# Patient Record
Sex: MALE | Race: WHITE | NOT HISPANIC OR LATINO | Employment: OTHER | URBAN - NONMETROPOLITAN AREA
[De-identification: names, ages, dates, MRNs, and addresses within clinical notes are randomized per-mention and may not be internally consistent; named-entity substitution may affect disease eponyms.]

---

## 2017-07-11 ENCOUNTER — OFFICE VISIT (OUTPATIENT)
Dept: GASTROENTEROLOGY | Facility: CLINIC | Age: 77
End: 2017-07-11

## 2017-07-11 VITALS
HEART RATE: 76 BPM | DIASTOLIC BLOOD PRESSURE: 82 MMHG | SYSTOLIC BLOOD PRESSURE: 168 MMHG | OXYGEN SATURATION: 100 % | BODY MASS INDEX: 22.76 KG/M2 | HEIGHT: 70 IN | WEIGHT: 159 LBS | TEMPERATURE: 97.8 F

## 2017-07-11 DIAGNOSIS — Z80.0 FAMILY HX OF COLON CANCER: ICD-10-CM

## 2017-07-11 DIAGNOSIS — I10 ESSENTIAL HYPERTENSION: ICD-10-CM

## 2017-07-11 DIAGNOSIS — Z86.010 HX OF COLONIC POLYP: Primary | ICD-10-CM

## 2017-07-11 PROBLEM — Z86.0100 HX OF COLONIC POLYP: Status: ACTIVE | Noted: 2017-07-11

## 2017-07-11 PROCEDURE — S0285 CNSLT BEFORE SCREEN COLONOSC: HCPCS | Performed by: NURSE PRACTITIONER

## 2017-07-11 RX ORDER — LATANOPROST 50 UG/ML
1 SOLUTION/ DROPS OPHTHALMIC DAILY
COMMUNITY
Start: 2017-06-27

## 2017-07-11 RX ORDER — ASPIRIN 81 MG/1
81 TABLET ORAL DAILY
COMMUNITY
End: 2022-09-13

## 2017-07-11 NOTE — PROGRESS NOTES
[facility]    Primary Physician Jorge Shepherd MD    7/11/2017    Oral Dey   1940      Chief Complaint   Patient presents with   • Colonoscopy       Subjective     HPI    Oral Dey is a 76 y.o. male who presents as a referral for preventative maintenance. He has no complaints of nausea or vomiting. No change in bowels. No wt loss. No BRBPR. No melena. No abdominal pain. There is family h/o colon cancer.  He has h/o colon polyps.  Last cscope was 6/2012 , recall recommended 5 yr.       Past Medical History:   Diagnosis Date   • CLL (chronic lymphocytic leukemia)    • Colon polyp    • GERD (gastroesophageal reflux disease)    • Hiatal hernia    • Hypertension        Past Surgical History:   Procedure Laterality Date   • COLONOSCOPY  06/13/2012   • HERNIA REPAIR         Outpatient Prescriptions Marked as Taking for the 7/11/17 encounter (Office Visit) with RENUKA Gonzales   Medication Sig Dispense Refill   • aspirin 81 MG EC tablet Take 81 mg by mouth Daily.     • latanoprost (XALATAN) 0.005 % ophthalmic solution Daily.     • metoprolol tartrate (LOPRESSOR) 25 MG tablet Daily.         Allergies   Allergen Reactions   • Augmentin [Amoxicillin-Pot Clavulanate]    • Latex      And band aids.        Social History     Social History   • Marital status:      Spouse name: N/A   • Number of children: N/A   • Years of education: N/A     Occupational History   • Not on file.     Social History Main Topics   • Smoking status: Former Smoker   • Smokeless tobacco: Never Used   • Alcohol use No   • Drug use: No   • Sexual activity: Not on file     Other Topics Concern   • Not on file     Social History Narrative       Family History   Problem Relation Age of Onset   • Colon cancer Maternal Grandfather      in his 60's.        Review of Systems   Constitutional: Negative for appetite change, chills, fatigue, fever and unexpected weight change.   HENT: Positive for hearing loss  (wears hearing aid). Negative for sore throat and trouble swallowing.    Respiratory: Negative for cough, chest tightness, shortness of breath and wheezing.    Cardiovascular: Negative for chest pain and palpitations.   Gastrointestinal: Negative for abdominal distention, abdominal pain, anal bleeding, blood in stool, constipation, diarrhea, nausea, rectal pain and vomiting.        As mentioned in hpi   Genitourinary: Negative for difficulty urinating, dysuria and hematuria.   Musculoskeletal: Negative for arthralgias and back pain.   Skin: Negative for color change and rash.   Neurological: Negative for dizziness, tremors, seizures, syncope, light-headedness and headaches.   Hematological: Negative for adenopathy. Does not bruise/bleed easily.   Psychiatric/Behavioral: Negative for confusion. The patient is not nervous/anxious.        Objective     Vitals:    07/11/17 1040   BP: 168/82   Pulse: 76   Temp: 97.8 °F (36.6 °C)   SpO2: 100%     Last 2 weights    07/11/17  1040   Weight: 159 lb (72.1 kg)     Body mass index is 22.81 kg/(m^2).    Physical Exam   Constitutional: He is oriented to person, place, and time. He appears well-developed and well-nourished. No distress.   HENT:   Head: Normocephalic and atraumatic.   Mouth/Throat: Oropharynx is clear and moist.   Eyes: Pupils are equal, round, and reactive to light. No scleral icterus.   Neck: Normal range of motion. Neck supple. No JVD present. No tracheal deviation present.   Cardiovascular: Normal rate, regular rhythm, normal heart sounds and intact distal pulses.  Exam reveals no gallop and no friction rub.    No murmur heard.  Pulmonary/Chest: Effort normal and breath sounds normal. No stridor. No respiratory distress. He has no wheezes. He has no rales.   Abdominal: Soft. Bowel sounds are normal. He exhibits no distension and no mass. There is no tenderness. There is no rebound and no guarding.   Musculoskeletal: Normal range of motion. He exhibits no edema  or deformity.   Neurological: He is alert and oriented to person, place, and time.   Skin: Skin is warm and dry. No rash noted.   Psychiatric: He has a normal mood and affect. His behavior is normal.   Vitals reviewed.      Imaging Results (most recent)     None          Assessment/Plan     Oral was seen today for colonoscopy.    Diagnoses and all orders for this visit:    Hx of colonic polyp  -     External Facility Surgical/Procedural Request    Family hx of colon cancer  -     External Facility Surgical/Procedural Request    Essential hypertension    Other orders  -     polyethylene glycol (GOLYTELY) 236 G solution; Take 4,000 mL by mouth 1 (One) Time for 1 dose. Take as directed per instruction sheet.      Plan for colonoscopy.Instructed patient to take heart or blood pressure medication a.m. of procedure if that is when normally taken.    COLONOSCOPY   All risks, benefits, alternatives, and indications of colonoscopy procedure have been discussed with the patient. Risks to include perforation of the colon requiring possible surgery or colostomy, risk of bleeding from biopsies or removal of colon tissue, possibility of missing a colon polyp or cancer, or adverse drug reaction.  Benefits to include the diagnosis and management of disease of the colon and rectum. Alternatives to include barium enema, radiographic evaluation, lab testing or no intervention. Pt verbalizes understanding and agrees.         RENUKA Peacock      EMR Dragon/transcription disclaimer:  Much of this encounter note is electronic transcription/translation of spoken language to printed text.  The electronic translation of spoken language may be erroneous, or at times, nonsensical words or phrases may be inadvertently transcribed.  Although I have reviewed the note for such errors, some may still exist.

## 2017-08-01 ENCOUNTER — HOSPITAL ENCOUNTER (OUTPATIENT)
Dept: HOSPITAL 58 - SURG | Age: 77
Discharge: HOME | End: 2017-08-01
Attending: INTERNAL MEDICINE

## 2017-08-01 ENCOUNTER — OUTSIDE FACILITY SERVICE (OUTPATIENT)
Dept: GASTROENTEROLOGY | Facility: CLINIC | Age: 77
End: 2017-08-01

## 2017-08-01 VITALS — BODY MASS INDEX: 23.9 KG/M2

## 2017-08-01 VITALS — SYSTOLIC BLOOD PRESSURE: 132 MMHG | DIASTOLIC BLOOD PRESSURE: 67 MMHG | TEMPERATURE: 97.2 F

## 2017-08-01 DIAGNOSIS — Z80.0: ICD-10-CM

## 2017-08-01 DIAGNOSIS — Z09: Primary | ICD-10-CM

## 2017-08-01 DIAGNOSIS — Z86.010: ICD-10-CM

## 2017-08-01 DIAGNOSIS — K64.8: ICD-10-CM

## 2017-08-01 PROCEDURE — G0105 COLORECTAL SCRN; HI RISK IND: HCPCS | Performed by: INTERNAL MEDICINE

## 2017-08-02 NOTE — OP
INDICATIONS FOR PROCEDURE: 76-year-old gentleman presents for colonoscopy exam.
  He has a history of adenomatous polyps with the last colonoscopy five years 
ago.  A grandparent had colon cancer at an elderly age.  He presents now for 
colonoscopy.



MEDICATIONS:  SEE ANESTHESIA NOTES.



PROCEDURE:  COLONOSCOPY.



REPORT:  The risks, benefits, alternatives and limitations were discussed in 
detail with the patient.  Informed consent was obtained.



After adequate sedation was achieved, digital rectal exam revealed good tone, 
no masses.  The colonoscope was introduced into the rectum and advanced under 
direct visual guidance to the cecum.  The cecum was identified by the 
appendiceal orifice and IC valve. I then slowly withdrew the scope in a 
circumferential manner examining the mucosa quite carefully.  I looked on the 
proximal and distal side of folds and flexures as best as possible. I was able 
to retroflex the scope in the right colon as well as left colon. I noted no 
abnormalities throughout the entire length of the colon except for internal 
hemorrhoids on retroflex view of the anal canal. The prep was good. The 
withdrawal time was 7 minutes and 17 seconds.  The patient tolerated the 
procedure well with stable vital signs and pulse oximetry throughout.



IMPRESSION:

1.   SMALL INTERNAL HEMORRHOIDS. 



RECOMMENDATIONS: 

1.   High fiber diet.

2.   Office visit as needed. 

3.   Reviewing his health and his advanced age, I would suggest a colonoscopy 
examination for surveillance again in 5 years, only if he is clinically well. 
We will not put him on our recall but please refer him for colonoscopy if he is 
a reasonable candidate at that time or sooner if needed.



CC:  DR. DAVE SARMIENTO

## 2017-09-01 ENCOUNTER — HOSPITAL ENCOUNTER (OUTPATIENT)
Dept: HOSPITAL 58 - RAD | Age: 77
Discharge: HOME | End: 2017-09-01
Attending: INTERNAL MEDICINE

## 2017-09-01 VITALS — BODY MASS INDEX: 23.9 KG/M2

## 2017-09-01 DIAGNOSIS — M25.432: ICD-10-CM

## 2017-09-01 DIAGNOSIS — M25.532: Primary | ICD-10-CM

## 2017-09-01 NOTE — DI
EXAM:  Two views of the left wrist. 

  

History:  Left wrist pain and swelling. 

  

Findings: There is a subtle lucency of the distal left radius.  No dislocation.  No abnormal calcifi
cations or radiopaque foreign bodies.  Mild to moderate narrowing of the first carpal metacarpal micahel
nt and mild to moderate narrowing of the scaphoid trapezium articulation. 

  

Impression: A subtle linear lucency within the distal left radius near the articular surface probabl
y represents a nutrient foramen.  Nondisplaced fracture is not excluded.

## 2017-09-01 NOTE — DI
EXAM:  Radiographs, left hand 

  

HISTORY:  Left hand pain and swelling. 

  

COMPARISON:  None available. 

  

TECHNIQUE:  Three views. 

  

  

FINDINGS:  Bone mineralization is normal.  There is no fracture or dislocation.  Mild joint space na
rrowing marginal osteophyte formation noted throughout the hand and wrist.  No erosive changes are s
een.  No focal soft tissue abnormality is seen. 

  

---------------------------- 

IMPRESSION: 

Mild osteoarthritis.

## 2017-12-21 ENCOUNTER — APPOINTMENT (OUTPATIENT)
Dept: PREADMISSION TESTING | Facility: HOSPITAL | Age: 77
End: 2017-12-21

## 2017-12-21 VITALS
HEIGHT: 70 IN | RESPIRATION RATE: 18 BRPM | HEART RATE: 68 BPM | DIASTOLIC BLOOD PRESSURE: 71 MMHG | BODY MASS INDEX: 23.01 KG/M2 | OXYGEN SATURATION: 95 % | SYSTOLIC BLOOD PRESSURE: 148 MMHG | WEIGHT: 160.72 LBS

## 2017-12-21 LAB
ALBUMIN SERPL-MCNC: 4.1 G/DL (ref 3.5–5)
ALBUMIN/GLOB SERPL: 1.8 G/DL (ref 1.1–2.5)
ALP SERPL-CCNC: 72 U/L (ref 24–120)
ALT SERPL W P-5'-P-CCNC: 27 U/L (ref 0–54)
ANION GAP SERPL CALCULATED.3IONS-SCNC: 12 MMOL/L (ref 4–13)
AST SERPL-CCNC: 15 U/L (ref 7–45)
BASOPHILS # BLD MANUAL: 0.24 10*3/MM3 (ref 0–0.2)
BASOPHILS NFR BLD AUTO: 3 % (ref 0–2)
BILIRUB SERPL-MCNC: 1.3 MG/DL (ref 0.1–1)
BUN BLD-MCNC: 12 MG/DL (ref 5–21)
BUN/CREAT SERPL: 11.7 (ref 7–25)
CALCIUM SPEC-SCNC: 9.8 MG/DL (ref 8.4–10.4)
CHLORIDE SERPL-SCNC: 103 MMOL/L (ref 98–110)
CO2 SERPL-SCNC: 29 MMOL/L (ref 24–31)
CREAT BLD-MCNC: 1.03 MG/DL (ref 0.5–1.4)
DEPRECATED RDW RBC AUTO: 39.8 FL (ref 40–54)
ERYTHROCYTE [DISTWIDTH] IN BLOOD BY AUTOMATED COUNT: 12.3 % (ref 12–15)
GFR SERPL CREATININE-BSD FRML MDRD: 70 ML/MIN/1.73
GLOBULIN UR ELPH-MCNC: 2.3 GM/DL
GLUCOSE BLD-MCNC: 130 MG/DL (ref 70–100)
HCT VFR BLD AUTO: 38.6 % (ref 40–52)
HGB BLD-MCNC: 13 G/DL (ref 14–18)
LYMPHOCYTES # BLD MANUAL: 2.22 10*3/MM3 (ref 0.72–4.86)
LYMPHOCYTES NFR BLD MANUAL: 28 % (ref 15–45)
LYMPHOCYTES NFR BLD MANUAL: 4 % (ref 4–12)
MCH RBC QN AUTO: 29.7 PG (ref 28–32)
MCHC RBC AUTO-ENTMCNC: 33.7 G/DL (ref 33–36)
MCV RBC AUTO: 88.3 FL (ref 82–95)
MONOCYTES # BLD AUTO: 0.32 10*3/MM3 (ref 0.19–1.3)
NEUTROPHILS # BLD AUTO: 3.65 10*3/MM3 (ref 1.87–8.4)
NEUTROPHILS NFR BLD MANUAL: 45 % (ref 39–78)
NEUTS BAND NFR BLD MANUAL: 1 % (ref 0–10)
PLATELET # BLD AUTO: 119 10*3/MM3 (ref 130–400)
PMV BLD AUTO: 9.5 FL (ref 6–12)
POTASSIUM BLD-SCNC: 3.8 MMOL/L (ref 3.5–5.3)
PROT SERPL-MCNC: 6.4 G/DL (ref 6.3–8.7)
RBC # BLD AUTO: 4.37 10*6/MM3 (ref 4.8–5.9)
RBC MORPH BLD: NORMAL
SCAN SLIDE: NORMAL
SMALL PLATELETS BLD QL SMEAR: ABNORMAL
SODIUM BLD-SCNC: 144 MMOL/L (ref 135–145)
VARIANT LYMPHS NFR BLD MANUAL: 19 % (ref 0–5)
WBC MORPH BLD: NORMAL
WBC NRBC COR # BLD: 7.94 10*3/MM3 (ref 4.8–10.8)

## 2017-12-21 PROCEDURE — 85007 BL SMEAR W/DIFF WBC COUNT: CPT | Performed by: SPECIALIST

## 2017-12-21 PROCEDURE — 93010 ELECTROCARDIOGRAM REPORT: CPT | Performed by: INTERNAL MEDICINE

## 2017-12-21 PROCEDURE — 85025 COMPLETE CBC W/AUTO DIFF WBC: CPT | Performed by: SPECIALIST

## 2017-12-21 PROCEDURE — 93005 ELECTROCARDIOGRAM TRACING: CPT

## 2017-12-21 PROCEDURE — 80053 COMPREHEN METABOLIC PANEL: CPT | Performed by: SPECIALIST

## 2017-12-21 PROCEDURE — 36415 COLL VENOUS BLD VENIPUNCTURE: CPT

## 2017-12-21 RX ORDER — ANTIOX #8/OM3/DHA/EPA/LUT/ZEAX 250-2.5 MG
1 CAPSULE ORAL 2 TIMES DAILY
COMMUNITY

## 2017-12-21 NOTE — DISCHARGE INSTRUCTIONS
DAY OF SURGERY INSTRUCTIONS        YOUR SURGEON: April MARITA    PROCEDURE: RIGHT INGUINAL HERNIA REPAIR WITH MESH    DATE OF SURGERY: December 28, 2017    ARRIVAL TIME: AS DIRECTED BY OFFICE    DAY OF SURGERY TAKE ONLY THESE MEDICATIONS: METOPROLOL        BEFORE YOU COME TO THE HOSPITAL  (Pre-op instructions)  • Do not eat, drink, smoke or chew gum after midnight the night before surgery.  This also includes no mints.  • Morning of surgery take only the medicines you have been instructed with a sip of water unless otherwise instructed  by your physician.  • Do not shave, wear makeup or dark nail polish.  • Remove all jewelry including rings.  • Leave anything you consider valuable at home.  • Leave your suitcase in the car until after your surgery.  • Bring the following with you if applicable:  o Picture ID and insurance, Medicare or Medicaid cards  o Co-pay/deductible required by insurance (cash, check, credit card)  o Copy of advance directive, living will or power-of- documents if not brought to PAT  o CPAP or BIPAP mask and tubing  o Relaxation aids (MP3 player, book, magazine)  • On the day of surgery check in at registration located at the main entrance of the hospital.       Outpatient Surgery Guidelines, Adult  Outpatient procedures are those for which the person having the procedure is allowed to go home the same day as the procedure. Various procedures are done on an outpatient basis. You should follow some general guidelines if you will be having an outpatient procedure.  LET YOUR HEALTH CARE PROVIDER KNOW ABOUT:  · Any allergies you have.  · All medicines you are taking, including vitamins, herbs, eye drops, creams, and over-the-counter medicines.  · Previous problems you or members of your family have had with the use of anesthetics.  · Any blood disorders you have.  · Previous surgeries you have had.  · Medical conditions you have.  RISKS AND COMPLICATIONS  Your health care provider will  discuss possible risks and complications with you before surgery. Common risks and complications include:    · Problems due to the use of anesthetics.  · Blood loss and replacement (does not apply to minor surgical procedures).  · Temporary increase in pain due to surgery.  · Uncorrected pain or problems that the surgery was meant to correct.  · Infection.  · New damage.  BEFORE THE PROCEDURE  · Ask your health care provider about changing or stopping your regular medicines. You may need to stop taking certain medicines in the days or weeks before the procedure.  · Stop smoking at least 2 weeks before surgery. This lowers your risk for complications during and after surgery. Ask your health care provider for help with this if needed.  · Eat your usual meals and a light supper the day before surgery. Continue fluid intake. Do not drink alcohol.  · Do not eat or drink after midnight the night before your surgery.   · Arrange for someone to take you home and to stay with you for 24 hours after the procedure. Medicine given for your procedure may affect your ability to drive or to care for yourself.  · Call your health care provider's office if you develop an illness or problem that may prevent you from safely having your procedure.  AFTER THE PROCEDURE  After surgery, you will be taken to a recovery area, where your progress will be monitored. If there are no complications, you will be allowed to go home when you are awake, stable, and taking fluids well. You may have numbness around the surgical site. Healing will take some time. You will have tenderness at the surgical site and may have some swelling and bruising. You may also have some nausea.  HOME CARE INSTRUCTIONS  · Do not drive for 24 hours, or as directed by your health care provider. Do not drive while taking prescription pain medicines.  · Do not drink alcohol for 24 hours.  · Do not make important decisions or sign legal documents for 24 hours.  · You may  resume a normal diet and activities as directed.  · Do not lift anything heavier than 10 pounds (4.5 kg) or play contact sports until your health care provider says it is okay.  · Change your bandages (dressings) as directed.  · Only take over-the-counter or prescription medicines as directed by your health care provider.  · Follow up with your health care provider as directed.  SEEK MEDICAL CARE IF:  · You have increased bleeding (more than a small spot) from the surgical site.  · You have redness, swelling, or increasing pain in the wound.  · You see pus coming from the wound.  · You have a fever.  · You notice a bad smell coming from the wound or dressing.  · You feel lightheaded or faint.  · You develop a rash.  · You have trouble breathing.  · You develop allergies.  MAKE SURE YOU:  · Understand these instructions.  · Will watch your condition.  · Will get help right away if you are not doing well or get worse.     This information is not intended to replace advice given to you by your health care provider. Make sure you discuss any questions you have with your health care provider.     Document Released: 09/12/2002 Document Revised: 05/03/2016 Document Reviewed: 05/22/2014  [employer] Interactive Patient Education ©2016 [employer]       Fall Prevention in Hospitals, Adult  As a hospital patient, your condition and the treatments you receive can increase your risk for falls. Some additional risk factors for falls in a hospital include:  · Being in an unfamiliar environment.  · Being on bed rest.  · Your surgery.  · Taking certain medicines.  · Your tubing requirements, such as intravenous (IV) therapy or catheters.  It is important that you learn how to decrease fall risks while at the hospital. Below are important tips that can help prevent falls.  SAFETY TIPS FOR PREVENTING FALLS  Talk about your risk of falling.  · Ask your health care provider why you are at risk for falling. Is it your medicine, illness,  tubing placement, or something else?  · Make a plan with your health care provider to keep you safe from falls.  · Ask your health care provider or pharmacist about side effects of your medicines. Some medicines can make you dizzy or affect your coordination.  Ask for help.  · Ask for help before getting out of bed. You may need to press your call button.  · Ask for assistance in getting safely to the toilet.  · Ask for a walker or cane to be put at your bedside. Ask that most of the side rails on your bed be placed up before your health care provider leaves the room.  · Ask family or friends to sit with you.  · Ask for things that are out of your reach, such as your glasses, hearing aids, telephone, bedside table, or call button.  Follow these tips to avoid falling:  · Stay lying or seated, rather than standing, while waiting for help.  · Wear rubber-soled slippers or shoes whenever you walk in the hospital.  · Avoid quick, sudden movements.  ¨ Change positions slowly.  ¨ Sit on the side of your bed before standing.  ¨ Stand up slowly and wait before you start to walk.  · Let your health care provider know if there is a spill on the floor.  · Pay careful attention to the medical equipment, electrical cords, and tubes around you.  · When you need help, use your call button by your bed or in the bathroom. Wait for one of your health care providers to help you.  · If you feel dizzy or unsure of your footing, return to bed and wait for assistance.  · Avoid being distracted by the TV, telephone, or another person in your room.  · Do not lean or support yourself on rolling objects, such as IV poles or bedside tables.     This information is not intended to replace advice given to you by your health care provider. Make sure you discuss any questions you have with your health care provider.     Document Released: 12/15/2001 Document Revised: 01/08/2016 Document Reviewed: 08/25/2013  [employer] Interactive Patient Education  ©2016 [employer]       Surgical Site Infections FAQs  What is a Surgical Site Infection (SSI)?  A surgical site infection is an infection that occurs after surgery in the part of the body where the surgery took place. Most patients who have surgery do not develop an infection. However, infections develop in about 1 to 3 out of every 100 patients who have surgery.  Some of the common symptoms of a surgical site infection are:  · Redness and pain around the area where you had surgery  · Drainage of cloudy fluid from your surgical wound  · Fever  Can SSIs be treated?  Yes. Most surgical site infections can be treated with antibiotics. The antibiotic given to you depends on the bacteria (germs) causing the infection. Sometimes patients with SSIs also need another surgery to treat the infection.  What are some of the things that hospitals are doing to prevent SSIs?  To prevent SSIs, doctors, nurses, and other healthcare providers:  · Clean their hands and arms up to their elbows with an antiseptic agent just before the surgery.  · Clean their hands with soap and water or an alcohol-based hand rub before and after caring for each patient.  · May remove some of your hair immediately before your surgery using electric clippers if the hair is in the same area where the procedure will occur. They should not shave you with a razor.  · Wear special hair covers, masks, gowns, and gloves during surgery to keep the surgery area clean.  · Give you antibiotics before your surgery starts. In most cases, you should get antibiotics within 60 minutes before the surgery starts and the antibiotics should be stopped within 24 hours after surgery.  · Clean the skin at the site of your surgery with a special soap that kills germs.  What can I do to help prevent SSIs?  Before your surgery:  · Tell your doctor about other medical problems you may have. Health problems such as allergies, diabetes, and obesity could affect your surgery and  your treatment.  · Quit smoking. Patients who smoke get more infections. Talk to your doctor about how you can quit before your surgery.  · Do not shave near where you will have surgery. Shaving with a razor can irritate your skin and make it easier to develop an infection.  At the time of your surgery:  · Speak up if someone tries to shave you with a razor before surgery. Ask why you need to be shaved and talk with your surgeon if you have any concerns.  · Ask if you will get antibiotics before surgery.  After your surgery:  · Make sure that your healthcare providers clean their hands before examining you, either with soap and water or an alcohol-based hand rub.  · If you do not see your providers clean their hands, please ask them to do so.  · Family and friends who visit you should not touch the surgical wound or dressings.  · Family and friends should clean their hands with soap and water or an alcohol-based hand rub before and after visiting you. If you do not see them clean their hands, ask them to clean their hands.  What do I need to do when I go home from the hospital?  · Before you go home, your doctor or nurse should explain everything you need to know about taking care of your wound. Make sure you understand how to care for your wound before you leave the hospital.  · Always clean your hands before and after caring for your wound.  · Before you go home, make sure you know who to contact if you have questions or problems after you get home.  · If you have any symptoms of an infection, such as redness and pain at the surgery site, drainage, or fever, call your doctor immediately.  If you have additional questions, please ask your doctor or nurse.  Developed and co-sponsored by The Society for Healthcare Epidemiology of [place] (SHEA); Infectious Diseases Society of [place] (IDSA); American Hospital Association; Association for Professionals in Infection Control and Epidemiology (APIC); Centers for Disease  Control and Prevention (CDC); and The Joint Commission.     This information is not intended to replace advice given to you by your health care provider. Make sure you discuss any questions you have with your health care provider.     Document Released: 12/23/2014 Document Revised: 01/08/2016 Document Reviewed: 03/02/2016  [employer] Interactive Patient Education ©2016 [employer]       [facility]  CHG 4% Patient Instruction Sheet    Preparing the Skin Before Surgery  Preparing or “prepping” skin before surgery can reduce the risk of infection at the surgical site. To make the process easier,[facility] has chosen 4% Chlorhexidine Gluconate (CHG) antiseptic solution.   The steps below outline the prepping process and should be carefully followed.                                                                                                                                                      Prep the skin at the following time(s):                                                      We recommend you shower the night before surgery, and again the morning of surgery with the 4% CHG antiseptic solution using half of the bottle and a cloth each time.  Dress in clean clothes/sleepwear after showering.  See instructions below for application.          Do not apply any lotions or moisturizers.       Do not shave the area to be prepped for at least 2 days prior to surgery.    Clipping the hair may be done immediately prior to your surgery at the hospital    if needed.    Directions:  Thoroughly rinse your body with water.  Apply 4% CHG to a cloth and wash skin gently, paying special attention to the operative site.  Rinse again thoroughly.  Once you have begun using this product do not apply anything else to your skin. If itching or redness persists, rinse affected areas and discontinue use.    When using this product:  • Keep out of eyes, ears, and mouth.  • If solution should contact these areas, rinse out promptly  and thoroughly with water.  • For external use only.  • Do not use in genital area, on your face or head.      PATIENT/FAMILY/RESPONSIBLE PARTY VERBALIZES UNDERSTANDING OF ABOVE EDUCATION.  COPY OF PAIN SCALE GIVEN AND REVIEWED WITH VERBALIZED UNDERSTANDING.

## 2017-12-28 ENCOUNTER — ANESTHESIA EVENT (OUTPATIENT)
Dept: PERIOP | Facility: HOSPITAL | Age: 77
End: 2017-12-28

## 2017-12-28 ENCOUNTER — ANESTHESIA (OUTPATIENT)
Dept: PERIOP | Facility: HOSPITAL | Age: 77
End: 2017-12-28

## 2017-12-28 ENCOUNTER — HOSPITAL ENCOUNTER (OUTPATIENT)
Facility: HOSPITAL | Age: 77
Setting detail: HOSPITAL OUTPATIENT SURGERY
Discharge: HOME | End: 2017-12-28
Attending: SPECIALIST | Admitting: SPECIALIST

## 2017-12-28 VITALS
RESPIRATION RATE: 16 BRPM | SYSTOLIC BLOOD PRESSURE: 142 MMHG | OXYGEN SATURATION: 96 % | TEMPERATURE: 97.6 F | DIASTOLIC BLOOD PRESSURE: 62 MMHG | HEART RATE: 69 BPM

## 2017-12-28 PROCEDURE — 25010000002 VANCOMYCIN PER 500 MG: Performed by: SPECIALIST

## 2017-12-28 PROCEDURE — 25010000002 MIDAZOLAM PER 1 MG: Performed by: ANESTHESIOLOGY

## 2017-12-28 PROCEDURE — 25010000002 FENTANYL CITRATE (PF) 250 MCG/5ML SOLUTION: Performed by: NURSE ANESTHETIST, CERTIFIED REGISTERED

## 2017-12-28 PROCEDURE — C1781 MESH (IMPLANTABLE): HCPCS | Performed by: SPECIALIST

## 2017-12-28 PROCEDURE — 25010000002 ONDANSETRON PER 1 MG: Performed by: NURSE ANESTHETIST, CERTIFIED REGISTERED

## 2017-12-28 PROCEDURE — 25010000002 DEXAMETHASONE PER 1 MG: Performed by: NURSE ANESTHETIST, CERTIFIED REGISTERED

## 2017-12-28 PROCEDURE — 25010000002 PROPOFOL 10 MG/ML EMULSION: Performed by: NURSE ANESTHETIST, CERTIFIED REGISTERED

## 2017-12-28 PROCEDURE — 25010000002 SUCCINYLCHOLINE PER 20 MG: Performed by: NURSE ANESTHETIST, CERTIFIED REGISTERED

## 2017-12-28 DEVICE — BARD MESH PERFIX PLUG, EXTRA LARGE
Type: IMPLANTABLE DEVICE | Site: INGUINAL | Status: FUNCTIONAL
Brand: BARD MESH PERFIX PLUG

## 2017-12-28 RX ORDER — SUCCINYLCHOLINE CHLORIDE 20 MG/ML
INJECTION INTRAMUSCULAR; INTRAVENOUS AS NEEDED
Status: DISCONTINUED | OUTPATIENT
Start: 2017-12-28 | End: 2017-12-28 | Stop reason: SURG

## 2017-12-28 RX ORDER — MIDAZOLAM HYDROCHLORIDE 1 MG/ML
2 INJECTION INTRAMUSCULAR; INTRAVENOUS
Status: DISCONTINUED | OUTPATIENT
Start: 2017-12-28 | End: 2017-12-28 | Stop reason: HOSPADM

## 2017-12-28 RX ORDER — PROPOFOL 10 MG/ML
VIAL (ML) INTRAVENOUS AS NEEDED
Status: DISCONTINUED | OUTPATIENT
Start: 2017-12-28 | End: 2017-12-28 | Stop reason: SURG

## 2017-12-28 RX ORDER — LIDOCAINE HYDROCHLORIDE 40 MG/ML
SOLUTION TOPICAL AS NEEDED
Status: DISCONTINUED | OUTPATIENT
Start: 2017-12-28 | End: 2017-12-28 | Stop reason: SURG

## 2017-12-28 RX ORDER — ROCURONIUM BROMIDE 10 MG/ML
INJECTION, SOLUTION INTRAVENOUS AS NEEDED
Status: DISCONTINUED | OUTPATIENT
Start: 2017-12-28 | End: 2017-12-28 | Stop reason: SURG

## 2017-12-28 RX ORDER — SODIUM CHLORIDE 0.9 % (FLUSH) 0.9 %
3 SYRINGE (ML) INJECTION AS NEEDED
Status: DISCONTINUED | OUTPATIENT
Start: 2017-12-28 | End: 2017-12-28 | Stop reason: HOSPADM

## 2017-12-28 RX ORDER — CLINDAMYCIN PHOSPHATE 600 MG/50ML
600 INJECTION INTRAVENOUS
Status: COMPLETED | OUTPATIENT
Start: 2017-12-28 | End: 2017-12-28

## 2017-12-28 RX ORDER — NALOXONE HCL 0.4 MG/ML
0.04 VIAL (ML) INJECTION AS NEEDED
Status: DISCONTINUED | OUTPATIENT
Start: 2017-12-28 | End: 2017-12-28 | Stop reason: HOSPADM

## 2017-12-28 RX ORDER — FENTANYL CITRATE 50 UG/ML
INJECTION, SOLUTION INTRAMUSCULAR; INTRAVENOUS AS NEEDED
Status: DISCONTINUED | OUTPATIENT
Start: 2017-12-28 | End: 2017-12-28

## 2017-12-28 RX ORDER — METOCLOPRAMIDE HYDROCHLORIDE 5 MG/ML
5 INJECTION INTRAMUSCULAR; INTRAVENOUS
Status: DISCONTINUED | OUTPATIENT
Start: 2017-12-28 | End: 2017-12-28 | Stop reason: HOSPADM

## 2017-12-28 RX ORDER — DEXAMETHASONE SODIUM PHOSPHATE 4 MG/ML
INJECTION, SOLUTION INTRA-ARTICULAR; INTRALESIONAL; INTRAMUSCULAR; INTRAVENOUS; SOFT TISSUE AS NEEDED
Status: DISCONTINUED | OUTPATIENT
Start: 2017-12-28 | End: 2017-12-28 | Stop reason: SURG

## 2017-12-28 RX ORDER — MORPHINE SULFATE 2 MG/ML
2 INJECTION, SOLUTION INTRAMUSCULAR; INTRAVENOUS AS NEEDED
Status: DISCONTINUED | OUTPATIENT
Start: 2017-12-28 | End: 2017-12-28 | Stop reason: HOSPADM

## 2017-12-28 RX ORDER — HYDROCODONE BITARTRATE AND ACETAMINOPHEN 5; 325 MG/1; MG/1
1-2 TABLET ORAL EVERY 4 HOURS PRN
Qty: 30 TABLET | Refills: 0 | Status: SHIPPED | OUTPATIENT
Start: 2017-12-28 | End: 2022-03-01

## 2017-12-28 RX ORDER — LABETALOL HYDROCHLORIDE 5 MG/ML
5 INJECTION, SOLUTION INTRAVENOUS
Status: DISCONTINUED | OUTPATIENT
Start: 2017-12-28 | End: 2017-12-28 | Stop reason: HOSPADM

## 2017-12-28 RX ORDER — SODIUM CHLORIDE, SODIUM LACTATE, POTASSIUM CHLORIDE, CALCIUM CHLORIDE 600; 310; 30; 20 MG/100ML; MG/100ML; MG/100ML; MG/100ML
100 INJECTION, SOLUTION INTRAVENOUS CONTINUOUS
Status: DISCONTINUED | OUTPATIENT
Start: 2017-12-28 | End: 2017-12-28 | Stop reason: HOSPADM

## 2017-12-28 RX ORDER — LIDOCAINE HYDROCHLORIDE 20 MG/ML
INJECTION, SOLUTION INFILTRATION; PERINEURAL AS NEEDED
Status: DISCONTINUED | OUTPATIENT
Start: 2017-12-28 | End: 2017-12-28 | Stop reason: SURG

## 2017-12-28 RX ORDER — IPRATROPIUM BROMIDE AND ALBUTEROL SULFATE 2.5; .5 MG/3ML; MG/3ML
3 SOLUTION RESPIRATORY (INHALATION) ONCE AS NEEDED
Status: DISCONTINUED | OUTPATIENT
Start: 2017-12-28 | End: 2017-12-28 | Stop reason: HOSPADM

## 2017-12-28 RX ORDER — FLUMAZENIL 0.1 MG/ML
0.2 INJECTION INTRAVENOUS AS NEEDED
Status: DISCONTINUED | OUTPATIENT
Start: 2017-12-28 | End: 2017-12-28 | Stop reason: HOSPADM

## 2017-12-28 RX ORDER — SODIUM CHLORIDE 0.9 % (FLUSH) 0.9 %
1-10 SYRINGE (ML) INJECTION AS NEEDED
Status: DISCONTINUED | OUTPATIENT
Start: 2017-12-28 | End: 2017-12-28 | Stop reason: HOSPADM

## 2017-12-28 RX ORDER — HYDROCODONE BITARTRATE AND ACETAMINOPHEN 5; 325 MG/1; MG/1
1 TABLET ORAL ONCE AS NEEDED
Status: DISCONTINUED | OUTPATIENT
Start: 2017-12-28 | End: 2017-12-28 | Stop reason: HOSPADM

## 2017-12-28 RX ORDER — MEPERIDINE HYDROCHLORIDE 25 MG/ML
12.5 INJECTION INTRAMUSCULAR; INTRAVENOUS; SUBCUTANEOUS
Status: DISCONTINUED | OUTPATIENT
Start: 2017-12-28 | End: 2017-12-28 | Stop reason: HOSPADM

## 2017-12-28 RX ORDER — FENTANYL CITRATE 50 UG/ML
INJECTION, SOLUTION INTRAMUSCULAR; INTRAVENOUS AS NEEDED
Status: DISCONTINUED | OUTPATIENT
Start: 2017-12-28 | End: 2017-12-28 | Stop reason: SURG

## 2017-12-28 RX ORDER — SODIUM CHLORIDE, SODIUM LACTATE, POTASSIUM CHLORIDE, CALCIUM CHLORIDE 600; 310; 30; 20 MG/100ML; MG/100ML; MG/100ML; MG/100ML
1000 INJECTION, SOLUTION INTRAVENOUS CONTINUOUS
Status: DISCONTINUED | OUTPATIENT
Start: 2017-12-28 | End: 2017-12-28 | Stop reason: HOSPADM

## 2017-12-28 RX ORDER — ONDANSETRON 2 MG/ML
INJECTION INTRAMUSCULAR; INTRAVENOUS AS NEEDED
Status: DISCONTINUED | OUTPATIENT
Start: 2017-12-28 | End: 2017-12-28 | Stop reason: SURG

## 2017-12-28 RX ORDER — HYDRALAZINE HYDROCHLORIDE 20 MG/ML
5 INJECTION INTRAMUSCULAR; INTRAVENOUS
Status: DISCONTINUED | OUTPATIENT
Start: 2017-12-28 | End: 2017-12-28 | Stop reason: HOSPADM

## 2017-12-28 RX ORDER — ONDANSETRON 2 MG/ML
4 INJECTION INTRAMUSCULAR; INTRAVENOUS AS NEEDED
Status: DISCONTINUED | OUTPATIENT
Start: 2017-12-28 | End: 2017-12-28 | Stop reason: HOSPADM

## 2017-12-28 RX ORDER — MIDAZOLAM HYDROCHLORIDE 1 MG/ML
1 INJECTION INTRAMUSCULAR; INTRAVENOUS
Status: DISCONTINUED | OUTPATIENT
Start: 2017-12-28 | End: 2017-12-28 | Stop reason: HOSPADM

## 2017-12-28 RX ADMIN — ONDANSETRON HYDROCHLORIDE 4 MG: 2 SOLUTION INTRAMUSCULAR; INTRAVENOUS at 11:10

## 2017-12-28 RX ADMIN — FENTANYL CITRATE 125 MCG: 50 INJECTION INTRAMUSCULAR; INTRAVENOUS at 10:38

## 2017-12-28 RX ADMIN — LIDOCAINE HYDROCHLORIDE 0.5 ML: 10 INJECTION, SOLUTION EPIDURAL; INFILTRATION; INTRACAUDAL; PERINEURAL at 08:58

## 2017-12-28 RX ADMIN — SUCCINYLCHOLINE CHLORIDE 100 MG: 20 INJECTION, SOLUTION INTRAMUSCULAR; INTRAVENOUS at 10:38

## 2017-12-28 RX ADMIN — PROPOFOL 100 MG: 10 INJECTION, EMULSION INTRAVENOUS at 10:38

## 2017-12-28 RX ADMIN — MIDAZOLAM HYDROCHLORIDE 2 MG: 1 INJECTION, SOLUTION INTRAMUSCULAR; INTRAVENOUS at 09:54

## 2017-12-28 RX ADMIN — LIDOCAINE HYDROCHLORIDE 1 EACH: 40 SOLUTION TOPICAL at 10:38

## 2017-12-28 RX ADMIN — SODIUM CHLORIDE, POTASSIUM CHLORIDE, SODIUM LACTATE AND CALCIUM CHLORIDE 1000 ML: 600; 310; 30; 20 INJECTION, SOLUTION INTRAVENOUS at 08:59

## 2017-12-28 RX ADMIN — CLINDAMYCIN PHOSPHATE 600 MG: 12 INJECTION, SOLUTION INTRAVENOUS at 10:38

## 2017-12-28 RX ADMIN — DEXAMETHASONE SODIUM PHOSPHATE 4 MG: 4 INJECTION, SOLUTION INTRAMUSCULAR; INTRAVENOUS at 11:10

## 2017-12-28 RX ADMIN — ROCURONIUM BROMIDE 10 MG: 10 INJECTION INTRAVENOUS at 10:38

## 2017-12-28 RX ADMIN — LIDOCAINE HYDROCHLORIDE 100 MG: 20 INJECTION, SOLUTION INFILTRATION; PERINEURAL at 10:38

## 2017-12-28 RX ADMIN — HYDROCODONE BITARTRATE AND ACETAMINOPHEN 1 TABLET: 5; 325 TABLET ORAL at 13:15

## 2017-12-28 NOTE — ANESTHESIA PREPROCEDURE EVALUATION
Anesthesia Evaluation     Patient summary reviewed   no history of anesthetic complications:  NPO Solid Status: > 8 hours  NPO Liquid Status: > 8 hours     Airway   Mallampati: I  TM distance: >3 FB  Neck ROM: full  no difficulty expected  Dental - normal exam     Pulmonary - negative pulmonary ROS and normal exam   (-) COPD, asthma, sleep apnea, not a smoker  Cardiovascular - normal exam  Exercise tolerance: good (4-7 METS)    ECG reviewed  Patient on routine beta blocker and Beta blocker given within 24 hours of surgery    (+) hypertension,     ROS comment: Patient reports took B Blocker last pm at 1730    Neuro/Psych- negative ROS  GI/Hepatic/Renal/Endo    (+)  GERD,   (-) liver disease, no renal disease, diabetes    Musculoskeletal     Abdominal    Substance History      OB/GYN          Other      history of cancer (CLL)                                          Anesthesia Plan    ASA 2     general     intravenous induction   Anesthetic plan and risks discussed with patient.

## 2017-12-28 NOTE — ANESTHESIA POSTPROCEDURE EVALUATION
Patient: Oral Dey    Procedure Summary     Date Anesthesia Start Anesthesia Stop Room / Location    12/28/17 1036 1141 [facility] OR 06 / [facility] OR       Procedure Diagnosis Surgeon Provider    RIGHT INGUINAL HERNIA REPAIR WITH MESH  (Right Abdomen) (RIGHT INGUINAL HERNIA ) MD Nigel Howard CRNA          Anesthesia Type: general  Last vitals  BP   143/66 (12/28/17 1245)   Temp   97.6 °F (36.4 °C) (12/28/17 1205)   Pulse   68 (12/28/17 1245)   Resp   16 (12/28/17 1245)     SpO2   98 % (12/28/17 1245)     Post Anesthesia Care and Evaluation    Patient location during evaluation: PACU  Patient participation: complete - patient participated  Level of consciousness: awake and alert  Pain management: adequate  Airway patency: patent  Anesthetic complications: No anesthetic complications  PONV Status: none  Cardiovascular status: acceptable and hemodynamically stable  Respiratory status: acceptable  Hydration status: acceptable    Comments: Blood pressure 143/66, pulse 68, temperature 97.6 °F (36.4 °C), temperature source Temporal Artery , resp. rate 16, SpO2 98 %.    Patient discharged from PACU based upon Abimael score. Please see RN notes for further details

## 2017-12-28 NOTE — ANESTHESIA PROCEDURE NOTES
Airway  Urgency: elective    Date/Time: 12/28/2017 10:38 AM  Airway not difficult    General Information and Staff    Patient location during procedure: OR  CRNA: UNA DAVILA    Indications and Patient Condition  Indications for airway management: airway protection    Preoxygenated: yes  MILS maintained throughout  Mask difficulty assessment: 1 - vent by mask    Final Airway Details  Final airway type: endotracheal airway      Successful airway: ETT  Cuffed: yes   Successful intubation technique: direct laryngoscopy  Facilitating devices/methods: intubating stylet  Endotracheal tube insertion site: oral  Blade: Marx  Blade size: #2  ETT size: 8.0 mm  Cormack-Lehane Classification: grade IIa - partial view of glottis  Placement verified by: chest auscultation   Cuff volume (mL): 18  Measured from: lips  ETT to lips (cm): 22  Number of attempts at approach: 1    Additional Comments  Limited view ,disposable blade shape in the way. To keep from doing 2nd laryngoscopy intubating stylet was used.

## 2018-09-11 ENCOUNTER — HOSPITAL ENCOUNTER (OUTPATIENT)
Dept: HOSPITAL 58 - OUTPT | Age: 78
End: 2018-09-11
Attending: OTOLARYNGOLOGY

## 2018-09-11 VITALS — BODY MASS INDEX: 23.9 KG/M2

## 2018-09-11 DIAGNOSIS — H69.80: Primary | ICD-10-CM

## 2019-01-02 ENCOUNTER — LAB REQUISITION (OUTPATIENT)
Dept: LAB | Facility: HOSPITAL | Age: 79
End: 2019-01-02

## 2019-01-02 DIAGNOSIS — Z00.00 ENCOUNTER FOR GENERAL ADULT MEDICAL EXAMINATION WITHOUT ABNORMAL FINDINGS: ICD-10-CM

## 2019-01-02 LAB
FERRITIN SERPL-MCNC: 82.4 NG/ML (ref 17.9–464)
IRON 24H UR-MRATE: 134 MCG/DL (ref 42–180)
IRON SATN MFR SERPL: 40 % (ref 20–45)
TIBC SERPL-MCNC: 336 MCG/DL (ref 225–420)

## 2019-01-02 PROCEDURE — 83550 IRON BINDING TEST: CPT | Performed by: INTERNAL MEDICINE

## 2019-01-02 PROCEDURE — 82728 ASSAY OF FERRITIN: CPT | Performed by: INTERNAL MEDICINE

## 2019-01-02 PROCEDURE — 83540 ASSAY OF IRON: CPT | Performed by: INTERNAL MEDICINE

## 2019-01-29 ENCOUNTER — LAB REQUISITION (OUTPATIENT)
Dept: LAB | Facility: HOSPITAL | Age: 79
End: 2019-01-29

## 2019-01-29 DIAGNOSIS — Z00.00 ENCOUNTER FOR GENERAL ADULT MEDICAL EXAMINATION WITHOUT ABNORMAL FINDINGS: ICD-10-CM

## 2019-01-29 LAB
FERRITIN SERPL-MCNC: 77.9 NG/ML (ref 17.9–464)
IRON 24H UR-MRATE: 111 MCG/DL (ref 42–180)
IRON SATN MFR SERPL: 32 % (ref 20–45)
TIBC SERPL-MCNC: 342 MCG/DL (ref 225–420)

## 2019-01-29 PROCEDURE — 82728 ASSAY OF FERRITIN: CPT | Performed by: INTERNAL MEDICINE

## 2019-01-29 PROCEDURE — 83540 ASSAY OF IRON: CPT | Performed by: INTERNAL MEDICINE

## 2019-01-29 PROCEDURE — 83550 IRON BINDING TEST: CPT | Performed by: INTERNAL MEDICINE

## 2019-02-06 ENCOUNTER — TRANSCRIBE ORDERS (OUTPATIENT)
Dept: ONCOLOGY | Facility: CLINIC | Age: 79
End: 2019-02-06

## 2019-02-06 DIAGNOSIS — C91.10 CLL (CHRONIC LYMPHOCYTIC LEUKEMIA) (HCC): Primary | ICD-10-CM

## 2019-02-06 DIAGNOSIS — D47.2 MONOCLONAL GAMMOPATHY: ICD-10-CM

## 2019-02-26 ENCOUNTER — APPOINTMENT (OUTPATIENT)
Dept: LAB | Facility: HOSPITAL | Age: 79
End: 2019-02-26

## 2019-02-26 LAB
ALBUMIN SERPL-MCNC: 4.3 G/DL (ref 3.5–5)
ALBUMIN/GLOB SERPL: 2.3 G/DL (ref 1.1–2.5)
ALP SERPL-CCNC: 66 U/L (ref 24–120)
ALT SERPL W P-5'-P-CCNC: 19 U/L (ref 0–54)
ANION GAP SERPL CALCULATED.3IONS-SCNC: 9 MMOL/L (ref 4–13)
AST SERPL-CCNC: 21 U/L (ref 7–45)
BILIRUB SERPL-MCNC: 1.1 MG/DL (ref 0.1–1)
BUN BLD-MCNC: 13 MG/DL (ref 5–21)
BUN/CREAT SERPL: 11.8 (ref 7–25)
CALCIUM SPEC-SCNC: 9.1 MG/DL (ref 8.4–10.4)
CHLORIDE SERPL-SCNC: 103 MMOL/L (ref 98–110)
CO2 SERPL-SCNC: 27 MMOL/L (ref 24–31)
CREAT BLD-MCNC: 1.1 MG/DL (ref 0.5–1.4)
DEPRECATED RDW RBC AUTO: 40.5 FL (ref 40–54)
EOSINOPHIL # BLD MANUAL: 0.19 10*3/MM3 (ref 0–0.7)
EOSINOPHIL NFR BLD MANUAL: 2.2 % (ref 0–4)
ERYTHROCYTE [DISTWIDTH] IN BLOOD BY AUTOMATED COUNT: 12.5 % (ref 12–15)
FERRITIN SERPL-MCNC: 83.4 NG/ML (ref 17.9–464)
GFR SERPL CREATININE-BSD FRML MDRD: 65 ML/MIN/1.73
GLOBULIN UR ELPH-MCNC: 1.9 GM/DL
GLUCOSE BLD-MCNC: 99 MG/DL (ref 70–100)
HCT VFR BLD AUTO: 39 % (ref 40–52)
HGB BLD-MCNC: 13.6 G/DL (ref 14–18)
IRON 24H UR-MRATE: 146 MCG/DL (ref 42–180)
IRON SATN MFR SERPL: 44 % (ref 20–45)
LYMPHOCYTES # BLD MANUAL: 2.66 10*3/MM3 (ref 0.72–4.86)
LYMPHOCYTES NFR BLD MANUAL: 31.2 % (ref 15–45)
LYMPHOCYTES NFR BLD MANUAL: 7.5 % (ref 4–12)
MCH RBC QN AUTO: 30.8 PG (ref 28–32)
MCHC RBC AUTO-ENTMCNC: 34.9 G/DL (ref 33–36)
MCV RBC AUTO: 88.4 FL (ref 82–95)
MONOCYTES # BLD AUTO: 0.64 10*3/MM3 (ref 0.19–1.3)
NEUTROPHILS # BLD AUTO: 3.66 10*3/MM3 (ref 1.87–8.4)
NEUTROPHILS NFR BLD MANUAL: 43 % (ref 39–78)
PLAT MORPH BLD: NORMAL
PLATELET # BLD AUTO: 127 10*3/MM3 (ref 130–400)
PMV BLD AUTO: 9.2 FL (ref 6–12)
POIKILOCYTOSIS BLD QL SMEAR: ABNORMAL
POTASSIUM BLD-SCNC: 4.3 MMOL/L (ref 3.5–5.3)
PROT SERPL-MCNC: 6.2 G/DL (ref 6.3–8.7)
RBC # BLD AUTO: 4.41 10*6/MM3 (ref 4.8–5.9)
SMUDGE CELLS BLD QL SMEAR: ABNORMAL
SODIUM BLD-SCNC: 139 MMOL/L (ref 135–145)
TIBC SERPL-MCNC: 332 MCG/DL (ref 225–420)
VARIANT LYMPHS NFR BLD MANUAL: 16.1 % (ref 0–5)
WBC NRBC COR # BLD: 8.52 10*3/MM3 (ref 4.8–10.8)

## 2019-02-26 PROCEDURE — 84165 PROTEIN E-PHORESIS SERUM: CPT | Performed by: INTERNAL MEDICINE

## 2019-02-26 PROCEDURE — 83540 ASSAY OF IRON: CPT | Performed by: INTERNAL MEDICINE

## 2019-02-26 PROCEDURE — 36415 COLL VENOUS BLD VENIPUNCTURE: CPT | Performed by: INTERNAL MEDICINE

## 2019-02-26 PROCEDURE — 85007 BL SMEAR W/DIFF WBC COUNT: CPT | Performed by: INTERNAL MEDICINE

## 2019-02-26 PROCEDURE — 82728 ASSAY OF FERRITIN: CPT | Performed by: INTERNAL MEDICINE

## 2019-02-26 PROCEDURE — 83550 IRON BINDING TEST: CPT | Performed by: INTERNAL MEDICINE

## 2019-02-26 PROCEDURE — 85025 COMPLETE CBC W/AUTO DIFF WBC: CPT | Performed by: INTERNAL MEDICINE

## 2019-02-26 PROCEDURE — 80053 COMPREHEN METABOLIC PANEL: CPT | Performed by: INTERNAL MEDICINE

## 2019-02-27 LAB
ALBUMIN SERPL-MCNC: 3.9 G/DL (ref 2.9–4.4)
ALBUMIN/GLOB SERPL: 1.9 {RATIO} (ref 0.7–1.7)
ALPHA1 GLOB FLD ELPH-MCNC: 0.2 G/DL (ref 0–0.4)
ALPHA2 GLOB SERPL ELPH-MCNC: 0.7 G/DL (ref 0.4–1)
B-GLOBULIN SERPL ELPH-MCNC: 0.8 G/DL (ref 0.7–1.3)
GAMMA GLOB SERPL ELPH-MCNC: 0.4 G/DL (ref 0.4–1.8)
GLOBULIN SER CALC-MCNC: 2.1 G/DL (ref 2.2–3.9)
Lab: ABNORMAL
M-SPIKE: 0.2 G/DL
PROT PATTERN SERPL ELPH-IMP: ABNORMAL
PROT SERPL-MCNC: 6 G/DL (ref 6–8.5)

## 2019-03-11 ENCOUNTER — TRANSCRIBE ORDERS (OUTPATIENT)
Dept: ONCOLOGY | Facility: CLINIC | Age: 79
End: 2019-03-11

## 2019-03-11 DIAGNOSIS — D64.9 NORMOCYTIC ANEMIA: ICD-10-CM

## 2019-03-11 DIAGNOSIS — D47.2 GAMMOPATHY, MONOCLONAL: ICD-10-CM

## 2019-03-11 DIAGNOSIS — C91.10 CHRONIC LYMPHOCYTIC LEUKEMIA B-CELL TYPE NOT HAVING ACHIEVED REMISSION (HCC): Primary | ICD-10-CM

## 2019-04-30 ENCOUNTER — LAB (OUTPATIENT)
Dept: LAB | Facility: HOSPITAL | Age: 79
End: 2019-04-30

## 2019-04-30 DIAGNOSIS — D47.2 GAMMOPATHY, MONOCLONAL: ICD-10-CM

## 2019-04-30 DIAGNOSIS — C91.10 CHRONIC LYMPHOCYTIC LEUKEMIA B-CELL TYPE NOT HAVING ACHIEVED REMISSION (HCC): ICD-10-CM

## 2019-04-30 DIAGNOSIS — D64.9 NORMOCYTIC ANEMIA: ICD-10-CM

## 2019-04-30 LAB
BASOPHILS # BLD MANUAL: 0.08 10*3/MM3 (ref 0–0.2)
BASOPHILS NFR BLD AUTO: 1 % (ref 0–2)
DEPRECATED RDW RBC AUTO: 39.9 FL (ref 40–54)
ERYTHROCYTE [DISTWIDTH] IN BLOOD BY AUTOMATED COUNT: 12.2 % (ref 12–15)
FERRITIN SERPL-MCNC: 92.3 NG/ML (ref 17.9–464)
HCT VFR BLD AUTO: 39.4 % (ref 40–52)
HGB BLD-MCNC: 13.6 G/DL (ref 14–18)
IRON 24H UR-MRATE: 80 MCG/DL (ref 42–180)
IRON SATN MFR SERPL: 23 % (ref 20–45)
LYMPHOCYTES # BLD MANUAL: 5.31 10*3/MM3 (ref 0.72–4.86)
LYMPHOCYTES NFR BLD MANUAL: 64 % (ref 15–45)
LYMPHOCYTES NFR BLD MANUAL: 7 % (ref 4–12)
MCH RBC QN AUTO: 30.8 PG (ref 28–32)
MCHC RBC AUTO-ENTMCNC: 34.5 G/DL (ref 33–36)
MCV RBC AUTO: 89.1 FL (ref 82–95)
MONOCYTES # BLD AUTO: 0.58 10*3/MM3 (ref 0.19–1.3)
NEUTROPHILS # BLD AUTO: 1.91 10*3/MM3 (ref 1.87–8.4)
NEUTROPHILS NFR BLD MANUAL: 23 % (ref 39–78)
PLAT MORPH BLD: NORMAL
PLATELET # BLD AUTO: 144 10*3/MM3 (ref 130–400)
PMV BLD AUTO: 9.2 FL (ref 6–12)
RBC # BLD AUTO: 4.42 10*6/MM3 (ref 4.8–5.9)
RBC MORPH BLD: NORMAL
TIBC SERPL-MCNC: 343 MCG/DL (ref 225–420)
VARIANT LYMPHS NFR BLD MANUAL: 5 % (ref 0–5)
WBC MORPH BLD: NORMAL
WBC NRBC COR # BLD: 8.29 10*3/MM3 (ref 4.8–10.8)

## 2019-04-30 PROCEDURE — 85007 BL SMEAR W/DIFF WBC COUNT: CPT

## 2019-04-30 PROCEDURE — 36415 COLL VENOUS BLD VENIPUNCTURE: CPT

## 2019-04-30 PROCEDURE — 85025 COMPLETE CBC W/AUTO DIFF WBC: CPT

## 2019-04-30 PROCEDURE — 83540 ASSAY OF IRON: CPT

## 2019-04-30 PROCEDURE — 82728 ASSAY OF FERRITIN: CPT

## 2019-04-30 PROCEDURE — 83550 IRON BINDING TEST: CPT

## 2019-06-25 ENCOUNTER — LAB (OUTPATIENT)
Dept: LAB | Facility: HOSPITAL | Age: 79
End: 2019-06-25

## 2019-06-25 DIAGNOSIS — D47.2 GAMMOPATHY, MONOCLONAL: ICD-10-CM

## 2019-06-25 DIAGNOSIS — D64.9 NORMOCYTIC ANEMIA: ICD-10-CM

## 2019-06-25 DIAGNOSIS — C91.10 CHRONIC LYMPHOCYTIC LEUKEMIA B-CELL TYPE NOT HAVING ACHIEVED REMISSION (HCC): ICD-10-CM

## 2019-06-25 LAB
DEPRECATED RDW RBC AUTO: 40.4 FL (ref 40–54)
EOSINOPHIL # BLD MANUAL: 0.2 10*3/MM3 (ref 0–0.7)
EOSINOPHIL NFR BLD MANUAL: 3.1 % (ref 0–4)
ERYTHROCYTE [DISTWIDTH] IN BLOOD BY AUTOMATED COUNT: 12.3 % (ref 12–15)
FERRITIN SERPL-MCNC: 65.1 NG/ML (ref 17.9–464)
HCT VFR BLD AUTO: 37.1 % (ref 40–52)
HGB BLD-MCNC: 12.7 G/DL (ref 14–18)
IRON 24H UR-MRATE: 92 MCG/DL (ref 42–180)
IRON SATN MFR SERPL: 27 % (ref 20–45)
LYMPHOCYTES # BLD MANUAL: 2.84 10*3/MM3 (ref 0.72–4.86)
LYMPHOCYTES NFR BLD MANUAL: 43.3 % (ref 15–45)
LYMPHOCYTES NFR BLD MANUAL: 8.2 % (ref 4–12)
MCH RBC QN AUTO: 30.8 PG (ref 28–32)
MCHC RBC AUTO-ENTMCNC: 34.2 G/DL (ref 33–36)
MCV RBC AUTO: 90 FL (ref 82–95)
MONOCYTES # BLD AUTO: 0.54 10*3/MM3 (ref 0.19–1.3)
NEUTROPHILS # BLD AUTO: 2.17 10*3/MM3 (ref 1.87–8.4)
NEUTROPHILS NFR BLD MANUAL: 33 % (ref 39–78)
PLAT MORPH BLD: NORMAL
PLATELET # BLD AUTO: 130 10*3/MM3 (ref 130–400)
PMV BLD AUTO: 9.1 FL (ref 6–12)
RBC # BLD AUTO: 4.12 10*6/MM3 (ref 4.8–5.9)
RBC MORPH BLD: NORMAL
TIBC SERPL-MCNC: 340 MCG/DL (ref 225–420)
VARIANT LYMPHS NFR BLD MANUAL: 12.4 % (ref 0–5)
WBC MORPH BLD: NORMAL
WBC NRBC COR # BLD: 6.57 10*3/MM3 (ref 4.8–10.8)

## 2019-06-25 PROCEDURE — 83540 ASSAY OF IRON: CPT

## 2019-06-25 PROCEDURE — 82728 ASSAY OF FERRITIN: CPT

## 2019-06-25 PROCEDURE — 83550 IRON BINDING TEST: CPT

## 2019-06-25 PROCEDURE — 85025 COMPLETE CBC W/AUTO DIFF WBC: CPT

## 2019-06-25 PROCEDURE — 36415 COLL VENOUS BLD VENIPUNCTURE: CPT

## 2019-06-25 PROCEDURE — 85007 BL SMEAR W/DIFF WBC COUNT: CPT

## 2019-08-20 ENCOUNTER — APPOINTMENT (OUTPATIENT)
Dept: LAB | Facility: HOSPITAL | Age: 79
End: 2019-08-20

## 2019-08-20 LAB
ALBUMIN SERPL-MCNC: 4.3 G/DL (ref 3.5–5)
ALBUMIN/GLOB SERPL: 2 G/DL (ref 1.1–2.5)
ALP SERPL-CCNC: 69 U/L (ref 24–120)
ALT SERPL W P-5'-P-CCNC: 16 U/L (ref 0–54)
ANION GAP SERPL CALCULATED.3IONS-SCNC: 7 MMOL/L (ref 4–13)
ANISOCYTOSIS BLD QL: ABNORMAL
AST SERPL-CCNC: 20 U/L (ref 7–45)
BASOPHILS # BLD MANUAL: 0.22 10*3/MM3 (ref 0–0.2)
BASOPHILS NFR BLD AUTO: 3 % (ref 0–1.5)
BILIRUB SERPL-MCNC: 1.1 MG/DL (ref 0.1–1)
BUN BLD-MCNC: 15 MG/DL (ref 5–21)
BUN/CREAT SERPL: 12 (ref 7–25)
CALCIUM SPEC-SCNC: 9.5 MG/DL (ref 8.4–10.4)
CHLORIDE SERPL-SCNC: 103 MMOL/L (ref 98–110)
CO2 SERPL-SCNC: 31 MMOL/L (ref 24–31)
CREAT BLD-MCNC: 1.25 MG/DL (ref 0.5–1.4)
DEPRECATED RDW RBC AUTO: 41.8 FL (ref 37–54)
EOSINOPHIL # BLD MANUAL: 0.15 10*3/MM3 (ref 0–0.4)
EOSINOPHIL NFR BLD MANUAL: 2 % (ref 0.3–6.2)
ERYTHROCYTE [DISTWIDTH] IN BLOOD BY AUTOMATED COUNT: 12.4 % (ref 12.3–15.4)
FERRITIN SERPL-MCNC: 71.5 NG/ML (ref 17.9–464)
GFR SERPL CREATININE-BSD FRML MDRD: 56 ML/MIN/1.73
GLOBULIN UR ELPH-MCNC: 2.2 GM/DL
GLUCOSE BLD-MCNC: 129 MG/DL (ref 70–100)
HCT VFR BLD AUTO: 40 % (ref 37.5–51)
HGB BLD-MCNC: 13.5 G/DL (ref 13–17.7)
IRON 24H UR-MRATE: 100 MCG/DL (ref 42–180)
IRON SATN MFR SERPL: 28 % (ref 20–45)
LYMPHOCYTES # BLD MANUAL: 3.16 10*3/MM3 (ref 0.7–3.1)
LYMPHOCYTES NFR BLD MANUAL: 43 % (ref 19.6–45.3)
LYMPHOCYTES NFR BLD MANUAL: 8 % (ref 5–12)
MCH RBC QN AUTO: 31 PG (ref 26.6–33)
MCHC RBC AUTO-ENTMCNC: 33.8 G/DL (ref 31.5–35.7)
MCV RBC AUTO: 91.7 FL (ref 79–97)
MONOCYTES # BLD AUTO: 0.59 10*3/MM3 (ref 0.1–0.9)
NEUTROPHILS # BLD AUTO: 2.87 10*3/MM3 (ref 1.7–7)
NEUTROPHILS NFR BLD MANUAL: 39 % (ref 42.7–76)
PLAT MORPH BLD: NORMAL
PLATELET # BLD AUTO: 144 10*3/MM3 (ref 140–450)
PMV BLD AUTO: 9 FL (ref 6–12)
POTASSIUM BLD-SCNC: 4.3 MMOL/L (ref 3.5–5.3)
PROT SERPL-MCNC: 6.5 G/DL (ref 6.3–8.7)
RBC # BLD AUTO: 4.36 10*6/MM3 (ref 4.14–5.8)
SODIUM BLD-SCNC: 141 MMOL/L (ref 135–145)
TIBC SERPL-MCNC: 358 MCG/DL (ref 225–420)
VARIANT LYMPHS NFR BLD MANUAL: 5 % (ref 0–5)
WBC MORPH BLD: NORMAL
WBC NRBC COR # BLD: 7.36 10*3/MM3 (ref 3.4–10.8)

## 2019-08-20 PROCEDURE — 83540 ASSAY OF IRON: CPT

## 2019-08-20 PROCEDURE — 84165 PROTEIN E-PHORESIS SERUM: CPT | Performed by: INTERNAL MEDICINE

## 2019-08-20 PROCEDURE — 36415 COLL VENOUS BLD VENIPUNCTURE: CPT | Performed by: INTERNAL MEDICINE

## 2019-08-20 PROCEDURE — 82728 ASSAY OF FERRITIN: CPT

## 2019-08-20 PROCEDURE — 80053 COMPREHEN METABOLIC PANEL: CPT | Performed by: INTERNAL MEDICINE

## 2019-08-20 PROCEDURE — 83550 IRON BINDING TEST: CPT

## 2019-08-20 PROCEDURE — 85007 BL SMEAR W/DIFF WBC COUNT: CPT

## 2019-08-20 PROCEDURE — 85025 COMPLETE CBC W/AUTO DIFF WBC: CPT

## 2019-08-21 LAB
ALBUMIN SERPL-MCNC: 4 G/DL (ref 2.9–4.4)
ALBUMIN/GLOB SERPL: 1.8 {RATIO} (ref 0.7–1.7)
ALPHA1 GLOB FLD ELPH-MCNC: 0.2 G/DL (ref 0–0.4)
ALPHA2 GLOB SERPL ELPH-MCNC: 0.7 G/DL (ref 0.4–1)
B-GLOBULIN SERPL ELPH-MCNC: 0.9 G/DL (ref 0.7–1.3)
GAMMA GLOB SERPL ELPH-MCNC: 0.4 G/DL (ref 0.4–1.8)
GLOBULIN SER CALC-MCNC: 2.2 G/DL (ref 2.2–3.9)
Lab: ABNORMAL
M-SPIKE: 0.2 G/DL
PROT PATTERN SERPL ELPH-IMP: ABNORMAL
PROT SERPL-MCNC: 6.2 G/DL (ref 6–8.5)

## 2019-08-27 ENCOUNTER — OFFICE VISIT (OUTPATIENT)
Dept: ONCOLOGY | Facility: CLINIC | Age: 79
End: 2019-08-27

## 2019-08-27 VITALS
HEIGHT: 60 IN | OXYGEN SATURATION: 97 % | SYSTOLIC BLOOD PRESSURE: 126 MMHG | DIASTOLIC BLOOD PRESSURE: 72 MMHG | TEMPERATURE: 97.6 F | RESPIRATION RATE: 16 BRPM | BODY MASS INDEX: 30.53 KG/M2 | HEART RATE: 68 BPM | WEIGHT: 155.5 LBS

## 2019-08-27 DIAGNOSIS — D47.2 IGG LAMBDA MONOCLONAL GAMMOPATHY: ICD-10-CM

## 2019-08-27 DIAGNOSIS — E61.1 IRON DEFICIENCY: ICD-10-CM

## 2019-08-27 DIAGNOSIS — I10 ESSENTIAL HYPERTENSION: ICD-10-CM

## 2019-08-27 DIAGNOSIS — C91.10 CLL (CHRONIC LYMPHOCYTIC LEUKEMIA) (HCC): Primary | ICD-10-CM

## 2019-08-27 PROCEDURE — 99213 OFFICE O/P EST LOW 20 MIN: CPT | Performed by: NURSE PRACTITIONER

## 2019-08-27 NOTE — PROGRESS NOTES
[facility]  HEMATOLOGY & ONCOLOGY    [facility] [place]ATOLOGY AND ONCOLOGY  [address]  [place] [address] [phone]    Patient Name: Oral Dey  Encounter Date: 08/27/2019  YOB: 1940  Patient Number: [id number]    Subjective     REASON FOR VISIT:     Chief Complaint   Patient presents with   • CLL     Here for followup. Not having any new problems.   • Depression Screening     Minimal Depression        HEMATOLOGY / ONCOLOGY HISTORY:      CLL (chronic lymphocytic leukemia) (CMS/HCC)    8/27/2019 Initial Diagnosis     CLL (chronic lymphocytic leukemia) (CMS/HCC)            Problem List Items Addressed This Visit        Cardiovascular and Mediastinum    Hypertension       Digestive    Iron deficiency    Relevant Orders    CBC & Differential    Comprehensive Metabolic Panel    Iron Profile    Ferritin       Immune and Lymphatic    IgG lambda monoclonal gammopathy       Hematopoietic and Hemostatic    CLL (chronic lymphocytic leukemia) (CMS/HCC) - Primary    Overview     DIAGNOSTIC ABNORMALITIES:   1. Labs, 05/16/2012, [facility]: WBC 10.9, hemoglobin 14.3, hematocrit 42.2, MCV of 94.2, platelets 133,000, ALC elevated at 4796, ANC normal at 4.8, Absolute monocytosis at 970.  2. Labs, 11/14/2012, [facility]: WBC 10.2, hemoglobin 15.4, hematocrit 45.4, MCV 94.5, platelets 154,000, ALC elevated at 4,865. Globulin 1.9.  3. Labs, 11/15/2012, [facility]: IgG 495, IgA 34, IgM 28 (all below normal limits).  4. Blood film review, 11/16/2012, [facility]: Mild absolute lymphocytosis and monocytosis. Reactive and atypical lymphocytes present. Flow cytometry recommended.  5. PATHOLOGY: Cytogenetics, 12/12/2012, [facility]: CLL and CCND1-IGH@FISH: Abnormal results with +12 and 13q-.   6. Flow cytometry peripheral blood, 12/12/2012, [facility]: Peripheral blood with a CD5+ B cell population showing lambda  restriction, 30% cellularity.   7. Labs, 03/27/2013: GFR 78. glucose 108. IgG 455, IgM 16, IgA 35, M-spike 0.1, [facility]: IgG monoclonal protein with lambda light chain specificity.   8. 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 Negative for CCND1/IGH gene rearrangement.   9. Hematopathology report peripheral blood, 03/27/2013 [facility]: Normal white blood cell count with no evidence of lymphocytosis but 23% abnormal intermediate lambda light chain-restricted B cell population identified by flow cytometry. Mild thrombocytopenia with normal hemoglobin/hematocrit. See comment.   10. Flow peripheral blood, 03/27/2013, [facility]: Abnormal CD5-positive identified, monoclonal lambda. Consistent with chronic lymphocytic leukemia (CLL)/small lymphocytic lymphoma (SLL).   11. Skeletal survey, 01/11/2013, [facility]: No discrete lytic lesions identified.   12. Ultrasound abdomen, 01/11/2013, [facility]: No focal pathology.     PREVIOUS INTERVENTION:   1. Observation.           Relevant Orders    CBC & Differential    Comprehensive Metabolic Panel    Iron Profile    Ferritin          Cancer Staging Information:  Cancer Staging  CLL (chronic lymphocytic leukemia) (CMS/HCC)  Staging form: Chronic Lymphocytic Leukemia / Small Lymphocytic Lymphoma, AJCC 8th Edition  - Clinical: Modified Rizvi Stage 0 (Modified Rizvi risk: Low, Lymphocytosis: Present, Adenopathy: Absent, Organomegaly: Absent, Anemia: Absent, Thrombocytopenia: Absent) - Unsigned        INTERVAL HISTORY  Patient ID: Oral Dey is a 78 y.o. year old male  who is seen on followup for chronic lymphocytic leukemia (CLL)/small lymphocytic lymphoma (SLL), Stage 0. He is on observation and has been since diagnosis in December 2012.  Fortunately, he has not required treatment for his CLL this far. He is also seen for anemia from iron deficiency. He has been off oral iron for  "\"quite some time.\" The patient is here alone. History is obtained from the patient who is considered to be a marginal historian.    He presents today stating that he has been feeling very well.  He has been tired at times but will be for just a couple of days and then he feels better.  He does do a lot of yard work and is very active.  His weights been remaining overall stable.  He has had no night sweats.  He has a good appetite.  He denies any fever or chills.  He has had no abdominal pain nausea vomiting and no bowel or bladder habit changes.  He denies any significant shortness of breath or chest pain.    Lab work as of 8/20/2019 showed his white count to be stable at 7.36 hemoglobin 13.5 and hematocrit 40.0.  His platelet count was stable at 144,000.  His absolute lymphocyte count was 3160.  Iron saturation 28% and ferritin of 71.5.  He does have an element of an IgG monoclonal gammopathy that is been stable from the CLL.  His M spike has been staying around 0.2.      Past Medical History:   Past Medical History:   Diagnosis Date   • CLL (chronic lymphocytic leukemia) (CMS/HCC)    • Colon polyp    • GERD (gastroesophageal reflux disease)    • Glaucoma    • Hypertension    • Inguinal hernia     right groin   • Macular degeneration, right eye      Past Surgical History:   Past Surgical History:   Procedure Laterality Date   • COLONOSCOPY  06/13/2012   • INGUINAL HERNIA REPAIR Left 2007   • INGUINAL HERNIA REPAIR Right 12/28/2017    Procedure: RIGHT INGUINAL HERNIA REPAIR WITH MESH ;  Surgeon: Rossana Mahajan MD;  Location: [facility];  Service:      Social History:   Social History     Socioeconomic History   • Marital status:      Spouse name: Not on file   • Number of children: Not on file   • Years of education: Not on file   • Highest education level: Not on file   Tobacco Use   • Smoking status: Former Smoker     Years: 15.00     Types: Cigarettes     Last attempt to quit: 1972     Years since " quittin.6   • Smokeless tobacco: Never Used   Substance and Sexual Activity   • Alcohol use: No   • Drug use: No   • Sexual activity: Defer     Family History:   Family History   Problem Relation Age of Onset   • Colon cancer Maternal Grandfather         in his 60's.    • Alzheimer's disease Mother    • Hypertension Father    • Other Father         stent   • COPD Sister    • Heart attack Brother    • No Known Problems Maternal Grandmother    • No Known Problems Paternal Grandmother    • No Known Problems Paternal Grandfather        Review of Systems   Constitutional: Positive for fatigue. Negative for activity change, appetite change, chills, diaphoresis, fever and unexpected weight change.   HENT: Negative for congestion, facial swelling, mouth sores, nosebleeds, sore throat, trouble swallowing and voice change.    Eyes: Negative for discharge and redness.   Respiratory: Negative for cough, chest tightness, shortness of breath and wheezing.    Cardiovascular: Negative for chest pain, palpitations and leg swelling.   Gastrointestinal: Negative for abdominal distention, abdominal pain, blood in stool, constipation, diarrhea, nausea and vomiting.   Endocrine: Negative.    Genitourinary: Negative for difficulty urinating, dysuria, frequency, hematuria and urgency.   Musculoskeletal: Negative for arthralgias, gait problem and myalgias.   Skin: Negative for color change and rash.   Neurological: Negative for dizziness, weakness, light-headedness, numbness and headaches.   Hematological: Negative for adenopathy. Does not bruise/bleed easily.   Psychiatric/Behavioral: Negative for confusion and sleep disturbance. The patient is not nervous/anxious.         Medications:    Current Outpatient Medications   Medication Sig Dispense Refill   • aspirin 81 MG EC tablet Take 81 mg by mouth Daily.     • latanoprost (XALATAN) 0.005 % ophthalmic solution Administer 1 drop to the right eye Daily.     • metoprolol tartrate  "(LOPRESSOR) 25 MG tablet Take 25 mg by mouth Daily.     • multivitamins-minerals (PRESERVISION AREDS 2) capsule capsule Take 1 capsule by mouth 2 (Two) Times a Day.     • HYDROcodone-acetaminophen (NORCO) 5-325 MG per tablet Take 1-2 tablets by mouth Every 4 (Four) Hours As Needed (Pain). 30 tablet 0     No current facility-administered medications for this visit.        ALLERGIES:    Allergies   Allergen Reactions   • Augmentin [Amoxicillin-Pot Clavulanate] Hives   • Latex Itching and Other (See Comments)     And band aids. Causes redness and itching.       Objective      Vitals:    08/27/19 1327   BP: 126/72   Pulse: 68   Resp: 16   Temp: 97.6 °F (36.4 °C)   TempSrc: Oral   SpO2: 97%   Weight: 70.5 kg (155 lb 8 oz)   Height: 70 cm (27.56\")   PainSc: 0-No pain     /72   Pulse 68   Temp 97.6 °F (36.4 °C) (Oral)   Resp 16   Ht 70 cm (27.56\")   Wt 70.5 kg (155 lb 8 oz)   SpO2 97%   .95 kg/m²      Current Status 8/27/2019   ECOG score 0         General Appearance:    Alert, cooperative, no distress, appears stated age   Head:    Normocephalic, without obvious abnormality, atraumatic   Eyes:    PERRL, conjunctiva pink, sclera clear, EOM's intact   Ears:    Not examined   Nose:   Nares normal, septum midline, mucosa normal, no drainage     or sinus tenderness   Throat:   Lips, mucosa, and tongue normal; teeth and gums normal,     mucous membranes moist   Neck:   Supple, Trachea midline   Back:     Symmetric, no curvature, ROM normal, no CVA tenderness   Lungs:     Clear to auscultation bilaterally, respirations unlabored   Chest Wall:    No tenderness or deformity    Heart:    Regular rate and rhythm, S1 and S2 normal, no murmur, rub    or gallop   Abdomen:     Soft, non-tender, bowel sounds active all four quadrants,     no masses, no organomegaly   Extremities:   Extremities normal, atraumatic, no cyanosis or edema       Skin:   Skin color, texture, turgor normal, no rashes or lesions   Lymph nodes: "   Cervical, supraclavicular, and axillary nodes normal   Neurologic:   Grossly nonfocal, gait coordinated and smooth, cognition is   preserved.       LABS    Lab Results - Last 18 Months   Lab Units 08/20/19  1331 06/25/19  1329 04/30/19  1326 02/26/19  1331   HEMOGLOBIN g/dL 13.5 12.7* 13.6* 13.6*   HEMATOCRIT % 40.0 37.1* 39.4* 39.0*   MCV fL 91.7 90.0 89.1 88.4   WBC 10*3/mm3 7.36 6.57 8.29 8.52   RDW % 12.4 12.3 12.2 12.5   MPV fL 9.0 9.1 9.2 9.2   PLATELETS 10*3/mm3 144 130 144 127*   NEUTROS ABS 10*3/mm3 2.87 2.17 1.91 3.66   EOS ABS 10*3/mm3 0.15 0.20  --  0.19   BASOS ABS 10*3/mm3 0.22*  --  0.08  --    NEUTROPHIL % % 39.0* 33.0* 23.0* 43.0   MONOCYTES % % 8.0 8.2 7.0 7.5   BASOPHIL % % 3.0*  --  1.0  --    ATYP LYMPH % % 5.0 12.4* 5.0 16.1*   ANISOCYTOSIS  Slight/1+  --   --   --        Lab Results - Last 18 Months   Lab Units 08/20/19  1331 02/26/19  1331   GLUCOSE mg/dL 129* 99   SODIUM mmol/L 141 139   POTASSIUM mmol/L 4.3 4.3   CO2 mmol/L 31.0 27.0   CHLORIDE mmol/L 103 103   ANION GAP mmol/L 7.0 9.0   CREATININE mg/dL 1.25 1.10   BUN mg/dL 15 13   BUN / CREAT RATIO  12.0 11.8   CALCIUM mg/dL 9.5 9.1   EGFR IF NONAFRICN AM mL/min/1.73 56* 65   ALK PHOS U/L 69 66   TOTAL PROTEIN g/dL 6.5 6.2*   ALT (SGPT) U/L 16 19   AST (SGOT) U/L 20 21   BILIRUBIN mg/dL 1.1* 1.1*   ALBUMIN g/dL 4.30  4.0 4.30  3.9   GLOBULIN gm/dL 2.2 1.9       Lab Results - Last 18 Months   Lab Units 08/20/19  1331 02/26/19  1331   M-SPIKE g/dL 0.2* 0.2*       Lab Results - Last 18 Months   Lab Units 08/20/19  1331 06/25/19  1329 04/30/19  1326 02/26/19  1331 01/29/19  1600 01/02/19  1300   IRON mcg/dL 100 92 80 146 111 134   TIBC mcg/dL 358 340 343 332 342 336   IRON SATURATION % 28 27 23 44 32 40   FERRITIN ng/mL 71.50 65.10 92.30 83.40 77.90 82.40           Assessment/Plan     Patient Active Problem List   Diagnosis   • Hx of colonic polyp   • Family hx of colon cancer   • CLL (chronic lymphocytic leukemia) (CMS/HCC)   •  Hypertension   • IgG lambda monoclonal gammopathy   • Iron deficiency        1. CLL (chronic lymphocytic leukemia) (CMS/[facility])  Stage 0 chronic lymphocytic leukemia/small lymphocytic lymphoma remaining overall stable on observation since diagnosis in 2012.  His total white cell count is remaining within normal range.  His lymphocyte count is also remaining stable.  His white count range over the last 9 months has been from 7.3-12 with an absolute lymphocyte count range 2.6-5.4.  He has had no other cytopenias.  His hemoglobin is remaining stable as is his platelet count.  He is remaining asymptomatic.    Continue to follow his labs every 2 months and see him at a 6-month interval.    2. Iron deficiency  Iron deficiency for which he has been off and on iron therapy orally.  He takes ferrous sulfate 325 daily when needed.  He has been off the oral iron therapy for some time.  His ferritin is down to 71 and since it is less than 100 I will asked him to restart his iron supplement 1 a day.  Hemoglobin again is stable.    3. IgG lambda monoclonal gammopathy  IgG lambda monoclonal gammopathy from CLL overall stable.  M spike has been staying at 0.2 since February.  Immunoglobulins are stable as well. Continue to monitor.     4. Essential hypertension  Blood pressure overall stable today at 126/72.  I encouraged him to continue following with his medical physician for management of this.  He does take metoprolol tartrate daily.       PLAN  1.  Will continue to follow labs every 2 months with CBC CMP and iron studies.  We will also check a serum protein electrophoresis at that time.  2.  Return in 6 months for routine follow-up office visit with the same pre-office labs.  3.  Continue to follow with primary care physician another specialist for other comorbidities.  4.  Patient will continue on observation only as he does not require treatment at this time.  5.  Advised the patient to call with any problems between now and  next appointment if needed.    Orders Placed This Encounter   Procedures   • Comprehensive Metabolic Panel     Standing Status:   Standing     Number of Occurrences:   12     Standing Expiration Date:   8/27/2020   • Iron Profile     Standing Status:   Standing     Number of Occurrences:   12     Standing Expiration Date:   8/27/2020   • Ferritin     Standing Status:   Standing     Number of Occurrences:   12     Standing Expiration Date:   8/27/2020   • CBC & Differential     Standing Status:   Standing     Number of Occurrences:   12     Standing Expiration Date:   8/27/2020     Order Specific Question:   Manual Differential     Answer:   No         I have spent a total of  __25__  minutes in face-to-face encounter with the patient, out of which more than 50% was counseling the patient and family regarding coordination of care.  Counseling included but not limited to time spent reviewing labs, treatment plan as well as answering questions.         All activities occurring within this visit are in accordance with the plan of care as set forth by Dr. Marco Boogie.    Taylor Perez, APRN    8/27/2019    2:22 PM

## 2019-10-22 ENCOUNTER — LAB (OUTPATIENT)
Dept: LAB | Facility: HOSPITAL | Age: 79
End: 2019-10-22

## 2019-10-22 DIAGNOSIS — E61.1 IRON DEFICIENCY: ICD-10-CM

## 2019-10-22 DIAGNOSIS — C91.10 CLL (CHRONIC LYMPHOCYTIC LEUKEMIA) (HCC): ICD-10-CM

## 2019-10-22 LAB
ALBUMIN SERPL-MCNC: 4.2 G/DL (ref 3.5–5.2)
ALBUMIN/GLOB SERPL: 2.5 G/DL
ALP SERPL-CCNC: 77 U/L (ref 39–117)
ALT SERPL W P-5'-P-CCNC: 7 U/L (ref 1–41)
ANION GAP SERPL CALCULATED.3IONS-SCNC: 9 MMOL/L (ref 5–15)
AST SERPL-CCNC: 10 U/L (ref 1–40)
BASOPHILS # BLD MANUAL: 0.28 10*3/MM3 (ref 0–0.2)
BASOPHILS NFR BLD AUTO: 4.3 % (ref 0–1.5)
BILIRUB SERPL-MCNC: 0.7 MG/DL (ref 0.2–1.2)
BUN BLD-MCNC: 13 MG/DL (ref 8–23)
BUN/CREAT SERPL: 12.6 (ref 7–25)
CALCIUM SPEC-SCNC: 8.8 MG/DL (ref 8.6–10.5)
CHLORIDE SERPL-SCNC: 105 MMOL/L (ref 98–107)
CO2 SERPL-SCNC: 30 MMOL/L (ref 22–29)
CREAT BLD-MCNC: 1.03 MG/DL (ref 0.76–1.27)
DEPRECATED RDW RBC AUTO: 40.5 FL (ref 37–54)
EOSINOPHIL # BLD MANUAL: 0.35 10*3/MM3 (ref 0–0.4)
EOSINOPHIL NFR BLD MANUAL: 5.4 % (ref 0.3–6.2)
ERYTHROCYTE [DISTWIDTH] IN BLOOD BY AUTOMATED COUNT: 12.1 % (ref 12.3–15.4)
FERRITIN SERPL-MCNC: 100.2 NG/ML (ref 30–400)
GFR SERPL CREATININE-BSD FRML MDRD: 70 ML/MIN/1.73
GLOBULIN UR ELPH-MCNC: 1.7 GM/DL
GLUCOSE BLD-MCNC: 105 MG/DL (ref 65–99)
HCT VFR BLD AUTO: 38.9 % (ref 37.5–51)
HGB BLD-MCNC: 13 G/DL (ref 13–17.7)
IRON 24H UR-MRATE: 86 MCG/DL (ref 59–158)
IRON SATN MFR SERPL: 24 % (ref 20–50)
LYMPHOCYTES # BLD MANUAL: 2.93 10*3/MM3 (ref 0.7–3.1)
LYMPHOCYTES NFR BLD MANUAL: 4.3 % (ref 5–12)
LYMPHOCYTES NFR BLD MANUAL: 45.7 % (ref 19.6–45.3)
MCH RBC QN AUTO: 30.6 PG (ref 26.6–33)
MCHC RBC AUTO-ENTMCNC: 33.4 G/DL (ref 31.5–35.7)
MCV RBC AUTO: 91.5 FL (ref 79–97)
MONOCYTES # BLD AUTO: 0.28 10*3/MM3 (ref 0.1–0.9)
MYELOCYTES NFR BLD MANUAL: 1.1 % (ref 0–0)
NEUTROPHILS # BLD AUTO: 1.33 10*3/MM3 (ref 1.7–7)
NEUTROPHILS NFR BLD MANUAL: 20.7 % (ref 42.7–76)
PLAT MORPH BLD: NORMAL
PLATELET # BLD AUTO: 129 10*3/MM3 (ref 140–450)
PMV BLD AUTO: 9.6 FL (ref 6–12)
POTASSIUM BLD-SCNC: 4.2 MMOL/L (ref 3.5–5.2)
PROT SERPL-MCNC: 5.9 G/DL (ref 6–8.5)
RBC # BLD AUTO: 4.25 10*6/MM3 (ref 4.14–5.8)
RBC MORPH BLD: NORMAL
SODIUM BLD-SCNC: 144 MMOL/L (ref 136–145)
TIBC SERPL-MCNC: 356 MCG/DL (ref 298–536)
TRANSFERRIN SERPL-MCNC: 239 MG/DL (ref 200–360)
VARIANT LYMPHS NFR BLD MANUAL: 18.5 % (ref 0–5)
WBC MORPH BLD: NORMAL
WBC NRBC COR # BLD: 6.42 10*3/MM3 (ref 3.4–10.8)

## 2019-10-22 PROCEDURE — 85025 COMPLETE CBC W/AUTO DIFF WBC: CPT

## 2019-10-22 PROCEDURE — 80053 COMPREHEN METABOLIC PANEL: CPT

## 2019-10-22 PROCEDURE — 83540 ASSAY OF IRON: CPT

## 2019-10-22 PROCEDURE — 84466 ASSAY OF TRANSFERRIN: CPT

## 2019-10-22 PROCEDURE — 36415 COLL VENOUS BLD VENIPUNCTURE: CPT

## 2019-10-22 PROCEDURE — 85007 BL SMEAR W/DIFF WBC COUNT: CPT

## 2019-10-22 PROCEDURE — 82728 ASSAY OF FERRITIN: CPT

## 2019-10-24 ENCOUNTER — TELEPHONE (OUTPATIENT)
Dept: ONCOLOGY | Facility: CLINIC | Age: 79
End: 2019-10-24

## 2019-10-24 NOTE — TELEPHONE ENCOUNTER
Left voice message    ----- Message from RENUKA Anderson sent at 10/24/2019  2:22 PM CDT -----  Please let Mr Dey know that his ferritin is now 100 so he can stop the iron for now and we will continue to follow

## 2019-12-17 ENCOUNTER — LAB (OUTPATIENT)
Dept: LAB | Facility: HOSPITAL | Age: 79
End: 2019-12-17

## 2019-12-17 DIAGNOSIS — C91.10 CLL (CHRONIC LYMPHOCYTIC LEUKEMIA) (HCC): ICD-10-CM

## 2019-12-17 DIAGNOSIS — E61.1 IRON DEFICIENCY: ICD-10-CM

## 2019-12-17 LAB
ALBUMIN SERPL-MCNC: 4.2 G/DL (ref 3.5–5.2)
ALBUMIN/GLOB SERPL: 2.2 G/DL
ALP SERPL-CCNC: 77 U/L (ref 39–117)
ALT SERPL W P-5'-P-CCNC: 10 U/L (ref 1–41)
ANION GAP SERPL CALCULATED.3IONS-SCNC: 7 MMOL/L (ref 5–15)
AST SERPL-CCNC: 11 U/L (ref 1–40)
BILIRUB SERPL-MCNC: 0.7 MG/DL (ref 0.2–1.2)
BUN BLD-MCNC: 14 MG/DL (ref 8–23)
BUN/CREAT SERPL: 14.6 (ref 7–25)
CALCIUM SPEC-SCNC: 9 MG/DL (ref 8.6–10.5)
CHLORIDE SERPL-SCNC: 104 MMOL/L (ref 98–107)
CO2 SERPL-SCNC: 31 MMOL/L (ref 22–29)
CREAT BLD-MCNC: 0.96 MG/DL (ref 0.76–1.27)
DEPRECATED RDW RBC AUTO: 40.9 FL (ref 37–54)
EOSINOPHIL # BLD MANUAL: 0.3 10*3/MM3 (ref 0–0.4)
EOSINOPHIL NFR BLD MANUAL: 3 % (ref 0.3–6.2)
ERYTHROCYTE [DISTWIDTH] IN BLOOD BY AUTOMATED COUNT: 12.5 % (ref 12.3–15.4)
FERRITIN SERPL-MCNC: 145.7 NG/ML (ref 30–400)
GFR SERPL CREATININE-BSD FRML MDRD: 76 ML/MIN/1.73
GLOBULIN UR ELPH-MCNC: 1.9 GM/DL
GLUCOSE BLD-MCNC: 125 MG/DL (ref 65–99)
HCT VFR BLD AUTO: 40.7 % (ref 37.5–51)
HGB BLD-MCNC: 13.8 G/DL (ref 13–17.7)
IRON 24H UR-MRATE: 104 MCG/DL (ref 59–158)
IRON SATN MFR SERPL: 30 % (ref 20–50)
LYMPHOCYTES # BLD MANUAL: 2.31 10*3/MM3 (ref 0.7–3.1)
LYMPHOCYTES NFR BLD MANUAL: 23 % (ref 19.6–45.3)
LYMPHOCYTES NFR BLD MANUAL: 5 % (ref 5–12)
MCH RBC QN AUTO: 30.7 PG (ref 26.6–33)
MCHC RBC AUTO-ENTMCNC: 33.9 G/DL (ref 31.5–35.7)
MCV RBC AUTO: 90.4 FL (ref 79–97)
MONOCYTES # BLD AUTO: 0.5 10*3/MM3 (ref 0.1–0.9)
NEUTROPHILS # BLD AUTO: 4.32 10*3/MM3 (ref 1.7–7)
NEUTROPHILS NFR BLD MANUAL: 43 % (ref 42.7–76)
PLAT MORPH BLD: NORMAL
PLATELET # BLD AUTO: 166 10*3/MM3 (ref 140–450)
PMV BLD AUTO: 9.1 FL (ref 6–12)
POTASSIUM BLD-SCNC: 3.9 MMOL/L (ref 3.5–5.2)
PROT SERPL-MCNC: 6.1 G/DL (ref 6–8.5)
RBC # BLD AUTO: 4.5 10*6/MM3 (ref 4.14–5.8)
RBC MORPH BLD: NORMAL
SODIUM BLD-SCNC: 142 MMOL/L (ref 136–145)
TIBC SERPL-MCNC: 349 MCG/DL (ref 298–536)
TRANSFERRIN SERPL-MCNC: 234 MG/DL (ref 200–360)
VARIANT LYMPHS NFR BLD MANUAL: 26 % (ref 0–5)
WBC MORPH BLD: NORMAL
WBC NRBC COR # BLD: 10.05 10*3/MM3 (ref 3.4–10.8)

## 2019-12-17 PROCEDURE — 36415 COLL VENOUS BLD VENIPUNCTURE: CPT

## 2019-12-17 PROCEDURE — 83540 ASSAY OF IRON: CPT

## 2019-12-17 PROCEDURE — 80053 COMPREHEN METABOLIC PANEL: CPT

## 2019-12-17 PROCEDURE — 85025 COMPLETE CBC W/AUTO DIFF WBC: CPT

## 2019-12-17 PROCEDURE — 82728 ASSAY OF FERRITIN: CPT

## 2019-12-17 PROCEDURE — 84466 ASSAY OF TRANSFERRIN: CPT

## 2019-12-17 PROCEDURE — 85007 BL SMEAR W/DIFF WBC COUNT: CPT

## 2020-02-20 ENCOUNTER — LAB (OUTPATIENT)
Dept: LAB | Facility: HOSPITAL | Age: 80
End: 2020-02-20

## 2020-02-20 DIAGNOSIS — E61.1 IRON DEFICIENCY: ICD-10-CM

## 2020-02-20 DIAGNOSIS — D47.2 IGG LAMBDA MONOCLONAL GAMMOPATHY: ICD-10-CM

## 2020-02-20 DIAGNOSIS — C91.10 CLL (CHRONIC LYMPHOCYTIC LEUKEMIA) (HCC): ICD-10-CM

## 2020-02-20 LAB
ALBUMIN SERPL-MCNC: 4.4 G/DL (ref 3.5–5.2)
ALBUMIN/GLOB SERPL: 2.1 G/DL
ALP SERPL-CCNC: 95 U/L (ref 39–117)
ALT SERPL W P-5'-P-CCNC: 16 U/L (ref 1–41)
ANION GAP SERPL CALCULATED.3IONS-SCNC: 11 MMOL/L (ref 5–15)
AST SERPL-CCNC: 13 U/L (ref 1–40)
BASOPHILS # BLD AUTO: 0.05 10*3/MM3 (ref 0–0.2)
BASOPHILS NFR BLD AUTO: 0.5 % (ref 0–1.5)
BILIRUB SERPL-MCNC: 0.5 MG/DL (ref 0.2–1.2)
BUN BLD-MCNC: 20 MG/DL (ref 8–23)
BUN/CREAT SERPL: 18.7 (ref 7–25)
CALCIUM SPEC-SCNC: 9.2 MG/DL (ref 8.6–10.5)
CHLORIDE SERPL-SCNC: 101 MMOL/L (ref 98–107)
CO2 SERPL-SCNC: 28 MMOL/L (ref 22–29)
CREAT BLD-MCNC: 1.07 MG/DL (ref 0.76–1.27)
DEPRECATED RDW RBC AUTO: 40.6 FL (ref 37–54)
EOSINOPHIL # BLD AUTO: 0.07 10*3/MM3 (ref 0–0.4)
EOSINOPHIL NFR BLD AUTO: 0.8 % (ref 0.3–6.2)
ERYTHROCYTE [DISTWIDTH] IN BLOOD BY AUTOMATED COUNT: 12.4 % (ref 12.3–15.4)
FERRITIN SERPL-MCNC: 195 NG/ML (ref 30–400)
GFR SERPL CREATININE-BSD FRML MDRD: 67 ML/MIN/1.73
GLOBULIN UR ELPH-MCNC: 2.1 GM/DL
GLUCOSE BLD-MCNC: 118 MG/DL (ref 65–99)
HCT VFR BLD AUTO: 40.6 % (ref 37.5–51)
HGB BLD-MCNC: 13.8 G/DL (ref 13–17.7)
IMM GRANULOCYTES # BLD AUTO: 0.1 10*3/MM3 (ref 0–0.05)
IMM GRANULOCYTES NFR BLD AUTO: 1.1 % (ref 0–0.5)
IRON 24H UR-MRATE: 71 MCG/DL (ref 59–158)
IRON SATN MFR SERPL: 20 % (ref 20–50)
LYMPHOCYTES # BLD AUTO: 3.97 10*3/MM3 (ref 0.7–3.1)
LYMPHOCYTES NFR BLD AUTO: 43.5 % (ref 19.6–45.3)
MCH RBC QN AUTO: 30.7 PG (ref 26.6–33)
MCHC RBC AUTO-ENTMCNC: 34 G/DL (ref 31.5–35.7)
MCV RBC AUTO: 90.2 FL (ref 79–97)
MONOCYTES # BLD AUTO: 0.93 10*3/MM3 (ref 0.1–0.9)
MONOCYTES NFR BLD AUTO: 10.2 % (ref 5–12)
NEUTROPHILS # BLD AUTO: 4 10*3/MM3 (ref 1.7–7)
NEUTROPHILS NFR BLD AUTO: 43.9 % (ref 42.7–76)
NRBC BLD AUTO-RTO: 0 /100 WBC (ref 0–0.2)
PLATELET # BLD AUTO: 225 10*3/MM3 (ref 140–450)
PMV BLD AUTO: 8.9 FL (ref 6–12)
POTASSIUM BLD-SCNC: 4 MMOL/L (ref 3.5–5.2)
PROT SERPL-MCNC: 6.5 G/DL (ref 6–8.5)
RBC # BLD AUTO: 4.5 10*6/MM3 (ref 4.14–5.8)
SODIUM BLD-SCNC: 140 MMOL/L (ref 136–145)
TIBC SERPL-MCNC: 361 MCG/DL (ref 298–536)
TRANSFERRIN SERPL-MCNC: 242 MG/DL (ref 200–360)
WBC NRBC COR # BLD: 9.12 10*3/MM3 (ref 3.4–10.8)

## 2020-02-20 PROCEDURE — 83540 ASSAY OF IRON: CPT

## 2020-02-20 PROCEDURE — 82728 ASSAY OF FERRITIN: CPT

## 2020-02-20 PROCEDURE — 84165 PROTEIN E-PHORESIS SERUM: CPT

## 2020-02-20 PROCEDURE — 36415 COLL VENOUS BLD VENIPUNCTURE: CPT

## 2020-02-20 PROCEDURE — 83883 ASSAY NEPHELOMETRY NOT SPEC: CPT

## 2020-02-20 PROCEDURE — 84466 ASSAY OF TRANSFERRIN: CPT

## 2020-02-20 PROCEDURE — 85025 COMPLETE CBC W/AUTO DIFF WBC: CPT

## 2020-02-20 PROCEDURE — 82784 ASSAY IGA/IGD/IGG/IGM EACH: CPT

## 2020-02-20 PROCEDURE — 86334 IMMUNOFIX E-PHORESIS SERUM: CPT

## 2020-02-20 PROCEDURE — 80053 COMPREHEN METABOLIC PANEL: CPT

## 2020-02-20 NOTE — PROGRESS NOTES
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  He is off therapy.  He is also seen for anemia from iron deficiency.  He is off oral iron for the past 4 months.  The patient is here alone.  History is obtained from the patient who is considered to be a marginal historian.      Problem List Items Addressed This Visit        Hematopoietic and Hemostatic    CLL (chronic lymphocytic leukemia) (CMS/HCC) - Primary    Overview     DIAGNOSTIC ABNORMALITIES:   1. Labs, 05/16/2012, [facility]: WBC 10.9, hemoglobin 14.3, hematocrit 42.2, MCV of 94.2, platelets 133,000, ALC elevated at 4796, ANC normal at 4.8, Absolute monocytosis at 970.  2. Labs, 11/14/2012, [facility]: WBC 10.2, hemoglobin 15.4, hematocrit 45.4, MCV 94.5, platelets 154,000, ALC elevated at 4,865. Globulin 1.9.  3. Labs, 11/15/2012, [facility]: IgG 495, IgA 34, IgM 28 (all below normal limits).  4. Blood film review, 11/16/2012, [facility]: Mild absolute lymphocytosis and monocytosis. Reactive and atypical lymphocytes present. Flow cytometry recommended.  5. PATHOLOGY: Cytogenetics, 12/12/2012, [facility]: CLL and CCND1-IGH@FISH: Abnormal results with +12 and 13q-.   6. Flow cytometry peripheral blood, 12/12/2012, [facility]: Peripheral blood with a CD5+ B cell population showing lambda restriction, 30% cellularity.   7. Labs, 03/27/2013: GFR 78. glucose 108. IgG 455, IgM 16, IgA 35, M-spike 0.1, [facility]: IgG monoclonal protein with lambda light chain specificity.   8. 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 Negative for CCND1/IGH gene rearrangement.   9. Hematopathology report peripheral blood, 03/27/2013 [facility]: Normal white blood cell count with no evidence of lymphocytosis but 23% abnormal intermediate lambda light chain-restricted B cell population identified by flow cytometry. Mild thrombocytopenia with normal hemoglobin/hematocrit. See comment.   10. Flow peripheral blood, 03/27/2013, [facility]: Abnormal CD5-positive identified, monoclonal lambda. Consistent with chronic lymphocytic leukemia (CLL)/small lymphocytic lymphoma (SLL).   11. Skeletal survey, 01/11/2013, [facility]: No discrete lytic lesions identified.   12. Ultrasound abdomen, 01/11/2013, [facility]: No focal pathology.     PREVIOUS INTERVENTION:   1. Observation.           Relevant Orders    CBC & Differential    Ferritin    Iron Profile    Comprehensive Metabolic Panel    Protein Elec + Interp, Serum        Oncology/Hematology History    DIAGNOSTIC ABNORMALITIES:  Labs, 05/16/2012, [facility]: WBC 10.9, hemoglobin 14.3, hematocrit 42.2, MCV of 94.2, platelets 133,000, ALC elevated at 4796, ANC normal at 4.8, Absolute monocytosis at 970.  Labs, 11/14/2012, [facility]: WBC 10.2, hemoglobin 15.4, hematocrit 45.4, MCV 94.5, platelets 154,000, ALC elevated at 4,865. Globulin 1.9.  Labs, 11/15/2012, [facility]: IgG 495, IgA 34, IgM 28 (all below normal limits).  Blood film review, 11/16/2012, [facility]: Mild absolute lymphocytosis and monocytosis. Reactive and atypical lymphocytes present. Flow cytometry recommended.  PATHOLOGY: Cytogenetics, 12/12/2012, [facility]:  CLL and CCND1-IGH@FISH: Abnormal results with +12 and 13q-.   Flow cytometry peripheral blood, 12/12/2012, [facility]: Peripheral blood with a CD5+ B cell population showing lambda restriction, 30% cellularity.   Labs, 03/27/2013: GFR 78. glucose 108. IgG 455, IgM 16, IgA 35, M-spike 0.1, [facility]: IgG  monoclonal protein with lambda light chain specificity.   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 Negative for CCND1/IGH gene rearrangement.    Hematopathology report peripheral blood, 03/27/2013 [facility]: Normal white blood cell count with no evidence of lymphocytosis but 23% abnormal intermediate lambda light chain-restricted B cell population identified by flow cytometry.  Mild thrombocytopenia with normal hemoglobin/hematocrit.  See comment.   Flow peripheral blood, 03/27/2013, [facility]:  Abnormal CD5-positive identified, monoclonal lambda. Consistent with chronic lymphocytic leukemia (CLL)/small lymphocytic lymphoma (SLL).     Skeletal survey, 01/11/2013, [facility]: No discrete lytic lesions identified.   Ultrasound abdomen, 01/11/2013, [facility]: No focal pathology.        PREVIOUS INTERVENTION:   Observation.      PREVIOUS INTERVENTIONS: Anemia from iron deficiency.  Ferrous sulfate 325 mg p.o. daily 01/03/2018 through 03/06/2018.  Resume 09/04/2018 through 10/10/2018.  Resumed 08/27/2019 through 10/24/2019.         CLL (chronic lymphocytic leukemia) (CMS/[facility])    8/27/2019 Initial Diagnosis     CLL (chronic lymphocytic leukemia) (CMS/[facility])         PAST MEDICAL HISTORY:  ALLERGIES:  Allergies   Allergen Reactions   • Augmentin [Amoxicillin-Pot Clavulanate] Hives   • Latex Itching and Other (See Comments)     And band aids. Causes redness and itching.     CURRENT MEDICATIONS:  Outpatient Encounter Medications as of 2/27/2020   Medication Sig Dispense Refill   • aspirin 81 MG EC tablet Take 81 mg by mouth Daily.     • folic acid-vit B6-vit B12 (FOLTABS) 0.8-10-0.115 MG tablet tablet Take  by mouth Daily.     • latanoprost (XALATAN) 0.005 % ophthalmic solution Administer 1 drop to the right eye Daily.     • metoprolol tartrate (LOPRESSOR) 25 MG tablet Take 25 mg by mouth 2 (Two) Times a Day.     •  multivitamins-minerals (PRESERVISION AREDS 2) capsule capsule Take 1 capsule by mouth 2 (Two) Times a Day.     • HYDROcodone-acetaminophen (NORCO) 5-325 MG per tablet Take 1-2 tablets by mouth Every 4 (Four) Hours As Needed (Pain). 30 tablet 0     No facility-administered encounter medications on file as of 2/27/2020.      ADULT ILLNESSES:  Patient Active Problem List   Diagnosis Code   • Hx of colonic polyp Z86.010   • Family hx of colon cancer Z80.0   • CLL (chronic lymphocytic leukemia) (CMS/HCC) C91.10   • Hypertension I10   • IgG lambda monoclonal gammopathy D47.2   • Iron deficiency E61.1     SURGERIES:  Past Surgical History:   Procedure Laterality Date   • COLONOSCOPY  06/13/2012   • INGUINAL HERNIA REPAIR Left 2007   • INGUINAL HERNIA REPAIR Right 12/28/2017    Procedure: RIGHT INGUINAL HERNIA REPAIR WITH MESH ;  Surgeon: Rossana Mahajan MD;  Location: [facility] OR;  Service:      HEALTH MAINTENANCE ITEMS:  Health Maintenance Due   Topic Date Due   • ANNUAL PHYSICAL  12/19/1945   • TDAP/TD VACCINES (1 - Tdap) 12/19/1953   • ZOSTER VACCINE (1 of 2) 12/19/1992       <no information>  Last Completed Colonoscopy       Status Date      COLONOSCOPY Done 8/1/2017 SCANNED - COLONOSCOPY     Patient has more history with this topic...          There is no immunization history on file for this patient.  Last Completed Mammogram     Patient has no health maintenance due at this time            FAMILY HISTORY:  Family History   Problem Relation Age of Onset   • Colon cancer Maternal Grandfather         in his 60's.    • Alzheimer's disease Mother    • Hypertension Father    • Other Father         stent   • COPD Sister    • Heart attack Brother    • No Known Problems Maternal Grandmother    • No Known Problems Paternal Grandmother    • No Known Problems Paternal Grandfather      SOCIAL HISTORY:  Social History     Socioeconomic History   • Marital status:      Spouse name: Not on file   • Number of children: Not on  "file   • Years of education: Not on file   • Highest education level: Not on file   Tobacco Use   • Smoking status: Former Smoker     Years: 15.00     Types: Cigarettes     Last attempt to quit: 1972     Years since quittin.1   • Smokeless tobacco: Never Used   Substance and Sexual Activity   • Alcohol use: No   • Drug use: No   • Sexual activity: Defer       REVIEW OF SYSTEMS:    Review of Systems   Constitutional: Negative for activity change, appetite change, chills, diaphoresis, fatigue, fever and unexpected weight loss.        \"I am okay.\"   Respiratory: Negative for cough, shortness of breath and wheezing.    Cardiovascular: Negative for chest pain and leg swelling.   Gastrointestinal: Negative for abdominal pain, blood in stool, constipation, diarrhea, nausea and vomiting.   Endocrine: Negative for heat intolerance.   Genitourinary: Negative for difficulty urinating, dysuria, frequency, hematuria and urgency.   Musculoskeletal: Negative for arthralgias, joint swelling and neck stiffness.   Skin: Negative for pallor.   Allergic/Immunologic: Negative for food allergies.   Neurological: Negative for dizziness, tremors, seizures, syncope, speech difficulty, weakness, light-headedness, headache and confusion.   Hematological: Negative for adenopathy. Does not bruise/bleed easily.   Psychiatric/Behavioral: Negative for agitation, behavioral problems, dysphoric mood, sleep disturbance, suicidal ideas and depressed mood.       VITAL SIGNS: /66   Pulse 70   Temp 98.2 °F (36.8 °C)   Resp 18   Ht 177.8 cm (70\")   Wt 71.3 kg (157 lb 3.2 oz)   SpO2 97%   BMI 22.56 kg/m²  Body surface area is 1.88 meters squared.   Pain Score    20 1408   PainSc: 0-No pain       PHYSICAL EXAMINATION:     Physical Exam   Constitutional: He is oriented to person, place, and time. He appears well-developed and well-nourished. No distress.   HENT:   Head: Normocephalic and atraumatic.   Neck: Trachea normal. "   Cardiovascular: Normal rate, regular rhythm and normal pulses. Exam reveals no friction rub.   No murmur heard.  Pulmonary/Chest: Effort normal and breath sounds normal. He has no wheezes. He has no rhonchi. He has no rales. Chest wall is not dull to percussion.   Abdominal: Soft. Normal appearance and bowel sounds are normal. There is no tenderness. There is no rebound and no guarding.   Musculoskeletal: He exhibits no edema.   Lymphadenopathy:     He has no axillary adenopathy.        Right: No inguinal and no supraclavicular adenopathy present.        Left: No inguinal and no supraclavicular adenopathy present.   Neurological: He is alert and oriented to person, place, and time. He has normal strength. No sensory deficit.   Skin: Skin is warm and dry. He is not diaphoretic. No pallor.   Psychiatric: He has a normal mood and affect. His behavior is normal. Judgment and thought content normal.       LABS    Lab Results - Last 18 Months   Lab Units 02/20/20  1340 12/17/19  1339 10/22/19  1346 08/20/19  1331 06/25/19  1329 04/30/19  1326 02/26/19  1331   HEMOGLOBIN g/dL 13.8 13.8 13.0 13.5 12.7* 13.6* 13.6*   HEMATOCRIT % 40.6 40.7 38.9 40.0 37.1* 39.4* 39.0*   MCV fL 90.2 90.4 91.5 91.7 90.0 89.1 88.4   WBC 10*3/mm3 9.12 10.05 6.42 7.36 6.57 8.29 8.52   RDW % 12.4 12.5 12.1* 12.4 12.3 12.2 12.5   MPV fL 8.9 9.1 9.6 9.0 9.1 9.2 9.2   PLATELETS 10*3/mm3 225 166 129* 144 130 144 127*   IMM GRAN % % 1.1*  --   --   --   --   --   --    NEUTROS ABS 10*3/mm3 4.00 4.32 1.33* 2.87 2.17 1.91 3.66   LYMPHS ABS 10*3/mm3 3.97*  --   --   --   --   --   --    MONOS ABS 10*3/mm3 0.93*  --   --   --   --   --   --    EOS ABS 10*3/mm3 0.07 0.30 0.35 0.15 0.20  --  0.19   BASOS ABS 10*3/mm3 0.05  --  0.28* 0.22*  --  0.08  --    IMMATURE GRANS (ABS) 10*3/mm3 0.10*  --   --   --   --   --   --    NRBC /100 WBC 0.0  --   --   --   --   --   --    NEUTROPHIL % %  --  43.0 20.7* 39.0* 33.0* 23.0* 43.0   MONOCYTES % %  --  5.0 4.3* 8.0  8.2 7.0 7.5   BASOPHIL % %  --   --  4.3* 3.0*  --  1.0  --    ATYP LYMPH % %  --  26.0* 18.5* 5.0 12.4* 5.0 16.1*   ANISOCYTOSIS   --   --   --  Slight/1+  --   --   --        Lab Results - Last 18 Months   Lab Units 02/20/20  1340 12/17/19  1339 10/22/19  1346 08/20/19  1331 02/26/19  1331   GLUCOSE mg/dL 118* 125* 105* 129* 99   SODIUM mmol/L 140 142 144 141 139   POTASSIUM mmol/L 4.0 3.9 4.2 4.3 4.3   CO2 mmol/L 28.0 31.0* 30.0* 31.0 27.0   CHLORIDE mmol/L 101 104 105 103 103   ANION GAP mmol/L 11.0 7.0 9.0 7.0 9.0   CREATININE mg/dL 1.07 0.96 1.03 1.25 1.10   BUN mg/dL 20 14 13 15 13   BUN / CREAT RATIO  18.7 14.6 12.6 12.0 11.8   CALCIUM mg/dL 9.2 9.0 8.8 9.5 9.1   EGFR IF NONAFRICN AM mL/min/1.73 67 76 70 56* 65   ALK PHOS U/L 95 77 77 69 66   TOTAL PROTEIN g/dL 6.5 6.1 5.9* 6.5 6.2*   ALT (SGPT) U/L 16 10 7 16 19   AST (SGOT) U/L 13 11 10 20 21   BILIRUBIN mg/dL 0.5 0.7 0.7 1.1* 1.1*   ALBUMIN g/dL 4.40  3.7 4.20 4.20 4.30  4.0 4.30  3.9   GLOBULIN gm/dL 2.1 1.9 1.7 2.2 1.9       Lab Results - Last 18 Months   Lab Units 02/20/20  1340 08/20/19  1331 02/26/19  1331   M-SPIKE g/dL 0.1* 0.2* 0.2*   KAPPA/LAMBDA RATIO, S  0.73  --   --    FREE LAMBDA LIGHT CHAINS mg/L 8.9  --   --    IG KAPPA FREE LIGHT CHAIN mg/L 6.5  --   --        Lab Results - Last 18 Months   Lab Units 02/20/20  1340 12/17/19  1339 10/22/19  1346 08/20/19  1331 06/25/19  1329 04/30/19  1326   IRON mcg/dL 71 104 86 100 92 80   TIBC mcg/dL 361 349 356 358 340 343   IRON SATURATION % 20 30 24 28 27 23   FERRITIN ng/mL 195.00 145.70 100.20 71.50 65.10 92.30         Oral Dey reports a pain score of 0.        Patient's Body mass index is 22.56 kg/m². BMI is within normal parameters. No follow-up required..      ASSESSMENT:  1.    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  Treatment status: Observation.  Complications: None.  Prognosis: Good.  2.    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  3.    Normocytic anemia, from chronic disease and iron deficiency.   4.    Glaucoma, right eye.  5.    Mild thrombocytopenia, stable for observation.   6.    History of fever, resolved, ? viral syndrome.  Not compatible with progressing chronic lymphocytic leukemia (CLL). Lymphocyte count is stable, no palpable adenopathies, and fever had resolved.   7.    Elevated bilirubin, negative sonogram of the abdomen, 01/2013.  8.    Cholelithiasis.      PLAN:  1.     again Re: Chronic lymphocytic leukemia (CLL) is an incurable disease. Treatment is generally reserved until the patient has active disease defined as the presence of disease-related symptoms, such as weight loss greater than 10%, extreme fatigue, fever greater than 100.5 for 2 weeks with night sweats without evidence of infection (\"B\" symptoms), worsening cytopenias, unexplained recurrent infections, worsening adenopathy, or splenomegaly.  3.     Re:  Heme status.  WBC 9.1, lymphocyte 3.97, ANC 4, hemoglobin 13.8 gm, normal, and platelet 225.   4.     Re:  Pre office SPEP/SIEP. M spike 0.1, stable.  5.     Re:  Pre-office CMP.   GFR 67 ml/minute.  6.     Re:  Pre-office ferritin, TIBC, % saturation, and iron. 20% saturation and ferritin 195 ng/ml.  7.     Re:  Stable for observation (CLL).  Patient did not meet criteria for therapy.  8.     again Re:  A bone marrow aspiration and biopsy is indicated in all patients with an M protein 1.5 g/dL, patients with a non-IgG MGUS, patients with an abnormal serum free light chain ratio (i.e., ratio of kappa to lambda free light chains less than 0.26 or greater than 1.65), and in all patients who have any abnormalities of the CBC, serum creatinine, serum calcium, or radiographic bone survey.  9.    Continue currently identified medications.  10.  Plan of care discussed with " patient.  Understanding expressed.  Patient agreeable to proceed.  11.  Continue ongoing management per primary care physician and other specialists.  12.  Advance Care Planning   ACP discussion was declined by the patient. Patient does not have an advance directive, information provided.  13.  CBC with differential, ferritin, TIBC, % saturation, and iron every 8 weeks.  14.  Return to the [place] office with pre-office CMP and SPEP/SIEP in 6 months.      TIME SPENT:  Face-to-face time on this encounter, as defined by the [employer] Medical Association in the 2020 Current Procedural Terminology codebook; assessment, record review, lab review, planning and education is 17 minutes.          cc:  Jorge Shepherd MD         (Chalo Zaman MD)

## 2020-02-21 LAB
ALBUMIN SERPL-MCNC: 3.7 G/DL (ref 2.9–4.4)
ALBUMIN/GLOB SERPL: 1.6 {RATIO} (ref 0.7–1.7)
ALPHA1 GLOB FLD ELPH-MCNC: 0.2 G/DL (ref 0–0.4)
ALPHA2 GLOB SERPL ELPH-MCNC: 0.9 G/DL (ref 0.4–1)
B-GLOBULIN SERPL ELPH-MCNC: 0.9 G/DL (ref 0.7–1.3)
GAMMA GLOB SERPL ELPH-MCNC: 0.4 G/DL (ref 0.4–1.8)
GLOBULIN SER CALC-MCNC: 2.4 G/DL (ref 2.2–3.9)
IGA SERPL-MCNC: 27 MG/DL (ref 61–437)
IGG SERPL-MCNC: 415 MG/DL (ref 700–1600)
IGM SERPL-MCNC: 15 MG/DL (ref 15–143)
INTERPRETATION SERPL IEP-IMP: ABNORMAL
KAPPA LC SERPL-MCNC: 6.5 MG/L (ref 3.3–19.4)
KAPPA LC/LAMBDA SER: 0.73 {RATIO} (ref 0.26–1.65)
LAMBDA LC FREE SERPL-MCNC: 8.9 MG/L (ref 5.7–26.3)
Lab: ABNORMAL
M-SPIKE: 0.1 G/DL
PROT SERPL-MCNC: 6.1 G/DL (ref 6–8.5)

## 2020-02-27 ENCOUNTER — OFFICE VISIT (OUTPATIENT)
Dept: ONCOLOGY | Facility: CLINIC | Age: 80
End: 2020-02-27

## 2020-02-27 VITALS
HEART RATE: 70 BPM | WEIGHT: 157.2 LBS | RESPIRATION RATE: 18 BRPM | DIASTOLIC BLOOD PRESSURE: 66 MMHG | SYSTOLIC BLOOD PRESSURE: 152 MMHG | TEMPERATURE: 98.2 F | OXYGEN SATURATION: 97 % | BODY MASS INDEX: 22.5 KG/M2 | HEIGHT: 70 IN

## 2020-02-27 DIAGNOSIS — C91.10 CLL (CHRONIC LYMPHOCYTIC LEUKEMIA) (HCC): Primary | ICD-10-CM

## 2020-02-27 PROCEDURE — 99213 OFFICE O/P EST LOW 20 MIN: CPT | Performed by: INTERNAL MEDICINE

## 2020-04-23 ENCOUNTER — LAB (OUTPATIENT)
Dept: LAB | Facility: HOSPITAL | Age: 80
End: 2020-04-23

## 2020-04-23 DIAGNOSIS — C91.10 CLL (CHRONIC LYMPHOCYTIC LEUKEMIA) (HCC): ICD-10-CM

## 2020-04-23 DIAGNOSIS — E61.1 IRON DEFICIENCY: ICD-10-CM

## 2020-04-23 LAB
ALBUMIN SERPL-MCNC: 4.3 G/DL (ref 3.5–5.2)
ALBUMIN/GLOB SERPL: 2.4 G/DL
ALP SERPL-CCNC: 88 U/L (ref 39–117)
ALT SERPL W P-5'-P-CCNC: 8 U/L (ref 1–41)
ANION GAP SERPL CALCULATED.3IONS-SCNC: 10 MMOL/L (ref 5–15)
AST SERPL-CCNC: 11 U/L (ref 1–40)
BASOPHILS # BLD MANUAL: 0.09 10*3/MM3 (ref 0–0.2)
BASOPHILS NFR BLD AUTO: 1.1 % (ref 0–1.5)
BILIRUB SERPL-MCNC: 1 MG/DL (ref 0.2–1.2)
BUN BLD-MCNC: 13 MG/DL (ref 8–23)
BUN/CREAT SERPL: 12.3 (ref 7–25)
CALCIUM SPEC-SCNC: 9.3 MG/DL (ref 8.6–10.5)
CHLORIDE SERPL-SCNC: 105 MMOL/L (ref 98–107)
CO2 SERPL-SCNC: 28 MMOL/L (ref 22–29)
CREAT BLD-MCNC: 1.06 MG/DL (ref 0.76–1.27)
DEPRECATED RDW RBC AUTO: 40.9 FL (ref 37–54)
EOSINOPHIL # BLD MANUAL: 0.26 10*3/MM3 (ref 0–0.4)
EOSINOPHIL NFR BLD MANUAL: 3.3 % (ref 0.3–6.2)
ERYTHROCYTE [DISTWIDTH] IN BLOOD BY AUTOMATED COUNT: 12.3 % (ref 12.3–15.4)
FERRITIN SERPL-MCNC: 139.1 NG/ML (ref 30–400)
GFR SERPL CREATININE-BSD FRML MDRD: 68 ML/MIN/1.73
GLOBULIN UR ELPH-MCNC: 1.8 GM/DL
GLUCOSE BLD-MCNC: 100 MG/DL (ref 65–99)
HCT VFR BLD AUTO: 39.2 % (ref 37.5–51)
HGB BLD-MCNC: 13.1 G/DL (ref 13–17.7)
IRON 24H UR-MRATE: 133 MCG/DL (ref 59–158)
IRON SATN MFR SERPL: 36 % (ref 20–50)
LYMPHOCYTES # BLD MANUAL: 5 10*3/MM3 (ref 0.7–3.1)
LYMPHOCYTES NFR BLD MANUAL: 13 % (ref 5–12)
LYMPHOCYTES NFR BLD MANUAL: 63 % (ref 19.6–45.3)
MCH RBC QN AUTO: 30.3 PG (ref 26.6–33)
MCHC RBC AUTO-ENTMCNC: 33.4 G/DL (ref 31.5–35.7)
MCV RBC AUTO: 90.5 FL (ref 79–97)
MONOCYTES # BLD AUTO: 1.03 10*3/MM3 (ref 0.1–0.9)
NEUTROPHILS # BLD AUTO: 1.38 10*3/MM3 (ref 1.7–7)
NEUTROPHILS NFR BLD MANUAL: 17.4 % (ref 42.7–76)
PLAT MORPH BLD: NORMAL
PLATELET # BLD AUTO: 120 10*3/MM3 (ref 140–450)
PMV BLD AUTO: 9.1 FL (ref 6–12)
POTASSIUM BLD-SCNC: 4.5 MMOL/L (ref 3.5–5.2)
PROT SERPL-MCNC: 6.1 G/DL (ref 6–8.5)
RBC # BLD AUTO: 4.33 10*6/MM3 (ref 4.14–5.8)
RBC MORPH BLD: NORMAL
SODIUM BLD-SCNC: 143 MMOL/L (ref 136–145)
TIBC SERPL-MCNC: 367 MCG/DL (ref 298–536)
TRANSFERRIN SERPL-MCNC: 246 MG/DL (ref 200–360)
VARIANT LYMPHS NFR BLD MANUAL: 2.2 % (ref 0–5)
WBC MORPH BLD: NORMAL
WBC NRBC COR # BLD: 7.93 10*3/MM3 (ref 3.4–10.8)

## 2020-04-23 PROCEDURE — 83540 ASSAY OF IRON: CPT

## 2020-04-23 PROCEDURE — 80053 COMPREHEN METABOLIC PANEL: CPT

## 2020-04-23 PROCEDURE — 85007 BL SMEAR W/DIFF WBC COUNT: CPT

## 2020-04-23 PROCEDURE — 85025 COMPLETE CBC W/AUTO DIFF WBC: CPT

## 2020-04-23 PROCEDURE — 84466 ASSAY OF TRANSFERRIN: CPT

## 2020-04-23 PROCEDURE — 82728 ASSAY OF FERRITIN: CPT

## 2020-04-23 PROCEDURE — 36415 COLL VENOUS BLD VENIPUNCTURE: CPT

## 2020-06-18 ENCOUNTER — LAB (OUTPATIENT)
Dept: LAB | Facility: HOSPITAL | Age: 80
End: 2020-06-18

## 2020-06-18 DIAGNOSIS — E61.1 IRON DEFICIENCY: ICD-10-CM

## 2020-06-18 DIAGNOSIS — C91.10 CLL (CHRONIC LYMPHOCYTIC LEUKEMIA) (HCC): ICD-10-CM

## 2020-06-18 LAB
ALBUMIN SERPL-MCNC: 4.3 G/DL (ref 3.5–5.2)
ALBUMIN/GLOB SERPL: 2.2 G/DL
ALP SERPL-CCNC: 84 U/L (ref 39–117)
ALT SERPL W P-5'-P-CCNC: 7 U/L (ref 1–41)
ANION GAP SERPL CALCULATED.3IONS-SCNC: 12 MMOL/L (ref 5–15)
AST SERPL-CCNC: 12 U/L (ref 1–40)
BASOPHILS # BLD MANUAL: 0.07 10*3/MM3 (ref 0–0.2)
BASOPHILS NFR BLD AUTO: 1 % (ref 0–1.5)
BILIRUB SERPL-MCNC: 0.8 MG/DL (ref 0.2–1.2)
BUN BLD-MCNC: 18 MG/DL (ref 8–23)
BUN/CREAT SERPL: 16.8 (ref 7–25)
CALCIUM SPEC-SCNC: 9.6 MG/DL (ref 8.6–10.5)
CHLORIDE SERPL-SCNC: 106 MMOL/L (ref 98–107)
CO2 SERPL-SCNC: 26 MMOL/L (ref 22–29)
CREAT BLD-MCNC: 1.07 MG/DL (ref 0.76–1.27)
DEPRECATED RDW RBC AUTO: 40.4 FL (ref 37–54)
EOSINOPHIL # BLD MANUAL: 0.14 10*3/MM3 (ref 0–0.4)
EOSINOPHIL NFR BLD MANUAL: 2 % (ref 0.3–6.2)
ERYTHROCYTE [DISTWIDTH] IN BLOOD BY AUTOMATED COUNT: 12.3 % (ref 12.3–15.4)
FERRITIN SERPL-MCNC: 108.2 NG/ML (ref 30–400)
GFR SERPL CREATININE-BSD FRML MDRD: 67 ML/MIN/1.73
GLOBULIN UR ELPH-MCNC: 2 GM/DL
GLUCOSE BLD-MCNC: 116 MG/DL (ref 65–99)
HCT VFR BLD AUTO: 38.9 % (ref 37.5–51)
HGB BLD-MCNC: 13 G/DL (ref 13–17.7)
IRON 24H UR-MRATE: 101 MCG/DL (ref 59–158)
IRON SATN MFR SERPL: 27 % (ref 20–50)
LYMPHOCYTES # BLD MANUAL: 4.45 10*3/MM3 (ref 0.7–3.1)
LYMPHOCYTES NFR BLD MANUAL: 13 % (ref 5–12)
LYMPHOCYTES NFR BLD MANUAL: 65 % (ref 19.6–45.3)
MCH RBC QN AUTO: 29.7 PG (ref 26.6–33)
MCHC RBC AUTO-ENTMCNC: 33.4 G/DL (ref 31.5–35.7)
MCV RBC AUTO: 89 FL (ref 79–97)
MONOCYTES # BLD AUTO: 0.89 10*3/MM3 (ref 0.1–0.9)
NEUTROPHILS # BLD AUTO: 1.3 10*3/MM3 (ref 1.7–7)
NEUTROPHILS NFR BLD MANUAL: 19 % (ref 42.7–76)
PLATELET # BLD AUTO: 131 10*3/MM3 (ref 140–450)
PMV BLD AUTO: 9.4 FL (ref 6–12)
POTASSIUM BLD-SCNC: 4.3 MMOL/L (ref 3.5–5.2)
PROT SERPL-MCNC: 6.3 G/DL (ref 6–8.5)
RBC # BLD AUTO: 4.37 10*6/MM3 (ref 4.14–5.8)
RBC MORPH BLD: NORMAL
SMALL PLATELETS BLD QL SMEAR: ADEQUATE
SODIUM BLD-SCNC: 144 MMOL/L (ref 136–145)
TIBC SERPL-MCNC: 380 MCG/DL (ref 298–536)
TRANSFERRIN SERPL-MCNC: 255 MG/DL (ref 200–360)
WBC MORPH BLD: NORMAL
WBC NRBC COR # BLD: 6.85 10*3/MM3 (ref 3.4–10.8)

## 2020-06-18 PROCEDURE — 36415 COLL VENOUS BLD VENIPUNCTURE: CPT

## 2020-06-18 PROCEDURE — 80053 COMPREHEN METABOLIC PANEL: CPT

## 2020-06-18 PROCEDURE — 85025 COMPLETE CBC W/AUTO DIFF WBC: CPT

## 2020-06-18 PROCEDURE — 82728 ASSAY OF FERRITIN: CPT

## 2020-06-18 PROCEDURE — 83540 ASSAY OF IRON: CPT

## 2020-06-18 PROCEDURE — 85007 BL SMEAR W/DIFF WBC COUNT: CPT

## 2020-06-18 PROCEDURE — 84466 ASSAY OF TRANSFERRIN: CPT

## 2020-08-19 NOTE — PROGRESS NOTES
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  He is off therapy.  He is also seen for anemia from iron deficiency.  He is off oral iron for the past 10 months.  The patient is here alone.  History is obtained from the patient who is considered to be a marginal historian.      Problem List Items Addressed This Visit     None        Oncology/Hematology History    DIAGNOSTIC ABNORMALITIES:  Labs, 05/16/2012, [facility]: WBC 10.9, hemoglobin 14.3, hematocrit 42.2, MCV of 94.2, platelets 133,000, ALC elevated at 4796, ANC normal at 4.8, Absolute monocytosis at 970.  Labs, 11/14/2012, [facility]: WBC 10.2, hemoglobin 15.4, hematocrit 45.4, MCV 94.5, platelets 154,000, ALC elevated at 4,865. Globulin 1.9.  Labs, 11/15/2012, [facility]: IgG 495, IgA 34, IgM 28 (all below normal limits).  Blood film review, 11/16/2012, [facility]: Mild absolute lymphocytosis and monocytosis. Reactive and atypical lymphocytes present. Flow cytometry recommended.  PATHOLOGY: Cytogenetics, 12/12/2012, [facility]:  CLL and CCND1-IGH@FISH: Abnormal results with +12 and 13q-.   Flow cytometry peripheral blood, 12/12/2012, [facility]: Peripheral blood with a CD5+ B cell population showing lambda restriction, 30% cellularity.   Labs, 03/27/2013: GFR 78. glucose 108. IgG 455, IgM 16, IgA 35, M-spike 0.1, [facility]: IgG monoclonal protein with lambda light chain specificity.   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 Negative for CCND1/IGH gene  rearrangement.    Hematopathology report peripheral blood, 03/27/2013 [facility]: Normal white blood cell count with no evidence of lymphocytosis but 23% abnormal intermediate lambda light chain-restricted B cell population identified by flow cytometry.  Mild thrombocytopenia with normal hemoglobin/hematocrit.  See comment.   Flow peripheral blood, 03/27/2013, [facility]:  Abnormal CD5-positive identified, monoclonal lambda. Consistent with chronic lymphocytic leukemia (CLL)/small lymphocytic lymphoma (SLL).     Skeletal survey, 01/11/2013, [facility]: No discrete lytic lesions identified.   Ultrasound abdomen, 01/11/2013, [facility]: No focal pathology.        PREVIOUS INTERVENTION:   Observation.      PREVIOUS INTERVENTIONS: Anemia from iron deficiency.  Ferrous sulfate 325 mg p.o. daily 01/03/2018 through 03/06/2018.  Resume 09/04/2018 through 10/10/2018.  Resumed 08/27/2019 through 10/24/2019.         CLL (chronic lymphocytic leukemia) (CMS/[facility])    8/27/2019 Initial Diagnosis     CLL (chronic lymphocytic leukemia) (CMS/[facility])         PAST MEDICAL HISTORY:  ALLERGIES:  Allergies   Allergen Reactions   • Augmentin [Amoxicillin-Pot Clavulanate] Hives   • Latex Itching and Other (See Comments)     And band aids. Causes redness and itching.     CURRENT MEDICATIONS:  Outpatient Encounter Medications as of 8/26/2020   Medication Sig Dispense Refill   • aspirin 81 MG EC tablet Take 81 mg by mouth Daily.     • folic acid-vit B6-vit B12 (FOLTABS) 0.8-10-0.115 MG tablet tablet Take  by mouth Daily.     • HYDROcodone-acetaminophen (NORCO) 5-325 MG per tablet Take 1-2 tablets by mouth Every 4 (Four) Hours As Needed (Pain). 30 tablet 0   • latanoprost (XALATAN) 0.005 % ophthalmic solution Administer 1 drop to the right eye Daily.     • metoprolol tartrate (LOPRESSOR) 25 MG tablet Take 25 mg by mouth 2 (Two) Times a Day.     • multivitamins-minerals (PRESERVISION AREDS 2) capsule capsule Take 1 capsule by mouth  2 (Two) Times a Day.       No facility-administered encounter medications on file as of 8/26/2020.      ADULT ILLNESSES:  Patient Active Problem List   Diagnosis Code   • Hx of colonic polyp Z86.010   • Family hx of colon cancer Z80.0   • CLL (chronic lymphocytic leukemia) (CMS/HCC) C91.10   • Hypertension I10   • IgG lambda monoclonal gammopathy D47.2   • Iron deficiency E61.1     SURGERIES:  Past Surgical History:   Procedure Laterality Date   • COLONOSCOPY  06/13/2012   • INGUINAL HERNIA REPAIR Left 2007   • INGUINAL HERNIA REPAIR Right 12/28/2017    Procedure: RIGHT INGUINAL HERNIA REPAIR WITH MESH ;  Surgeon: Rossana Mahajan MD;  Location: [facility] OR;  Service:      HEALTH MAINTENANCE ITEMS:  Health Maintenance Due   Topic Date Due   • ANNUAL PHYSICAL  12/19/1943   • TDAP/TD VACCINES (1 - Tdap) 12/19/1953   • ZOSTER VACCINE (1 of 2) 12/19/1992   • HEPATITIS C SCREENING  06/23/2017   • INFLUENZA VACCINE  08/01/2020       <no information>  Last Completed Colonoscopy       Status Date      COLONOSCOPY Done 8/1/2017 SCANNED - COLONOSCOPY     Patient has more history with this topic...          There is no immunization history on file for this patient.  Last Completed Mammogram     Patient has no health maintenance due at this time            FAMILY HISTORY:  Family History   Problem Relation Age of Onset   • Colon cancer Maternal Grandfather         in his 60's.    • Alzheimer's disease Mother    • Hypertension Father    • Other Father         stent   • COPD Sister    • Heart attack Brother    • No Known Problems Maternal Grandmother    • No Known Problems Paternal Grandmother    • No Known Problems Paternal Grandfather      SOCIAL HISTORY:  Social History     Socioeconomic History   • Marital status:      Spouse name: Not on file   • Number of children: Not on file   • Years of education: Not on file   • Highest education level: Not on file   Tobacco Use   • Smoking status: Former Smoker     Years: 15.00     " Types: Cigarettes     Last attempt to quit: 1972     Years since quittin.6   • Smokeless tobacco: Never Used   Substance and Sexual Activity   • Alcohol use: No   • Drug use: No   • Sexual activity: Defer       REVIEW OF SYSTEMS:    Review of Systems   Constitutional: Negative for chills, diaphoresis, fatigue and fever.        \"I feel normal.\"   Respiratory: Negative for cough, shortness of breath and wheezing.    Cardiovascular: Negative for chest pain and leg swelling.   Gastrointestinal: Negative for abdominal pain, constipation, diarrhea, nausea and vomiting.   Endocrine: Negative for cold intolerance and heat intolerance.   Genitourinary: Negative for difficulty urinating, flank pain and hematuria.   Musculoskeletal: Negative for gait problem and joint swelling.   Skin: Negative for pallor.   Allergic/Immunologic: Negative for food allergies.   Neurological: Negative for speech difficulty, weakness and confusion.   Hematological: Negative for adenopathy. Does not bruise/bleed easily.   Psychiatric/Behavioral: Negative for agitation and hallucinations. The patient is not nervous/anxious.        VITAL SIGNS: /58   Pulse 60   Temp 98.2 °F (36.8 °C)   Resp 18   Ht 177.8 cm (70\")   Wt 70.6 kg (155 lb 9.6 oz)   SpO2 98%   BMI 22.33 kg/m²  Body surface area is 1.88 meters squared.   Pain Score    20 1337   PainSc: 0-No pain       PHYSICAL EXAMINATION:     Physical Exam   Constitutional: He is oriented to person, place, and time. He appears well-developed and well-nourished. No distress.   HENT:   Head: Normocephalic and atraumatic.   Cardiovascular: Normal rate and regular rhythm.   Pulmonary/Chest: Effort normal and breath sounds normal. He has no wheezes. He has no rales.   Abdominal: Soft. Bowel sounds are normal. There is no tenderness.   Musculoskeletal: He exhibits no edema.   Neurological: He is alert and oriented to person, place, and time.   Skin: Skin is warm and dry. He is not " diaphoretic. No pallor.   Psychiatric: He has a normal mood and affect. His behavior is normal. Judgment and thought content normal.   Vitals reviewed.      LABS    Lab Results - Last 18 Months   Lab Units 08/20/20  1414 06/18/20  1408 04/23/20  1401 02/20/20  1340 12/17/19  1339 10/22/19  1346 08/20/19  1331 06/25/19  1329 04/30/19  1326   HEMOGLOBIN g/dL 13.4 13.0 13.1 13.8 13.8 13.0 13.5 12.7* 13.6*   HEMATOCRIT % 40.1 38.9 39.2 40.6 40.7 38.9 40.0 37.1* 39.4*   MCV fL 89.5 89.0 90.5 90.2 90.4 91.5 91.7 90.0 89.1   WBC 10*3/mm3 11.69* 6.85 7.93 9.12 10.05 6.42 7.36 6.57 8.29   RDW % 12.4 12.3 12.3 12.4 12.5 12.1* 12.4 12.3 12.2   MPV fL 9.4 9.4 9.1 8.9 9.1 9.6 9.0 9.1 9.2   PLATELETS 10*3/mm3 131* 131* 120* 225 166 129* 144 130 144   IMM GRAN % %  --   --   --  1.1*  --   --   --   --   --    NEUTROS ABS 10*3/mm3 5.85 1.30* 1.38* 4.00 4.32 1.33* 2.87 2.17 1.91   LYMPHS ABS 10*3/mm3  --   --   --  3.97*  --   --   --   --   --    MONOS ABS 10*3/mm3  --   --   --  0.93*  --   --   --   --   --    EOS ABS 10*3/mm3 0.36 0.14 0.26 0.07 0.30 0.35 0.15 0.20  --    BASOS ABS 10*3/mm3 0.12 0.07 0.09 0.05  --  0.28* 0.22*  --  0.08   IMMATURE GRANS (ABS) 10*3/mm3  --   --   --  0.10*  --   --   --   --   --    NRBC /100 WBC  --   --   --  0.0  --   --   --   --   --    NEUTROPHIL % % 50.0 19.0* 17.4*  --  43.0 20.7* 39.0* 33.0* 23.0*   MONOCYTES % % 3.1* 13.0* 13.0*  --  5.0 4.3* 8.0 8.2 7.0   BASOPHIL % % 1.0 1.0 1.1  --   --  4.3* 3.0*  --  1.0   ATYP LYMPH % % 5.2*  --  2.2  --  26.0* 18.5* 5.0 12.4* 5.0   ANISOCYTOSIS   --   --   --   --   --   --  Slight/1+  --   --        Lab Results - Last 18 Months   Lab Units 08/20/20  1414 06/18/20  1408 04/23/20  1401 02/20/20  1340 12/17/19  1339 10/22/19  1346   GLUCOSE mg/dL 123* 116* 100* 118* 125* 105*   SODIUM mmol/L 141 144 143 140 142 144   POTASSIUM mmol/L 4.4 4.3 4.5 4.0 3.9 4.2   CO2 mmol/L 26.0 26.0 28.0 28.0 31.0* 30.0*   CHLORIDE mmol/L 102 106 105 101 104 105    ANION GAP mmol/L 13.0 12.0 10.0 11.0 7.0 9.0   CREATININE mg/dL 1.22 1.07 1.06 1.07 0.96 1.03   BUN mg/dL 16 18 13 20 14 13   BUN / CREAT RATIO  13.1 16.8 12.3 18.7 14.6 12.6   CALCIUM mg/dL 9.4 9.6 9.3 9.2 9.0 8.8   EGFR IF NONAFRICN AM mL/min/1.73 58* 67 68 67 76 70   ALK PHOS U/L 87 84 88 95 77 77   TOTAL PROTEIN g/dL 6.4 6.3 6.1 6.5 6.1 5.9*   ALT (SGPT) U/L 9 7 8 16 10 7   AST (SGOT) U/L 11 12 11 13 11 10   BILIRUBIN mg/dL 0.7 0.8 1.0 0.5 0.7 0.7   ALBUMIN g/dL 4.60  3.9 4.30 4.30 4.40  3.7 4.20 4.20   GLOBULIN gm/dL 1.8 2.0 1.8 2.1 1.9 1.7       Lab Results - Last 18 Months   Lab Units 08/20/20  1414 02/20/20  1340 08/20/19  1331 02/26/19  1331   M-SPIKE g/dL 0.2* 0.1* 0.2* 0.2*   KAPPA/LAMBDA RATIO, S   --  0.73  --   --    FREE LAMBDA LIGHT CHAINS mg/L  --  8.9  --   --    IG KAPPA FREE LIGHT CHAIN mg/L  --  6.5  --   --        Lab Results - Last 18 Months   Lab Units 08/20/20  1414 06/18/20  1408 04/23/20  1401 02/20/20  1340 12/17/19  1339 10/22/19  1346   IRON mcg/dL 68 101 133 71 104 86   TIBC mcg/dL 390 380 367 361 349 356   IRON SATURATION % 17* 27 36 20 30 24   FERRITIN ng/mL 125.40 108.20 139.10 195.00 145.70 100.20         Oral Dey reports a pain score of 0.      Patient's Body mass index is 22.33 kg/m². BMI is within normal parameters. No follow-up required..    ASSESSMENT:  1.    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  Treatment status: Observation.  Complications: None.  Prognosis: Good.  2.    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  3.    Normocytic anemia, from chronic disease and iron deficiency.   4.    Glaucoma, right eye.  5.    Mild thrombocytopenia, stable for observation.   6.    History of fever, resolved, ? viral syndrome.  Not compatible with progressing chronic lymphocytic leukemia (CLL). Lymphocyte count is stable, no palpable  "adenopathies, and fever had resolved.   7.    Elevated bilirubin, negative sonogram of the abdomen, 01/2013.  8.    Cholelithiasis.  Asymptomatic.         PLAN:  1.     Re:  Treatment is generally reserved until the patient has active disease defined as the presence of disease-related symptoms, such as weight loss greater than 10%, extreme fatigue, fever greater than 100.5 for 2 weeks with night sweats without evidence of infection (\"B\" symptoms), worsening cytopenias, unexplained recurrent infections, worsening adenopathy, or splenomegaly.  3.     Re:  Heme status.  WBC 11.6, hemoglobin 13.4, normal, platelet 131, lymphocyte 4.3, no rapid doubling.  4.     Re:  Pre office SPEP/SIEP. M spike 0.2 from 0.1, stable.  5.     Re:  Pre-office CMP.  GFR 58 ml/minute.  6.     Re:  Pre-office ferritin, TIBC, % saturation, and iron. 17% saturation and ferritin 125.4 ng/ml.  Hold iron replacement, no anemia.  7.     Re:  Stable for observation (CLL).  Patient did not meet criteria for therapy.  8.     again Re:  A bone marrow aspiration and biopsy is indicated in all patients with an M protein 1.5 g/dL, patients with a non-IgG MGUS, patients with an abnormal serum free light chain ratio (i.e., ratio of kappa to lambda free light chains less than 0.26 or greater than 1.65), and in all patients who have any abnormalities of the CBC, serum creatinine, serum calcium, or radiographic bone survey.  9.    Continue currently identified medications.  10.  Plan of care discussed with patient.  Understanding expressed.  Patient agreeable to proceed.  11.  Continue ongoing management per primary care physician and other specialists.  12.  Advance Care Planning   ACP discussion was declined by the patient. Patient does not have an advance directive, information provided.  13.  CBC with differential, ferritin, TIBC, % saturation, and iron every 12 weeks.  14.  Return to the office with " pre-office CMP and SPEP/SIEP in 6 months.        I spent 16 total minutes, face-to-face, caring for Oral today.  Greater than 50% of this time involved counseling and/or coordination of care as documented within this note regarding the patient's illness(es), pros and cons of various treatment options, instructions and/or risk reduction.         cc:  Jorge Shepherd MD         (Chalo Zaman MD)

## 2020-08-20 ENCOUNTER — LAB (OUTPATIENT)
Dept: LAB | Facility: HOSPITAL | Age: 80
End: 2020-08-20

## 2020-08-20 DIAGNOSIS — C91.10 CLL (CHRONIC LYMPHOCYTIC LEUKEMIA) (HCC): ICD-10-CM

## 2020-08-20 LAB
ALBUMIN SERPL-MCNC: 4.6 G/DL (ref 3.5–5.2)
ALBUMIN/GLOB SERPL: 2.6 G/DL
ALP SERPL-CCNC: 87 U/L (ref 39–117)
ALT SERPL W P-5'-P-CCNC: 9 U/L (ref 1–41)
ANION GAP SERPL CALCULATED.3IONS-SCNC: 13 MMOL/L (ref 5–15)
AST SERPL-CCNC: 11 U/L (ref 1–40)
BASOPHILS # BLD MANUAL: 0.12 10*3/MM3 (ref 0–0.2)
BASOPHILS NFR BLD AUTO: 1 % (ref 0–1.5)
BILIRUB SERPL-MCNC: 0.7 MG/DL (ref 0–1.2)
BUN SERPL-MCNC: 16 MG/DL (ref 8–23)
BUN/CREAT SERPL: 13.1 (ref 7–25)
CALCIUM SPEC-SCNC: 9.4 MG/DL (ref 8.6–10.5)
CHLORIDE SERPL-SCNC: 102 MMOL/L (ref 98–107)
CO2 SERPL-SCNC: 26 MMOL/L (ref 22–29)
CREAT SERPL-MCNC: 1.22 MG/DL (ref 0.76–1.27)
DEPRECATED RDW RBC AUTO: 40.6 FL (ref 37–54)
EOSINOPHIL # BLD MANUAL: 0.36 10*3/MM3 (ref 0–0.4)
EOSINOPHIL NFR BLD MANUAL: 3.1 % (ref 0.3–6.2)
ERYTHROCYTE [DISTWIDTH] IN BLOOD BY AUTOMATED COUNT: 12.4 % (ref 12.3–15.4)
FERRITIN SERPL-MCNC: 125.4 NG/ML (ref 30–400)
GFR SERPL CREATININE-BSD FRML MDRD: 58 ML/MIN/1.73
GLOBULIN UR ELPH-MCNC: 1.8 GM/DL
GLUCOSE SERPL-MCNC: 123 MG/DL (ref 65–99)
HCT VFR BLD AUTO: 40.1 % (ref 37.5–51)
HGB BLD-MCNC: 13.4 G/DL (ref 13–17.7)
IRON 24H UR-MRATE: 68 MCG/DL (ref 59–158)
IRON SATN MFR SERPL: 17 % (ref 20–50)
LYMPHOCYTES # BLD MANUAL: 4.38 10*3/MM3 (ref 0.7–3.1)
LYMPHOCYTES NFR BLD MANUAL: 3.1 % (ref 5–12)
LYMPHOCYTES NFR BLD MANUAL: 37.5 % (ref 19.6–45.3)
MCH RBC QN AUTO: 29.9 PG (ref 26.6–33)
MCHC RBC AUTO-ENTMCNC: 33.4 G/DL (ref 31.5–35.7)
MCV RBC AUTO: 89.5 FL (ref 79–97)
MONOCYTES # BLD AUTO: 0.36 10*3/MM3 (ref 0.1–0.9)
NEUTROPHILS # BLD AUTO: 5.85 10*3/MM3 (ref 1.7–7)
NEUTROPHILS NFR BLD MANUAL: 50 % (ref 42.7–76)
PLATELET # BLD AUTO: 131 10*3/MM3 (ref 140–450)
PMV BLD AUTO: 9.4 FL (ref 6–12)
POTASSIUM SERPL-SCNC: 4.4 MMOL/L (ref 3.5–5.2)
PROT SERPL-MCNC: 6.4 G/DL (ref 6–8.5)
RBC # BLD AUTO: 4.48 10*6/MM3 (ref 4.14–5.8)
RBC MORPH BLD: NORMAL
SMALL PLATELETS BLD QL SMEAR: ABNORMAL
SMUDGE CELLS BLD QL SMEAR: ABNORMAL
SODIUM SERPL-SCNC: 141 MMOL/L (ref 136–145)
TIBC SERPL-MCNC: 390 MCG/DL (ref 298–536)
TRANSFERRIN SERPL-MCNC: 262 MG/DL (ref 200–360)
VARIANT LYMPHS NFR BLD MANUAL: 5.2 % (ref 0–5)
WBC # BLD AUTO: 11.69 10*3/MM3 (ref 3.4–10.8)

## 2020-08-20 PROCEDURE — 82728 ASSAY OF FERRITIN: CPT

## 2020-08-20 PROCEDURE — 85025 COMPLETE CBC W/AUTO DIFF WBC: CPT

## 2020-08-20 PROCEDURE — 36415 COLL VENOUS BLD VENIPUNCTURE: CPT

## 2020-08-20 PROCEDURE — 84466 ASSAY OF TRANSFERRIN: CPT

## 2020-08-20 PROCEDURE — 80053 COMPREHEN METABOLIC PANEL: CPT

## 2020-08-20 PROCEDURE — 85007 BL SMEAR W/DIFF WBC COUNT: CPT

## 2020-08-20 PROCEDURE — 84165 PROTEIN E-PHORESIS SERUM: CPT

## 2020-08-20 PROCEDURE — 83540 ASSAY OF IRON: CPT

## 2020-08-21 LAB
ALBUMIN SERPL-MCNC: 3.9 G/DL (ref 2.9–4.4)
ALBUMIN/GLOB SERPL: 1.7 {RATIO} (ref 0.7–1.7)
ALPHA1 GLOB FLD ELPH-MCNC: 0.3 G/DL (ref 0–0.4)
ALPHA2 GLOB SERPL ELPH-MCNC: 0.8 G/DL (ref 0.4–1)
B-GLOBULIN SERPL ELPH-MCNC: 0.9 G/DL (ref 0.7–1.3)
GAMMA GLOB SERPL ELPH-MCNC: 0.4 G/DL (ref 0.4–1.8)
GLOBULIN SER CALC-MCNC: 2.3 G/DL (ref 2.2–3.9)
Lab: ABNORMAL
M-SPIKE: 0.2 G/DL
PROT PATTERN SERPL ELPH-IMP: ABNORMAL
PROT SERPL-MCNC: 6.2 G/DL (ref 6–8.5)

## 2020-08-26 ENCOUNTER — OFFICE VISIT (OUTPATIENT)
Dept: ONCOLOGY | Facility: CLINIC | Age: 80
End: 2020-08-26

## 2020-08-26 VITALS
HEART RATE: 60 BPM | TEMPERATURE: 98.2 F | SYSTOLIC BLOOD PRESSURE: 126 MMHG | OXYGEN SATURATION: 98 % | WEIGHT: 155.6 LBS | RESPIRATION RATE: 18 BRPM | HEIGHT: 70 IN | DIASTOLIC BLOOD PRESSURE: 58 MMHG | BODY MASS INDEX: 22.28 KG/M2

## 2020-08-26 DIAGNOSIS — C91.10 CLL (CHRONIC LYMPHOCYTIC LEUKEMIA) (HCC): Primary | ICD-10-CM

## 2020-08-26 PROCEDURE — 99213 OFFICE O/P EST LOW 20 MIN: CPT | Performed by: INTERNAL MEDICINE

## 2020-11-18 ENCOUNTER — LAB (OUTPATIENT)
Dept: LAB | Facility: HOSPITAL | Age: 80
End: 2020-11-18

## 2020-11-18 DIAGNOSIS — C91.10 CLL (CHRONIC LYMPHOCYTIC LEUKEMIA) (HCC): ICD-10-CM

## 2020-11-18 LAB
ANISOCYTOSIS BLD QL: ABNORMAL
BASOPHILS # BLD MANUAL: 0.1 10*3/MM3 (ref 0–0.2)
BASOPHILS NFR BLD AUTO: 1 % (ref 0–1.5)
DEPRECATED RDW RBC AUTO: 41.1 FL (ref 37–54)
EOSINOPHIL # BLD MANUAL: 0.31 10*3/MM3 (ref 0–0.4)
EOSINOPHIL NFR BLD MANUAL: 3 % (ref 0.3–6.2)
ERYTHROCYTE [DISTWIDTH] IN BLOOD BY AUTOMATED COUNT: 12.4 % (ref 12.3–15.4)
FERRITIN SERPL-MCNC: 113.5 NG/ML (ref 30–400)
HCT VFR BLD AUTO: 41.6 % (ref 37.5–51)
HGB BLD-MCNC: 13.8 G/DL (ref 13–17.7)
IRON 24H UR-MRATE: 121 MCG/DL (ref 59–158)
IRON SATN MFR SERPL: 30 % (ref 20–50)
LYMPHOCYTES # BLD MANUAL: 3.91 10*3/MM3 (ref 0.7–3.1)
LYMPHOCYTES NFR BLD MANUAL: 38.4 % (ref 19.6–45.3)
LYMPHOCYTES NFR BLD MANUAL: 4 % (ref 5–12)
MCH RBC QN AUTO: 29.8 PG (ref 26.6–33)
MCHC RBC AUTO-ENTMCNC: 33.2 G/DL (ref 31.5–35.7)
MCV RBC AUTO: 89.8 FL (ref 79–97)
MONOCYTES # BLD AUTO: 0.41 10*3/MM3 (ref 0.1–0.9)
NEUTROPHILS # BLD AUTO: 3.81 10*3/MM3 (ref 1.7–7)
NEUTROPHILS NFR BLD MANUAL: 37.4 % (ref 42.7–76)
PLAT MORPH BLD: NORMAL
PLATELET # BLD AUTO: 132 10*3/MM3 (ref 140–450)
PMV BLD AUTO: 9.3 FL (ref 6–12)
RBC # BLD AUTO: 4.63 10*6/MM3 (ref 4.14–5.8)
TIBC SERPL-MCNC: 399 MCG/DL (ref 298–536)
TRANSFERRIN SERPL-MCNC: 268 MG/DL (ref 200–360)
VARIANT LYMPHS NFR BLD MANUAL: 16.2 % (ref 0–5)
WBC # BLD AUTO: 10.18 10*3/MM3 (ref 3.4–10.8)
WBC MORPH BLD: NORMAL

## 2020-11-18 PROCEDURE — 85007 BL SMEAR W/DIFF WBC COUNT: CPT

## 2020-11-18 PROCEDURE — 36415 COLL VENOUS BLD VENIPUNCTURE: CPT

## 2020-11-18 PROCEDURE — 82728 ASSAY OF FERRITIN: CPT

## 2020-11-18 PROCEDURE — 83540 ASSAY OF IRON: CPT

## 2020-11-18 PROCEDURE — 84466 ASSAY OF TRANSFERRIN: CPT

## 2020-11-18 PROCEDURE — 85025 COMPLETE CBC W/AUTO DIFF WBC: CPT

## 2021-02-15 ENCOUNTER — LAB (OUTPATIENT)
Dept: LAB | Facility: HOSPITAL | Age: 81
End: 2021-02-15

## 2021-02-15 DIAGNOSIS — C91.10 CLL (CHRONIC LYMPHOCYTIC LEUKEMIA) (HCC): ICD-10-CM

## 2021-02-15 LAB
ALBUMIN SERPL-MCNC: 4.4 G/DL (ref 3.5–5.2)
ALBUMIN/GLOB SERPL: 2 G/DL
ALP SERPL-CCNC: 100 U/L (ref 39–117)
ALT SERPL W P-5'-P-CCNC: 8 U/L (ref 1–41)
ANION GAP SERPL CALCULATED.3IONS-SCNC: 7 MMOL/L (ref 5–15)
AST SERPL-CCNC: 13 U/L (ref 1–40)
BILIRUB SERPL-MCNC: 0.7 MG/DL (ref 0–1.2)
BUN SERPL-MCNC: 11 MG/DL (ref 8–23)
BUN/CREAT SERPL: 10.8 (ref 7–25)
CALCIUM SPEC-SCNC: 9.8 MG/DL (ref 8.6–10.5)
CHLORIDE SERPL-SCNC: 106 MMOL/L (ref 98–107)
CO2 SERPL-SCNC: 30 MMOL/L (ref 22–29)
CREAT SERPL-MCNC: 1.02 MG/DL (ref 0.76–1.27)
DEPRECATED RDW RBC AUTO: 42.4 FL (ref 37–54)
EOSINOPHIL # BLD MANUAL: 0.11 10*3/MM3 (ref 0–0.4)
EOSINOPHIL NFR BLD MANUAL: 1 % (ref 0.3–6.2)
ERYTHROCYTE [DISTWIDTH] IN BLOOD BY AUTOMATED COUNT: 12.8 % (ref 12.3–15.4)
FERRITIN SERPL-MCNC: 124.3 NG/ML (ref 30–400)
GFR SERPL CREATININE-BSD FRML MDRD: 71 ML/MIN/1.73
GLOBULIN UR ELPH-MCNC: 2.2 GM/DL
GLUCOSE SERPL-MCNC: 99 MG/DL (ref 65–99)
HCT VFR BLD AUTO: 39.6 % (ref 37.5–51)
HGB BLD-MCNC: 13.7 G/DL (ref 13–17.7)
IRON 24H UR-MRATE: 114 MCG/DL (ref 59–158)
IRON SATN MFR SERPL: 27 % (ref 20–50)
LYMPHOCYTES # BLD MANUAL: 5.85 10*3/MM3 (ref 0.7–3.1)
LYMPHOCYTES NFR BLD MANUAL: 4.1 % (ref 5–12)
LYMPHOCYTES NFR BLD MANUAL: 55.1 % (ref 19.6–45.3)
MCH RBC QN AUTO: 31.1 PG (ref 26.6–33)
MCHC RBC AUTO-ENTMCNC: 34.6 G/DL (ref 31.5–35.7)
MCV RBC AUTO: 90 FL (ref 79–97)
MONOCYTES # BLD AUTO: 0.44 10*3/MM3 (ref 0.1–0.9)
NEUTROPHILS # BLD AUTO: 3.35 10*3/MM3 (ref 1.7–7)
NEUTROPHILS NFR BLD MANUAL: 31.6 % (ref 42.7–76)
PLAT MORPH BLD: NORMAL
PLATELET # BLD AUTO: 116 10*3/MM3 (ref 140–450)
PMV BLD AUTO: 9.3 FL (ref 6–12)
POIKILOCYTOSIS BLD QL SMEAR: ABNORMAL
POTASSIUM SERPL-SCNC: 4.4 MMOL/L (ref 3.5–5.2)
PROT SERPL-MCNC: 6.6 G/DL (ref 6–8.5)
RBC # BLD AUTO: 4.4 10*6/MM3 (ref 4.14–5.8)
SODIUM SERPL-SCNC: 143 MMOL/L (ref 136–145)
TIBC SERPL-MCNC: 420 MCG/DL (ref 298–536)
TRANSFERRIN SERPL-MCNC: 282 MG/DL (ref 200–360)
VARIANT LYMPHS NFR BLD MANUAL: 8.2 % (ref 0–5)
WBC # BLD AUTO: 10.61 10*3/MM3 (ref 3.4–10.8)
WBC MORPH BLD: NORMAL

## 2021-02-15 PROCEDURE — 80053 COMPREHEN METABOLIC PANEL: CPT

## 2021-02-15 PROCEDURE — 85007 BL SMEAR W/DIFF WBC COUNT: CPT

## 2021-02-15 PROCEDURE — 82728 ASSAY OF FERRITIN: CPT

## 2021-02-15 PROCEDURE — 83540 ASSAY OF IRON: CPT

## 2021-02-15 PROCEDURE — 84165 PROTEIN E-PHORESIS SERUM: CPT

## 2021-02-15 PROCEDURE — 85025 COMPLETE CBC W/AUTO DIFF WBC: CPT

## 2021-02-15 PROCEDURE — 84466 ASSAY OF TRANSFERRIN: CPT

## 2021-02-15 PROCEDURE — 36415 COLL VENOUS BLD VENIPUNCTURE: CPT

## 2021-02-15 NOTE — PROGRESS NOTES
[facility] GROUP HEMATOLOGY AND ONCOLOGY  [address]  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  He is off therapy.  He is also seen for anemia from iron deficiency.  He is off oral iron for the past 15.5 months.  The patient is here alone.  History is obtained from the patient who is considered to be a marginal historian.      I have reviewed the HPI and verified with the patient the accuracy of it. No changes to interval history since the information was documented. Marco Boogie MD 02/22/21       Problem List Items Addressed This Visit     None        Oncology/Hematology History Overview Note   DIAGNOSTIC ABNORMALITIES:  Labs, 05/16/2012, [facility]: WBC 10.9, hemoglobin 14.3, hematocrit 42.2, MCV of 94.2, platelets 133,000, ALC elevated at 4796, ANC normal at 4.8, Absolute monocytosis at 970.  Labs, 11/14/2012, [facility]: WBC 10.2, hemoglobin 15.4, hematocrit 45.4, MCV 94.5, platelets 154,000, ALC elevated at 4,865. Globulin 1.9.  Labs, 11/15/2012, [facility]: IgG 495, IgA 34, IgM 28 (all below normal limits).  Blood film review, 11/16/2012, [facility]: Mild absolute lymphocytosis and monocytosis. Reactive and atypical lymphocytes present. Flow cytometry recommended.  PATHOLOGY: Cytogenetics, 12/12/2012, [facility]:  CLL and CCND1-IGH@FISH: Abnormal results with +12 and 13q-.   Flow cytometry peripheral blood, 12/12/2012, [facility]: Peripheral blood with a CD5+ B cell population showing lambda restriction, 30% cellularity.   Labs, 03/27/2013: GFR 78. glucose 108. IgG 455, IgM 16, IgA 35, M-spike 0.1, [facility]: IgG monoclonal protein with  lambda light chain specificity.   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 Negative for CCND1/IGH gene rearrangement.    Hematopathology report peripheral blood, 03/27/2013 [facility]: Normal white blood cell count with no evidence of lymphocytosis but 23% abnormal intermediate lambda light chain-restricted B cell population identified by flow cytometry.  Mild thrombocytopenia with normal hemoglobin/hematocrit.  See comment.   Flow peripheral blood, 03/27/2013, [facility]:  Abnormal CD5-positive identified, monoclonal lambda. Consistent with chronic lymphocytic leukemia (CLL)/small lymphocytic lymphoma (SLL).     Skeletal survey, 01/11/2013, [facility]: No discrete lytic lesions identified.   Ultrasound abdomen, 01/11/2013, [facility]: No focal pathology.        PREVIOUS INTERVENTION:   Observation.      PREVIOUS INTERVENTIONS: Anemia from iron deficiency.  Ferrous sulfate 325 mg p.o. daily 01/03/2018 through 03/06/2018.  Resume 09/04/2018 through 10/10/2018.  Resumed 08/27/2019 through 10/24/2019.      CLL (chronic lymphocytic leukemia) (CMS/[facility])   8/27/2019 Initial Diagnosis    CLL (chronic lymphocytic leukemia) (CMS/[facility])         PAST MEDICAL HISTORY:  ALLERGIES:  Allergies   Allergen Reactions   • Augmentin [Amoxicillin-Pot Clavulanate] Hives   • Latex Itching and Other (See Comments)     And band aids. Causes redness and itching.     CURRENT MEDICATIONS:  Outpatient Encounter Medications as of 2/22/2021   Medication Sig Dispense Refill   • aspirin 81 MG EC tablet Take 81 mg by mouth Daily.     • folic acid-vit B6-vit B12 (FOLTABS) 0.8-10-0.115 MG tablet tablet Take  by mouth Daily.     • HYDROcodone-acetaminophen (NORCO) 5-325 MG per tablet Take 1-2 tablets by mouth Every 4 (Four) Hours As Needed (Pain). 30 tablet 0   • latanoprost (XALATAN) 0.005 % ophthalmic solution Administer 1 drop to the right eye Daily.     •  metoprolol tartrate (LOPRESSOR) 25 MG tablet Take 25 mg by mouth 2 (Two) Times a Day.     • multivitamins-minerals (PRESERVISION AREDS 2) capsule capsule Take 1 capsule by mouth 2 (Two) Times a Day.       No facility-administered encounter medications on file as of 2/22/2021.      ADULT ILLNESSES:  Patient Active Problem List   Diagnosis Code   • Hx of colonic polyp Z86.010   • Family hx of colon cancer Z80.0   • CLL (chronic lymphocytic leukemia) (CMS/HCC) C91.10   • Hypertension I10   • IgG lambda monoclonal gammopathy D47.2   • Iron deficiency E61.1     SURGERIES:  Past Surgical History:   Procedure Laterality Date   • COLONOSCOPY  06/13/2012   • INGUINAL HERNIA REPAIR Left 2007   • INGUINAL HERNIA REPAIR Right 12/28/2017    Procedure: RIGHT INGUINAL HERNIA REPAIR WITH MESH ;  Surgeon: Rossana Mahajan MD;  Location: [facility] OR;  Service:      HEALTH MAINTENANCE ITEMS:  Health Maintenance Due   Topic Date Due   • ANNUAL PHYSICAL  [birth year]19/1943   • TDAP/TD VACCINES (1 - Tdap) 12/19/1961   • ZOSTER VACCINE (1 of 2) 12/19/1992   • Pneumococcal Vaccine 65+ (1 of 1 - PPSV23) 12/19/2007   • HEPATITIS C SCREENING  06/23/2017       <no information>  Last Completed Colonoscopy       Status Date      COLONOSCOPY Done 8/1/2017 SCANNED - COLONOSCOPY     Patient has more history with this topic...          There is no immunization history on file for this patient.  Last Completed Mammogram     Patient has no health maintenance due at this time            FAMILY HISTORY:  Family History   Problem Relation Age of Onset   • Colon cancer Maternal Grandfather         in his 60's.    • Alzheimer's disease Mother    • Hypertension Father    • Other Father         stent   • COPD Sister    • Heart attack Brother    • No Known Problems Maternal Grandmother    • No Known Problems Paternal Grandmother    • No Known Problems Paternal Grandfather      SOCIAL HISTORY:  Social History     Socioeconomic History   • Marital status:       "Spouse name: Not on file   • Number of children: Not on file   • Years of education: Not on file   • Highest education level: Not on file   Tobacco Use   • Smoking status: Former Smoker     Years: 15.00     Types: Cigarettes     Quit date:      Years since quittin.1   • Smokeless tobacco: Never Used   Substance and Sexual Activity   • Alcohol use: No   • Drug use: No   • Sexual activity: Defer       REVIEW OF SYSTEMS:    Review of Systems   Constitutional: Negative for chills, fatigue and fever.        \"I feel pretty good.\"   Respiratory: Negative for cough, shortness of breath and wheezing.    Cardiovascular: Negative for chest pain and palpitations.   Gastrointestinal: Negative for abdominal pain, constipation, diarrhea, nausea and vomiting.   Genitourinary: Negative for flank pain and hematuria.   Musculoskeletal: Negative for gait problem and joint swelling.   Skin: Negative for pallor.   Neurological: Negative for facial asymmetry, weakness and confusion.   Psychiatric/Behavioral: Negative for agitation and hallucinations. The patient is not nervous/anxious.          VITAL SIGNS: /66   Pulse 68   Temp 98 °F (36.7 °C)   Resp 18   Ht 177.8 cm (70\")   Wt 73 kg (160 lb 14.4 oz)   SpO2 98%   BMI 23.09 kg/m²  Body surface area is 1.9 meters squared.   Pain Score    21 1412   PainSc: 0-No pain           PHYSICAL EXAMINATION:     Physical Exam  Vitals signs reviewed.   Constitutional:       General: He is not in acute distress.  Cardiovascular:      Rate and Rhythm: Normal rate and regular rhythm.   Pulmonary:      Effort: No respiratory distress.      Breath sounds: No wheezing or rales.   Abdominal:      General: Abdomen is flat. Bowel sounds are normal.      Palpations: Abdomen is soft.      Tenderness: There is no abdominal tenderness.   Musculoskeletal:         General: No swelling.   Skin:     General: Skin is warm and dry.      Coloration: Skin is not pale.   Neurological:      Mental " Status: He is alert and oriented to person, place, and time.   Psychiatric:         Mood and Affect: Mood normal.         Behavior: Behavior normal.         Thought Content: Thought content normal.         Judgment: Judgment normal.         LABS    Lab Results - Last 18 Months   Lab Units 02/15/21  1131 11/18/20  1106 08/20/20  1414 06/18/20  1408 04/23/20  1401 02/20/20  1340 12/17/19  1339 10/22/19  1346   HEMOGLOBIN g/dL 13.7 13.8 13.4 13.0 13.1 13.8 13.8 13.0   HEMATOCRIT % 39.6 41.6 40.1 38.9 39.2 40.6 40.7 38.9   MCV fL 90.0 89.8 89.5 89.0 90.5 90.2 90.4 91.5   WBC 10*3/mm3 10.61 10.18 11.69* 6.85 7.93 9.12 10.05 6.42   RDW % 12.8 12.4 12.4 12.3 12.3 12.4 12.5 12.1*   MPV fL 9.3 9.3 9.4 9.4 9.1 8.9 9.1 9.6   PLATELETS 10*3/mm3 116* 132* 131* 131* 120* 225 166 129*   IMM GRAN % %  --   --   --   --   --  1.1*  --   --    NEUTROS ABS 10*3/mm3 3.35 3.81 5.85 1.30* 1.38* 4.00 4.32 1.33*   LYMPHS ABS 10*3/mm3  --   --   --   --   --  3.97*  --   --    MONOS ABS 10*3/mm3  --   --   --   --   --  0.93*  --   --    EOS ABS 10*3/mm3 0.11 0.31 0.36 0.14 0.26 0.07 0.30 0.35   BASOS ABS 10*3/mm3  --  0.10 0.12 0.07 0.09 0.05  --  0.28*   IMMATURE GRANS (ABS) 10*3/mm3  --   --   --   --   --  0.10*  --   --    NRBC /100 WBC  --   --   --   --   --  0.0  --   --    NEUTROPHIL % % 31.6* 37.4* 50.0 19.0* 17.4*  --  43.0 20.7*   MONOCYTES % % 4.1* 4.0* 3.1* 13.0* 13.0*  --  5.0 4.3*   BASOPHIL % %  --  1.0 1.0 1.0 1.1  --   --  4.3*   ATYP LYMPH % % 8.2* 16.2* 5.2*  --  2.2  --  26.0* 18.5*   ANISOCYTOSIS   --  Slight/1+  --   --   --   --   --   --        Lab Results - Last 18 Months   Lab Units 02/15/21  1131 08/20/20  1414 06/18/20  1408 04/23/20  1401 02/20/20  1340 12/17/19  1339   GLUCOSE mg/dL 99 123* 116* 100* 118* 125*   SODIUM mmol/L 143 141 144 143 140 142   POTASSIUM mmol/L 4.4 4.4 4.3 4.5 4.0 3.9   CO2 mmol/L 30.0* 26.0 26.0 28.0 28.0 31.0*   CHLORIDE mmol/L 106 102 106 105 101 104   ANION GAP mmol/L 7.0 13.0 12.0  10.0 11.0 7.0   CREATININE mg/dL 1.02 1.22 1.07 1.06 1.07 0.96   BUN mg/dL 11 16 18 13 20 14   BUN / CREAT RATIO  10.8 13.1 16.8 12.3 18.7 14.6   CALCIUM mg/dL 9.8 9.4 9.6 9.3 9.2 9.0   EGFR IF NONAFRICN AM mL/min/1.73 71 58* 67 68 67 76   ALK PHOS U/L 100 87 84 88 95 77   TOTAL PROTEIN g/dL 6.6 6.4 6.3 6.1 6.5 6.1   ALT (SGPT) U/L 8 9 7 8 16 10   AST (SGOT) U/L 13 11 12 11 13 11   BILIRUBIN mg/dL 0.7 0.7 0.8 1.0 0.5 0.7   ALBUMIN g/dL 4.40  4.1 4.60  3.9 4.30 4.30 4.40  3.7 4.20   GLOBULIN gm/dL 2.2 1.8 2.0 1.8 2.1 1.9       Lab Results - Last 18 Months   Lab Units 02/15/21  1131 08/20/20  1414 02/20/20  1340   M-SPIKE g/dL 0.1* 0.2* 0.1*   KAPPA/LAMBDA RATIO, S   --   --  0.73   FREE LAMBDA LIGHT CHAINS mg/L  --   --  8.9   IG KAPPA FREE LIGHT CHAIN mg/L  --   --  6.5       Lab Results - Last 18 Months   Lab Units 02/15/21  1131 11/18/20  1106 08/20/20  1414 06/18/20  1408 04/23/20  1401 02/20/20  1340   IRON mcg/dL 114 121 68 101 133 71   TIBC mcg/dL 420 399 390 380 367 361   IRON SATURATION % 27 30 17* 27 36 20   FERRITIN ng/mL 124.30 113.50 125.40 108.20 139.10 195.00         Oral Dey reports a pain score of 0.        Patient's Body mass index is 23.09 kg/m². BMI is within normal parameters. No follow-up required..      ASSESSMENT:  1.    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  Treatment status: Observation.  Complications: None.  Prognosis: Good.  2.    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  3.    Normocytic anemia, from chronic disease and iron deficiency.   4.    Glaucoma, right eye.  5.    Mild thrombocytopenia likely from idiopathic thrombocytopenic purpura, stable for observation.   6.    History of fever, resolved, ? viral syndrome.  Not compatible with progressing chronic lymphocytic leukemia (CLL). Lymphocyte count is stable, no palpable  "adenopathies, and fever had resolved.   7.    Elevated bilirubin, negative sonogram of the abdomen, 01/2013.  8.    Cholelithiasis.  Asymptomatic.         PLAN:  1.     Re:  Treatment is generally reserved until the patient has active disease defined as the presence of disease-related symptoms, such as weight loss greater than 10%, extreme fatigue, fever greater than 100.5 for 2 weeks with night sweats without evidence of infection (\"B\" symptoms), worsening cytopenias, unexplained recurrent infections, worsening adenopathy, or splenomegaly.  3.     Re:  Heme status.  WBC 10.6, hemoglobin 13.7, normal, platelet 116, stable, lymphocyte 5.8  from 3.9 fom 4.3, no rapid doubling.  4.     Re:  Pre office SPEP/SIEP. M spike 0.1 from 0.2 from 0.1, stable.  5.     Re:  Pre-office CMP.  GFR 71 ml/minute.  6.     Re:  Pre-office ferritin, TIBC, % saturation, and iron. 27% saturation and ferritin 124.3 ng/ml.    7.     Re:  Stable for observation (CLL).  Patient did not meet criteria for therapy.  8.     again Re:  A bone marrow aspiration and biopsy is indicated in all patients with an M protein 1.5 g/dL, patients with a non-IgG MGUS, patients with an abnormal serum free light chain ratio (i.e., ratio of kappa to lambda free light chains less than 0.26 or greater than 1.65), and in all patients who have any abnormalities of the CBC, serum creatinine, serum calcium, or radiographic bone survey.  9.    Plan of care discussed with patient.  Understanding expressed.  Patient agreeable to proceed.  10.  Continue ongoing management per primary care physician and other specialists.  11.  Advance Care Planning   ACP discussion was declined by the patient. Patient does not have an advance directive, information provided.  12.  CBC with differential, ferritin, TIBC, % saturation, and iron every 12 weeks.  13.  Return to the office with pre-office CMP and SPEP/SIEP in 6 months.         I " spent 21 total minutes, face-to-face, caring for Oral today.  Greater than 50% of this time involved counseling and/or coordination of care as documented within this note regarding the patient's illness(es), pros and cons of various treatment options, instructions and/or risk reduction.           cc:  Jorge Shepherd MD         (Chalo Zaman MD)

## 2021-02-17 LAB
ALBUMIN SERPL ELPH-MCNC: 4.1 G/DL (ref 2.9–4.4)
ALBUMIN/GLOB SERPL: 2 {RATIO} (ref 0.7–1.7)
ALPHA1 GLOB SERPL ELPH-MCNC: 0.2 G/DL (ref 0–0.4)
ALPHA2 GLOB SERPL ELPH-MCNC: 0.8 G/DL (ref 0.4–1)
B-GLOBULIN SERPL ELPH-MCNC: 0.9 G/DL (ref 0.7–1.3)
GAMMA GLOB SERPL ELPH-MCNC: 0.3 G/DL (ref 0.4–1.8)
GLOBULIN SER CALC-MCNC: 2.1 G/DL (ref 2.2–3.9)
LABORATORY COMMENT REPORT: ABNORMAL
M PROTEIN SERPL ELPH-MCNC: 0.1 G/DL
PROT PATTERN SERPL ELPH-IMP: ABNORMAL
PROT SERPL-MCNC: 6.2 G/DL (ref 6–8.5)

## 2021-02-22 ENCOUNTER — OFFICE VISIT (OUTPATIENT)
Dept: ONCOLOGY | Facility: CLINIC | Age: 81
End: 2021-02-22

## 2021-02-22 VITALS
RESPIRATION RATE: 18 BRPM | TEMPERATURE: 98 F | HEART RATE: 68 BPM | SYSTOLIC BLOOD PRESSURE: 120 MMHG | OXYGEN SATURATION: 98 % | DIASTOLIC BLOOD PRESSURE: 66 MMHG | WEIGHT: 160.9 LBS | BODY MASS INDEX: 23.03 KG/M2 | HEIGHT: 70 IN

## 2021-02-22 DIAGNOSIS — D50.8 IRON DEFICIENCY ANEMIA SECONDARY TO INADEQUATE DIETARY IRON INTAKE: ICD-10-CM

## 2021-02-22 DIAGNOSIS — C91.10 CLL (CHRONIC LYMPHOCYTIC LEUKEMIA) (HCC): Primary | ICD-10-CM

## 2021-02-22 PROCEDURE — 99213 OFFICE O/P EST LOW 20 MIN: CPT | Performed by: INTERNAL MEDICINE

## 2021-05-17 ENCOUNTER — LAB (OUTPATIENT)
Dept: LAB | Facility: HOSPITAL | Age: 81
End: 2021-05-17

## 2021-05-17 DIAGNOSIS — C91.10 CLL (CHRONIC LYMPHOCYTIC LEUKEMIA) (HCC): ICD-10-CM

## 2021-05-17 DIAGNOSIS — D50.8 IRON DEFICIENCY ANEMIA SECONDARY TO INADEQUATE DIETARY IRON INTAKE: ICD-10-CM

## 2021-05-17 LAB
BASOPHILS # BLD AUTO: 0.09 10*3/MM3 (ref 0–0.2)
BASOPHILS NFR BLD AUTO: 0.7 % (ref 0–1.5)
DEPRECATED RDW RBC AUTO: 41.7 FL (ref 37–54)
EOSINOPHIL # BLD AUTO: 0.38 10*3/MM3 (ref 0–0.4)
EOSINOPHIL NFR BLD AUTO: 2.8 % (ref 0.3–6.2)
ERYTHROCYTE [DISTWIDTH] IN BLOOD BY AUTOMATED COUNT: 12.7 % (ref 12.3–15.4)
FERRITIN SERPL-MCNC: 116.7 NG/ML (ref 30–400)
HCT VFR BLD AUTO: 39.8 % (ref 37.5–51)
HGB BLD-MCNC: 13.3 G/DL (ref 13–17.7)
IRON 24H UR-MRATE: 75 MCG/DL (ref 59–158)
IRON SATN MFR SERPL: 22 % (ref 20–50)
LYMPHOCYTES # BLD AUTO: 6.95 10*3/MM3 (ref 0.7–3.1)
LYMPHOCYTES NFR BLD AUTO: 51.1 % (ref 19.6–45.3)
MCH RBC QN AUTO: 30.3 PG (ref 26.6–33)
MCHC RBC AUTO-ENTMCNC: 33.4 G/DL (ref 31.5–35.7)
MCV RBC AUTO: 90.7 FL (ref 79–97)
MONOCYTES # BLD AUTO: 1.02 10*3/MM3 (ref 0.1–0.9)
MONOCYTES NFR BLD AUTO: 7.5 % (ref 5–12)
NEUTROPHILS NFR BLD AUTO: 37.4 % (ref 42.7–76)
NEUTROPHILS NFR BLD AUTO: 5.1 10*3/MM3 (ref 1.7–7)
PLATELET # BLD AUTO: 161 10*3/MM3 (ref 140–450)
PMV BLD AUTO: 8.9 FL (ref 6–12)
RBC # BLD AUTO: 4.39 10*6/MM3 (ref 4.14–5.8)
SMUDGE CELLS BLD QL SMEAR: NORMAL
TIBC SERPL-MCNC: 349 MCG/DL (ref 298–536)
TRANSFERRIN SERPL-MCNC: 234 MG/DL (ref 200–360)
WBC # BLD AUTO: 13.61 10*3/MM3 (ref 3.4–10.8)

## 2021-05-17 PROCEDURE — 85007 BL SMEAR W/DIFF WBC COUNT: CPT

## 2021-05-17 PROCEDURE — 83540 ASSAY OF IRON: CPT

## 2021-05-17 PROCEDURE — 82728 ASSAY OF FERRITIN: CPT

## 2021-05-17 PROCEDURE — 85025 COMPLETE CBC W/AUTO DIFF WBC: CPT

## 2021-05-17 PROCEDURE — 36415 COLL VENOUS BLD VENIPUNCTURE: CPT

## 2021-05-17 PROCEDURE — 84466 ASSAY OF TRANSFERRIN: CPT

## 2021-08-13 NOTE — PROGRESS NOTES
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  He is off therapy.  He is also seen for anemia from iron deficiency.  He is off oral iron for the past 22 months.  The patient is here alone.  History is obtained from the patient who is considered to be a marginal historian.         Problem List Items Addressed This Visit     None        Oncology/Hematology History Overview Note   DIAGNOSTIC ABNORMALITIES:  Labs, 05/16/2012, [facility]: WBC 10.9, hemoglobin 14.3, hematocrit 42.2, MCV of 94.2, platelets 133,000, ALC elevated at 4796, ANC normal at 4.8, Absolute monocytosis at 970.  Labs, 11/14/2012, [facility]: WBC 10.2, hemoglobin 15.4, hematocrit 45.4, MCV 94.5, platelets 154,000, ALC elevated at 4,865. Globulin 1.9.  Labs, 11/15/2012, [facility]: IgG 495, IgA 34, IgM 28 (all below normal limits).  Blood film review, 11/16/2012, [facility]: Mild absolute lymphocytosis and monocytosis. Reactive and atypical lymphocytes present. Flow cytometry recommended.  PATHOLOGY: Cytogenetics, 12/12/2012, [facility]:  CLL and CCND1-IGH@FISH: Abnormal results with +12 and 13q-.   Flow cytometry peripheral blood, 12/12/2012, [facility]: Peripheral blood with a CD5+ B cell population showing lambda restriction, 30% cellularity.   Labs, 03/27/2013: GFR 78. glucose 108. IgG 455, IgM 16, IgA 35, M-spike 0.1, [facility]: IgG monoclonal protein with lambda light chain specificity.   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 Negative for CCND1/IGH  gene rearrangement.    Hematopathology report peripheral blood, 03/27/2013 [facility]: Normal white blood cell count with no evidence of lymphocytosis but 23% abnormal intermediate lambda light chain-restricted B cell population identified by flow cytometry.  Mild thrombocytopenia with normal hemoglobin/hematocrit.  See comment.   Flow peripheral blood, 03/27/2013, [facility]:  Abnormal CD5-positive identified, monoclonal lambda. Consistent with chronic lymphocytic leukemia (CLL)/small lymphocytic lymphoma (SLL).     Skeletal survey, 01/11/2013, [facility]: No discrete lytic lesions identified.   Ultrasound abdomen, 01/11/2013, [facility]: No focal pathology.        PREVIOUS INTERVENTION:   Observation.      PREVIOUS INTERVENTIONS: Anemia from iron deficiency.  Ferrous sulfate 325 mg p.o. daily 01/03/2018 through 03/06/2018.  Resume 09/04/2018 through 10/10/2018.  Resumed 08/27/2019 through 10/24/2019.      CLL (chronic lymphocytic leukemia) (CMS/[facility])   8/27/2019 Initial Diagnosis    CLL (chronic lymphocytic leukemia) (CMS/[facility])         PAST MEDICAL HISTORY:  ALLERGIES:  Allergies   Allergen Reactions   • Augmentin [Amoxicillin-Pot Clavulanate] Hives   • Latex Itching and Other (See Comments)     And band aids. Causes redness and itching.     CURRENT MEDICATIONS:  Outpatient Encounter Medications as of 8/23/2021   Medication Sig Dispense Refill   • aspirin 81 MG EC tablet Take 81 mg by mouth Daily.     • folic acid-vit B6-vit B12 (FOLTABS) 0.8-10-0.115 MG tablet tablet Take  by mouth Daily.     • HYDROcodone-acetaminophen (NORCO) 5-325 MG per tablet Take 1-2 tablets by mouth Every 4 (Four) Hours As Needed (Pain). 30 tablet 0   • latanoprost (XALATAN) 0.005 % ophthalmic solution Administer 1 drop to the right eye Daily.     • metoprolol tartrate (LOPRESSOR) 25 MG tablet Take 25 mg by mouth 2 (Two) Times a Day.     • multivitamins-minerals (PRESERVISION AREDS 2) capsule capsule Take 1 capsule by mouth  2 (Two) Times a Day.       No facility-administered encounter medications on file as of 8/23/2021.     ADULT ILLNESSES:  Patient Active Problem List   Diagnosis Code   • Hx of colonic polyp Z86.010   • Family hx of colon cancer Z80.0   • CLL (chronic lymphocytic leukemia) (CMS/HCC) C91.10   • Hypertension I10   • IgG lambda monoclonal gammopathy D47.2   • Iron deficiency E61.1     SURGERIES:  Past Surgical History:   Procedure Laterality Date   • COLONOSCOPY  06/13/2012   • INGUINAL HERNIA REPAIR Left 2007   • INGUINAL HERNIA REPAIR Right 12/28/2017    Procedure: RIGHT INGUINAL HERNIA REPAIR WITH MESH ;  Surgeon: Rossana Mahajan MD;  Location: [facility];  Service:      HEALTH MAINTENANCE ITEMS:  Health Maintenance Due   Topic Date Due   • ANNUAL PHYSICAL  Never done   • COVID-19 Vaccine (1) Never done   • TDAP/TD VACCINES (1 - Tdap) Never done   • ZOSTER VACCINE (1 of 2) Never done   • Pneumococcal Vaccine 65+ (1 of 1 - PPSV23) Never done   • HEPATITIS C SCREENING  Never done       <no information>  Last Completed Colonoscopy     This patient has no relevant Health Maintenance data.          There is no immunization history on file for this patient.  Last Completed Mammogram     This patient has no relevant Health Maintenance data.            FAMILY HISTORY:  Family History   Problem Relation Age of Onset   • Colon cancer Maternal Grandfather         in his 60's.    • Alzheimer's disease Mother    • Hypertension Father    • Other Father         stent   • COPD Sister    • Heart attack Brother    • No Known Problems Maternal Grandmother    • No Known Problems Paternal Grandmother    • No Known Problems Paternal Grandfather      SOCIAL HISTORY:  Social History     Socioeconomic History   • Marital status:      Spouse name: Not on file   • Number of children: Not on file   • Years of education: Not on file   • Highest education level: Not on file   Tobacco Use   • Smoking status: Former Smoker     Years: 15.00  "    Types: Cigarettes     Quit date: 1972     Years since quittin.6   • Smokeless tobacco: Never Used   Substance and Sexual Activity   • Alcohol use: No   • Drug use: No   • Sexual activity: Defer       REVIEW OF SYSTEMS:    Review of Systems   Constitutional: Positive for fatigue. Negative for chills and fever.        \"I feel tired. Dr. Shepherd told me I might have sleep apnea.\"   HENT: Negative for congestion and trouble swallowing.    Respiratory: Negative for cough, shortness of breath and wheezing.    Cardiovascular: Negative for chest pain and leg swelling.   Gastrointestinal: Negative for abdominal pain, nausea and vomiting.   Genitourinary: Negative for dysuria and flank pain.   Skin: Negative for pallor.   Neurological: Negative for speech difficulty, weakness and confusion.   Hematological: Negative for adenopathy.   Psychiatric/Behavioral: Negative for agitation and hallucinations. The patient is not nervous/anxious.          VITAL SIGNS: /62   Pulse 66   Temp 98.1 °F (36.7 °C)   Resp 18   Ht 177.8 cm (70\")   Wt 67.9 kg (149 lb 12.8 oz)   SpO2 98%   BMI 21.49 kg/m²  Body surface area is 1.85 meters squared.   Pain Score    21 1430   PainSc: 0-No pain           PHYSICAL EXAMINATION:     Physical Exam  Vitals reviewed.   Constitutional:       General: He is not in acute distress.  Cardiovascular:      Rate and Rhythm: Normal rate and regular rhythm.   Pulmonary:      Effort: No respiratory distress.      Breath sounds: No wheezing or rales.   Abdominal:      General: Bowel sounds are normal. There is no distension.      Palpations: Abdomen is soft.   Musculoskeletal:         General: No swelling.   Skin:     General: Skin is warm and dry.      Coloration: Skin is not pale.   Neurological:      Mental Status: He is alert and oriented to person, place, and time.   Psychiatric:         Mood and Affect: Mood normal.         Behavior: Behavior normal.         Thought Content: Thought " content normal.         Judgment: Judgment normal.         LABS    Lab Results - Last 18 Months   Lab Units 08/16/21  1353 05/17/21  1358 02/15/21  1131 11/18/20  1106 08/20/20  1414 06/18/20  1408 04/23/20  1401 04/23/20  1401   HEMOGLOBIN g/dL 13.4 13.3 13.7 13.8 13.4 13.0   < > 13.1   HEMATOCRIT % 41.8 39.8 39.6 41.6 40.1 38.9   < > 39.2   MCV fL 93.1 90.7 90.0 89.8 89.5 89.0   < > 90.5   WBC 10*3/mm3 13.95* 13.61* 10.61 10.18 11.69* 6.85   < > 7.93   RDW % 12.0* 12.7 12.8 12.4 12.4 12.3   < > 12.3   MPV fL 9.2 8.9 9.3 9.3 9.4 9.4   < > 9.1   PLATELETS 10*3/mm3 230 161 116* 132* 131* 131*   < > 120*   NEUTROS ABS 10*3/mm3 9.35* 5.10 3.35 3.81 5.85 1.30*   < > 1.38*   LYMPHS ABS 10*3/mm3  --  6.95*  --   --   --   --   --   --    MONOS ABS 10*3/mm3  --  1.02*  --   --   --   --   --   --    EOS ABS 10*3/mm3  --  0.38 0.11 0.31 0.36 0.14  --  0.26   BASOS ABS 10*3/mm3 0.14 0.09  --  0.10 0.12 0.07  --  0.09   NEUTROPHIL % % 67.0  --  31.6* 37.4* 50.0 19.0*  --  17.4*   MONOCYTES % % 4.0*  --  4.1* 4.0* 3.1* 13.0*  --  13.0*   BASOPHIL % % 1.0  --   --  1.0 1.0 1.0  --  1.1   ATYP LYMPH % %  --   --  8.2* 16.2* 5.2*  --   --  2.2   ANISOCYTOSIS   --   --   --  Slight/1+  --   --   --   --     < > = values in this interval not displayed.       Lab Results - Last 18 Months   Lab Units 08/16/21  1353 02/15/21  1131 08/20/20  1414 06/18/20  1408 04/23/20  1401 04/23/20  1401   GLUCOSE mg/dL 107* 99 123* 116*  --  100*   SODIUM mmol/L 139 143 141 144  --  143   POTASSIUM mmol/L 4.8 4.4 4.4 4.3  --  4.5   CO2 mmol/L 27.0 30.0* 26.0 26.0  --  28.0   CHLORIDE mmol/L 103 106 102 106  --  105   ANION GAP mmol/L 9.0 7.0 13.0 12.0  --  10.0   CREATININE mg/dL 1.05 1.02 1.22 1.07  --  1.06   BUN mg/dL 21 11 16 18  --  13   BUN / CREAT RATIO  20.0 10.8 13.1 16.8  --  12.3   CALCIUM mg/dL 10.1 9.8 9.4 9.6  --  9.3   EGFR IF NONAFRICN AM mL/min/1.73 68 71 58* 67  --  68   ALK PHOS U/L 108 100 87 84  --  88   TOTAL PROTEIN g/dL 6.7  "6.6 6.4 6.3  --  6.1   ALT (SGPT) U/L 7 8 9 7  --  8   AST (SGOT) U/L 10 13 11 12  --  11   BILIRUBIN mg/dL 0.5 0.7 0.7 0.8  --  1.0   ALBUMIN g/dL 4.80  3.9 4.40  4.1 4.60  3.9 4.30   < > 4.30   GLOBULIN gm/dL 1.9 2.2 1.8 2.0  --  1.8    < > = values in this interval not displayed.       Lab Results - Last 18 Months   Lab Units 08/16/21  1353 02/15/21  1131 08/20/20  1414   M-SPIKE g/dL 0.1* 0.1* 0.2*       Lab Results - Last 18 Months   Lab Units 08/16/21  1353 05/17/21  1358 02/15/21  1131 11/18/20  1106 08/20/20  1414 06/18/20  1408   IRON mcg/dL 101 75 114 121 68 101   TIBC mcg/dL 386 349 420 399 390 380   IRON SATURATION % 26 22 27 30 17* 27   FERRITIN ng/mL 134.70 116.70 124.30 113.50 125.40 108.20         Oral Dey reports a pain score of 0.          ASSESSMENT:  1.    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  Treatment status: Observation.  Complications: None.  Prognosis: Good.  2.    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  3.    Normocytic anemia, from chronic disease and iron deficiency.   4.    Glaucoma, right eye.  5.    Mild thrombocytopenia likely from idiopathic thrombocytopenic purpura, stable for observation.   6.    History of fever, resolved, ? viral syndrome.  Not compatible with progressing chronic lymphocytic leukemia (CLL). Lymphocyte count is stable, no palpable adenopathies, and fever had resolved.   7.    Elevated bilirubin, negative sonogram of the abdomen, 01/2013.  8.    Cholelithiasis.  Asymptomatic.         PLAN:  1.     Re:  Treatment is generally reserved until the patient has active disease defined as the presence of disease-related symptoms, such as weight loss greater than 10%, extreme fatigue, fever greater than 100.5 for 2 weeks with night sweats without evidence of infection (\"B\" symptoms), worsening cytopenias, " unexplained recurrent infections, worsening adenopathy, or splenomegaly.  2.     Re:  Heme status.  WBC 13.9 from 13.6, hemoglobin 13.4 from 13.3, normal, platelet 230 from 161, lymphocyte 3.91  from 6.95 from 5.8  from 3.9 fom 4.3, no rapid doubling.  3.     Re:  Pre office SPEP/SIEP. M spike 0.1 from 0.1 from 0.2 from 0.1, stable.  4.     Re:  Pre-office CMP.  GFR 68 ml/minute.  5.     Re:  Pre-office ferritin, TIBC, % saturation, and iron. 26% saturation and ferritin 134.7 ng/ml.    6.     Re:  Stable for observation (CLL).  Patient did not meet criteria for therapy.  7.     again Re:  A bone marrow aspiration and biopsy is indicated in all patients with an M protein 1.5 g/dL, patients with a non-IgG MGUS, patients with an abnormal serum free light chain ratio (i.e., ratio of kappa to lambda free light chains less than 0.26 or greater than 1.65), and in all patients who have any abnormalities of the CBC, serum creatinine, serum calcium, or radiographic bone survey.  8.    Plan of care discussed with patient.  Understanding expressed.  Patient agreeable to proceed.  9.  Continue ongoing management per primary care physician and other specialists.  10.  Advance Care Planning   ACP discussion was declined by the patient. Patient does not have an advance directive, information provided.  11.  CBC with differential, ferritin, TIBC, % saturation, and iron every 12 weeks.  12.  Return to the office with pre-office CMP and SPEP/SIEP in 6 months.        I have reviewed the assessment and plan and verified the accuracy of it. No changes to assessment and plan since the information was documented. Marco Boogie MD 08/23/21        I spent 22 total minutes, face-to-face, caring for Oral today.  Greater than 50% of this time involved counseling and/or coordination of care as documented within this note regarding the patient's illness(es), pros and cons of various treatment options,  instructions and/or risk reduction.         cc:  Jorge Shepherd MD         (Chalo Zaman MD)

## 2021-08-16 ENCOUNTER — LAB (OUTPATIENT)
Dept: LAB | Facility: HOSPITAL | Age: 81
End: 2021-08-16

## 2021-08-16 DIAGNOSIS — C91.10 CLL (CHRONIC LYMPHOCYTIC LEUKEMIA) (HCC): ICD-10-CM

## 2021-08-16 DIAGNOSIS — D50.8 IRON DEFICIENCY ANEMIA SECONDARY TO INADEQUATE DIETARY IRON INTAKE: ICD-10-CM

## 2021-08-16 LAB
ALBUMIN SERPL-MCNC: 4.8 G/DL (ref 3.5–5.2)
ALBUMIN/GLOB SERPL: 2.5 G/DL
ALP SERPL-CCNC: 108 U/L (ref 39–117)
ALT SERPL W P-5'-P-CCNC: 7 U/L (ref 1–41)
ANION GAP SERPL CALCULATED.3IONS-SCNC: 9 MMOL/L (ref 5–15)
AST SERPL-CCNC: 10 U/L (ref 1–40)
BASOPHILS # BLD MANUAL: 0.14 10*3/MM3 (ref 0–0.2)
BASOPHILS NFR BLD AUTO: 1 % (ref 0–1.5)
BILIRUB SERPL-MCNC: 0.5 MG/DL (ref 0–1.2)
BUN SERPL-MCNC: 21 MG/DL (ref 8–23)
BUN/CREAT SERPL: 20 (ref 7–25)
CALCIUM SPEC-SCNC: 10.1 MG/DL (ref 8.6–10.5)
CHLORIDE SERPL-SCNC: 103 MMOL/L (ref 98–107)
CO2 SERPL-SCNC: 27 MMOL/L (ref 22–29)
CREAT SERPL-MCNC: 1.05 MG/DL (ref 0.76–1.27)
DEPRECATED RDW RBC AUTO: 41.5 FL (ref 37–54)
ERYTHROCYTE [DISTWIDTH] IN BLOOD BY AUTOMATED COUNT: 12 % (ref 12.3–15.4)
FERRITIN SERPL-MCNC: 134.7 NG/ML (ref 30–400)
GFR SERPL CREATININE-BSD FRML MDRD: 68 ML/MIN/1.73
GLOBULIN UR ELPH-MCNC: 1.9 GM/DL
GLUCOSE SERPL-MCNC: 107 MG/DL (ref 65–99)
HCT VFR BLD AUTO: 41.8 % (ref 37.5–51)
HGB BLD-MCNC: 13.4 G/DL (ref 13–17.7)
IRON 24H UR-MRATE: 101 MCG/DL (ref 59–158)
IRON SATN MFR SERPL: 26 % (ref 20–50)
LYMPHOCYTES # BLD MANUAL: 3.91 10*3/MM3 (ref 0.7–3.1)
LYMPHOCYTES NFR BLD MANUAL: 28 % (ref 19.6–45.3)
LYMPHOCYTES NFR BLD MANUAL: 4 % (ref 5–12)
MCH RBC QN AUTO: 29.8 PG (ref 26.6–33)
MCHC RBC AUTO-ENTMCNC: 32.1 G/DL (ref 31.5–35.7)
MCV RBC AUTO: 93.1 FL (ref 79–97)
MONOCYTES # BLD AUTO: 0.56 10*3/MM3 (ref 0.1–0.9)
NEUTROPHILS # BLD AUTO: 9.35 10*3/MM3 (ref 1.7–7)
NEUTROPHILS NFR BLD MANUAL: 67 % (ref 42.7–76)
PLAT MORPH BLD: NORMAL
PLATELET # BLD AUTO: 230 10*3/MM3 (ref 140–450)
PMV BLD AUTO: 9.2 FL (ref 6–12)
POTASSIUM SERPL-SCNC: 4.8 MMOL/L (ref 3.5–5.2)
PROT SERPL-MCNC: 6.7 G/DL (ref 6–8.5)
RBC # BLD AUTO: 4.49 10*6/MM3 (ref 4.14–5.8)
RBC MORPH BLD: NORMAL
SMUDGE CELLS BLD QL SMEAR: ABNORMAL
SODIUM SERPL-SCNC: 139 MMOL/L (ref 136–145)
TIBC SERPL-MCNC: 386 MCG/DL (ref 298–536)
TRANSFERRIN SERPL-MCNC: 259 MG/DL (ref 200–360)
WBC # BLD AUTO: 13.95 10*3/MM3 (ref 3.4–10.8)

## 2021-08-16 PROCEDURE — 82728 ASSAY OF FERRITIN: CPT

## 2021-08-16 PROCEDURE — 83540 ASSAY OF IRON: CPT

## 2021-08-16 PROCEDURE — 84165 PROTEIN E-PHORESIS SERUM: CPT

## 2021-08-16 PROCEDURE — 85025 COMPLETE CBC W/AUTO DIFF WBC: CPT

## 2021-08-16 PROCEDURE — 84466 ASSAY OF TRANSFERRIN: CPT

## 2021-08-16 PROCEDURE — 85007 BL SMEAR W/DIFF WBC COUNT: CPT

## 2021-08-16 PROCEDURE — 36415 COLL VENOUS BLD VENIPUNCTURE: CPT

## 2021-08-16 PROCEDURE — 80053 COMPREHEN METABOLIC PANEL: CPT

## 2021-08-17 LAB
ALBUMIN SERPL ELPH-MCNC: 3.9 G/DL (ref 2.9–4.4)
ALBUMIN/GLOB SERPL: 1.6 {RATIO} (ref 0.7–1.7)
ALPHA1 GLOB SERPL ELPH-MCNC: 0.2 G/DL (ref 0–0.4)
ALPHA2 GLOB SERPL ELPH-MCNC: 0.9 G/DL (ref 0.4–1)
B-GLOBULIN SERPL ELPH-MCNC: 0.9 G/DL (ref 0.7–1.3)
GAMMA GLOB SERPL ELPH-MCNC: 0.4 G/DL (ref 0.4–1.8)
GLOBULIN SER CALC-MCNC: 2.4 G/DL (ref 2.2–3.9)
LABORATORY COMMENT REPORT: ABNORMAL
M PROTEIN SERPL ELPH-MCNC: 0.1 G/DL
PROT PATTERN SERPL ELPH-IMP: ABNORMAL
PROT SERPL-MCNC: 6.3 G/DL (ref 6–8.5)

## 2021-08-23 ENCOUNTER — OFFICE VISIT (OUTPATIENT)
Dept: ONCOLOGY | Facility: CLINIC | Age: 81
End: 2021-08-23

## 2021-08-23 VITALS
WEIGHT: 149.8 LBS | RESPIRATION RATE: 18 BRPM | BODY MASS INDEX: 21.45 KG/M2 | HEART RATE: 66 BPM | HEIGHT: 70 IN | TEMPERATURE: 98.1 F | SYSTOLIC BLOOD PRESSURE: 124 MMHG | OXYGEN SATURATION: 98 % | DIASTOLIC BLOOD PRESSURE: 62 MMHG

## 2021-08-23 DIAGNOSIS — C91.10 CLL (CHRONIC LYMPHOCYTIC LEUKEMIA) (HCC): Primary | ICD-10-CM

## 2021-08-23 PROCEDURE — 99213 OFFICE O/P EST LOW 20 MIN: CPT | Performed by: INTERNAL MEDICINE

## 2021-11-15 ENCOUNTER — APPOINTMENT (OUTPATIENT)
Dept: LAB | Facility: HOSPITAL | Age: 81
End: 2021-11-15

## 2021-11-16 ENCOUNTER — LAB (OUTPATIENT)
Dept: LAB | Facility: HOSPITAL | Age: 81
End: 2021-11-16

## 2021-11-16 DIAGNOSIS — C91.10 CLL (CHRONIC LYMPHOCYTIC LEUKEMIA) (HCC): ICD-10-CM

## 2021-11-16 LAB
ANISOCYTOSIS BLD QL: ABNORMAL
DEPRECATED RDW RBC AUTO: 41.1 FL (ref 37–54)
EOSINOPHIL # BLD MANUAL: 0.39 10*3/MM3 (ref 0–0.4)
EOSINOPHIL NFR BLD MANUAL: 4.1 % (ref 0.3–6.2)
ERYTHROCYTE [DISTWIDTH] IN BLOOD BY AUTOMATED COUNT: 12.4 % (ref 12.3–15.4)
FERRITIN SERPL-MCNC: 87.83 NG/ML (ref 30–400)
HCT VFR BLD AUTO: 40.9 % (ref 37.5–51)
HGB BLD-MCNC: 13.7 G/DL (ref 13–17.7)
IRON 24H UR-MRATE: 60 MCG/DL (ref 59–158)
IRON SATN MFR SERPL: 15 % (ref 20–50)
LYMPHOCYTES # BLD MANUAL: 5.74 10*3/MM3 (ref 0.7–3.1)
LYMPHOCYTES NFR BLD MANUAL: 5.1 % (ref 5–12)
LYMPHOCYTES NFR BLD MANUAL: 55.1 % (ref 19.6–45.3)
MCH RBC QN AUTO: 30.2 PG (ref 26.6–33)
MCHC RBC AUTO-ENTMCNC: 33.5 G/DL (ref 31.5–35.7)
MCV RBC AUTO: 90.3 FL (ref 79–97)
MONOCYTES # BLD AUTO: 0.49 10*3/MM3 (ref 0.1–0.9)
NEUTROPHILS # BLD AUTO: 2.92 10*3/MM3 (ref 1.7–7)
NEUTROPHILS NFR BLD MANUAL: 30.6 % (ref 42.7–76)
PLAT MORPH BLD: NORMAL
PLATELET # BLD AUTO: 143 10*3/MM3 (ref 140–450)
PMV BLD AUTO: 9.1 FL (ref 6–12)
RBC # BLD AUTO: 4.53 10*6/MM3 (ref 4.14–5.8)
SMUDGE CELLS BLD QL SMEAR: ABNORMAL
TIBC SERPL-MCNC: 401 MCG/DL (ref 298–536)
TRANSFERRIN SERPL-MCNC: 269 MG/DL (ref 200–360)
VARIANT LYMPHS NFR BLD MANUAL: 5.1 % (ref 0–5)
WBC # BLD AUTO: 9.54 10*3/MM3 (ref 3.4–10.8)

## 2021-11-16 PROCEDURE — 84466 ASSAY OF TRANSFERRIN: CPT

## 2021-11-16 PROCEDURE — 85007 BL SMEAR W/DIFF WBC COUNT: CPT

## 2021-11-16 PROCEDURE — 85025 COMPLETE CBC W/AUTO DIFF WBC: CPT

## 2021-11-16 PROCEDURE — 82728 ASSAY OF FERRITIN: CPT

## 2021-11-16 PROCEDURE — 83540 ASSAY OF IRON: CPT

## 2021-11-16 PROCEDURE — 36415 COLL VENOUS BLD VENIPUNCTURE: CPT

## 2021-12-22 ENCOUNTER — OFFICE VISIT (OUTPATIENT)
Dept: UROLOGY | Age: 81
End: 2021-12-22
Payer: COMMERCIAL

## 2021-12-22 VITALS — WEIGHT: 150 LBS | HEIGHT: 70 IN | BODY MASS INDEX: 21.47 KG/M2

## 2021-12-22 DIAGNOSIS — R31.0 GROSS HEMATURIA: ICD-10-CM

## 2021-12-22 DIAGNOSIS — N32.89 BLADDER MASS: Primary | ICD-10-CM

## 2021-12-22 LAB
APPEARANCE FLUID: CLEAR
BILIRUBIN, POC: NORMAL
BLOOD URINE, POC: NORMAL
CLARITY, POC: CLEAR
COLOR, POC: YELLOW
GLUCOSE URINE, POC: NORMAL
KETONES, POC: NORMAL
LEUKOCYTE EST, POC: NORMAL
NITRITE, POC: NORMAL
PH, POC: 5.5
PROTEIN, POC: 30
SPECIFIC GRAVITY, POC: 1.02
UROBILINOGEN, POC: 0.2

## 2021-12-22 PROCEDURE — 99204 OFFICE O/P NEW MOD 45 MIN: CPT | Performed by: UROLOGY

## 2021-12-22 PROCEDURE — 81002 URINALYSIS NONAUTO W/O SCOPE: CPT | Performed by: UROLOGY

## 2021-12-22 RX ORDER — MULTIVIT-MIN/IRON/FOLIC ACID/K 18-600-40
CAPSULE ORAL
COMMUNITY

## 2021-12-22 RX ORDER — UBIDECARENONE 75 MG
1000 CAPSULE ORAL DAILY
COMMUNITY

## 2021-12-22 RX ORDER — METOPROLOL SUCCINATE 25 MG/1
25 TABLET, EXTENDED RELEASE ORAL 2 TIMES DAILY
COMMUNITY

## 2021-12-22 RX ORDER — ASPIRIN 81 MG/1
81 TABLET ORAL DAILY
Status: ON HOLD | COMMUNITY
End: 2022-01-11 | Stop reason: SDUPTHER

## 2021-12-22 ASSESSMENT — ENCOUNTER SYMPTOMS
WHEEZING: 0
FACIAL SWELLING: 0
SORE THROAT: 0
VOMITING: 0
CHEST TIGHTNESS: 0
EYE DISCHARGE: 0
BACK PAIN: 0
EYE REDNESS: 0
NAUSEA: 0

## 2021-12-22 NOTE — PROGRESS NOTES
Calos Orozco is a 80 y.o. male who presents today   Chief Complaint   Patient presents with    Follow-up     I am here today as a new pt being seen for mass of urinary bladder. Hematuria  Patient complains of gross hematuria. Onset of hematuria was 1 month ago and was sudden in onset. There is not a history of nephrolithiasis. There is not a history of urologic trauma. Other urologic symptoms include slow stream sometimes. Patient admits to history of tobacco use and Former smoker no occupational exposure. Patient denies history of occupational exposure. Prior workup has been UA'S, CT CT scan done 12-21 shows a bladder mass he is referred now for evaluation. Past Medical History:   Diagnosis Date    Hypertension        Past Surgical History:   Procedure Laterality Date    HERNIA REPAIR Bilateral     in groin area        Current Outpatient Medications   Medication Sig Dispense Refill    aspirin 81 MG EC tablet Take 81 mg by mouth daily      metoprolol succinate (TOPROL XL) 25 MG extended release tablet Take 25 mg by mouth 2 times daily      Loratadine (CLARITIN PO) Take by mouth      vitamin B-12 (CYANOCOBALAMIN) 100 MCG tablet Take 1,000 mcg by mouth daily      Cholecalciferol (VITAMIN D) 50 MCG (2000 UT) CAPS capsule Take by mouth       No current facility-administered medications for this visit.        Allergies   Allergen Reactions    Amoxicillin-Pot Clavulanate Hives       Social History     Socioeconomic History    Marital status: Unknown     Spouse name: None    Number of children: None    Years of education: None    Highest education level: None   Occupational History    None   Tobacco Use    Smoking status: Former Smoker    Smokeless tobacco: Never Used    Tobacco comment: about 40 years ago   Substance and Sexual Activity    Alcohol use: None    Drug use: None    Sexual activity: None   Other Topics Concern    None   Social History Narrative    None     Social Determinants of Health     Financial Resource Strain:     Difficulty of Paying Living Expenses: Not on file   Food Insecurity:     Worried About Running Out of Food in the Last Year: Not on file    Esther of Food in the Last Year: Not on file   Transportation Needs:     Lack of Transportation (Medical): Not on file    Lack of Transportation (Non-Medical): Not on file   Physical Activity:     Days of Exercise per Week: Not on file    Minutes of Exercise per Session: Not on file   Stress:     Feeling of Stress : Not on file   Social Connections:     Frequency of Communication with Friends and Family: Not on file    Frequency of Social Gatherings with Friends and Family: Not on file    Attends [employer] Services: Not on file    Active Member of [address] [place] [address] [employer] or Organizations: Not on file    Attends Club or Organization Meetings: Not on file    Marital Status: Not on file   Intimate Partner Violence:     Fear of Current or Ex-Partner: Not on file    Emotionally Abused: Not on file    Physically Abused: Not on file    Sexually Abused: Not on file   Housing Stability:     Unable to Pay for Housing in the Last Year: Not on file    Number of [place] in the Last Year: Not on file    Unstable Housing in the Last Year: Not on file       History reviewed. No pertinent family history. REVIEW OF SYSTEMS:  Review of Systems   Constitutional: Negative for chills and fever. HENT: Negative for facial swelling and sore throat. Eyes: Negative for discharge and redness. Respiratory: Negative for chest tightness and wheezing. Cardiovascular: Negative for chest pain and palpitations. Gastrointestinal: Negative for nausea and vomiting. Endocrine: Negative for polyphagia and polyuria. Genitourinary: Positive for hematuria.  Negative for decreased urine volume, difficulty urinating, dysuria, enuresis, flank pain, frequency, genital sores, penile discharge, penile pain, penile swelling (had red tinged urine in first of December that is what made him go to  ), scrotal swelling, testicular pain and urgency. Musculoskeletal: Negative for back pain and neck stiffness. Skin: Negative for rash and wound. Neurological: Negative for dizziness and headaches. Hematological: Negative for adenopathy. Does not bruise/bleed easily. Psychiatric/Behavioral: Negative for confusion and hallucinations. PHYSICAL EXAM:  Ht 5' 10\" (1.778 m)   Wt 150 lb (68 kg)   BMI 21.52 kg/m²   Physical Exam  Constitutional:       General: He is not in acute distress. Appearance: Normal appearance. He is well-developed. HENT:      Head: Normocephalic and atraumatic. Nose: Nose normal.   Eyes:      General: No scleral icterus. Conjunctiva/sclera: Conjunctivae normal.      Pupils: Pupils are equal, round, and reactive to light. Neck:      Trachea: No tracheal deviation. Cardiovascular:      Rate and Rhythm: Normal rate and regular rhythm. Pulmonary:      Effort: Pulmonary effort is normal. No respiratory distress. Breath sounds: No stridor. Abdominal:      General: There is no distension. Palpations: Abdomen is soft. There is no mass. Tenderness: There is no abdominal tenderness. Musculoskeletal:         General: No tenderness. Normal range of motion. Cervical back: Normal range of motion and neck supple. Lymphadenopathy:      Cervical: No cervical adenopathy. Skin:     General: Skin is warm and dry. Findings: No erythema. Neurological:      Mental Status: He is alert and oriented to person, place, and time.    Psychiatric:         Behavior: Behavior normal.         Judgment: Judgment normal.             DATA:    Results for orders placed or performed in visit on 12/22/21   POCT Urinalysis no Micro   Result Value Ref Range    Color, UA yellow     Clarity, UA clear     Glucose, UA POC neg     Bilirubin, UA neg     Ketones, UA neg     Spec Grav, UA 1.020     Blood, UA POC neg     pH, UA 5.5 Protein, UA POC 30     Urobilinogen, UA 0.2     Leukocytes, UA neg     Nitrite, UA neg     Appearance, Fluid Clear Clear, Slightly Cloudy       IMAGING:  Patient had a CT scan done with and without contrast on 12/2/2021 I do not have the films but the report reveals a 1.9 x 1.8 x 1.9 cm urinary bladder mass in the left posterior lateral bladder. 1. Bladder mass  We will plan for cystoscopy to further evaluate this confirms a bladder tumor he will need surgical resection  - POCT Urinalysis no Micro  - Cystoscopy; Future    2. Gross hematuria  He will bring the films of the CT with him on disc. Return for cystoscopy as above. He is not had any other episodes      Orders Placed This Encounter   Procedures    POCT Urinalysis no Micro    Cystoscopy     Next available     Standing Status:   Future     Standing Expiration Date:   12/22/2022     Scheduling Instructions:      Next available        Return in about 2 weeks (around 1/5/2022) for Cystoscopy on next visit. All information inputted into the note by the MA to include chief complaint, past medical history, past surgical history, medications, allergies, social and family history and review of systems has been reviewed and updated as needed by me. EMR Dragon/transcription disclaimer: Much of this documentt is electronic  transcription/translation of spoken language to printed text. The  electronic translation of spoken language may be erroneous, or at times,  nonsensical words or phrases may be inadvertently transcribed.  Although I  have reviewed the document for such errors, some may still exist.

## 2022-01-05 ENCOUNTER — PROCEDURE VISIT (OUTPATIENT)
Dept: UROLOGY | Age: 82
End: 2022-01-05
Payer: COMMERCIAL

## 2022-01-05 VITALS — TEMPERATURE: 98.3 F | WEIGHT: 153 LBS | HEIGHT: 70 IN | BODY MASS INDEX: 21.9 KG/M2

## 2022-01-05 DIAGNOSIS — C67.2 MALIGNANT NEOPLASM OF LATERAL WALL OF URINARY BLADDER (HCC): Primary | ICD-10-CM

## 2022-01-05 DIAGNOSIS — R31.0 GROSS HEMATURIA: ICD-10-CM

## 2022-01-05 DIAGNOSIS — N32.89 BLADDER MASS: ICD-10-CM

## 2022-01-05 LAB
BILIRUBIN, POC: NORMAL
BLOOD URINE, POC: NORMAL
CLARITY, POC: CLEAR
COLOR, POC: YELLOW
GLUCOSE URINE, POC: NORMAL
KETONES, POC: NORMAL
LEUKOCYTE EST, POC: NORMAL
NITRITE, POC: NORMAL
PH, POC: 7
PROTEIN, POC: NORMAL
SPECIFIC GRAVITY, POC: 1.01
UROBILINOGEN, POC: 0.2

## 2022-01-05 PROCEDURE — 99214 OFFICE O/P EST MOD 30 MIN: CPT | Performed by: UROLOGY

## 2022-01-05 PROCEDURE — 52000 CYSTOURETHROSCOPY: CPT | Performed by: UROLOGY

## 2022-01-05 PROCEDURE — 81003 URINALYSIS AUTO W/O SCOPE: CPT | Performed by: UROLOGY

## 2022-01-05 ASSESSMENT — ENCOUNTER SYMPTOMS
VOMITING: 0
BACK PAIN: 0
EYE REDNESS: 0
CHEST TIGHTNESS: 0
FACIAL SWELLING: 0
WHEEZING: 0
EYE DISCHARGE: 0
NAUSEA: 0
SORE THROAT: 0

## 2022-01-05 NOTE — PROGRESS NOTES
Ivon Stahl is a 80 y.o. male who presents today   Chief Complaint   Patient presents with    Cystoscopy     I am here for cysto to evaluate bladder mass.        Hematuria  Patient complains of gross hematuria. Onset of hematuria was 2 month ago and was sudden in onset. There is not a history of nephrolithiasis. There is not a history of urologic trauma. Other urologic symptoms include slow stream sometimes. Patient admits to history of tobacco use and Former smoker no occupational exposure. Patient denies history of occupational exposure. Prior workup has been UA'S, CT CT scan done 12-21 shows a bladder mass he is referred now for evaluation. He is here today for cystoscopy. He did bring his films          Past Medical History:   Diagnosis Date    Hypertension        Past Surgical History:   Procedure Laterality Date    HERNIA REPAIR Bilateral     in groin area        Current Outpatient Medications   Medication Sig Dispense Refill    aspirin 81 MG EC tablet Take 81 mg by mouth daily      metoprolol succinate (TOPROL XL) 25 MG extended release tablet Take 25 mg by mouth 2 times daily      Loratadine (CLARITIN PO) Take by mouth      vitamin B-12 (CYANOCOBALAMIN) 100 MCG tablet Take 1,000 mcg by mouth daily      Cholecalciferol (VITAMIN D) 50 MCG (2000 UT) CAPS capsule Take by mouth       No current facility-administered medications for this visit. Allergies   Allergen Reactions    Latex Itching and Other (See Comments)     And band aids. Causes redness and itching.     Amoxicillin-Pot Clavulanate Hives       Social History     Socioeconomic History    Marital status: Unknown     Spouse name: None    Number of children: None    Years of education: None    Highest education level: None   Occupational History    None   Tobacco Use    Smoking status: Former Smoker    Smokeless tobacco: Never Used    Tobacco comment: about 40 years ago   Substance and Sexual Activity    Alcohol use: None    Drug use: None    Sexual activity: None   Other Topics Concern    None   Social History Narrative    None     Social Determinants of Health     Financial Resource Strain:     Difficulty of Paying Living Expenses: Not on file   Food Insecurity:     Worried About Running Out of Food in the Last Year: Not on file    Esther of Food in the Last Year: Not on file   Transportation Needs:     Lack of Transportation (Medical): Not on file    Lack of Transportation (Non-Medical): Not on file   Physical Activity:     Days of Exercise per Week: Not on file    Minutes of Exercise per Session: Not on file   Stress:     Feeling of Stress : Not on file   Social Connections:     Frequency of Communication with Friends and Family: Not on file    Frequency of Social Gatherings with Friends and Family: Not on file    Attends [employer] Services: Not on file    Active Member of [address] [place] [address] [employer] or Organizations: Not on file    Attends Club or Organization Meetings: Not on file    Marital Status: Not on file   Intimate Partner Violence:     Fear of Current or Ex-Partner: Not on file    Emotionally Abused: Not on file    Physically Abused: Not on file    Sexually Abused: Not on file   Housing Stability:     Unable to Pay for Housing in the Last Year: Not on file    Number of [place] in the Last Year: Not on file    Unstable Housing in the Last Year: Not on file       No family history on file. REVIEW OF SYSTEMS:  Review of Systems   Constitutional: Negative for chills and fever. HENT: Negative for facial swelling and sore throat. Eyes: Negative for discharge and redness. Respiratory: Negative for chest tightness and wheezing. Cardiovascular: Negative for chest pain and palpitations. Gastrointestinal: Negative for nausea and vomiting. Endocrine: Negative for polyphagia and polyuria. Genitourinary: Positive for hematuria.  Negative for decreased urine volume, difficulty urinating, dysuria, enuresis, flank pain, frequency, genital sores, penile discharge, penile pain, penile swelling (had red tinged urine in first of December that is what made him go to [facility] ), scrotal swelling, testicular pain and urgency. Musculoskeletal: Negative for back pain and neck stiffness. Skin: Negative for rash and wound. Neurological: Negative for dizziness and headaches. Hematological: Negative for adenopathy. Does not bruise/bleed easily. Psychiatric/Behavioral: Negative for confusion and hallucinations. PHYSICAL EXAM:  Temp 98.3 °F (36.8 °C) (Temporal)   Ht 5' 10\" (1.778 m)   Wt 153 lb (69.4 kg)   BMI 21.95 kg/m²   Physical Exam  Constitutional:       General: He is not in acute distress. Appearance: Normal appearance. He is well-developed. HENT:      Head: Normocephalic and atraumatic. Nose: Nose normal.   Eyes:      General: No scleral icterus. Conjunctiva/sclera: Conjunctivae normal.      Pupils: Pupils are equal, round, and reactive to light. Neck:      Trachea: No tracheal deviation. Cardiovascular:      Rate and Rhythm: Normal rate and regular rhythm. Pulmonary:      Effort: Pulmonary effort is normal. No respiratory distress. Breath sounds: No stridor. Abdominal:      General: There is no distension. Palpations: Abdomen is soft. There is no mass. Tenderness: There is no abdominal tenderness. Genitourinary:     Penis: Normal.       Testes: Normal.   Musculoskeletal:         General: No tenderness. Normal range of motion. Cervical back: Normal range of motion and neck supple. Lymphadenopathy:      Cervical: No cervical adenopathy. Skin:     General: Skin is warm and dry. Findings: No erythema. Neurological:      Mental Status: He is alert and oriented to person, place, and time.    Psychiatric:         Behavior: Behavior normal.         Judgment: Judgment normal.       Cystoscopy Procedure Note    Indications: Diagnosis    Pre-operative Diagnosis: Hematuria and bladder mass    Post-operative Diagnosis: Same    Surgeon: Elena Claros MD     Assistants: staff    Anesthesia: Local anesthesia topical 2% lidocaine gel    Procedure Details   The risks, benefits, complications, treatment options, and expected outcomes were discussed with the patient. The patient concurred with the proposed plan, giving informed consent. Cystoscopy was performed today under local anesthesia, using sterile technique. The patient was placed in the supine position, prepped with Hibiclens, and draped in the usual sterile fashion. A 17 Norwegian sheath flexible cystoscope was used to inspect both the urethra and bladder using the flexible scope. Findings:  Anterior urethra: normal without strictures and without scarring. Prostate:  Prostatic urethra: 2+ moderate prostatic hyperplasia. median lobe present? no.   Bladder: 1+ trabeculation, with lesions papillary tumor about 3 to 4 cm in size just lateral to the left ureteral orifice involving the left lateral wall wall more posterior. This does not involve the ureteral orifice however it is on the intramural tunnel of the ureter on the left side no other papillary tumors are seen. Ureteral orifice(s) was/were seen both. Ureteral orifice(s) both were in the normal location and both ureteral orifices were effluxing clear urine. Papillary tumor as described above seen cystoscopically today corresponds to the soft tissue mass seen on the CT. This appears to be consistent with his bladder cancer                            Complications:  None; patient tolerated the procedure well. Disposition: To home after observation.            Condition: stable    DATA:  CBC: No results found for: WBC, RBC, HGB, HCT, MCV, MCH, MCHC, RDW, PLT, MPV  CMP:  No results found for: NA, K, CL, CO2, BUN, CREATININE, GFRAA, AGRATIO, LABGLOM, GLUCOSE, GLU, PROT, LABALBU, CALCIUM, BILITOT, ALKPHOS, AST, ALT  Results for orders placed or performed in visit on 01/05/22   POCT Urinalysis No Micro (Auto)   Result Value Ref Range    Color, UA yellow     Clarity, UA clear     Glucose, UA POC 100mg/dl     Bilirubin, UA -     Ketones, UA -     Spec Grav, UA 1.010     Blood, UA POC -     pH, UA 7.0     Protein, UA POC -     Urobilinogen, UA 0.2     Leukocytes, UA -     Nitrite, UA -         IMAGING:  I reviewed the CT scan done at outside hospital on 12/2/2021. This shows about a 2 cm soft tissue mass in the posterior lateral left bladder. 1. Bladder mass  Cystoscopy today confirms bladder tumor described above. - Cystoscopy  - POCT Urinalysis No Micro (Auto)    2. Malignant neoplasm of lateral wall of urinary bladder (HCC)  We will plan to go the operating room for cystoscopy TURBT, bilateral ureteral catheterization and bilateral retrograde pyelograms. Risk and complication procedure been discussed with the patient including the risk of bladder perforation. He does understand since this is close to the left ureter if this requires resection of the intramural ureter he may require a stent placed. We also discussed the risk of needing go home with [place] catheter bleeding infection recurrence and need for surveillance. Patient agrees to proceed. - [place] CYSTOURETHROSCOPY,FULGUR 2-5CM LESN; Future    3. Gross hematuria  Secondary to bladder mass presumed bladder cancer as described above      Orders Placed This Encounter   Procedures    POCT Urinalysis No Micro (Auto)    [place] CYSTOURETHROSCOPY,FULGUR 2-5CM LESN     Cystoscopy TURBT and bilateral retrograde pyelograms, possible left stent placed     Standing Status:   Future     Standing Expiration Date:   3/5/2022        Return for PT to be scheduled for Surgery. All information inputted into the note by the MA to include chief complaint, past medical history, past surgical history, medications, allergies, social and family history and review of systems has been reviewed and updated as needed by me.     EMR Dragon/transcription disclaimer: Much of this documentt is electronic  transcription/translation of spoken language to printed text. The  electronic translation of spoken language may be erroneous, or at times,  nonsensical words or phrases may be inadvertently transcribed.  Although I  have reviewed the document for such errors, some may still exist.

## 2022-01-10 ENCOUNTER — HOSPITAL ENCOUNTER (OUTPATIENT)
Dept: PREADMISSION TESTING | Age: 82
Discharge: HOME | End: 2022-01-14
Payer: COMMERCIAL

## 2022-01-10 LAB
ANION GAP SERPL CALCULATED.3IONS-SCNC: 9 MMOL/L (ref 7–19)
APTT: 26.7 SEC (ref 26–36.2)
BASOPHILS ABSOLUTE: 0.1 K/UL (ref 0–0.2)
BASOPHILS RELATIVE PERCENT: 1 % (ref 0–1)
BUN BLDV-MCNC: 13 MG/DL (ref 8–23)
CALCIUM SERPL-MCNC: 9.6 MG/DL (ref 8.8–10.2)
CHLORIDE BLD-SCNC: 106 MMOL/L (ref 98–111)
CO2: 29 MMOL/L (ref 22–29)
CREAT SERPL-MCNC: 1 MG/DL (ref 0.5–1.2)
EKG P AXIS: 81 DEGREES
EKG P-R INTERVAL: 220 MS
EKG Q-T INTERVAL: 428 MS
EKG QRS DURATION: 86 MS
EKG QTC CALCULATION (BAZETT): 423 MS
EKG T AXIS: 65 DEGREES
EOSINOPHILS ABSOLUTE: 0.2 K/UL (ref 0–0.6)
EOSINOPHILS RELATIVE PERCENT: 1.7 % (ref 0–5)
GFR AFRICAN AMERICAN: >59
GFR NON-AFRICAN AMERICAN: >60
GLUCOSE BLD-MCNC: 101 MG/DL (ref 74–109)
HCT VFR BLD CALC: 42.2 % (ref 42–52)
HEMOGLOBIN: 13.4 G/DL (ref 14–18)
IMMATURE GRANULOCYTES #: 0 K/UL
INR BLD: 1.03 (ref 0.88–1.18)
LYMPHOCYTES ABSOLUTE: 4.5 K/UL (ref 1.1–4.5)
LYMPHOCYTES RELATIVE PERCENT: 46.7 % (ref 20–40)
MCH RBC QN AUTO: 30.3 PG (ref 27–31)
MCHC RBC AUTO-ENTMCNC: 31.8 G/DL (ref 33–37)
MCV RBC AUTO: 95.5 FL (ref 80–94)
MONOCYTES ABSOLUTE: 1.1 K/UL (ref 0–0.9)
MONOCYTES RELATIVE PERCENT: 11.5 % (ref 0–10)
NEUTROPHILS ABSOLUTE: 3.7 K/UL (ref 1.5–7.5)
NEUTROPHILS RELATIVE PERCENT: 38.8 % (ref 50–65)
PDW BLD-RTO: 12.3 % (ref 11.5–14.5)
PLATELET # BLD: 150 K/UL (ref 130–400)
PMV BLD AUTO: 9.1 FL (ref 9.4–12.4)
POTASSIUM SERPL-SCNC: 4.6 MMOL/L (ref 3.5–5)
PROTHROMBIN TIME: 13.7 SEC (ref 12–14.6)
RBC # BLD: 4.42 M/UL (ref 4.7–6.1)
SODIUM BLD-SCNC: 144 MMOL/L (ref 136–145)
WBC # BLD: 9.6 K/UL (ref 4.8–10.8)

## 2022-01-10 PROCEDURE — 85025 COMPLETE CBC W/AUTO DIFF WBC: CPT

## 2022-01-10 PROCEDURE — 85730 THROMBOPLASTIN TIME PARTIAL: CPT

## 2022-01-10 PROCEDURE — 85610 PROTHROMBIN TIME: CPT

## 2022-01-10 PROCEDURE — 80048 BASIC METABOLIC PNL TOTAL CA: CPT

## 2022-01-10 RX ORDER — CIPROFLOXACIN 2 MG/ML
400 INJECTION, SOLUTION INTRAVENOUS ONCE
Status: CANCELLED | OUTPATIENT
Start: 2022-01-11

## 2022-01-11 ENCOUNTER — ANESTHESIA (OUTPATIENT)
Dept: OPERATING ROOM | Age: 82
End: 2022-01-11
Payer: COMMERCIAL

## 2022-01-11 ENCOUNTER — APPOINTMENT (OUTPATIENT)
Dept: GENERAL RADIOLOGY | Age: 82
End: 2022-01-11
Attending: UROLOGY
Payer: COMMERCIAL

## 2022-01-11 ENCOUNTER — HOSPITAL ENCOUNTER (OUTPATIENT)
Age: 82
Setting detail: OUTPATIENT SURGERY
Discharge: HOME | End: 2022-01-11
Attending: UROLOGY | Admitting: UROLOGY
Payer: COMMERCIAL

## 2022-01-11 ENCOUNTER — ANESTHESIA EVENT (OUTPATIENT)
Dept: OPERATING ROOM | Age: 82
End: 2022-01-11
Payer: COMMERCIAL

## 2022-01-11 VITALS — OXYGEN SATURATION: 100 % | DIASTOLIC BLOOD PRESSURE: 79 MMHG | SYSTOLIC BLOOD PRESSURE: 173 MMHG | TEMPERATURE: 96.6 F

## 2022-01-11 VITALS
BODY MASS INDEX: 21.9 KG/M2 | SYSTOLIC BLOOD PRESSURE: 142 MMHG | HEIGHT: 70 IN | TEMPERATURE: 98 F | RESPIRATION RATE: 18 BRPM | DIASTOLIC BLOOD PRESSURE: 61 MMHG | WEIGHT: 153 LBS | OXYGEN SATURATION: 95 % | HEART RATE: 58 BPM

## 2022-01-11 DIAGNOSIS — C67.2 MALIGNANT NEOPLASM OF LATERAL WALL OF URINARY BLADDER (HCC): Primary | ICD-10-CM

## 2022-01-11 PROCEDURE — C1758 CATHETER, URETERAL: HCPCS | Performed by: UROLOGY

## 2022-01-11 PROCEDURE — 7100000010 HC PHASE II RECOVERY - FIRST 15 MIN: Performed by: UROLOGY

## 2022-01-11 PROCEDURE — 3700000000 HC ANESTHESIA ATTENDED CARE: Performed by: UROLOGY

## 2022-01-11 PROCEDURE — C1769 GUIDE WIRE: HCPCS | Performed by: UROLOGY

## 2022-01-11 PROCEDURE — 6360000002 HC RX W HCPCS

## 2022-01-11 PROCEDURE — 6370000000 HC RX 637 (ALT 250 FOR IP): Performed by: UROLOGY

## 2022-01-11 PROCEDURE — 2580000003 HC RX 258

## 2022-01-11 PROCEDURE — 7100000001 HC PACU RECOVERY - ADDTL 15 MIN: Performed by: UROLOGY

## 2022-01-11 PROCEDURE — 52240 CYSTOSCOPY AND TREATMENT: CPT | Performed by: UROLOGY

## 2022-01-11 PROCEDURE — 74420 UROGRAPHY RTRGR +-KUB: CPT | Performed by: UROLOGY

## 2022-01-11 PROCEDURE — 2720000010 HC SURG SUPPLY STERILE: Performed by: UROLOGY

## 2022-01-11 PROCEDURE — 3600000004 HC SURGERY LEVEL 4 BASE: Performed by: UROLOGY

## 2022-01-11 PROCEDURE — 3600000014 HC SURGERY LEVEL 4 ADDTL 15MIN: Performed by: UROLOGY

## 2022-01-11 PROCEDURE — 7100000000 HC PACU RECOVERY - FIRST 15 MIN: Performed by: UROLOGY

## 2022-01-11 PROCEDURE — 74420 UROGRAPHY RTRGR +-KUB: CPT

## 2022-01-11 PROCEDURE — 6360000002 HC RX W HCPCS: Performed by: UROLOGY

## 2022-01-11 PROCEDURE — C2617 STENT, NON-COR, TEM W/O DEL: HCPCS | Performed by: UROLOGY

## 2022-01-11 PROCEDURE — 52332 CYSTOSCOPY AND TREATMENT: CPT | Performed by: UROLOGY

## 2022-01-11 PROCEDURE — 2709999900 HC NON-CHARGEABLE SUPPLY: Performed by: UROLOGY

## 2022-01-11 PROCEDURE — 3700000001 HC ADD 15 MINUTES (ANESTHESIA): Performed by: UROLOGY

## 2022-01-11 PROCEDURE — 7100000011 HC PHASE II RECOVERY - ADDTL 15 MIN: Performed by: UROLOGY

## 2022-01-11 PROCEDURE — 2500000003 HC RX 250 WO HCPCS

## 2022-01-11 PROCEDURE — 88307 TISSUE EXAM BY PATHOLOGIST: CPT

## 2022-01-11 DEVICE — URETERAL STENT
Type: IMPLANTABLE DEVICE | Status: FUNCTIONAL
Brand: POLARIS™ ULTRA

## 2022-01-11 RX ORDER — ASPIRIN 81 MG/1
81 TABLET ORAL DAILY
Qty: 30 TABLET | Refills: 0 | Status: ON HOLD
Start: 2022-01-16 | End: 2022-10-27 | Stop reason: HOSPADM

## 2022-01-11 RX ORDER — DIPHENHYDRAMINE HYDROCHLORIDE 50 MG/ML
12.5 INJECTION INTRAMUSCULAR; INTRAVENOUS
Status: DISCONTINUED | OUTPATIENT
Start: 2022-01-11 | End: 2022-01-11 | Stop reason: HOSPADM

## 2022-01-11 RX ORDER — SODIUM CHLORIDE, SODIUM LACTATE, POTASSIUM CHLORIDE, CALCIUM CHLORIDE 600; 310; 30; 20 MG/100ML; MG/100ML; MG/100ML; MG/100ML
INJECTION, SOLUTION INTRAVENOUS CONTINUOUS
Status: DISCONTINUED | OUTPATIENT
Start: 2022-01-11 | End: 2022-01-11 | Stop reason: HOSPADM

## 2022-01-11 RX ORDER — HYDRALAZINE HYDROCHLORIDE 20 MG/ML
5 INJECTION INTRAMUSCULAR; INTRAVENOUS EVERY 10 MIN PRN
Status: DISCONTINUED | OUTPATIENT
Start: 2022-01-11 | End: 2022-01-11 | Stop reason: HOSPADM

## 2022-01-11 RX ORDER — HYDROCODONE BITARTRATE AND ACETAMINOPHEN 5; 325 MG/1; MG/1
1 TABLET ORAL EVERY 6 HOURS PRN
Qty: 12 TABLET | Refills: 0 | Status: SHIPPED | OUTPATIENT
Start: 2022-01-11 | End: 2022-01-14

## 2022-01-11 RX ORDER — FENTANYL CITRATE 50 UG/ML
50 INJECTION, SOLUTION INTRAMUSCULAR; INTRAVENOUS
Status: DISCONTINUED | OUTPATIENT
Start: 2022-01-11 | End: 2022-01-11 | Stop reason: HOSPADM

## 2022-01-11 RX ORDER — HYDROMORPHONE HYDROCHLORIDE 1 MG/ML
0.5 INJECTION, SOLUTION INTRAMUSCULAR; INTRAVENOUS; SUBCUTANEOUS EVERY 5 MIN PRN
Status: DISCONTINUED | OUTPATIENT
Start: 2022-01-11 | End: 2022-01-11 | Stop reason: HOSPADM

## 2022-01-11 RX ORDER — SODIUM CHLORIDE 0.9 % (FLUSH) 0.9 %
5-40 SYRINGE (ML) INJECTION EVERY 12 HOURS SCHEDULED
Status: DISCONTINUED | OUTPATIENT
Start: 2022-01-11 | End: 2022-01-11 | Stop reason: HOSPADM

## 2022-01-11 RX ORDER — ENALAPRILAT 2.5 MG/2ML
1.25 INJECTION INTRAVENOUS
Status: DISCONTINUED | OUTPATIENT
Start: 2022-01-11 | End: 2022-01-11 | Stop reason: HOSPADM

## 2022-01-11 RX ORDER — ATROPA BELLADONNA AND OPIUM 16.2; 6 MG/1; MG/1
SUPPOSITORY RECTAL PRN
Status: DISCONTINUED | OUTPATIENT
Start: 2022-01-11 | End: 2022-01-11 | Stop reason: ALTCHOICE

## 2022-01-11 RX ORDER — FENTANYL CITRATE 50 UG/ML
50 INJECTION, SOLUTION INTRAMUSCULAR; INTRAVENOUS EVERY 5 MIN PRN
Status: DISCONTINUED | OUTPATIENT
Start: 2022-01-11 | End: 2022-01-11 | Stop reason: HOSPADM

## 2022-01-11 RX ORDER — MIDAZOLAM HYDROCHLORIDE 1 MG/ML
2 INJECTION INTRAMUSCULAR; INTRAVENOUS
Status: DISCONTINUED | OUTPATIENT
Start: 2022-01-11 | End: 2022-01-11 | Stop reason: HOSPADM

## 2022-01-11 RX ORDER — LIDOCAINE HYDROCHLORIDE 10 MG/ML
1 INJECTION, SOLUTION EPIDURAL; INFILTRATION; INTRACAUDAL; PERINEURAL
Status: DISCONTINUED | OUTPATIENT
Start: 2022-01-11 | End: 2022-01-11 | Stop reason: HOSPADM

## 2022-01-11 RX ORDER — SODIUM CHLORIDE 9 MG/ML
INJECTION, SOLUTION INTRAVENOUS CONTINUOUS
Status: DISCONTINUED | OUTPATIENT
Start: 2022-01-11 | End: 2022-01-11 | Stop reason: HOSPADM

## 2022-01-11 RX ORDER — LIDOCAINE HYDROCHLORIDE 10 MG/ML
INJECTION, SOLUTION EPIDURAL; INFILTRATION; INTRACAUDAL; PERINEURAL PRN
Status: DISCONTINUED | OUTPATIENT
Start: 2022-01-11 | End: 2022-01-11 | Stop reason: SDUPTHER

## 2022-01-11 RX ORDER — CIPROFLOXACIN 500 MG/1
500 TABLET, FILM COATED ORAL DAILY
Qty: 10 TABLET | Refills: 0 | Status: SHIPPED | OUTPATIENT
Start: 2022-01-11 | End: 2022-01-21

## 2022-01-11 RX ORDER — HYDROMORPHONE HYDROCHLORIDE 1 MG/ML
0.5 INJECTION, SOLUTION INTRAMUSCULAR; INTRAVENOUS; SUBCUTANEOUS
Status: DISCONTINUED | OUTPATIENT
Start: 2022-01-11 | End: 2022-01-11 | Stop reason: HOSPADM

## 2022-01-11 RX ORDER — LABETALOL HYDROCHLORIDE 5 MG/ML
5 INJECTION, SOLUTION INTRAVENOUS EVERY 10 MIN PRN
Status: DISCONTINUED | OUTPATIENT
Start: 2022-01-11 | End: 2022-01-11 | Stop reason: HOSPADM

## 2022-01-11 RX ORDER — PROCHLORPERAZINE EDISYLATE 5 MG/ML
5 INJECTION INTRAMUSCULAR; INTRAVENOUS
Status: DISCONTINUED | OUTPATIENT
Start: 2022-01-11 | End: 2022-01-11 | Stop reason: HOSPADM

## 2022-01-11 RX ORDER — MEPERIDINE HYDROCHLORIDE 25 MG/ML
12.5 INJECTION INTRAMUSCULAR; INTRAVENOUS; SUBCUTANEOUS EVERY 5 MIN PRN
Status: DISCONTINUED | OUTPATIENT
Start: 2022-01-11 | End: 2022-01-11 | Stop reason: HOSPADM

## 2022-01-11 RX ORDER — HYDROMORPHONE HYDROCHLORIDE 1 MG/ML
0.25 INJECTION, SOLUTION INTRAMUSCULAR; INTRAVENOUS; SUBCUTANEOUS EVERY 5 MIN PRN
Status: DISCONTINUED | OUTPATIENT
Start: 2022-01-11 | End: 2022-01-11 | Stop reason: HOSPADM

## 2022-01-11 RX ORDER — METOCLOPRAMIDE HYDROCHLORIDE 5 MG/ML
10 INJECTION INTRAMUSCULAR; INTRAVENOUS
Status: DISCONTINUED | OUTPATIENT
Start: 2022-01-11 | End: 2022-01-11 | Stop reason: HOSPADM

## 2022-01-11 RX ORDER — SODIUM CHLORIDE 0.9 % (FLUSH) 0.9 %
5-40 SYRINGE (ML) INJECTION PRN
Status: DISCONTINUED | OUTPATIENT
Start: 2022-01-11 | End: 2022-01-11 | Stop reason: HOSPADM

## 2022-01-11 RX ORDER — OXYCODONE HYDROCHLORIDE AND ACETAMINOPHEN 5; 325 MG/1; MG/1
2 TABLET ORAL EVERY 4 HOURS PRN
Status: DISCONTINUED | OUTPATIENT
Start: 2022-01-11 | End: 2022-01-11 | Stop reason: HOSPADM

## 2022-01-11 RX ORDER — SODIUM CHLORIDE, SODIUM LACTATE, POTASSIUM CHLORIDE, CALCIUM CHLORIDE 600; 310; 30; 20 MG/100ML; MG/100ML; MG/100ML; MG/100ML
INJECTION, SOLUTION INTRAVENOUS CONTINUOUS PRN
Status: DISCONTINUED | OUTPATIENT
Start: 2022-01-11 | End: 2022-01-11 | Stop reason: SDUPTHER

## 2022-01-11 RX ORDER — CIPROFLOXACIN 2 MG/ML
400 INJECTION, SOLUTION INTRAVENOUS ONCE
Status: COMPLETED | OUTPATIENT
Start: 2022-01-11 | End: 2022-01-11

## 2022-01-11 RX ORDER — PROPOFOL 10 MG/ML
INJECTION, EMULSION INTRAVENOUS PRN
Status: DISCONTINUED | OUTPATIENT
Start: 2022-01-11 | End: 2022-01-11 | Stop reason: SDUPTHER

## 2022-01-11 RX ORDER — ROCURONIUM BROMIDE 10 MG/ML
INJECTION, SOLUTION INTRAVENOUS PRN
Status: DISCONTINUED | OUTPATIENT
Start: 2022-01-11 | End: 2022-01-11 | Stop reason: SDUPTHER

## 2022-01-11 RX ORDER — ONDANSETRON 2 MG/ML
4 INJECTION INTRAMUSCULAR; INTRAVENOUS
Status: DISCONTINUED | OUTPATIENT
Start: 2022-01-11 | End: 2022-01-11 | Stop reason: HOSPADM

## 2022-01-11 RX ORDER — FENTANYL CITRATE 50 UG/ML
25 INJECTION, SOLUTION INTRAMUSCULAR; INTRAVENOUS
Status: DISCONTINUED | OUTPATIENT
Start: 2022-01-11 | End: 2022-01-11 | Stop reason: HOSPADM

## 2022-01-11 RX ORDER — ONDANSETRON 2 MG/ML
4 INJECTION INTRAMUSCULAR; INTRAVENOUS EVERY 4 HOURS PRN
Status: DISCONTINUED | OUTPATIENT
Start: 2022-01-11 | End: 2022-01-11 | Stop reason: HOSPADM

## 2022-01-11 RX ORDER — FENTANYL CITRATE 50 UG/ML
25 INJECTION, SOLUTION INTRAMUSCULAR; INTRAVENOUS EVERY 5 MIN PRN
Status: DISCONTINUED | OUTPATIENT
Start: 2022-01-11 | End: 2022-01-11 | Stop reason: HOSPADM

## 2022-01-11 RX ORDER — PROMETHAZINE HYDROCHLORIDE 25 MG/ML
6.25 INJECTION, SOLUTION INTRAMUSCULAR; INTRAVENOUS
Status: DISCONTINUED | OUTPATIENT
Start: 2022-01-11 | End: 2022-01-11 | Stop reason: HOSPADM

## 2022-01-11 RX ORDER — ONDANSETRON 2 MG/ML
INJECTION INTRAMUSCULAR; INTRAVENOUS PRN
Status: DISCONTINUED | OUTPATIENT
Start: 2022-01-11 | End: 2022-01-11 | Stop reason: SDUPTHER

## 2022-01-11 RX ORDER — SODIUM CHLORIDE 9 MG/ML
25 INJECTION, SOLUTION INTRAVENOUS PRN
Status: DISCONTINUED | OUTPATIENT
Start: 2022-01-11 | End: 2022-01-11 | Stop reason: HOSPADM

## 2022-01-11 RX ORDER — FENTANYL CITRATE 50 UG/ML
INJECTION, SOLUTION INTRAMUSCULAR; INTRAVENOUS PRN
Status: DISCONTINUED | OUTPATIENT
Start: 2022-01-11 | End: 2022-01-11 | Stop reason: SDUPTHER

## 2022-01-11 RX ADMIN — CIPROFLOXACIN 400 MG: 2 INJECTION, SOLUTION INTRAVENOUS at 08:16

## 2022-01-11 RX ADMIN — FENTANYL CITRATE 50 MCG: 50 INJECTION, SOLUTION INTRAMUSCULAR; INTRAVENOUS at 08:02

## 2022-01-11 RX ADMIN — ROCURONIUM BROMIDE 10 MG: 10 INJECTION, SOLUTION INTRAVENOUS at 08:56

## 2022-01-11 RX ADMIN — FENTANYL CITRATE 50 MCG: 50 INJECTION, SOLUTION INTRAMUSCULAR; INTRAVENOUS at 08:21

## 2022-01-11 RX ADMIN — FENTANYL CITRATE 50 MCG: 50 INJECTION, SOLUTION INTRAMUSCULAR; INTRAVENOUS at 08:45

## 2022-01-11 RX ADMIN — PROPOFOL 120 MG: 10 INJECTION, EMULSION INTRAVENOUS at 08:06

## 2022-01-11 RX ADMIN — ROCURONIUM BROMIDE 40 MG: 10 INJECTION, SOLUTION INTRAVENOUS at 08:06

## 2022-01-11 RX ADMIN — SODIUM CHLORIDE, SODIUM LACTATE, POTASSIUM CHLORIDE, AND CALCIUM CHLORIDE: 600; 310; 30; 20 INJECTION, SOLUTION INTRAVENOUS at 09:06

## 2022-01-11 RX ADMIN — SODIUM CHLORIDE, SODIUM LACTATE, POTASSIUM CHLORIDE, AND CALCIUM CHLORIDE: 600; 310; 30; 20 INJECTION, SOLUTION INTRAVENOUS at 07:58

## 2022-01-11 RX ADMIN — LIDOCAINE HYDROCHLORIDE 50 MG: 10 INJECTION, SOLUTION EPIDURAL; INFILTRATION; INTRACAUDAL; PERINEURAL at 08:06

## 2022-01-11 RX ADMIN — ROCURONIUM BROMIDE 10 MG: 10 INJECTION, SOLUTION INTRAVENOUS at 08:19

## 2022-01-11 RX ADMIN — SUGAMMADEX 300 MG: 100 INJECTION, SOLUTION INTRAVENOUS at 09:28

## 2022-01-11 RX ADMIN — ONDANSETRON 4 MG: 2 INJECTION INTRAMUSCULAR; INTRAVENOUS at 09:28

## 2022-01-11 ASSESSMENT — PAIN DESCRIPTION - PAIN TYPE
TYPE: SURGICAL PAIN
TYPE: SURGICAL PAIN

## 2022-01-11 ASSESSMENT — LIFESTYLE VARIABLES: SMOKING_STATUS: 0

## 2022-01-11 ASSESSMENT — PAIN SCALES - GENERAL
PAINLEVEL_OUTOF10: 4
PAINLEVEL_OUTOF10: 0
PAINLEVEL_OUTOF10: 5

## 2022-01-11 ASSESSMENT — PAIN DESCRIPTION - DESCRIPTORS
DESCRIPTORS: PRESSURE
DESCRIPTORS: PRESSURE;OTHER (COMMENT)

## 2022-01-11 ASSESSMENT — PAIN DESCRIPTION - LOCATION
LOCATION: PENIS
LOCATION: PENIS

## 2022-01-11 ASSESSMENT — PAIN - FUNCTIONAL ASSESSMENT: PAIN_FUNCTIONAL_ASSESSMENT: 0-10

## 2022-01-11 ASSESSMENT — PAIN DESCRIPTION - FREQUENCY
FREQUENCY: CONTINUOUS
FREQUENCY: CONTINUOUS

## 2022-01-11 ASSESSMENT — ENCOUNTER SYMPTOMS: SHORTNESS OF BREATH: 0

## 2022-01-11 NOTE — OP NOTE
[facility] [address] [facility]                                OPERATIVE REPORT    PATIENT NAME: Tita Preciado                    :        1940  MED REC NO:   [id number]                              ROOM:  ACCOUNT NO:   [de-identified]                           ADMIT DATE: 2022  PROVIDER:     Raffaele Baez MD    DATE OF PROCEDURE:  2022    RADIOGRAPHIC INTERPRETATION OF BILATERAL RETROGRADE PYELOGRAMS    1. Right retrograde pyelogram.    INTERPRETATION:  The right ureter is intubated with ureteral catheter  cystoscopically, contrast injected. This shows a normal-appearing right  ureter with no filling defects. There is no obvious obstruction or  hydronephrosis and normal-appearing upper collecting system on the  right. CONCLUSION:  Normal-appearing right retrograde pyelogram.    2.  Left retrograde pyelogram.    INTERPRETATION:  The left ureter is cystoscopically intubated with  ureteral catheter, contrast injected retrograde. This shows a  normal-appearing left ureter. No filling defects or obstruction. No  hydronephrosis.   Upper collecting system also appears normal.    CONCLUSION:  Normal-appearing left retrograde pyelogram.        Gregory Clifford MD    D: 2022 10:43:30      T: 2022 12:28:59     [email or web address]  Job#: [id number]     Doc#: [id number]    CC:

## 2022-01-11 NOTE — ANESTHESIA PRE PROCEDURE
Department of Anesthesiology  Preprocedure Note       Name:  Santosh Bennett   Age:  80 y.o.  :  1940                                          MRN:  [id number]         Date:  2022      Surgeon: Erin Nugent):  Whitley Aleman MD    Procedure: Procedure(s):  CYSTOSCOPY TRANSURETHRAL RESECTION BLADDER TUMOR  BILATERAL URETERAL CATHETERIZATION, BILATERAL RETROGRADE PYELOGRAMS  POSSIBLE LEFT URETERAL STENT INSERTION    Medications prior to admission:   Prior to Admission medications    Medication Sig Start Date End Date Taking? Authorizing Provider   aspirin 81 MG EC tablet Take 81 mg by mouth daily    Historical Provider, MD   metoprolol succinate (TOPROL XL) 25 MG extended release tablet Take 25 mg by mouth 2 times daily    Historical Provider, MD   Loratadine (CLARITIN PO) Take 1 tablet by mouth daily     Historical Provider, MD   vitamin B-12 (CYANOCOBALAMIN) 100 MCG tablet Take 1,000 mcg by mouth daily    Historical Provider, MD   Cholecalciferol (VITAMIN D) 50 MCG ( UT) CAPS capsule Take by mouth    Historical Provider, MD       Current medications:    Current Facility-Administered Medications   Medication Dose Route Frequency Provider Last Rate Last Admin    ciprofloxacin (CIPRO) IVPB 400 mg  400 mg IntraVENous Once Whitley Aleman MD           Allergies: Allergies   Allergen Reactions    Latex Itching and Other (See Comments)     And band aids. Causes redness and itching.     Amoxicillin-Pot Clavulanate Hives       Problem List:    Patient Active Problem List   Diagnosis Code    Malignant neoplasm of lateral wall of urinary bladder (HCC) C67.2       Past Medical History:        Diagnosis Date    Hypertension        Past Surgical History:        Procedure Laterality Date    HERNIA REPAIR Bilateral     in groin area        Social History:    Social History     Tobacco Use    Smoking status: Former Smoker    Smokeless tobacco: Never Used    Tobacco comment: about 40 years ago   Substance Use Topics    Alcohol use: Not on file                                Counseling given: Not Answered  Comment: about 40 years ago      Vital Signs (Current): There were no vitals filed for this visit. BP Readings from Last 3 Encounters:   No data found for BP       NPO Status:                                                                                 BMI:   Wt Readings from Last 3 Encounters:   01/05/22 153 lb (69.4 kg)   12/22/21 150 lb (68 kg)     There is no height or weight on file to calculate BMI.    CBC:   Lab Results   Component Value Date    WBC 9.6 01/10/2022    RBC 4.42 01/10/2022    HGB 13.4 01/10/2022    HCT 42.2 01/10/2022    MCV 95.5 01/10/2022    RDW 12.3 01/10/2022     01/10/2022       CMP:   Lab Results   Component Value Date     01/10/2022    K 4.6 01/10/2022     01/10/2022    CO2 29 01/10/2022    BUN 13 01/10/2022    CREATININE 1.0 01/10/2022    GFRAA >59 01/10/2022    LABGLOM >60 01/10/2022    GLUCOSE 101 01/10/2022    CALCIUM 9.6 01/10/2022       POC Tests: No results for input(s): POCGLU, POCNA, POCK, POCCL, POCBUN, POCHEMO, POCHCT in the last 72 hours.     Coags:   Lab Results   Component Value Date    PROTIME 13.7 01/10/2022    INR 1.03 01/10/2022    APTT 26.7 01/10/2022       HCG (If Applicable): No results found for: PREGTESTUR, PREGSERUM, HCG, HCGQUANT     ABGs: No results found for: PHART, PO2ART, ZZF1PNP, VJN9GNW, BEART, I3HAUAWQ     Type & Screen (If Applicable):  No results found for: LABABO, LABRH    Drug/Infectious Status (If Applicable):  No results found for: HIV, HEPCAB    COVID-19 Screening (If Applicable): No results found for: COVID19        Anesthesia Evaluation  Patient summary reviewed and Nursing notes reviewed no history of anesthetic complications:   Airway: Mallampati: II  TM distance: <3 FB   Neck ROM: full  Mouth opening: > = 3 FB Dental: normal exam         Pulmonary:       (-) shortness of breath,

## 2022-01-11 NOTE — PROGRESS NOTES
CLINICAL PHARMACY NOTE: MEDS TO BEDS    Total # of Prescriptions Filled: 2   The following medications were delivered to the patient:  · cipro 500mg  · Hydrocodone-acetaminophen 5-325mg    Additional Documentation:  The patients wife paid with cash in the patients room in [facility]. The wife was handed the medications.

## 2022-01-11 NOTE — PROGRESS NOTES
Urine draining to BSD remains clear, pink tinged. Patient will be discharged home with f/c. Teaching done with wife regarding f/c care, leg bag and bedside drainage bag.   Electronically signed by Juaquin Muniz RN on 1/11/2022 at 1:16 PM

## 2022-01-11 NOTE — H&P
Geneva Man is a 80 y.o. male who presents today        Chief Complaint   Patient presents with    Cystoscopy       I am here for cysto to evaluate bladder mass.         Hematuria  Patient complains of gross hematuria. Onset of hematuria was 2 month ago and was sudden in onset. There is not a history of nephrolithiasis. There is not a history of urologic trauma. Other urologic symptoms include slow stream sometimes. Patient admits to history of tobacco use and Former smoker no occupational exposure. Patient denies history of occupational exposure. Prior workup has been UA'S, CT CT scan done 12-21 shows a bladder mass he is referred now for evaluation. He is here today for cystoscopy. He did bring his films            Past Medical History        Past Medical History:   Diagnosis Date    Hypertension              Past Surgical History         Past Surgical History:   Procedure Laterality Date    HERNIA REPAIR Bilateral       in groin area             Current Facility-Administered Medications          Current Outpatient Medications   Medication Sig Dispense Refill    aspirin 81 MG EC tablet Take 81 mg by mouth daily        metoprolol succinate (TOPROL XL) 25 MG extended release tablet Take 25 mg by mouth 2 times daily        Loratadine (CLARITIN PO) Take by mouth        vitamin B-12 (CYANOCOBALAMIN) 100 MCG tablet Take 1,000 mcg by mouth daily        Cholecalciferol (VITAMIN D) 50 MCG (2000 UT) CAPS capsule Take by mouth          No current facility-administered medications for this visit.                  Allergies   Allergen Reactions    Latex Itching and Other (See Comments)       And band aids. Causes redness and itching.     Amoxicillin-Pot Clavulanate Hives         Social History   Social History            Socioeconomic History    Marital status: Unknown       Spouse name: None    Number of children: None    Years of education: None    Highest education level: None   Occupational History    None   Tobacco Use    Smoking status: Former Smoker    Smokeless tobacco: Never Used    Tobacco comment: about 40 years ago   Substance and Sexual Activity    Alcohol use: None    Drug use: None    Sexual activity: None   Other Topics Concern    None   Social History Narrative    None      Social Determinants of Health          Financial Resource Strain:     Difficulty of Paying Living Expenses: Not on file   Food Insecurity:     Worried About Running Out of Food in the Last Year: Not on file    Esther of Food in the Last Year: Not on file   Transportation Needs:     Lack of Transportation (Medical): Not on file    Lack of Transportation (Non-Medical): Not on file   Physical Activity:     Days of Exercise per Week: Not on file    Minutes of Exercise per Session: Not on file   Stress:     Feeling of Stress : Not on file   Social Connections:     Frequency of Communication with Friends and Family: Not on file    Frequency of Social Gatherings with Friends and Family: Not on file    Attends [employer] Services: Not on file    Active Member of [address] [place] [address] or Organizations: Not on file    Attends Club or Organization Meetings: Not on file    Marital Status: Not on file   Intimate Partner Violence:     Fear of Current or Ex-Partner: Not on file    Emotionally Abused: Not on file    Physically Abused: Not on file    Sexually Abused: Not on file   Housing Stability:     Unable to Pay for Housing in the Last Year: Not on file    Number of [place] in the Last Year: Not on file    Unstable Housing in the Last Year: Not on file            Family History   No family history on file.        REVIEW OF SYSTEMS:  Review of Systems   Constitutional: Negative for chills and fever. HENT: Negative for facial swelling and sore throat. Eyes: Negative for discharge and redness. Respiratory: Negative for chest tightness and wheezing. Cardiovascular: Negative for chest pain and palpitations. Gastrointestinal: Negative for nausea and vomiting. Endocrine: Negative for polyphagia and polyuria. Genitourinary: Positive for hematuria. Negative for decreased urine volume, difficulty urinating, dysuria, enuresis, flank pain, frequency, genital sores, penile discharge, penile pain, penile swelling (had red tinged urine in first of December that is what made him go to [facility] ), scrotal swelling, testicular pain and urgency. Musculoskeletal: Negative for back pain and neck stiffness. Skin: Negative for rash and wound. Neurological: Negative for dizziness and headaches. Hematological: Negative for adenopathy. Does not bruise/bleed easily. Psychiatric/Behavioral: Negative for confusion and hallucinations.         PHYSICAL EXAM:  Temp 98.3 °F (36.8 °C) (Temporal)   Ht 5' 10\" (1.778 m)   Wt 153 lb (69.4 kg)   BMI 21.95 kg/m²   Physical Exam  Constitutional:       General: He is not in acute distress. Appearance: Normal appearance. He is well-developed. HENT:      Head: Normocephalic and atraumatic. Nose: Nose normal.   Eyes:      General: No scleral icterus. Conjunctiva/sclera: Conjunctivae normal.      Pupils: Pupils are equal, round, and reactive to light. Neck:      Trachea: No tracheal deviation. Cardiovascular:      Rate and Rhythm: Normal rate and regular rhythm. Pulmonary:      Effort: Pulmonary effort is normal. No respiratory distress. Breath sounds: No stridor. Abdominal:      General: There is no distension. Palpations: Abdomen is soft. There is no mass. Tenderness: There is no abdominal tenderness. Genitourinary:     Penis: Normal.       Testes: Normal.   Musculoskeletal:         General: No tenderness. Normal range of motion. Cervical back: Normal range of motion and neck supple. Lymphadenopathy:      Cervical: No cervical adenopathy. Skin:     General: Skin is warm and dry. Findings: No erythema.    Neurological:      Mental Status: He is alert and oriented to person, place, and time. Psychiatric:         Behavior: Behavior normal.         Judgment: Judgment normal.         Cystoscopy Procedure Note     Indications: Diagnosis    Pre-operative Diagnosis: Hematuria and bladder mass     Post-operative Diagnosis: Same     Surgeon: Gary Mariano MD      Assistants: staff     Anesthesia: Local anesthesia topical 2% lidocaine gel     Procedure Details   The risks, benefits, complications, treatment options, and expected outcomes were discussed with the patient. The patient concurred with the proposed plan, giving informed consent.     Cystoscopy was performed today under local anesthesia, using sterile technique. The patient was placed in the supine position, prepped with Hibiclens, and draped in the usual sterile fashion. A 17 Paraguayan sheath flexible cystoscope was used to inspect both the urethra and bladder using the flexible scope.     Findings:  Anterior urethra: normal without strictures and without scarring. Prostate:  Prostatic urethra: 2+ moderate prostatic hyperplasia. median lobe present? no.   Bladder: 1+ trabeculation, with lesions papillary tumor about 3 to 4 cm in size just lateral to the left ureteral orifice involving the left lateral wall wall more posterior. This does not involve the ureteral orifice however it is on the intramural tunnel of the ureter on the left side no other papillary tumors are seen. Ureteral orifice(s) was/were seen both. Ureteral orifice(s) both were in the normal location and both ureteral orifices were effluxing clear urine. Papillary tumor as described above seen cystoscopically today corresponds to the soft tissue mass seen on the CT. This appears to be consistent with his bladder cancer                             Complications:  None; patient tolerated the procedure well. Disposition: To home after observation.            Condition: stable     DATA:  CBC: No results found for: WBC, RBC, HGB, HCT, MCV, MCH, MCHC, RDW, PLT, MPV  CMP:  No results found for: NA, K, CL, CO2, BUN, CREATININE, GFRAA, AGRATIO, LABGLOM, GLUCOSE, GLU, PROT, LABALBU, CALCIUM, BILITOT, ALKPHOS, AST, ALT        Results for orders placed or performed in visit on 01/05/22   POCT Urinalysis No Micro (Auto)   Result Value Ref Range     Color, UA yellow       Clarity, UA clear       Glucose, UA POC 100mg/dl       Bilirubin, UA -       Ketones, UA -       Spec Grav, UA 1.010       Blood, UA POC -       pH, UA 7.0       Protein, UA POC -       Urobilinogen, UA 0.2       Leukocytes, UA -       Nitrite, UA -           IMAGING:  I reviewed the CT scan done at outside hospital on 12/2/2021. This shows about a 2 cm soft tissue mass in the posterior lateral left bladder.     1. Bladder mass  Cystoscopy today confirms bladder tumor described above. - Cystoscopy  - POCT Urinalysis No Micro (Auto)     2. Malignant neoplasm of lateral wall of urinary bladder (HCC)  We will plan to go the operating room for cystoscopy TURBT, bilateral ureteral catheterization and bilateral retrograde pyelograms. Risk and complication procedure been discussed with the patient including the risk of bladder perforation. He does understand since this is close to the left ureter if this requires resection of the intramural ureter he may require a stent placed. We also discussed the risk of needing go home with Porras catheter bleeding infection recurrence and need for surveillance. Patient agrees to proceed. - CA CYSTOURETHROSCOPY,FULGUR 2-5CM LESN; Future     3.  Gross hematuria  Secondary to bladder mass presumed bladder cancer as described above              Orders Placed This Encounter   Procedures    POCT Urinalysis No Micro (Auto)    CA CYSTOURETHROSCOPY,FULGUR 2-5CM LESN       Cystoscopy TURBT and bilateral retrograde pyelograms, possible left stent placed       Standing Status:   Future       Standing Expiration Date:   3/5/2022         Return for PT to be scheduled for Surgery.     All information inputted into the note by the MA to include chief complaint, past medical history, past surgical history, medications, allergies, social and family history and review of systems has been reviewed and updated as needed by me.     EMR Dragon/transcription disclaimer: Much of this documentt is electronic  transcription/translation of spoken language to printed text. The  electronic translation of spoken language may be erroneous, or at times,  nonsensical words or phrases may be inadvertently transcribed.  Although I  have reviewed the document for such errors, some may still exist.

## 2022-01-11 NOTE — OP NOTE
Brief operative Note      Patient: Feng Chinchilla  YOB: 1940  MRN: [id number]    Date of Procedure: 1/11/2022    Pre-Op Diagnosis: BLADDER CANCER LEFT LATERAL WALL    Post-Op Diagnosis: Same       Procedure(s):  CYSTOSCOPY TRANSURETHRAL RESECTION BLADDER TUMOR  BILATERAL URETERAL CATHETERIZATION, BILATERAL RETROGRADE PYELOGRAMS  LEFT URETERAL STENT INSERTION    Surgeon(s):  Barbara Corley MD    Assistant:   * No surgical staff found *    Anesthesia: General    Estimated Blood Loss (mL): 0    Complications: None    Specimens:   ID Type Source Tests Collected by Time Destination   A : bladder tumor  Tissue Bladder SURGICAL PATHOLOGY Barbara Corley MD 1/11/2022 7736        Implants:  Implant Name Type Inv. Item Serial No.  Lot No. LRB No. Used Action   STENT URET 6FR L22CM PERCFLX HYDR+ DBL PGTL THRD 2  STENT URET 6FR L22CM PERCFLX HYDR+ DBL PGTL THRD 2  [employer]- [id number] Left 1 Implanted         Drains: * No LDAs found *    Findings: Papillary tumor involving the left posterior lateral wall was completely resected this was including the intramural portion of the left ureter which was also resected. Resected down to visible muscle. 6 [place] by 22 cm left stent placed. Normal bilateral retrograde pyelograms without filling defects or hydronephrosis. 20 Solomon Islander three-way catheter with 25 cc balloon. Will go home with Porras catheter    Detailed Description of Procedure:   See dictated report:  [id number]    Disposition to PACU in stable condition. On CBI. Wean CBI over 60 to 90 minutes and monitor off CBI 90 minutes if okay may dismiss with Porras catheter today.   Follow-up in 1 week for catheter removal.  Follow-up for postop visit to see me in 2 weeks     Electronically signed by Vargas France MD on 1/11/2022 at 9:31 AM

## 2022-01-11 NOTE — PROGRESS NOTES
Dr. Keith Michaels at bedside. Decreased CBI to slow rate. Porras cath draining light pink urine.   Electronically signed by Hamlet Rodriguez RN on 1/11/2022 at 12:22 PM

## 2022-01-11 NOTE — OP NOTE
[facility] [employer] [facility] [address] [facility]                                OPERATIVE REPORT    PATIENT NAME: Pascual Dewitt                    :        1940  MED REC NO:   [id number]                              ROOM:  ACCOUNT NO:   [de-identified]                           ADMIT DATE: 2022  PROVIDER:     Jonathan Man MD    DATE OF PROCEDURE:  2022    TITLE OF OPERATION:  1. Cystoscopy, bilateral ureteral catheterization; bilateral retrograde  pyelograms. 2.  Transurethral resection of bladder tumor greater than 5 cm. 3.  Insertion of left ureteral stent, 6-Uzbek x 22 cm. PREOPERATIVE DIAGNOSIS:  Bladder cancer, left lateral wall. POSTOPERATIVE DIAGNOSIS:  Bladder cancer, left lateral wall. ANESTHETIC:  General anesthetic. ATTENDING SURGEON:  Jonathan Man MD    ESTIMATED BLOOD LOSS:  0 mL. HISTORY:  The patient is a 62-year-old gentleman who presented with  gross hematuria. A CT scan showed soft tissue mass of the posterior  left bladder. Otherwise, he underwent cystoscopy which confirmed a  papillary tumor greater than 5 cm in size involving the left lateral  posterior wall of the bladder. It was just lateral to the ureteral  orifice involving the intramural portion of the left ureter, but the  orifice itself was visible. I did tell him that this would have to be  resected and probably resecting a portion of the intramural ureter which  would require a left ureteral stent to be placed. We also discussed the  risk of bladder perforation, need for surveillance, need to go home with  a Porras catheter, infection, etc.  They understood and agreed to  proceed. DESCRIPTION OF PROCEDURE:  The patient was brought to the operating  room, underwent general anesthetic. He was placed in the lithotomy  position. His genitalia was prepped and draped in routine sterile  fashion.   Time-out was performed and he received preoperative  antibiotic. A 22-Cambodian cystoscope was inserted into the meatus. This was advanced  under direct vision. Penile and bulbar urethra appeared to be normal.   Prostatic urethra, he had some mild lateral lobe enlargement, but  nonobstructing, a somewhat of a high bladder neck, but no median lobe,  really a normal prostate for his age without significant obstruction. Entering the patient's bladder, however, he did have 1 to 2+  trabeculation. There was a papillary tumor greater than 5 cm in size  involving the left posterior wall just lateral to the left ureteral  orifice, but was over the intramural portion of the left ureter. Even  though the orifice was visible, this did basically involve the ureter. The right ureteral orifice was normal in normal position. There was no  other papillary tumor seen. I then inspected the bladder with a  70-degree lens and I saw no additional new findings, except for as  described above. With 30-degree lens, I performed bilateral retrograde pyelograms. Under  direct cystoscopic visualization, the right ureter was intubated with a  ureteral catheter, contrast injected retrograde and this showed a  normal-appearing right retrograde pyelogram.  The left orifice was  visible. This was cannulated and intubated with a 5-Cambodian ureteral  catheter, contrast injected retrograde on the left and this showed a  normal-appearing left retrograde pyelogram.    I then withdrew the cystoscope. I used [employer] sounds to dilate and  calibrate the urethral meatus up to 30-Cambodian. The 26-Cambodian continuous  flow resectoscope sheath was then inserted and this was advanced under  direct vision into the patient's bladder using the visual obturator. Then, I exchanged this for the resectoscope working element with yellow  loop. The bladder tumor was then resected down to visible muscle and it  was completely resected.   This included resecting the distal portion of  the ureteral orifice and the intramural ureter. Ellik evacuator was  used to remove the specimen which was collected and sent for pathologic  diagnosis. I then fulgurated the resection site as well as the edges of  resection site to make sure there was good hemostasis and there was no  significant bleeding. There was a little bit of bleeding from the  posterior bladder neck from the scope and this was also fulgurated. After _____ resected down to visible muscle, there was a couple spots  where the bladder was pretty thin and you could see some fat intertwined  between the muscle fibers, but there was no perforations of the bladder. I confirmed all the specimens had been removed and there was good  hemostasis and as mentioned, the resection was greater than 5 cm. I  then withdrew the resectoscope. I looked back in with regular  cystoscope and 30-degree lens. I then intubated the stump of the left  ureteral orifice where I had resected the ureter with a 0.035 sensor tip  guidewire with the aid of a 5-Mexican catheter. Guidewire was then  advanced under fluoroscopic guidance up into the left kidney. I then  advanced the catheter up. I injected contrast to opacify the renal  pelvis and measure for the stent. The wire was replaced. The catheter  was removed. Then over this guidewire, then a 6-Mexican x 22-cm left  double-J ureteral stent was advanced using cystoscopic and fluoroscopic  guidance. This was coiled in the renal pelvis and coiled in the bladder  in good position. There was no string left. He will need to keep the  stent in for approximately 4 weeks. At this point, the procedure was  concluded. A 20-Mexican non-latex three-way Porras catheter was inserted,  25 mL was placed in the balloon. I confirmed the balloon was within the  lumen of the bladder by injecting some contrast through the catheter and  it was in good position. I then hand-irrigated with Deric syringe and  irrigated well.   This was placed to continuous irrigation with normal  saline and was running essentially clear to pink tinge. At this point,  the procedure was terminated. The estimated blood loss was 0 mL. The  specimen was collected and sent for pathologic examination, and he was  awakened from his anesthetic and taken to the recovery room in stable  condition. We will monitor him on CBI over the next 60 to 90 minutes  and then wean this to off. If he remains clear to just pink tinge off  CBI for 90 minutes, we will dismiss him to home later today with Porras  catheter. The plan is to leave the catheter in for 1 week and he can  come in and see the nurse for catheter removal.  He will follow up to  see me in 2 weeks to go over his pathology report. He will keep the  stent in place for a total of 4 weeks.         Hayes Starr MD    D: 01/11/2022 10:43:30      T: 01/11/2022 12:26:55     [id number]  Job#: [id number]     Doc#: [id number]    CC:

## 2022-01-11 NOTE — ANESTHESIA POSTPROCEDURE EVALUATION
Department of Anesthesiology  Postprocedure Note    Patient: Elizabeth Warren  MRN: [id number]  YOB: 1940  Date of evaluation: 1/11/2022  Time:  9:49 AM     Procedure Summary     Date: 01/11/22 Room / Location: [facility]    Anesthesia Start: 1512 Anesthesia Stop: 8632    Procedures:       CYSTOSCOPY TRANSURETHRAL RESECTION BLADDER TUMOR (N/A )      BILATERAL URETERAL CATHETERIZATION, BILATERAL RETROGRADE PYELOGRAMS (Bilateral )      LEFT URETERAL STENT INSERTION (Left ) Diagnosis: (BLADDER CANCER LEFT LATERAL WALL)    Surgeons: Perdo Patton MD Responsible Provider: MAYELA Hdz CRNA    Anesthesia Type: general ASA Status: 2          Anesthesia Type: general    Keily Phase I: Keily Score: 10    Keily Phase II:      Last vitals: Reviewed and per EMR flowsheets.        Anesthesia Post Evaluation    Patient location during evaluation: PACU  Patient participation: complete - patient participated  Level of consciousness: sleepy but conscious  Pain score: 0  Nausea & Vomiting: no nausea  Complications: no  Cardiovascular status: blood pressure returned to baseline  Respiratory status: acceptable and room air  Hydration status: stable

## 2022-01-18 ENCOUNTER — NURSE ONLY (OUTPATIENT)
Dept: UROLOGY | Age: 82
End: 2022-01-18
Payer: COMMERCIAL

## 2022-01-18 DIAGNOSIS — C67.2 MALIGNANT NEOPLASM OF LATERAL WALL OF URINARY BLADDER (HCC): ICD-10-CM

## 2022-01-18 PROCEDURE — 51700 IRRIGATION OF BLADDER: CPT | Performed by: NURSE PRACTITIONER

## 2022-01-18 NOTE — PROGRESS NOTES
Patient of Dr Gilbert Davis presents today for voiding trial post surgery. The patient denies any fever, chills or  N&V. After patient had given consent, using the catheter in place, 200cc of sterile water was installed into the bladder with no complications. Patient was able to void 250cc. Giovanni PEDRO was in office at time of procedure. Patient was advised to drink clear fluids for the next couple hours and urinate. Patient was advised they may experience some blood in the urine and burning with urination for the next couple days. If the patient is unable to urinate or develops fever, chills, N&V or suprapubic pain, they will call office for an appt at clinic or seek medical treatment at nearest ER, PCP or Urgent Care if after hours. Patient verbalized understanding and all questions were answered. Patient advised to call the office with any questions or concerns. Patient is to follow up as scheduled.     1/31/2022  9:30 AM FOLLOW UP [address] [facility] Urology Radha Dukes MD   Appointment Notes:    2 WK FU

## 2022-01-31 ENCOUNTER — OFFICE VISIT (OUTPATIENT)
Dept: UROLOGY | Age: 82
End: 2022-01-31
Payer: COMMERCIAL

## 2022-01-31 VITALS — TEMPERATURE: 97.3 F | HEIGHT: 70 IN | WEIGHT: 153.2 LBS | BODY MASS INDEX: 21.93 KG/M2

## 2022-01-31 DIAGNOSIS — C67.8 MALIGNANT NEOPLASM OF OVERLAPPING SITES OF BLADDER (HCC): Primary | ICD-10-CM

## 2022-01-31 LAB
BACTERIA URINE, POC: 0
BILIRUBIN URINE: 0 MG/DL
BLOOD, URINE: POSITIVE
CASTS URINE, POC: 0
CLARITY: CLEAR
COLOR: YELLOW
CRYSTALS URINE, POC: 0
EPI CELLS URINE, POC: 0
GLUCOSE URINE: NORMAL
KETONES, URINE: NEGATIVE
LEUKOCYTE EST, POC: NORMAL
NITRITE, URINE: NEGATIVE
PH UA: 7 (ref 4.5–8)
PROTEIN UA: NEGATIVE
RBC URINE, POC: 12
SPECIFIC GRAVITY UA: 1.01 (ref 1–1.03)
UROBILINOGEN, URINE: NORMAL
WBC URINE, POC: 0
YEAST URINE, POC: 0

## 2022-01-31 PROCEDURE — 81001 URINALYSIS AUTO W/SCOPE: CPT | Performed by: UROLOGY

## 2022-01-31 PROCEDURE — 99214 OFFICE O/P EST MOD 30 MIN: CPT | Performed by: UROLOGY

## 2022-01-31 ASSESSMENT — ENCOUNTER SYMPTOMS
CHEST TIGHTNESS: 0
FACIAL SWELLING: 0
VOMITING: 0
SORE THROAT: 0
WHEEZING: 0
BACK PAIN: 0
EYE DISCHARGE: 0
EYE REDNESS: 0
NAUSEA: 0

## 2022-01-31 NOTE — PROGRESS NOTES
tablet by mouth daily       vitamin B-12 (CYANOCOBALAMIN) 100 MCG tablet Take 1,000 mcg by mouth daily      Cholecalciferol (VITAMIN D) 50 MCG (2000 UT) CAPS capsule Take by mouth       No current facility-administered medications for this visit. Allergies   Allergen Reactions    Latex Itching and Other (See Comments)     And band aids. Causes redness and itching.  Amoxicillin-Pot Clavulanate Hives       Social History     Socioeconomic History    Marital status:      Spouse name: None    Number of children: None    Years of education: None    Highest education level: None   Occupational History    None   Tobacco Use    Smoking status: Former Smoker    Smokeless tobacco: Never Used    Tobacco comment: about 40 years ago   Vaping Use    Vaping Use: Never used   Substance and Sexual Activity    Alcohol use: None    Drug use: None    Sexual activity: None   Other Topics Concern    None   Social History Narrative    None     Social Determinants of Health     Financial Resource Strain:     Difficulty of Paying Living Expenses: Not on file   Food Insecurity:     Worried About Running Out of Food in the Last Year: Not on file    Esther of Food in the Last Year: Not on file   Transportation Needs:     Lack of Transportation (Medical): Not on file    Lack of Transportation (Non-Medical):  Not on file   Physical Activity:     Days of Exercise per Week: Not on file    Minutes of Exercise per Session: Not on file   Stress:     Feeling of Stress : Not on file   Social Connections:     Frequency of Communication with Friends and Family: Not on file    Frequency of Social Gatherings with Friends and Family: Not on file    Attends Rastafari Services: Not on file    Active Member of Clubs or Organizations: Not on file    Attends Club or Organization Meetings: Not on file    Marital Status: Not on file   Intimate Partner Violence:     Fear of Current or Ex-Partner: Not on file   [facility] Emotionally Abused: Not on file    Physically Abused: Not on file    Sexually Abused: Not on file   Housing Stability:     Unable to Pay for Housing in the Last Year: Not on file    Number of Places Lived in the Last Year: Not on file    Unstable Housing in the Last Year: Not on file       History reviewed. No pertinent family history. REVIEW OF SYSTEMS:  Review of Systems   Constitutional: Negative for chills and fever. HENT: Negative for facial swelling and sore throat. Eyes: Negative for discharge and redness. Respiratory: Negative for chest tightness and wheezing. Cardiovascular: Negative for chest pain and palpitations. Gastrointestinal: Negative for nausea and vomiting. Endocrine: Negative for polyphagia and polyuria. Genitourinary: Negative for decreased urine volume, difficulty urinating, dysuria, enuresis, flank pain, frequency, genital sores, hematuria, penile discharge, penile pain, penile swelling, scrotal swelling, testicular pain and urgency. Musculoskeletal: Negative for back pain and neck stiffness. Skin: Negative for rash and wound. Neurological: Negative for dizziness and headaches. Hematological: Negative for adenopathy. Does not bruise/bleed easily. Psychiatric/Behavioral: Negative for confusion and hallucinations. PHYSICAL EXAM:  Temp 97.3 °F (36.3 °C)   Ht 5' 10\" (1.778 m)   Wt 153 lb 3.2 oz (69.5 kg)   BMI 21.98 kg/m²   Physical Exam  Constitutional:       General: He is not in acute distress. Appearance: Normal appearance. He is well-developed. HENT:      Head: Normocephalic and atraumatic. Nose: Nose normal.   Eyes:      General: No scleral icterus. Conjunctiva/sclera: Conjunctivae normal.      Pupils: Pupils are equal, round, and reactive to light. Neck:      Trachea: No tracheal deviation. Cardiovascular:      Rate and Rhythm: Normal rate and regular rhythm.    Pulmonary:      Effort: Pulmonary effort is normal. No respiratory distress. Breath sounds: No stridor. Abdominal:      General: There is no distension. Palpations: Abdomen is soft. There is no mass. Tenderness: There is no abdominal tenderness. Musculoskeletal:         General: No tenderness. Normal range of motion. Cervical back: Normal range of motion and neck supple. Lymphadenopathy:      Cervical: No cervical adenopathy. Skin:     General: Skin is warm and dry. Findings: No erythema. Neurological:      Mental Status: He is alert and oriented to person, place, and time. Psychiatric:         Behavior: Behavior normal.         Judgment: Judgment normal.             DATA:  CMP:    Lab Results   Component Value Date     01/10/2022    K 4.6 01/10/2022     01/10/2022    CO2 29 01/10/2022    BUN 13 01/10/2022    CREATININE 1.0 01/10/2022    GFRAA >59 01/10/2022    LABGLOM >60 01/10/2022    GLUCOSE 101 01/10/2022    CALCIUM 9.6 01/10/2022     Results for orders placed or performed in visit on 22   POCT Urinalysis Dipstick w/ Micro (Auto)   Result Value Ref Range    Color, UA Yellow     Clarity, UA Clear Clear    Glucose, Ur neg     Bilirubin Urine 0 mg/dL    Ketones, Urine Negative     Specific Gravity, UA 1.010 1.005 - 1.030    Blood, Urine Positive     pH, UA 7.0 4.5 - 8.0    Protein, UA Negative Negative    Nitrite, Urine Negative     Leukocytes, UA small     Urobilinogen, Urine Normal     rbc urine, poc 12     wbc urine, poc 0     bacteria urine, poc 0     yeast urine, poc 0     casts urine, poc 0     Epi Cells Urine, POC 0     crystals urine, poc 0      [address], [place] [address]   Department of Pathology   FINAL SURGICAL PATHOLOGY REPORT   Patient Name: [facility]        Accession No:  [id number]    Age Sex:   1940    81 Y M        Pt Type:  I     DIS                                              Location:   Account #     [de-identified]                 Collected:     01/11/2022   Med Rec #      [id number]                    Received:      01/11/2022   Attend Phys: Arnaldo Dupree             Completed:     01/12/2022   Perform Phys: Pedro Acosta     FINAL DIAGNOSIS:     Urinary bladder, transurethral resection of left lateral wall bladder   tumor:   1.  Noninvasive high-grade papillary urothelial carcinoma. 2.  Detrusor muscle is present, negative for evidence of invasion. AJCC STAGE: pTa, pNx       URINARY BLADDER:  Biopsy and Transurethral Resection of Bladder Tumor   (TURBT)     SPECIMEN     Procedure:  TURBT   TUMOR     Tumor Type     Non-invasive urothelial carcinoma:     Non-invasive Histologic Type:  Non-invasive urothelial (transitional   cell) carcinoma   Histologic Grade:  Urothelial carcinoma:   High-grade   EXTENT     Microscopic Tumor Extension:     Noninvasive papillary carcinoma   ACCESSORY FINDINGS     Tumor Configuration:   Papillary   Adequacy of Material for Determining Muscularis Propria Invasion:   Muscularis propria (detrusor muscle) present   Lymph-Vascular Invasion:  Not identified   ADDITIONAL NON-TUMOR FINDINGS            CBG/CBG       IMAGING:  He had normal upper tracts by bilateral retrograde pyelogram done on 1/11/2020    1. Malignant neoplasm of overlapping sites of bladder [facility])  We discussed pathology report. This showed high-grade nonmuscle invasive bladder cancer. We will plan for surveillance.   He will need to return to see me in 1 month for cystoscopy stent removal he still has a stent in place  - POCT Urinalysis Dipstick w/ Micro (Auto)      Orders Placed This Encounter   Procedures    POCT Urinalysis Dipstick w/ Micro (Auto)        Return in about 1 month (around 2/28/2022) for Cystoscopy stent removal.    All information inputted into the note by the MA to include chief complaint, past medical history, past surgical history, medications, allergies, social and family history and review of systems has been reviewed and updated as needed by me. EMR Dragon/transcription disclaimer: Much of this documentt is electronic  transcription/translation of spoken language to printed text. The  electronic translation of spoken language may be erroneous, or at times,  nonsensical words or phrases may be inadvertently transcribed.  Although I  have reviewed the document for such errors, some may still exist.

## 2022-02-14 ENCOUNTER — TELEPHONE (OUTPATIENT)
Dept: ONCOLOGY | Facility: CLINIC | Age: 82
End: 2022-02-14

## 2022-02-14 NOTE — TELEPHONE ENCOUNTER
DR COLEMAN PT: LEFT VM ON SPOUSE TEL/PT DID NOT HAVE VM ON HIS TEL. NEED TO MOVE APPT OUT PER DR COLEMAN.

## 2022-02-15 ENCOUNTER — APPOINTMENT (OUTPATIENT)
Dept: LAB | Facility: HOSPITAL | Age: 82
End: 2022-02-15

## 2022-02-28 ENCOUNTER — TRANSCRIBE ORDERS (OUTPATIENT)
Dept: LAB | Facility: HOSPITAL | Age: 82
End: 2022-02-28

## 2022-02-28 ENCOUNTER — PROCEDURE VISIT (OUTPATIENT)
Dept: UROLOGY | Age: 82
End: 2022-02-28
Payer: COMMERCIAL

## 2022-02-28 VITALS — TEMPERATURE: 97.5 F | WEIGHT: 154 LBS | OXYGEN SATURATION: 98 % | HEIGHT: 70 IN | BODY MASS INDEX: 22.05 KG/M2

## 2022-02-28 DIAGNOSIS — C67.8 MALIGNANT NEOPLASM OF OVERLAPPING SITES OF BLADDER (HCC): Primary | ICD-10-CM

## 2022-02-28 DIAGNOSIS — Z11.59 SCREENING FOR VIRAL DISEASE: Primary | ICD-10-CM

## 2022-02-28 PROCEDURE — 52310 CYSTOSCOPY AND TREATMENT: CPT | Performed by: UROLOGY

## 2022-02-28 NOTE — PROGRESS NOTES
Laith Nicolas is a 80 y.o. male who presents today   Chief Complaint   Patient presents with    Cystoscopy     I am here today for my stent removal.       HPI:      BLADDER CANCER  Patient was first diagnosed with bladder cancer on 1/11/2022. Patient presented with gross hematuria. CT scan showed a soft tissue mass in the left bladder. Subsequent cystoscopy confirmed a 5 cm tumor involving the left lateral posterior bladder as well as the left trigone and left ureteral orifice. He was taken the operating room on the above date 1/11/2022 underwent TURBT. He had a complete resection. He also had the left ureteral orifice resected and had a stent placed. He was sent home with [place] catheter which was removed in the office on 1/18/2022. Since that time he is emptying his bladder well he has some frequency but no hematuria.   As was his initial diagnosis  Stage of bladder cancer at last recurrence: 8130/2 - Papillary transitional cell carcinoma, non-invasive, stage TA, grade: high grade  Hematuria? microscopic  Is here today for cystoscopy stent removal.    Past Medical History:   Diagnosis Date    Hypertension        Past Surgical History:   Procedure Laterality Date    CYSTOSCOPY N/A 1/11/2022    CYSTOSCOPY TRANSURETHRAL RESECTION BLADDER TUMOR performed by Ruthie Mei MD at [facility] Bilateral 1/11/2022    BILATERAL URETERAL CATHETERIZATION, BILATERAL RETROGRADE PYELOGRAMS performed by Ruthie Mei MD at [facility] Left 1/11/2022    LEFT URETERAL STENT INSERTION performed by Ruthie Mei MD at [address] [place] [address] Bilateral     in groin area        Current Outpatient Medications   Medication Sig Dispense Refill    aspirin 81 MG EC tablet Take 1 tablet by mouth daily 30 tablet 0    metoprolol succinate (TOPROL XL) 25 MG extended release tablet Take 25 mg by mouth 2 times daily      Loratadine (CLARITIN PO) Take 1 tablet by mouth daily       vitamin B-12 (CYANOCOBALAMIN) 100 MCG tablet Take 1,000 mcg by mouth daily      Cholecalciferol (VITAMIN D) 50 MCG (2000 UT) CAPS capsule Take by mouth       No current facility-administered medications for this visit. Allergies   Allergen Reactions    Latex Itching and Other (See Comments)     And band aids. Causes redness and itching.  Amoxicillin-Pot Clavulanate Hives         REVIEW OF SYSTEMS:  Review of Systems    PHYSICAL EXAM:  Physical Exam      Cystoscopy Procedure Note    Indications: Diagnosis,  Indwelling Left Ureteral stent     Pre-operative Diagnosis: Bladder cancer, retained stent    Post-operative Diagnosis: Same    Surgeon: Eddie Paniagua MD     Assistants: staff    Anesthesia: Local anesthesia topical 2% lidocaine gel    Procedure Details   The risks, benefits, complications, treatment options, and expected outcomes were discussedwith the patient. The patient concurred with the proposed plan, giving informed consent. Cystoscopy was performed today under local anesthesia, using sterile technique. The patient was placed in the supine position, prepped with Hibiclens, and draped in the usual sterile fashion. A 17 Frisian sheath flexible cystoscope was used to inspect both the urethra and bladder. A stent was seenprotruding from the Left ureteral orifice. It was grasped with a grasper and the stent was moved removed in its entirety, without difficulty. Findings:  Anterior urethra: normal without strictures and without scarring. Prostate: 2+  Bladder: Mild trabeculation, without lesions prior resection site was healed. There was no mucosa edema, irritation from the stent   Ureteral orifice(s) was/were seen bilateral.Ureteral orifice(s) both were in the normal location and both ureteral orifices were effluxing clear urine. Stent was grasped and removed without difficulty                            Complications:  None;patient tolerated the procedure well.            Disposition: To home after observation. Condition: stable          DATA:  CMP:    Lab Results   Component Value Date     01/10/2022    K 4.6 01/10/2022     01/10/2022    CO2 29 01/10/2022    BUN 13 01/10/2022    CREATININE 1.0 01/10/2022    GFRAA >59 01/10/2022    LABGLOM >60 01/10/2022    GLUCOSE 101 01/10/2022    CALCIUM 9.6 01/10/2022     Upper tract imaging with bilateral retrograde pyelogram showed normal collecting system bilateral without hydronephrosis. 1. Malignant neoplasm of overlapping sites of bladder ([address]  Left UO was resected at the time of TURBT he had a retained stent which was removed today cystoscopically in the office. He will follow-up for surveillance cystoscopy in 6 weeks with cytology prior. We will also check a renal ultrasound prior to the follow-up  - TX CYSTOURETHROGRAPHY REMV CALCULUS, STENT, FOREIGN BODY, SIMPLE  - Cystoscopy;  Future  - Cytology, Non-Gyn; Future  - US RENAL COMPLETE; Future      Orders Placed This Encounter   Procedures    US RENAL COMPLETE     Standing Status:   Future     Standing Expiration Date:   2/28/2023     Order Specific Question:   Reason for exam:     Answer:   Status post resection left-sided bladder tumor requiring left stent placement stent now removed assess for hydronephrosis    Cytology, Non-Gyn     Prior to next visit in 6 weeks     Standing Status:   Future     Standing Expiration Date:   2/28/2023     Order Specific Question:   PREVIOUS BIOPSY     Answer:   Yes     Order Specific Question:   PREOP DIAGNOSIS     Answer:   History of bladder cancer     Order Specific Question:   FROZEN SECTION - NO OR YES/SPECIMEN     Answer:   No    TX CYSTOURETHROGRAPHY REMV CALCULUS, STENT, FOREIGN BODY, SIMPLE    Cystoscopy     Next available     Standing Status:   Future     Standing Expiration Date:   2/28/2023     Scheduling Instructions:      Next available in 6 weeks        Return in about 6 weeks (around 4/11/2022) for Cystoscopy on next visit, Urine Cytology prior to next visit.

## 2022-03-01 ENCOUNTER — LAB (OUTPATIENT)
Dept: LAB | Facility: HOSPITAL | Age: 82
End: 2022-03-01

## 2022-03-01 ENCOUNTER — PRE-ADMISSION TESTING (OUTPATIENT)
Dept: PREADMISSION TESTING | Facility: HOSPITAL | Age: 82
End: 2022-03-01

## 2022-03-01 VITALS
DIASTOLIC BLOOD PRESSURE: 62 MMHG | RESPIRATION RATE: 18 BRPM | SYSTOLIC BLOOD PRESSURE: 155 MMHG | BODY MASS INDEX: 23.05 KG/M2 | OXYGEN SATURATION: 98 % | HEART RATE: 72 BPM | HEIGHT: 69 IN | WEIGHT: 155.65 LBS

## 2022-03-01 LAB
ALBUMIN SERPL-MCNC: 4.1 G/DL (ref 3.5–5.2)
ALBUMIN/GLOB SERPL: 2.2 G/DL
ALP SERPL-CCNC: 105 U/L (ref 39–117)
ALT SERPL W P-5'-P-CCNC: 8 U/L (ref 1–41)
ANION GAP SERPL CALCULATED.3IONS-SCNC: 9 MMOL/L (ref 5–15)
AST SERPL-CCNC: 13 U/L (ref 1–40)
BASOPHILS # BLD MANUAL: 0.09 10*3/MM3 (ref 0–0.2)
BASOPHILS NFR BLD MANUAL: 1 % (ref 0–1.5)
BILIRUB SERPL-MCNC: 0.6 MG/DL (ref 0–1.2)
BUN SERPL-MCNC: 16 MG/DL (ref 8–23)
BUN/CREAT SERPL: 12.7 (ref 7–25)
CALCIUM SPEC-SCNC: 9.4 MG/DL (ref 8.6–10.5)
CHLORIDE SERPL-SCNC: 107 MMOL/L (ref 98–107)
CO2 SERPL-SCNC: 27 MMOL/L (ref 22–29)
CREAT SERPL-MCNC: 1.26 MG/DL (ref 0.76–1.27)
DEPRECATED RDW RBC AUTO: 41.4 FL (ref 37–54)
EGFRCR SERPLBLD CKD-EPI 2021: 58 ML/MIN/1.73
EOSINOPHIL # BLD MANUAL: 0.26 10*3/MM3 (ref 0–0.4)
EOSINOPHIL NFR BLD MANUAL: 3 % (ref 0.3–6.2)
ERYTHROCYTE [DISTWIDTH] IN BLOOD BY AUTOMATED COUNT: 12.5 % (ref 12.3–15.4)
GLOBULIN UR ELPH-MCNC: 1.9 GM/DL
GLUCOSE SERPL-MCNC: 106 MG/DL (ref 65–99)
HCT VFR BLD AUTO: 37.7 % (ref 37.5–51)
HGB BLD-MCNC: 12.5 G/DL (ref 13–17.7)
LYMPHOCYTES # BLD MANUAL: 3.28 10*3/MM3 (ref 0.7–3.1)
LYMPHOCYTES NFR BLD MANUAL: 9.1 % (ref 5–12)
MCH RBC QN AUTO: 30.2 PG (ref 26.6–33)
MCHC RBC AUTO-ENTMCNC: 33.2 G/DL (ref 31.5–35.7)
MCV RBC AUTO: 91.1 FL (ref 79–97)
MONOCYTES # BLD: 0.8 10*3/MM3 (ref 0.1–0.9)
NEUTROPHILS # BLD AUTO: 4.35 10*3/MM3 (ref 1.7–7)
NEUTROPHILS NFR BLD MANUAL: 49.5 % (ref 42.7–76)
PLATELET # BLD AUTO: 136 10*3/MM3 (ref 140–450)
PMV BLD AUTO: 9.6 FL (ref 6–12)
POTASSIUM SERPL-SCNC: 3.9 MMOL/L (ref 3.5–5.2)
PROT SERPL-MCNC: 6 G/DL (ref 6–8.5)
RBC # BLD AUTO: 4.14 10*6/MM3 (ref 4.14–5.8)
RBC MORPH BLD: NORMAL
SARS-COV-2 ORF1AB RESP QL NAA+PROBE: NOT DETECTED
SMALL PLATELETS BLD QL SMEAR: ABNORMAL
SODIUM SERPL-SCNC: 143 MMOL/L (ref 136–145)
VARIANT LYMPHS NFR BLD MANUAL: 29.3 % (ref 19.6–45.3)
VARIANT LYMPHS NFR BLD MANUAL: 8.1 % (ref 0–5)
WBC MORPH BLD: NORMAL
WBC NRBC COR # BLD: 8.78 10*3/MM3 (ref 3.4–10.8)

## 2022-03-01 PROCEDURE — C9803 HOPD COVID-19 SPEC COLLECT: HCPCS | Performed by: SPECIALIST

## 2022-03-01 PROCEDURE — 93010 ELECTROCARDIOGRAM REPORT: CPT | Performed by: INTERNAL MEDICINE

## 2022-03-01 PROCEDURE — 80053 COMPREHEN METABOLIC PANEL: CPT

## 2022-03-01 PROCEDURE — 85007 BL SMEAR W/DIFF WBC COUNT: CPT

## 2022-03-01 PROCEDURE — 85025 COMPLETE CBC W/AUTO DIFF WBC: CPT

## 2022-03-01 PROCEDURE — 36415 COLL VENOUS BLD VENIPUNCTURE: CPT

## 2022-03-01 PROCEDURE — U0004 COV-19 TEST NON-CDC HGH THRU: HCPCS | Performed by: SPECIALIST

## 2022-03-01 PROCEDURE — 93005 ELECTROCARDIOGRAM TRACING: CPT

## 2022-03-01 NOTE — DISCHARGE INSTRUCTIONS
DAY OF SURGERY INSTRUCTIONS          ARRIVAL TIME: AS DIRECTED BY OFFICE    YOU MAY TAKE THE FOLLOWING MEDICATION(S) THE MORNING OF SURGERY WITH A SIP OF WATER: Metoprolol      ALL OTHER HOME MEDICATION CHECK WITH YOUR PHYSICIAN (ask your health care provider about changing or stopping your regular medicines, especially if you are taking diabetes medicines or blood thinners)      DO NOT TAKE ANY ERECTILE DYSFUNCTION MEDICATIONS (EX: CIALIS, VIAGRA) 24 HOURS PRIOR TO SURGERY                      MANAGING PAIN AFTER SURGERY    We know you are probably wondering what your pain will be like after surgery.  Following surgery it is unrealistic to expect you will not have pain.   Pain is how our bodies let us know that something is wrong or cautions us to be careful.  That said, our goal is to make your pain tolerable.    Methods we may use to treat your pain include (oral or IV medications, PCAs, epidurals, nerve blocks, etc.)   While some procedures require IV pain medications for a short time after surgery, transitioning to pain medications by mouth allows for better management of pain.   Your nurse will encourage you to take oral pain medications whenever possible.  IV medications work almost immediately, but only last a short while.  Taking medications by mouth allows for a more constant level of medication in your blood stream for a longer period of time.      Once your pain is out of control it is harder to get back under control.  It is important you are aware when your next dose of pain medication is due.  If you are admitted, your nurse may write the time of your next dose on the white board in your room to help you remember.      We are interested in your pain and encourage you to inform us about aggravating factors during your visit.   Many times a simple repositioning every few hours can make a big difference.    If your physician says it is okay, do not let your pain prevent you from getting out of bed. Be  sure to call your nurse for assistance prior to getting up so you do not fall.      Before surgery, please decide your tolerable pain goal.  These faces help describe the pain ratings we use on a 0-10 scale.   Be prepared to tell us your goal and whether or not you take pain or anxiety medications at home.          BEFORE YOU COME TO THE HOSPITAL  (Pre-op instructions)  • Do not eat, drink, smoke or chew gum after midnight the night before surgery.  This also includes no mints.  • Morning of surgery take only the medicines you have been instructed with a sip of water unless otherwise instructed  by your physician.  • Do not shave, wear makeup or dark nail polish.  • Remove all jewelry including rings.  • Leave anything you consider valuable at home.  • Leave your suitcase in the car until after your surgery.  • Bring the following with you if applicable:  o Picture ID and insurance, Medicare or Medicaid cards  o Co-pay/deductible required by insurance (cash, check, credit card)  o Copy of advance directive, living will or power-of- documents if not brought to pre-work  o CPAP or BIPAP mask and tubing  o Relaxation aids ( book, magazine), etc.  o Hearing aids                        ON THE DAY OF SURGERY  · On the day of surgery check in at registration located at the main entrance of the hospital. Only one family member or friend are allowed per patient.  ? You will be registered and given a beeper with instructions where to wait in the main lobby.  ? When your beeper lights up and vibrates a member of the Outpatient Surgery staff will meet you at the double doors under the stair steps and escort you to your preoperative room.   · You may have cloth compression devices placed on your legs. These help to prevent blood clots and reduce swelling in your legs.  · An IV may be inserted into one of your veins.  · In the operating room, you may be given one or more of the following:  ? A medicine to help you relax  "(sedative).  ? A medicine to numb the area (local anesthetic).  ? A medicine to make you fall asleep (general anesthetic).  ? A medicine that is injected into an area of your body to numb everything below the injection site (regional anesthetic).  · Your surgical site will be marked or identified.  · You may be given an antibiotic through your IV to help prevent infection.  Contact a health care provider if you:  · Develop a fever of more than 100.4°F (38°C) or other feelings of illness during the 48 hours before your surgery.  · Have symptoms that get worse.  Have questions or concerns about your surgery    General Anesthesia/Surgery, Adult  General anesthesia is the use of medicines to make a person \"go to sleep\" (unconscious) for a medical procedure. General anesthesia must be used for certain procedures, and is often recommended for procedures that:  · Last a long time.  · Require you to be still or in an unusual position.  · Are major and can cause blood loss.  The medicines used for general anesthesia are called general anesthetics. As well as making you unconscious for a certain amount of time, these medicines:  · Prevent pain.  · Control your blood pressure.  · Relax your muscles.  Tell a health care provider about:  · Any allergies you have.  · All medicines you are taking, including vitamins, herbs, eye drops, creams, and over-the-counter medicines.  · Any problems you or family members have had with anesthetic medicines.  · Types of anesthetics you have had in the past.  · Any blood disorders you have.  · Any surgeries you have had.  · Any medical conditions you have.  · Any recent upper respiratory, chest, or ear infections.  · Any history of:  ? Heart or lung conditions, such as heart failure, sleep apnea, asthma, or chronic obstructive pulmonary disease (COPD).  ?  service.  ? Depression or anxiety.  · Any tobacco or drug use, including marijuana or alcohol use.  · Whether you are pregnant or " may be pregnant.  What are the risks?  Generally, this is a safe procedure. However, problems may occur, including:  · Allergic reaction.  · Lung and heart problems.  · Inhaling food or liquid from the stomach into the lungs (aspiration).  · Nerve injury.  · Air in the bloodstream, which can lead to stroke.  · Extreme agitation or confusion (delirium) when you wake up from the anesthetic.  · Waking up during your procedure and being unable to move. This is rare.  These problems are more likely to develop if you are having a major surgery or if you have an advanced or serious medical condition. You can prevent some of these complications by answering all of your health care provider's questions thoroughly and by following all instructions before your procedure.  General anesthesia can cause side effects, including:  · Nausea or vomiting.  · A sore throat from the breathing tube.  · Hoarseness.  · Wheezing or coughing.  · Shaking chills.  · Tiredness.  · Body aches.  · Anxiety.  · Sleepiness or drowsiness.  · Confusion or agitation.  RISKS AND COMPLICATIONS OF SURGERY  Your health care provider will discuss possible risks and complications with you before surgery. Common risks and complications include:    · Problems due to the use of anesthetics.  · Blood loss and replacement (does not apply to minor surgical procedures).  · Temporary increase in pain due to surgery.  · Uncorrected pain or problems that the surgery was meant to correct.  · Infection.  · New damage.    What happens before the procedure?    Medicines  Ask your health care provider about:  · Changing or stopping your regular medicines. This is especially important if you are taking diabetes medicines or blood thinners.  · Taking medicines such as aspirin and ibuprofen. These medicines can thin your blood. Do not take these medicines unless your health care provider tells you to take them.  · Taking over-the-counter medicines, vitamins, herbs, and  supplements. Do not take these during the week before your procedure unless your health care provider approves them.  General instructions  · Starting 3-6 weeks before the procedure, do not use any products that contain nicotine or tobacco, such as cigarettes and e-cigarettes. If you need help quitting, ask your health care provider.  · If you brush your teeth on the morning of the procedure, make sure to spit out all of the toothpaste.  · Tell your health care provider if you become ill or develop a cold, cough, or fever.  · If instructed by your health care provider, bring your sleep apnea device with you on the day of your surgery (if applicable).  · Ask your health care provider if you will be going home the same day, the following day, or after a longer hospital stay.  ? Plan to have someone take you home from the hospital or clinic.  ? Plan to have a responsible adult care for you for at least 24 hours after you leave the hospital or clinic. This is important.  What happens during the procedure?  · You will be given anesthetics through both of the following:  ? A mask placed over your nose and mouth.  ? An IV in one of your veins.  · You may receive a medicine to help you relax (sedative).  · After you are unconscious, a breathing tube may be inserted down your throat to help you breathe. This will be removed before you wake up.  · An anesthesia specialist will stay with you throughout your procedure. He or she will:  ? Keep you comfortable and safe by continuing to give you medicines and adjusting the amount of medicine that you get.  ? Monitor your blood pressure, pulse, and oxygen levels to make sure that the anesthetics do not cause any problems.  The procedure may vary among health care providers and hospitals.  What happens after the procedure?  · Your blood pressure, temperature, heart rate, breathing rate, and blood oxygen level will be monitored until the medicines you were given have worn off.  · You  will wake up in a recovery area. You may wake up slowly.  · If you feel anxious or agitated, you may be given medicine to help you calm down.  · If you will be going home the same day, your health care provider may check to make sure you can walk, drink, and urinate.  · Your health care provider will treat any pain or side effects you have before you go home.  · Do not drive for 24 hours if you were given a sedative.  Summary  · General anesthesia is used to keep you still and prevent pain during a procedure.  · It is important to tell your healthcare provider about your medical history and any surgeries you have had, and previous experience with anesthesia.  · Follow your healthcare provider’s instructions about when to stop eating, drinking, or taking certain medicines before your procedure.  · Plan to have someone take you home from the hospital or clinic.  This information is not intended to replace advice given to you by your health care provider. Make sure you discuss any questions you have with your health care provider.  Document Released: 03/26/2009 Document Revised: 08/03/2018 Document Reviewed: 08/03/2018  [employer] Interactive Patient Education © 2019 [employer]       Fall Prevention in Hospitals, Adult  As a hospital patient, your condition and the treatments you receive can increase your risk for falls. Some additional risk factors for falls in a hospital include:  · Being in an unfamiliar environment.  · Being on bed rest.  · Your surgery.  · Taking certain medicines.  · Your tubing requirements, such as intravenous (IV) therapy or catheters.  It is important that you learn how to decrease fall risks while at the hospital. Below are important tips that can help prevent falls.  SAFETY TIPS FOR PREVENTING FALLS  Talk about your risk of falling.  · Ask your health care provider why you are at risk for falling. Is it your medicine, illness, tubing placement, or something else?  · Make a plan with your  health care provider to keep you safe from falls.  · Ask your health care provider or pharmacist about side effects of your medicines. Some medicines can make you dizzy or affect your coordination.  Ask for help.  · Ask for help before getting out of bed. You may need to press your call button.  · Ask for assistance in getting safely to the toilet.  · Ask for a walker or cane to be put at your bedside. Ask that most of the side rails on your bed be placed up before your health care provider leaves the room.  · Ask family or friends to sit with you.  · Ask for things that are out of your reach, such as your glasses, hearing aids, telephone, bedside table, or call button.  Follow these tips to avoid falling:  · Stay lying or seated, rather than standing, while waiting for help.  · Wear rubber-soled slippers or shoes whenever you walk in the hospital.  · Avoid quick, sudden movements.  ¨ Change positions slowly.  ¨ Sit on the side of your bed before standing.  ¨ Stand up slowly and wait before you start to walk.  · Let your health care provider know if there is a spill on the floor.  · Pay careful attention to the medical equipment, electrical cords, and tubes around you.  · When you need help, use your call button by your bed or in the bathroom. Wait for one of your health care providers to help you.  · If you feel dizzy or unsure of your footing, return to bed and wait for assistance.  · Avoid being distracted by the TV, telephone, or another person in your room.  · Do not lean or support yourself on rolling objects, such as IV poles or bedside tables.     This information is not intended to replace advice given to you by your health care provider. Make sure you discuss any questions you have with your health care provider.     Document Released: 12/15/2001 Document Revised: 01/08/2016 Document Reviewed: 08/25/2013  [employer] Interactive Patient Education ©2016 [employer]       [facility] 4%  Patient Instruction Sheet    Chlorhexidine Before Surgery  Chlorhexidine gluconate (CHG) is a germ-killing (antiseptic) solution that is used to clean the skin. It gets rid of the bacteria that normally live on the skin. Cleaning your skin with CHG before surgery helps lower the risk for infection after surgery.    How to use CHG solution  · You will take 2 showers, one shower the night before surgery, the second shower the morning of surgery before coming to the hospital.  · Use CHG only as told by your health care provider, and follow the instructions on the label.  · Use CHG solution while taking a shower. Follow these steps when using CHG solution (unless your health care provider gives you different instructions):  1. Start the shower.  2. Use your normal soap and shampoo to wash your face and hair.  3. Turn off the shower or move out of the shower stream.  4. Pour the CHG onto a clean washcloth. Do not use any type of brush or rough-edged sponge.  5. Starting at your neck, lather your body down to your toes. Make sure you:  6. Pay special attention to the part of your body where you will be having surgery. Scrub this area for at least 1 minute.  7. Use the full amount of CHG as directed. Usually, this is one half bottle for each shower.  8. Do not use CHG on your head or face. If the solution gets into your ears or eyes, rinse them well with water.  9. Avoid your genital area.  10. Avoid any areas of skin that have broken skin, cuts, or scrapes.  11. Scrub your back and under your arms. Make sure to wash skin folds.  12. Let the lather sit on your skin for 1-2 minutes or as long as told by your health care  provider.  13. Thoroughly rinse your entire body in the shower. Make sure that all body creases and crevices are rinsed well.  14. Dry off with a clean towel. Do not put any substances on your body afterward, such as powder, lotion, or perfume.  15. Put on clean clothes or pajamas.  16. If it is the night  before your surgery, sleep in clean sheets.    What are the risks?  Risks of using CHG include:  · A skin reaction.  · Hearing loss, if CHG gets in your ears.  · Eye injury, if CHG gets in your eyes and is not rinsed out.  · The CHG product catching fire.  Make sure that you avoid smoking and flames after applying CHG to your skin.  Do not use CHG:  · If you have a chlorhexidine allergy or have previously reacted to chlorhexidine.  · On babies younger than 2 months of age.      On the day of surgery, when you are taken to your room in Outpatient Surgery you will be given a CHG prepackaged cloth to wipe the site for your surgery.  How to use CHG prepackaged cloths  · Follow the instructions on the label.  · Use the CHG cloth on clean, dry skin. Follow these steps when using a CHG cloth (unless your health care provider gives you different instructions):  1. Using the CHG cloth, vigorously scrub the part of your body where you will be having surgery. Scrub using a back-and-forth motion for 3 minutes. The area on your body should be completely wet with CHG when you are finished scrubbing.  2. Do not rinse. Discard the cloth and let the area air-dry for 1 minute. Do not put any substances on your body afterward, such as powder, lotion, or perfume.  Contact a health care provider if:  · Your skin gets irritated after scrubbing.  · You have questions about using your solution or cloth.  Get help right away if:  · Your eyes become very red or swollen.  · Your eyes itch badly.  · Your skin itches badly and is red or swollen.  · Your hearing changes.  · You have trouble seeing.  · You have swelling or tingling in your mouth or throat.  · You have trouble breathing.  · You swallow any chlorhexidine.  Summary  · Chlorhexidine gluconate (CHG) is a germ-killing (antiseptic) solution that is used to clean the skin. Cleaning your skin with CHG before surgery helps lower the risk for infection after surgery.  · You may be given CHG  to use at home. It may be in a bottle or in a prepackaged cloth to use on your skin. Carefully follow your health care provider's instructions and the instructions on the product label.  · Do not use CHG if you have a chlorhexidine allergy.  · Contact your health care provider if your skin gets irritated after scrubbing.  This information is not intended to replace advice given to you by your health care provider. Make sure you discuss any questions you have with your health care provider.  Document Released: 09/11/2013 Document Revised: 11/15/2018 Document Reviewed: 11/15/2018  [employer] Interactive Patient Education © 2019 [employer]          PATIENT/FAMILY/RESPONSIBLE PARTY VERBALIZES UNDERSTANDING OF ABOVE EDUCATION.  COPY OF PAIN SCALE GIVEN AND REVIEWED WITH VERBALIZED UNDERSTANDING.

## 2022-03-02 LAB
QT INTERVAL: 396 MS
QTC INTERVAL: 430 MS

## 2022-03-03 ENCOUNTER — LAB (OUTPATIENT)
Dept: LAB | Facility: HOSPITAL | Age: 82
End: 2022-03-03

## 2022-03-03 DIAGNOSIS — C91.10 CLL (CHRONIC LYMPHOCYTIC LEUKEMIA): ICD-10-CM

## 2022-03-03 LAB
ALBUMIN SERPL-MCNC: 4.3 G/DL (ref 3.5–5.2)
ALBUMIN/GLOB SERPL: 2.4 G/DL
ALP SERPL-CCNC: 106 U/L (ref 39–117)
ALT SERPL W P-5'-P-CCNC: 5 U/L (ref 1–41)
ANION GAP SERPL CALCULATED.3IONS-SCNC: 7 MMOL/L (ref 5–15)
AST SERPL-CCNC: 11 U/L (ref 1–40)
BASOPHILS # BLD MANUAL: 0.1 10*3/MM3 (ref 0–0.2)
BASOPHILS NFR BLD MANUAL: 1 % (ref 0–1.5)
BILIRUB SERPL-MCNC: 1 MG/DL (ref 0–1.2)
BUN SERPL-MCNC: 13 MG/DL (ref 8–23)
BUN/CREAT SERPL: 10.4 (ref 7–25)
CALCIUM SPEC-SCNC: 9.3 MG/DL (ref 8.6–10.5)
CHLORIDE SERPL-SCNC: 106 MMOL/L (ref 98–107)
CO2 SERPL-SCNC: 30 MMOL/L (ref 22–29)
CREAT SERPL-MCNC: 1.25 MG/DL (ref 0.76–1.27)
DEPRECATED RDW RBC AUTO: 43.2 FL (ref 37–54)
EGFRCR SERPLBLD CKD-EPI 2021: 58.6 ML/MIN/1.73
EOSINOPHIL # BLD MANUAL: 0.2 10*3/MM3 (ref 0–0.4)
EOSINOPHIL NFR BLD MANUAL: 2 % (ref 0.3–6.2)
ERYTHROCYTE [DISTWIDTH] IN BLOOD BY AUTOMATED COUNT: 12.6 % (ref 12.3–15.4)
FERRITIN SERPL-MCNC: 93.39 NG/ML (ref 30–400)
GLOBULIN UR ELPH-MCNC: 1.8 GM/DL
GLUCOSE SERPL-MCNC: 98 MG/DL (ref 65–99)
HCT VFR BLD AUTO: 39.2 % (ref 37.5–51)
HGB BLD-MCNC: 12.7 G/DL (ref 13–17.7)
IRON 24H UR-MRATE: 51 MCG/DL (ref 59–158)
IRON SATN MFR SERPL: 14 % (ref 20–50)
LYMPHOCYTES # BLD MANUAL: 3.96 10*3/MM3 (ref 0.7–3.1)
LYMPHOCYTES NFR BLD MANUAL: 10.1 % (ref 5–12)
MCH RBC QN AUTO: 30.2 PG (ref 26.6–33)
MCHC RBC AUTO-ENTMCNC: 32.4 G/DL (ref 31.5–35.7)
MCV RBC AUTO: 93.1 FL (ref 79–97)
MONOCYTES # BLD: 1.01 10*3/MM3 (ref 0.1–0.9)
NEUTROPHILS # BLD AUTO: 4.77 10*3/MM3 (ref 1.7–7)
NEUTROPHILS NFR BLD MANUAL: 45.5 % (ref 42.7–76)
NEUTS BAND NFR BLD MANUAL: 2 % (ref 0–5)
PLATELET # BLD AUTO: 127 10*3/MM3 (ref 140–450)
PMV BLD AUTO: 9.2 FL (ref 6–12)
POTASSIUM SERPL-SCNC: 4.2 MMOL/L (ref 3.5–5.2)
PROT SERPL-MCNC: 6.1 G/DL (ref 6–8.5)
RBC # BLD AUTO: 4.21 10*6/MM3 (ref 4.14–5.8)
RBC MORPH BLD: NORMAL
SMALL PLATELETS BLD QL SMEAR: ABNORMAL
SMUDGE CELLS BLD QL SMEAR: ABNORMAL
SODIUM SERPL-SCNC: 143 MMOL/L (ref 136–145)
TIBC SERPL-MCNC: 368 MCG/DL (ref 298–536)
TRANSFERRIN SERPL-MCNC: 247 MG/DL (ref 200–360)
VARIANT LYMPHS NFR BLD MANUAL: 15.2 % (ref 0–5)
VARIANT LYMPHS NFR BLD MANUAL: 24.2 % (ref 19.6–45.3)
WBC NRBC COR # BLD: 10.04 10*3/MM3 (ref 3.4–10.8)

## 2022-03-03 PROCEDURE — 82728 ASSAY OF FERRITIN: CPT

## 2022-03-03 PROCEDURE — 85025 COMPLETE CBC W/AUTO DIFF WBC: CPT

## 2022-03-03 PROCEDURE — 85007 BL SMEAR W/DIFF WBC COUNT: CPT

## 2022-03-03 PROCEDURE — 84165 PROTEIN E-PHORESIS SERUM: CPT

## 2022-03-03 PROCEDURE — 84466 ASSAY OF TRANSFERRIN: CPT

## 2022-03-03 PROCEDURE — 80053 COMPREHEN METABOLIC PANEL: CPT

## 2022-03-03 PROCEDURE — 36415 COLL VENOUS BLD VENIPUNCTURE: CPT

## 2022-03-03 PROCEDURE — 83540 ASSAY OF IRON: CPT

## 2022-03-04 ENCOUNTER — HOSPITAL ENCOUNTER (OUTPATIENT)
Facility: HOSPITAL | Age: 82
Setting detail: HOSPITAL OUTPATIENT SURGERY
Discharge: HOME | End: 2022-03-04
Attending: SPECIALIST | Admitting: SPECIALIST

## 2022-03-04 ENCOUNTER — ANESTHESIA EVENT (OUTPATIENT)
Dept: PERIOP | Facility: HOSPITAL | Age: 82
End: 2022-03-04

## 2022-03-04 ENCOUNTER — ANESTHESIA (OUTPATIENT)
Dept: PERIOP | Facility: HOSPITAL | Age: 82
End: 2022-03-04

## 2022-03-04 VITALS
DIASTOLIC BLOOD PRESSURE: 61 MMHG | OXYGEN SATURATION: 94 % | TEMPERATURE: 97.8 F | HEART RATE: 67 BPM | RESPIRATION RATE: 18 BRPM | SYSTOLIC BLOOD PRESSURE: 132 MMHG

## 2022-03-04 DIAGNOSIS — L72.3 SEBACEOUS CYST: Primary | ICD-10-CM

## 2022-03-04 LAB
ALBUMIN SERPL ELPH-MCNC: 3.6 G/DL (ref 2.9–4.4)
ALBUMIN/GLOB SERPL: 1.8 {RATIO} (ref 0.7–1.7)
ALPHA1 GLOB SERPL ELPH-MCNC: 0.2 G/DL (ref 0–0.4)
ALPHA2 GLOB SERPL ELPH-MCNC: 0.7 G/DL (ref 0.4–1)
B-GLOBULIN SERPL ELPH-MCNC: 0.8 G/DL (ref 0.7–1.3)
GAMMA GLOB SERPL ELPH-MCNC: 0.3 G/DL (ref 0.4–1.8)
GLOBULIN SER CALC-MCNC: 2 G/DL (ref 2.2–3.9)
LABORATORY COMMENT REPORT: ABNORMAL
M PROTEIN SERPL ELPH-MCNC: 0.1 G/DL
PROT PATTERN SERPL ELPH-IMP: ABNORMAL
PROT SERPL-MCNC: 5.6 G/DL (ref 6–8.5)

## 2022-03-04 PROCEDURE — 25010000002 PROPOFOL 10 MG/ML EMULSION: Performed by: NURSE ANESTHETIST, CERTIFIED REGISTERED

## 2022-03-04 RX ORDER — SODIUM CHLORIDE 0.9 % (FLUSH) 0.9 %
10 SYRINGE (ML) INJECTION AS NEEDED
Status: DISCONTINUED | OUTPATIENT
Start: 2022-03-04 | End: 2022-03-04 | Stop reason: HOSPADM

## 2022-03-04 RX ORDER — DEXMEDETOMIDINE HYDROCHLORIDE 100 UG/ML
INJECTION, SOLUTION INTRAVENOUS AS NEEDED
Status: DISCONTINUED | OUTPATIENT
Start: 2022-03-04 | End: 2022-03-04 | Stop reason: SURG

## 2022-03-04 RX ORDER — CLINDAMYCIN PHOSPHATE 600 MG/50ML
600 INJECTION INTRAVENOUS ONCE
Status: COMPLETED | OUTPATIENT
Start: 2022-03-04 | End: 2022-03-04

## 2022-03-04 RX ORDER — SODIUM CHLORIDE, SODIUM LACTATE, POTASSIUM CHLORIDE, CALCIUM CHLORIDE 600; 310; 30; 20 MG/100ML; MG/100ML; MG/100ML; MG/100ML
9 INJECTION, SOLUTION INTRAVENOUS CONTINUOUS
Status: DISCONTINUED | OUTPATIENT
Start: 2022-03-04 | End: 2022-03-04 | Stop reason: HOSPADM

## 2022-03-04 RX ORDER — PROPOFOL 10 MG/ML
VIAL (ML) INTRAVENOUS AS NEEDED
Status: DISCONTINUED | OUTPATIENT
Start: 2022-03-04 | End: 2022-03-04 | Stop reason: SURG

## 2022-03-04 RX ORDER — MAGNESIUM HYDROXIDE 1200 MG/15ML
LIQUID ORAL AS NEEDED
Status: DISCONTINUED | OUTPATIENT
Start: 2022-03-04 | End: 2022-03-04 | Stop reason: HOSPADM

## 2022-03-04 RX ORDER — OXYCODONE AND ACETAMINOPHEN 10; 325 MG/1; MG/1
1 TABLET ORAL ONCE AS NEEDED
Status: DISCONTINUED | OUTPATIENT
Start: 2022-03-04 | End: 2022-03-04 | Stop reason: HOSPADM

## 2022-03-04 RX ORDER — OXYCODONE HYDROCHLORIDE AND ACETAMINOPHEN 5; 325 MG/1; MG/1
1 TABLET ORAL ONCE AS NEEDED
Status: DISCONTINUED | OUTPATIENT
Start: 2022-03-04 | End: 2022-03-04 | Stop reason: HOSPADM

## 2022-03-04 RX ORDER — BUPIVACAINE HYDROCHLORIDE AND EPINEPHRINE 2.5; 5 MG/ML; UG/ML
INJECTION, SOLUTION INFILTRATION; PERINEURAL AS NEEDED
Status: DISCONTINUED | OUTPATIENT
Start: 2022-03-04 | End: 2022-03-04 | Stop reason: HOSPADM

## 2022-03-04 RX ORDER — LIDOCAINE HYDROCHLORIDE 20 MG/ML
INJECTION, SOLUTION EPIDURAL; INFILTRATION; INTRACAUDAL; PERINEURAL AS NEEDED
Status: DISCONTINUED | OUTPATIENT
Start: 2022-03-04 | End: 2022-03-04 | Stop reason: SURG

## 2022-03-04 RX ORDER — DEXTROSE MONOHYDRATE 25 G/50ML
12.5 INJECTION, SOLUTION INTRAVENOUS AS NEEDED
Status: DISCONTINUED | OUTPATIENT
Start: 2022-03-04 | End: 2022-03-04 | Stop reason: HOSPADM

## 2022-03-04 RX ORDER — OXYCODONE AND ACETAMINOPHEN 7.5; 325 MG/1; MG/1
2 TABLET ORAL EVERY 4 HOURS PRN
Status: DISCONTINUED | OUTPATIENT
Start: 2022-03-04 | End: 2022-03-04 | Stop reason: HOSPADM

## 2022-03-04 RX ORDER — LIDOCAINE HYDROCHLORIDE 10 MG/ML
0.5 INJECTION, SOLUTION EPIDURAL; INFILTRATION; INTRACAUDAL; PERINEURAL ONCE AS NEEDED
Status: DISCONTINUED | OUTPATIENT
Start: 2022-03-04 | End: 2022-03-04 | Stop reason: HOSPADM

## 2022-03-04 RX ORDER — ONDANSETRON 2 MG/ML
4 INJECTION INTRAMUSCULAR; INTRAVENOUS ONCE AS NEEDED
Status: DISCONTINUED | OUTPATIENT
Start: 2022-03-04 | End: 2022-03-04 | Stop reason: HOSPADM

## 2022-03-04 RX ORDER — LABETALOL HYDROCHLORIDE 5 MG/ML
5 INJECTION, SOLUTION INTRAVENOUS
Status: DISCONTINUED | OUTPATIENT
Start: 2022-03-04 | End: 2022-03-04 | Stop reason: HOSPADM

## 2022-03-04 RX ORDER — NALOXONE HCL 0.4 MG/ML
0.4 VIAL (ML) INJECTION AS NEEDED
Status: DISCONTINUED | OUTPATIENT
Start: 2022-03-04 | End: 2022-03-04 | Stop reason: HOSPADM

## 2022-03-04 RX ORDER — FENTANYL CITRATE 50 UG/ML
25 INJECTION, SOLUTION INTRAMUSCULAR; INTRAVENOUS
Status: DISCONTINUED | OUTPATIENT
Start: 2022-03-04 | End: 2022-03-04 | Stop reason: HOSPADM

## 2022-03-04 RX ORDER — SODIUM CHLORIDE, SODIUM LACTATE, POTASSIUM CHLORIDE, CALCIUM CHLORIDE 600; 310; 30; 20 MG/100ML; MG/100ML; MG/100ML; MG/100ML
1000 INJECTION, SOLUTION INTRAVENOUS CONTINUOUS
Status: DISCONTINUED | OUTPATIENT
Start: 2022-03-04 | End: 2022-03-04 | Stop reason: HOSPADM

## 2022-03-04 RX ORDER — SODIUM CHLORIDE 0.9 % (FLUSH) 0.9 %
3 SYRINGE (ML) INJECTION AS NEEDED
Status: DISCONTINUED | OUTPATIENT
Start: 2022-03-04 | End: 2022-03-04 | Stop reason: HOSPADM

## 2022-03-04 RX ORDER — IBUPROFEN 600 MG/1
600 TABLET ORAL ONCE AS NEEDED
Status: DISCONTINUED | OUTPATIENT
Start: 2022-03-04 | End: 2022-03-04 | Stop reason: HOSPADM

## 2022-03-04 RX ORDER — DROPERIDOL 2.5 MG/ML
0.62 INJECTION, SOLUTION INTRAMUSCULAR; INTRAVENOUS ONCE AS NEEDED
Status: DISCONTINUED | OUTPATIENT
Start: 2022-03-04 | End: 2022-03-04 | Stop reason: HOSPADM

## 2022-03-04 RX ORDER — SUFENTANIL CITRATE 50 UG/ML
INJECTION EPIDURAL; INTRAVENOUS AS NEEDED
Status: DISCONTINUED | OUTPATIENT
Start: 2022-03-04 | End: 2022-03-04 | Stop reason: SURG

## 2022-03-04 RX ORDER — FLUMAZENIL 0.1 MG/ML
0.2 INJECTION INTRAVENOUS AS NEEDED
Status: DISCONTINUED | OUTPATIENT
Start: 2022-03-04 | End: 2022-03-04 | Stop reason: HOSPADM

## 2022-03-04 RX ORDER — OXYCODONE HYDROCHLORIDE AND ACETAMINOPHEN 5; 325 MG/1; MG/1
1 TABLET ORAL EVERY 4 HOURS PRN
Qty: 10 TABLET | Refills: 0 | Status: SHIPPED | OUTPATIENT
Start: 2022-03-04 | End: 2022-09-13

## 2022-03-04 RX ORDER — SODIUM CHLORIDE 0.9 % (FLUSH) 0.9 %
10 SYRINGE (ML) INJECTION EVERY 12 HOURS SCHEDULED
Status: DISCONTINUED | OUTPATIENT
Start: 2022-03-04 | End: 2022-03-04 | Stop reason: HOSPADM

## 2022-03-04 RX ADMIN — SUFENTANIL CITRATE 5 MCG: 50 INJECTION EPIDURAL; INTRAVENOUS at 09:47

## 2022-03-04 RX ADMIN — PROPOFOL 50 MG: 10 INJECTION, EMULSION INTRAVENOUS at 09:45

## 2022-03-04 RX ADMIN — SODIUM CHLORIDE, POTASSIUM CHLORIDE, SODIUM LACTATE AND CALCIUM CHLORIDE 1000 ML: 600; 310; 30; 20 INJECTION, SOLUTION INTRAVENOUS at 08:22

## 2022-03-04 RX ADMIN — DEXMEDETOMIDINE 25 MCG: 100 INJECTION, SOLUTION, CONCENTRATE INTRAVENOUS at 09:51

## 2022-03-04 RX ADMIN — LIDOCAINE HYDROCHLORIDE 100 MG: 20 INJECTION, SOLUTION EPIDURAL; INFILTRATION; INTRACAUDAL; PERINEURAL at 09:46

## 2022-03-04 RX ADMIN — CLINDAMYCIN IN 5 PERCENT DEXTROSE 600 MG: 12 INJECTION, SOLUTION INTRAVENOUS at 09:37

## 2022-03-04 RX ADMIN — DEXMEDETOMIDINE 25 MCG: 100 INJECTION, SOLUTION, CONCENTRATE INTRAVENOUS at 09:45

## 2022-03-04 NOTE — ANESTHESIA POSTPROCEDURE EVALUATION
Patient: Oral Dey    Procedure Summary     Date: 03/04/22 Room / Location:  [facility] OR  /  [facility]    Anesthesia Start: 0943 Anesthesia Stop: 1017    Procedure: EXCISION OF MASS ON POSTERIOR NECK (N/A ) Diagnosis: (MASS)    Surgeons: Rossana Mahajan MD Provider: Nigel Okeefe CRNA    Anesthesia Type: general ASA Status: 2          Anesthesia Type: general    Vitals  Vitals Value Taken Time   /58 03/04/22 1031   Temp     Pulse 69 03/04/22 1044   Resp 18 03/04/22 1021   SpO2 94 % 03/04/22 1044   Vitals shown include unvalidated device data.        Post Anesthesia Care and Evaluation    Patient location during evaluation: PACU  Patient participation: complete - patient participated  Level of consciousness: awake and alert  Pain management: adequate  Airway patency: patent  Anesthetic complications: No anesthetic complications    Cardiovascular status: acceptable  Respiratory status: acceptable  Hydration status: acceptable    Comments: Blood pressure 135/60, pulse 83, temperature 97.8 °F (36.6 °C), temperature source Temporal, resp. rate 18, SpO2 95 %.    Pt discharged from PACU based on ross score >8  No anesthesia care post op

## 2022-03-04 NOTE — ANESTHESIA PREPROCEDURE EVALUATION
Anesthesia Evaluation     Patient summary reviewed   no history of anesthetic complications:  NPO Solid Status: > 8 hours             Airway   Mallampati: II  TM distance: >3 FB  Neck ROM: full  Dental      Pulmonary    (-) COPD, asthma, sleep apnea, not a smoker  Cardiovascular   Exercise tolerance: excellent (>7 METS)    (+) hypertension,   (-) pacemaker, past MI, angina, cardiac stents      Neuro/Psych  (-) seizures, TIA, CVA  GI/Hepatic/Renal/Endo    (-) GERD, liver disease, no renal disease, diabetes    Musculoskeletal     Abdominal    Substance History      OB/GYN          Other      history of cancer                    Anesthesia Plan    ASA 2     general     intravenous induction     Anesthetic plan, all risks, benefits, and alternatives have been provided, discussed and informed consent has been obtained with: patient.        CODE STATUS:

## 2022-03-04 NOTE — DISCHARGE INSTRUCTIONS
PATIENT/FAMILY/RESPONSIBLE PARTY VERBALIZES UNDERSTANDING OF ABOVE EDUCATION.  COPY OF PAIN SCALE GIVEN AND REVIEWED WITH VERBALIZED UNDERSTANDING.  YOUR NEXT PAIN MEDICATION IS DUE AT______________        Moderate Conscious Sedation, Adult, Care After  Refer to this sheet in the next few weeks. These instructions provide you with information on caring for yourself after your procedure. Your health care provider may also give you more specific instructions. Your treatment has been planned according to current medical practices, but problems sometimes occur. Call your health care provider if you have any problems or questions after your procedure.  WHAT TO EXPECT AFTER THE PROCEDURE    After your procedure:  · You may feel sleepy, clumsy, and have poor balance for several hours.  · Vomiting may occur if you eat too soon after the procedure.  HOME CARE INSTRUCTIONS  · Do not participate in any activities where you could become injured for at least 24 hours. Do not:  ¨ Drive.  ¨ Swim.  ¨ Ride a bicycle.  ¨ Operate heavy machinery.  ¨ Cook.  ¨ Use power tools.  ¨ Climb ladders.  ¨ Work from a high place.  · Do not make important decisions or sign legal documents until you are improved.  · If you vomit, drink water, juice, or soup when you can drink without vomiting. Make sure you have little or no nausea before eating solid foods.  · Only take over-the-counter or prescription medicines for pain, discomfort, or fever as directed by your health care provider.  · Make sure you and your family fully understand everything about the medicines given to you, including what side effects may occur.  · You should not drink alcohol, take sleeping pills, or take medicines that cause drowsiness for at least 24 hours.  · If you smoke, do not smoke without supervision.  · If you are feeling better, you may resume normal activities 24 hours after you were sedated.  · Keep all appointments with your health care provider.  SEEK MEDICAL  CARE IF:  · Your skin is pale or bluish in color.  · You continue to feel nauseous or vomit.  · Your pain is getting worse and is not helped by medicine.  · You have bleeding or swelling.  · You are still sleepy or feeling clumsy after 24 hours.  SEEK IMMEDIATE MEDICAL CARE IF:  · You develop a rash.  · You have difficulty breathing.  · You develop any type of allergic problem.  · You have a fever.  MAKE SURE YOU:  · Understand these instructions.  · Will watch your condition.  · Will get help right away if you are not doing well or get worse.     This information is not intended to replace advice given to you by your health care provider. Make sure you discuss any questions you have with your health care provider.     Document Released: 10/08/2014 Document Revised: 01/08/2016 Document Reviewed: 10/08/2014  [employer] Interactive Patient Education ©2016 [employer]         CALL YOUR PHYSICIAN IF YOU EXPERIENCE  INCREASED PAIN NOT HELPED BY YOUR PAIN MEDICATION.        Fall Prevention in the Home      Falls can cause injuries. They can happen to people of all ages. There are many things you can do to make your home safe and to help prevent falls.    WHAT CAN I DO ON THE OUTSIDE OF MY HOME?  · Regularly fix the edges of walkways and driveways and fix any cracks.  · Remove anything that might make you trip as you walk through a door, such as a raised step or threshold.  · Trim any bushes or trees on the path to your home.  · Use bright outdoor lighting.  · Clear any walking paths of anything that might make someone trip, such as rocks or tools.  · Regularly check to see if handrails are loose or broken. Make sure that both sides of any steps have handrails.  · Any raised decks and porches should have guardrails on the edges.  · Have any leaves, snow, or ice cleared regularly.  · Use sand or salt on walking paths during winter.  · Clean up any spills in your garage right away. This includes oil or grease spills.  WHAT  CAN I DO IN THE BATHROOM?    · Use night lights.  · Install grab bars by the toilet and in the tub and shower. Do not use towel bars as grab bars.  · Use non-skid mats or decals in the tub or shower.  · If you need to sit down in the shower, use a plastic, non-slip stool.  · Keep the floor dry. Clean up any water that spills on the floor as soon as it happens.  · Remove soap buildup in the tub or shower regularly.  · Attach bath mats securely with double-sided non-slip rug tape.  · Do not have throw rugs and other things on the floor that can make you trip.  WHAT CAN I DO IN THE BEDROOM?  · Use night lights.  · Make sure that you have a light by your bed that is easy to reach.  · Do not use any sheets or blankets that are too big for your bed. They should not hang down onto the floor.  · Have a firm chair that has side arms. You can use this for support while you get dressed.  · Do not have throw rugs and other things on the floor that can make you trip.  WHAT CAN I DO IN THE KITCHEN?  · Clean up any spills right away.  · Avoid walking on wet floors.  · Keep items that you use a lot in easy-to-reach places.  · If you need to reach something above you, use a strong step stool that has a grab bar.  · Keep electrical cords out of the way.  · Do not use floor polish or wax that makes floors slippery. If you must use wax, use non-skid floor wax.  · Do not have throw rugs and other things on the floor that can make you trip.  WHAT CAN I DO WITH MY STAIRS?  · Do not leave any items on the stairs.  · Make sure that there are handrails on both sides of the stairs and use them. Fix handrails that are broken or loose. Make sure that handrails are as long as the stairways.  · Check any carpeting to make sure that it is firmly attached to the stairs. Fix any carpet that is loose or worn.  · Avoid having throw rugs at the top or bottom of the stairs. If you do have throw rugs, attach them to the floor with carpet tape.  · Make  sure that you have a light switch at the top of the stairs and the bottom of the stairs. If you do not have them, ask someone to add them for you.  WHAT ELSE CAN I DO TO HELP PREVENT FALLS?  · Wear shoes that:  ¨ Do not have high heels.  ¨ Have rubber bottoms.  ¨ Are comfortable and fit you well.  ¨ Are closed at the toe. Do not wear sandals.  · If you use a stepladder:  ¨ Make sure that it is fully opened. Do not climb a closed stepladder.  ¨ Make sure that both sides of the stepladder are locked into place.  ¨ Ask someone to hold it for you, if possible.  · Clearly kendall and make sure that you can see:  ¨ Any grab bars or handrails.  ¨ First and last steps.  ¨ Where the edge of each step is.  · Use tools that help you move around (mobility aids) if they are needed. These include:  ¨ Canes.  ¨ Walkers.  ¨ Scooters.  ¨ Crutches.  · Turn on the lights when you go into a dark area. Replace any light bulbs as soon as they burn out.  · Set up your furniture so you have a clear path. Avoid moving your furniture around.  · If any of your floors are uneven, fix them.  · If there are any pets around you, be aware of where they are.  · Review your medicines with your doctor. Some medicines can make you feel dizzy. This can increase your chance of falling.  Ask your doctor what other things that you can do to help prevent falls.     This information is not intended to replace advice given to you by your health care provider. Make sure you discuss any questions you have with your health care provider.     Document Released: 10/14/2010 Document Revised: 05/03/2016 Document Reviewed: 01/22/2016  [employer] Interactive Patient Education ©2016 [employer]

## 2022-03-04 NOTE — OP NOTE
Preoperative diagnosis:  Mass posterior neck  Postoperative diagnosis:  Same  Procedure:  Excision sebaceous cyst posterior neck  Surgeon: Rossana Mahajan MD  Anesthesia:  Mac loc  Ebl:  Minimal  Ivf:  See anesthesia  Indications: the patient is a 81-year-old male who presents for excision of mass posterior neck.  The risks, benefits, complications, and possible alternatives were discussed with the patient who agreed to proceed.  Description of procedure: the patient was laid left side down.  The posterior neck was prepped and draped.  At the mid posterior neck, a well-circumscribed 4cm mass was seen.  A transverse incision was created.  A sebaceous cyst was encountered.  Its contents and lining were sharply excised with scalpel.   The wound was copiously irrigated. The skin was closed with 3-0 and 4-0 vicryl.  Dry dressings were applied. The sponge, needle, and instrument counts were correct.  Complications: none  Disposition good to pacu  Findings:  4cm cyst

## 2022-03-09 NOTE — PROGRESS NOTES
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  He is off therapy.  He is also seen for anemia from iron deficiency.  He is off oral iron for the past 28.5 months.  The patient is here alone.  History is obtained from the patient who is considered to be a marginal historian.        Problem List Items Addressed This Visit        Other    CLL (chronic lymphocytic leukemia) (HCC)    Overview     DIAGNOSTIC ABNORMALITIES:   1. Labs, 05/16/2012, [facility]: WBC 10.9, hemoglobin 14.3, hematocrit 42.2, MCV of 94.2, platelets 133,000, ALC elevated at 4796, ANC normal at 4.8, Absolute monocytosis at 970.  2. Labs, 11/14/2012, [facility]: WBC 10.2, hemoglobin 15.4, hematocrit 45.4, MCV 94.5, platelets 154,000, ALC elevated at 4,865. Globulin 1.9.  3. Labs, 11/15/2012, [facility]: IgG 495, IgA 34, IgM 28 (all below normal limits).  4. Blood film review, 11/16/2012, [facility]: Mild absolute lymphocytosis and monocytosis. Reactive and atypical lymphocytes present. Flow cytometry recommended.  5. PATHOLOGY: Cytogenetics, 12/12/2012, [facility]: CLL and CCND1-IGH@FISH: Abnormal results with +12 and 13q-.   6. Flow cytometry peripheral blood, 12/12/2012, [facility]: Peripheral blood with a CD5+ B cell population showing lambda restriction, 30% cellularity.   7. Labs, 03/27/2013: GFR 78. glucose 108. IgG 455, IgM 16, IgA 35, M-spike 0.1, [facility]: IgG monoclonal protein with lambda light chain specificity.   8. 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 Negative for CCND1/IGH gene rearrangement.   9. Hematopathology report peripheral blood, 03/27/2013 [facility]: Normal white blood cell count with no evidence of lymphocytosis but 23% abnormal intermediate lambda light chain-restricted B cell population identified by flow cytometry. Mild thrombocytopenia with normal hemoglobin/hematocrit. See comment.   10. Flow peripheral blood, 03/27/2013, [facility]: Abnormal CD5-positive identified, monoclonal lambda. Consistent with chronic lymphocytic leukemia (CLL)/small lymphocytic lymphoma (SLL).   11. Skeletal survey, 01/11/2013, [facility]: No discrete lytic lesions identified.   12. Ultrasound abdomen, 01/11/2013, [facility]: No focal pathology.     PREVIOUS INTERVENTION:   1. Observation.             IgG lambda monoclonal gammopathy    Relevant Orders    CBC & Differential    Iron and TIBC    Ferritin    Comprehensive Metabolic Panel    Protein Elec + Interp, Serum    Iron deficiency - Primary    Relevant Orders    CBC & Differential    Iron and TIBC    Ferritin    Comprehensive Metabolic Panel    Protein Elec + Interp, Serum        Oncology/Hematology History Overview Note   DIAGNOSTIC ABNORMALITIES:  Labs, 05/16/2012, [facility]: WBC 10.9, hemoglobin 14.3, hematocrit 42.2, MCV of 94.2, platelets 133,000, ALC elevated at 4796, ANC normal at 4.8, Absolute monocytosis at 970.  Labs, 11/14/2012, [facility]: WBC 10.2, hemoglobin 15.4, hematocrit 45.4, MCV 94.5, platelets 154,000, ALC elevated at 4,865. Globulin 1.9.  Labs, 11/15/2012, [facility]: IgG 495, IgA 34, IgM 28 (all below normal limits).  Blood film review, 11/16/2012, [facility]: Mild absolute lymphocytosis and monocytosis. Reactive and atypical lymphocytes present. Flow cytometry recommended.  PATHOLOGY: Cytogenetics, 12/12/2012, [facility]:  CLL and CCND1-IGH@FISH: Abnormal results with +12 and 13q-.   Flow cytometry peripheral blood, 12/12/2012, [facility]:  Peripheral blood with a CD5+ B cell population showing lambda restriction, 30% cellularity.   Labs, 03/27/2013: GFR 78. glucose 108. IgG 455, IgM 16, IgA 35, M-spike 0.1, [facility]: IgG monoclonal protein with lambda light chain specificity.   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 Negative for CCND1/IGH gene rearrangement.    Hematopathology report peripheral blood, 03/27/2013 [facility]: Normal white blood cell count with no evidence of lymphocytosis but 23% abnormal intermediate lambda light chain-restricted B cell population identified by flow cytometry.  Mild thrombocytopenia with normal hemoglobin/hematocrit.  See comment.   Flow peripheral blood, 03/27/2013, [facility]:  Abnormal CD5-positive identified, monoclonal lambda. Consistent with chronic lymphocytic leukemia (CLL)/small lymphocytic lymphoma (SLL).     Skeletal survey, 01/11/2013, [facility]: No discrete lytic lesions identified.   Ultrasound abdomen, 01/11/2013, [facility]: No focal pathology.        PREVIOUS INTERVENTION:   Observation.      PREVIOUS INTERVENTIONS: Anemia from iron deficiency.  Ferrous sulfate 325 mg p.o. daily 01/03/2018 through 03/06/2018.  Resumed 09/04/2018 through 10/10/2018.  Resumed 08/27/2019 through 10/24/2019.  Resume 03/10/2022.     CLL (chronic lymphocytic leukemia) ([facility])   8/27/2019 Initial Diagnosis    CLL (chronic lymphocytic leukemia) (CMS/[facility])         PAST MEDICAL HISTORY:  ALLERGIES:  Allergies   Allergen Reactions   • Augmentin [Amoxicillin-Pot Clavulanate] Hives   • Latex Itching and Other (See Comments)     And band aids. Causes redness and itching.     CURRENT MEDICATIONS:  Outpatient Encounter Medications as of 3/10/2022   Medication Sig Dispense Refill   • aspirin 81 MG EC tablet Take 81 mg by mouth Daily. Holding for surgery     • folic acid-vit B6-vit B12 (FOLTABS) 0.8-10-0.115 MG tablet tablet Take  by mouth  Daily.     • latanoprost (XALATAN) 0.005 % ophthalmic solution Administer 1 drop to the right eye Daily.     • metoprolol tartrate (LOPRESSOR) 25 MG tablet Take 25 mg by mouth 2 (Two) Times a Day.     • multivitamins-minerals (PRESERVISION AREDS 2) capsule capsule Take 1 capsule by mouth 2 (Two) Times a Day.     • oxyCODONE-acetaminophen (PERCOCET) 5-325 MG per tablet Take 1 tablet by mouth Every 4 (Four) Hours As Needed (Pain). 10 tablet 0   • vitamin D3 125 MCG (5000 UT) capsule capsule Take 5,000 Units by mouth Daily.     • ferrous sulfate 325 (65 FE) MG tablet Take 1 tablet by mouth Daily With Breakfast. 60 tablet 2     No facility-administered encounter medications on file as of 3/10/2022.     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   • Iron deficiency E61.1     SURGERIES:  Past Surgical History:   Procedure Laterality Date   • CATARACT EXTRACTION, BILATERAL     • COLONOSCOPY  06/13/2012   • CYSTOSCOPY BLADDER BIOPSY     • EXCISION MASS HEAD/NECK N/A 3/4/2022    Procedure: EXCISION OF MASS ON POSTERIOR NECK;  Surgeon: Rossana Mahajan MD;  Location: [facility] OR;  Service: General;  Laterality: N/A;   • INGUINAL HERNIA REPAIR Left 2007   • INGUINAL HERNIA REPAIR Right 12/28/2017    Procedure: RIGHT INGUINAL HERNIA REPAIR WITH MESH ;  Surgeon: Rossana Mahajan MD;  Location: [facility] OR;  Service:      HEALTH MAINTENANCE ITEMS:  Health Maintenance Due   Topic Date Due   • ANNUAL PHYSICAL  Never done   • Pneumococcal Vaccine 65+ (1 of 4 - PCV13) Never done   • TDAP/TD VACCINES (1 - Tdap) Never done   • ZOSTER VACCINE (1 of 2) Never done   • HEPATITIS C SCREENING  Never done   • INFLUENZA VACCINE  08/01/2021       <no information>  Last Completed Colonoscopy          COLORECTAL CANCER SCREENING (COLONOSCOPY -  "Every 10 Years) Next due on 2017  SCANNED - COLONOSCOPY    2012  SCANNED - COLONOSCOPY                There is no immunization history on file for this patient.  Last Completed Mammogram     This patient has no relevant Health Maintenance data.            FAMILY HISTORY:  Family History   Problem Relation Age of Onset   • Colon cancer Maternal Grandfather         in his 60's.    • Alzheimer's disease Mother    • Hypertension Father    • Other Father         stent   • COPD Sister    • Heart attack Brother    • No Known Problems Maternal Grandmother    • No Known Problems Paternal Grandmother    • No Known Problems Paternal Grandfather      SOCIAL HISTORY:  Social History     Socioeconomic History   • Marital status:    Tobacco Use   • Smoking status: Former Smoker     Years: 15.00     Types: Cigarettes     Quit date:      Years since quittin.2   • Smokeless tobacco: Never Used   Substance and Sexual Activity   • Alcohol use: No   • Drug use: No   • Sexual activity: Defer       REVIEW OF SYSTEMS:    Review of Systems   Constitutional: Positive for fatigue. Negative for chills and fever.        \"I feel tired.\"   HENT: Negative for congestion, mouth sores and nosebleeds.    Eyes: Negative for redness and visual disturbance.   Respiratory: Negative for cough, shortness of breath and wheezing.    Cardiovascular: Negative for chest pain and palpitations.   Gastrointestinal: Negative for abdominal pain, blood in stool, nausea and vomiting.   Endocrine: Negative for polydipsia and polyphagia.   Genitourinary: Negative for dysuria, flank pain and hematuria.   Musculoskeletal: Negative for gait problem and joint swelling.   Skin: Positive for pallor.   Allergic/Immunologic: Negative for food allergies.   Neurological: Negative for speech difficulty, weakness and confusion.   Hematological: Negative for adenopathy. Does not bruise/bleed easily.   Psychiatric/Behavioral: Negative for " "agitation, hallucinations and depressed mood.         VITAL SIGNS: /80   Pulse 68   Temp 98.2 °F (36.8 °C)   Resp 18   Ht 175 cm (68.9\")   Wt 68.9 kg (152 lb)   SpO2 96%   BMI 22.51 kg/m²  Body surface area is 1.84 meters squared.   Pain Score    03/10/22 1351   PainSc: 0-No pain           PHYSICAL EXAMINATION:     Physical Exam  Vitals reviewed.   Constitutional:       General: He is not in acute distress.  HENT:      Head: Normocephalic and atraumatic.   Eyes:      General: No scleral icterus.  Cardiovascular:      Rate and Rhythm: Normal rate.   Pulmonary:      Effort: No respiratory distress.      Breath sounds: No wheezing or rales.   Abdominal:      General: Abdomen is flat. Bowel sounds are normal.      Palpations: Abdomen is soft.      Tenderness: There is no abdominal tenderness.   Musculoskeletal:         General: No swelling.      Cervical back: Neck supple.   Skin:     General: Skin is warm.      Coloration: Skin is pale.   Neurological:      Mental Status: He is alert and oriented to person, place, and time.   Psychiatric:         Mood and Affect: Mood normal.         Behavior: Behavior normal.         Thought Content: Thought content normal.         Judgment: Judgment normal.         LABS    Lab Results - Last 18 Months   Lab Units 03/03/22  1132 03/01/22  1554 01/10/22  1044 11/16/21  1324 08/16/21  1353 05/17/21  1358 02/15/21  1131 11/18/20  1106   HEMOGLOBIN g/dL 12.7* 12.5* 13.4* 13.7 13.4 13.3 13.7 13.8   HEMATOCRIT % 39.2 37.7 42.2 40.9 41.8 39.8 39.6 41.6   MCV fL 93.1 91.1 95.5* 90.3 93.1 90.7 90.0 89.8   WBC 10*3/mm3 10.04 8.78 9.6 9.54 13.95* 13.61* 10.61 10.18   RDW % 12.6 12.5 12.3 12.4 12.0* 12.7 12.8 12.4   MPV fL 9.2 9.6 9.1* 9.1 9.2 8.9 9.3 9.3   PLATELETS 10*3/mm3 127* 136* 150 143 230 161 116* 132*   NEUTROS ABS 10*3/mm3 4.77 4.35 3.7 2.92 9.35* 5.10 3.35 3.81   LYMPHS ABS K/uL  --   --  4.5  --   --  6.95*  --   --    MONOS ABS K/uL  --   --  1.10*  --   --  1.02*  --   " --    EOS ABS 10*3/mm3 0.20 0.26 0.20 0.39  --  0.38 0.11 0.31   BASOS ABS 10*3/mm3 0.10 0.09 0.10  --  0.14 0.09  --  0.10   IMMATURE GRANS (ABS) K/uL  --   --  0.0  --   --   --   --   --    NEUTROPHIL % % 45.5 49.5  --  30.6* 67.0  --  31.6* 37.4*   MONOCYTES % % 10.1 9.1  --  5.1 4.0*  --  4.1* 4.0*   BASOPHIL % % 1.0 1.0  --   --  1.0  --   --  1.0   ATYP LYMPH % % 15.2* 8.1*  --  5.1*  --   --  8.2* 16.2*   ANISOCYTOSIS   --   --   --  Slight/1+  --   --   --  Slight/1+       Lab Results - Last 18 Months   Lab Units 03/03/22  1132 03/01/22  1554 01/10/22  1044 08/16/21  1353 02/15/21  1131   GLUCOSE mg/dL 98 106* 101 107* 99   SODIUM mmol/L 143 143 144 139 143   POTASSIUM mmol/L 4.2 3.9 4.6 4.8 4.4   TOTAL CO2 mmol/L  --   --  29  --   --    CO2 mmol/L 30.0* 27.0  --  27.0 30.0*   CHLORIDE mmol/L 106 107 106 103 106   ANION GAP mmol/L 7.0 9.0 9 9.0 7.0   CREATININE mg/dL 1.25 1.26 1 1.05 1.02   BUN mg/dL 13 16 13 21 11   BUN / CREAT RATIO  10.4 12.7  --  20.0 10.8   CALCIUM mg/dL 9.3 9.4 9.6 10.1 9.8   EGFR IF NONAFRICN AM   --   --  >60 68 71   ALK PHOS U/L 106 105  --  108 100   TOTAL PROTEIN g/dL 6.1 6.0  --  6.7 6.6   ALT (SGPT) U/L 5 8  --  7 8   AST (SGOT) U/L 11 13  --  10 13   BILIRUBIN mg/dL 1.0 0.6  --  0.5 0.7   ALBUMIN g/dL 4.30  3.6 4.10  --  4.80  3.9 4.40  4.1   GLOBULIN gm/dL 1.8 1.9  --  1.9 2.2       Lab Results - Last 18 Months   Lab Units 03/03/22  1132 08/16/21  1353 02/15/21  1131   M-SPIKE g/dL 0.1* 0.1* 0.1*       Lab Results - Last 18 Months   Lab Units 03/03/22  1132 11/16/21  1324 08/16/21  1353 05/17/21  1358 02/15/21  1131 11/18/20  1106   IRON mcg/dL 51* 60 101 75 114 121   TIBC mcg/dL 368 401 386 349 420 399   IRON SATURATION % 14* 15* 26 22 27 30   FERRITIN ng/mL 93.39 87.83 134.70 116.70 124.30 113.50         Oral Dey reports a pain score of 0.      ASSESSMENT:  1.   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  Treatment status: Observation.  Complications: None.  Prognosis: Good.  2.   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  3.   Normocytic anemia, from chronic disease and iron deficiency. On oral iron.  4.   Performance status of 1.  5.   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   6.   History of fever, resolved, ? viral syndrome.  Not compatible with progressing chronic lymphocytic leukemia (CLL). Lymphocyte count is stable, no palpable adenopathies, and fever had resolved.   7.   Bladder cancer  AJCC stage cTa, cN0, cM0.   Treatment status:On observation by urology.         PLAN:  1.    Re:  Treatment is generally reserved until the patient has active disease defined as the presence of disease-related symptoms, such as weight loss greater than 10%, extreme fatigue, fever greater than 100.5 for 2 weeks with night sweats without evidence of infection (\"B\" symptoms), worsening cytopenias, unexplained recurrent infections, worsening adenopathy, or splenomegaly.  2.    Re:  Heme status.  WBC  10.04, hemoglobin 12.7, hematocrit 31.2, MCV 93.1, platelet 127, lymphocyte 3.9 from 3.2, no rapid doubling.  3.    Re:  Pre office SPEP/SIEP. M spike 0.1 from 0.1 from 0.1 from 0.2 from 0.1, stable.  4.    Re:  Pre-office CMP.  GFR 58.6 ml/minute.  5.    Re:  Pre-office ferritin, TIBC, % saturation, and iron. 14% saturation and ferritin 93.39 ng/ml.    6.    Re:  Stable for observation (CLL).  Patient did not meet criteria for therapy.  7.    again Re:  A bone marrow aspiration and biopsy is indicated in all patients with an M protein 1.5 g/dL, patients with a non-IgG MGUS, patients with an abnormal serum free light chain ratio (i.e., ratio of kappa to lambda free light chains less than 0.26 or greater than 1.65), and in all patients who have " any abnormalities of the CBC, serum creatinine, serum calcium, or radiographic bone survey.  8.  Plan of care discussed with patient.  Understanding expressed.  Patient agreeable to proceed.  9.  Continue ongoing management per primary care physician and other specialists.  10.  Advance Care Planning   ACP discussion was declined by the patient. Patient does not have an advance directive, information provided.  11. eRx ferrous sulfate 325 mg p.o. daily #60 with 2 refills.  Stop and call if intolerant.  Observe for potential nausea, vomiting, abdominal pain, diarrhea or constipation.  12.  CBC with differential, ferritin, TIBC, % saturation, and iron every 12 weeks.  13.  Return to the office with pre-office CMP and SPEP/SIEP in 6 months.        I have reviewed the assessment and plan and verified the accuracy of it. No changes to assessment and plan since the information was documented. Marco Boogie MD 03/10/22        I spent 30 total minutes, face-to-face, caring for Oral today.  Greater than 50% of this time involved counseling and/or coordination of care as documented within this note regarding the patient's illness(es), pros and cons of various treatment options, instructions and/or risk reduction.           cc:  Jorge Shepherd MD         (Rossana Mahajan MD         (Chu Chaves MD)         (Chalo Zaman MD)

## 2022-03-10 ENCOUNTER — OFFICE VISIT (OUTPATIENT)
Dept: ONCOLOGY | Facility: CLINIC | Age: 82
End: 2022-03-10

## 2022-03-10 VITALS
TEMPERATURE: 98.2 F | HEART RATE: 68 BPM | HEIGHT: 69 IN | DIASTOLIC BLOOD PRESSURE: 80 MMHG | RESPIRATION RATE: 18 BRPM | BODY MASS INDEX: 22.51 KG/M2 | OXYGEN SATURATION: 96 % | WEIGHT: 152 LBS | SYSTOLIC BLOOD PRESSURE: 154 MMHG

## 2022-03-10 DIAGNOSIS — E61.1 IRON DEFICIENCY: Primary | ICD-10-CM

## 2022-03-10 DIAGNOSIS — D47.2 IGG LAMBDA MONOCLONAL GAMMOPATHY: ICD-10-CM

## 2022-03-10 DIAGNOSIS — C91.10 CLL (CHRONIC LYMPHOCYTIC LEUKEMIA): ICD-10-CM

## 2022-03-10 PROCEDURE — 99214 OFFICE O/P EST MOD 30 MIN: CPT | Performed by: INTERNAL MEDICINE

## 2022-03-10 RX ORDER — FERROUS SULFATE 325(65) MG
325 TABLET ORAL
Qty: 60 TABLET | Refills: 2 | Status: SHIPPED | OUTPATIENT
Start: 2022-03-10 | End: 2022-09-13

## 2022-04-07 ENCOUNTER — HOSPITAL ENCOUNTER (OUTPATIENT)
Dept: ULTRASOUND IMAGING | Age: 82
Discharge: HOME | End: 2022-04-07
Payer: COMMERCIAL

## 2022-04-07 ENCOUNTER — HOSPITAL ENCOUNTER (OUTPATIENT)
Dept: LAB | Age: 82
Discharge: HOME | End: 2022-04-07
Payer: COMMERCIAL

## 2022-04-07 DIAGNOSIS — C67.8 MALIGNANT NEOPLASM OF OVERLAPPING SITES OF BLADDER (HCC): ICD-10-CM

## 2022-04-07 PROCEDURE — 76770 US EXAM ABDO BACK WALL COMP: CPT

## 2022-04-11 ENCOUNTER — PROCEDURE VISIT (OUTPATIENT)
Dept: UROLOGY | Age: 82
End: 2022-04-11
Payer: COMMERCIAL

## 2022-04-11 VITALS — HEIGHT: 70 IN | TEMPERATURE: 98.4 F | WEIGHT: 154 LBS | BODY MASS INDEX: 22.05 KG/M2

## 2022-04-11 DIAGNOSIS — Z85.51 HISTORY OF BLADDER CANCER: Primary | ICD-10-CM

## 2022-04-11 LAB
BILIRUBIN, POC: NORMAL
BLOOD URINE, POC: NORMAL
CLARITY, POC: CLEAR
COLOR, POC: YELLOW
GLUCOSE URINE, POC: NORMAL
KETONES, POC: NORMAL
LEUKOCYTE EST, POC: NORMAL
NITRITE, POC: NORMAL
PH, POC: 6.5
PROTEIN, POC: NORMAL
SPECIFIC GRAVITY, POC: 1.01
UROBILINOGEN, POC: 0.2

## 2022-04-11 PROCEDURE — 81003 URINALYSIS AUTO W/O SCOPE: CPT | Performed by: UROLOGY

## 2022-04-11 PROCEDURE — 52000 CYSTOURETHROSCOPY: CPT | Performed by: UROLOGY

## 2022-04-11 PROCEDURE — 99999 PR OFFICE/OUTPT VISIT,PROCEDURE ONLY: CPT | Performed by: UROLOGY

## 2022-04-11 RX ORDER — FERROUS SULFATE 325(65) MG
325 TABLET ORAL
Status: ON HOLD | COMMUNITY
End: 2022-10-27 | Stop reason: HOSPADM

## 2022-04-11 NOTE — PROGRESS NOTES
BLADDER CANCER  Patient was first diagnosed with bladder cancer approximately 3 month(s) ago. 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 microscopic  Last upper tract study: 3 month(s) ago. Study was a bilateral retrograde pyelograms on 1/11/2022. Patient also had a renal ultrasound done on 4/7/2022 because his left ureteral orifice and trigone was resected at the time of TURBT. A stent have been left in place. He comes in today after getting the ultrasound  He comes in now for his first surveillance cystoscopy      Cystoscopy Operative Note (4/11/22)  Surgeon: Liliana Tejeda MD  Anesthesia: Urethral 2% Xylocaine   Indications: History bladder cancer  Position: Supine    Findings:   The patient was prepped and draped in the usual sterile fashion. The flexible cystoscope was advanced through the urethra and into the bladder under direct vision. The bladder was thoroughly inspected and the following was noted:    Residual Urine: mild  Urethra: normal appearing urethra with no masses, tenderness or lesions  Prostate: partially obstructing lateral lobes of prostate; median lobe present? no.    Bladder: No tumors or CIS noted. No bladder diverticulum. There was mild trabeculation noted. Previous area of resection is well-healed no recurrent tumors were seen today. Ureters: Clear efflux from both ureters. Right orifices with normal configuration and location. Left resected more lateral and open    The cystoscope was removed. The patient tolerated the procedure well. The patient was given a post procedure instructions and follow up.     Results for orders placed or performed in visit on 04/11/22   POCT Urinalysis No Micro (Auto) Result Value Ref Range    Color, UA yellow     Clarity, UA clear     Glucose, UA POC -     Bilirubin, UA -     Ketones, UA -     Spec Grav, UA 1.015     Blood, UA POC -     pH, UA 6.5     Protein, UA POC -     Urobilinogen, UA 0.2     Leukocytes, UA -     Nitrite, UA -            1. History of bladder cancer  Surveillance cystoscopy showed no recurrence no follow-up in 3 months for next surveillance  - Cytology, Non-Gyn; Future  - Cystoscopy; Future      Orders Placed This Encounter   Procedures    Cytology, Non-Gyn     Prior to next visit in 3 mos     Standing Status:   Future     Standing Expiration Date:   4/11/2023     Order Specific Question:   PREVIOUS BIOPSY     Answer:   Yes     Order Specific Question:   PREOP DIAGNOSIS     Answer:   History of bladder cancer     Order Specific Question:   FROZEN SECTION - NO OR YES/SPECIMEN     Answer:   No    POCT Urinalysis No Micro (Auto)    Cystoscopy     Cystoscopy in 3 months     Standing Status:   Future     Standing Expiration Date:   4/11/2023       Return in about 3 months (around 7/11/2022) for Cystoscopy on next visit, Urine Cytology prior to next visit.         Josee Nuñez MD

## 2022-06-09 ENCOUNTER — LAB (OUTPATIENT)
Dept: LAB | Facility: HOSPITAL | Age: 82
End: 2022-06-09

## 2022-06-09 ENCOUNTER — TELEPHONE (OUTPATIENT)
Dept: ONCOLOGY | Facility: CLINIC | Age: 82
End: 2022-06-09

## 2022-06-09 DIAGNOSIS — E61.1 IRON DEFICIENCY: ICD-10-CM

## 2022-06-09 DIAGNOSIS — D47.2 IGG LAMBDA MONOCLONAL GAMMOPATHY: ICD-10-CM

## 2022-06-09 LAB
BASOPHILS # BLD MANUAL: 0.09 10*3/MM3 (ref 0–0.2)
BASOPHILS NFR BLD MANUAL: 1 % (ref 0–1.5)
DEPRECATED RDW RBC AUTO: 43.9 FL (ref 37–54)
EOSINOPHIL # BLD MANUAL: 0.09 10*3/MM3 (ref 0–0.4)
EOSINOPHIL NFR BLD MANUAL: 1 % (ref 0.3–6.2)
ERYTHROCYTE [DISTWIDTH] IN BLOOD BY AUTOMATED COUNT: 13 % (ref 12.3–15.4)
FERRITIN SERPL-MCNC: 121.2 NG/ML (ref 30–400)
HCT VFR BLD AUTO: 39.1 % (ref 37.5–51)
HGB BLD-MCNC: 12.9 G/DL (ref 13–17.7)
IRON 24H UR-MRATE: 99 MCG/DL (ref 59–158)
IRON SATN MFR SERPL: 28 % (ref 20–50)
LYMPHOCYTES # BLD MANUAL: 4.9 10*3/MM3 (ref 0.7–3.1)
LYMPHOCYTES NFR BLD MANUAL: 8.3 % (ref 5–12)
MCH RBC QN AUTO: 30.4 PG (ref 26.6–33)
MCHC RBC AUTO-ENTMCNC: 33 G/DL (ref 31.5–35.7)
MCV RBC AUTO: 92.2 FL (ref 79–97)
MONOCYTES # BLD: 0.77 10*3/MM3 (ref 0.1–0.9)
NEUTROPHILS # BLD AUTO: 3.36 10*3/MM3 (ref 1.7–7)
NEUTROPHILS NFR BLD MANUAL: 35.4 % (ref 42.7–76)
NEUTS BAND NFR BLD MANUAL: 1 % (ref 0–5)
PLATELET # BLD AUTO: 137 10*3/MM3 (ref 140–450)
PMV BLD AUTO: 9.3 FL (ref 6–12)
POIKILOCYTOSIS BLD QL SMEAR: ABNORMAL
RBC # BLD AUTO: 4.24 10*6/MM3 (ref 4.14–5.8)
SMALL PLATELETS BLD QL SMEAR: ABNORMAL
SMUDGE CELLS BLD QL SMEAR: ABNORMAL
STOMATOCYTES BLD QL SMEAR: ABNORMAL
TIBC SERPL-MCNC: 359 MCG/DL (ref 298–536)
TRANSFERRIN SERPL-MCNC: 241 MG/DL (ref 200–360)
VARIANT LYMPHS NFR BLD MANUAL: 3.1 % (ref 0–5)
VARIANT LYMPHS NFR BLD MANUAL: 50 % (ref 19.6–45.3)
WBC NRBC COR # BLD: 9.22 10*3/MM3 (ref 3.4–10.8)

## 2022-06-09 PROCEDURE — 83540 ASSAY OF IRON: CPT

## 2022-06-09 PROCEDURE — 84466 ASSAY OF TRANSFERRIN: CPT

## 2022-06-09 PROCEDURE — 85025 COMPLETE CBC W/AUTO DIFF WBC: CPT

## 2022-06-09 PROCEDURE — 36415 COLL VENOUS BLD VENIPUNCTURE: CPT

## 2022-06-09 PROCEDURE — 82728 ASSAY OF FERRITIN: CPT

## 2022-06-09 PROCEDURE — 85007 BL SMEAR W/DIFF WBC COUNT: CPT

## 2022-06-09 NOTE — TELEPHONE ENCOUNTER
STOP ORAL IRON  Notified patient Oral Dey, per Dr Boogie instructions he can d/c use of oral iron tablet Ferrous Sulfate  6/9/22: Iron Sat 28% iron stores    Patient v/u of instructions and will d/c use of his oral iron, he will recheck his studies at next [facility] visit.

## 2022-07-05 ENCOUNTER — HOSPITAL ENCOUNTER (OUTPATIENT)
Age: 82
Setting detail: SPECIMEN
Discharge: HOME | End: 2022-07-05
Payer: COMMERCIAL

## 2022-07-05 DIAGNOSIS — Z85.51 HISTORY OF BLADDER CANCER: ICD-10-CM

## 2022-07-05 PROCEDURE — 88112 CYTOPATH CELL ENHANCE TECH: CPT

## 2022-07-11 ENCOUNTER — PROCEDURE VISIT (OUTPATIENT)
Dept: UROLOGY | Age: 82
End: 2022-07-11
Payer: COMMERCIAL

## 2022-07-11 VITALS — TEMPERATURE: 98.8 F | HEIGHT: 70 IN | BODY MASS INDEX: 21.47 KG/M2 | WEIGHT: 150 LBS

## 2022-07-11 DIAGNOSIS — Z85.51 HISTORY OF BLADDER CANCER: ICD-10-CM

## 2022-07-11 LAB
BILIRUBIN, POC: NORMAL
BLOOD URINE, POC: NORMAL
CLARITY, POC: CLEAR
COLOR, POC: YELLOW
GLUCOSE URINE, POC: NORMAL
KETONES, POC: NORMAL
LEUKOCYTE EST, POC: NORMAL
NITRITE, POC: NORMAL
PH, POC: 6
PROTEIN, POC: NORMAL
SPECIFIC GRAVITY, POC: 1.01
UROBILINOGEN, POC: 0.2

## 2022-07-11 PROCEDURE — 81003 URINALYSIS AUTO W/O SCOPE: CPT | Performed by: UROLOGY

## 2022-07-11 PROCEDURE — 52000 CYSTOURETHROSCOPY: CPT | Performed by: UROLOGY

## 2022-07-11 NOTE — LETTER
[facility]  [place] [address]  [address] [address]  Phone: [phone]  Fax: [phone]    Bronson Corona MD    July 11, 2022     Sammie Hartley MD  [address]  [id number]    Patient: Sharad Benitez   MR Number: [id number]   YOB: 1940   Date of Visit: 7/11/2022       Dear Sammie Hartley: Thank you for referring Shahida Alegre to me for evaluation/treatment. Below are the relevant portions of my assessment and plan of care. If you have questions, please do not hesitate to call me. I look forward to following Brayan Mckeon along with you.     Sincerely,      Bronson Corona MD

## 2022-07-11 NOTE — PROGRESS NOTES
BLADDER CANCER  Patient was first diagnosed with bladder cancer approximately 6 month(s) ago. 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 None  Last upper tract study: 6 month(s) ago. Study was a bilateral retrograde pyelograms done 1/11/2022        Cystoscopy Operative Note (7/11/22)  Surgeon: Blade Agustin MD  Anesthesia: Urethral 2% Xylocaine   Indications: History of bladder cancer  Position: Supine    Findings:   The patient was prepped and draped in the usual sterile fashion. The flexible cystoscope was advanced through the urethra and into the bladder under direct vision. The bladder was thoroughly inspected and the following was noted:    Residual Urine: mild  Urethra: normal appearing urethra with no masses, tenderness or lesions  Prostate: partially obstructing lateral lobes of prostate mild; median lobe present? yes. Small  Bladder: No tumors or CIS noted. No bladder diverticulum. There was mild trabeculation noted. Recurrent papillary tumor seen today  Ureters: Clear efflux from both ureters. Orifices with normal configuration and location of the right. The left status post resection and slightly displaced laterally. The cystoscope was removed. The patient tolerated the procedure well. The patient was given a post procedure instructions and follow up. Results for orders placed or performed in visit on 07/11/22   POCT Urinalysis No Micro (Auto)   Result Value Ref Range    Color, UA yellow     Clarity, UA clear     Glucose, UA POC -     Bilirubin, UA -     Ketones, UA -     Spec Grav, UA 1.010     Blood, UA POC -     pH, UA 6.0     Protein, UA POC -     Urobilinogen, UA 0.2     Leukocytes, UA -     Nitrite, UA -            1.  History of bladder cancer  Return for routine surveillance in 3 months  - Cystoscopy  - POCT Urinalysis No Micro (Auto)  - Cytology, Non-Gyn; Future  - LA CYSTOURETHROSCOPY; Future      Orders Placed This Encounter   Procedures    Cytology, Non-Gyn     Prior to next visit in 3 mos     Standing Status:   Future     Standing Expiration Date:   7/11/2023     Order Specific Question:   PREVIOUS BIOPSY     Answer:   Yes     Order Specific Question:   PREOP DIAGNOSIS     Answer:   History of bladder cancer     Order Specific Question:   FROZEN SECTION - NO OR YES/SPECIMEN     Answer:   No    POCT Urinalysis No Micro (Auto)    LA CYSTOURETHROSCOPY     Cystoscopy in 3 months     Standing Status:   Future     Standing Expiration Date:   7/11/2023       Return in about 3 months (around 10/11/2022) for Cystoscopy on next visit, Urine Cytology prior to next visit.         Louis Castellano MD

## 2022-09-01 ENCOUNTER — TELEPHONE (OUTPATIENT)
Dept: ONCOLOGY | Facility: CLINIC | Age: 82
End: 2022-09-01

## 2022-09-01 ENCOUNTER — LAB (OUTPATIENT)
Dept: LAB | Facility: HOSPITAL | Age: 82
End: 2022-09-01

## 2022-09-01 DIAGNOSIS — E61.1 IRON DEFICIENCY: ICD-10-CM

## 2022-09-01 DIAGNOSIS — D47.2 IGG LAMBDA MONOCLONAL GAMMOPATHY: ICD-10-CM

## 2022-09-01 LAB
ALBUMIN SERPL-MCNC: 4.6 G/DL (ref 3.5–5.2)
ALBUMIN/GLOB SERPL: 2.9 G/DL
ALP SERPL-CCNC: 91 U/L (ref 39–117)
ALT SERPL W P-5'-P-CCNC: 8 U/L (ref 1–41)
ANION GAP SERPL CALCULATED.3IONS-SCNC: 9 MMOL/L (ref 5–15)
AST SERPL-CCNC: 13 U/L (ref 1–40)
BASOPHILS # BLD MANUAL: 0.17 10*3/MM3 (ref 0–0.2)
BASOPHILS NFR BLD MANUAL: 2.1 % (ref 0–1.5)
BILIRUB SERPL-MCNC: 0.6 MG/DL (ref 0–1.2)
BUN SERPL-MCNC: 19 MG/DL (ref 8–23)
BUN/CREAT SERPL: 16.5 (ref 7–25)
CALCIUM SPEC-SCNC: 9.4 MG/DL (ref 8.6–10.5)
CHLORIDE SERPL-SCNC: 105 MMOL/L (ref 98–107)
CO2 SERPL-SCNC: 27 MMOL/L (ref 22–29)
CREAT SERPL-MCNC: 1.15 MG/DL (ref 0.76–1.27)
DEPRECATED RDW RBC AUTO: 40.8 FL (ref 37–54)
EGFRCR SERPLBLD CKD-EPI 2021: 64.7 ML/MIN/1.73
EOSINOPHIL # BLD MANUAL: 0.08 10*3/MM3 (ref 0–0.4)
EOSINOPHIL NFR BLD MANUAL: 1 % (ref 0.3–6.2)
ERYTHROCYTE [DISTWIDTH] IN BLOOD BY AUTOMATED COUNT: 12.6 % (ref 12.3–15.4)
FERRITIN SERPL-MCNC: 109 NG/ML (ref 30–400)
GLOBULIN UR ELPH-MCNC: 1.6 GM/DL
GLUCOSE SERPL-MCNC: 113 MG/DL (ref 65–99)
HCT VFR BLD AUTO: 37.7 % (ref 37.5–51)
HGB BLD-MCNC: 12.9 G/DL (ref 13–17.7)
IRON 24H UR-MRATE: 86 MCG/DL (ref 59–158)
IRON SATN MFR SERPL: 22 % (ref 20–50)
LYMPHOCYTES # BLD MANUAL: 4.46 10*3/MM3 (ref 0.7–3.1)
LYMPHOCYTES NFR BLD MANUAL: 11.3 % (ref 5–12)
MCH RBC QN AUTO: 30.5 PG (ref 26.6–33)
MCHC RBC AUTO-ENTMCNC: 34.2 G/DL (ref 31.5–35.7)
MCV RBC AUTO: 89.1 FL (ref 79–97)
MONOCYTES # BLD: 0.94 10*3/MM3 (ref 0.1–0.9)
NEUTROPHILS # BLD AUTO: 2.67 10*3/MM3 (ref 1.7–7)
NEUTROPHILS NFR BLD MANUAL: 32 % (ref 42.7–76)
PLAT MORPH BLD: NORMAL
PLATELET # BLD AUTO: 147 10*3/MM3 (ref 140–450)
PMV BLD AUTO: 9.1 FL (ref 6–12)
POTASSIUM SERPL-SCNC: 4 MMOL/L (ref 3.5–5.2)
PROT SERPL-MCNC: 6.2 G/DL (ref 6–8.5)
RBC # BLD AUTO: 4.23 10*6/MM3 (ref 4.14–5.8)
RBC MORPH BLD: NORMAL
SMUDGE CELLS BLD QL SMEAR: ABNORMAL
SODIUM SERPL-SCNC: 141 MMOL/L (ref 136–145)
TIBC SERPL-MCNC: 384 MCG/DL (ref 298–536)
TRANSFERRIN SERPL-MCNC: 258 MG/DL (ref 200–360)
VARIANT LYMPHS NFR BLD MANUAL: 53.6 % (ref 19.6–45.3)
WBC NRBC COR # BLD: 8.33 10*3/MM3 (ref 3.4–10.8)

## 2022-09-01 PROCEDURE — 80053 COMPREHEN METABOLIC PANEL: CPT

## 2022-09-01 PROCEDURE — 82728 ASSAY OF FERRITIN: CPT

## 2022-09-01 PROCEDURE — 84466 ASSAY OF TRANSFERRIN: CPT

## 2022-09-01 PROCEDURE — 36415 COLL VENOUS BLD VENIPUNCTURE: CPT

## 2022-09-01 PROCEDURE — 85025 COMPLETE CBC W/AUTO DIFF WBC: CPT

## 2022-09-01 PROCEDURE — 84165 PROTEIN E-PHORESIS SERUM: CPT

## 2022-09-01 PROCEDURE — 83540 ASSAY OF IRON: CPT

## 2022-09-01 PROCEDURE — 85007 BL SMEAR W/DIFF WBC COUNT: CPT

## 2022-09-02 LAB
ALBUMIN SERPL ELPH-MCNC: 3.8 G/DL (ref 2.9–4.4)
ALBUMIN/GLOB SERPL: 2 {RATIO} (ref 0.7–1.7)
ALPHA1 GLOB SERPL ELPH-MCNC: 0.2 G/DL (ref 0–0.4)
ALPHA2 GLOB SERPL ELPH-MCNC: 0.7 G/DL (ref 0.4–1)
B-GLOBULIN SERPL ELPH-MCNC: 0.8 G/DL (ref 0.7–1.3)
GAMMA GLOB SERPL ELPH-MCNC: 0.3 G/DL (ref 0.4–1.8)
GLOBULIN SER CALC-MCNC: 1.9 G/DL (ref 2.2–3.9)
LABORATORY COMMENT REPORT: ABNORMAL
M PROTEIN SERPL ELPH-MCNC: 0.1 G/DL
PROT PATTERN SERPL ELPH-IMP: ABNORMAL
PROT SERPL-MCNC: 5.7 G/DL (ref 6–8.5)

## 2022-09-13 ENCOUNTER — OFFICE VISIT (OUTPATIENT)
Dept: ONCOLOGY | Facility: CLINIC | Age: 82
End: 2022-09-13

## 2022-09-13 VITALS
RESPIRATION RATE: 16 BRPM | BODY MASS INDEX: 21.91 KG/M2 | SYSTOLIC BLOOD PRESSURE: 116 MMHG | OXYGEN SATURATION: 97 % | TEMPERATURE: 97.7 F | WEIGHT: 147.9 LBS | HEART RATE: 66 BPM | DIASTOLIC BLOOD PRESSURE: 78 MMHG | HEIGHT: 69 IN

## 2022-09-13 DIAGNOSIS — D50.8 IRON DEFICIENCY ANEMIA SECONDARY TO INADEQUATE DIETARY IRON INTAKE: ICD-10-CM

## 2022-09-13 DIAGNOSIS — C91.10 CLL (CHRONIC LYMPHOCYTIC LEUKEMIA): ICD-10-CM

## 2022-09-13 DIAGNOSIS — Z85.51 HISTORY OF BLADDER CANCER: ICD-10-CM

## 2022-09-13 DIAGNOSIS — D47.2 IGG LAMBDA MONOCLONAL GAMMOPATHY: Primary | ICD-10-CM

## 2022-09-13 DIAGNOSIS — E61.1 IRON DEFICIENCY: ICD-10-CM

## 2022-09-13 PROCEDURE — 99214 OFFICE O/P EST MOD 30 MIN: CPT | Performed by: NURSE PRACTITIONER

## 2022-09-13 RX ORDER — LEVOCETIRIZINE DIHYDROCHLORIDE 5 MG/1
5 TABLET, FILM COATED ORAL EVERY EVENING
COMMUNITY
End: 2023-03-14

## 2022-09-13 NOTE — PROGRESS NOTES
[facility] ON[facility]EMATOLOGY & ONCOLOGY  [address]  [place] [id number] [phone]    Patient Name: Oral Dey  Encounter Date: 09/13/2022  YOB: 1940  Patient Number: [id number]      REASON FOR FOLLOW-UP: Oral Dey is a pleasant 81 y.o.  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  He is also seen for anemia from iron deficiency.  He is no longer taking oral iron.     He presents to clinic today for continued follow up.  He complains of persistent fatigue. Otherwise he feels well.  He states he is drinking a protein shake per day.     He had labs drawn on September 1.  Chemistry was unremarkable other than nonfasting glucose 113, protein 5.7  Anemia profile with serum iron 86, ferritin 109, saturation 22%, TIBC 284.  CBC with WBC 8.33, hemoglobin 12.9, hematocrit 37.7, platelets 147.          Problem List Items Addressed This Visit        Other    CLL (chronic lymphocytic leukemia) (HCC)    Overview     DIAGNOSTIC ABNORMALITIES:   1. Labs, 05/16/2012, [facility]: WBC 10.9, hemoglobin 14.3, hematocrit 42.2, MCV of 94.2, platelets 133,000, ALC elevated at 4796, ANC normal at 4.8, Absolute monocytosis at 970.  2. Labs, 11/14/2012, [facility]: WBC 10.2, hemoglobin 15.4, hematocrit 45.4, MCV 94.5, platelets 154,000, ALC elevated at 4,865. Globulin 1.9.  3. Labs, 11/15/2012, [facility]: IgG 495, IgA 34, IgM 28 (all below normal limits).  4. Blood film review, 11/16/2012, [facility]: Mild absolute lymphocytosis and monocytosis. Reactive and atypical lymphocytes present. Flow cytometry recommended.  5. PATHOLOGY: Cytogenetics, 12/12/2012, [facility]: CLL and CCND1-IGH@FISH: Abnormal results with +12 and 13q-.   6. Flow cytometry peripheral blood, 12/12/2012, [facility]: Peripheral blood with a CD5+ B cell population showing lambda restriction,  30% cellularity.   7. Labs, 03/27/2013: GFR 78. glucose 108. IgG 455, IgM 16, IgA 35, M-spike 0.1, [facility]: IgG monoclonal protein with lambda light chain specificity.   8. 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 Negative for CCND1/IGH gene rearrangement.   9. Hematopathology report peripheral blood, 03/27/2013 [facility]: Normal white blood cell count with no evidence of lymphocytosis but 23% abnormal intermediate lambda light chain-restricted B cell population identified by flow cytometry. Mild thrombocytopenia with normal hemoglobin/hematocrit. See comment.   10. Flow peripheral blood, 03/27/2013, [facility]: Abnormal CD5-positive identified, monoclonal lambda. Consistent with chronic lymphocytic leukemia (CLL)/small lymphocytic lymphoma (SLL).   11. Skeletal survey, 01/11/2013, [facility]: No discrete lytic lesions identified.   12. Ultrasound abdomen, 01/11/2013, [facility]: No focal pathology.     PREVIOUS INTERVENTION:   1. Observation.           Relevant Orders    CBC & Differential    Comprehensive Metabolic Panel    Iron Profile    Ferritin    BECK, PE & Free LT Chains, Ser    IgG lambda monoclonal gammopathy - Primary    Relevant Orders    CBC & Differential    Comprehensive Metabolic Panel    Iron Profile    Ferritin    BECK, PE & Free LT Chains, Ser    Iron deficiency    Relevant Orders    CBC & Differential    Comprehensive Metabolic Panel    Iron Profile    Ferritin    BECK, PE & Free LT Chains, Ser      Other Visit Diagnoses     Iron deficiency anemia secondary to inadequate dietary iron intake        Relevant Orders    CBC & Differential    Comprehensive Metabolic Panel    Iron Profile    Ferritin    BECK, PE & Free LT Chains, Ser    History of bladder cancer        Relevant Orders    CBC & Differential    Comprehensive Metabolic Panel    Iron Profile    Ferritin    BECK, PE & Free LT Chains, Ser         Oncology/Hematology History Overview Note   DIAGNOSTIC ABNORMALITIES:  Labs, 05/16/2012, [facility]: WBC 10.9, hemoglobin 14.3, hematocrit 42.2, MCV of 94.2, platelets 133,000, ALC elevated at 4796, ANC normal at 4.8, Absolute monocytosis at 970.  Labs, 11/14/2012, [facility]: WBC 10.2, hemoglobin 15.4, hematocrit 45.4, MCV 94.5, platelets 154,000, ALC elevated at 4,865. Globulin 1.9.  Labs, 11/15/2012, [facility]: IgG 495, IgA 34, IgM 28 (all below normal limits).  Blood film review, 11/16/2012, [facility]: Mild absolute lymphocytosis and monocytosis. Reactive and atypical lymphocytes present. Flow cytometry recommended.  PATHOLOGY: Cytogenetics, 12/12/2012, [facility]:  CLL and CCND1-IGH@FISH: Abnormal results with +12 and 13q-.   Flow cytometry peripheral blood, 12/12/2012, [facility]: Peripheral blood with a CD5+ B cell population showing lambda restriction, 30% cellularity.   Labs, 03/27/2013: GFR 78. glucose 108. IgG 455, IgM 16, IgA 35, M-spike 0.1, [facility]: IgG monoclonal protein with lambda light chain specificity.   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 Negative for CCND1/IGH gene rearrangement.    Hematopathology report peripheral blood, 03/27/2013 [facility]: Normal white blood cell count with no evidence of lymphocytosis but 23% abnormal intermediate lambda light chain-restricted B cell population identified by flow cytometry.  Mild thrombocytopenia with normal hemoglobin/hematocrit.  See comment.   Flow peripheral blood, 03/27/2013, [facility]:  Abnormal CD5-positive identified, monoclonal lambda. Consistent with chronic lymphocytic leukemia (CLL)/small lymphocytic lymphoma (SLL).     Skeletal survey, 01/11/2013, [facility]: No discrete lytic lesions identified.   Ultrasound abdomen, 01/11/2013, [facility]: No focal pathology.        PREVIOUS INTERVENTION:    Observation.      PREVIOUS INTERVENTIONS: Anemia from iron deficiency.  Ferrous sulfate 325 mg p.o. daily 01/03/2018 through 03/06/2018.  Resumed 09/04/2018 through 10/10/2018.  Resumed 08/27/2019 through 10/24/2019.  Resume 03/10/2022.     CLL (chronic lymphocytic leukemia) ([facility])   8/27/2019 Initial Diagnosis    CLL (chronic lymphocytic leukemia) ([facility])         PAST MEDICAL HISTORY:  ALLERGIES:  Allergies   Allergen Reactions   • Augmentin [Amoxicillin-Pot Clavulanate] Hives   • Latex Itching and Other (See Comments)     And band aids. Causes redness and itching.     CURRENT MEDICATIONS:  Outpatient Encounter Medications as of 9/13/2022   Medication Sig Dispense Refill   • folic acid-vit B6-vit B12 (FOLTABS) 0.8-10-0.115 MG tablet tablet Take  by mouth Daily.     • latanoprost (XALATAN) 0.005 % ophthalmic solution Administer 1 drop to the right eye Daily.     • levocetirizine (XYZAL) 5 MG tablet Take 5 mg by mouth Every Evening.     • metoprolol tartrate (LOPRESSOR) 25 MG tablet Take 25 mg by mouth 2 (Two) Times a Day.     • multivitamins-minerals (PRESERVISION AREDS 2) capsule capsule Take 1 capsule by mouth 2 (Two) Times a Day.     • vitamin D3 125 MCG (5000 UT) capsule capsule Take 5,000 Units by mouth Daily.     • [DISCONTINUED] aspirin 81 MG EC tablet Take 81 mg by mouth Daily. Holding for surgery     • [DISCONTINUED] ferrous sulfate 325 (65 FE) MG tablet Take 1 tablet by mouth Daily With Breakfast. 60 tablet 2   • [DISCONTINUED] oxyCODONE-acetaminophen (PERCOCET) 5-325 MG per tablet Take 1 tablet by mouth Every 4 (Four) Hours As Needed (Pain). 10 tablet 0     No facility-administered encounter medications on file as of 9/13/2022.     ADULT ILLNESSES:  Patient Active Problem List   Diagnosis Code   • Hx of colonic polyp Z86.010   • Family hx of colon cancer Z80.0   • CLL (chronic lymphocytic leukemia) ([facility]) C91.10   • Hypertension I10   • IgG lambda monoclonal gammopathy D47.2   • Iron deficiency E61.1      SURGERIES:  Past Surgical History:   Procedure Laterality Date   • CATARACT EXTRACTION, BILATERAL     • COLONOSCOPY  2012   • CYSTOSCOPY BLADDER BIOPSY     • EXCISION MASS HEAD/NECK N/A 3/4/2022    Procedure: EXCISION OF MASS ON POSTERIOR NECK;  Surgeon: Rossana Mahajan MD;  Location: [facility] OR;  Service: General;  Laterality: N/A;   • INGUINAL HERNIA REPAIR Left    • INGUINAL HERNIA REPAIR Right 2017    Procedure: RIGHT INGUINAL HERNIA REPAIR WITH MESH ;  Surgeon: Rossana Mahajan MD;  Location: [facility] OR;  Service:      HEALTH MAINTENANCE ITEMS:  Health Maintenance Due   Topic Date Due   • ANNUAL PHYSICAL  Never done   • Pneumococcal Vaccine 65+ (1 - PCV) Never done   • TDAP/TD VACCINES (1 - Tdap) Never done   • ZOSTER VACCINE (1 of 2) Never done   • HEPATITIS C SCREENING  Never done   • COVID-19 Vaccine (4 - Booster for Moderna series) 2022       <no information>  Last Completed Colonoscopy          COLORECTAL CANCER SCREENING (COLONOSCOPY - Every 10 Years) Next due on 2017  SCANNED - COLONOSCOPY    2012  SCANNED - COLONOSCOPY                There is no immunization history on file for this patient.  Last Completed Mammogram     This patient has no relevant Health Maintenance data.            FAMILY HISTORY:  Family History   Problem Relation Age of Onset   • Colon cancer Maternal Grandfather         in his 60's.    • Alzheimer's disease Mother    • Hypertension Father    • Other Father         stent   • COPD Sister    • Heart attack Brother    • No Known Problems Maternal Grandmother    • No Known Problems Paternal Grandmother    • No Known Problems Paternal Grandfather      SOCIAL HISTORY:  Social History     Socioeconomic History   • Marital status:    Tobacco Use   • Smoking status: Former Smoker     Years: 15.00     Types: Cigarettes     Quit date:      Years since quittin.7   • Smokeless tobacco: Never Used   Substance and Sexual Activity  "  • Alcohol use: No   • Drug use: No   • Sexual activity: Defer       REVIEW OF SYSTEMS:    Review of Systems   Constitutional: Positive for fatigue. Negative for chills and fever.        \"I feel tired.\"   HENT: Negative for congestion, mouth sores and nosebleeds.         Hearing aids     Eyes: Negative for redness and visual disturbance.   Respiratory: Negative for cough, shortness of breath and wheezing.    Cardiovascular: Negative for chest pain and palpitations.   Gastrointestinal: Negative for abdominal pain, blood in stool, nausea and vomiting.   Endocrine: Negative for polydipsia and polyphagia.   Genitourinary: Negative for dysuria, flank pain and hematuria.   Musculoskeletal: Negative for gait problem and joint swelling.   Skin: Positive for pallor.   Allergic/Immunologic: Negative for food allergies.   Neurological: Negative for speech difficulty, weakness and confusion.   Hematological: Negative for adenopathy. Does not bruise/bleed easily.   Psychiatric/Behavioral: Negative for agitation, hallucinations and depressed mood.         VITAL SIGNS: /78   Pulse 66   Temp 97.7 °F (36.5 °C) (Temporal)   Resp 16   Ht 175.3 cm (69\")   Wt 67.1 kg (147 lb 14.4 oz)   SpO2 97%   BMI 21.84 kg/m²  Body surface area is 1.82 meters squared.   Pain Score    09/13/22 1349   PainSc: 0-No pain           PHYSICAL EXAMINATION:     Physical Exam  Vitals reviewed.   Constitutional:       General: He is not in acute distress.  HENT:      Head: Normocephalic and atraumatic.   Eyes:      Extraocular Movements: Extraocular movements intact.   Cardiovascular:      Rate and Rhythm: Normal rate and regular rhythm.   Pulmonary:      Effort: Pulmonary effort is normal.      Breath sounds: Normal breath sounds.   Abdominal:      General: Abdomen is flat. Bowel sounds are normal.      Tenderness: There is no abdominal tenderness.   Musculoskeletal:         General: Normal range of motion.   Skin:     General: Skin is warm and " dry.   Neurological:      Mental Status: He is alert and oriented to person, place, and time.   Psychiatric:         Mood and Affect: Mood normal.         Behavior: Behavior normal.         LABS    Lab Results - Last 18 Months   Lab Units 09/01/22  1422 06/09/22  1422 03/03/22  1132 03/01/22  1554 01/10/22  1044 11/16/21  1324 08/16/21  1353 05/17/21  1358 05/17/21  1358   HEMOGLOBIN g/dL 12.9* 12.9* 12.7* 12.5* 13.4* 13.7 13.4  --  13.3   HEMATOCRIT % 37.7 39.1 39.2 37.7 42.2 40.9 41.8  --  39.8   MCV fL 89.1 92.2 93.1 91.1 95.5* 90.3 93.1  --  90.7   WBC 10*3/mm3 8.33 9.22 10.04 8.78 9.6 9.54 13.95*  --  13.61*   RDW % 12.6 13.0 12.6 12.5 12.3 12.4 12.0*  --  12.7   MPV fL 9.1 9.3 9.2 9.6 9.1* 9.1 9.2  --  8.9   PLATELETS 10*3/mm3 147 137* 127* 136* 150 143 230  --  161   NEUTROS ABS 10*3/mm3 2.67 3.36 4.77 4.35 3.7 2.92 9.35*   < > 5.10   LYMPHS ABS K/uL  --   --   --   --  4.5  --   --   --  6.95*   MONOS ABS K/uL  --   --   --   --  1.10*  --   --   --  1.02*   EOS ABS 10*3/mm3 0.08 0.09 0.20 0.26 0.20 0.39  --   --  0.38   BASOS ABS 10*3/mm3 0.17 0.09 0.10 0.09 0.10  --  0.14  --  0.09   IMMATURE GRANS (ABS) K/uL  --   --   --   --  0.0  --   --   --   --    NEUTROPHIL % % 32.0* 35.4* 45.5 49.5  --  30.6* 67.0  --   --    MONOCYTES % % 11.3 8.3 10.1 9.1  --  5.1 4.0*  --   --    BASOPHIL % % 2.1* 1.0 1.0 1.0  --   --  1.0  --   --    ATYP LYMPH % %  --  3.1 15.2* 8.1*  --  5.1*  --   --   --    ANISOCYTOSIS   --   --   --   --   --  Slight/1+  --   --   --     < > = values in this interval not displayed.       Lab Results - Last 18 Months   Lab Units 09/01/22  1422 03/03/22  1132 03/01/22  1554 01/10/22  1044 08/16/21  1353   GLUCOSE mg/dL 113* 98 106* 101 107*   SODIUM mmol/L 141 143 143 144 139   POTASSIUM mmol/L 4.0 4.2 3.9 4.6 4.8   TOTAL CO2 mmol/L  --   --   --  29  --    CO2 mmol/L 27.0 30.0* 27.0  --  27.0   CHLORIDE mmol/L 105 106 107 106 103   ANION GAP mmol/L 9.0 7.0 9.0 9 9.0   CREATININE mg/dL  "1.15 1.25 1.26 1 1.05   BUN mg/dL 19 13 16 13 21   BUN / CREAT RATIO  16.5 10.4 12.7  --  20.0   CALCIUM mg/dL 9.4 9.3 9.4 9.6 10.1   EGFR IF NONAFRICN AM   --   --   --  >60 68   ALK PHOS U/L 91 106 105  --  108   TOTAL PROTEIN g/dL 6.2 6.1 6.0  --  6.7   ALT (SGPT) U/L 8 5 8  --  7   AST (SGOT) U/L 13 11 13  --  10   BILIRUBIN mg/dL 0.6 1.0 0.6  --  0.5   ALBUMIN g/dL 4.60  3.8 4.30  3.6 4.10  --  4.80  3.9   GLOBULIN gm/dL 1.6 1.8 1.9  --  1.9       Lab Results - Last 18 Months   Lab Units 09/01/22  1422 03/03/22  1132 08/16/21  1353   M-SPIKE g/dL 0.1* 0.1* 0.1*       Lab Results - Last 18 Months   Lab Units 09/01/22  1422 06/09/22  1422 03/03/22  1132 11/16/21  1324 08/16/21  1353 05/17/21  1358   IRON mcg/dL 86 99 51* 60 101 75   TIBC mcg/dL 384 359 368 401 386 349   IRON SATURATION % 22 28 14* 15* 26 22   FERRITIN ng/mL 109.00 121.20 93.39 87.83 134.70 116.70         Oral Dey reports a pain score of .      ASSESSMENT:  1. IgG lambda monoclonal gammopathy    2. Iron deficiency    3. CLL (chronic lymphocytic leukemia) (HCC)    4. Iron deficiency anemia secondary to inadequate dietary iron intake    5. History of bladder cancer          1.   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  Treatment status: Observation.  -Treatment is generally reserved until the patient has active disease defined as the presence of disease-related symptoms, such as weight loss greater than 10%, extreme fatigue, fever greater than 100.5 for 2 weeks with night sweats without evidence of infection (\"B\" symptoms), worsening cytopenias, unexplained recurrent infections, worsening adenopathy, or splenomegaly.    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  -M Ignacio 0.1  -A bone marrow aspiration and biopsy is indicated in all patients with an M protein 1.5 g/dL, patients with a non-IgG MGUS, patients " with an abnormal serum free light chain ratio (i.e., ratio of kappa to lambda free light chains less than 0.26 or greater than 1.65), and in all patients who have any abnormalities of the CBC, serum creatinine, serum calcium, or radiographic bone survey.    3. Anemia   -Hgb 12.9, Hct   -Iron 86, Ferritin 109, Sat 22%, TIBC 384  -Stable for observations.  No intervention indicated    4.  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   History of Bladder cancer  AJCC stage cTa, cN0, cM0.   Treatment status:On observation   -Managed by urology.         PLAN:  Stable for observation  Continue current medications, treatment plans and follow up with PCP and any other providers  CBC with differential, ferritin, TIBC, % saturation, and iron every 12 weeks.  Return to office in 6 months with pre-office CMP and SPEP/SIEP in 6 months.  Patient voiced understanding and agrees to treatment plan    Jennifer Cooper, RENUKA  09/13/2022

## 2022-09-30 DIAGNOSIS — Z85.51 HISTORY OF BLADDER CANCER: ICD-10-CM

## 2022-10-12 ENCOUNTER — PROCEDURE VISIT (OUTPATIENT)
Dept: UROLOGY | Age: 82
End: 2022-10-12
Payer: COMMERCIAL

## 2022-10-12 VITALS — HEIGHT: 70 IN | BODY MASS INDEX: 21.42 KG/M2 | TEMPERATURE: 97.6 F | WEIGHT: 149.6 LBS

## 2022-10-12 DIAGNOSIS — Z85.51 HISTORY OF BLADDER CANCER: ICD-10-CM

## 2022-10-12 DIAGNOSIS — C67.1 MALIGNANT NEOPLASM OF DOME OF URINARY BLADDER (HCC): Primary | ICD-10-CM

## 2022-10-12 LAB
APPEARANCE FLUID: CLEAR
BILIRUBIN, POC: NORMAL
BLOOD URINE, POC: NORMAL
CLARITY, POC: CLEAR
COLOR, POC: YELLOW
GLUCOSE URINE, POC: NORMAL
KETONES, POC: NORMAL
LEUKOCYTE EST, POC: NORMAL
NITRITE, POC: NORMAL
PH, POC: 6.5
PROTEIN, POC: NORMAL
SPECIFIC GRAVITY, POC: 1.01
UROBILINOGEN, POC: 0.2

## 2022-10-12 PROCEDURE — 81002 URINALYSIS NONAUTO W/O SCOPE: CPT | Performed by: UROLOGY

## 2022-10-12 PROCEDURE — 1123F ACP DISCUSS/DSCN MKR DOCD: CPT | Performed by: UROLOGY

## 2022-10-12 PROCEDURE — 52000 CYSTOURETHROSCOPY: CPT | Performed by: UROLOGY

## 2022-10-12 PROCEDURE — 99214 OFFICE O/P EST MOD 30 MIN: CPT | Performed by: UROLOGY

## 2022-10-12 RX ORDER — LATANOPROST 50 UG/ML
SOLUTION/ DROPS OPHTHALMIC
COMMUNITY
Start: 2022-08-31

## 2022-10-12 ASSESSMENT — ENCOUNTER SYMPTOMS
CHEST TIGHTNESS: 0
EYE DISCHARGE: 0
VOMITING: 0
BACK PAIN: 0
EYE REDNESS: 0
FACIAL SWELLING: 0
NAUSEA: 0
SORE THROAT: 0
WHEEZING: 0

## 2022-10-12 NOTE — H&P (VIEW-ONLY)
Pablo Fernandes is a 80 y.o. male who presents today   Chief Complaint   Patient presents with    Cystoscopy     I am here today for a 3 month cystoscopy. I had my cytology done prior. BLADDER CANCER  Patient was first diagnosed with bladder cancer approximately 1 month(s) ago. 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 None  Last upper tract study: 9 month(s) ago. Study was a bilateral retrograde pyelograms done 1/11/2022      Past Medical History:   Diagnosis Date    Hypertension        Past Surgical History:   Procedure Laterality Date    CYSTOSCOPY N/A 1/11/2022    CYSTOSCOPY TRANSURETHRAL RESECTION BLADDER TUMOR performed by Pebbles Amor MD at [facility] Bilateral 1/11/2022    BILATERAL URETERAL CATHETERIZATION, BILATERAL RETROGRADE PYELOGRAMS performed by Pebbles Amor MD at [facility] Left 1/11/2022    LEFT URETERAL STENT INSERTION performed by Pebbles Amor MD at [facility] Bilateral     in groin area        Current Outpatient Medications   Medication Sig Dispense Refill    latanoprost (XALATAN) 0.005 % ophthalmic solution       ferrous sulfate (IRON 325) 325 (65 Fe) MG tablet Take 325 mg by mouth daily (with breakfast)      aspirin 81 MG EC tablet Take 1 tablet by mouth daily 30 tablet 0    metoprolol succinate (TOPROL XL) 25 MG extended release tablet Take 25 mg by mouth 2 times daily      vitamin B-12 (CYANOCOBALAMIN) 100 MCG tablet Take 1,000 mcg by mouth daily      Cholecalciferol (VITAMIN D) 50 MCG (2000 UT) CAPS capsule Take by mouth       No current facility-administered medications for this visit.        Allergies   Allergen Reactions    Latex Itching and Other (See Comments)     And band aids. Causes redness and itching. Amoxicillin-Pot Clavulanate Hives       Social History     Socioeconomic History    Marital status:      Spouse name: None    Number of children: None    Years of education: None    Highest education level: None   Tobacco Use    Smoking status: Former    Smokeless tobacco: Never    Tobacco comments:     about 40 years ago   Vaping Use    Vaping Use: Never used       History reviewed. No pertinent family history. REVIEW OF SYSTEMS:  Review of Systems   Constitutional:  Negative for chills and fever. HENT:  Negative for facial swelling and sore throat. Eyes:  Negative for discharge and redness. Respiratory:  Negative for chest tightness and wheezing. Cardiovascular:  Negative for chest pain and palpitations. Gastrointestinal:  Negative for nausea and vomiting. Endocrine: Negative for polyphagia and polyuria. Genitourinary:  Negative for decreased urine volume, difficulty urinating, dysuria, enuresis, flank pain, frequency, genital sores, hematuria, penile discharge, penile pain, penile swelling, scrotal swelling, testicular pain and urgency. Musculoskeletal:  Negative for back pain and neck stiffness. Skin:  Negative for rash and wound. Neurological:  Negative for dizziness and headaches. Hematological:  Negative for adenopathy. Does not bruise/bleed easily. Psychiatric/Behavioral:  Negative for confusion and hallucinations. PHYSICAL EXAM:  Temp 97.6 °F (36.4 °C)   Ht 5' 10\" (1.778 m)   Wt 149 lb 9.6 oz (67.9 kg)   BMI 21.47 kg/m²   Physical Exam  Constitutional:       General: He is not in acute distress. Appearance: Normal appearance. He is well-developed. HENT:      Head: Normocephalic and atraumatic. Nose: Nose normal.   Eyes:      General: No scleral icterus. Conjunctiva/sclera: Conjunctivae normal.      Pupils: Pupils are equal, round, and reactive to light.    Neck:      Trachea: No tracheal deviation. Cardiovascular:      Rate and Rhythm: Normal rate and regular rhythm. Pulses: Normal pulses. Pulmonary:      Effort: Pulmonary effort is normal. No respiratory distress. Breath sounds: No stridor. Abdominal:      General: There is no distension. Palpations: Abdomen is soft. There is no mass. Tenderness: There is no abdominal tenderness. Musculoskeletal:         General: No tenderness or deformity. Normal range of motion. Cervical back: Normal range of motion and neck supple. Lymphadenopathy:      Cervical: No cervical adenopathy. Skin:     General: Skin is warm and dry. Findings: No erythema. Neurological:      General: No focal deficit present. Mental Status: He is alert and oriented to person, place, and time. Psychiatric:         Behavior: Behavior normal.         Judgment: Judgment normal.       Cystoscopy Operative Note (10/12/22)  Surgeon: Wilfredo Mena MD  Anesthesia: Urethral 2% Xylocaine   Indications: History bladder cancer  Position: Supine    Findings:   The patient was prepped and draped in the usual sterile fashion. The flexible cystoscope was advanced through the urethra and into the bladder under direct vision. The bladder was thoroughly inspected and the following was noted:    Residual Urine: mild  Urethra: normal appearing urethra with no masses, tenderness or lesions  Prostate: partially obstructing lateral lobes of prostate; median lobe present? no.    Bladder: 0.5 cm papillary tumor noted right dome no bladder diverticulum. There was mild trabeculation noted. Recurrent papillary tumor seen above. Ureters: Clear efflux from both ureters. Right orifices with normal configuration and location. Left ureteral orifice previously resected at prior TURBT site    The cystoscope was removed. The patient tolerated the procedure well. The patient was given a post procedure instructions and follow up.       DATA:  CBC:   Lab Results Component Value Date/Time    WBC 9.6 01/10/2022 10:44 AM    RBC 4.42 01/10/2022 10:44 AM    HGB 13.4 01/10/2022 10:44 AM    HCT 42.2 01/10/2022 10:44 AM    MCV 95.5 01/10/2022 10:44 AM    MCH 30.3 01/10/2022 10:44 AM    MCHC 31.8 01/10/2022 10:44 AM    RDW 12.3 01/10/2022 10:44 AM     01/10/2022 10:44 AM    MPV 9.1 01/10/2022 10:44 AM     CMP:    Lab Results   Component Value Date/Time     01/10/2022 10:44 AM    K 4.6 01/10/2022 10:44 AM     01/10/2022 10:44 AM    CO2 29 01/10/2022 10:44 AM    BUN 13 01/10/2022 10:44 AM    CREATININE 1.0 01/10/2022 10:44 AM    GFRAA >59 01/10/2022 10:44 AM    LABGLOM >60 01/10/2022 10:44 AM    GLUCOSE 101 01/10/2022 10:44 AM    CALCIUM 9.6 01/10/2022 10:44 AM     Results for orders placed or performed in visit on 10/12/22   POCT Urinalysis no Micro   Result Value Ref Range    Color, UA yellow     Clarity, UA clear     Glucose, UA POC neg     Bilirubin, UA neg     Ketones, UA neg     Spec Grav, UA 1.015     Blood, UA POC neg     pH, UA 6.5     Protein, UA POC neg     Urobilinogen, UA 0.2     Leukocytes, UA neg     Nitrite, UA neg     Appearance, Fluid Clear Clear, Slightly Cloudy     1. History of bladder cancer  Patient comes in for routine surveillance today shows a recurrent bladder tumor as described above. - POCT Urinalysis no Micro  - Cystoscopy    2. 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 Risks and complication procedure were discussed with patient clean the risk of bladder perforation need for subsequent adjuvant or repeat procedures need for continued surveillance. Infection, postop urinary retention, need to go home with Porras catheter.   Injury to the ureter.  - CO CYSTOURETHROSCOPY,FULGUR 2-5CM LESN; Future      Orders Placed This Encounter   Procedures    POCT Urinalysis no Micro    CO CYSTOURETHROSCOPY,FULGUR 2-5CM LESN     Cystoscopy bladder biopsy fulguration and bilateral retrograde pyelograms     Standing Status:   Future     Standing Expiration Date:   12/12/2022    Cystoscopy        Return for PT to be scheduled for Surgery. All information inputted into the note by the MA to include chief complaint, past medical history, past surgical history, medications, allergies, social and family history and review of systems has been reviewed and updated as needed by me. EMR Dragon/transcription disclaimer: Much of this documentt is electronic  transcription/translation of spoken language to printed text. The  electronic translation of spoken language may be erroneous, or at times,  nonsensical words or phrases may be inadvertently transcribed.  Although I  have reviewed the document for such errors, some may still exist.      Saadia Freeman MD

## 2022-10-12 NOTE — LETTER
[facility]  [place] [address]  [address] [address]  Phone: [phone]  Fax: [phone]    Raquel Shelby MD    October 12, 2022     Priya Mccarty, [address] [place] [id number]    Patient: Quyen Love   MR Number: [id number]   YOB: 1940   Date of Visit: 10/12/2022       Dear Priya Mccarty: Thank you for referring Chong Almanzar to me for evaluation/treatment. Below are the relevant portions of my assessment and plan of care. If you have questions, please do not hesitate to call me. I look forward to following Lianet Kirk along with you.     Sincerely,      Raquel Shelby MD

## 2022-10-12 NOTE — PROGRESS NOTES
Dario Rivera is a 80 y.o. male who presents today   Chief Complaint   Patient presents with    Cystoscopy     I am here today for a 3 month cystoscopy. I had my cytology done prior. BLADDER CANCER  Patient was first diagnosed with bladder cancer approximately 9 month(s) ago. 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 None  Last upper tract study: 9 month(s) ago. Study was a bilateral retrograde pyelograms done 1/11/2022      Past Medical History:   Diagnosis Date    Hypertension        Past Surgical History:   Procedure Laterality Date    CYSTOSCOPY N/A 1/11/2022    CYSTOSCOPY TRANSURETHRAL RESECTION BLADDER TUMOR performed by Marychuy Barnett MD at [address] Bilateral 1/11/2022    BILATERAL URETERAL CATHETERIZATION, BILATERAL RETROGRADE PYELOGRAMS performed by Marychuy Barnett MD at [address] Left 1/11/2022    LEFT URETERAL STENT INSERTION performed by Marychuy Barnett MD at [address] Bilateral     in groin area        Current Outpatient Medications   Medication Sig Dispense Refill    latanoprost (XALATAN) 0.005 % ophthalmic solution       ferrous sulfate (IRON 325) 325 (65 Fe) MG tablet Take 325 mg by mouth daily (with breakfast)      aspirin 81 MG EC tablet Take 1 tablet by mouth daily 30 tablet 0    metoprolol succinate (TOPROL XL) 25 MG extended release tablet Take 25 mg by mouth 2 times daily      vitamin B-12 (CYANOCOBALAMIN) 100 MCG tablet Take 1,000 mcg by mouth daily      Cholecalciferol (VITAMIN D) 50 MCG (2000 UT) CAPS capsule Take by mouth       No current facility-administered medications for this visit.        Allergies   Allergen Reactions    Latex Itching and Other (See Comments)     And band aids. Causes redness and itching. Amoxicillin-Pot Clavulanate Hives       Social History     Socioeconomic History    Marital status:      Spouse name: None    Number of children: None    Years of education: None    Highest education level: None   Tobacco Use    Smoking status: Former    Smokeless tobacco: Never    Tobacco comments:     about 40 years ago   Vaping Use    Vaping Use: Never used       History reviewed. No pertinent family history. REVIEW OF SYSTEMS:  Review of Systems   Constitutional:  Negative for chills and fever. HENT:  Negative for facial swelling and sore throat. Eyes:  Negative for discharge and redness. Respiratory:  Negative for chest tightness and wheezing. Cardiovascular:  Negative for chest pain and palpitations. Gastrointestinal:  Negative for nausea and vomiting. Endocrine: Negative for polyphagia and polyuria. Genitourinary:  Negative for decreased urine volume, difficulty urinating, dysuria, enuresis, flank pain, frequency, genital sores, hematuria, penile discharge, penile pain, penile swelling, scrotal swelling, testicular pain and urgency. Musculoskeletal:  Negative for back pain and neck stiffness. Skin:  Negative for rash and wound. Neurological:  Negative for dizziness and headaches. Hematological:  Negative for adenopathy. Does not bruise/bleed easily. Psychiatric/Behavioral:  Negative for confusion and hallucinations. PHYSICAL EXAM:  Temp 97.6 °F (36.4 °C)   Ht 5' 10\" (1.778 m)   Wt 149 lb 9.6 oz (67.9 kg)   BMI 21.47 kg/m²   Physical Exam  Constitutional:       General: He is not in acute distress. Appearance: Normal appearance. He is well-developed. HENT:      Head: Normocephalic and atraumatic. Nose: Nose normal.   Eyes:      General: No scleral icterus. Conjunctiva/sclera: Conjunctivae normal.      Pupils: Pupils are equal, round, and reactive to light.    Neck:      Trachea: No tracheal deviation. Cardiovascular:      Rate and Rhythm: Normal rate and regular rhythm. Pulses: Normal pulses. Pulmonary:      Effort: Pulmonary effort is normal. No respiratory distress. Breath sounds: No stridor. Abdominal:      General: There is no distension. Palpations: Abdomen is soft. There is no mass. Tenderness: There is no abdominal tenderness. Musculoskeletal:         General: No tenderness or deformity. Normal range of motion. Cervical back: Normal range of motion and neck supple. Lymphadenopathy:      Cervical: No cervical adenopathy. Skin:     General: Skin is warm and dry. Findings: No erythema. Neurological:      General: No focal deficit present. Mental Status: He is alert and oriented to person, place, and time. Psychiatric:         Behavior: Behavior normal.         Judgment: Judgment normal.       Cystoscopy Operative Note (10/12/22)  Surgeon: Marcia Kimbrough MD  Anesthesia: Urethral 2% Xylocaine   Indications: History bladder cancer  Position: Supine    Findings:   The patient was prepped and draped in the usual sterile fashion. The flexible cystoscope was advanced through the urethra and into the bladder under direct vision. The bladder was thoroughly inspected and the following was noted:    Residual Urine: mild  Urethra: normal appearing urethra with no masses, tenderness or lesions  Prostate: partially obstructing lateral lobes of prostate; median lobe present? no.    Bladder: 0.5 cm papillary tumor noted right dome no bladder diverticulum. There was mild trabeculation noted. Recurrent papillary tumor seen above. Ureters: Clear efflux from both ureters. Right orifices with normal configuration and location. Left ureteral orifice previously resected at prior TURBT site    The cystoscope was removed. The patient tolerated the procedure well. The patient was given a post procedure instructions and follow up.       DATA:  CBC:   Lab Results Component Value Date/Time    WBC 9.6 01/10/2022 10:44 AM    RBC 4.42 01/10/2022 10:44 AM    HGB 13.4 01/10/2022 10:44 AM    HCT 42.2 01/10/2022 10:44 AM    MCV 95.5 01/10/2022 10:44 AM    MCH 30.3 01/10/2022 10:44 AM    MCHC 31.8 01/10/2022 10:44 AM    RDW 12.3 01/10/2022 10:44 AM     01/10/2022 10:44 AM    MPV 9.1 01/10/2022 10:44 AM     CMP:    Lab Results   Component Value Date/Time     01/10/2022 10:44 AM    K 4.6 01/10/2022 10:44 AM     01/10/2022 10:44 AM    CO2 29 01/10/2022 10:44 AM    BUN 13 01/10/2022 10:44 AM    CREATININE 1.0 01/10/2022 10:44 AM    GFRAA >59 01/10/2022 10:44 AM    LABGLOM >60 01/10/2022 10:44 AM    GLUCOSE 101 01/10/2022 10:44 AM    CALCIUM 9.6 01/10/2022 10:44 AM     Results for orders placed or performed in visit on 10/12/22   POCT Urinalysis no Micro   Result Value Ref Range    Color, UA yellow     Clarity, UA clear     Glucose, UA POC neg     Bilirubin, UA neg     Ketones, UA neg     Spec Grav, UA 1.015     Blood, UA POC neg     pH, UA 6.5     Protein, UA POC neg     Urobilinogen, UA 0.2     Leukocytes, UA neg     Nitrite, UA neg     Appearance, Fluid Clear Clear, Slightly Cloudy     1. History of bladder cancer  Patient comes in for routine surveillance today shows a recurrent bladder tumor as described above. - POCT Urinalysis no Micro  - Cystoscopy    2. 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 Risks and complication procedure were discussed with patient clean the risk of bladder perforation need for subsequent adjuvant or repeat procedures need for continued surveillance. Infection, postop urinary retention, need to go home with Porras catheter.   Injury to the ureter.  - AL CYSTOURETHROSCOPY,FULGUR 2-5CM LESN; Future      Orders Placed This Encounter   Procedures    POCT Urinalysis no Micro    AL CYSTOURETHROSCOPY,FULGUR 2-5CM LESN     Cystoscopy bladder biopsy fulguration and bilateral retrograde pyelograms     Standing Status:   Future     Standing Expiration Date:   12/12/2022    Cystoscopy        Return for PT to be scheduled for Surgery. All information inputted into the note by the MA to include chief complaint, past medical history, past surgical history, medications, allergies, social and family history and review of systems has been reviewed and updated as needed by me. EMR Dragon/transcription disclaimer: Much of this documentt is electronic  transcription/translation of spoken language to printed text. The  electronic translation of spoken language may be erroneous, or at times,  nonsensical words or phrases may be inadvertently transcribed.  Although I  have reviewed the document for such errors, some may still exist.      Aurelia Brown MD

## 2022-10-25 ENCOUNTER — HOSPITAL ENCOUNTER (OUTPATIENT)
Dept: PREADMISSION TESTING | Age: 82
Discharge: HOME | End: 2022-10-29
Payer: COMMERCIAL

## 2022-10-25 VITALS — WEIGHT: 147 LBS | BODY MASS INDEX: 21.09 KG/M2

## 2022-10-25 LAB
ANION GAP SERPL CALCULATED.3IONS-SCNC: 9 MMOL/L (ref 7–19)
BASOPHILS ABSOLUTE: 0.1 K/UL (ref 0–0.2)
BASOPHILS RELATIVE PERCENT: 0.9 % (ref 0–1)
BUN BLDV-MCNC: 18 MG/DL (ref 8–23)
CALCIUM SERPL-MCNC: 9.4 MG/DL (ref 8.8–10.2)
CHLORIDE BLD-SCNC: 106 MMOL/L (ref 98–111)
CO2: 27 MMOL/L (ref 22–29)
CREAT SERPL-MCNC: 1.2 MG/DL (ref 0.5–1.2)
EOSINOPHILS ABSOLUTE: 0.2 K/UL (ref 0–0.6)
EOSINOPHILS RELATIVE PERCENT: 2.7 % (ref 0–5)
GFR SERPL CREATININE-BSD FRML MDRD: >60 ML/MIN/{1.73_M2}
GLUCOSE BLD-MCNC: 100 MG/DL (ref 74–109)
HCT VFR BLD CALC: 40.1 % (ref 42–52)
HEMOGLOBIN: 13.1 G/DL (ref 14–18)
IMMATURE GRANULOCYTES #: 0 K/UL
LYMPHOCYTES ABSOLUTE: 4.1 K/UL (ref 1.1–4.5)
LYMPHOCYTES RELATIVE PERCENT: 47 % (ref 20–40)
MCH RBC QN AUTO: 30.4 PG (ref 27–31)
MCHC RBC AUTO-ENTMCNC: 32.7 G/DL (ref 33–37)
MCV RBC AUTO: 93 FL (ref 80–94)
MONOCYTES ABSOLUTE: 0.9 K/UL (ref 0–0.9)
MONOCYTES RELATIVE PERCENT: 9.8 % (ref 0–10)
NEUTROPHILS ABSOLUTE: 3.4 K/UL (ref 1.5–7.5)
NEUTROPHILS RELATIVE PERCENT: 39.3 % (ref 50–65)
PDW BLD-RTO: 12.5 % (ref 11.5–14.5)
PLATELET # BLD: 124 K/UL (ref 130–400)
PMV BLD AUTO: 9.4 FL (ref 9.4–12.4)
POTASSIUM SERPL-SCNC: 4.1 MMOL/L (ref 3.5–5)
RBC # BLD: 4.31 M/UL (ref 4.7–6.1)
SODIUM BLD-SCNC: 142 MMOL/L (ref 136–145)
WBC # BLD: 8.8 K/UL (ref 4.8–10.8)

## 2022-10-25 PROCEDURE — 85025 COMPLETE CBC W/AUTO DIFF WBC: CPT

## 2022-10-25 PROCEDURE — 80048 BASIC METABOLIC PNL TOTAL CA: CPT

## 2022-10-25 PROCEDURE — 93005 ELECTROCARDIOGRAM TRACING: CPT | Performed by: UROLOGY

## 2022-10-25 RX ORDER — LEVOFLOXACIN 5 MG/ML
500 INJECTION, SOLUTION INTRAVENOUS ONCE
Status: CANCELLED | OUTPATIENT
Start: 2022-10-27

## 2022-10-26 LAB
EKG P AXIS: 49 DEGREES
EKG P-R INTERVAL: 218 MS
EKG Q-T INTERVAL: 426 MS
EKG QRS DURATION: 88 MS
EKG QTC CALCULATION (BAZETT): 425 MS
EKG T AXIS: 61 DEGREES

## 2022-10-26 PROCEDURE — 93010 ELECTROCARDIOGRAM REPORT: CPT | Performed by: INTERNAL MEDICINE

## 2022-10-27 ENCOUNTER — ANESTHESIA EVENT (OUTPATIENT)
Dept: OPERATING ROOM | Age: 82
End: 2022-10-27
Payer: COMMERCIAL

## 2022-10-27 ENCOUNTER — APPOINTMENT (OUTPATIENT)
Dept: GENERAL RADIOLOGY | Age: 82
End: 2022-10-27
Attending: UROLOGY
Payer: COMMERCIAL

## 2022-10-27 ENCOUNTER — HOSPITAL ENCOUNTER (OUTPATIENT)
Age: 82
Setting detail: OUTPATIENT SURGERY
Discharge: HOME | End: 2022-10-27
Attending: UROLOGY | Admitting: UROLOGY
Payer: COMMERCIAL

## 2022-10-27 ENCOUNTER — ANESTHESIA (OUTPATIENT)
Dept: OPERATING ROOM | Age: 82
End: 2022-10-27
Payer: COMMERCIAL

## 2022-10-27 VITALS
SYSTOLIC BLOOD PRESSURE: 152 MMHG | DIASTOLIC BLOOD PRESSURE: 64 MMHG | RESPIRATION RATE: 18 BRPM | WEIGHT: 148 LBS | TEMPERATURE: 98.1 F | OXYGEN SATURATION: 95 % | HEART RATE: 61 BPM | BODY MASS INDEX: 21.19 KG/M2 | HEIGHT: 70 IN

## 2022-10-27 DIAGNOSIS — C67.1 CANCER OF DOME OF URINARY BLADDER (HCC): ICD-10-CM

## 2022-10-27 PROBLEM — C67.2 MALIGNANT NEOPLASM OF LATERAL WALL OF URINARY BLADDER (HCC): Status: RESOLVED | Noted: 2022-01-05 | Resolved: 2022-10-27

## 2022-10-27 PROCEDURE — 3600000004 HC SURGERY LEVEL 4 BASE: Performed by: UROLOGY

## 2022-10-27 PROCEDURE — 7100000011 HC PHASE II RECOVERY - ADDTL 15 MIN: Performed by: UROLOGY

## 2022-10-27 PROCEDURE — 52234 CYSTOSCOPY AND TREATMENT: CPT | Performed by: UROLOGY

## 2022-10-27 PROCEDURE — 2580000003 HC RX 258: Performed by: NURSE ANESTHETIST, CERTIFIED REGISTERED

## 2022-10-27 PROCEDURE — 7100000001 HC PACU RECOVERY - ADDTL 15 MIN: Performed by: UROLOGY

## 2022-10-27 PROCEDURE — 2580000003 HC RX 258: Performed by: ANESTHESIOLOGY

## 2022-10-27 PROCEDURE — 3700000000 HC ANESTHESIA ATTENDED CARE: Performed by: UROLOGY

## 2022-10-27 PROCEDURE — 6360000002 HC RX W HCPCS: Performed by: NURSE ANESTHETIST, CERTIFIED REGISTERED

## 2022-10-27 PROCEDURE — 74420 UROGRAPHY RTRGR +-KUB: CPT | Performed by: UROLOGY

## 2022-10-27 PROCEDURE — 6370000000 HC RX 637 (ALT 250 FOR IP): Performed by: ANESTHESIOLOGY

## 2022-10-27 PROCEDURE — C1758 CATHETER, URETERAL: HCPCS | Performed by: UROLOGY

## 2022-10-27 PROCEDURE — 88307 TISSUE EXAM BY PATHOLOGIST: CPT

## 2022-10-27 PROCEDURE — C1769 GUIDE WIRE: HCPCS | Performed by: UROLOGY

## 2022-10-27 PROCEDURE — 2709999900 HC NON-CHARGEABLE SUPPLY: Performed by: UROLOGY

## 2022-10-27 PROCEDURE — 52005 CYSTO W/URTRL CATHJ: CPT | Performed by: UROLOGY

## 2022-10-27 PROCEDURE — 3700000001 HC ADD 15 MINUTES (ANESTHESIA): Performed by: UROLOGY

## 2022-10-27 PROCEDURE — 6360000002 HC RX W HCPCS: Performed by: UROLOGY

## 2022-10-27 PROCEDURE — 7100000000 HC PACU RECOVERY - FIRST 15 MIN: Performed by: UROLOGY

## 2022-10-27 PROCEDURE — 7100000010 HC PHASE II RECOVERY - FIRST 15 MIN: Performed by: UROLOGY

## 2022-10-27 PROCEDURE — 2500000003 HC RX 250 WO HCPCS: Performed by: NURSE ANESTHETIST, CERTIFIED REGISTERED

## 2022-10-27 PROCEDURE — 3600000014 HC SURGERY LEVEL 4 ADDTL 15MIN: Performed by: UROLOGY

## 2022-10-27 PROCEDURE — 74420 UROGRAPHY RTRGR +-KUB: CPT

## 2022-10-27 RX ORDER — FAMOTIDINE 20 MG/1
20 TABLET, FILM COATED ORAL ONCE
Status: COMPLETED | OUTPATIENT
Start: 2022-10-27 | End: 2022-10-27

## 2022-10-27 RX ORDER — CIPROFLOXACIN 500 MG/1
500 TABLET, FILM COATED ORAL 2 TIMES DAILY
Qty: 6 TABLET | Refills: 0 | Status: SHIPPED | OUTPATIENT
Start: 2022-10-27 | End: 2022-10-30

## 2022-10-27 RX ORDER — SODIUM CHLORIDE, SODIUM LACTATE, POTASSIUM CHLORIDE, CALCIUM CHLORIDE 600; 310; 30; 20 MG/100ML; MG/100ML; MG/100ML; MG/100ML
INJECTION, SOLUTION INTRAVENOUS CONTINUOUS PRN
Status: DISCONTINUED | OUTPATIENT
Start: 2022-10-27 | End: 2022-10-27 | Stop reason: SDUPTHER

## 2022-10-27 RX ORDER — LEVOFLOXACIN 5 MG/ML
500 INJECTION, SOLUTION INTRAVENOUS ONCE
Status: COMPLETED | OUTPATIENT
Start: 2022-10-27 | End: 2022-10-27

## 2022-10-27 RX ORDER — OXYCODONE HYDROCHLORIDE AND ACETAMINOPHEN 5; 325 MG/1; MG/1
2 TABLET ORAL EVERY 4 HOURS PRN
Status: DISCONTINUED | OUTPATIENT
Start: 2022-10-27 | End: 2022-10-27 | Stop reason: HOSPADM

## 2022-10-27 RX ORDER — ONDANSETRON 2 MG/ML
INJECTION INTRAMUSCULAR; INTRAVENOUS PRN
Status: DISCONTINUED | OUTPATIENT
Start: 2022-10-27 | End: 2022-10-27 | Stop reason: SDUPTHER

## 2022-10-27 RX ORDER — MORPHINE SULFATE 2 MG/ML
2 INJECTION, SOLUTION INTRAMUSCULAR; INTRAVENOUS EVERY 5 MIN PRN
Status: DISCONTINUED | OUTPATIENT
Start: 2022-10-27 | End: 2022-10-27 | Stop reason: HOSPADM

## 2022-10-27 RX ORDER — PROPOFOL 10 MG/ML
INJECTION, EMULSION INTRAVENOUS PRN
Status: DISCONTINUED | OUTPATIENT
Start: 2022-10-27 | End: 2022-10-27 | Stop reason: SDUPTHER

## 2022-10-27 RX ORDER — MORPHINE SULFATE 4 MG/ML
4 INJECTION, SOLUTION INTRAMUSCULAR; INTRAVENOUS EVERY 5 MIN PRN
Status: DISCONTINUED | OUTPATIENT
Start: 2022-10-27 | End: 2022-10-27 | Stop reason: HOSPADM

## 2022-10-27 RX ORDER — SCOLOPAMINE TRANSDERMAL SYSTEM 1 MG/1
1 PATCH, EXTENDED RELEASE TRANSDERMAL
Status: DISCONTINUED | OUTPATIENT
Start: 2022-10-27 | End: 2022-10-27 | Stop reason: HOSPADM

## 2022-10-27 RX ORDER — CIPROFLOXACIN 500 MG/1
500 TABLET, FILM COATED ORAL DAILY
Qty: 10 TABLET | Refills: 0 | Status: SHIPPED | OUTPATIENT
Start: 2022-10-27 | End: 2022-10-27 | Stop reason: SDUPTHER

## 2022-10-27 RX ORDER — LIDOCAINE HYDROCHLORIDE 10 MG/ML
INJECTION, SOLUTION INFILTRATION; PERINEURAL PRN
Status: DISCONTINUED | OUTPATIENT
Start: 2022-10-27 | End: 2022-10-27 | Stop reason: SDUPTHER

## 2022-10-27 RX ORDER — DIPHENHYDRAMINE HYDROCHLORIDE 50 MG/ML
12.5 INJECTION INTRAMUSCULAR; INTRAVENOUS
Status: DISCONTINUED | OUTPATIENT
Start: 2022-10-27 | End: 2022-10-27 | Stop reason: HOSPADM

## 2022-10-27 RX ORDER — ROCURONIUM BROMIDE 10 MG/ML
INJECTION, SOLUTION INTRAVENOUS PRN
Status: DISCONTINUED | OUTPATIENT
Start: 2022-10-27 | End: 2022-10-27 | Stop reason: SDUPTHER

## 2022-10-27 RX ORDER — SODIUM CHLORIDE 9 MG/ML
INJECTION, SOLUTION INTRAVENOUS CONTINUOUS
Status: DISCONTINUED | OUTPATIENT
Start: 2022-10-27 | End: 2022-10-27 | Stop reason: HOSPADM

## 2022-10-27 RX ORDER — METOCLOPRAMIDE HYDROCHLORIDE 5 MG/ML
10 INJECTION INTRAMUSCULAR; INTRAVENOUS
Status: DISCONTINUED | OUTPATIENT
Start: 2022-10-27 | End: 2022-10-27 | Stop reason: HOSPADM

## 2022-10-27 RX ORDER — MIDAZOLAM HYDROCHLORIDE 2 MG/2ML
2 INJECTION, SOLUTION INTRAMUSCULAR; INTRAVENOUS
Status: DISCONTINUED | OUTPATIENT
Start: 2022-10-27 | End: 2022-10-27 | Stop reason: HOSPADM

## 2022-10-27 RX ORDER — LIDOCAINE HYDROCHLORIDE 10 MG/ML
1 INJECTION, SOLUTION EPIDURAL; INFILTRATION; INTRACAUDAL; PERINEURAL
Status: DISCONTINUED | OUTPATIENT
Start: 2022-10-27 | End: 2022-10-27 | Stop reason: HOSPADM

## 2022-10-27 RX ORDER — SODIUM CHLORIDE, SODIUM LACTATE, POTASSIUM CHLORIDE, CALCIUM CHLORIDE 600; 310; 30; 20 MG/100ML; MG/100ML; MG/100ML; MG/100ML
INJECTION, SOLUTION INTRAVENOUS CONTINUOUS
Status: DISCONTINUED | OUTPATIENT
Start: 2022-10-27 | End: 2022-10-27 | Stop reason: HOSPADM

## 2022-10-27 RX ORDER — FENTANYL CITRATE 50 UG/ML
INJECTION, SOLUTION INTRAMUSCULAR; INTRAVENOUS PRN
Status: DISCONTINUED | OUTPATIENT
Start: 2022-10-27 | End: 2022-10-27 | Stop reason: SDUPTHER

## 2022-10-27 RX ORDER — ONDANSETRON 2 MG/ML
4 INJECTION INTRAMUSCULAR; INTRAVENOUS EVERY 4 HOURS PRN
Status: DISCONTINUED | OUTPATIENT
Start: 2022-10-27 | End: 2022-10-27 | Stop reason: HOSPADM

## 2022-10-27 RX ADMIN — ROCURONIUM BROMIDE 50 MG: 10 INJECTION, SOLUTION INTRAVENOUS at 13:48

## 2022-10-27 RX ADMIN — FAMOTIDINE 20 MG: 20 TABLET ORAL at 12:27

## 2022-10-27 RX ADMIN — LEVOFLOXACIN 500 MG: 5 INJECTION, SOLUTION INTRAVENOUS at 13:53

## 2022-10-27 RX ADMIN — SODIUM CHLORIDE, SODIUM LACTATE, POTASSIUM CHLORIDE, AND CALCIUM CHLORIDE: 600; 310; 30; 20 INJECTION, SOLUTION INTRAVENOUS at 13:43

## 2022-10-27 RX ADMIN — FENTANYL CITRATE 50 MCG: 50 INJECTION, SOLUTION INTRAMUSCULAR; INTRAVENOUS at 13:48

## 2022-10-27 RX ADMIN — SODIUM CHLORIDE, POTASSIUM CHLORIDE, SODIUM LACTATE AND CALCIUM CHLORIDE: 600; 310; 30; 20 INJECTION, SOLUTION INTRAVENOUS at 13:06

## 2022-10-27 RX ADMIN — SUGAMMADEX 200 MG: 100 INJECTION, SOLUTION INTRAVENOUS at 14:34

## 2022-10-27 RX ADMIN — PROPOFOL 130 MG: 10 INJECTION, EMULSION INTRAVENOUS at 13:48

## 2022-10-27 RX ADMIN — LIDOCAINE HYDROCHLORIDE 50 MG: 10 INJECTION, SOLUTION INFILTRATION; PERINEURAL at 13:48

## 2022-10-27 RX ADMIN — ONDANSETRON 4 MG: 2 INJECTION INTRAMUSCULAR; INTRAVENOUS at 14:34

## 2022-10-27 ASSESSMENT — LIFESTYLE VARIABLES: SMOKING_STATUS: 0

## 2022-10-27 ASSESSMENT — PAIN DESCRIPTION - ORIENTATION
ORIENTATION: LOWER
ORIENTATION: LOWER

## 2022-10-27 ASSESSMENT — PAIN SCALES - GENERAL
PAINLEVEL_OUTOF10: 1
PAINLEVEL_OUTOF10: 2

## 2022-10-27 ASSESSMENT — PAIN DESCRIPTION - FREQUENCY
FREQUENCY: CONTINUOUS
FREQUENCY: CONTINUOUS

## 2022-10-27 ASSESSMENT — PAIN DESCRIPTION - DESCRIPTORS
DESCRIPTORS: BURNING
DESCRIPTORS: BURNING

## 2022-10-27 ASSESSMENT — PAIN DESCRIPTION - LOCATION
LOCATION: ABDOMEN
LOCATION: ABDOMEN

## 2022-10-27 ASSESSMENT — PAIN - FUNCTIONAL ASSESSMENT
PAIN_FUNCTIONAL_ASSESSMENT: ACTIVITIES ARE NOT PREVENTED
PAIN_FUNCTIONAL_ASSESSMENT: NONE - DENIES PAIN

## 2022-10-27 ASSESSMENT — PAIN DESCRIPTION - PAIN TYPE
TYPE: SURGICAL PAIN
TYPE: SURGICAL PAIN

## 2022-10-27 ASSESSMENT — ENCOUNTER SYMPTOMS: SHORTNESS OF BREATH: 0

## 2022-10-27 NOTE — ANESTHESIA PRE PROCEDURE
Department of Anesthesiology  Preprocedure Note       Name:  Chris Pineda   Age:  80 y.o.  :  1940                                          MRN:  [id number]         Date:  10/27/2022      Surgeon: Kim Davis):  Ti Aviles MD    Procedure: Procedure(s):  CYSTOSCOPY BLADDER BIOPSY & FULGURATION OF BLADDER TUMOR  BILATERAL URETHRAL CATHETERIZATION & BILATERAL RETROGRADE PYELOGRAMS    Medications prior to admission:   Prior to Admission medications    Medication Sig Start Date End Date Taking? Authorizing Provider   latanoprost (XALATAN) 0.005 % ophthalmic solution  22   Historical Provider, MD   ferrous sulfate (IRON 325) 325 (65 Fe) MG tablet Take 325 mg by mouth daily (with breakfast)  Patient not taking: No sig reported    Historical Provider, MD   aspirin 81 MG EC tablet Take 1 tablet by mouth daily  Patient not taking: No sig reported 22   Ti Aviles MD   metoprolol succinate (TOPROL XL) 25 MG extended release tablet Take 25 mg by mouth 2 times daily    Historical Provider, MD   vitamin B-12 (CYANOCOBALAMIN) 100 MCG tablet Take 1,000 mcg by mouth daily    Historical Provider, MD   Cholecalciferol (VITAMIN D) 50 MCG (2000) CAPS capsule Take by mouth    Historical Provider, MD       Current medications:    Current Facility-Administered Medications   Medication Dose Route Frequency Provider Last Rate Last Admin    levoFLOXacin (LEVAQUIN) 500 MG/100ML infusion 500 mg  500 mg IntraVENous Once Ti Aviles MD           Allergies: Allergies   Allergen Reactions    Latex Itching and Other (See Comments)     And band aids. Causes redness and itching.     Amoxicillin-Pot Clavulanate Hives       Problem List:    Patient Active Problem List   Diagnosis Code    Malignant neoplasm of lateral wall of urinary bladder (HCC) C67.2    History of bladder cancer Z85.51       Past Medical History:        Diagnosis Date    Hypertension        Past Surgical History:        Procedure Laterality Date    CYSTOSCOPY N/A 1/11/2022    CYSTOSCOPY TRANSURETHRAL RESECTION BLADDER TUMOR performed by Olive Kim MD at [facility] Bilateral 1/11/2022    BILATERAL URETERAL CATHETERIZATION, BILATERAL RETROGRADE PYELOGRAMS performed by Olive Kim MD at [facility] Left 1/11/2022    LEFT URETERAL STENT INSERTION performed by Olive Kim MD at [address] [facility] Bilateral     in groin area        Social History:    Social History     Tobacco Use    Smoking status: Former    Smokeless tobacco: Never    Tobacco comments:     about 40 years ago   Substance Use Topics    Alcohol use: Not on file                                Counseling given: Not Answered  Tobacco comments: about 40 years ago      Vital Signs (Current):   Vitals:    10/27/22 1142   BP: (!) 152/61   Pulse: 59   Resp: 16   Temp: 97.9 °F (36.6 °C)   TempSrc: Tympanic   SpO2: 96%   Weight: 148 lb (67.1 kg)   Height: 5' 10\" (1.778 m)                                              BP Readings from Last 3 Encounters:   10/27/22 (!) 152/61   01/11/22 (!) 142/61   01/11/22 (!) 173/79       NPO Status: Time of last liquid consumption: 2300 (Sip of water with AM meds.)                        Time of last solid consumption: 2100                        Date of last liquid consumption: 10/26/22                        Date of last solid food consumption: 10/26/22    BMI:   Wt Readings from Last 3 Encounters:   10/27/22 148 lb (67.1 kg)   10/25/22 147 lb (66.7 kg)   10/12/22 149 lb 9.6 oz (67.9 kg)     Body mass index is 21.24 kg/m².     CBC:   Lab Results   Component Value Date/Time    WBC 8.8 10/25/2022 10:16 AM    RBC 4.31 10/25/2022 10:16 AM    HGB 13.1 10/25/2022 10:16 AM    HCT 40.1 10/25/2022 10:16 AM    MCV 93.0 10/25/2022 10:16 AM    RDW 12.5 10/25/2022 10:16 AM     10/25/2022 10:16 AM       CMP:   Lab Results   Component Value Date/Time     10/25/2022 10:16 AM    K 4.1 10/25/2022 10:16 AM  10/25/2022 10:16 AM    CO2 27 10/25/2022 10:16 AM    BUN 18 10/25/2022 10:16 AM    CREATININE 1.2 10/25/2022 10:16 AM    GFRAA >59 01/10/2022 10:44 AM    LABGLOM >60 10/25/2022 10:16 AM    GLUCOSE 100 10/25/2022 10:16 AM    CALCIUM 9.4 10/25/2022 10:16 AM       POC Tests: No results for input(s): POCGLU, POCNA, POCK, POCCL, POCBUN, POCHEMO, POCHCT in the last 72 hours. Coags:   Lab Results   Component Value Date/Time    PROTIME 13.7 01/10/2022 10:44 AM    INR 1.03 01/10/2022 10:44 AM    APTT 26.7 01/10/2022 10:44 AM       HCG (If Applicable): No results found for: PREGTESTUR, PREGSERUM, HCG, HCGQUANT     ABGs: No results found for: PHART, PO2ART, OJF1PTX, TZN9ACP, BEART, Q0DZAEAF     Type & Screen (If Applicable):  No results found for: LABABO, LABRH    Drug/Infectious Status (If Applicable):  No results found for: HIV, HEPCAB    COVID-19 Screening (If Applicable): No results found for: COVID19        Anesthesia Evaluation  Patient summary reviewed and Nursing notes reviewed no history of anesthetic complications:   Airway: Mallampati: II  TM distance: >3 FB   Neck ROM: full  Mouth opening: > = 3 FB   Dental: normal exam   (+) caps      Pulmonary:Negative Pulmonary ROS and normal exam  breath sounds clear to auscultation      (-) shortness of breath and not a current smoker          Patient did not smoke on day of surgery. Cardiovascular:    (+) hypertension:,     (-) CAD,  angina and  CHF    NYHA Classification: I  ECG reviewed  Rhythm: regular  Rate: normal           Beta Blocker:  Not on Beta Blocker         Neuro/Psych:   Negative Neuro/Psych ROS     (-) seizures, CVA and depression/anxiety            GI/Hepatic/Renal: Neg GI/Hepatic/Renal ROS       (-) hiatal hernia and GERD       Endo/Other: Negative Endo/Other ROS   (+) malignancy/cancer. Pt had PAT visit. Abdominal:       Abdomen: soft. Vascular:           Other Findings:           Anesthesia Plan      general ASA 2     (Iv zofran within 30 min of closing )  Induction: intravenous. BIS  MIPS: Postoperative opioids intended and Prophylactic antiemetics administered. Anesthetic plan and risks discussed with patient. Use of blood products discussed with patient whom. Plan discussed with CRNA.     Attending anesthesiologist reviewed and agrees with Pre Eval content                Denver Ross MD   10/27/2022

## 2022-10-27 NOTE — INTERVAL H&P NOTE
Update History & Physical    The patient's History and Physical of October 12, 2022 was reviewed with the patient and I examined the patient. There was no change. The surgical site was confirmed by the patient and me. Plan: The risks, benefits, expected outcome, and alternative to the recommended procedure have been discussed with the patient. Patient understands and wants to proceed with the procedure.      Electronically signed by Issac Sandra MD on 10/27/2022 at 12:28 PM

## 2022-10-27 NOTE — PROGRESS NOTES
CLINICAL PHARMACY NOTE: MEDS TO BEDS    Total # of Prescriptions Filled: 1   The following medications were delivered to the patient:  Ciprofloxacin 500 mg    Additional Documentation:   Delivered Rx's to op-care. Gave Rx's to patients caretaker Laurence Escalante. Laurence Escalante paid $0.97 with [employer].

## 2022-10-27 NOTE — ANESTHESIA POSTPROCEDURE EVALUATION
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     Keily Phase I: Keily Score: 9    Keily Phase II:        Anesthesia Post Evaluation    Patient location during evaluation: PACU  Patient participation: complete - patient participated  Level of consciousness: awake and alert  Pain score: 0  Airway patency: patent  Nausea & Vomiting: no nausea and no vomiting  Complications: no  Cardiovascular status: blood pressure returned to baseline  Respiratory status: acceptable, spontaneous ventilation and room air  Hydration status: stable

## 2022-10-28 NOTE — OP NOTE
Brief operative Note      Patient: Leyla Jara  YOB: 1940  MRN: [id number]    Date of Procedure: 10/27/2022    Pre-Op Diagnosis: Cancer of dome of urinary bladder ([facility] [address] [C67.1]    Post-Op Diagnosis: Same       Procedure(s):  CYSTOSCOPY BLADDER BIOPSY & FULGURATION OF BLADDER TUMOR  BILATERAL URETHRAL CATHETERIZATION & BILATERAL RETROGRADE PYELOGRAMS    Surgeon(s):  Sarwat Lemon MD    Assistant:   * No surgical staff found *    Anesthesia: General    Estimated Blood Loss (mL): 0    Complications: None    Specimens:   ID Type Source Tests Collected by Time Destination   A : bladder tumor dome Tissue Bladder SURGICAL PATHOLOGY Sarwat Lemon MD 10/27/2022 1420        Implants:  * No implants in log *      Drains: * No LDAs found *    Findings: Small papillary tumor right dome completely removed with biopsy forceps and fulgurated.   Normal bilateral retrograde pyelograms    Detailed Description of Procedure:   See dictated report: [id number]    Disposition to PACU then to op care outpatient follow-up in 2 weeks    Electronically signed by Sarah Astudillo MD on 10/27/2022 at 2:56 PM
LISANCALEXANDRA GREEN [facility] OF [facility] [address] [facility]                                OPERATIVE REPORT    PATIENT NAME: Charles Estevez                    :        1940  MED REC NO:   [id number]                              ROOM:  ACCOUNT NO:   [de-identified]                           ADMIT DATE: 10/27/2022  PROVIDER:     Phuong Alanis MD    DATE OF PROCEDURE:  10/27/2022    RADIOGRAPHIC INTERPRETATION OF BILATERAL RETROGRADE PYELOGRAMS. 1.  RIGHT RETROGRADE PYELOGRAM.    INTERPRETATION:  The right ureter was intubated with a ureteral  catheter, contrast injected retrograde. This shows a normal-appearing  right ureter. No filling defects. There is a little bit of narrowing  of the ureter where it goes over the iliac vessel and over the pelvic  brim, but otherwise there is no obstruction, no dilation or  hydronephrosis. The upper collecting system including the renal pelvis  also appears normal.    CONCLUSION:  Normal-appearing right retrograde pyelogram.    2.  LEFT RETROGRADE PYELOGRAM.    INTERPRETATION:  The left ureter was intubated with a ureteral catheter,  contrast injected retrograde. This shows a normal-appearing left  ureter. No filling defects. No hydronephrosis.   The upper collecting  system including the renal pelvis and calyces also appears normal.    CONCLUSION:  Normal-appearing left retrograde pyelogram.        Rosy Hayes MD    D: 10/27/2022 16:04:38      T: 10/27/2022 23:29:43     [email or web address]  Job#: [id number]     Doc#: [id number]    CC:
patient's bladder, bladder was inspected  throughout with a 30 and a 70 degree lens. This showed a papillary  tumor just on the right dome upward, about 6 to 7 mm in size. There was  no other lesion seen. There was mild trabeculation. Using cold cup biopsy forceps, I was able to pinch off this tumor from  the surface of the bladder. This was sent for pathologic examination. I did a couple of biopsies. I then used a Bovie cautery and this  fulgurated the biopsy site. There was no bleeding and all  abnormal-appearing mucosa had been fulgurated. I then turned my attention to retrograde pyelograms. The right and left  ureteral orifices were identified. The right ureteral orifice was  normal.  This was identified and intubated with the ureteral catheter. Contrast was injected retrograde. This showed a normal-appearing right  retrograde pyelogram.  On the left side, the left orifice had previously  been resected, so it was a little bit more lateral, but it was open,  somewhat gaping, sort of refluxed a little bit, so I passed a 5-Syriac  catheter up a little bit higher in the distal ureter and then injected  contrast and this showed a normal-appearing left retrograde pyelogram.   Post-injection imaging showed good drainage and no obstruction. The bladder was emptied. The scope was removed. Procedure was  terminated. The patient was taken to the PACU in stable condition. Estimated blood loss was 0 mL. The patient will follow up to see me in  two weeks to go over the path report.         Christina Lopez MD    D: 10/27/2022 16:02:35      T: 10/27/2022 23:50:15     [email or web address]  Job#: [id number]     Doc#: [id number]    CC:

## 2022-11-14 ENCOUNTER — OFFICE VISIT (OUTPATIENT)
Dept: UROLOGY | Age: 82
End: 2022-11-14
Payer: COMMERCIAL

## 2022-11-14 VITALS — BODY MASS INDEX: 21.93 KG/M2 | HEIGHT: 70 IN | TEMPERATURE: 97.3 F | WEIGHT: 153.2 LBS

## 2022-11-14 DIAGNOSIS — Z85.51 HISTORY OF BLADDER CANCER: Primary | ICD-10-CM

## 2022-11-14 PROCEDURE — 81002 URINALYSIS NONAUTO W/O SCOPE: CPT | Performed by: UROLOGY

## 2022-11-14 PROCEDURE — 1123F ACP DISCUSS/DSCN MKR DOCD: CPT | Performed by: UROLOGY

## 2022-11-14 PROCEDURE — 99214 OFFICE O/P EST MOD 30 MIN: CPT | Performed by: UROLOGY

## 2022-11-14 ASSESSMENT — ENCOUNTER SYMPTOMS
EYE DISCHARGE: 0
EYE REDNESS: 0
NAUSEA: 0
VOMITING: 0
SORE THROAT: 0
CHEST TIGHTNESS: 0
WHEEZING: 0
FACIAL SWELLING: 0
BACK PAIN: 0

## 2022-11-14 NOTE — PROGRESS NOTES
Huseyin Fisher is a 80 y.o. male who presents today   Chief Complaint   Patient presents with    Follow-up     I am here today for a 2 week post op. BLADDER CANCER  Patient was first diagnosed with bladder cancer approximately 10 month(s) ago. Last Recurrence: 10/28/2022. Patient underwent biopsy and fulguration for recurrence seen on surveillance cystoscopy. He is now here for follow-up. 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 Patient had some gross hematuria after his biopsy but this has resolved.     Upper tract studies were bilateral retrograde pyelograms done on 10/27/2022    Past Medical History:   Diagnosis Date    Hypertension        Past Surgical History:   Procedure Laterality Date    CYSTOSCOPY N/A 1/11/2022    CYSTOSCOPY TRANSURETHRAL RESECTION BLADDER TUMOR performed by Leticia Jj MD at [address] Bilateral 1/11/2022    BILATERAL URETERAL CATHETERIZATION, BILATERAL RETROGRADE PYELOGRAMS performed by Leticia Jj MD at [address] [place] Left 1/11/2022    LEFT URETERAL STENT INSERTION performed by Leticia Jj MD at [address] [facility] N/A 10/27/2022    CYSTOSCOPY BLADDER BIOPSY & FULGURATION OF BLADDER TUMOR performed by Leticia Jj MD at [address] Bilateral 10/27/2022    BILATERAL URETHRAL CATHETERIZATION & BILATERAL RETROGRADE PYELOGRAMS performed by Leticia Jj MD at [address] Bilateral     in groin area        Current Outpatient Medications   Medication Sig Dispense Refill    latanoprost (XALATAN) 0.005 % ophthalmic solution       metoprolol succinate (TOPROL XL) 25 MG extended release tablet Take 25 mg by mouth 2 times daily      vitamin B-12 (CYANOCOBALAMIN) 100 MCG tablet Take 1,000 mcg by mouth daily      Cholecalciferol (VITAMIN D) 50 MCG (2000 UT) CAPS capsule Take by mouth       No current facility-administered medications for this visit. Allergies   Allergen Reactions    Latex Itching and Other (See Comments)     And band aids. Causes redness and itching. Amoxicillin-Pot Clavulanate Hives       Social History     Socioeconomic History    Marital status:    Tobacco Use    Smoking status: Former    Smokeless tobacco: Never    Tobacco comments:     about 40 years ago   Vaping Use    Vaping Use: Never used       No family history on file. REVIEW OF SYSTEMS:  Review of Systems   Constitutional:  Negative for chills and fever. HENT:  Negative for facial swelling and sore throat. Eyes:  Negative for discharge and redness. Respiratory:  Negative for chest tightness and wheezing. Cardiovascular:  Negative for chest pain and palpitations. Gastrointestinal:  Negative for nausea and vomiting. Endocrine: Negative for polyphagia and polyuria. Genitourinary:  Negative for decreased urine volume, difficulty urinating, dysuria, enuresis, flank pain, frequency, genital sores, hematuria, penile discharge, penile pain, penile swelling, scrotal swelling, testicular pain and urgency. Musculoskeletal:  Negative for back pain and neck stiffness. Skin:  Negative for rash and wound. Neurological:  Negative for dizziness and headaches. Hematological:  Negative for adenopathy. Does not bruise/bleed easily. Psychiatric/Behavioral:  Negative for confusion and hallucinations. PHYSICAL EXAM:  Temp 97.3 °F (36.3 °C)   Ht 5' 10\" (1.778 m)   Wt 153 lb 3.2 oz (69.5 kg)   BMI 21.98 kg/m²   Physical Exam  Constitutional:       General: He is not in acute distress. Appearance: Normal appearance. He is well-developed. HENT:      Head: Normocephalic and atraumatic. Nose: Nose normal.   Eyes:      General: No scleral icterus. Conjunctiva/sclera: Conjunctivae normal.      Pupils: Pupils are equal, round, and reactive to light. Neck:      Trachea: No tracheal deviation. Cardiovascular:      Rate and Rhythm: Normal rate and regular rhythm. Pulses: Normal pulses. Pulmonary:      Effort: Pulmonary effort is normal. No respiratory distress. Breath sounds: No stridor. Abdominal:      General: There is no distension. Palpations: Abdomen is soft. There is no mass. Tenderness: There is no abdominal tenderness. Musculoskeletal:         General: No tenderness or deformity. Normal range of motion. Cervical back: Normal range of motion and neck supple. Lymphadenopathy:      Cervical: No cervical adenopathy. Skin:     General: Skin is warm and dry. Findings: No erythema. Neurological:      General: No focal deficit present. Mental Status: He is alert and oriented to person, place, and time.    Psychiatric:         Behavior: Behavior normal.         Judgment: Judgment normal.           DATA:  BMP:    Lab Results   Component Value Date/Time     10/25/2022 10:16 AM    K 4.1 10/25/2022 10:16 AM     10/25/2022 10:16 AM    CO2 27 10/25/2022 10:16 AM    BUN 18 10/25/2022 10:16 AM    CREATININE 1.2 10/25/2022 10:16 AM    CALCIUM 9.4 10/25/2022 10:16 AM    GFRAA >59 01/10/2022 10:44 AM    LABGLOM >60 10/25/2022 10:16 AM    GLUCOSE 100 10/25/2022 10:16 AM     Results for orders placed or performed in visit on 11/14/22   POCT Urinalysis no Micro   Result Value Ref Range    Color, UA yellow     Clarity, UA clear     Glucose, UA POC neg     Bilirubin, UA neg     Ketones, UA neg     Spec Grav, UA 1.010     Blood, UA POC neg     pH, UA 6.5     Protein, UA POC neg     Urobilinogen, UA 0.2     Leukocytes, UA neg     Nitrite, UA neg     Appearance, Fluid Clear Clear, Slightly [address] [address]   Department of Pathology   FINAL SURGICAL PATHOLOGY REPORT   Patient Name:  Nick Bazzi            Accession No:  [id number]  Age Sex:   1940    80 Y M        Pt Type: I     DIS                                              Location:   Account #      [de-identified]                 Collected:     10/27/2022   Med Rec #      [id number]                    Received:      10/28/2022   Attend Phys:   Esme Dee             Completed:     10/31/2022   Perform Phys:  Nathalia Main     FINAL DIAGNOSIS:     Urinary bladder, biopsy of bladder dome lesion: Noninvasive high-grade   papillary urothelial carcinoma. AJCC STAGE: pTa, pNx    CBG/CBG       1. History of bladder cancer  Pathology discussed patient will follow-up for 3-month surveillance cystoscopy he will be due in about 10 weeks  - POCT Urinalysis no Micro  - Cytology, Non-Gyn; Future  - GA CYSTOURETHROSCOPY; Future      Orders Placed This Encounter   Procedures    Cytology, Non-Gyn     Prior to next visit in in 8 -10 weeks     Standing Status:   Future     Standing Expiration Date:   2023     Order Specific Question:   PREVIOUS BIOPSY     Answer:   Yes     Order Specific Question:   PREOP DIAGNOSIS     Answer:   Hematuria     Order Specific Question:   FROZEN SECTION - NO OR YES/SPECIMEN     Answer:   No    POCT Urinalysis no Micro    GA CYSTOURETHROSCOPY     Next available in 8 -10 weeks     Standing Status:   Future     Standing Expiration Date:   2023        Return in about 10 weeks (around 2023) for Cystoscopy on next visit, Urine Cytology prior to next visit. All information inputted into the note by the MA to include chief complaint, past medical history, past surgical history, medications, allergies, social and family history and review of systems has been reviewed and updated as needed by me. EMR Dragon/transcription disclaimer: Much of this documentt is electronic  transcription/translation of spoken language to printed text.  The  electronic translation of spoken language may be erroneous, or at times,  nonsensical words or phrases may be inadvertently transcribed.  Although I  have reviewed the document for such errors, some may still exist.

## 2022-12-06 ENCOUNTER — LAB (OUTPATIENT)
Dept: LAB | Facility: HOSPITAL | Age: 82
End: 2022-12-06

## 2022-12-06 DIAGNOSIS — E61.1 IRON DEFICIENCY: ICD-10-CM

## 2022-12-06 DIAGNOSIS — D47.2 IGG LAMBDA MONOCLONAL GAMMOPATHY: ICD-10-CM

## 2022-12-06 DIAGNOSIS — Z85.51 HISTORY OF BLADDER CANCER: ICD-10-CM

## 2022-12-06 DIAGNOSIS — D50.8 IRON DEFICIENCY ANEMIA SECONDARY TO INADEQUATE DIETARY IRON INTAKE: ICD-10-CM

## 2022-12-06 DIAGNOSIS — C91.10 CLL (CHRONIC LYMPHOCYTIC LEUKEMIA): ICD-10-CM

## 2022-12-06 LAB
ALBUMIN SERPL-MCNC: 4.3 G/DL (ref 3.5–5.2)
ALBUMIN/GLOB SERPL: 2.7 G/DL
ALP SERPL-CCNC: 96 U/L (ref 39–117)
ALT SERPL W P-5'-P-CCNC: 8 U/L (ref 1–41)
ANION GAP SERPL CALCULATED.3IONS-SCNC: 6 MMOL/L (ref 5–15)
AST SERPL-CCNC: 11 U/L (ref 1–40)
BASOPHILS # BLD MANUAL: 0.19 10*3/MM3 (ref 0–0.2)
BASOPHILS NFR BLD MANUAL: 2 % (ref 0–1.5)
BILIRUB SERPL-MCNC: 0.6 MG/DL (ref 0–1.2)
BUN SERPL-MCNC: 14 MG/DL (ref 8–23)
BUN/CREAT SERPL: 12.8 (ref 7–25)
CALCIUM SPEC-SCNC: 9.5 MG/DL (ref 8.6–10.5)
CHLORIDE SERPL-SCNC: 107 MMOL/L (ref 98–107)
CO2 SERPL-SCNC: 30 MMOL/L (ref 22–29)
CREAT SERPL-MCNC: 1.09 MG/DL (ref 0.76–1.27)
DACRYOCYTES BLD QL SMEAR: ABNORMAL
DEPRECATED RDW RBC AUTO: 43.1 FL (ref 37–54)
EGFRCR SERPLBLD CKD-EPI 2021: 69 ML/MIN/1.73
EOSINOPHIL # BLD MANUAL: 0.28 10*3/MM3 (ref 0–0.4)
EOSINOPHIL NFR BLD MANUAL: 3 % (ref 0.3–6.2)
ERYTHROCYTE [DISTWIDTH] IN BLOOD BY AUTOMATED COUNT: 12.5 % (ref 12.3–15.4)
FERRITIN SERPL-MCNC: 95.09 NG/ML (ref 30–400)
GLOBULIN UR ELPH-MCNC: 1.6 GM/DL
GLUCOSE SERPL-MCNC: 100 MG/DL (ref 65–99)
HCT VFR BLD AUTO: 40.1 % (ref 37.5–51)
HGB BLD-MCNC: 12.8 G/DL (ref 13–17.7)
IRON 24H UR-MRATE: 82 MCG/DL (ref 59–158)
IRON SATN MFR SERPL: 22 % (ref 20–50)
LYMPHOCYTES # BLD MANUAL: 5.01 10*3/MM3 (ref 0.7–3.1)
LYMPHOCYTES NFR BLD MANUAL: 10.1 % (ref 5–12)
MCH RBC QN AUTO: 30 PG (ref 26.6–33)
MCHC RBC AUTO-ENTMCNC: 31.9 G/DL (ref 31.5–35.7)
MCV RBC AUTO: 93.9 FL (ref 79–97)
MONOCYTES # BLD: 0.94 10*3/MM3 (ref 0.1–0.9)
NEUTROPHILS # BLD AUTO: 2.92 10*3/MM3 (ref 1.7–7)
NEUTROPHILS NFR BLD MANUAL: 30.3 % (ref 42.7–76)
NEUTS BAND NFR BLD MANUAL: 1 % (ref 0–5)
PLATELET # BLD AUTO: 127 10*3/MM3 (ref 140–450)
PMV BLD AUTO: 9.2 FL (ref 6–12)
POIKILOCYTOSIS BLD QL SMEAR: ABNORMAL
POTASSIUM SERPL-SCNC: 4 MMOL/L (ref 3.5–5.2)
PROT SERPL-MCNC: 5.9 G/DL (ref 6–8.5)
RBC # BLD AUTO: 4.27 10*6/MM3 (ref 4.14–5.8)
SMALL PLATELETS BLD QL SMEAR: ABNORMAL
SODIUM SERPL-SCNC: 143 MMOL/L (ref 136–145)
TIBC SERPL-MCNC: 380 MCG/DL (ref 298–536)
TRANSFERRIN SERPL-MCNC: 255 MG/DL (ref 200–360)
VARIANT LYMPHS NFR BLD MANUAL: 15.2 % (ref 0–5)
VARIANT LYMPHS NFR BLD MANUAL: 38.4 % (ref 19.6–45.3)
WBC MORPH BLD: NORMAL
WBC NRBC COR # BLD: 9.34 10*3/MM3 (ref 3.4–10.8)

## 2022-12-06 PROCEDURE — 83540 ASSAY OF IRON: CPT

## 2022-12-06 PROCEDURE — 83521 IG LIGHT CHAINS FREE EACH: CPT

## 2022-12-06 PROCEDURE — 84466 ASSAY OF TRANSFERRIN: CPT

## 2022-12-06 PROCEDURE — 86334 IMMUNOFIX E-PHORESIS SERUM: CPT

## 2022-12-06 PROCEDURE — 85025 COMPLETE CBC W/AUTO DIFF WBC: CPT

## 2022-12-06 PROCEDURE — 82728 ASSAY OF FERRITIN: CPT

## 2022-12-06 PROCEDURE — 82784 ASSAY IGA/IGD/IGG/IGM EACH: CPT

## 2022-12-06 PROCEDURE — 80053 COMPREHEN METABOLIC PANEL: CPT

## 2022-12-06 PROCEDURE — 36415 COLL VENOUS BLD VENIPUNCTURE: CPT

## 2022-12-06 PROCEDURE — 85007 BL SMEAR W/DIFF WBC COUNT: CPT

## 2022-12-06 PROCEDURE — 84165 PROTEIN E-PHORESIS SERUM: CPT

## 2022-12-07 LAB
ALBUMIN SERPL ELPH-MCNC: 3.9 G/DL (ref 2.9–4.4)
ALBUMIN/GLOB SERPL: 2.1 {RATIO} (ref 0.7–1.7)
ALPHA1 GLOB SERPL ELPH-MCNC: 0.2 G/DL (ref 0–0.4)
ALPHA2 GLOB SERPL ELPH-MCNC: 0.7 G/DL (ref 0.4–1)
B-GLOBULIN SERPL ELPH-MCNC: 0.7 G/DL (ref 0.7–1.3)
GAMMA GLOB SERPL ELPH-MCNC: 0.3 G/DL (ref 0.4–1.8)
GLOBULIN SER-MCNC: 1.9 G/DL (ref 2.2–3.9)
IGA SERPL-MCNC: 16 MG/DL (ref 61–437)
IGG SERPL-MCNC: 371 MG/DL (ref 603–1613)
IGM SERPL-MCNC: 13 MG/DL (ref 15–143)
INTERPRETATION SERPL IEP-IMP: ABNORMAL
KAPPA LC FREE SER-MCNC: 9.4 MG/L (ref 3.3–19.4)
KAPPA LC FREE/LAMBDA FREE SER: 0.76 {RATIO} (ref 0.26–1.65)
LABORATORY COMMENT REPORT: ABNORMAL
LAMBDA LC FREE SERPL-MCNC: 12.4 MG/L (ref 5.7–26.3)
M PROTEIN SERPL ELPH-MCNC: 0.1 G/DL
PROT SERPL-MCNC: 5.8 G/DL (ref 6–8.5)

## 2023-01-09 DIAGNOSIS — Z85.51 HISTORY OF BLADDER CANCER: ICD-10-CM

## 2023-01-10 ENCOUNTER — HOSPITAL ENCOUNTER (OUTPATIENT)
Age: 83
Setting detail: SPECIMEN
Discharge: HOME | End: 2023-01-10
Payer: COMMERCIAL

## 2023-01-10 PROCEDURE — 88112 CYTOPATH CELL ENHANCE TECH: CPT

## 2023-01-23 ENCOUNTER — PROCEDURE VISIT (OUTPATIENT)
Dept: UROLOGY | Age: 83
End: 2023-01-23
Payer: COMMERCIAL

## 2023-01-23 VITALS — TEMPERATURE: 98 F | BODY MASS INDEX: 21.9 KG/M2 | HEIGHT: 70 IN | WEIGHT: 153 LBS

## 2023-01-23 DIAGNOSIS — Z85.51 HISTORY OF BLADDER CANCER: ICD-10-CM

## 2023-01-23 PROCEDURE — 81003 URINALYSIS AUTO W/O SCOPE: CPT | Performed by: UROLOGY

## 2023-01-23 PROCEDURE — 52000 CYSTOURETHROSCOPY: CPT | Performed by: UROLOGY

## 2023-01-23 PROCEDURE — 99999 PR OFFICE/OUTPT VISIT,PROCEDURE ONLY: CPT | Performed by: UROLOGY

## 2023-01-23 NOTE — PROGRESS NOTES
BLADDER CANCER  Patient was first diagnosed with bladder cancer approximately 12 month(s) ago. 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 microscopic  Last upper tract study: 3 month(s) ago. Study was a retrograde pyelograms done 10/27/2022        Cystoscopy Operative Note (1/23/23)  Surgeon: Fidencio Nova MD  Anesthesia: Urethral 2% Xylocaine   Indications: History of bladder cancer  Position: Supine    Findings:   The patient was prepped and draped in the usual sterile fashion. The flexible cystoscope was advanced through the urethra and into the bladder under direct vision. The bladder was thoroughly inspected and the following was noted:    Residual Urine: moderate  Urethra: normal appearing urethra with no masses, tenderness or lesions  Prostate: partially obstructing lateral lobes of prostate; median lobe present? no.    Bladder: No tumors or CIS noted. No bladder diverticulum. There was moderate trabeculation noted. No recurrent papillary tumor seen  Ureters: Clear efflux from both ureters. Orifices with normal configuration and location. The cystoscope was removed. The patient tolerated the procedure well. The patient was given a post procedure instructions and follow up. Results for orders placed or performed in visit on 01/23/23   POCT Urinalysis No Micro (Auto)   Result Value Ref Range    Color, UA yellow     Clarity, UA clear     Glucose, UA POC -     Bilirubin, UA -     Ketones, UA -     Spec Grav, UA 1.015     Blood, UA POC trace     pH, UA 6.5     Protein, UA POC -     Urobilinogen, UA 0.2     Leukocytes, UA -     Nitrite, UA -            1. History of bladder cancer  No visible recurrences today on surveillance cystoscopy.   We will repeat his cytology at the next follow-up. - HI CYSTOURETHROSCOPY  - POCT Urinalysis No Micro (Auto)  - HI CYSTOURETHROSCOPY; Future  - Cytology, Non-Gyn; Future      Orders Placed This Encounter   Procedures    Cytology, Non-Gyn     Prior to next visit in 3 mos     Standing Status:   Future     Standing Expiration Date:   1/23/2024     Order Specific Question:   PREVIOUS BIOPSY     Answer:   Yes     Order Specific Question:   PREOP DIAGNOSIS     Answer:   History of bladder cancer     Order Specific Question:   FROZEN SECTION - NO OR YES/SPECIMEN     Answer:   No    POCT Urinalysis No Micro (Auto)    HI CYSTOURETHROSCOPY     Cystoscopy in 3 months     Standing Status:   Future     Standing Expiration Date:   1/23/2024       Return in about 3 months (around 4/23/2023) for Cystoscopy on next visit, Urine Cytology prior to next visit.         Hailee Crisostomo MD

## 2023-03-07 ENCOUNTER — LAB (OUTPATIENT)
Dept: LAB | Facility: HOSPITAL | Age: 83
End: 2023-03-07
Payer: COMMERCIAL

## 2023-03-07 DIAGNOSIS — E61.1 IRON DEFICIENCY: ICD-10-CM

## 2023-03-07 DIAGNOSIS — D47.2 IGG LAMBDA MONOCLONAL GAMMOPATHY: ICD-10-CM

## 2023-03-07 DIAGNOSIS — Z85.51 HISTORY OF BLADDER CANCER: ICD-10-CM

## 2023-03-07 DIAGNOSIS — C91.10 CLL (CHRONIC LYMPHOCYTIC LEUKEMIA): ICD-10-CM

## 2023-03-07 DIAGNOSIS — D50.8 IRON DEFICIENCY ANEMIA SECONDARY TO INADEQUATE DIETARY IRON INTAKE: ICD-10-CM

## 2023-03-07 LAB
ALBUMIN SERPL-MCNC: 4.4 G/DL (ref 3.5–5.2)
ALBUMIN/GLOB SERPL: 2.6 G/DL
ALP SERPL-CCNC: 95 U/L (ref 39–117)
ALT SERPL W P-5'-P-CCNC: 8 U/L (ref 1–41)
ANION GAP SERPL CALCULATED.3IONS-SCNC: 9 MMOL/L (ref 5–15)
AST SERPL-CCNC: 12 U/L (ref 1–40)
BILIRUB SERPL-MCNC: 0.7 MG/DL (ref 0–1.2)
BUN SERPL-MCNC: 14 MG/DL (ref 8–23)
BUN/CREAT SERPL: 13.3 (ref 7–25)
CALCIUM SPEC-SCNC: 9.2 MG/DL (ref 8.6–10.5)
CHLORIDE SERPL-SCNC: 104 MMOL/L (ref 98–107)
CO2 SERPL-SCNC: 29 MMOL/L (ref 22–29)
CREAT SERPL-MCNC: 1.05 MG/DL (ref 0.76–1.27)
DEPRECATED RDW RBC AUTO: 42.5 FL (ref 37–54)
EGFRCR SERPLBLD CKD-EPI 2021: 71.8 ML/MIN/1.73
EOSINOPHIL # BLD MANUAL: 0.18 10*3/MM3 (ref 0–0.4)
EOSINOPHIL NFR BLD MANUAL: 2 % (ref 0.3–6.2)
ERYTHROCYTE [DISTWIDTH] IN BLOOD BY AUTOMATED COUNT: 12.5 % (ref 12.3–15.4)
FERRITIN SERPL-MCNC: 103 NG/ML (ref 30–400)
GLOBULIN UR ELPH-MCNC: 1.7 GM/DL
GLUCOSE SERPL-MCNC: 94 MG/DL (ref 65–99)
HCT VFR BLD AUTO: 40.1 % (ref 37.5–51)
HGB BLD-MCNC: 13 G/DL (ref 13–17.7)
IRON 24H UR-MRATE: 76 MCG/DL (ref 59–158)
IRON SATN MFR SERPL: 20 % (ref 20–50)
LYMPHOCYTES # BLD MANUAL: 4.86 10*3/MM3 (ref 0.7–3.1)
LYMPHOCYTES NFR BLD MANUAL: 3 % (ref 5–12)
MCH RBC QN AUTO: 29.9 PG (ref 26.6–33)
MCHC RBC AUTO-ENTMCNC: 32.4 G/DL (ref 31.5–35.7)
MCV RBC AUTO: 92.2 FL (ref 79–97)
MONOCYTES # BLD: 0.26 10*3/MM3 (ref 0.1–0.9)
NEUTROPHILS # BLD AUTO: 3.45 10*3/MM3 (ref 1.7–7)
NEUTROPHILS NFR BLD MANUAL: 37.4 % (ref 42.7–76)
NEUTS BAND NFR BLD MANUAL: 2 % (ref 0–5)
PLATELET # BLD AUTO: 129 10*3/MM3 (ref 140–450)
PMV BLD AUTO: 9.2 FL (ref 6–12)
POTASSIUM SERPL-SCNC: 4.4 MMOL/L (ref 3.5–5.2)
PROT SERPL-MCNC: 6.1 G/DL (ref 6–8.5)
RBC # BLD AUTO: 4.35 10*6/MM3 (ref 4.14–5.8)
RBC MORPH BLD: NORMAL
SMALL PLATELETS BLD QL SMEAR: ABNORMAL
SMUDGE CELLS BLD QL SMEAR: ABNORMAL
SODIUM SERPL-SCNC: 142 MMOL/L (ref 136–145)
TIBC SERPL-MCNC: 383 MCG/DL (ref 298–536)
TRANSFERRIN SERPL-MCNC: 257 MG/DL (ref 200–360)
VARIANT LYMPHS NFR BLD MANUAL: 23.2 % (ref 19.6–45.3)
VARIANT LYMPHS NFR BLD MANUAL: 32.3 % (ref 0–5)
WBC NRBC COR # BLD: 8.76 10*3/MM3 (ref 3.4–10.8)

## 2023-03-07 PROCEDURE — 84165 PROTEIN E-PHORESIS SERUM: CPT

## 2023-03-07 PROCEDURE — 82728 ASSAY OF FERRITIN: CPT

## 2023-03-07 PROCEDURE — 85007 BL SMEAR W/DIFF WBC COUNT: CPT

## 2023-03-07 PROCEDURE — 82784 ASSAY IGA/IGD/IGG/IGM EACH: CPT

## 2023-03-07 PROCEDURE — 83540 ASSAY OF IRON: CPT

## 2023-03-07 PROCEDURE — 85025 COMPLETE CBC W/AUTO DIFF WBC: CPT

## 2023-03-07 PROCEDURE — 83521 IG LIGHT CHAINS FREE EACH: CPT

## 2023-03-07 PROCEDURE — 86334 IMMUNOFIX E-PHORESIS SERUM: CPT

## 2023-03-07 PROCEDURE — 80053 COMPREHEN METABOLIC PANEL: CPT

## 2023-03-07 PROCEDURE — 84466 ASSAY OF TRANSFERRIN: CPT

## 2023-03-07 PROCEDURE — 36415 COLL VENOUS BLD VENIPUNCTURE: CPT

## 2023-03-08 LAB
ALBUMIN SERPL ELPH-MCNC: 4.1 G/DL (ref 2.9–4.4)
ALBUMIN/GLOB SERPL: 2.2 {RATIO} (ref 0.7–1.7)
ALPHA1 GLOB SERPL ELPH-MCNC: 0.2 G/DL (ref 0–0.4)
ALPHA2 GLOB SERPL ELPH-MCNC: 0.6 G/DL (ref 0.4–1)
B-GLOBULIN SERPL ELPH-MCNC: 0.7 G/DL (ref 0.7–1.3)
GAMMA GLOB SERPL ELPH-MCNC: 0.3 G/DL (ref 0.4–1.8)
GLOBULIN SER-MCNC: 1.9 G/DL (ref 2.2–3.9)
IGA SERPL-MCNC: 15 MG/DL (ref 61–437)
IGG SERPL-MCNC: 388 MG/DL (ref 603–1613)
IGM SERPL-MCNC: 11 MG/DL (ref 15–143)
INTERPRETATION SERPL IEP-IMP: ABNORMAL
KAPPA LC FREE SER-MCNC: 8.9 MG/L (ref 3.3–19.4)
KAPPA LC FREE/LAMBDA FREE SER: 0.69 {RATIO} (ref 0.26–1.65)
LABORATORY COMMENT REPORT: ABNORMAL
LAMBDA LC FREE SERPL-MCNC: 12.9 MG/L (ref 5.7–26.3)
M PROTEIN SERPL ELPH-MCNC: 0.1 G/DL
PROT SERPL-MCNC: 6 G/DL (ref 6–8.5)

## 2023-03-14 ENCOUNTER — OFFICE VISIT (OUTPATIENT)
Dept: ONCOLOGY | Facility: CLINIC | Age: 83
End: 2023-03-14
Payer: COMMERCIAL

## 2023-03-14 VITALS
OXYGEN SATURATION: 98 % | BODY MASS INDEX: 22.51 KG/M2 | DIASTOLIC BLOOD PRESSURE: 62 MMHG | HEIGHT: 69 IN | SYSTOLIC BLOOD PRESSURE: 118 MMHG | RESPIRATION RATE: 16 BRPM | TEMPERATURE: 97.6 F | HEART RATE: 75 BPM | WEIGHT: 152 LBS

## 2023-03-14 DIAGNOSIS — E61.1 IRON DEFICIENCY: ICD-10-CM

## 2023-03-14 DIAGNOSIS — D47.2 IGG LAMBDA MONOCLONAL GAMMOPATHY: Primary | ICD-10-CM

## 2023-03-14 DIAGNOSIS — C91.10 CLL (CHRONIC LYMPHOCYTIC LEUKEMIA): ICD-10-CM

## 2023-03-14 PROCEDURE — 99214 OFFICE O/P EST MOD 30 MIN: CPT | Performed by: NURSE PRACTITIONER

## 2023-03-14 RX ORDER — LANOLIN ALCOHOL/MO/W.PET/CERES
1000 CREAM (GRAM) TOPICAL DAILY
COMMUNITY

## 2023-03-14 NOTE — PROGRESS NOTES
[facility]  [address]  [place] [address] [phone]    Patient Name: Oral Dey  Encounter Date: 03/14/2023  YOB: 1940  Patient Number: [id number]      REASON FOR FOLLOW-UP: Oral Dey is a pleasant 82 y.o.  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  He is also seen for anemia from iron deficiency.  He is no longer taking oral iron.     He presents to clinic today for continued follow up.  He complains of persistent fatigue. Otherwise he feels well.  He states he is drinking a protein shake per day.     He states he wakes up once in a while soaking wet.  Nothing consistent.          Problem List Items Addressed This Visit    None    Oncology/Hematology History Overview Note   DIAGNOSTIC ABNORMALITIES:  Labs, 05/16/2012, [facility]: WBC 10.9, hemoglobin 14.3, hematocrit 42.2, MCV of 94.2, platelets 133,000, ALC elevated at 4796, ANC normal at 4.8, Absolute monocytosis at 970.  Labs, 11/14/2012, [facility]: WBC 10.2, hemoglobin 15.4, hematocrit 45.4, MCV 94.5, platelets 154,000, ALC elevated at 4,865. Globulin 1.9.  Labs, 11/15/2012, [facility]: IgG 495, IgA 34, IgM 28 (all below normal limits).  Blood film review, 11/16/2012, [facility]: Mild absolute lymphocytosis and monocytosis. Reactive and atypical lymphocytes present. Flow cytometry recommended.  PATHOLOGY: Cytogenetics, 12/12/2012, [facility]:  CLL and CCND1-IGH@FISH: Abnormal results with +12 and 13q-.   Flow cytometry peripheral blood, 12/12/2012, [facility]: Peripheral blood with a CD5+ B cell population showing lambda restriction, 30% cellularity.   Labs, 03/27/2013: GFR 78. glucose 108. IgG 455, IgM 16, IgA 35, M-spike 0.1, Immunofixation: IgG monoclonal protein with lambda light chain specificity.   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 Negative for CCND1/IGH gene rearrangement.    Hematopathology report peripheral blood, 03/27/2013 [facility]: Normal white blood cell count with no evidence of lymphocytosis but 23% abnormal intermediate lambda light chain-restricted B cell population identified by flow cytometry.  Mild thrombocytopenia with normal hemoglobin/hematocrit.  See comment.   Flow peripheral blood, 03/27/2013, [facility]:  Abnormal CD5-positive identified, monoclonal lambda. Consistent with chronic lymphocytic leukemia (CLL)/small lymphocytic lymphoma (SLL).     Skeletal survey, 01/11/2013, [facility]: No discrete lytic lesions identified.   Ultrasound abdomen, 01/11/2013, [facility]: No focal pathology.        PREVIOUS INTERVENTION:   Observation.      PREVIOUS INTERVENTIONS: Anemia from iron deficiency.  Ferrous sulfate 325 mg p.o. daily 01/03/2018 through 03/06/2018.  Resumed 09/04/2018 through 10/10/2018.  Resumed 08/27/2019 through 10/24/2019.  Resume 03/10/2022.     CLL (chronic lymphocytic leukemia) ([facility])   8/27/2019 Initial Diagnosis    CLL (chronic lymphocytic leukemia) (CMS/[facility])         PAST MEDICAL HISTORY:  ALLERGIES:  Allergies   Allergen Reactions   • Augmentin [Amoxicillin-Pot Clavulanate] Hives   • Latex Itching and Other (See Comments)     And band aids. Causes redness and itching.     CURRENT MEDICATIONS:  Outpatient Encounter Medications as of 3/14/2023   Medication Sig Dispense Refill   • latanoprost (XALATAN) 0.005 % ophthalmic solution Administer 1 drop to the right eye Daily.     • metoprolol tartrate (LOPRESSOR) 25 MG tablet Take 1 tablet by mouth 2 (Two) Times a Day.     • multivitamins-minerals (PRESERVISION AREDS 2) capsule capsule Take 1 capsule by mouth 2 (Two) Times a Day.     • vitamin B-12 (CYANOCOBALAMIN) 1000 MCG tablet Take 1 tablet by mouth Daily.     • vitamin D3 125 MCG (5000 UT) capsule capsule Take 1 capsule by  mouth Daily.     • [DISCONTINUED] folic acid-vit B6-vit B12 (FOLTABS) 0.8-10-0.115 MG tablet tablet Take  by mouth Daily. (Patient not taking: Reported on 3/14/2023)     • [DISCONTINUED] levocetirizine (XYZAL) 5 MG tablet Take 5 mg by mouth Every Evening. (Patient not taking: Reported on 3/14/2023)       No facility-administered encounter medications on file as of 3/14/2023.     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   • Iron deficiency E61.1     SURGERIES:  Past Surgical History:   Procedure Laterality Date   • CATARACT EXTRACTION, BILATERAL     • COLONOSCOPY  06/13/2012   • CYSTOSCOPY BLADDER BIOPSY     • EXCISION MASS HEAD/NECK N/A 3/4/2022    Procedure: EXCISION OF MASS ON POSTERIOR NECK;  Surgeon: Rossana Mahajan MD;  Location:  [facility] OR;  Service: General;  Laterality: N/A;   • INGUINAL HERNIA REPAIR Left 2007   • INGUINAL HERNIA REPAIR Right 12/28/2017    Procedure: RIGHT INGUINAL HERNIA REPAIR WITH MESH ;  Surgeon: Rossana Mahajan MD;  Location:  [facility] OR;  Service:      HEALTH MAINTENANCE ITEMS:  Health Maintenance Due   Topic Date Due   • Pneumococcal Vaccine 65+ (1 - PCV) Never done   • TDAP/TD VACCINES (1 - Tdap) Never done   • ZOSTER VACCINE (1 of 2) Never done   • ANNUAL PHYSICAL  Never done   • COVID-19 Vaccine (4 - Booster for Moderna series) 12/20/2021   • INFLUENZA VACCINE  08/01/2022       <no information>  Last Completed Colonoscopy          COLORECTAL CANCER SCREENING (COLONOSCOPY - Every 10 Years) Next due on 8/1/2027 08/01/2017  SCANNED - COLONOSCOPY    06/14/2012  SCANNED - COLONOSCOPY                There is no immunization history on file for this patient.  Last Completed Mammogram     This patient has no relevant Health Maintenance data.            FAMILY HISTORY:  Family  "History   Problem Relation Age of Onset   • Colon cancer Maternal Grandfather         in his 60's.    • Alzheimer's disease Mother    • Hypertension Father    • Other Father         stent   • COPD Sister    • Heart attack Brother    • No Known Problems Maternal Grandmother    • No Known Problems Paternal Grandmother    • No Known Problems Paternal Grandfather      SOCIAL HISTORY:  Social History     Socioeconomic History   • Marital status:    Tobacco Use   • Smoking status: Former     Years: 15.00     Types: Cigarettes     Quit date:      Years since quittin.2   • Smokeless tobacco: Never   Substance and Sexual Activity   • Alcohol use: No   • Drug use: No   • Sexual activity: Defer       REVIEW OF SYSTEMS:    Review of Systems   Constitutional: Positive for fatigue. Negative for chills and fever.        \"I feel tired.\"   HENT: Negative for congestion, mouth sores and nosebleeds.         Hearing aids     Eyes: Negative for redness and visual disturbance.   Respiratory: Negative for cough, shortness of breath and wheezing.    Cardiovascular: Negative for chest pain and palpitations.   Gastrointestinal: Negative for abdominal pain, blood in stool, nausea and vomiting.   Endocrine: Negative for polydipsia and polyphagia.   Genitourinary: Negative for dysuria, flank pain and hematuria.   Musculoskeletal: Negative for gait problem and joint swelling.   Skin: Positive for pallor.   Allergic/Immunologic: Negative for food allergies.   Neurological: Negative for speech difficulty, weakness and confusion.   Hematological: Negative for adenopathy. Does not bruise/bleed easily.   Psychiatric/Behavioral: Negative for agitation, hallucinations and depressed mood.         VITAL SIGNS: /62   Pulse 75   Temp 97.6 °F (36.4 °C)   Resp 16   Ht 175.3 cm (69\")   Wt 68.9 kg (152 lb)   SpO2 98%   BMI 22.45 kg/m²  Body surface area is 1.84 meters squared.   Pain Score    23 1348   PainSc: 0-No pain " "          PHYSICAL EXAMINATION:     Physical Exam  Vitals reviewed.   Constitutional:       General: He is not in acute distress.  HENT:      Head: Normocephalic and atraumatic.   Eyes:      Extraocular Movements: Extraocular movements intact.   Cardiovascular:      Rate and Rhythm: Normal rate and regular rhythm.   Pulmonary:      Effort: Pulmonary effort is normal.      Breath sounds: Normal breath sounds.   Abdominal:      General: Abdomen is flat. Bowel sounds are normal.      Tenderness: There is no abdominal tenderness.   Musculoskeletal:         General: Normal range of motion.   Lymphadenopathy:      Cervical: Cervical adenopathy (Left cervial node.  pt states it has been there \" a long time\".  States there are no changed or enlargment from previously.) present.   Skin:     General: Skin is warm and dry.   Neurological:      Mental Status: He is alert and oriented to person, place, and time.   Psychiatric:         Mood and Affect: Mood normal.         Behavior: Behavior normal.         LABS    Lab Results - Last 18 Months   Lab Units 03/07/23  1339 12/06/22  1347 10/25/22  1016 09/01/22  1422 06/09/22  1422 03/03/22  1132 03/01/22  1554 01/10/22  1044 11/16/21  1324   HEMOGLOBIN g/dL 13.0 12.8* 13.1* 12.9* 12.9* 12.7* 12.5* 13.4* 13.7   HEMATOCRIT % 40.1 40.1 40.1* 37.7 39.1 39.2 37.7 42.2 40.9   MCV fL 92.2 93.9 93.0 89.1 92.2 93.1 91.1 95.5* 90.3   WBC 10*3/mm3 8.76 9.34 8.8 8.33 9.22 10.04 8.78 9.6 9.54   RDW % 12.5 12.5 12.5 12.6 13.0 12.6 12.5 12.3 12.4   MPV fL 9.2 9.2 9.4 9.1 9.3 9.2 9.6 9.1* 9.1   PLATELETS 10*3/mm3 129* 127* 124* 147 137* 127* 136* 150 143   NEUTROS ABS 10*3/mm3 3.45 2.92 3.4 2.67 3.36 4.77 4.35 3.7 2.92   LYMPHS ABS K/uL  --   --  4.1  --   --   --   --  4.5  --    MONOS ABS K/uL  --   --  0.90  --   --   --   --  1.10*  --    EOS ABS 10*3/mm3 0.18 0.28 0.20 0.08 0.09 0.20 0.26 0.20 0.39   BASOS ABS 10*3/mm3  --  0.19 0.10 0.17 0.09 0.10 0.09 0.10  --    IMMATURE GRANS (ABS) K/uL  " --   --  0.0  --   --   --   --  0.0  --    NEUTROPHIL % % 37.4* 30.3*  --  32.0* 35.4* 45.5 49.5  --  30.6*   MONOCYTES % % 3.0* 10.1  --  11.3 8.3 10.1 9.1  --  5.1   BASOPHIL % %  --  2.0*  --  2.1* 1.0 1.0 1.0  --   --    ATYP LYMPH % % 32.3* 15.2*  --   --  3.1 15.2* 8.1*  --  5.1*   ANISOCYTOSIS   --   --   --   --   --   --   --   --  Slight/1+       Lab Results - Last 18 Months   Lab Units 03/07/23  1339 12/06/22  1347 09/01/22  1422 03/03/22  1132 03/01/22  1554 03/01/22  1554 01/10/22  1044   GLUCOSE mg/dL 94 100* 113* 98  --  106* 101   SODIUM mmol/L 142 143 141 143  --  143 144   POTASSIUM mmol/L 4.4 4.0 4.0 4.2  --  3.9 4.6   TOTAL CO2 mmol/L  --   --   --   --   --   --  29   CO2 mmol/L 29.0 30.0* 27.0 30.0*  --  27.0  --    CHLORIDE mmol/L 104 107 105 106  --  107 106   ANION GAP mmol/L 9.0 6.0 9.0 7.0  --  9.0 9   CREATININE mg/dL 1.05 1.09 1.15 1.25  --  1.26 1   BUN mg/dL 14 14 19 13  --  16 13   BUN / CREAT RATIO  13.3 12.8 16.5 10.4  --  12.7  --    CALCIUM mg/dL 9.2 9.5 9.4 9.3  --  9.4 9.6   EGFR IF NONAFRICN AM   --   --   --   --   --   --  >60   ALK PHOS U/L 95 96 91 106  --  105  --    TOTAL PROTEIN g/dL 6.1 5.9* 6.2 6.1  --  6.0  --    ALT (SGPT) U/L 8 8 8 5  --  8  --    AST (SGOT) U/L 12 11 13 11  --  13  --    BILIRUBIN mg/dL 0.7 0.6 0.6 1.0  --  0.6  --    ALBUMIN g/dL 4.4  4.1 4.30  3.9 4.60  3.8 4.30  3.6   < > 4.10  --    GLOBULIN gm/dL 1.7 1.6 1.6 1.8  --  1.9  --     < > = values in this interval not displayed.       Lab Results - Last 18 Months   Lab Units 03/07/23  1339 12/06/22  1347 09/01/22  1422 03/03/22  1132   M-SPIKE g/dL 0.1* 0.1* 0.1* 0.1*   KAPPA/LAMBDA RATIO, S  0.69 0.76  --   --    FREE LAMBDA LIGHT CHAINS mg/L 12.9 12.4  --   --    IG KAPPA FREE LIGHT CHAIN mg/L 8.9 9.4  --   --        Lab Results - Last 18 Months   Lab Units 03/07/23  1339 12/06/22  1347 09/01/22  1422 06/09/22  1422 03/03/22  1132 11/16/21  1324   IRON mcg/dL 76 82 86 99 51* 60   TIBC mcg/dL  "383 380 384 359 368 401   IRON SATURATION % 20 22 22 28 14* 15*   FERRITIN ng/mL 103.00 95.09 109.00 121.20 93.39 87.83         Oral Dey reports a pain score of .      ASSESSMENT:  No diagnosis found.      1.   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  Treatment status: Observation.  -Labs on 03/07/23 WBC 8.76, Hgb 13.0, Hct 40.1, Plt 129  -Pt is asymptomatic  -Treatment is generally reserved until the patient has active disease defined as the presence of disease-related symptoms, such as weight loss greater than 10%, extreme fatigue, fever greater than 100.5 for 2 weeks with night sweats without evidence of infection (\"B\" symptoms), worsening cytopenias, unexplained recurrent infections, worsening adenopathy, or splenomegaly.    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  -M Ignacio 0.1.  Immunofixation shows IgG monoclonal protein with lambda light chain specificity.  Calcium 9.2  Renal Function BUN 14, Creatinine 1.05, GFR 71.8  Anemia Hgb 13.0, Hct 40.1  Bone - no indication of bone problems  -A bone marrow aspiration and biopsy is indicated in all patients with an M protein 1.5 g/dL, patients with a non-IgG MGUS, patients with an abnormal serum free light chain ratio (i.e., ratio of kappa to lambda free light chains less than 0.26 or greater than 1.65), and in all patients who have any abnormalities of the CBC, serum creatinine, serum calcium, or radiographic bone survey.  -No bone  Marrow indicated at this time.     3. Anemia   -Hgb 13.0 Hct 40.1  -Iron 76.Ferritin 103, Sat 20%, TIBC 383  -Stable for observations.  No intervention indicated      4.  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   History of Bladder cancer  AJCC stage cTa, cN0, cM0.   Treatment status:On " observation   -Managed by urology.         PLAN:  Stable for observation  Continue current medications, treatment plans and follow up with PCP and any other providers  Return to office in 6 months   Labs one week before office visit for CBC with differential, ferritin, TIBC, % saturation, and iron every 12 weeks. CMP and SPEP/SIEP in 6 months.  Patient voiced understanding and agrees to treatment plan    Jennifer Cooper, RENUKA  03/14/2023

## 2023-04-10 DIAGNOSIS — Z85.51 HISTORY OF BLADDER CANCER: ICD-10-CM

## 2023-04-24 ENCOUNTER — PROCEDURE VISIT (OUTPATIENT)
Dept: UROLOGY | Age: 83
End: 2023-04-24
Payer: COMMERCIAL

## 2023-04-24 VITALS — HEIGHT: 70 IN | WEIGHT: 149.2 LBS | BODY MASS INDEX: 21.36 KG/M2 | TEMPERATURE: 98.1 F

## 2023-04-24 DIAGNOSIS — C67.1 MALIGNANT NEOPLASM OF DOME OF URINARY BLADDER (HCC): Primary | ICD-10-CM

## 2023-04-24 DIAGNOSIS — Z85.51 HISTORY OF BLADDER CANCER: ICD-10-CM

## 2023-04-24 PROCEDURE — 1123F ACP DISCUSS/DSCN MKR DOCD: CPT | Performed by: UROLOGY

## 2023-04-24 PROCEDURE — 81002 URINALYSIS NONAUTO W/O SCOPE: CPT | Performed by: UROLOGY

## 2023-04-24 PROCEDURE — 52000 CYSTOURETHROSCOPY: CPT | Performed by: UROLOGY

## 2023-04-24 PROCEDURE — 99214 OFFICE O/P EST MOD 30 MIN: CPT | Performed by: UROLOGY

## 2023-04-24 NOTE — PROGRESS NOTES
Radha Anderson is a [de-identified] y.o. male who presents today   Chief Complaint   Patient presents with    Cystoscopy     I am here today for my 3 month cysto      BLADDER CANCER  Patient was first diagnosed with bladder cancer 15 months ago. 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 Negative  Last upper tract study: 6 month(s) ago. Study was a retropyelogram's done 10/27/2020        Cystoscopy Operative Note (4/24/23)  Surgeon: Juanita Early MD  Anesthesia: Urethral 2% Xylocaine   Indications: History bladder cancer  Position: Supine    Findings:   The patient was prepped and draped in the usual sterile fashion. The flexible cystoscope was advanced through the urethra and into the bladder under direct vision. The bladder was thoroughly inspected and the following was noted:    Residual Urine: moderate  Urethra: normal appearing urethra with no masses, tenderness or lesions  Prostate: completely obstructing lateral lobes of prostate; median lobe present? yes. Small  Bladder: 4 small papillary tumors involving the dome just to the right  No bladder diverticulum. There was mild trabeculation noted. Ureters: Clear efflux from both ureters. Orifices with normal configuration and location. The cystoscope was removed. The patient tolerated the procedure well. The patient was given a post procedure instructions and follow up.       Past Medical History:   Diagnosis Date    Hypertension        Past Surgical History:   Procedure Laterality Date    CYSTOSCOPY N/A 1/11/2022    CYSTOSCOPY TRANSURETHRAL RESECTION BLADDER TUMOR performed by Tita Gonzalez MD at [address] [place] [address] Bilateral 1/11/2022    BILATERAL URETERAL CATHETERIZATION, BILATERAL RETROGRADE PYELOGRAMS performed by Tita Gonzalez MD at [facility]

## 2023-04-27 ENCOUNTER — HOSPITAL ENCOUNTER (OUTPATIENT)
Dept: PREADMISSION TESTING | Age: 83
Discharge: HOME | End: 2023-05-01
Payer: COMMERCIAL

## 2023-04-27 VITALS — WEIGHT: 148 LBS | BODY MASS INDEX: 21.24 KG/M2

## 2023-04-27 LAB
ANION GAP SERPL CALCULATED.3IONS-SCNC: 10 MMOL/L (ref 7–19)
BASOPHILS # BLD: 0.1 K/UL (ref 0–0.2)
BASOPHILS NFR BLD: 0.5 % (ref 0–1)
BUN SERPL-MCNC: 21 MG/DL (ref 8–23)
CALCIUM SERPL-MCNC: 9.7 MG/DL (ref 8.8–10.2)
CHLORIDE SERPL-SCNC: 103 MMOL/L (ref 98–111)
CO2 SERPL-SCNC: 26 MMOL/L (ref 22–29)
CREAT SERPL-MCNC: 1.3 MG/DL (ref 0.5–1.2)
EKG P AXIS: 54 DEGREES
EKG P-R INTERVAL: 210 MS
EKG Q-T INTERVAL: 398 MS
EKG QRS DURATION: 90 MS
EKG QTC CALCULATION (BAZETT): 408 MS
EKG T AXIS: 36 DEGREES
EOSINOPHIL # BLD: 0 K/UL (ref 0–0.6)
EOSINOPHIL NFR BLD: 0.2 % (ref 0–5)
ERYTHROCYTE [DISTWIDTH] IN BLOOD BY AUTOMATED COUNT: 12.4 % (ref 11.5–14.5)
GLUCOSE SERPL-MCNC: 107 MG/DL (ref 74–109)
HCT VFR BLD AUTO: 41.2 % (ref 42–52)
HGB BLD-MCNC: 13.4 G/DL (ref 14–18)
IMM GRANULOCYTES # BLD: 0.1 K/UL
LYMPHOCYTES # BLD: 4.1 K/UL (ref 1.1–4.5)
LYMPHOCYTES NFR BLD: 40.3 % (ref 20–40)
MCH RBC QN AUTO: 30 PG (ref 27–31)
MCHC RBC AUTO-ENTMCNC: 32.5 G/DL (ref 33–37)
MCV RBC AUTO: 92.4 FL (ref 80–94)
MONOCYTES # BLD: 0.6 K/UL (ref 0–0.9)
MONOCYTES NFR BLD: 6.3 % (ref 0–10)
NEUTROPHILS # BLD: 5.3 K/UL (ref 1.5–7.5)
NEUTS SEG NFR BLD: 52.1 % (ref 50–65)
PLATELET # BLD AUTO: 191 K/UL (ref 130–400)
PMV BLD AUTO: 9.2 FL (ref 9.4–12.4)
POTASSIUM SERPL-SCNC: 4.3 MMOL/L (ref 3.5–5)
RBC # BLD AUTO: 4.46 M/UL (ref 4.7–6.1)
SODIUM SERPL-SCNC: 139 MMOL/L (ref 136–145)
WBC # BLD AUTO: 10.2 K/UL (ref 4.8–10.8)

## 2023-04-27 PROCEDURE — 93005 ELECTROCARDIOGRAM TRACING: CPT | Performed by: ANESTHESIOLOGY

## 2023-04-27 PROCEDURE — 85025 COMPLETE CBC W/AUTO DIFF WBC: CPT

## 2023-04-27 PROCEDURE — 80048 BASIC METABOLIC PNL TOTAL CA: CPT

## 2023-04-27 RX ORDER — LEVOFLOXACIN 5 MG/ML
500 INJECTION, SOLUTION INTRAVENOUS ONCE
Status: CANCELLED | OUTPATIENT
Start: 2023-05-02

## 2023-04-27 RX ORDER — LANOLIN ALCOHOL/MO/W.PET/CERES
1000 CREAM (GRAM) TOPICAL DAILY
COMMUNITY

## 2023-05-02 ENCOUNTER — ANESTHESIA EVENT (OUTPATIENT)
Dept: OPERATING ROOM | Age: 83
End: 2023-05-02
Payer: COMMERCIAL

## 2023-05-02 ENCOUNTER — APPOINTMENT (OUTPATIENT)
Dept: GENERAL RADIOLOGY | Age: 83
End: 2023-05-02
Attending: UROLOGY
Payer: COMMERCIAL

## 2023-05-02 ENCOUNTER — ANESTHESIA (OUTPATIENT)
Dept: OPERATING ROOM | Age: 83
End: 2023-05-02
Payer: COMMERCIAL

## 2023-05-02 ENCOUNTER — HOSPITAL ENCOUNTER (OUTPATIENT)
Age: 83
Setting detail: OUTPATIENT SURGERY
Discharge: HOME | End: 2023-05-02
Attending: UROLOGY | Admitting: UROLOGY
Payer: COMMERCIAL

## 2023-05-02 ENCOUNTER — TELEPHONE (OUTPATIENT)
Dept: UROLOGY | Age: 83
End: 2023-05-02

## 2023-05-02 VITALS
OXYGEN SATURATION: 96 % | HEART RATE: 62 BPM | SYSTOLIC BLOOD PRESSURE: 185 MMHG | RESPIRATION RATE: 14 BRPM | BODY MASS INDEX: 21.19 KG/M2 | HEIGHT: 70 IN | DIASTOLIC BLOOD PRESSURE: 72 MMHG | WEIGHT: 148 LBS | TEMPERATURE: 98.1 F

## 2023-05-02 DIAGNOSIS — C67.1 CANCER OF DOME OF URINARY BLADDER (HCC): ICD-10-CM

## 2023-05-02 PROCEDURE — 7100000001 HC PACU RECOVERY - ADDTL 15 MIN: Performed by: UROLOGY

## 2023-05-02 PROCEDURE — 3700000000 HC ANESTHESIA ATTENDED CARE: Performed by: UROLOGY

## 2023-05-02 PROCEDURE — C1769 GUIDE WIRE: HCPCS | Performed by: UROLOGY

## 2023-05-02 PROCEDURE — 3700000001 HC ADD 15 MINUTES (ANESTHESIA): Performed by: UROLOGY

## 2023-05-02 PROCEDURE — 7100000011 HC PHASE II RECOVERY - ADDTL 15 MIN: Performed by: UROLOGY

## 2023-05-02 PROCEDURE — 7100000010 HC PHASE II RECOVERY - FIRST 15 MIN: Performed by: UROLOGY

## 2023-05-02 PROCEDURE — 3600000004 HC SURGERY LEVEL 4 BASE: Performed by: UROLOGY

## 2023-05-02 PROCEDURE — 88307 TISSUE EXAM BY PATHOLOGIST: CPT

## 2023-05-02 PROCEDURE — 3600000014 HC SURGERY LEVEL 4 ADDTL 15MIN: Performed by: UROLOGY

## 2023-05-02 PROCEDURE — C1758 CATHETER, URETERAL: HCPCS | Performed by: UROLOGY

## 2023-05-02 PROCEDURE — 6360000002 HC RX W HCPCS

## 2023-05-02 PROCEDURE — 74420 UROGRAPHY RTRGR +-KUB: CPT

## 2023-05-02 PROCEDURE — 2709999900 HC NON-CHARGEABLE SUPPLY: Performed by: UROLOGY

## 2023-05-02 PROCEDURE — 7100000000 HC PACU RECOVERY - FIRST 15 MIN: Performed by: UROLOGY

## 2023-05-02 PROCEDURE — 2500000003 HC RX 250 WO HCPCS

## 2023-05-02 PROCEDURE — 2580000003 HC RX 258: Performed by: ANESTHESIOLOGY

## 2023-05-02 PROCEDURE — 88112 CYTOPATH CELL ENHANCE TECH: CPT

## 2023-05-02 RX ORDER — ONDANSETRON 2 MG/ML
INJECTION INTRAMUSCULAR; INTRAVENOUS PRN
Status: DISCONTINUED | OUTPATIENT
Start: 2023-05-02 | End: 2023-05-02 | Stop reason: SDUPTHER

## 2023-05-02 RX ORDER — LEVOFLOXACIN 5 MG/ML
INJECTION, SOLUTION INTRAVENOUS PRN
Status: DISCONTINUED | OUTPATIENT
Start: 2023-05-02 | End: 2023-05-02 | Stop reason: SDUPTHER

## 2023-05-02 RX ORDER — SODIUM CHLORIDE 9 MG/ML
INJECTION, SOLUTION INTRAVENOUS CONTINUOUS
Status: DISCONTINUED | OUTPATIENT
Start: 2023-05-02 | End: 2023-05-02 | Stop reason: HOSPADM

## 2023-05-02 RX ORDER — SODIUM CHLORIDE 0.9 % (FLUSH) 0.9 %
5-40 SYRINGE (ML) INJECTION EVERY 12 HOURS SCHEDULED
Status: DISCONTINUED | OUTPATIENT
Start: 2023-05-02 | End: 2023-05-02 | Stop reason: HOSPADM

## 2023-05-02 RX ORDER — ROCURONIUM BROMIDE 10 MG/ML
INJECTION, SOLUTION INTRAVENOUS PRN
Status: DISCONTINUED | OUTPATIENT
Start: 2023-05-02 | End: 2023-05-02 | Stop reason: SDUPTHER

## 2023-05-02 RX ORDER — MIDAZOLAM HYDROCHLORIDE 2 MG/2ML
2 INJECTION, SOLUTION INTRAMUSCULAR; INTRAVENOUS
Status: CANCELLED | OUTPATIENT
Start: 2023-05-02 | End: 2023-05-03

## 2023-05-02 RX ORDER — FENTANYL CITRATE 50 UG/ML
25 INJECTION, SOLUTION INTRAMUSCULAR; INTRAVENOUS EVERY 5 MIN PRN
Status: DISCONTINUED | OUTPATIENT
Start: 2023-05-02 | End: 2023-05-02 | Stop reason: HOSPADM

## 2023-05-02 RX ORDER — LEVOFLOXACIN 5 MG/ML
500 INJECTION, SOLUTION INTRAVENOUS ONCE
Status: DISCONTINUED | OUTPATIENT
Start: 2023-05-02 | End: 2023-05-02 | Stop reason: HOSPADM

## 2023-05-02 RX ORDER — LIDOCAINE HYDROCHLORIDE 10 MG/ML
1 INJECTION, SOLUTION EPIDURAL; INFILTRATION; INTRACAUDAL; PERINEURAL
Status: CANCELLED | OUTPATIENT
Start: 2023-05-02 | End: 2023-05-03

## 2023-05-02 RX ORDER — SODIUM CHLORIDE 0.9 % (FLUSH) 0.9 %
5-40 SYRINGE (ML) INJECTION PRN
Status: DISCONTINUED | OUTPATIENT
Start: 2023-05-02 | End: 2023-05-02 | Stop reason: HOSPADM

## 2023-05-02 RX ORDER — PROPOFOL 10 MG/ML
INJECTION, EMULSION INTRAVENOUS PRN
Status: DISCONTINUED | OUTPATIENT
Start: 2023-05-02 | End: 2023-05-02 | Stop reason: SDUPTHER

## 2023-05-02 RX ORDER — SODIUM CHLORIDE 0.9 % (FLUSH) 0.9 %
5-40 SYRINGE (ML) INJECTION EVERY 12 HOURS SCHEDULED
Status: CANCELLED | OUTPATIENT
Start: 2023-05-02

## 2023-05-02 RX ORDER — ONDANSETRON 2 MG/ML
4 INJECTION INTRAMUSCULAR; INTRAVENOUS EVERY 4 HOURS PRN
Status: DISCONTINUED | OUTPATIENT
Start: 2023-05-02 | End: 2023-05-02 | Stop reason: HOSPADM

## 2023-05-02 RX ORDER — FENTANYL CITRATE 50 UG/ML
INJECTION, SOLUTION INTRAMUSCULAR; INTRAVENOUS PRN
Status: DISCONTINUED | OUTPATIENT
Start: 2023-05-02 | End: 2023-05-02 | Stop reason: SDUPTHER

## 2023-05-02 RX ORDER — PROCHLORPERAZINE EDISYLATE 5 MG/ML
5 INJECTION INTRAMUSCULAR; INTRAVENOUS
Status: DISCONTINUED | OUTPATIENT
Start: 2023-05-02 | End: 2023-05-02 | Stop reason: HOSPADM

## 2023-05-02 RX ORDER — ONDANSETRON 2 MG/ML
4 INJECTION INTRAMUSCULAR; INTRAVENOUS
Status: DISCONTINUED | OUTPATIENT
Start: 2023-05-02 | End: 2023-05-02 | Stop reason: HOSPADM

## 2023-05-02 RX ORDER — SODIUM CHLORIDE, SODIUM LACTATE, POTASSIUM CHLORIDE, CALCIUM CHLORIDE 600; 310; 30; 20 MG/100ML; MG/100ML; MG/100ML; MG/100ML
INJECTION, SOLUTION INTRAVENOUS CONTINUOUS
Status: DISCONTINUED | OUTPATIENT
Start: 2023-05-02 | End: 2023-05-02 | Stop reason: HOSPADM

## 2023-05-02 RX ORDER — SODIUM CHLORIDE 9 MG/ML
INJECTION, SOLUTION INTRAVENOUS PRN
Status: DISCONTINUED | OUTPATIENT
Start: 2023-05-02 | End: 2023-05-02 | Stop reason: HOSPADM

## 2023-05-02 RX ORDER — SODIUM CHLORIDE 0.9 % (FLUSH) 0.9 %
5-40 SYRINGE (ML) INJECTION PRN
Status: CANCELLED | OUTPATIENT
Start: 2023-05-02

## 2023-05-02 RX ORDER — CIPROFLOXACIN 500 MG/1
500 TABLET, FILM COATED ORAL 2 TIMES DAILY
Qty: 6 TABLET | Refills: 0 | Status: SHIPPED | OUTPATIENT
Start: 2023-05-02 | End: 2023-05-05

## 2023-05-02 RX ORDER — LIDOCAINE HYDROCHLORIDE 10 MG/ML
INJECTION, SOLUTION EPIDURAL; INFILTRATION; INTRACAUDAL; PERINEURAL PRN
Status: DISCONTINUED | OUTPATIENT
Start: 2023-05-02 | End: 2023-05-02 | Stop reason: SDUPTHER

## 2023-05-02 RX ORDER — SODIUM CHLORIDE 9 MG/ML
INJECTION, SOLUTION INTRAVENOUS PRN
Status: CANCELLED | OUTPATIENT
Start: 2023-05-02

## 2023-05-02 RX ORDER — FENTANYL CITRATE 50 UG/ML
50 INJECTION, SOLUTION INTRAMUSCULAR; INTRAVENOUS EVERY 5 MIN PRN
Status: DISCONTINUED | OUTPATIENT
Start: 2023-05-02 | End: 2023-05-02 | Stop reason: HOSPADM

## 2023-05-02 RX ORDER — FAMOTIDINE 20 MG/1
20 TABLET, FILM COATED ORAL ONCE
Status: CANCELLED | OUTPATIENT
Start: 2023-05-02 | End: 2023-05-02

## 2023-05-02 RX ORDER — OXYCODONE HYDROCHLORIDE AND ACETAMINOPHEN 5; 325 MG/1; MG/1
2 TABLET ORAL EVERY 4 HOURS PRN
Status: DISCONTINUED | OUTPATIENT
Start: 2023-05-02 | End: 2023-05-02 | Stop reason: HOSPADM

## 2023-05-02 RX ORDER — DIPHENHYDRAMINE HYDROCHLORIDE 50 MG/ML
12.5 INJECTION INTRAMUSCULAR; INTRAVENOUS
Status: DISCONTINUED | OUTPATIENT
Start: 2023-05-02 | End: 2023-05-02 | Stop reason: HOSPADM

## 2023-05-02 RX ORDER — APREPITANT 40 MG/1
40 CAPSULE ORAL ONCE
Status: CANCELLED | OUTPATIENT
Start: 2023-05-02

## 2023-05-02 RX ORDER — DEXAMETHASONE SODIUM PHOSPHATE 10 MG/ML
INJECTION, SOLUTION INTRAMUSCULAR; INTRAVENOUS PRN
Status: DISCONTINUED | OUTPATIENT
Start: 2023-05-02 | End: 2023-05-02 | Stop reason: SDUPTHER

## 2023-05-02 RX ADMIN — ONDANSETRON 4 MG: 2 INJECTION INTRAMUSCULAR; INTRAVENOUS at 09:59

## 2023-05-02 RX ADMIN — FENTANYL CITRATE 50 MCG: 0.05 INJECTION, SOLUTION INTRAMUSCULAR; INTRAVENOUS at 09:36

## 2023-05-02 RX ADMIN — LEVOFLOXACIN 500 MG: 5 INJECTION, SOLUTION INTRAVENOUS at 09:11

## 2023-05-02 RX ADMIN — LIDOCAINE HYDROCHLORIDE 50 MG: 10 INJECTION, SOLUTION EPIDURAL; INFILTRATION; INTRACAUDAL; PERINEURAL at 09:07

## 2023-05-02 RX ADMIN — ROCURONIUM BROMIDE 40 MG: 10 INJECTION, SOLUTION INTRAVENOUS at 09:07

## 2023-05-02 RX ADMIN — SODIUM CHLORIDE, POTASSIUM CHLORIDE, SODIUM LACTATE AND CALCIUM CHLORIDE: 600; 310; 30; 20 INJECTION, SOLUTION INTRAVENOUS at 07:51

## 2023-05-02 RX ADMIN — DEXAMETHASONE SODIUM PHOSPHATE 4 MG: 10 INJECTION, SOLUTION INTRAMUSCULAR; INTRAVENOUS at 09:18

## 2023-05-02 RX ADMIN — SUGAMMADEX 300 MG: 100 INJECTION, SOLUTION INTRAVENOUS at 10:01

## 2023-05-02 RX ADMIN — ROCURONIUM BROMIDE 10 MG: 10 INJECTION, SOLUTION INTRAVENOUS at 09:21

## 2023-05-02 RX ADMIN — PROPOFOL 130 MG: 10 INJECTION, EMULSION INTRAVENOUS at 09:07

## 2023-05-02 RX ADMIN — ROCURONIUM BROMIDE 10 MG: 10 INJECTION, SOLUTION INTRAVENOUS at 09:44

## 2023-05-02 RX ADMIN — FENTANYL CITRATE 50 MCG: 0.05 INJECTION, SOLUTION INTRAMUSCULAR; INTRAVENOUS at 09:06

## 2023-05-02 ASSESSMENT — LIFESTYLE VARIABLES: SMOKING_STATUS: 0

## 2023-05-02 ASSESSMENT — PAIN SCALES - GENERAL
PAINLEVEL_OUTOF10: 0
PAINLEVEL_OUTOF10: 2

## 2023-05-02 ASSESSMENT — PAIN - FUNCTIONAL ASSESSMENT: PAIN_FUNCTIONAL_ASSESSMENT: NONE - DENIES PAIN

## 2023-05-02 ASSESSMENT — ENCOUNTER SYMPTOMS: SHORTNESS OF BREATH: 0

## 2023-05-02 NOTE — PROGRESS NOTES
Patient F/C removed 15cc removed from bulb.  Patient tolerated removal.  Electronically signed by Ning Ac RN on 5/2/2023 at 11:56 AM

## 2023-05-02 NOTE — INTERVAL H&P NOTE
Update History & Physical    The patient's History and Physical of April 24, 2023 was reviewed with the patient and I examined the patient. There was no change. The surgical site was confirmed by the patient and me. Plan: The risks, benefits, expected outcome, and alternative to the recommended procedure have been discussed with the patient. Patient understands and wants to proceed with the procedure.      Electronically signed by Ferny Montemayor MD on 5/2/2023 at 8:32 AM

## 2023-05-02 NOTE — OP NOTE
Brief Operative Note      Patient: Isabel Cai  YOB: 1940  MRN: [id number]    Date of Procedure: 5/2/2023    Pre-Op Diagnosis Codes:     * [facility] [C67.[facility]]    Post-Op Diagnosis: Same       Procedure(s):  CYSTOSCOPY;  ; LEFT RENAL PELVIS WASHINGS;  TRANSURETHRAL RESECTION BLADDER TUMOR  BILATERAL CATHETERIZATION URETER & BILATERAL RETROGRADE PYELOGRAMS    Surgeon(s):  Rachel Crowder MD    Assistant:   * No surgical staff found *    Anesthesia: General    Estimated Blood Loss (mL): 0    Complications: None    Specimens:   ID Type Source Tests Collected by Time Destination   A : washings from left kidney \"for cytology\" Body Fluid Kidney CYTOLOGY, NON-GYN Rachel Crowder MD 5/2/2023 6911    B : bladder tumor dome Tissue Bladder SURGICAL PATHOLOGY Rachel Crowder MD 5/2/2023 3111        Implants:  * No implants in log *      Drains:   Urinary Catheter 05/02/23 3 Way (Active)       Findings: There are tumors involving the dome just to the right of midline these were completely resected down to visible muscle. It appeared grossly superficial.  Normal right retrograde pyelogram.  Left retrograde pyelogram initially showed some extravasation from the renal pelvis. This made imaging look irregular. So therefore I did left renal pelvis washings. Subsequent retrograde shows no irregular feeling in the renal pelvis or upper collecting system. 20 [place] nonlatex three-way Porras catheter 15 cc in balloon.       Detailed Description of Procedure:   See dictated report: [id number]    Disposition to PACU care outpatient if can wean irrigation off remove Porras catheter prior to dismissal.  Follow-up in 2 weeks    Electronically signed by Gigi Johnson MD on 5/2/2023 at 10:09 AM

## 2023-05-02 NOTE — ANESTHESIA PRE PROCEDURE
Results   Component Value Date/Time    WBC 10.2 04/27/2023 01:40 PM    RBC 4.46 04/27/2023 01:40 PM    HGB 13.4 04/27/2023 01:40 PM    HCT 41.2 04/27/2023 01:40 PM    MCV 92.4 04/27/2023 01:40 PM    RDW 12.4 04/27/2023 01:40 PM     04/27/2023 01:40 PM       CMP:   Lab Results   Component Value Date/Time     04/27/2023 01:40 PM    K 4.3 04/27/2023 01:40 PM     04/27/2023 01:40 PM    CO2 26 04/27/2023 01:40 PM    BUN 21 04/27/2023 01:40 PM    CREATININE 1.3 04/27/2023 01:40 PM    GFRAA >59 01/10/2022 10:44 AM    LABGLOM 55 04/27/2023 01:40 PM    GLUCOSE 107 04/27/2023 01:40 PM    CALCIUM 9.7 04/27/2023 01:40 PM       POC Tests: No results for input(s): POCGLU, POCNA, POCK, POCCL, POCBUN, POCHEMO, POCHCT in the last 72 hours. Coags:   Lab Results   Component Value Date/Time    PROTIME 13.7 01/10/2022 10:44 AM    INR 1.03 01/10/2022 10:44 AM    APTT 26.7 01/10/2022 10:44 AM       HCG (If Applicable): No results found for: PREGTESTUR, PREGSERUM, HCG, HCGQUANT     ABGs: No results found for: PHART, PO2ART, CDW8OJE, AIL7QHR, BEART, N2YPFBZP     Type & Screen (If Applicable):  No results found for: LABABO, LABRH    Drug/Infectious Status (If Applicable):  No results found for: HIV, HEPCAB    COVID-19 Screening (If Applicable): No results found for: COVID19        Anesthesia Evaluation  Patient summary reviewed and Nursing notes reviewed no history of anesthetic complications:   Airway: Mallampati: II  TM distance: >3 FB   Neck ROM: full  Mouth opening: > = 3 FB   Dental: normal exam   (+) caps      Pulmonary:Negative Pulmonary ROS and normal exam  breath sounds clear to auscultation      (-) shortness of breath and not a current smoker          Patient did not smoke on day of surgery.                  Cardiovascular:  Exercise tolerance: no interval change,   (+) hypertension:,     (-) CAD,  angina and  CHF    NYHA Classification: I  ECG reviewed  Rhythm: regular  Rate: normal           Beta Blocker:

## 2023-05-02 NOTE — ANESTHESIA POSTPROCEDURE EVALUATION
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     Keily Phase I: Keily Score: 10    Keily Phase II:        Anesthesia Post Evaluation    Patient location during evaluation: PACU  Patient participation: complete - patient participated  Level of consciousness: sleepy but conscious  Pain score: 0  Airway patency: patent  Nausea & Vomiting: no vomiting and no nausea  Complications: no  Cardiovascular status: hemodynamically stable  Respiratory status: acceptable and room air  Hydration status: stable

## 2023-05-02 NOTE — PERIOP NOTE
Dr. Jimbo Matute at bedside, gave instructions for CBI:  Run CBI for 30 min (starting at 10:14, arrival in PACU), THEN clamp for 1 hour. If urine is still clear after 1 hour may remove catheter.     10:45  CBI clamped

## 2023-05-02 NOTE — DISCHARGE INSTRUCTIONS
[facility] Urology, Dr Pallavi Santiago    Cystoscopy / Ureteroscopy / Bladder Endoscopy Procedures Discharge Instructions      You may experience : Burning sensation when you void     A feeling of a need to go to the bathroom frequently     You may have urgent urination     Your urine may be blood tinged    These symptoms should be relieved within a few hours and days  as you increase the amounts of fluids you drink and the number of times that you empty your bladder. They may persist for 1-2 weeks if you have a stent or had a biopsy or resection. We recommended that you drink plenty of fluid a few days after surgery. If you have a ureteral stent he may have pain in your side or back related to the stent. Stents also cause bladder irritation symptoms of frequency and urgency. No strenuous activities for 1 week or  until you talk to your doctor. You may feel light headed up to 24 hours after anesthesia. You should not do the following for the next 24 hours. Drive a car, operate machinery or power tools     Drink any alcoholic drinks (not even beer or wine)     Make any important decisions, i.e., signing important papers. It is recommended that you begin with clear liquids and/or light foods. If you  Are not nauseated, progress to your normal diet. I you are unable to urinate you should call your surgeon.     Dr. Elizabeth Jimenez

## 2023-05-02 NOTE — TELEPHONE ENCOUNTER
[facility] same day surgery called in to schedule a 2 week post op patient had CYSTOSCOPY;  ; LEFT RENAL PELVIS WASHINGS;  TRANSURETHRAL RESECTION BLADDER TUMOR completed  Please return call to same day surgery  [phone]  Thank you

## 2023-05-02 NOTE — OP NOTE
[facility] OF [facility] [address] [facility]                                OPERATIVE REPORT    PATIENT NAME: Erin Tello                    :        1940  MED REC NO:   [id number]                              ROOM:  ACCOUNT NO:   [de-identified]                           ADMIT DATE: 2023  PROVIDER:     Lisa Quevedo MD    DATE OF PROCEDURE:  2023    RADIOGRAPHIC INTERPRETATION OF BILATERAL RETROGRADE PYELOGRAMS    1. Left retrograde pyelogram.    INTERPRETATION:  Left ureter is intubated with ureteral catheter,  contrast injected retrograde. This shows some mild dilation of the  right ureter in its entirety including the distal and the mid ureter as  well as the lower lumbar ureter with no filling defect. However,  visualization of the more proximal ureter is somewhat obscured because  there is extravasation of contrast from the left renal pelvis. Subsequent imaging with the catheter up in the left renal pelvis shows  the renal pelvis without irregularity or filling defect. CONCLUSION:  Normal-appearing left ureter without filling defect, mild  dilation. There was initial irregularity of the left renal pelvis which  was obscured visibly due to extravasation of contrast from the renal  fornix; however, subsequent imaging shows normal-appearing left renal  pelvis without filling defect. 2.  Right retrograde pyelogram.    INTERPRETATION:  Right ureter is intubated with ureteral catheter  cystoscopically. Contrast was injected to opacify the right ureter. This appeared to be normal.  No dilation, no filling defects, no  hydronephrosis.     CONCLUSION:  Normal-appearing right retrograde pyelogram.        Marilynn Zamora MD    D: 2023 11:21:09      T: 2023 14:35:27     [email or web address]  Job#: [id number]     Doc#: [id number]    CC:

## 2023-05-03 NOTE — OP NOTE
[facility] WILL [address], [address] [facility]                                OPERATIVE REPORT    PATIENT NAME: Seven Espinoza                    :        1940  MED REC NO:   [id number]                              ROOM:  ACCOUNT NO:   [de-identified]                           ADMIT DATE: 2023  PROVIDER:     Daniella Beck MD    DATE OF PROCEDURE:  2023    TITLE OF OPERATION:  1. Cystoscopy, bilateral ureteral catheterization. 2.  Left renal pelvis washing. 3.  Bilateral retrograde pyelograms. 4.  Transurethral resection of bladder tumor, medium. PREOPERATIVE DIAGNOSIS:  Bladder cancer of the dome. POSTOPERATIVE DIAGNOSIS:  Bladder cancer of the dome. ANESTHETIC:  General anesthetic. ATTENDING SURGEON:  Daniella Beck MD    HISTORY:  The patient is an 72-year-old gentleman who has known  non-muscle invasive bladder cancer diagnosed 2022. His last  recurrence was in 10/2022, about 6 months ago. He had a high-grade  stage Ta. He presented for surveillance cystoscopy on 2023, and  this shows recurrent tumors involving the dome of the bladder just to  the right of midline. Therefore, he now presents to undergo cystoscopy,  TURBT, bilateral ureteral catheterization, bilateral retrograde  pyelograms. Risks and complications of the procedure have been  discussed with him including the risk of bleeding, infection, injury to  the ureters. Bladder perforation, postop urinary retention, need to go  home with [place] catheter, and need for continued surveillance or for  recurrence. They understand and agreed to proceed. DESCRIPTION OF PROCEDURE:  The patient was brought to the operating  room, underwent general anesthetic. He was placed in the lithotomy  position. His genitalia was prepped and draped in routine sterile  fashion. He received preoperative antibiotic. Time-out was performed.     The 22-Mohawk cystoscope was

## 2023-05-03 NOTE — PROGRESS NOTES
CLINICAL PHARMACY NOTE: MEDS TO BEDS    Total # of Prescriptions Filled: 1   The following medications were delivered to the patient:  Discharge Medication List as of 5/2/2023 10:59 AM        START taking these medications    Details   ciprofloxacin (CIPRO) 500 MG tablet Take 1 tablet by mouth 2 times daily for 3 days, Disp-6 tablet, R-0Normal             Additional Documentation:     Delivered Rx's to op-care. Gave Rx's to patients family at bedside. Paid with credit card.

## 2023-05-19 ENCOUNTER — OFFICE VISIT (OUTPATIENT)
Dept: UROLOGY | Age: 83
End: 2023-05-19

## 2023-05-19 VITALS — HEIGHT: 70 IN | WEIGHT: 148 LBS | BODY MASS INDEX: 21.19 KG/M2 | TEMPERATURE: 98.3 F

## 2023-05-19 DIAGNOSIS — R93.41 ABNORMAL RADIOLOGIC FINDINGS ON DIAGNOSTIC IMAGING OF RENAL PELVIS, URETER, OR BLADDER: ICD-10-CM

## 2023-05-19 DIAGNOSIS — R82.89 ABNORMAL URINE CYTOLOGY: ICD-10-CM

## 2023-05-19 DIAGNOSIS — C67.1 MALIGNANT NEOPLASM OF DOME OF URINARY BLADDER (HCC): ICD-10-CM

## 2023-05-19 DIAGNOSIS — Z85.51 HISTORY OF BLADDER CANCER: Primary | ICD-10-CM

## 2023-05-19 LAB
APPEARANCE FLUID: CLEAR
BILIRUBIN, POC: NORMAL
BLOOD URINE, POC: NORMAL
CLARITY, POC: CLEAR
COLOR, POC: YELLOW
GLUCOSE URINE, POC: NORMAL
KETONES, POC: NORMAL
LEUKOCYTE EST, POC: NORMAL
NITRITE, POC: NORMAL
PH, POC: 6
PROTEIN, POC: NORMAL
SPECIFIC GRAVITY, POC: 1.01
UROBILINOGEN, POC: 0.2

## 2023-05-19 ASSESSMENT — ENCOUNTER SYMPTOMS
ABDOMINAL PAIN: 0
SHORTNESS OF BREATH: 0
CONSTIPATION: 0
TROUBLE SWALLOWING: 0
EYE REDNESS: 0
VOMITING: 0
EYE DISCHARGE: 0
COUGH: 0
NAUSEA: 0
DIARRHEA: 0
BACK PAIN: 0
ABDOMINAL DISTENTION: 0

## 2023-05-19 NOTE — PROGRESS NOTES
MED BLADDER OWEN; Future      Orders Placed This Encounter   Procedures    POCT Urinalysis no Micro    [facility] CYSTOURETHROSCOPY W/DEST &/RMVL MED BLADDER OWEN     Cystoscopy bladder biopsy fulguration, possible TURBT and left retrograde pyelograms left renal pelvis washing, left ureteroscopy stent placement     Standing Status:   Future     Standing Expiration Date:   7/19/2023        Return for PT to be scheduled for Surgery. All information inputted into the note by the MA to include chief complaint, past medical history, past surgical history, medications, allergies, social and family history and review of systems has been reviewed and updated as needed by me. EMR Dragon/transcription disclaimer: Much of this documentt is electronic  transcription/translation of spoken language to printed text. The  electronic translation of spoken language may be erroneous, or at times,  nonsensical words or phrases may be inadvertently transcribed.  Although I  have reviewed the document for such errors, some may still exist.

## 2023-06-02 ENCOUNTER — HOSPITAL ENCOUNTER (OUTPATIENT)
Dept: PREADMISSION TESTING | Age: 83
Discharge: HOME | End: 2023-06-06
Payer: COMMERCIAL

## 2023-06-02 VITALS — HEIGHT: 70 IN | BODY MASS INDEX: 20.04 KG/M2 | WEIGHT: 140 LBS

## 2023-06-02 LAB
ANION GAP SERPL CALCULATED.3IONS-SCNC: 7 MMOL/L (ref 7–19)
BASOPHILS # BLD: 0.1 K/UL (ref 0–0.2)
BASOPHILS NFR BLD: 1 % (ref 0–1)
BUN SERPL-MCNC: 16 MG/DL (ref 8–23)
CALCIUM SERPL-MCNC: 9.4 MG/DL (ref 8.8–10.2)
CHLORIDE SERPL-SCNC: 104 MMOL/L (ref 98–111)
CO2 SERPL-SCNC: 28 MMOL/L (ref 22–29)
CREAT SERPL-MCNC: 1.2 MG/DL (ref 0.5–1.2)
EOSINOPHIL # BLD: 0.1 K/UL (ref 0–0.6)
EOSINOPHIL NFR BLD: 1.8 % (ref 0–5)
ERYTHROCYTE [DISTWIDTH] IN BLOOD BY AUTOMATED COUNT: 12.5 % (ref 11.5–14.5)
GLUCOSE SERPL-MCNC: 96 MG/DL (ref 74–109)
HCT VFR BLD AUTO: 39.6 % (ref 42–52)
HGB BLD-MCNC: 13 G/DL (ref 14–18)
IMM GRANULOCYTES # BLD: 0 K/UL
LYMPHOCYTES # BLD: 3.8 K/UL (ref 1.1–4.5)
LYMPHOCYTES NFR BLD: 60.7 % (ref 20–40)
MCH RBC QN AUTO: 30.2 PG (ref 27–31)
MCHC RBC AUTO-ENTMCNC: 32.8 G/DL (ref 33–37)
MCV RBC AUTO: 91.9 FL (ref 80–94)
MONOCYTES # BLD: 0.9 K/UL (ref 0–0.9)
MONOCYTES NFR BLD: 14.5 % (ref 0–10)
NEUTROPHILS # BLD: 1.4 K/UL (ref 1.5–7.5)
NEUTS SEG NFR BLD: 21.8 % (ref 50–65)
PLATELET # BLD AUTO: 137 K/UL (ref 130–400)
PMV BLD AUTO: 9.8 FL (ref 9.4–12.4)
POTASSIUM SERPL-SCNC: 4.4 MMOL/L (ref 3.5–5)
RBC # BLD AUTO: 4.31 M/UL (ref 4.7–6.1)
SODIUM SERPL-SCNC: 139 MMOL/L (ref 136–145)
WBC # BLD AUTO: 6.3 K/UL (ref 4.8–10.8)

## 2023-06-02 PROCEDURE — 80048 BASIC METABOLIC PNL TOTAL CA: CPT

## 2023-06-02 PROCEDURE — 85025 COMPLETE CBC W/AUTO DIFF WBC: CPT

## 2023-06-02 RX ORDER — LEVOFLOXACIN 5 MG/ML
500 INJECTION, SOLUTION INTRAVENOUS ONCE
Status: CANCELLED | OUTPATIENT
Start: 2023-06-06

## 2023-06-06 ENCOUNTER — ANESTHESIA (OUTPATIENT)
Dept: OPERATING ROOM | Age: 83
End: 2023-06-06
Payer: COMMERCIAL

## 2023-06-06 ENCOUNTER — HOSPITAL ENCOUNTER (OUTPATIENT)
Age: 83
Setting detail: OUTPATIENT SURGERY
Discharge: HOME | End: 2023-06-06
Attending: UROLOGY | Admitting: UROLOGY
Payer: COMMERCIAL

## 2023-06-06 ENCOUNTER — APPOINTMENT (OUTPATIENT)
Dept: GENERAL RADIOLOGY | Age: 83
End: 2023-06-06
Attending: UROLOGY
Payer: COMMERCIAL

## 2023-06-06 ENCOUNTER — TELEPHONE (OUTPATIENT)
Dept: UROLOGY | Age: 83
End: 2023-06-06

## 2023-06-06 ENCOUNTER — ANESTHESIA EVENT (OUTPATIENT)
Dept: OPERATING ROOM | Age: 83
End: 2023-06-06
Payer: COMMERCIAL

## 2023-06-06 VITALS
HEIGHT: 70 IN | TEMPERATURE: 97.4 F | OXYGEN SATURATION: 100 % | RESPIRATION RATE: 16 BRPM | SYSTOLIC BLOOD PRESSURE: 146 MMHG | WEIGHT: 140 LBS | BODY MASS INDEX: 20.04 KG/M2 | DIASTOLIC BLOOD PRESSURE: 71 MMHG | HEART RATE: 62 BPM

## 2023-06-06 DIAGNOSIS — C67.1 CANCER OF DOME OF URINARY BLADDER (HCC): ICD-10-CM

## 2023-06-06 DIAGNOSIS — R82.89 ABNORMAL URINE CYTOLOGY: ICD-10-CM

## 2023-06-06 PROCEDURE — 7100000011 HC PHASE II RECOVERY - ADDTL 15 MIN: Performed by: UROLOGY

## 2023-06-06 PROCEDURE — 6360000002 HC RX W HCPCS: Performed by: NURSE ANESTHETIST, CERTIFIED REGISTERED

## 2023-06-06 PROCEDURE — 3600000014 HC SURGERY LEVEL 4 ADDTL 15MIN: Performed by: UROLOGY

## 2023-06-06 PROCEDURE — 2580000003 HC RX 258: Performed by: ANESTHESIOLOGY

## 2023-06-06 PROCEDURE — 2709999900 HC NON-CHARGEABLE SUPPLY: Performed by: UROLOGY

## 2023-06-06 PROCEDURE — 6370000000 HC RX 637 (ALT 250 FOR IP): Performed by: ANESTHESIOLOGY

## 2023-06-06 PROCEDURE — 7100000001 HC PACU RECOVERY - ADDTL 15 MIN: Performed by: UROLOGY

## 2023-06-06 PROCEDURE — 7100000010 HC PHASE II RECOVERY - FIRST 15 MIN: Performed by: UROLOGY

## 2023-06-06 PROCEDURE — C1769 GUIDE WIRE: HCPCS | Performed by: UROLOGY

## 2023-06-06 PROCEDURE — 74420 UROGRAPHY RTRGR +-KUB: CPT

## 2023-06-06 PROCEDURE — 6370000000 HC RX 637 (ALT 250 FOR IP): Performed by: UROLOGY

## 2023-06-06 PROCEDURE — 3700000000 HC ANESTHESIA ATTENDED CARE: Performed by: UROLOGY

## 2023-06-06 PROCEDURE — 6360000002 HC RX W HCPCS: Performed by: UROLOGY

## 2023-06-06 PROCEDURE — 2500000003 HC RX 250 WO HCPCS: Performed by: NURSE ANESTHETIST, CERTIFIED REGISTERED

## 2023-06-06 PROCEDURE — 7100000000 HC PACU RECOVERY - FIRST 15 MIN: Performed by: UROLOGY

## 2023-06-06 PROCEDURE — 88112 CYTOPATH CELL ENHANCE TECH: CPT

## 2023-06-06 PROCEDURE — 3600000004 HC SURGERY LEVEL 4 BASE: Performed by: UROLOGY

## 2023-06-06 PROCEDURE — 74420 UROGRAPHY RTRGR +-KUB: CPT | Performed by: UROLOGY

## 2023-06-06 PROCEDURE — 52351 CYSTOURETERO & OR PYELOSCOPE: CPT | Performed by: UROLOGY

## 2023-06-06 PROCEDURE — 88305 TISSUE EXAM BY PATHOLOGIST: CPT

## 2023-06-06 PROCEDURE — 52204 CYSTOSCOPY W/BIOPSY(S): CPT | Performed by: UROLOGY

## 2023-06-06 PROCEDURE — C1758 CATHETER, URETERAL: HCPCS | Performed by: UROLOGY

## 2023-06-06 PROCEDURE — 3700000001 HC ADD 15 MINUTES (ANESTHESIA): Performed by: UROLOGY

## 2023-06-06 PROCEDURE — C1726 CATH, BAL DIL, NON-VASCULAR: HCPCS | Performed by: UROLOGY

## 2023-06-06 RX ORDER — SODIUM CHLORIDE 0.9 % (FLUSH) 0.9 %
5-40 SYRINGE (ML) INJECTION PRN
Status: DISCONTINUED | OUTPATIENT
Start: 2023-06-06 | End: 2023-06-06 | Stop reason: HOSPADM

## 2023-06-06 RX ORDER — KETOROLAC TROMETHAMINE 30 MG/ML
INJECTION, SOLUTION INTRAMUSCULAR; INTRAVENOUS PRN
Status: DISCONTINUED | OUTPATIENT
Start: 2023-06-06 | End: 2023-06-06 | Stop reason: SDUPTHER

## 2023-06-06 RX ORDER — PROPOFOL 10 MG/ML
INJECTION, EMULSION INTRAVENOUS PRN
Status: DISCONTINUED | OUTPATIENT
Start: 2023-06-06 | End: 2023-06-06 | Stop reason: SDUPTHER

## 2023-06-06 RX ORDER — SCOLOPAMINE TRANSDERMAL SYSTEM 1 MG/1
1 PATCH, EXTENDED RELEASE TRANSDERMAL
Status: DISCONTINUED | OUTPATIENT
Start: 2023-06-06 | End: 2023-06-06 | Stop reason: HOSPADM

## 2023-06-06 RX ORDER — FENTANYL CITRATE 50 UG/ML
25 INJECTION, SOLUTION INTRAMUSCULAR; INTRAVENOUS EVERY 5 MIN PRN
Status: DISCONTINUED | OUTPATIENT
Start: 2023-06-06 | End: 2023-06-06 | Stop reason: HOSPADM

## 2023-06-06 RX ORDER — SODIUM CHLORIDE 9 MG/ML
INJECTION, SOLUTION INTRAVENOUS PRN
Status: DISCONTINUED | OUTPATIENT
Start: 2023-06-06 | End: 2023-06-06 | Stop reason: HOSPADM

## 2023-06-06 RX ORDER — SODIUM CHLORIDE 0.9 % (FLUSH) 0.9 %
5-40 SYRINGE (ML) INJECTION EVERY 12 HOURS SCHEDULED
Status: DISCONTINUED | OUTPATIENT
Start: 2023-06-06 | End: 2023-06-06 | Stop reason: HOSPADM

## 2023-06-06 RX ORDER — FENTANYL CITRATE 50 UG/ML
INJECTION, SOLUTION INTRAMUSCULAR; INTRAVENOUS PRN
Status: DISCONTINUED | OUTPATIENT
Start: 2023-06-06 | End: 2023-06-06 | Stop reason: SDUPTHER

## 2023-06-06 RX ORDER — HYDROMORPHONE HYDROCHLORIDE 1 MG/ML
0.5 INJECTION, SOLUTION INTRAMUSCULAR; INTRAVENOUS; SUBCUTANEOUS
Status: DISCONTINUED | OUTPATIENT
Start: 2023-06-06 | End: 2023-06-06 | Stop reason: HOSPADM

## 2023-06-06 RX ORDER — HYDRALAZINE HYDROCHLORIDE 20 MG/ML
INJECTION INTRAMUSCULAR; INTRAVENOUS PRN
Status: DISCONTINUED | OUTPATIENT
Start: 2023-06-06 | End: 2023-06-06 | Stop reason: SDUPTHER

## 2023-06-06 RX ORDER — ONDANSETRON 2 MG/ML
INJECTION INTRAMUSCULAR; INTRAVENOUS PRN
Status: DISCONTINUED | OUTPATIENT
Start: 2023-06-06 | End: 2023-06-06 | Stop reason: SDUPTHER

## 2023-06-06 RX ORDER — HYDROMORPHONE HYDROCHLORIDE 1 MG/ML
0.5 INJECTION, SOLUTION INTRAMUSCULAR; INTRAVENOUS; SUBCUTANEOUS EVERY 5 MIN PRN
Status: DISCONTINUED | OUTPATIENT
Start: 2023-06-06 | End: 2023-06-06 | Stop reason: HOSPADM

## 2023-06-06 RX ORDER — SODIUM CHLORIDE 9 MG/ML
INJECTION, SOLUTION INTRAVENOUS CONTINUOUS
Status: DISCONTINUED | OUTPATIENT
Start: 2023-06-06 | End: 2023-06-06 | Stop reason: HOSPADM

## 2023-06-06 RX ORDER — HYDROCODONE BITARTRATE AND ACETAMINOPHEN 5; 325 MG/1; MG/1
1 TABLET ORAL EVERY 6 HOURS PRN
Qty: 12 TABLET | Refills: 0 | Status: SHIPPED | OUTPATIENT
Start: 2023-06-06 | End: 2023-06-09

## 2023-06-06 RX ORDER — FAMOTIDINE 20 MG/1
20 TABLET, FILM COATED ORAL ONCE
Status: COMPLETED | OUTPATIENT
Start: 2023-06-06 | End: 2023-06-06

## 2023-06-06 RX ORDER — LEVOFLOXACIN 5 MG/ML
500 INJECTION, SOLUTION INTRAVENOUS ONCE
Status: COMPLETED | OUTPATIENT
Start: 2023-06-06 | End: 2023-06-06

## 2023-06-06 RX ORDER — SODIUM CHLORIDE, SODIUM LACTATE, POTASSIUM CHLORIDE, CALCIUM CHLORIDE 600; 310; 30; 20 MG/100ML; MG/100ML; MG/100ML; MG/100ML
INJECTION, SOLUTION INTRAVENOUS CONTINUOUS
Status: DISCONTINUED | OUTPATIENT
Start: 2023-06-06 | End: 2023-06-06 | Stop reason: HOSPADM

## 2023-06-06 RX ORDER — LIDOCAINE HYDROCHLORIDE 10 MG/ML
1 INJECTION, SOLUTION EPIDURAL; INFILTRATION; INTRACAUDAL; PERINEURAL
Status: DISCONTINUED | OUTPATIENT
Start: 2023-06-06 | End: 2023-06-06 | Stop reason: HOSPADM

## 2023-06-06 RX ORDER — KETOROLAC TROMETHAMINE 30 MG/ML
15 INJECTION, SOLUTION INTRAMUSCULAR; INTRAVENOUS ONCE
Status: COMPLETED | OUTPATIENT
Start: 2023-06-06 | End: 2023-06-06

## 2023-06-06 RX ORDER — ONDANSETRON 2 MG/ML
4 INJECTION INTRAMUSCULAR; INTRAVENOUS EVERY 4 HOURS PRN
Status: DISCONTINUED | OUTPATIENT
Start: 2023-06-06 | End: 2023-06-06 | Stop reason: HOSPADM

## 2023-06-06 RX ORDER — CIPROFLOXACIN 500 MG/1
500 TABLET, FILM COATED ORAL 2 TIMES DAILY
Qty: 6 TABLET | Refills: 0 | Status: SHIPPED | OUTPATIENT
Start: 2023-06-06 | End: 2023-06-09

## 2023-06-06 RX ORDER — LIDOCAINE HYDROCHLORIDE 10 MG/ML
INJECTION, SOLUTION EPIDURAL; INFILTRATION; INTRACAUDAL; PERINEURAL PRN
Status: DISCONTINUED | OUTPATIENT
Start: 2023-06-06 | End: 2023-06-06 | Stop reason: SDUPTHER

## 2023-06-06 RX ORDER — ROCURONIUM BROMIDE 10 MG/ML
INJECTION, SOLUTION INTRAVENOUS PRN
Status: DISCONTINUED | OUTPATIENT
Start: 2023-06-06 | End: 2023-06-06 | Stop reason: SDUPTHER

## 2023-06-06 RX ORDER — MIDAZOLAM HYDROCHLORIDE 2 MG/2ML
2 INJECTION, SOLUTION INTRAMUSCULAR; INTRAVENOUS
Status: DISCONTINUED | OUTPATIENT
Start: 2023-06-06 | End: 2023-06-06 | Stop reason: HOSPADM

## 2023-06-06 RX ORDER — OXYCODONE HYDROCHLORIDE AND ACETAMINOPHEN 5; 325 MG/1; MG/1
2 TABLET ORAL EVERY 4 HOURS PRN
Status: DISCONTINUED | OUTPATIENT
Start: 2023-06-06 | End: 2023-06-06 | Stop reason: HOSPADM

## 2023-06-06 RX ADMIN — ROCURONIUM BROMIDE 50 MG: 10 INJECTION, SOLUTION INTRAVENOUS at 08:18

## 2023-06-06 RX ADMIN — ONDANSETRON 4 MG: 2 INJECTION INTRAMUSCULAR; INTRAVENOUS at 09:09

## 2023-06-06 RX ADMIN — KETOROLAC TROMETHAMINE 15 MG: 30 INJECTION, SOLUTION INTRAMUSCULAR; INTRAVENOUS at 09:54

## 2023-06-06 RX ADMIN — SUGAMMADEX 200 MG: 100 INJECTION, SOLUTION INTRAVENOUS at 09:05

## 2023-06-06 RX ADMIN — HYOSCYAMINE SULFATE 125 MCG: 0.12 TABLET ORAL; SUBLINGUAL at 09:54

## 2023-06-06 RX ADMIN — SODIUM CHLORIDE, POTASSIUM CHLORIDE, SODIUM LACTATE AND CALCIUM CHLORIDE: 600; 310; 30; 20 INJECTION, SOLUTION INTRAVENOUS at 06:40

## 2023-06-06 RX ADMIN — FENTANYL CITRATE 25 MCG: 0.05 INJECTION, SOLUTION INTRAMUSCULAR; INTRAVENOUS at 08:51

## 2023-06-06 RX ADMIN — KETOROLAC TROMETHAMINE 15 MG: 30 INJECTION, SOLUTION INTRAMUSCULAR; INTRAVENOUS at 09:13

## 2023-06-06 RX ADMIN — FENTANYL CITRATE 25 MCG: 0.05 INJECTION, SOLUTION INTRAMUSCULAR; INTRAVENOUS at 08:48

## 2023-06-06 RX ADMIN — FAMOTIDINE 20 MG: 20 TABLET ORAL at 06:52

## 2023-06-06 RX ADMIN — LIDOCAINE HYDROCHLORIDE 50 MG: 10 INJECTION, SOLUTION EPIDURAL; INFILTRATION; INTRACAUDAL; PERINEURAL at 08:17

## 2023-06-06 RX ADMIN — PROPOFOL 150 MG: 10 INJECTION, EMULSION INTRAVENOUS at 08:17

## 2023-06-06 RX ADMIN — FENTANYL CITRATE 50 MCG: 0.05 INJECTION, SOLUTION INTRAMUSCULAR; INTRAVENOUS at 08:17

## 2023-06-06 RX ADMIN — HYDRALAZINE HYDROCHLORIDE 5 MG: 20 INJECTION INTRAMUSCULAR; INTRAVENOUS at 09:10

## 2023-06-06 RX ADMIN — LEVOFLOXACIN 500 MG: 500 INJECTION, SOLUTION INTRAVENOUS at 08:23

## 2023-06-06 ASSESSMENT — PAIN - FUNCTIONAL ASSESSMENT: PAIN_FUNCTIONAL_ASSESSMENT: NONE - DENIES PAIN

## 2023-06-06 ASSESSMENT — LIFESTYLE VARIABLES: SMOKING_STATUS: 0

## 2023-06-06 ASSESSMENT — ENCOUNTER SYMPTOMS: SHORTNESS OF BREATH: 0

## 2023-06-06 NOTE — TELEPHONE ENCOUNTER
Janisers called in to schedule a 2 week post op with    Please contact patient with appointment information  Thank you

## 2023-06-06 NOTE — OP NOTE
[facility] OF [facility] [address] [facility]                                OPERATIVE REPORT    PATIENT NAME: Denver Espino                    :        1940  MED REC NO:   [id number]                              ROOM:  ACCOUNT NO:   [de-identified]                           ADMIT DATE: 2023  PROVIDER:     Josemanuel Swan MD    DATE OF PROCEDURE:  2023    TITLE OF OPERATION:  1. Cystoscopy, left ureteral catheterization, left renal pelvis  washings. 2.  Left retrograde pyelogram.  3.  Left ureteroscopy. 4.  Bladder biopsy and fulguration, small bladder lesion. PREOPERATIVE DIAGNOSES:  1. 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 2.  Abnormal left renal pelvis cytology. POSTOPERATIVE DIAGNOSES:  1. 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 2.  Abnormal left renal pelvis cytology. ANESTHESIA:  General anesthetic. ATTENDING SURGEON:  Josemanuel Swan MD    EBL=0      HISTORY:  The patient is an 74-year-old gentleman who has  non-muscle-invasive bladder cancer diagnosed approximately 16 months  ago. He had a recurrence early May and underwent TURBT and retrograde  pyelograms. At that time, the pathology revealed high-grade T1 disease  with focal lamina propria invasion. In addition, on left retrograde  pyelogram, he had normal-appearing left renal pelvis. Prior retrogrades  on the left had been normal.  Urinary washings for cytology were  positive for high-grade papillary urothelial carcinoma. Therefore, he  now presents to undergo biopsy of the prior tumor resection site giving  high-grade T1 disease to make sure there is no occult invasive disease  and we will plan for repeat left retrograde pyelograms, left renal  washings and direct visualization with left ureteroscopy to make sure he  does not have upper tract disease on the left side.   The risks and  complication of the procedure were discussed with the

## 2023-06-06 NOTE — OP NOTE
Brief Operative Note      Patient: Dimitry Camejo  YOB: 1940  MRN: [id number]    Date of Procedure: 6/6/2023    Pre-Op Diagnosis Codes:     * Cancer of dome of urinary bladder (HCC) [C67.1]     * Abnormal urine cytology [R82.89]    Post-Op Diagnosis: Same       Procedure(s):  CYSTOSCOPY, LEFT URETERAL CATHETERIZATION, LEFT RENAL PELVIS WASHINGS,  LEFT RETROGRADE PYELOGRAM, LEFT URETEROSCOPY, BLADDER BIOPSY, FULGERATION    Surgeon(s):  Ruel Aguilar MD    Assistant:   * No surgical staff found *    Anesthesia: General    Estimated Blood Loss (mL): 0    Complications: None    Specimens:   ID Type Source Tests Collected by Time Destination   A : Left renal pelvic washings for cytology Body Fluid Kidney CYTOLOGY, NON-GYN Ruel Aguilar MD 6/6/2023 6779    B : bladder biopsy prior tumor resection base Tissue Bladder SURGICAL PATHOLOGY Ruel Aguilar MD 6/6/2023 8363        Implants:  * No implants in log *      Drains:   [REMOVED] Urinary Catheter 05/02/23 3 Way (Removed)       Findings: Normal left retrograde pyelogram without irregularity or filling defect. Direct visualization with ureteroscopy shows normal mucosa of the left ureter and left renal pelvis and kidney. Washings sent for cytology. Bladder shows no evidence of papillary tumor recurrence. Prior resection site with denuded mucosa and granulation tissue biopsy x3.       Detailed Description of Procedure:   See dictated report:  [id number]    Disposition to PACU then to op care outpatient follow-up in 2 weeks    Electronically signed by Sherolyn Gilford, MD on 6/6/2023 at 9:20 AM

## 2023-06-06 NOTE — ANESTHESIA PRE PROCEDURE
of surgery. Cardiovascular:  Exercise tolerance: no interval change,   (+) hypertension:,     (-) past MI, CAD,  angina and  CHF    NYHA Classification: I  ECG reviewed  Rhythm: regular  Rate: normal           Beta Blocker:  Dose within 24 Hrs         Neuro/Psych:   Negative Neuro/Psych ROS     (-) seizures, CVA and depression/anxiety            GI/Hepatic/Renal: Neg GI/Hepatic/Renal ROS       (-) hiatal hernia, GERD, liver disease and no renal disease      ROS comment: Bladder ca. Endo/Other:    (+) malignancy/cancer. (-) diabetes mellitus        Pt had PAT visit. Abdominal:       Abdomen: soft. Vascular: negative vascular ROS. Other Findings:             Anesthesia Plan      general     ASA 2     (Emend and pepcid in preop.)  Induction: intravenous. MIPS: Postoperative opioids intended and Prophylactic antiemetics administered. Anesthetic plan and risks discussed with patient. Use of blood products discussed with patient whom. Plan discussed with CRNA.     Attending anesthesiologist reviewed and agrees with Sukhjinder Sparks MD   6/6/2023

## 2023-06-06 NOTE — INTERVAL H&P NOTE
Update History & Physical    The patient's History and Physical of May 19, 2023 was reviewed with the patient and I examined the patient. There was no change. The surgical site was confirmed by the patient and me. Plan: The risks, benefits, expected outcome, and alternative to the recommended procedure have been discussed with the patient. Patient understands and wants to proceed with the procedure.      Electronically signed by Anthony Rivero MD on 6/6/2023 at 8:02 AM

## 2023-06-06 NOTE — ANESTHESIA POSTPROCEDURE EVALUATION
Department of Anesthesiology  Postprocedure Note    Patient: Radha Anderson  MRN: [id number]  YOB: 1940  Date of evaluation: 6/6/2023      Procedure Summary     Date: 06/06/23 Room / Location: [facility] OR [facility]    Anesthesia Start: 4292 Anesthesia Stop: 8546    Procedure: CYSTOSCOPY, LEFT URETERAL CATHETERIZATION, LEFT RENAL PELVIS WASHINGS,  LEFT RETROGRADE PYELOGRAM, LEFT URETEROSCOPY, BLADDER BIOPSY, FULGERATION (Left) Diagnosis:       Cancer of dome of urinary bladder ([address]      Abnormal urine cytology      (Cancer of dome of urinary bladder ([address] [C67.1])      (Abnormal urine cytology [R82.89])    Surgeons: Tita Gonzalez MD Responsible Provider: MAYELA Monson CRNA    Anesthesia Type: general ASA Status: 2          Anesthesia Type: No value filed.     Keily Phase I: Keily Score: 8    Keily Phase II:        Anesthesia Post Evaluation    Patient location during evaluation: PACU  Patient participation: complete - patient cannot participate  Level of consciousness: sleepy but conscious and awake  Airway patency: patent  Nausea & Vomiting: no nausea and no vomiting  Complications: no  Cardiovascular status: hemodynamically stable  Respiratory status: acceptable, spontaneous ventilation, nonlabored ventilation and face mask  Hydration status: stable  Comments: BP (!) 142/64   Pulse 63   Temp 97.8 °F (36.6 °C) (Temporal)   Resp 11   Ht 5' 10\" (1.778 m)   Wt 140 lb (63.5 kg)   SpO2 100%   BMI 20.09 kg/m²

## 2023-06-06 NOTE — OP NOTE
[facility] OF [facility] [address] [facility]                                OPERATIVE REPORT    PATIENT NAME: Donald Swan                    :        1940  MED REC NO:   [id number]                              ROOM:  ACCOUNT NO:   [de-identified]                           ADMIT DATE: 2023  PROVIDER:     Aury Purvis MD    DATE OF PROCEDURE:  2023    RADIOGRAPHIC INTERPRETATION OF LEFT RETROGRADE PYELOGRAM    Left retrograde pyelogram.    INTERPRETATION:  The left ureter is intubated with ureteral catheter in  the proximal collecting system. This shows a normal-appearing left  renal pelvis without filling defects or abnormality, no extravasation. Compared to prior are retrogrades, this appears normal.  The ureter is  opacified in its entirety and also appears to be normal without filling  defects.     CONCLUSION:  Normal left retrograde pyelogram.        Shannon Weldon MD    D: 2023 10:30:41      T: 2023 12:21:58     [email or web address]  Job#: [id number]     Doc#: [id number]    CC:

## 2023-06-06 NOTE — DISCHARGE INSTRUCTIONS
[facility]rology, Dr Daniel Bañuelos    Cystoscopy / Ureteroscopy / Bladder Endoscopy Procedures Discharge Instructions      You may experience : Burning sensation when you void     A feeling of a need to go to the bathroom frequently     You may have urgent urination     Your urine may be blood tinged    These symptoms should be relieved within a few hours and days  as you increase the amounts of fluids you drink and the number of times that you empty your bladder. They may persist for 1-2 weeks if you have a stent or had a biopsy or resection. We recommended that you drink plenty of fluid a few days after surgery. If you have a ureteral stent he may have pain in your side or back related to the stent. Stents also cause bladder irritation symptoms of frequency and urgency. No strenuous activities for 1 week or  until you talk to your doctor. You may feel light headed up to 24 hours after anesthesia. You should not do the following for the next 24 hours. Drive a car, operate machinery or power tools     Drink any alcoholic drinks (not even beer or wine)     Make any important decisions, i.e., signing important papers. It is recommended that you begin with clear liquids and/or light foods. If you  Are not nauseated, progress to your normal diet. I you are unable to urinate you should call your surgeon.     Dr. Abram Owen

## 2023-06-07 NOTE — PROGRESS NOTES
CLINICAL PHARMACY NOTE: MEDS TO BEDS    Total # of Prescriptions Filled: 2   The following medications were delivered to the patient:  Discharge Medication List as of 6/6/2023 10:49 AM        START taking these medications    Details   ciprofloxacin (CIPRO) 500 MG tablet Take 1 tablet by mouth 2 times daily for 3 days, Disp-6 tablet, R-0Normal      HYDROcodone-acetaminophen (NORCO) 5-325 MG per tablet Take 1 tablet by mouth every 6 hours as needed for Pain for up to 3 days. Max Daily Amount: 4 tablets, Disp-12 tablet, R-0Normal             Additional Documentation:     Delivered Rx's to op-care. Gave Rx's to patients spouse at bedtime. Paid $0.00.

## 2023-06-22 ENCOUNTER — OFFICE VISIT (OUTPATIENT)
Dept: UROLOGY | Age: 83
End: 2023-06-22
Payer: COMMERCIAL

## 2023-06-22 VITALS — BODY MASS INDEX: 20.62 KG/M2 | TEMPERATURE: 97.9 F | WEIGHT: 144 LBS | HEIGHT: 70 IN

## 2023-06-22 DIAGNOSIS — R82.89 ABNORMAL URINE CYTOLOGY: ICD-10-CM

## 2023-06-22 DIAGNOSIS — Z85.51 HISTORY OF BLADDER CANCER: ICD-10-CM

## 2023-06-22 DIAGNOSIS — C67.1 MALIGNANT NEOPLASM OF DOME OF URINARY BLADDER (HCC): Primary | ICD-10-CM

## 2023-06-22 LAB
APPEARANCE FLUID: CLEAR
BILIRUBIN, POC: NORMAL
BLOOD URINE, POC: NORMAL
CLARITY, POC: CLEAR
COLOR, POC: YELLOW
GLUCOSE URINE, POC: NORMAL
KETONES, POC: NORMAL
LEUKOCYTE EST, POC: NORMAL
NITRITE, POC: NORMAL
PH, POC: 7
PROTEIN, POC: NORMAL
SPECIFIC GRAVITY, POC: 1.01
UROBILINOGEN, POC: 0.2

## 2023-06-22 PROCEDURE — 1123F ACP DISCUSS/DSCN MKR DOCD: CPT | Performed by: UROLOGY

## 2023-06-22 PROCEDURE — 81002 URINALYSIS NONAUTO W/O SCOPE: CPT | Performed by: UROLOGY

## 2023-06-22 PROCEDURE — 99214 OFFICE O/P EST MOD 30 MIN: CPT | Performed by: UROLOGY

## 2023-06-22 ASSESSMENT — ENCOUNTER SYMPTOMS
VOMITING: 0
BACK PAIN: 0
SHORTNESS OF BREATH: 0
CONSTIPATION: 0
NAUSEA: 0
EYE REDNESS: 0
TROUBLE SWALLOWING: 0
ABDOMINAL DISTENTION: 0
DIARRHEA: 0
ABDOMINAL PAIN: 0
COUGH: 0
EYE DISCHARGE: 0

## 2023-06-22 NOTE — PROGRESS NOTES
Lis Bobo is a [de-identified] y.o. male who presents today   Chief Complaint   Patient presents with    Post-Op Check     I am here today for my 2 week post op and to go over my path report. BLADDER CANCER  Patient was first diagnosed with bladder cancer 16 month(s) ago. Last Recurrence: 5/2/2023. This showed focal lamina propria invasion therefore on 6/6/2023 he had rebiopsy of the resection site he comes in today after that. In addition he had as documented below abnormal retrograde with positive left renal pelvis washings. On 6/6/2023 also did left ureteroscopy and repeat washings. 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 On retrograde pyelogram had abnormal-appearing left renal pelvis where prior retrogrades were had been normal.  Was almost like there was extravasation for subsequent imaging look more normal Right is retrograde is normal I did renal pelvis washings and this comes back positive for high-grade papillary urothelial carcinoma. Repeat retrograde on 6/6/2023 was normal.  Cytology washings were suspicious  Hematuria? microscopic  Prior to this his last upper tract studies were done on 10/27/2022 6 months ago.   Last retrogrades done on 5/2/2023 showed normal right, left retrograde done on 6/6/2023 was normal       Past Medical History:   Diagnosis Date    Cancer ([address]     bladder cancer    Hypertension     Sinus infection     recent; tx with zpack and pred pack       Past Surgical History:   Procedure Laterality Date    CYSTOSCOPY N/A 1/11/2022    CYSTOSCOPY TRANSURETHRAL RESECTION BLADDER TUMOR performed by Nimco Senior MD at [address] [place] [address]

## 2023-07-25 ENCOUNTER — NURSE ONLY (OUTPATIENT)
Dept: UROLOGY | Age: 83
End: 2023-07-25
Payer: COMMERCIAL

## 2023-07-25 DIAGNOSIS — C67.1 CANCER OF DOME OF URINARY BLADDER (HCC): Primary | ICD-10-CM

## 2023-07-25 LAB
APPEARANCE FLUID: CLEAR
BILIRUBIN, POC: NORMAL
BLOOD URINE, POC: NORMAL
CLARITY, POC: CLEAR
COLOR, POC: YELLOW
GLUCOSE URINE, POC: NORMAL
KETONES, POC: NORMAL
LEUKOCYTE EST, POC: NORMAL
NITRITE, POC: NORMAL
PH, POC: 6
PROTEIN, POC: NORMAL
SPECIFIC GRAVITY, POC: 1.02
UROBILINOGEN, POC: 0.2

## 2023-07-25 PROCEDURE — 51720 TREATMENT OF BLADDER LESION: CPT | Performed by: UROLOGY

## 2023-07-25 PROCEDURE — 81002 URINALYSIS NONAUTO W/O SCOPE: CPT | Performed by: NURSE PRACTITIONER

## 2023-07-25 NOTE — PROGRESS NOTES
BLADDER CANCER  Patient was first diagnosed with bladder cancer 16 month(s) ago. 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  none  Symptoms since last treatment: none   Fever: absent    The patient is here today for an intravesical BCG treatment. BCG Treatment # 1 of 6:  Patient was prepped and draped in the supine position. Using sterile technique, xylocaine jelly was introduced into the urethra. Under sterile conditions, a #16 Fr. Catheter was gently introduced into the urethra and bladder. All urine was drained from the bladder. A full strength solution of FERNANDO BCG  Lot # [id number], Exp Date 04/25/24 mixed in 50 mL of sterile, preservative free 0.9% NaCl solution was then instilled under gravity feed through the catheter into the bladder. The catheter was then removed. Post BCG Treatment Instructions:  I discussed the side effects of BCG including burning with urination, increase urinary urgency and frequency, blood in urine, fatigue, fever or chills. The patient was given instruction to hold the BCG in the bladder for at least one hour but no longer than 2 hours after instillation. The patient should use the same toilet for the 1st 6 hours after instillation and clean the toilet with Chlorox bleach each time the toilet is used. The patient should call our office immediately or go to the Emergency Room if he/she experiences fever over 101 and/or has shaking chills.

## 2023-08-01 ENCOUNTER — NURSE ONLY (OUTPATIENT)
Dept: UROLOGY | Age: 83
End: 2023-08-01
Payer: COMMERCIAL

## 2023-08-01 DIAGNOSIS — C67.1 CANCER OF DOME OF URINARY BLADDER (HCC): Primary | ICD-10-CM

## 2023-08-01 LAB
APPEARANCE FLUID: CLEAR
BILIRUBIN, POC: NORMAL
BLOOD URINE, POC: NORMAL
CLARITY, POC: CLEAR
COLOR, POC: YELLOW
GLUCOSE URINE, POC: NORMAL
KETONES, POC: NORMAL
LEUKOCYTE EST, POC: NORMAL
NITRITE, POC: NORMAL
PH, POC: 7
PROTEIN, POC: NORMAL
SPECIFIC GRAVITY, POC: 1.02
UROBILINOGEN, POC: 0.2

## 2023-08-01 PROCEDURE — 51720 TREATMENT OF BLADDER LESION: CPT | Performed by: UROLOGY

## 2023-08-01 PROCEDURE — 81002 URINALYSIS NONAUTO W/O SCOPE: CPT | Performed by: UROLOGY

## 2023-08-01 NOTE — PROGRESS NOTES
BLADDER CANCER  Patient was first diagnosed with bladder cancer 16 month(s) ago. 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  none  Symptoms since last treatment: none   Fever: absent    The patient is here today for an intravesical BCG treatment. BCG Treatment # 2 of 6:  Patient was prepped and draped in the supine position. Using sterile technique, xylocaine jelly was introduced into the urethra. Under sterile conditions, a #16 Fr. Catheter was gently introduced into the urethra and bladder. All urine was drained from the bladder. A full strength solution of FERNANDO BCG  Lot # [id number], Exp Date 04/25/24 mixed in 50 mL of sterile, preservative free 0.9% NaCl solution was then instilled under gravity feed through the catheter into the bladder. The catheter was then removed. Post BCG Treatment Instructions:  I discussed the side effects of BCG including burning with urination, increase urinary urgency and frequency, blood in urine, fatigue, fever or chills. The patient was given instruction to hold the BCG in the bladder for at least one hour but no longer than 2 hours after instillation. The patient should use the same toilet for the 1st 6 hours after instillation and clean the toilet with Chlorox bleach each time the toilet is used. The patient should call our office immediately or go to the Emergency Room if he/she experiences fever over 101 and/or has shaking chills.

## 2023-08-10 ENCOUNTER — NURSE ONLY (OUTPATIENT)
Dept: UROLOGY | Age: 83
End: 2023-08-10
Payer: COMMERCIAL

## 2023-08-10 DIAGNOSIS — C67.1 CANCER OF DOME OF URINARY BLADDER (HCC): Primary | ICD-10-CM

## 2023-08-10 PROCEDURE — 51720 TREATMENT OF BLADDER LESION: CPT | Performed by: UROLOGY

## 2023-08-10 PROCEDURE — 81002 URINALYSIS NONAUTO W/O SCOPE: CPT | Performed by: UROLOGY

## 2023-08-10 NOTE — PROGRESS NOTES
BLADDER CANCER  Patient was first diagnosed with bladder cancer 16 month(s) ago. 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  none  Symptoms since last treatment: none   Fever: absent    The patient is here today for an intravesical BCG treatment. BCG Treatment # 3 of 6:  Patient was prepped and draped in the supine position. Using sterile technique, xylocaine jelly was introduced into the urethra. Under sterile conditions, a #16 Fr. Catheter was gently introduced into the urethra and bladder. All urine was drained from the bladder. A full strength solution of FERNANDO BCG  Lot # [id number], Exp Date 04/25/24 mixed in 50 mL of sterile, preservative free 0.9% NaCl solution was then instilled under gravity feed through the catheter into the bladder. The catheter was then removed. Post BCG Treatment Instructions:  I discussed the side effects of BCG including burning with urination, increase urinary urgency and frequency, blood in urine, fatigue, fever or chills. The patient was given instruction to hold the BCG in the bladder for at least one hour but no longer than 2 hours after instillation. The patient should use the same toilet for the 1st 6 hours after instillation and clean the toilet with Chlorox bleach each time the toilet is used. The patient should call our office immediately or go to the Emergency Room if he/she experiences fever over 101 and/or has shaking chills.

## 2023-08-11 RX ORDER — LEVOFLOXACIN 250 MG/1
250 TABLET ORAL DAILY
Qty: 5 TABLET | Refills: 0 | Status: SHIPPED | OUTPATIENT
Start: 2023-08-11 | End: 2023-08-16

## 2023-08-23 ENCOUNTER — NURSE ONLY (OUTPATIENT)
Dept: UROLOGY | Age: 83
End: 2023-08-23
Payer: COMMERCIAL

## 2023-08-23 DIAGNOSIS — C67.1 CANCER OF DOME OF URINARY BLADDER (HCC): Primary | ICD-10-CM

## 2023-08-23 LAB
APPEARANCE FLUID: CLEAR
BILIRUBIN, POC: NORMAL
BLOOD URINE, POC: NORMAL
CLARITY, POC: CLEAR
COLOR, POC: YELLOW
GLUCOSE URINE, POC: NORMAL
KETONES, POC: NORMAL
LEUKOCYTE EST, POC: NORMAL
NITRITE, POC: NORMAL
PH, POC: 5.5
PROTEIN, POC: NORMAL
SPECIFIC GRAVITY, POC: 1.01
UROBILINOGEN, POC: 0.2

## 2023-08-23 PROCEDURE — 81002 URINALYSIS NONAUTO W/O SCOPE: CPT | Performed by: UROLOGY

## 2023-08-23 PROCEDURE — 51720 TREATMENT OF BLADDER LESION: CPT | Performed by: UROLOGY

## 2023-08-23 NOTE — PROGRESS NOTES
BLADDER CANCER  Patient was first diagnosed with bladder cancer 16 month(s) ago. 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 none  Symptoms since last treatment: none   Fever: absent    The patient is here today for an intravesical BCG treatment. BCG Treatment # 4 of 6:  Patient was prepped and draped in the supine position. Using sterile technique, xylocaine jelly was introduced into the urethra. Under sterile conditions, a #16 Fr. Catheter was gently introduced into the urethra and bladder. All urine was drained from the bladder. A full strength solution of FERNANDO BCG  NDC: 1783-7377-91 Lot # [id number], Exp Date 05/18/2024 mixed in 50 mL of sterile, preservative free 0.9% NaCl solution was then instilled under gravity feed through the catheter into the bladder. The catheter was then removed. Post BCG Treatment Instructions:  I discussed the side effects of BCG including burning with urination, increase urinary urgency and frequency, blood in urine, fatigue, fever or chills. The patient was given instruction to hold the BCG in the bladder for at least one hour but no longer than 2 hours after instillation. The patient should use the same toilet for the 1st 6 hours after instillation and clean the toilet with Chlorox bleach each time the toilet is used. The patient should call our office immediately or go to the Emergency Room if he/she experiences fever over 101 and/or has shaking chills.

## 2023-08-29 ENCOUNTER — NURSE ONLY (OUTPATIENT)
Dept: UROLOGY | Age: 83
End: 2023-08-29

## 2023-08-29 ENCOUNTER — TELEPHONE (OUTPATIENT)
Dept: UROLOGY | Age: 83
End: 2023-08-29

## 2023-08-29 NOTE — PROGRESS NOTES
Patient came in today for BCG but stated he had chills starting Saturday. He went to his PCP yesterday and they put him on antibiotics and a z pack. I consulted with the APRNs and they said just to be safe wait a week to do BCG #5. Patient will come back next week for BCG 5/6.

## 2023-08-29 NOTE — PROGRESS NOTES
BLADDER CANCER  Patient was first diagnosed with bladder cancer 16 month(s) ago. 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 none  Symptoms since last treatment: none   Fever: absent    The patient is here today for an intravesical BCG treatment. BCG Treatment # 5 of 6:  Patient was prepped and draped in the supine position. Using sterile technique, xylocaine jelly was introduced into the urethra. Under sterile conditions, a #16 Fr. Catheter was gently introduced into the urethra and bladder. All urine was drained from the bladder. A full strength solution of FERNANDO BCG  NDC: 4245-1103-60 Lot # [id number], Exp Date 05/18/2024 mixed in 50 mL of sterile, preservative free 0.9% NaCl solution was then instilled under gravity feed through the catheter into the bladder. The catheter was then removed. Post BCG Treatment Instructions:  I discussed the side effects of BCG including burning with urination, increase urinary urgency and frequency, blood in urine, fatigue, fever or chills. The patient was given instruction to hold the BCG in the bladder for at least one hour but no longer than 2 hours after instillation. The patient should use the same toilet for the 1st 6 hours after instillation and clean the toilet with Chlorox bleach each time the toilet is used. The patient should call our office immediately or go to the Emergency Room if he/she experiences fever over 101 and/or has shaking chills.

## 2023-09-05 ENCOUNTER — NURSE ONLY (OUTPATIENT)
Dept: UROLOGY | Age: 83
End: 2023-09-05
Payer: COMMERCIAL

## 2023-09-05 DIAGNOSIS — C67.1 CANCER OF DOME OF URINARY BLADDER (HCC): Primary | ICD-10-CM

## 2023-09-05 PROCEDURE — 81002 URINALYSIS NONAUTO W/O SCOPE: CPT | Performed by: NURSE PRACTITIONER

## 2023-09-05 PROCEDURE — 51720 TREATMENT OF BLADDER LESION: CPT | Performed by: NURSE PRACTITIONER

## 2023-09-05 NOTE — PROGRESS NOTES
BLADDER CANCER  Patient was first diagnosed with bladder cancer 16 month(s) ago. 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 none  Symptoms since last treatment: none   Fever: absent    The patient is here today for an intravesical BCG treatment. BCG Treatment # 5 of 6:  Patient was prepped and draped in the supine position. Using sterile technique, xylocaine jelly was introduced into the urethra. Under sterile conditions, a #16 Fr. coude Catheter was gently introduced into the urethra and bladder. All urine was drained from the bladder. A full strength solution of FERNANDO BCG  NDC: 8912-0013-99 Lot # [id number], Exp Date 05/18/2024 mixed in 50 mL of sterile, preservative free 0.9% NaCl solution was then instilled under gravity feed through the catheter into the bladder. The catheter was then removed. Post BCG Treatment Instructions:  I discussed the side effects of BCG including burning with urination, increase urinary urgency and frequency, blood in urine, fatigue, fever or chills. The patient was given instruction to hold the BCG in the bladder for at least one hour but no longer than 2 hours after instillation. The patient should use the same toilet for the 1st 6 hours after instillation and clean the toilet with Chlorox bleach each time the toilet is used. The patient should call our office immediately or go to the Emergency Room if he/she experiences fever over 101 and/or has shaking chills.

## 2023-09-06 ENCOUNTER — LAB (OUTPATIENT)
Dept: LAB | Facility: HOSPITAL | Age: 83
End: 2023-09-06
Payer: COMMERCIAL

## 2023-09-06 DIAGNOSIS — E61.1 IRON DEFICIENCY: ICD-10-CM

## 2023-09-06 DIAGNOSIS — D47.2 IGG LAMBDA MONOCLONAL GAMMOPATHY: ICD-10-CM

## 2023-09-06 DIAGNOSIS — C91.10 CLL (CHRONIC LYMPHOCYTIC LEUKEMIA): ICD-10-CM

## 2023-09-06 LAB
ALBUMIN SERPL-MCNC: 4.1 G/DL (ref 3.5–5.2)
ALBUMIN/GLOB SERPL: 2.1 G/DL
ALP SERPL-CCNC: 91 U/L (ref 39–117)
ALT SERPL W P-5'-P-CCNC: 7 U/L (ref 1–41)
ANION GAP SERPL CALCULATED.3IONS-SCNC: 10 MMOL/L (ref 5–15)
AST SERPL-CCNC: 10 U/L (ref 1–40)
BASOPHILS # BLD MANUAL: 0.15 10*3/MM3 (ref 0–0.2)
BASOPHILS NFR BLD MANUAL: 2 % (ref 0–1.5)
BILIRUB SERPL-MCNC: 0.5 MG/DL (ref 0–1.2)
BUN SERPL-MCNC: 19 MG/DL (ref 8–23)
BUN/CREAT SERPL: 13.7 (ref 7–25)
CALCIUM SPEC-SCNC: 8.9 MG/DL (ref 8.6–10.5)
CHLORIDE SERPL-SCNC: 100 MMOL/L (ref 98–107)
CO2 SERPL-SCNC: 29 MMOL/L (ref 22–29)
CREAT SERPL-MCNC: 1.39 MG/DL (ref 0.76–1.27)
DEPRECATED RDW RBC AUTO: 41.3 FL (ref 37–54)
EGFRCR SERPLBLD CKD-EPI 2021: 51.2 ML/MIN/1.73
EOSINOPHIL # BLD MANUAL: 0.07 10*3/MM3 (ref 0–0.4)
EOSINOPHIL NFR BLD MANUAL: 1 % (ref 0.3–6.2)
ERYTHROCYTE [DISTWIDTH] IN BLOOD BY AUTOMATED COUNT: 12.3 % (ref 12.3–15.4)
FERRITIN SERPL-MCNC: 250.1 NG/ML (ref 30–400)
GLOBULIN UR ELPH-MCNC: 2 GM/DL
GLUCOSE SERPL-MCNC: 99 MG/DL (ref 65–99)
HCT VFR BLD AUTO: 38.3 % (ref 37.5–51)
HGB BLD-MCNC: 12.4 G/DL (ref 13–17.7)
IRON 24H UR-MRATE: 74 MCG/DL (ref 59–158)
IRON SATN MFR SERPL: 24 % (ref 20–50)
LYMPHOCYTES # BLD MANUAL: 5.48 10*3/MM3 (ref 0.7–3.1)
LYMPHOCYTES NFR BLD MANUAL: 8 % (ref 5–12)
MCH RBC QN AUTO: 29.5 PG (ref 26.6–33)
MCHC RBC AUTO-ENTMCNC: 32.4 G/DL (ref 31.5–35.7)
MCV RBC AUTO: 91 FL (ref 79–97)
MONOCYTES # BLD: 0.58 10*3/MM3 (ref 0.1–0.9)
NEUTROPHILS # BLD AUTO: 1.02 10*3/MM3 (ref 1.7–7)
NEUTROPHILS NFR BLD MANUAL: 14 % (ref 42.7–76)
PLAT MORPH BLD: NORMAL
PLATELET # BLD AUTO: 237 10*3/MM3 (ref 140–450)
PMV BLD AUTO: 8.4 FL (ref 6–12)
POIKILOCYTOSIS BLD QL SMEAR: ABNORMAL
POLYCHROMASIA BLD QL SMEAR: ABNORMAL
POTASSIUM SERPL-SCNC: 4.9 MMOL/L (ref 3.5–5.2)
PROT SERPL-MCNC: 6.1 G/DL (ref 6–8.5)
RBC # BLD AUTO: 4.21 10*6/MM3 (ref 4.14–5.8)
SODIUM SERPL-SCNC: 139 MMOL/L (ref 136–145)
TIBC SERPL-MCNC: 302 MCG/DL (ref 298–536)
TRANSFERRIN SERPL-MCNC: 203 MG/DL (ref 200–360)
VARIANT LYMPHS NFR BLD MANUAL: 28 % (ref 0–5)
VARIANT LYMPHS NFR BLD MANUAL: 47 % (ref 19.6–45.3)
WBC MORPH BLD: NORMAL
WBC NRBC COR # BLD: 7.3 10*3/MM3 (ref 3.4–10.8)

## 2023-09-06 PROCEDURE — 83521 IG LIGHT CHAINS FREE EACH: CPT

## 2023-09-06 PROCEDURE — 80053 COMPREHEN METABOLIC PANEL: CPT

## 2023-09-06 PROCEDURE — 84466 ASSAY OF TRANSFERRIN: CPT

## 2023-09-06 PROCEDURE — 86334 IMMUNOFIX E-PHORESIS SERUM: CPT

## 2023-09-06 PROCEDURE — 82784 ASSAY IGA/IGD/IGG/IGM EACH: CPT

## 2023-09-06 PROCEDURE — 85007 BL SMEAR W/DIFF WBC COUNT: CPT

## 2023-09-06 PROCEDURE — 36415 COLL VENOUS BLD VENIPUNCTURE: CPT

## 2023-09-06 PROCEDURE — 82728 ASSAY OF FERRITIN: CPT

## 2023-09-06 PROCEDURE — 85025 COMPLETE CBC W/AUTO DIFF WBC: CPT

## 2023-09-06 PROCEDURE — 84165 PROTEIN E-PHORESIS SERUM: CPT

## 2023-09-06 PROCEDURE — 83540 ASSAY OF IRON: CPT

## 2023-09-07 LAB
ALBUMIN SERPL ELPH-MCNC: 3.2 G/DL (ref 2.9–4.4)
ALBUMIN/GLOB SERPL: 1.3 {RATIO} (ref 0.7–1.7)
ALPHA1 GLOB SERPL ELPH-MCNC: 0.3 G/DL (ref 0–0.4)
ALPHA2 GLOB SERPL ELPH-MCNC: 0.9 G/DL (ref 0.4–1)
B-GLOBULIN SERPL ELPH-MCNC: 0.8 G/DL (ref 0.7–1.3)
GAMMA GLOB SERPL ELPH-MCNC: 0.4 G/DL (ref 0.4–1.8)
GLOBULIN SER-MCNC: 2.5 G/DL (ref 2.2–3.9)
IGA SERPL-MCNC: 17 MG/DL (ref 61–437)
IGG SERPL-MCNC: 362 MG/DL (ref 603–1613)
IGM SERPL-MCNC: 7 MG/DL (ref 15–143)
INTERPRETATION SERPL IEP-IMP: ABNORMAL
KAPPA LC FREE SER-MCNC: 6.8 MG/L (ref 3.3–19.4)
KAPPA LC FREE/LAMBDA FREE SER: 0.52 {RATIO} (ref 0.26–1.65)
LABORATORY COMMENT REPORT: ABNORMAL
LAMBDA LC FREE SERPL-MCNC: 13 MG/L (ref 5.7–26.3)
M PROTEIN SERPL ELPH-MCNC: 0.1 G/DL
PROT SERPL-MCNC: 5.7 G/DL (ref 6–8.5)

## 2023-09-12 ENCOUNTER — HOSPITAL ENCOUNTER (OUTPATIENT)
Dept: GENERAL RADIOLOGY | Facility: HOSPITAL | Age: 83
Discharge: HOME | End: 2023-09-12
Admitting: NURSE PRACTITIONER
Payer: COMMERCIAL

## 2023-09-12 ENCOUNTER — OFFICE VISIT (OUTPATIENT)
Dept: ONCOLOGY | Facility: CLINIC | Age: 83
End: 2023-09-12
Payer: COMMERCIAL

## 2023-09-12 VITALS
HEART RATE: 88 BPM | BODY MASS INDEX: 20.5 KG/M2 | OXYGEN SATURATION: 96 % | TEMPERATURE: 98 F | RESPIRATION RATE: 16 BRPM | HEIGHT: 69 IN | WEIGHT: 138.4 LBS | DIASTOLIC BLOOD PRESSURE: 68 MMHG | SYSTOLIC BLOOD PRESSURE: 120 MMHG

## 2023-09-12 DIAGNOSIS — C67.9 MALIGNANT NEOPLASM OF URINARY BLADDER, UNSPECIFIED SITE: ICD-10-CM

## 2023-09-12 DIAGNOSIS — R05.1 ACUTE COUGH: ICD-10-CM

## 2023-09-12 DIAGNOSIS — D47.2 IGG LAMBDA MONOCLONAL GAMMOPATHY: ICD-10-CM

## 2023-09-12 DIAGNOSIS — D69.6 THROMBOCYTOPENIA: ICD-10-CM

## 2023-09-12 DIAGNOSIS — C91.10 CLL (CHRONIC LYMPHOCYTIC LEUKEMIA): Primary | ICD-10-CM

## 2023-09-12 PROCEDURE — 71046 X-RAY EXAM CHEST 2 VIEWS: CPT

## 2023-09-12 NOTE — PROGRESS NOTES
[facility] HEMATOLOGY & ONCOLOGY  [address]  [place] [address] [phone]    Patient Name: Oral Dey  Encounter Date: 09/12/2023  YOB: 1940  Patient Number: [id number]      REASON FOR FOLLOW-UP: Oral Dey is a pleasant 82 y.o.  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  He is also seen for anemia from iron deficiency.  He is no longer taking oral iron.     He presents to clinic today for continued follow up.  He complains of persistent fatigue. Otherwise he feels well.  He states he is drinking a protein shake per day.     INTERVAL HISTORY   Pt presents to clinic today for continued follow up.  He reports he doesn't have as much energy.  He is easily tired and that he has lost weight.  He has had some chest drainage for the past two weeks and has had some intermittent sweats as if he broke a fever.      He weighed 153# on March 14 and weighs 138 today.      Of note, he is takking treatment for Bladder cancer with Dr. Chaves  Per office notes,   Last Recurrence: 5/2/2023. This showed focal lamina propria invasion therefore on 6/6/2023 he had rebiopsy of the resection site he comes in today after that. In addition he had as documented below abnormal retrograde with positive left renal pelvis washings. On 6/6/2023 also did left ureteroscopy and repeat washings. 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 On retrograde pyelogram had abnormal-appearing left renal pelvis where prior retrogrades were had been normal. Was  almost like there was extravasation for subsequent imaging look more normal Right is retrograde is normal I did renal pelvis washings and this comes back positive for high-grade papillary urothelial carcinoma. Repeat retrograde on 6/6/2023 was normal. Cytology washings were suspicious   He will need BCG treated with induction BCG x6 weekly treatments and then maintenance protocol.     He started BCG July 25, 2023 and has a treatment scheduled for tomorrow.     He states he is scheduled follow up 9/22/23 and is taking protein shakes 3-4 weeks.    Problem List Items Addressed This Visit          Other    CLL (chronic lymphocytic leukemia) - Primary    Overview     DIAGNOSTIC ABNORMALITIES:   Labs, 05/16/2012, [facility]: WBC 10.9, hemoglobin 14.3, hematocrit 42.2, MCV of 94.2, platelets 133,000, ALC elevated at 4796, ANC normal at 4.8, Absolute monocytosis at 970.  Labs, 11/14/2012, [facility]: WBC 10.2, hemoglobin 15.4, hematocrit 45.4, MCV 94.5, platelets 154,000, ALC elevated at 4,865. Globulin 1.9.  Labs, 11/15/2012, [facility]: IgG 495, IgA 34, IgM 28 (all below normal limits).  Blood film review, 11/16/2012, [facility]: Mild absolute lymphocytosis and monocytosis. Reactive and atypical lymphocytes present. Flow cytometry recommended.  PATHOLOGY: Cytogenetics, 12/12/2012, [facility]: CLL and CCND1-IGH@FISH: Abnormal results with +12 and 13q-.   Flow cytometry peripheral blood, 12/12/2012, [facility]: Peripheral blood with a CD5+ B cell population showing lambda restriction, 30% cellularity.   Labs, 03/27/2013: GFR 78. glucose 108. IgG 455, IgM 16, IgA 35, M-spike 0.1, Immunofixation: IgG monoclonal protein with lambda light chain specificity.   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 Negative for CCND1/IGH gene rearrangement.   Hematopathology report peripheral blood, 03/27/2013 [facility]: Normal white blood cell count with  no evidence of lymphocytosis but 23% abnormal intermediate lambda light chain-restricted B cell population identified by flow cytometry. Mild thrombocytopenia with normal hemoglobin/hematocrit. See comment.   Flow peripheral blood, 03/27/2013, [facility]: Abnormal CD5-positive identified, monoclonal lambda. Consistent with chronic lymphocytic leukemia (CLL)/small lymphocytic lymphoma (SLL).   Skeletal survey, 01/11/2013, [facility]: No discrete lytic lesions identified.   Ultrasound abdomen, 01/11/2013, [facility]: No focal pathology.     PREVIOUS INTERVENTION:   Observation.           IgG lambda monoclonal gammopathy     Other Visit Diagnoses       Malignant neoplasm of urinary bladder, unspecified site        Acute cough        Relevant Orders    XR Chest PA & Lateral (Completed)    Thrombocytopenia              Oncology/Hematology History Overview Note   DIAGNOSTIC ABNORMALITIES:  Labs, 05/16/2012, [facility]: WBC 10.9, hemoglobin 14.3, hematocrit 42.2, MCV of 94.2, platelets 133,000, ALC elevated at 4796, ANC normal at 4.8, Absolute monocytosis at 970.  Labs, 11/14/2012, [facility]: WBC 10.2, hemoglobin 15.4, hematocrit 45.4, MCV 94.5, platelets 154,000, ALC elevated at 4,865. Globulin 1.9.  Labs, 11/15/2012, [facility]: IgG 495, IgA 34, IgM 28 (all below normal limits).  Blood film review, 11/16/2012, [facility]: Mild absolute lymphocytosis and monocytosis. Reactive and atypical lymphocytes present. Flow cytometry recommended.  PATHOLOGY: Cytogenetics, 12/12/2012, [facility]:  CLL and CCND1-IGH@FISH: Abnormal results with +12 and 13q-.   Flow cytometry peripheral blood, 12/12/2012, [facility]: Peripheral blood with a CD5+ B cell population showing lambda restriction, 30% cellularity.   Labs, 03/27/2013: GFR 78. glucose 108. IgG 455, IgM 16, IgA 35, M-spike 0.1, Immunofixation: IgG monoclonal protein with lambda light chain specificity.   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 Negative for CCND1/IGH gene rearrangement.    Hematopathology report peripheral blood, 03/27/2013 [facility]: Normal white blood cell count with no evidence of lymphocytosis but 23% abnormal intermediate lambda light chain-restricted B cell population identified by flow cytometry.  Mild thrombocytopenia with normal hemoglobin/hematocrit.  See comment.   Flow peripheral blood, 03/27/2013, [facility]:  Abnormal CD5-positive identified, monoclonal lambda. Consistent with chronic lymphocytic leukemia (CLL)/small lymphocytic lymphoma (SLL).     Skeletal survey, 01/11/2013, [facility]: No discrete lytic lesions identified.   Ultrasound abdomen, 01/11/2013, [facility]: No focal pathology.        PREVIOUS INTERVENTION:   Observation.      PREVIOUS INTERVENTIONS: Anemia from iron deficiency.  Ferrous sulfate 325 mg p.o. daily 01/03/2018 through 03/06/2018.  Resumed 09/04/2018 through 10/10/2018.  Resumed 08/27/2019 through 10/24/2019.  Resume 03/10/2022.     CLL (chronic lymphocytic leukemia)   8/27/2019 Initial Diagnosis    CLL (chronic lymphocytic leukemia) (CMS/[facility])         PAST MEDICAL HISTORY:  ALLERGIES:  Allergies   Allergen Reactions    Augmentin [Amoxicillin-Pot Clavulanate] Hives    Latex Itching and Other (See Comments)     And band aids. Causes redness and itching.     CURRENT MEDICATIONS:  Outpatient Encounter Medications as of 9/12/2023   Medication Sig Dispense Refill    latanoprost (XALATAN) 0.005 % ophthalmic solution Administer 1 drop to the right eye Daily.      Loratadine-Pseudoephedrine (CLARITIN-D 24 HOUR PO) Take  by mouth.      metoprolol tartrate (LOPRESSOR) 25 MG tablet Take 1 tablet by mouth 2 (Two) Times a Day.      multivitamins-minerals (PRESERVISION AREDS 2) capsule capsule Take 1 capsule by mouth 2 (Two) Times a Day.      vitamin B-12 (CYANOCOBALAMIN) 1000 MCG tablet Take 1 tablet by mouth Daily.       vitamin D3 125 MCG (5000 UT) capsule capsule Take 1 capsule by mouth Daily.       No facility-administered encounter medications on file as of 9/12/2023.     ADULT ILLNESSES:  Patient Active Problem List   Diagnosis Code    Hx of colonic polyp Z86.010    Family hx of colon cancer Z80.0    CLL (chronic lymphocytic leukemia) C91.10    Hypertension I10    IgG lambda monoclonal gammopathy D47.2    Iron deficiency E61.1     SURGERIES:  Past Surgical History:   Procedure Laterality Date    CATARACT EXTRACTION, BILATERAL      COLONOSCOPY  06/13/2012    CYSTOSCOPY BLADDER BIOPSY      EXCISION MASS HEAD/NECK N/A 3/4/2022    Procedure: EXCISION OF MASS ON POSTERIOR NECK;  Surgeon: Rossana Mahajan MD;  Location:  [facility] OR;  Service: General;  Laterality: N/A;    INGUINAL HERNIA REPAIR Left 2007    INGUINAL HERNIA REPAIR Right 12/28/2017    Procedure: RIGHT INGUINAL HERNIA REPAIR WITH MESH ;  Surgeon: Rossana Mahajan MD;  Location:  [facility] OR;  Service:      HEALTH MAINTENANCE ITEMS:  Health Maintenance Due   Topic Date Due    Pneumococcal Vaccine 65+ (1 - PCV) Never done    TDAP/TD VACCINES (1 - Tdap) Never done    ZOSTER VACCINE (1 of 2) Never done    ANNUAL PHYSICAL  Never done    COVID-19 Vaccine (4 - Moderna risk series) 12/20/2021       <no information>  Last Completed Colonoscopy            COLORECTAL CANCER SCREENING (COLONOSCOPY - Every 10 Years) Next due on 8/1/2027 08/01/2017  SCANNED - COLONOSCOPY    06/14/2012  SCANNED - COLONOSCOPY                  Immunization History   Administered Date(s) Administered    COVID-19 (MODERNA) 1st,2nd,3rd Dose Monovalent 03/10/2021, 04/07/2021, 10/25/2021     Last Completed Mammogram       This patient has no relevant Health Maintenance data.              FAMILY HISTORY:  Family History   Problem Relation Age of Onset    Colon cancer Maternal Grandfather         in his 60's.     Alzheimer's disease Mother     Hypertension Father     Other Father         stent    COPD  "Sister     Heart attack Brother     No Known Problems Maternal Grandmother     No Known Problems Paternal Grandmother     No Known Problems Paternal Grandfather      SOCIAL HISTORY:  Social History     Socioeconomic History    Marital status:    Tobacco Use    Smoking status: Former     Years: 15.00     Types: Cigarettes     Quit date:      Years since quittin.7    Smokeless tobacco: Never   Substance and Sexual Activity    Alcohol use: No    Drug use: No    Sexual activity: Defer       REVIEW OF SYSTEMS:    Review of Systems   Constitutional:  Positive for fatigue and unexpected weight loss. Negative for chills and fever.        \"I feel tired.\"   HENT:  Negative for congestion, mouth sores and nosebleeds.         Hearing aids     Eyes:  Negative for redness and visual disturbance.   Respiratory:  Positive for cough and shortness of breath. Negative for wheezing.    Cardiovascular:  Negative for chest pain and palpitations.   Gastrointestinal:  Negative for abdominal pain, blood in stool, nausea and vomiting.   Endocrine: Negative for polydipsia and polyphagia.   Genitourinary:  Negative for dysuria, flank pain and hematuria.        Recurrent Bladder Cancer taking BCG, due for last treatment tomorrow   Musculoskeletal:  Negative for gait problem and joint swelling.   Skin:  Positive for pallor.   Allergic/Immunologic: Negative for food allergies.   Neurological:  Negative for speech difficulty, weakness and confusion.   Hematological:  Negative for adenopathy. Does not bruise/bleed easily.   Psychiatric/Behavioral:  Negative for agitation, hallucinations and depressed mood.        VITAL SIGNS: /68   Pulse 88   Temp 98 °F (36.7 °C)   Resp 16   Ht 175.3 cm (69\")   Wt 62.8 kg (138 lb 6.4 oz)   SpO2 96%   BMI 20.44 kg/m²  Body surface area is 1.77 meters squared.   Pain Score    23 1355   PainSc: 0-No pain       Physical Exam  Vitals reviewed.   Constitutional:       General: He is not " "in acute distress.  HENT:      Head: Normocephalic and atraumatic.   Eyes:      Extraocular Movements: Extraocular movements intact.   Cardiovascular:      Rate and Rhythm: Normal rate and regular rhythm.   Pulmonary:      Breath sounds: Wheezing and rhonchi present.   Abdominal:      General: Abdomen is flat. Bowel sounds are normal.      Tenderness: There is no abdominal tenderness.   Musculoskeletal:         General: Normal range of motion.   Lymphadenopathy:      Cervical: Cervical adenopathy (Left cervial node.  pt states it has been there \" a long time\".  States there are no changed or enlargment from previously.) present.   Skin:     General: Skin is warm and dry.   Neurological:      Mental Status: He is alert and oriented to person, place, and time.   Psychiatric:         Mood and Affect: Mood normal.         Behavior: Behavior normal.       LABS    Lab Results - Last 18 Months   Lab Units 09/06/23  1355 06/02/23  1325 04/27/23  1340 03/07/23  1339 12/06/22  1347 10/25/22  1016 09/01/22  1422 06/09/22  1422   HEMOGLOBIN g/dL 12.4* 13.0* 13.4* 13.0 12.8* 13.1* 12.9* 12.9*   HEMATOCRIT % 38.3 39.6* 41.2* 40.1 40.1 40.1* 37.7 39.1   MCV fL 91.0 91.9 92.4 92.2 93.9 93.0 89.1 92.2   WBC 10*3/mm3 7.30 6.3 10.2 8.76 9.34 8.8 8.33 9.22   RDW % 12.3 12.5 12.4 12.5 12.5 12.5 12.6 13.0   MPV fL 8.4 9.8 9.2* 9.2 9.2 9.4 9.1 9.3   PLATELETS 10*3/mm3 237 137 191 129* 127* 124* 147 137*   NEUTROS ABS 10*3/mm3 1.02* 1.4* 5.3 3.45 2.92 3.4 2.67 3.36   LYMPHS ABS K/uL  --  3.8 4.1  --   --  4.1  --   --    MONOS ABS K/uL  --  0.90 0.60  --   --  0.90  --   --    EOS ABS 10*3/mm3 0.07 0.10 0.00 0.18 0.28 0.20 0.08 0.09   BASOS ABS 10*3/mm3 0.15 0.10 0.10  --  0.19 0.10 0.17 0.09   IMMATURE GRANS (ABS) K/uL  --  0.0 0.1  --   --  0.0  --   --    NEUTROPHIL % % 14.0*  --   --  37.4* 30.3*  --  32.0* 35.4*   MONOCYTES % % 8.0  --   --  3.0* 10.1  --  11.3 8.3   BASOPHIL % % 2.0*  --   --   --  2.0*  --  2.1* 1.0   ATYP LYMPH % % " 28.0*  --   --  32.3* 15.2*  --   --  3.1       Lab Results - Last 18 Months   Lab Units 09/06/23  1355 03/07/23  1339 12/06/22  1347 09/01/22  1422   GLUCOSE mg/dL 99 94 100* 113*   SODIUM mmol/L 139 142 143 141   POTASSIUM mmol/L 4.9 4.4 4.0 4.0   CO2 mmol/L 29.0 29.0 30.0* 27.0   CHLORIDE mmol/L 100 104 107 105   ANION GAP mmol/L 10.0 9.0 6.0 9.0   CREATININE mg/dL 1.39* 1.05 1.09 1.15   BUN mg/dL 19 14 14 19   BUN / CREAT RATIO  13.7 13.3 12.8 16.5   CALCIUM mg/dL 8.9 9.2 9.5 9.4   ALK PHOS U/L 91 95 96 91   TOTAL PROTEIN g/dL 6.1 6.1 5.9* 6.2   ALT (SGPT) U/L 7 8 8 8   AST (SGOT) U/L 10 12 11 13   BILIRUBIN mg/dL 0.5 0.7 0.6 0.6   ALBUMIN g/dL 4.1  3.2 4.4  4.1 4.30  3.9 4.60  3.8   GLOBULIN gm/dL 2.0 1.7 1.6 1.6       Lab Results - Last 18 Months   Lab Units 09/06/23  1355 03/07/23  1339 12/06/22  1347 09/01/22  1422   M-SPIKE g/dL 0.1* 0.1* 0.1* 0.1*   KAPPA/LAMBDA RATIO, S  0.52 0.69 0.76  --    FREE LAMBDA LIGHT CHAINS mg/L 13.0 12.9 12.4  --    IG KAPPA FREE LIGHT CHAIN mg/L 6.8 8.9 9.4  --        Lab Results - Last 18 Months   Lab Units 09/06/23  1355 03/07/23  1339 12/06/22  1347 09/01/22  1422 06/09/22  1422   IRON mcg/dL 74 76 82 86 99   TIBC mcg/dL 302 383 380 384 359   IRON SATURATION (TSAT) % 24 20 22 22 28   FERRITIN ng/mL 250.10 103.00 95.09 109.00 121.20         Oral Dey reports a pain score of .      ASSESSMENT:  1. CLL (chronic lymphocytic leukemia)    2. Malignant neoplasm of urinary bladder, unspecified site    3. Acute cough    4. IgG lambda monoclonal gammopathy    5. Thrombocytopenia          1.   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  Treatment status: Observation.  -Labs WBC 7.3, Hgb 12.4, Hct 38.3, Plt 237  -Pt is asymptomatic  -Treatment is generally reserved until the patient has active disease defined as the presence of disease-related symptoms, such as weight loss greater than 10%, extreme fatigue, fever greater  "than 100.5 for 2 weeks with night sweats without evidence of infection (\"B\" symptoms), worsening cytopenias, unexplained recurrent infections, worsening adenopathy, or splenomegaly.    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  -M Ignacio 0.1.  Immunofixation shows IgG monoclonal protein with lambda light chain specificity.  Calcium 8.9  Renal Function BUN 19, Creatinine 1.39, GFR 51.2  Anemia Hgb 12.4, Hct 38.3  Bone - no indication of bone problems  -A bone marrow aspiration and biopsy is indicated in all patients with an M protein 1.5 g/dL, patients with a non-IgG MGUS, patients with an abnormal serum free light chain ratio (i.e., ratio of kappa to lambda free light chains less than 0.26 or greater than 1.65), and in all patients who have any abnormalities of the CBC, serum creatinine, serum calcium, or radiographic bone survey.  -No bone  Marrow indicated at this time.     3. Anemia    Hgb 12.4, Hct 38.3, BUN 19, Creatinine 1.39, GFR 51.2  -Iron 74, Ferritin 250, Sat 241%, TIBC 302  -Stable for observations.  No intervention indicated    4.  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   Recurrence of Bladder cancer  Initial Staging:  AJCC stage cTa, cN0, cM0.   Recurrence June 2023  AJCC pathologic stage:  pT1 Nx   High-grade papillary urothelial carcinoma with focal lamina propria   invasion.   Muscularis propria present with no tumor involvement.   Treatment status:  BCG x6 weekly treatments and then maintenance protocol. Started July 25, 2023  -Managed by Dr. Chaves, Urology.    -Currently having some weight loss and acute illness.    6.  Cough  X 2 weeks, fever checked once approx 2 weeks ago 100.  Productive. Reports light green sputum   Taking claritinD and Z PAck  Will order CXR today      PLAN:  Hematologically, Stable for " observation  Continue current medications, treatment plans and follow up with PCP and any other providers  Return to office in 6 months   Labs one week before office visit for CBC with differential, ferritin, TIBC, % saturation, and iron every 12 weeks. CMP and SPEP/SIEP in 6 months.  Patient voiced understanding and agrees to treatment plan    Jennifer Cooper, RENUKA  09/12/2023

## 2023-09-13 ENCOUNTER — NURSE ONLY (OUTPATIENT)
Dept: UROLOGY | Age: 83
End: 2023-09-13
Payer: COMMERCIAL

## 2023-09-13 ENCOUNTER — TELEPHONE (OUTPATIENT)
Dept: ONCOLOGY | Facility: CLINIC | Age: 83
End: 2023-09-13
Payer: COMMERCIAL

## 2023-09-13 DIAGNOSIS — C67.1 CANCER OF DOME OF URINARY BLADDER (HCC): Primary | ICD-10-CM

## 2023-09-13 LAB
APPEARANCE FLUID: CLEAR
BILIRUBIN, POC: NORMAL
BLOOD URINE, POC: NORMAL
CLARITY, POC: CLEAR
COLOR, POC: YELLOW
GLUCOSE URINE, POC: NORMAL
KETONES, POC: NORMAL
LEUKOCYTE EST, POC: NORMAL
NITRITE, POC: NORMAL
PH, POC: 5.5
PROTEIN, POC: NORMAL
SPECIFIC GRAVITY, POC: 1.02
UROBILINOGEN, POC: 0.2

## 2023-09-13 PROCEDURE — 51720 TREATMENT OF BLADDER LESION: CPT | Performed by: UROLOGY

## 2023-09-13 PROCEDURE — 81002 URINALYSIS NONAUTO W/O SCOPE: CPT | Performed by: UROLOGY

## 2023-09-13 NOTE — TELEPHONE ENCOUNTER
Caller: Oral Dey    Relationship: Self    Best call back number: [phone]     Caller requesting test results: YES    What test was performed: CHEST XRAY    When was the test performed: 9/12/23    Where was the test performed:  [facility]

## 2023-09-13 NOTE — PROGRESS NOTES
BLADDER CANCER  Patient was first diagnosed with bladder cancer 16 month(s) ago. 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 none  Symptoms since last treatment: none   Fever: absent    The patient is here today for an intravesical BCG treatment. BCG Treatment # 6 of 6:  Patient was prepped and draped in the supine position. Using sterile technique, xylocaine jelly was introduced into the urethra. Under sterile conditions, a #16 Fr. coude Catheter was gently introduced into the urethra and bladder. All urine was drained from the bladder. A full strength solution of FERNANDO BCG  NDC: 4310-7736-10 Lot # [id number], Exp Date 05/18/2024 mixed in 50 mL of sterile, preservative free 0.9% NaCl solution was then instilled under gravity feed through the catheter into the bladder. The catheter was then removed. Post BCG Treatment Instructions:  I discussed the side effects of BCG including burning with urination, increase urinary urgency and frequency, blood in urine, fatigue, fever or chills. The patient was given instruction to hold the BCG in the bladder for at least one hour but no longer than 2 hours after instillation. The patient should use the same toilet for the 1st 6 hours after instillation and clean the toilet with Chlorox bleach each time the toilet is used. The patient should call our office immediately or go to the Emergency Room if he/she experiences fever over 101 and/or has shaking chills.

## 2023-09-25 DIAGNOSIS — Z85.51 HISTORY OF BLADDER CANCER: ICD-10-CM

## 2023-10-09 ENCOUNTER — PROCEDURE VISIT (OUTPATIENT)
Dept: UROLOGY | Age: 83
End: 2023-10-09
Payer: COMMERCIAL

## 2023-10-09 VITALS — WEIGHT: 141 LBS | BODY MASS INDEX: 20.19 KG/M2 | TEMPERATURE: 98.1 F | HEIGHT: 70 IN

## 2023-10-09 DIAGNOSIS — Z85.51 HISTORY OF BLADDER CANCER: ICD-10-CM

## 2023-10-09 LAB
BACTERIA URINE, POC: ABNORMAL
BILIRUBIN URINE: 0 MG/DL
BLOOD, URINE: NEGATIVE
CASTS URINE, POC: 0
CLARITY: CLEAR
COLOR: YELLOW
CRYSTALS URINE, POC: 0
EPI CELLS URINE, POC: 0
GLUCOSE URINE: ABNORMAL
KETONES, URINE: NEGATIVE
LEUKOCYTE EST, POC: ABNORMAL
NITRITE, URINE: NEGATIVE
PH UA: 7 (ref 4.5–8)
PROTEIN UA: NEGATIVE
RBC URINE, POC: ABNORMAL
SPECIFIC GRAVITY UA: 1.01 (ref 1–1.03)
UROBILINOGEN, URINE: NORMAL
WBC URINE, POC: 2
YEAST URINE, POC: 0

## 2023-10-09 PROCEDURE — 52000 CYSTOURETHROSCOPY: CPT | Performed by: UROLOGY

## 2023-10-09 PROCEDURE — 81001 URINALYSIS AUTO W/SCOPE: CPT | Performed by: UROLOGY

## 2023-10-09 NOTE — PROGRESS NOTES
Result Value Ref Range    Color, UA Yellow     Clarity, UA Clear Clear    Glucose, Ur neg     Bilirubin Urine 0 mg/dL    Ketones, Urine Negative     Specific Gravity, UA 1.010 1.005 - 1.030    Blood, Urine Negative     pH, UA 7.0 4.5 - 8.0    Protein, UA Negative Negative    Nitrite, Urine Negative     Leukocytes, UA trace     Urobilinogen, Urine Normal     RBC Urine, POC 0-1     WBC Urine, POC 2     Bacteria Urine, POC trace     yeast urine, poc 0     Casts Urine, POC 0     Epi Cells Urine, POC 0     crystals urine, poc 0            1. History of bladder cancer  He just finished induction BCG proxy 1 month ago therefore we will have him return in 3 months for surveillance cystoscopy followed by maintenance BCG x3 weekly treatments  - [place] CYSTOURETHROSCOPY  - POCT Urinalysis Dipstick w/ Micro (Auto)  - Cytology, Non-Gyn; Future  - [place] CYSTOURETHROSCOPY; Future      Orders Placed This Encounter   Procedures    Cytology, Non-Gyn     Prior to next visit in 3 mos     Standing Status:   Future     Standing Expiration Date:   10/9/2024     Order Specific Question:   PREVIOUS BIOPSY     Answer:   Yes     Order Specific Question:   PREOP DIAGNOSIS     Answer:   History of bladder cancer     Order Specific Question:   FROZEN SECTION - NO OR YES/SPECIMEN     Answer:   No    POCT Urinalysis Dipstick w/ Micro (Auto)    [place] CYSTOURETHROSCOPY     Cystoscopy in 3 months     Standing Status:   Future     Standing Expiration Date:   10/9/2024       Return in about 3 months (around 1/9/2024) for Cystoscopy on next visit, Urine Cytology prior to next visit.         Nissa Schuster MD

## 2023-12-29 DIAGNOSIS — Z85.51 HISTORY OF BLADDER CANCER: ICD-10-CM

## 2024-01-08 ENCOUNTER — PROCEDURE VISIT (OUTPATIENT)
Dept: UROLOGY | Age: 84
End: 2024-01-08
Payer: COMMERCIAL

## 2024-01-08 VITALS — HEIGHT: 70 IN | TEMPERATURE: 98.1 F | BODY MASS INDEX: 21.27 KG/M2 | WEIGHT: 148.6 LBS

## 2024-01-08 DIAGNOSIS — R33.9 URINARY RETENTION: Primary | ICD-10-CM

## 2024-01-08 DIAGNOSIS — N40.1 BENIGN PROSTATIC HYPERPLASIA WITH INCOMPLETE BLADDER EMPTYING: ICD-10-CM

## 2024-01-08 DIAGNOSIS — R39.14 BENIGN PROSTATIC HYPERPLASIA WITH INCOMPLETE BLADDER EMPTYING: ICD-10-CM

## 2024-01-08 DIAGNOSIS — Z85.51 HISTORY OF BLADDER CANCER: ICD-10-CM

## 2024-01-08 DIAGNOSIS — N30.90 CYSTITIS: ICD-10-CM

## 2024-01-08 LAB
BACTERIA URINE, POC: 0
BILIRUBIN URINE: 0 MG/DL
BLOOD, URINE: POSITIVE
CASTS URINE, POC: 0
CLARITY: ABNORMAL
COLOR: YELLOW
CRYSTALS URINE, POC: 0
EPI CELLS URINE, POC: 0
GLUCOSE URINE: ABNORMAL
KETONES, URINE: NEGATIVE
LEUKOCYTE EST, POC: ABNORMAL
NITRITE, URINE: NEGATIVE
PH UA: 7 (ref 4.5–8)
POST VOID RESIDUAL (PVR): 26 ML
PROTEIN UA: NEGATIVE
RBC URINE, POC: 0
SPECIFIC GRAVITY UA: 1.01 (ref 1–1.03)
UROBILINOGEN, URINE: ABNORMAL
WBC URINE, POC: 10
YEAST URINE, POC: 0

## 2024-01-08 PROCEDURE — 52000 CYSTOURETHROSCOPY: CPT | Performed by: UROLOGY

## 2024-01-08 PROCEDURE — 99214 OFFICE O/P EST MOD 30 MIN: CPT | Performed by: UROLOGY

## 2024-01-08 PROCEDURE — 51798 US URINE CAPACITY MEASURE: CPT | Performed by: UROLOGY

## 2024-01-08 PROCEDURE — 1123F ACP DISCUSS/DSCN MKR DOCD: CPT | Performed by: UROLOGY

## 2024-01-08 PROCEDURE — 81001 URINALYSIS AUTO W/SCOPE: CPT | Performed by: UROLOGY

## 2024-01-08 RX ORDER — TAMSULOSIN HYDROCHLORIDE 0.4 MG/1
0.4 CAPSULE ORAL DAILY
Qty: 30 CAPSULE | Refills: 11 | Status: SHIPPED | OUTPATIENT
Start: 2024-01-08

## 2024-01-08 RX ORDER — NITROFURANTOIN 25; 75 MG/1; MG/1
100 CAPSULE ORAL 2 TIMES DAILY
Qty: 14 CAPSULE | Refills: 0 | Status: SHIPPED | OUTPATIENT
Start: 2024-01-08 | End: 2024-01-15

## 2024-01-08 NOTE — PROGRESS NOTES
BLADDER CANCER  Patient was first diagnosed with bladder cancer approximately 22 month(s) ago.     Last Recurrence: 5/2/2023.  He had lamina propria invasion he completed 6 weeks of induction BCG on 9/13/2023.  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  None  Last upper tract study: 6 month(s) ago.  Study was a retrograde pyelogram done 6/6/2023.  Patient had suspicious left renal pelvic washings and a prior abnormal appearing retrograde pyelogram.  Direct visualization with ureteroscopy was normal.          Cystoscopy Operative Note (1/8/24)  Surgeon: Armaan Ng MD  Anesthesia: Urethral 2% Xylocaine   Indications: History of bladder cancer  Position: Supine    Findings:   The patient was prepped and draped in the usual sterile fashion.  The flexible cystoscope was advanced through the urethra and into the bladder under direct vision.  The bladder was thoroughly inspected and the following was noted:    Residual Urine: severe patient had a distended bladder urine was cloudy.  Medical assistant had to catheterize him and irrigate him to clear.  400 mL was drained from his bladder.  Urethra:  normal appearing urethra with no masses, tenderness or lesions  Prostate: completely obstructing lateral lobes of prostate moderate; median lobe present? no.    Bladder: No tumors or CIS noted.  No bladder diverticulum.  There was moderate trabeculation noted.  No recurrent papillary tumor seen the bladder mucosa looked inflamed consistent with cystitis.  Also somewhat thickened  Ureters: Clear efflux from both ureters.  Orifices with normal configuration and location.    The cystoscope was removed.  The patient tolerated the procedure well.  The patient was given a post procedure instructions and follow up.    Results for orders placed or performed in visit

## 2024-01-08 NOTE — PROGRESS NOTES
In preparation for cysto patient was cleansed with Hibiclens. A 16f coude red rubber catheter was inserted into the bladder. Bladder was then drained of 450cc of urine and hand irrigated with sterile water due to bladder debirs. Catheter was then removed and patient prepped for cystoscopy. Patient tolerated procedure well.    After procedure patient was instructed to empty bladder to the best extent and have a BladderScan preformed.     Bladder Scan interpretation  Estimation of residual urine via abdominal ultrasound  Residual Urine: 26 ml  Indication: retention of urine  Position: Supine  Examination: Incremental scanning of the suprapubic area using 3 MHz transducer using copious amounts of acoustic gel.   Findings: An anechoic area was demonstrated which represented the bladder, with measurement of residual urine as noted.

## 2024-01-11 LAB
BACTERIA UR CULT: ABNORMAL
BACTERIA UR CULT: ABNORMAL
ORGANISM: ABNORMAL

## 2024-01-18 ENCOUNTER — OFFICE VISIT (OUTPATIENT)
Dept: UROLOGY | Age: 84
End: 2024-01-18
Payer: COMMERCIAL

## 2024-01-18 VITALS — TEMPERATURE: 98 F | WEIGHT: 151.4 LBS | BODY MASS INDEX: 21.67 KG/M2 | HEIGHT: 70 IN

## 2024-01-18 DIAGNOSIS — R33.9 URINARY RETENTION: Primary | ICD-10-CM

## 2024-01-18 DIAGNOSIS — R39.14 BENIGN PROSTATIC HYPERPLASIA WITH INCOMPLETE BLADDER EMPTYING: ICD-10-CM

## 2024-01-18 DIAGNOSIS — N40.1 BENIGN PROSTATIC HYPERPLASIA WITH INCOMPLETE BLADDER EMPTYING: ICD-10-CM

## 2024-01-18 DIAGNOSIS — N30.90 CYSTITIS: ICD-10-CM

## 2024-01-18 DIAGNOSIS — Z85.51 HISTORY OF BLADDER CANCER: ICD-10-CM

## 2024-01-18 LAB
APPEARANCE FLUID: CLEAR
BILIRUBIN, POC: NORMAL
BLOOD URINE, POC: NORMAL
CLARITY, POC: CLEAR
COLOR, POC: YELLOW
GLUCOSE URINE, POC: NORMAL
KETONES, POC: NORMAL
LEUKOCYTE EST, POC: NORMAL
NITRITE, POC: NORMAL
PH, POC: 7
POST VOID RESIDUAL (PVR): 169 ML
PROTEIN, POC: NORMAL
SPECIFIC GRAVITY, POC: 1.01
UROBILINOGEN, POC: 0.2

## 2024-01-18 PROCEDURE — 51798 US URINE CAPACITY MEASURE: CPT | Performed by: NURSE PRACTITIONER

## 2024-01-18 PROCEDURE — 81002 URINALYSIS NONAUTO W/O SCOPE: CPT | Performed by: NURSE PRACTITIONER

## 2024-01-18 PROCEDURE — 1123F ACP DISCUSS/DSCN MKR DOCD: CPT | Performed by: NURSE PRACTITIONER

## 2024-01-18 PROCEDURE — 99214 OFFICE O/P EST MOD 30 MIN: CPT | Performed by: NURSE PRACTITIONER

## 2024-01-18 ASSESSMENT — ENCOUNTER SYMPTOMS
VOMITING: 0
BACK PAIN: 0
NAUSEA: 0
ABDOMINAL DISTENTION: 0
ABDOMINAL PAIN: 0

## 2024-01-18 NOTE — PROGRESS NOTES
Mega Brumfield is a 83 y.o. male who presents today   Chief Complaint   Patient presents with    Follow-up     I am here today for my 1 wk retention follow up        Patient is a 83-year-old male who presents clinic today for follow-up.  Was seen by Dr. Ng on 1/8/2024 for surveillance cystoscopy.  Patient with history of bladder cancer as documented below.  At that time he was found to have incomplete bladder emptying with 400 mL residuals.  He does have a enlarged prostate he was placed on Flomax.  He does report improved symptoms overall nocturia from 4 times per night down to 0-1.  He does have postvoid dribbling.  Bladder scan today 169 mL PVR.    At his last visit he was also positive for UTI revealing staph epi.  He was treated with Macrodantin x 7 days, he completed this approximately 4 days ago.  Urine today is clear.      BLADDER CANCER  Patient was first diagnosed with bladder cancer approximately 22 month(s) ago.     Last Recurrence: 5/2/2023.  He had lamina propria invasion he completed 6 weeks of induction BCG on 9/13/2023.  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  None  Last upper tract study: 6 month(s) ago.  Study was a retrograde pyelogram done 6/6/2023.  Patient had suspicious left renal pelvic washings and a prior abnormal appearing retrograde pyelogram.  Direct visualization with ureteroscopy was normal.     Past Medical History:   Diagnosis Date    Cancer (HCC)     bladder cancer    Hypertension     Sinus infection     recent; tx with zpack and pred pack       Past Surgical History:   Procedure Laterality Date    CYSTOSCOPY N/A 1/11/2022    CYSTOSCOPY TRANSURETHRAL RESECTION BLADDER TUMOR performed by Armaan Ng MD at [facility] OR    CYSTOSCOPY Bilateral 1/11/2022    BILATERAL URETERAL CATHETERIZATION, BILATERAL

## 2024-01-23 ENCOUNTER — NURSE ONLY (OUTPATIENT)
Dept: UROLOGY | Age: 84
End: 2024-01-23
Payer: COMMERCIAL

## 2024-01-23 DIAGNOSIS — C67.1 CANCER OF DOME OF URINARY BLADDER (HCC): Primary | ICD-10-CM

## 2024-01-23 PROCEDURE — 81002 URINALYSIS NONAUTO W/O SCOPE: CPT | Performed by: UROLOGY

## 2024-01-23 PROCEDURE — 51720 TREATMENT OF BLADDER LESION: CPT | Performed by: UROLOGY

## 2024-01-23 NOTE — PROGRESS NOTES
BLADDER CANCER  Patient was first diagnosed with bladder cancer approximately 22 month(s) ago.     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  NONE  Symptoms since last treatment: NONE   Fever: absent    The patient is here today for an intravesical BCG treatment.  Maintenance   3 month cycle    BCG Treatment # 1 of 3:  Patient was prepped and draped in the supine position.  Using sterile technique, xylocaine jelly was introduced into the urethra.  Under sterile conditions, a #16 Fr. Catheter was gently introduced into the urethra and bladder.  All urine was drained from the bladder.  A full strength solution of FERNANDO BCG  Lot # [id number], Exp Date 04/25/24 mixed in 50 mL of sterile, preservative free 0.9% NaCl solution was then instilled under gravity feed through the catheter into the bladder.  The catheter was then removed.     Post BCG Treatment Instructions:  I discussed the side effects of BCG including burning with urination, increase urinary urgency and frequency, blood in urine, fatigue, fever or chills.  The patient was given instruction to hold the BCG in the bladder for at least one hour but no longer than 2 hours after instillation. The patient should use the same toilet for the 1st 6 hours after instillation and clean the toilet with Chlorox bleach each time the toilet is used.  The patient should call our office immediately or go to the Emergency Room if he/she experiences fever over 101 and/or has shaking chills.

## 2024-01-31 ENCOUNTER — NURSE ONLY (OUTPATIENT)
Dept: UROLOGY | Age: 84
End: 2024-01-31
Payer: COMMERCIAL

## 2024-01-31 DIAGNOSIS — R33.9 URINARY RETENTION: ICD-10-CM

## 2024-01-31 DIAGNOSIS — R39.14 BENIGN PROSTATIC HYPERPLASIA WITH INCOMPLETE BLADDER EMPTYING: ICD-10-CM

## 2024-01-31 DIAGNOSIS — C67.1 CANCER OF DOME OF URINARY BLADDER (HCC): Primary | ICD-10-CM

## 2024-01-31 DIAGNOSIS — N40.1 BENIGN PROSTATIC HYPERPLASIA WITH INCOMPLETE BLADDER EMPTYING: ICD-10-CM

## 2024-01-31 LAB
BACTERIA URINE, POC: NORMAL
BILIRUBIN URINE: 0 MG/DL
BLOOD, URINE: POSITIVE
CASTS URINE, POC: NORMAL
CLARITY: CLEAR
COLOR: YELLOW
CRYSTALS URINE, POC: NORMAL
EPI CELLS URINE, POC: NORMAL
GLUCOSE URINE: NORMAL
KETONES, URINE: NEGATIVE
LEUKOCYTE EST, POC: NORMAL
NITRITE, URINE: NEGATIVE
PH UA: 7 (ref 4.5–8)
PROTEIN UA: NEGATIVE
RBC URINE, POC: 1
SPECIFIC GRAVITY UA: 1.01 (ref 1–1.03)
UROBILINOGEN, URINE: NORMAL
WBC URINE, POC: 10
YEAST URINE, POC: NORMAL

## 2024-01-31 PROCEDURE — 1123F ACP DISCUSS/DSCN MKR DOCD: CPT | Performed by: NURSE PRACTITIONER

## 2024-01-31 PROCEDURE — 81001 URINALYSIS AUTO W/SCOPE: CPT | Performed by: NURSE PRACTITIONER

## 2024-01-31 PROCEDURE — 51720 TREATMENT OF BLADDER LESION: CPT | Performed by: NURSE PRACTITIONER

## 2024-01-31 PROCEDURE — 99214 OFFICE O/P EST MOD 30 MIN: CPT | Performed by: NURSE PRACTITIONER

## 2024-01-31 RX ORDER — TAMSULOSIN HYDROCHLORIDE 0.4 MG/1
0.4 CAPSULE ORAL DAILY
Qty: 90 CAPSULE | Refills: 3 | Status: SHIPPED | OUTPATIENT
Start: 2024-01-31

## 2024-01-31 ASSESSMENT — ENCOUNTER SYMPTOMS
ABDOMINAL PAIN: 0
ABDOMINAL DISTENTION: 0
VOMITING: 0
NAUSEA: 0
BACK PAIN: 0

## 2024-01-31 NOTE — PROGRESS NOTES
BLADDER CANCER  Patient was first diagnosed with bladder cancer approximately 22 month(s) ago.     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 NONE  Symptoms since last treatment: NONE   Fever: absent    The patient is here today for an intravesical BCG treatment.  Maintenance   3 month cycle    BCG Treatment # 2 of 3:  Patient was prepped and draped in the supine position.  Using sterile technique, xylocaine jelly was introduced into the urethra.  Under sterile conditions, a #16 Fr. Catheter was gently introduced into the urethra and bladder.  All urine was drained from the bladder.  A full strength solution of FERNANDO BCG  Lot # [id number], Exp Date 10/31/2024 mixed in 50 mL of sterile, preservative free 0.9% NaCl solution was then instilled under gravity feed through the catheter into the bladder.  The catheter was then removed.     Post BCG Treatment Instructions:  I discussed the side effects of BCG including burning with urination, increase urinary urgency and frequency, blood in urine, fatigue, fever or chills.  The patient was given instruction to hold the BCG in the bladder for at least one hour but no longer than 2 hours after instillation. The patient should use the same toilet for the 1st 6 hours after instillation and clean the toilet with Chlorox bleach each time the toilet is used.  The patient should call our office immediately or go to the Emergency Room if he/she experiences fever over 101 and/or has shaking chills.   
erroneous or, at times, nonsensical words or phrases may be inadvertently transcribed. Although I have reviewed the document for such errors, some may still exist.

## 2024-02-07 ENCOUNTER — NURSE ONLY (OUTPATIENT)
Dept: UROLOGY | Age: 84
End: 2024-02-07
Payer: COMMERCIAL

## 2024-02-07 DIAGNOSIS — C67.1 MALIGNANT NEOPLASM OF DOME OF URINARY BLADDER (HCC): Primary | ICD-10-CM

## 2024-02-07 LAB
BACTERIA URINE, POC: NORMAL
BILIRUBIN URINE: 0 MG/DL
BLOOD, URINE: POSITIVE
CASTS URINE, POC: NORMAL
CLARITY: CLEAR
COLOR: YELLOW
CRYSTALS URINE, POC: NORMAL
EPI CELLS URINE, POC: NORMAL
GLUCOSE URINE: NORMAL
KETONES, URINE: NEGATIVE
LEUKOCYTE EST, POC: NORMAL
NITRITE, URINE: NEGATIVE
PH UA: 7 (ref 4.5–8)
PROTEIN UA: NEGATIVE
RBC URINE, POC: 1
SPECIFIC GRAVITY UA: 1.01 (ref 1–1.03)
UROBILINOGEN, URINE: NORMAL
WBC URINE, POC: 18
YEAST URINE, POC: NORMAL

## 2024-02-07 PROCEDURE — 99213 OFFICE O/P EST LOW 20 MIN: CPT | Performed by: NURSE PRACTITIONER

## 2024-02-07 PROCEDURE — 51720 TREATMENT OF BLADDER LESION: CPT | Performed by: NURSE PRACTITIONER

## 2024-02-07 PROCEDURE — 81001 URINALYSIS AUTO W/SCOPE: CPT | Performed by: NURSE PRACTITIONER

## 2024-02-07 ASSESSMENT — ENCOUNTER SYMPTOMS
BACK PAIN: 0
VOMITING: 0
ABDOMINAL DISTENTION: 0
NAUSEA: 0
ABDOMINAL PAIN: 0

## 2024-02-07 NOTE — PROGRESS NOTES
BLADDER CANCER  Patient was first diagnosed with bladder cancer approximately 22 month(s) ago.     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 NONE  Symptoms since last treatment: NONE   Fever: absent    The patient is here today for an intravesical BCG treatment.  Maintenance   3 month cycle    BCG Treatment # 3 of 3:  Patient was prepped and draped in the supine position.  Using sterile technique, xylocaine jelly was introduced into the urethra.  Under sterile conditions, a #16 Fr. Catheter was gently introduced into the urethra and bladder.  All urine was drained from the bladder.  A full strength solution of FERNANDO BCG  Lot # [id number], Exp Date 10/31/2024 mixed in 50 mL of sterile, preservative free 0.9% NaCl solution was then instilled under gravity feed through the catheter into the bladder.  The catheter was then removed.     Post BCG Treatment Instructions:  I discussed the side effects of BCG including burning with urination, increase urinary urgency and frequency, blood in urine, fatigue, fever or chills.  The patient was given instruction to hold the BCG in the bladder for at least one hour but no longer than 2 hours after instillation. The patient should use the same toilet for the 1st 6 hours after instillation and clean the toilet with Chlorox bleach each time the toilet is used.  The patient should call our office immediately or go to the Emergency Room if he/she experiences fever over 101 and/or has shaking chills.   
Color, UA Yellow     Clarity, UA Clear Clear    Glucose, Ur Neg     Bilirubin Urine 0 mg/dL    Ketones, Urine Negative     Specific Gravity, UA 1.015 1.005 - 1.030    Blood, Urine Positive     pH, UA 7.0 4.5 - 8.0    Protein, UA Negative Negative    Nitrite, Urine Negative     Leukocytes, UA Neg     Urobilinogen, Urine 0.2 mg/dL     RBC Urine, POC 1     WBC Urine, POC 18     Bacteria Urine, POC      yeast urine, poc      Casts Urine, POC      Epi Cells Urine, POC      crystals urine, poc         ASSESSMENT/PLAN  1. 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  Continue with BCG today. Surveillance cystoscopy scheduled 4/8/24.  - BCG Live (FERNANDO) 50 mg in sodium chloride 0.9 % 50 mL chemo bladder instillation  - POCT Urinalysis Dipstick w/ Micro (Auto)      Orders Placed This Encounter   Procedures    POCT Urinalysis Dipstick w/ Micro (Auto)        Return for keep fu as scheduled.    An electronic signature was used to authenticate this note.    MAYELA GUPTA - CNP    All information inputted into the note by the MA to include chief complaint, past medical history, past surgical history, medications, allergies, social and family history and review of systems has been reviewed and updated as needed by me.    EMR Dragon/transcription disclaimer: Much of this document is electronic transcription/translation of spoken language to printed text. The electronic translation of spoken language may be erroneous or, at times, nonsensical words or phrases may be inadvertently transcribed. Although I have reviewed the document for such errors, some may still exist.

## 2024-03-12 ENCOUNTER — OFFICE VISIT (OUTPATIENT)
Dept: ONCOLOGY | Facility: CLINIC | Age: 84
End: 2024-03-12
Payer: COMMERCIAL

## 2024-03-12 ENCOUNTER — LAB (OUTPATIENT)
Dept: LAB | Facility: HOSPITAL | Age: 84
End: 2024-03-12
Payer: COMMERCIAL

## 2024-03-12 VITALS
TEMPERATURE: 97.2 F | OXYGEN SATURATION: 98 % | BODY MASS INDEX: 21.88 KG/M2 | WEIGHT: 147.7 LBS | RESPIRATION RATE: 16 BRPM | HEIGHT: 69 IN | HEART RATE: 88 BPM | DIASTOLIC BLOOD PRESSURE: 82 MMHG | SYSTOLIC BLOOD PRESSURE: 132 MMHG

## 2024-03-12 DIAGNOSIS — D47.2 IGG LAMBDA MONOCLONAL GAMMOPATHY: ICD-10-CM

## 2024-03-12 DIAGNOSIS — C91.10 CLL (CHRONIC LYMPHOCYTIC LEUKEMIA): ICD-10-CM

## 2024-03-12 DIAGNOSIS — E61.1 IRON DEFICIENCY: ICD-10-CM

## 2024-03-12 DIAGNOSIS — C91.10 CLL (CHRONIC LYMPHOCYTIC LEUKEMIA): Primary | ICD-10-CM

## 2024-03-12 LAB
ALBUMIN SERPL-MCNC: 4.4 G/DL (ref 3.5–5.2)
ALBUMIN/GLOB SERPL: 2 G/DL
ALP SERPL-CCNC: 102 U/L (ref 39–117)
ALT SERPL W P-5'-P-CCNC: 9 U/L (ref 1–41)
ANION GAP SERPL CALCULATED.3IONS-SCNC: 8 MMOL/L (ref 5–15)
ANISOCYTOSIS BLD QL: ABNORMAL
AST SERPL-CCNC: 12 U/L (ref 1–40)
BASOPHILS # BLD MANUAL: 0.07 10*3/MM3 (ref 0–0.2)
BASOPHILS NFR BLD MANUAL: 1 % (ref 0–1.5)
BILIRUB SERPL-MCNC: 1 MG/DL (ref 0–1.2)
BUN SERPL-MCNC: 15 MG/DL (ref 8–23)
BUN/CREAT SERPL: 12.8 (ref 7–25)
CALCIUM SPEC-SCNC: 9.8 MG/DL (ref 8.6–10.5)
CHLORIDE SERPL-SCNC: 103 MMOL/L (ref 98–107)
CO2 SERPL-SCNC: 31 MMOL/L (ref 22–29)
CREAT SERPL-MCNC: 1.17 MG/DL (ref 0.76–1.27)
DEPRECATED RDW RBC AUTO: 44.7 FL (ref 37–54)
EGFRCR SERPLBLD CKD-EPI 2021: 62.6 ML/MIN/1.73
EOSINOPHIL # BLD MANUAL: 0.13 10*3/MM3 (ref 0–0.4)
EOSINOPHIL NFR BLD MANUAL: 2 % (ref 0.3–6.2)
ERYTHROCYTE [DISTWIDTH] IN BLOOD BY AUTOMATED COUNT: 13.2 % (ref 12.3–15.4)
FERRITIN SERPL-MCNC: 128.4 NG/ML (ref 30–400)
GLOBULIN UR ELPH-MCNC: 2.2 GM/DL
GLUCOSE SERPL-MCNC: 96 MG/DL (ref 65–99)
HCT VFR BLD AUTO: 38.7 % (ref 37.5–51)
HGB BLD-MCNC: 12.5 G/DL (ref 13–17.7)
IRON 24H UR-MRATE: 79 MCG/DL (ref 59–158)
IRON SATN MFR SERPL: 20 % (ref 20–50)
LDH SERPL-CCNC: 142 U/L (ref 135–225)
LYMPHOCYTES # BLD MANUAL: 4 10*3/MM3 (ref 0.7–3.1)
LYMPHOCYTES NFR BLD MANUAL: 14.1 % (ref 5–12)
MCH RBC QN AUTO: 29.7 PG (ref 26.6–33)
MCHC RBC AUTO-ENTMCNC: 32.3 G/DL (ref 31.5–35.7)
MCV RBC AUTO: 91.9 FL (ref 79–97)
MONOCYTES # BLD: 0.92 10*3/MM3 (ref 0.1–0.9)
NEUTROPHILS # BLD AUTO: 1.38 10*3/MM3 (ref 1.7–7)
NEUTROPHILS NFR BLD MANUAL: 18.2 % (ref 42.7–76)
NEUTS BAND NFR BLD MANUAL: 3 % (ref 0–5)
PLAT MORPH BLD: NORMAL
PLATELET # BLD AUTO: 148 10*3/MM3 (ref 140–450)
PMV BLD AUTO: 9.1 FL (ref 6–12)
POIKILOCYTOSIS BLD QL SMEAR: ABNORMAL
POTASSIUM SERPL-SCNC: 4.4 MMOL/L (ref 3.5–5.2)
PROT SERPL-MCNC: 6.6 G/DL (ref 6–8.5)
RBC # BLD AUTO: 4.21 10*6/MM3 (ref 4.14–5.8)
SODIUM SERPL-SCNC: 142 MMOL/L (ref 136–145)
TIBC SERPL-MCNC: 393 MCG/DL (ref 298–536)
TRANSFERRIN SERPL-MCNC: 264 MG/DL (ref 200–360)
VARIANT LYMPHS NFR BLD MANUAL: 29.3 % (ref 0–5)
VARIANT LYMPHS NFR BLD MANUAL: 32.3 % (ref 19.6–45.3)
WBC MORPH BLD: NORMAL
WBC NRBC COR # BLD AUTO: 6.5 10*3/MM3 (ref 3.4–10.8)

## 2024-03-12 PROCEDURE — 82784 ASSAY IGA/IGD/IGG/IGM EACH: CPT

## 2024-03-12 PROCEDURE — 83615 LACTATE (LD) (LDH) ENZYME: CPT

## 2024-03-12 PROCEDURE — 85007 BL SMEAR W/DIFF WBC COUNT: CPT

## 2024-03-12 PROCEDURE — 80053 COMPREHEN METABOLIC PANEL: CPT

## 2024-03-12 PROCEDURE — 83540 ASSAY OF IRON: CPT

## 2024-03-12 PROCEDURE — 36415 COLL VENOUS BLD VENIPUNCTURE: CPT

## 2024-03-12 PROCEDURE — 84466 ASSAY OF TRANSFERRIN: CPT

## 2024-03-12 PROCEDURE — 84165 PROTEIN E-PHORESIS SERUM: CPT

## 2024-03-12 PROCEDURE — 83521 IG LIGHT CHAINS FREE EACH: CPT

## 2024-03-12 PROCEDURE — 86334 IMMUNOFIX E-PHORESIS SERUM: CPT

## 2024-03-12 PROCEDURE — 82728 ASSAY OF FERRITIN: CPT

## 2024-03-12 PROCEDURE — 85025 COMPLETE CBC W/AUTO DIFF WBC: CPT

## 2024-03-12 RX ORDER — TAMSULOSIN HYDROCHLORIDE 0.4 MG/1
1 CAPSULE ORAL DAILY
COMMUNITY
Start: 2024-01-31

## 2024-03-12 NOTE — PROGRESS NOTES
[facility] HEMATOLOGY & ONCOLOGY  [address]  [place] [address] [phone]    Patient Name: Oral Dey  Encounter Date: 03/12/2024  YOB: 1940  Patient Number: [id number]      REASON FOR FOLLOW-UP: Oral Dey is a pleasant 83 y.o.  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  He is also seen for anemia from iron deficiency.  He is no longer taking oral iron.     He presents to clinic today for continued follow up.  He complains of persistent fatigue. Otherwise he feels well.  He states he is drinking a protein shake per day.       Of note, pt was diagnosed Bladder cancer with Dr. Chaves  Per office notes,   Last Recurrence: 5/2/2023. This showed focal lamina propria invasion therefore on 6/6/2023 he had rebiopsy of the resection site he comes in today after that. In addition he had as documented below abnormal retrograde with positive left renal pelvis washings. On 6/6/2023 also did left ureteroscopy and repeat washings. 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 On retrograde pyelogram had abnormal-appearing left renal pelvis where prior retrogrades were had been normal. Was almost like there was extravasation for subsequent imaging look more normal Right is retrograde is normal I did renal pelvis washings and this comes back positive for high-grade papillary urothelial carcinoma. Repeat retrograde on 6/6/2023 was normal. Cytology washings were suspicious   He will need BCG treated with induction BCG x6 weekly treatments  "and then maintenance protocol.     He started BCG July 25, 2023      INTERVAL HISTORY   Pt presents to clinic today for continued follow up.  He has gained 9 pounds since last visit.  He states his energy is \"so-so\"    He completed 6 weeks of induction BCG 9/13/23.    Last urinary cytology/FISH results: negative; Date: 12/20/2023   Had maintenance BCG 1/23, 1/31, 2/7, 2024.      He states he is scheduled follow up 9/22/23 and is taking protein shakes 3-4 weeks.    He had labs and results were reviewed with him in office.     Problem List Items Addressed This Visit          Other    CLL (chronic lymphocytic leukemia) - Primary    Overview     DIAGNOSTIC ABNORMALITIES:   Labs, 05/16/2012, [facility]: WBC 10.9, hemoglobin 14.3, hematocrit 42.2, MCV of 94.2, platelets 133,000, ALC elevated at 4796, ANC normal at 4.8, Absolute monocytosis at 970.  Labs, 11/14/2012, [facility]: WBC 10.2, hemoglobin 15.4, hematocrit 45.4, MCV 94.5, platelets 154,000, ALC elevated at 4,865. Globulin 1.9.  Labs, 11/15/2012, [facility]: IgG 495, IgA 34, IgM 28 (all below normal limits).  Blood film review, 11/16/2012, [facility]: Mild absolute lymphocytosis and monocytosis. Reactive and atypical lymphocytes present. Flow cytometry recommended.  PATHOLOGY: Cytogenetics, 12/12/2012, [facility]: CLL and CCND1-IGH@FISH: Abnormal results with +12 and 13q-.   Flow cytometry peripheral blood, 12/12/2012, [facility]: Peripheral blood with a CD5+ B cell population showing lambda restriction, 30% cellularity.   Labs, 03/27/2013: GFR 78. glucose 108. IgG 455, IgM 16, IgA 35, M-spike 0.1, Immunof[facility]: IgG monoclonal protein with lambda light chain specificity.   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 Negative for CCND1/IGH gene rearrangement.   Hematopathology report peripheral blood, 03/27/2013 [facility]: Normal white blood cell count with no evidence of " lymphocytosis but 23% abnormal intermediate lambda light chain-restricted B cell population identified by flow cytometry. Mild thrombocytopenia with normal hemoglobin/hematocrit. See comment.   Flow peripheral blood, 03/27/2013, [facility]: Abnormal CD5-positive identified, monoclonal lambda. Consistent with chronic lymphocytic leukemia (CLL)/small lymphocytic lymphoma (SLL).   Skeletal survey, 01/11/2013, [facility]: No discrete lytic lesions identified.   Ultrasound abdomen, 01/11/2013, Health system: No focal pathology.     PREVIOUS INTERVENTION:   Observation.           Relevant Orders    CBC & Differential    Comprehensive Metabolic Panel    Iron Profile    Ferritin    Lactate Dehydrogenase    IgG lambda monoclonal gammopathy    Relevant Orders    BECK, PE & Free LT Chains, Ser    Iron deficiency    Relevant Orders    Iron Profile    Ferritin       Oncology/Hematology History Overview Note   DIAGNOSTIC ABNORMALITIES:  Labs, 05/16/2012, [facility]: WBC 10.9, hemoglobin 14.3, hematocrit 42.2, MCV of 94.2, platelets 133,000, ALC elevated at 4796, ANC normal at 4.8, Absolute monocytosis at 970.  Labs, 11/14/2012, [facility]: WBC 10.2, hemoglobin 15.4, hematocrit 45.4, MCV 94.5, platelets 154,000, ALC elevated at 4,865. Globulin 1.9.  Labs, 11/15/2012, Health system: IgG 495, IgA 34, IgM 28 (all below normal limits).  Blood film review, 11/16/2012, [facility]: Mild absolute lymphocytosis and monocytosis. Reactive and atypical lymphocytes present. Flow cytometry recommended.  PATHOLOGY: Cytogenetics, 12/12/2012, [facility]:  CLL and CCND1-IGH@FISH: Abnormal results with +12 and 13q-.   Flow cytometry peripheral blood, 12/12/2012, [facility]: Peripheral blood with a CD5+ B cell population showing lambda restriction, 30% cellularity.   Labs, 03/27/2013: GFR 78. glucose 108. IgG 455, IgM 16, IgA 35, M-spike 0.1, Immunofixation: IgG monoclonal protein with lambda light chain specificity.   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 Negative for CCND1/IGH gene rearrangement.    Hematopathology report peripheral blood, 03/27/2013 [facility]: Normal white blood cell count with no evidence of lymphocytosis but 23% abnormal intermediate lambda light chain-restricted B cell population identified by flow cytometry.  Mild thrombocytopenia with normal hemoglobin/hematocrit.  See comment.   Flow peripheral blood, 03/27/2013, [facility]:  Abnormal CD5-positive identified, monoclonal lambda. Consistent with chronic lymphocytic leukemia (CLL)/small lymphocytic lymphoma (SLL).     Skeletal survey, 01/11/2013, [facility]: No discrete lytic lesions identified.   Ultrasound abdomen, 01/11/2013, [facility]: No focal pathology.        PREVIOUS INTERVENTION:   Observation.      PREVIOUS INTERVENTIONS: Anemia from iron deficiency.  Ferrous sulfate 325 mg p.o. daily 01/03/2018 through 03/06/2018.  Resumed 09/04/2018 through 10/10/2018.  Resumed 08/27/2019 through 10/24/2019.  Resume 03/10/2022.     CLL (chronic lymphocytic leukemia)   8/27/2019 Initial Diagnosis    CLL (chronic lymphocytic leukemia) (CMS/[facility]ly [facility])         PAST MEDICAL HISTORY:  ALLERGIES:  Allergies   Allergen Reactions    Augmentin [Amoxicillin-Pot Clavulanate] Hives    Latex Itching and Other (See Comments)     And band aids. Causes redness and itching.     CURRENT MEDICATIONS:  Outpatient Encounter Medications as of 3/12/2024   Medication Sig Dispense Refill    latanoprost (XALATAN) 0.005 % ophthalmic solution Administer 1 drop to the right eye Daily.      metoprolol tartrate (LOPRESSOR) 25 MG tablet Take 1 tablet by mouth 2 (Two) Times a Day.      multivitamins-minerals (PRESERVISION AREDS 2) capsule capsule Take 1 capsule by mouth 2 (Two) Times a Day.      tamsulosin (FLOMAX) 0.4 MG capsule 24 hr capsule Take 1 capsule by mouth Daily.      vitamin B-12 (CYANOCOBALAMIN)  1000 MCG tablet Take 1 tablet by mouth Daily.      vitamin D3 125 MCG (5000 UT) capsule capsule Take 1 capsule by mouth Daily.      [DISCONTINUED] Loratadine-Pseudoephedrine (CLARITIN-D 24 HOUR PO) Take  by mouth. (Patient not taking: Reported on 3/12/2024)       No facility-administered encounter medications on file as of 3/12/2024.     ADULT ILLNESSES:  Patient Active Problem List   Diagnosis Code    Hx of colonic polyp Z86.010    Family hx of colon cancer Z80.0    CLL (chronic lymphocytic leukemia) C91.10    Hypertension I10    IgG lambda monoclonal gammopathy D47.2    Iron deficiency E61.1     SURGERIES:  Past Surgical History:   Procedure Laterality Date    CATARACT EXTRACTION, BILATERAL      COLONOSCOPY  06/13/2012    CYSTOSCOPY BLADDER BIOPSY      EXCISION MASS HEAD/NECK N/A 3/4/2022    Procedure: EXCISION OF MASS ON POSTERIOR NECK;  Surgeon: Rossana Mahajan MD;  Location:  [facility] OR;  Service: General;  Laterality: N/A;    INGUINAL HERNIA REPAIR Left 2007    INGUINAL HERNIA REPAIR Right 12/28/2017    Procedure: RIGHT INGUINAL HERNIA REPAIR WITH MESH ;  Surgeon: Rossana Mahajan MD;  Location:  [facility] OR;  Service:      HEALTH MAINTENANCE ITEMS:  Health Maintenance Due   Topic Date Due    Pneumococcal Vaccine 65+ (1 of 2 - PCV) Never done    TDAP/TD VACCINES (1 - Tdap) Never done    ZOSTER VACCINE (1 of 2) Never done    RSV Vaccine - Adults (1 - 1-dose 60+ series) Never done    ANNUAL PHYSICAL  Never done    INFLUENZA VACCINE  08/01/2023    COVID-19 Vaccine (4 - 2023-24 season) 09/01/2023       <no information>  Last Completed Colonoscopy            COLORECTAL CANCER SCREENING (COLONOSCOPY - Every 10 Years) Next due on 8/1/2027 08/01/2017  SCANNED - COLONOSCOPY    06/14/2012  SCANNED - COLONOSCOPY                  Immunization History   Administered Date(s) Administered    COVID-19 (MODERNA) 1st,2nd,3rd Dose Monovalent 03/10/2021, 04/07/2021, 10/25/2021     Last Completed Mammogram       This patient  "has no relevant Health Maintenance data.              FAMILY HISTORY:  Family History   Problem Relation Age of Onset    Colon cancer Maternal Grandfather         in his 60's.     Alzheimer's disease Mother     Hypertension Father     Other Father         stent    COPD Sister     Heart attack Brother     No Known Problems Maternal Grandmother     No Known Problems Paternal Grandmother     No Known Problems Paternal Grandfather      SOCIAL HISTORY:  Social History     Socioeconomic History    Marital status:    Tobacco Use    Smoking status: Former     Current packs/day: 0.00     Types: Cigarettes     Start date:      Quit date:      Years since quittin.2    Smokeless tobacco: Never   Substance and Sexual Activity    Alcohol use: No    Drug use: No    Sexual activity: Defer       REVIEW OF SYSTEMS:    Review of Systems   Constitutional:  Positive for fatigue and unexpected weight loss. Negative for chills and fever.        \"I feel tired.\"   HENT:  Negative for congestion, mouth sores and nosebleeds.         Hearing aids     Eyes:  Negative for redness and visual disturbance.   Respiratory:  Positive for cough and shortness of breath. Negative for wheezing.    Cardiovascular:  Negative for chest pain and palpitations.   Gastrointestinal:  Negative for abdominal pain, blood in stool, nausea and vomiting.   Endocrine: Negative for polydipsia and polyphagia.   Genitourinary:  Negative for dysuria, flank pain and hematuria.        Recurrent Bladder Cancer taking BCG, due for last treatment tomorrow   Musculoskeletal:  Negative for gait problem and joint swelling.   Skin:  Positive for pallor.   Allergic/Immunologic: Negative for food allergies.   Neurological:  Negative for speech difficulty, weakness and confusion.   Hematological:  Negative for adenopathy. Does not bruise/bleed easily.   Psychiatric/Behavioral:  Negative for agitation, hallucinations and depressed mood.          VITAL SIGNS: BP " "132/82   Pulse 88   Temp 97.2 °F (36.2 °C)   Resp 16   Ht 175.3 cm (69\")   Wt 67 kg (147 lb 11.2 oz)   SpO2 98%   BMI 21.81 kg/m²  Body surface area is 1.82 meters squared.   Pain Score    03/12/24 1100   PainSc: 0-No pain       Physical Exam  Vitals reviewed.   Constitutional:       General: He is not in acute distress.  HENT:      Head: Normocephalic and atraumatic.   Eyes:      Extraocular Movements: Extraocular movements intact.   Cardiovascular:      Rate and Rhythm: Normal rate and regular rhythm.   Pulmonary:      Breath sounds: Wheezing and rhonchi present.   Abdominal:      General: Abdomen is flat. Bowel sounds are normal.      Tenderness: There is no abdominal tenderness.   Musculoskeletal:         General: Normal range of motion.   Lymphadenopathy:      Cervical: Cervical adenopathy (Left cervial node.  pt states it has been there \" a long time\".  States there are no changed or enlargment from previously.) present.   Skin:     General: Skin is warm and dry.   Neurological:      Mental Status: He is alert and oriented to person, place, and time.   Psychiatric:         Mood and Affect: Mood normal.         Behavior: Behavior normal.         LABS    Lab Results - Last 18 Months   Lab Units 03/12/24  1046 09/06/23  1355 06/02/23  1325 04/27/23  1340 03/07/23  1339 12/06/22  1347 10/25/22  1016   HEMOGLOBIN g/dL 12.5* 12.4* 13.0* 13.4* 13.0 12.8* 13.1*   HEMATOCRIT % 38.7 38.3 39.6* 41.2* 40.1 40.1 40.1*   MCV fL 91.9 91.0 91.9 92.4 92.2 93.9 93.0   WBC 10*3/mm3 6.50 7.30 6.3 10.2 8.76 9.34 8.8   RDW % 13.2 12.3 12.5 12.4 12.5 12.5 12.5   MPV fL 9.1 8.4 9.8 9.2* 9.2 9.2 9.4   PLATELETS 10*3/mm3 148 237 137 191 129* 127* 124*   NEUTROS ABS 10*3/mm3 1.38* 1.02* 1.4* 5.3 3.45 2.92 3.4   LYMPHS ABS K/uL  --   --  3.8 4.1  --   --  4.1   MONOS ABS K/uL  --   --  0.90 0.60  --   --  0.90   EOS ABS 10*3/mm3 0.13 0.07 0.10 0.00 0.18 0.28 0.20   BASOS ABS 10*3/mm3 0.07 0.15 0.10 0.10  --  0.19 0.10   IMMATURE " GRANS (ABS) K/uL  --   --  0.0 0.1  --   --  0.0   NEUTROPHIL % % 18.2* 14.0*  --   --  37.4* 30.3*  --    MONOCYTES % % 14.1* 8.0  --   --  3.0* 10.1  --    BASOPHIL % % 1.0 2.0*  --   --   --  2.0*  --    ATYP LYMPH % % 29.3* 28.0*  --   --  32.3* 15.2*  --    ANISOCYTOSIS  Slight/1+  --   --   --   --   --   --        Lab Results - Last 18 Months   Lab Units 03/12/24  1046 09/06/23  1355 03/07/23  1339 12/06/22  1347   GLUCOSE mg/dL 96 99 94 100*   SODIUM mmol/L 142 139 142 143   POTASSIUM mmol/L 4.4 4.9 4.4 4.0   CO2 mmol/L 31.0* 29.0 29.0 30.0*   CHLORIDE mmol/L 103 100 104 107   ANION GAP mmol/L 8.0 10.0 9.0 6.0   CREATININE mg/dL 1.17 1.39* 1.05 1.09   BUN mg/dL 15 19 14 14   BUN / CREAT RATIO  12.8 13.7 13.3 12.8   CALCIUM mg/dL 9.8 8.9 9.2 9.5   ALK PHOS U/L 102 91 95 96   TOTAL PROTEIN g/dL 6.6 6.1 6.1 5.9*   ALT (SGPT) U/L 9 7 8 8   AST (SGOT) U/L 12 10 12 11   BILIRUBIN mg/dL 1.0 0.5 0.7 0.6   ALBUMIN g/dL 4.4  3.9 4.1  3.2 4.4  4.1 4.30  3.9   GLOBULIN gm/dL 2.2 2.0 1.7 1.6       Lab Results - Last 18 Months   Lab Units 03/12/24  1046 09/06/23  1355 03/07/23  1339 12/06/22  1347   M-SPIKE g/dL 0.1* 0.1* 0.1* 0.1*   KAPPA/LAMBDA RATIO, S  0.68 0.52 0.69 0.76   FREE LAMBDA LIGHT CHAINS mg/L 13.5 13.0 12.9 12.4   IG KAPPA FREE LIGHT CHAIN mg/L 9.2 6.8 8.9 9.4   LDH U/L 142  --   --   --        Lab Results - Last 18 Months   Lab Units 03/12/24  1046 09/06/23  1355 03/07/23  1339 12/06/22  1347   IRON mcg/dL 79 74 76 82   TIBC mcg/dL 393 302 383 380   IRON SATURATION (TSAT) % 20 24 20 22   FERRITIN ng/mL 128.40 250.10 103.00 95.09       Oral C Yissel reports a pain score of .      ASSESSMENT:  1. CLL (chronic lymphocytic leukemia)    2. IgG lambda monoclonal gammopathy    3. Iron deficiency            1.   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  Treatment status: Observation.  -Labs WBC 6.5, Hgb 12.5, Hct 38.7, ANC 1.38, ALC 4.0, AMC 0.92, Plt  "148. ,   -Pt is asymptomatic  -Treatment is generally reserved until the patient has active disease defined as the presence of disease-related symptoms, such as weight loss greater than 10%, extreme fatigue, fever greater than 100.5 for 2 weeks with night sweats without evidence of infection (\"B\" symptoms), worsening cytopenias, unexplained recurrent infections, worsening adenopathy, or splenomegaly.  -Stable for observation  -No intervention indicated.    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  -M Ignacio 0.1.  Immunofixation shows IgG monoclonal protein with lambda light chain specificity.  -IgA 17, IgG 387, IgM 10, Kappa FLC 9.2, Lambda LC 13.5 with ratio 0.68  Calcium 9.8  Renal Function:  BUN 15, Creatinine 1.17, GFR 62.6  Anemia:  Hgb 12.5, Hct 38.7  Bone - no indication of bone problems  -A bone marrow aspiration and biopsy is indicated in all patients with an M protein 1.5 g/dL, patients with a non-IgG MGUS, patients with an abnormal serum free light chain ratio (i.e., ratio of kappa to lambda free light chains less than 0.26 or greater than 1.65), and in all patients who have any abnormalities of the CBC, serum creatinine, serum calcium, or radiographic bone survey.  -No bone Marrow indicated at this time.     3. Anemia    -Labs today:    BUN 15, Creatinine 1.17, GFR 62.6,   Hgb 12.5, Hct 38.7, Iron 79, Ferritin 128, Sat 20%, TIBC 393  -Stable for observations  -Can start RAMONA with Retacrit r/t CKD if the following parameters are met:   -GFR < 60   -Ferritin >/= 100 and/or Sat >/= 20%   -Hgb < 10 or Hct < 30      4.  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   Recurrence of Bladder cancer  Initial Staging:  AJCC stage cTa, cN0, cM0.   Recurrence June 2023  AJCC pathologic stage:  pT1 Nx   High-grade papillary " urothelial carcinoma with focal lamina propria   invasion.   Muscularis propria present with no tumor involvement.   Treatment status:     He completed 6 weeks of induction BCG 9/13/23.    Last urinary cytology/FISH results: negative; Date: 12/20/2023   Had maintenance BCG 1/23, 1/31, 2/7, 2024.    -Managed by Dr. Chaves, Urology.    -Follow up with Dr. Woodson in April 2024     PLAN:  Hematologically, Stable for observation  Continue current medications, treatment plans and follow up with PCP and any other providers  Return to office in 6 months   Labs one week before office visit for CBC with differential, ferritin, TIBC, % saturation, and iron every 12 weeks. CMP and SPEP/SIEP in 6 months.  Patient voiced understanding and agrees to treatment plan    RENUKA Altman  03/12/2024

## 2024-03-13 LAB
ALBUMIN SERPL ELPH-MCNC: 3.9 G/DL (ref 2.9–4.4)
ALBUMIN/GLOB SERPL: 1.7 {RATIO} (ref 0.7–1.7)
ALPHA1 GLOB SERPL ELPH-MCNC: 0.3 G/DL (ref 0–0.4)
ALPHA2 GLOB SERPL ELPH-MCNC: 0.8 G/DL (ref 0.4–1)
B-GLOBULIN SERPL ELPH-MCNC: 0.8 G/DL (ref 0.7–1.3)
GAMMA GLOB SERPL ELPH-MCNC: 0.4 G/DL (ref 0.4–1.8)
GLOBULIN SER-MCNC: 2.3 G/DL (ref 2.2–3.9)
IGA SERPL-MCNC: 17 MG/DL (ref 61–437)
IGG SERPL-MCNC: 387 MG/DL (ref 603–1613)
IGM SERPL-MCNC: 10 MG/DL (ref 15–143)
INTERPRETATION SERPL IEP-IMP: ABNORMAL
KAPPA LC FREE SER-MCNC: 9.2 MG/L (ref 3.3–19.4)
KAPPA LC FREE/LAMBDA FREE SER: 0.68 {RATIO} (ref 0.26–1.65)
LABORATORY COMMENT REPORT: ABNORMAL
LAMBDA LC FREE SERPL-MCNC: 13.5 MG/L (ref 5.7–26.3)
M PROTEIN SERPL ELPH-MCNC: 0.1 G/DL
PROT SERPL-MCNC: 6.2 G/DL (ref 6–8.5)

## 2024-04-01 DIAGNOSIS — Z85.51 HISTORY OF BLADDER CANCER: ICD-10-CM

## 2024-04-08 ENCOUNTER — PROCEDURE VISIT (OUTPATIENT)
Dept: UROLOGY | Age: 84
End: 2024-04-08
Payer: COMMERCIAL

## 2024-04-08 VITALS — BODY MASS INDEX: 21.07 KG/M2 | WEIGHT: 147.2 LBS | TEMPERATURE: 98.1 F | HEIGHT: 70 IN

## 2024-04-08 DIAGNOSIS — Z85.51 HISTORY OF BLADDER CANCER: ICD-10-CM

## 2024-04-08 PROCEDURE — 52000 CYSTOURETHROSCOPY: CPT | Performed by: UROLOGY

## 2024-04-08 PROCEDURE — 81002 URINALYSIS NONAUTO W/O SCOPE: CPT | Performed by: UROLOGY

## 2024-04-08 NOTE — PROGRESS NOTES
BLADDER CANCER  Patient was first diagnosed with bladder cancer approximately 2 years(s) ago.     Last Recurrence: 5/2/2023.  He had lamina propria invasion he completed 6 weeks of induction BCG on 9/13/2023.  He completed a 3-month maintenance BCG on 2/7/2024  Stage of bladder cancer at last recurrence: 8130/2 - Papillary transitional cell carcinoma, non-invasive, stage T1  Grade: high grade  Last urinary cytology/FISH results: negative; Date: 4/1/2024  Hematuria?  None  Last upper tract study: 8 month(s) ago.  Study was a bilateral retrograde pyelograms done 6/6/2023  Patient had suspicious left renal pelvis washings and prior abnormal appearing retrograde pyelogram but direct visualization with ureteroscopy was normal.  Subsequent retrograde done on 6/6/2023 was normal.  This voided cytology remains negative        Cystoscopy Operative Note (4/8/24)  Surgeon: Armaan Ng MD  Anesthesia: Urethral 2% Xylocaine   Indications: History of bladder cancer  Position: Supine    Findings:   The patient was prepped and draped in the usual sterile fashion.  The flexible cystoscope was advanced through the urethra and into the bladder under direct vision.  The bladder was thoroughly inspected and the following was noted:    Residual Urine: mild  Urethra: normal appearing urethra with no masses, tenderness or lesions  Prostate: partially obstructing lateral lobes of prostate; median lobe present? no.    Bladder: No tumors or CIS noted.  No bladder diverticulum.  There was mild trabeculation noted.  No recurrent papillary tumor seen some mild mucosal changes suggestive of BCG but no erythematous or papillary patches or tumors  Ureters: Clear efflux from both ureters.  Orifices with normal configuration and location.    The cystoscope was removed.  The patient tolerated the procedure well.  The patient was given a post procedure instructions and follow up.    Results for orders placed or performed in visit on 04/08/24

## 2024-04-30 ENCOUNTER — OFFICE VISIT (OUTPATIENT)
Dept: GASTROENTEROLOGY | Facility: CLINIC | Age: 84
End: 2024-04-30
Payer: COMMERCIAL

## 2024-04-30 VITALS
WEIGHT: 146 LBS | DIASTOLIC BLOOD PRESSURE: 78 MMHG | HEIGHT: 70 IN | TEMPERATURE: 97.7 F | OXYGEN SATURATION: 98 % | BODY MASS INDEX: 20.9 KG/M2 | SYSTOLIC BLOOD PRESSURE: 166 MMHG | HEART RATE: 70 BPM

## 2024-04-30 DIAGNOSIS — Z86.010 HX OF COLONIC POLYP: Primary | ICD-10-CM

## 2024-04-30 DIAGNOSIS — Z80.0 FAMILY HX OF COLON CANCER: ICD-10-CM

## 2024-04-30 NOTE — PROGRESS NOTES
[facility] Gastroenterology    Primary Physician Jorge Shepherd MD    4/30/2024    Oral Dey   1940      Chief Complaint   Patient presents with    Colonoscopy       Subjective     HPI    Oral Dey is a 83 y.o. male who presents as a referral for preventative maintenance. He has no complaints of nausea or vomiting. No change in bowels. No wt loss. No BRBPR. No melena. No abdominal pain.       SCANNED - COLONOSCOPY (08/01/2017 00:00) recall 5 years.   Personal history of colon polyps.       Maternal GF had colon cancer.        Past Medical History:   Diagnosis Date    Bladder cancer     CLL (chronic lymphocytic leukemia)     Colon polyp     Gallstone     GERD (gastroesophageal reflux disease)     Glaucoma     Hearing loss     Hypertension     Inguinal hernia     right groin    Macular degeneration, right eye        Past Surgical History:   Procedure Laterality Date    CATARACT EXTRACTION, BILATERAL      COLONOSCOPY  06/13/2012    CYSTOSCOPY BLADDER BIOPSY      EXCISION MASS HEAD/NECK N/A 3/4/2022    Procedure: EXCISION OF MASS ON POSTERIOR NECK;  Surgeon: Rossana Mahajan MD;  Location:  [facility] OR;  Service: General;  Laterality: N/A;    INGUINAL HERNIA REPAIR Left 2007    INGUINAL HERNIA REPAIR Right 12/28/2017    Procedure: RIGHT INGUINAL HERNIA REPAIR WITH MESH ;  Surgeon: Rossana Mahajan MD;  Location:  [facility] OR;  Service:        Outpatient Medications Marked as Taking for the 4/30/24 encounter (Office Visit) with Laurie Ordaz APRN   Medication Sig Dispense Refill    latanoprost (XALATAN) 0.005 % ophthalmic solution Administer 1 drop to the right eye Daily.      metoprolol tartrate (LOPRESSOR) 25 MG tablet Take 1 tablet by mouth 2 (Two) Times a Day.      multivitamins-minerals (PRESERVISION AREDS 2) capsule capsule Take 1 capsule by mouth 2 (Two) Times a Day.      tamsulosin (FLOMAX) 0.4 MG capsule 24 hr capsule Take 1 capsule by mouth Daily.      vitamin B-12 (CYANOCOBALAMIN)  1000 MCG tablet Take 1 tablet by mouth Daily.      vitamin D3 125 MCG (5000 UT) capsule capsule Take 1 capsule by mouth Daily.         Allergies   Allergen Reactions    Augmentin [Amoxicillin-Pot Clavulanate] Hives    Latex Itching and Other (See Comments)     And band aids. Causes redness and itching.       Social History     Socioeconomic History    Marital status:    Tobacco Use    Smoking status: Former     Current packs/day: 0.00     Types: Cigarettes     Start date:      Quit date:      Years since quittin.3    Smokeless tobacco: Never   Substance and Sexual Activity    Alcohol use: No    Drug use: No    Sexual activity: Defer       Family History   Problem Relation Age of Onset    Colon cancer Maternal Grandfather         in his 60's.     Alzheimer's disease Mother     Hypertension Father     Other Father         stent    COPD Sister     Heart attack Brother     No Known Problems Maternal Grandmother     No Known Problems Paternal Grandmother     No Known Problems Paternal Grandfather        Review of Systems   Constitutional:  Negative for chills, fever and unexpected weight change.   Respiratory:  Negative for shortness of breath.    Cardiovascular:  Negative for chest pain.   Gastrointestinal:  Negative for abdominal distention, abdominal pain, anal bleeding, blood in stool, constipation, diarrhea, nausea and vomiting.       Objective     Vitals:    24 1034   BP: 166/78   Pulse: 70   Temp: 97.7 °F (36.5 °C)   SpO2: 98%         24  1034   Weight: 66.2 kg (146 lb)     Body mass index is 20.95 kg/m².    Physical Exam  Vitals reviewed.   Constitutional:       General: He is not in acute distress.  Cardiovascular:      Rate and Rhythm: Normal rate and regular rhythm.      Heart sounds: Normal heart sounds.   Pulmonary:      Effort: Pulmonary effort is normal.      Breath sounds: Normal breath sounds.   Abdominal:      General: Bowel sounds are normal. There is no distension.       Palpations: Abdomen is soft.      Tenderness: There is no abdominal tenderness.   Skin:     General: Skin is warm and dry.   Neurological:      Mental Status: He is alert.         Imaging Results (Most Recent)       None            Assessment & Plan     Diagnoses and all orders for this visit:    1. Hx of colonic polyp (Primary)  -     External Facility Surgical/Procedural Request; Future    2. Family hx of colon cancer  Comments:  SDR      Schedule colonoscopy. Miralax prep                * Surgery not found *  All risks, benefits, alternatives, and indications of colonoscopy procedure have been discussed with the patient. Risks to include perforation of the colon requiring possible surgery or colostomy, risk of bleeding from biopsies or removal of colon tissue, possibility of missing a colon polyp or cancer, or adverse drug reaction.  Benefits to include the diagnosis and management of disease of the colon and rectum. Alternatives to include barium enema, radiographic evaluation, lab testing or no intervention. Pt verbalizes understanding and agrees.     There are no Patient Instructions on file for this visit.    RENUKA Peacock

## 2024-06-26 ENCOUNTER — OUTSIDE FACILITY SERVICE (OUTPATIENT)
Dept: GASTROENTEROLOGY | Facility: CLINIC | Age: 84
End: 2024-06-26
Payer: COMMERCIAL

## 2024-07-01 DIAGNOSIS — Z85.51 HISTORY OF BLADDER CANCER: ICD-10-CM

## 2024-07-08 ENCOUNTER — PROCEDURE VISIT (OUTPATIENT)
Dept: UROLOGY | Age: 84
End: 2024-07-08
Payer: COMMERCIAL

## 2024-07-08 VITALS — HEIGHT: 70 IN | WEIGHT: 146 LBS | TEMPERATURE: 98 F | BODY MASS INDEX: 20.9 KG/M2

## 2024-07-08 DIAGNOSIS — Z85.51 HISTORY OF BLADDER CANCER: ICD-10-CM

## 2024-07-08 PROCEDURE — 52000 CYSTOURETHROSCOPY: CPT | Performed by: UROLOGY

## 2024-07-08 PROCEDURE — 81002 URINALYSIS NONAUTO W/O SCOPE: CPT | Performed by: UROLOGY

## 2024-07-08 NOTE — PROGRESS NOTES
BLADDER CANCER  Patient was first diagnosed with bladder cancer approximately 2.5 years(s) ago.  January 2022  Last Recurrence: 5/2/2023.  He had lamina propria invasion and completed a 6-week course of induction BCG on 9/13/2023 he completed a 3-month maintenance BCG on 2/7/2024.  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  None  Last upper tract study: 1 year(s) ago.  Study was a retrograde pyelogram done on 6/6/2023        Cystoscopy Operative Note (7/8/24)  Surgeon: Armaan Ng MD  Anesthesia: Urethral 2% Xylocaine   Indications: History of bladder cancer  Position: Supine    Findings:   The patient was prepped and draped in the usual sterile fashion.  The flexible cystoscope was advanced through the urethra and into the bladder under direct vision.  The bladder was thoroughly inspected and the following was noted:    Residual Urine: mild  Urethra: normal appearing urethra with no masses, tenderness or lesions  Prostate: partially obstructing lateral lobes of prostate mild; median lobe present? no.    Bladder: No tumors or CIS noted.  No bladder diverticulum.  There was mild trabeculation noted.  No recurrent papillary tumors were seen  Ureters: Clear efflux from both ureters.  Orifices with normal configuration and location.    The cystoscope was removed.  The patient tolerated the procedure well.  The patient was given a post procedure instructions and follow up.    Results for orders placed or performed in visit on 07/08/24   POCT Urinalysis no Micro   Result Value Ref Range    Color, UA yellow     Clarity, UA clear     Glucose, UA POC neg     Bilirubin, UA neg     Ketones, UA neg     Spec Grav, UA 1.020     Blood, UA POC neg     pH, UA 6.0     Protein, UA POC neg

## 2024-07-25 ENCOUNTER — TELEPHONE (OUTPATIENT)
Dept: UROLOGY | Age: 84
End: 2024-07-25

## 2024-07-29 ENCOUNTER — TELEPHONE (OUTPATIENT)
Dept: UROLOGY | Age: 84
End: 2024-07-29

## 2024-07-31 ENCOUNTER — NURSE ONLY (OUTPATIENT)
Dept: UROLOGY | Age: 84
End: 2024-07-31
Payer: COMMERCIAL

## 2024-07-31 DIAGNOSIS — Z85.51 HISTORY OF BLADDER CANCER: Primary | ICD-10-CM

## 2024-07-31 PROCEDURE — 81002 URINALYSIS NONAUTO W/O SCOPE: CPT | Performed by: UROLOGY

## 2024-07-31 PROCEDURE — 51720 TREATMENT OF BLADDER LESION: CPT | Performed by: UROLOGY

## 2024-07-31 NOTE — PROGRESS NOTES
BLADDER CANCER  Patient was first diagnosed with bladder cancer 2.5 year(s) ago.     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  None  Symptoms since last treatment: None   Fever: absent    The patient is here today for an intravesical BCG treatment.  Maintenance   1 month cycle    BCG Treatment # 1 of 3:  Patient was prepped and draped in the supine position.  Using sterile technique, xylocaine jelly was introduced into the urethra.  Under sterile conditions, a #16 Fr. Catheter was gently introduced into the urethra and bladder.  All urine was drained from the bladder.  A full strength solution of FERNANDO BCG  Lot # [id number], Exp Date 4/10/2025 mixed in 50 mL of sterile, preservative free 0.9% NaCl solution was then instilled under gravity feed through the catheter into the bladder.  The catheter was then removed.     Post BCG Treatment Instructions:  I discussed the side effects of BCG including burning with urination, increase urinary urgency and frequency, blood in urine, fatigue, fever or chills.  The patient was given instruction to hold the BCG in the bladder for at least one hour but no longer than 2 hours after instillation. The patient should use the same toilet for the 1st 6 hours after instillation and clean the toilet with Chlorox bleach each time the toilet is used.  The patient should call our office immediately or go to the Emergency Room if he/she experiences fever over 101 and/or has shaking chills.

## 2024-08-06 ENCOUNTER — NURSE ONLY (OUTPATIENT)
Dept: UROLOGY | Age: 84
End: 2024-08-06
Payer: COMMERCIAL

## 2024-08-06 DIAGNOSIS — Z85.51 HISTORY OF BLADDER CANCER: Primary | ICD-10-CM

## 2024-08-06 PROCEDURE — 51720 TREATMENT OF BLADDER LESION: CPT | Performed by: UROLOGY

## 2024-08-06 NOTE — PROGRESS NOTES
BLADDER CANCER  Patient was first diagnosed with bladder cancer 2.5 year(s) ago.     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  None  Symptoms since last treatment: None   Fever: absent    The patient is here today for an intravesical BCG treatment.  Maintenance   1 month cycle    BCG Treatment # 2 of 3:  Patient was prepped and draped in the supine position.  Using sterile technique, xylocaine jelly was introduced into the urethra.  Under sterile conditions, a #16 Fr. Catheter was gently introduced into the urethra and bladder.  All urine was drained from the bladder.  A full strength solution of FERNANDO BCG  Lot # [id number], Exp Date 4/10/2025 mixed in 50 mL of sterile, preservative free 0.9% NaCl solution was then instilled under gravity feed through the catheter into the bladder.  The catheter was then removed.     Post BCG Treatment Instructions:  I discussed the side effects of BCG including burning with urination, increase urinary urgency and frequency, blood in urine, fatigue, fever or chills.  The patient was given instruction to hold the BCG in the bladder for at least one hour but no longer than 2 hours after instillation. The patient should use the same toilet for the 1st 6 hours after instillation and clean the toilet with Chlorox bleach each time the toilet is used.  The patient should call our office immediately or go to the Emergency Room if he/she experiences fever over 101 and/or has shaking chills.

## 2024-08-15 ENCOUNTER — NURSE ONLY (OUTPATIENT)
Dept: UROLOGY | Age: 84
End: 2024-08-15

## 2024-08-15 DIAGNOSIS — C67.1 MALIGNANT NEOPLASM OF DOME OF URINARY BLADDER (HCC): Primary | ICD-10-CM

## 2024-08-15 NOTE — PROGRESS NOTES
BLADDER CANCER  Patient was first diagnosed with bladder cancer 2.5 year(s) ago.     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  None  Symptoms since last treatment: None   Fever: absent    The patient is here today for an intravesical BCG treatment.  Maintenance   1 month cycle    BCG Treatment # 3 of 3:  Patient was prepped and draped in the supine position.  Using sterile technique, xylocaine jelly was introduced into the urethra.  Under sterile conditions, a #16 Fr. Catheter was gently introduced into the urethra and bladder.  All urine was drained from the bladder.  A full strength solution of FERNANDO BCG  Lot # [id number], Exp Date 4/10/2025 mixed in 50 mL of sterile, preservative free 0.9% NaCl solution was then instilled under gravity feed through the catheter into the bladder.  The catheter was then removed.     Post BCG Treatment Instructions:  I discussed the side effects of BCG including burning with urination, increase urinary urgency and frequency, blood in urine, fatigue, fever or chills.  The patient was given instruction to hold the BCG in the bladder for at least one hour but no longer than 2 hours after instillation. The patient should use the same toilet for the 1st 6 hours after instillation and clean the toilet with Chlorox bleach each time the toilet is used.  The patient should call our office immediately or go to the Emergency Room if he/she experiences fever over 101 and/or has shaking chills.

## 2024-09-12 ENCOUNTER — OFFICE VISIT (OUTPATIENT)
Dept: ONCOLOGY | Facility: CLINIC | Age: 84
End: 2024-09-12
Payer: COMMERCIAL

## 2024-09-12 ENCOUNTER — LAB (OUTPATIENT)
Dept: LAB | Facility: HOSPITAL | Age: 84
End: 2024-09-12
Payer: COMMERCIAL

## 2024-09-12 VITALS
RESPIRATION RATE: 16 BRPM | BODY MASS INDEX: 20.13 KG/M2 | DIASTOLIC BLOOD PRESSURE: 66 MMHG | SYSTOLIC BLOOD PRESSURE: 116 MMHG | HEIGHT: 70 IN | HEART RATE: 66 BPM | TEMPERATURE: 97.8 F | WEIGHT: 140.6 LBS | OXYGEN SATURATION: 98 %

## 2024-09-12 DIAGNOSIS — D47.2 IGG LAMBDA MONOCLONAL GAMMOPATHY: ICD-10-CM

## 2024-09-12 DIAGNOSIS — E61.1 IRON DEFICIENCY: ICD-10-CM

## 2024-09-12 DIAGNOSIS — C91.10 CLL (CHRONIC LYMPHOCYTIC LEUKEMIA): ICD-10-CM

## 2024-09-12 DIAGNOSIS — C91.10 CLL (CHRONIC LYMPHOCYTIC LEUKEMIA): Primary | ICD-10-CM

## 2024-09-12 DIAGNOSIS — D47.2 MGUS (MONOCLONAL GAMMOPATHY OF UNKNOWN SIGNIFICANCE): ICD-10-CM

## 2024-09-12 LAB
ALBUMIN SERPL-MCNC: 3.8 G/DL (ref 3.5–5.2)
ALBUMIN/GLOB SERPL: 1.7 G/DL
ALP SERPL-CCNC: 96 U/L (ref 39–117)
ALT SERPL W P-5'-P-CCNC: 5 U/L (ref 1–41)
ANION GAP SERPL CALCULATED.3IONS-SCNC: 7 MMOL/L (ref 5–15)
AST SERPL-CCNC: 9 U/L (ref 1–40)
BASOPHILS # BLD AUTO: 0.04 10*3/MM3 (ref 0–0.2)
BASOPHILS NFR BLD AUTO: 0.5 % (ref 0–1.5)
BILIRUB SERPL-MCNC: 0.6 MG/DL (ref 0–1.2)
BUN SERPL-MCNC: 13 MG/DL (ref 8–23)
BUN/CREAT SERPL: 14.1 (ref 7–25)
CALCIUM SPEC-SCNC: 9 MG/DL (ref 8.6–10.5)
CHLORIDE SERPL-SCNC: 98 MMOL/L (ref 98–107)
CO2 SERPL-SCNC: 29 MMOL/L (ref 22–29)
CREAT SERPL-MCNC: 0.92 MG/DL (ref 0.76–1.27)
DEPRECATED RDW RBC AUTO: 42 FL (ref 37–54)
EGFRCR SERPLBLD CKD-EPI 2021: 83.6 ML/MIN/1.73
EOSINOPHIL # BLD AUTO: 0.3 10*3/MM3 (ref 0–0.4)
EOSINOPHIL NFR BLD AUTO: 3.6 % (ref 0.3–6.2)
ERYTHROCYTE [DISTWIDTH] IN BLOOD BY AUTOMATED COUNT: 12.7 % (ref 12.3–15.4)
FERRITIN SERPL-MCNC: 225.3 NG/ML (ref 30–400)
GLOBULIN UR ELPH-MCNC: 2.3 GM/DL
GLUCOSE SERPL-MCNC: 102 MG/DL (ref 65–99)
HCT VFR BLD AUTO: 34.7 % (ref 37.5–51)
HGB BLD-MCNC: 11.5 G/DL (ref 13–17.7)
IMM GRANULOCYTES # BLD AUTO: 0.02 10*3/MM3 (ref 0–0.05)
IMM GRANULOCYTES NFR BLD AUTO: 0.2 % (ref 0–0.5)
IRON 24H UR-MRATE: 19 MCG/DL (ref 59–158)
IRON SATN MFR SERPL: 7 % (ref 20–50)
LDH SERPL-CCNC: 129 U/L (ref 135–225)
LYMPHOCYTES # BLD AUTO: 2.52 10*3/MM3 (ref 0.7–3.1)
LYMPHOCYTES NFR BLD AUTO: 29.8 % (ref 19.6–45.3)
MCH RBC QN AUTO: 30 PG (ref 26.6–33)
MCHC RBC AUTO-ENTMCNC: 33.1 G/DL (ref 31.5–35.7)
MCV RBC AUTO: 90.6 FL (ref 79–97)
MONOCYTES # BLD AUTO: 0.88 10*3/MM3 (ref 0.1–0.9)
MONOCYTES NFR BLD AUTO: 10.4 % (ref 5–12)
NEUTROPHILS NFR BLD AUTO: 4.69 10*3/MM3 (ref 1.7–7)
NEUTROPHILS NFR BLD AUTO: 55.5 % (ref 42.7–76)
NRBC BLD AUTO-RTO: 0 /100 WBC (ref 0–0.2)
PLATELET # BLD AUTO: 131 10*3/MM3 (ref 140–450)
PMV BLD AUTO: 8.8 FL (ref 6–12)
POTASSIUM SERPL-SCNC: 4.5 MMOL/L (ref 3.5–5.2)
PROT SERPL-MCNC: 6.1 G/DL (ref 6–8.5)
RBC # BLD AUTO: 3.83 10*6/MM3 (ref 4.14–5.8)
SODIUM SERPL-SCNC: 134 MMOL/L (ref 136–145)
TIBC SERPL-MCNC: 279 MCG/DL (ref 298–536)
TRANSFERRIN SERPL-MCNC: 187 MG/DL (ref 200–360)
WBC NRBC COR # BLD AUTO: 8.45 10*3/MM3 (ref 3.4–10.8)

## 2024-09-12 PROCEDURE — 83521 IG LIGHT CHAINS FREE EACH: CPT

## 2024-09-12 PROCEDURE — 83540 ASSAY OF IRON: CPT

## 2024-09-12 PROCEDURE — 84165 PROTEIN E-PHORESIS SERUM: CPT

## 2024-09-12 PROCEDURE — 36415 COLL VENOUS BLD VENIPUNCTURE: CPT

## 2024-09-12 PROCEDURE — 83615 LACTATE (LD) (LDH) ENZYME: CPT

## 2024-09-12 PROCEDURE — 86334 IMMUNOFIX E-PHORESIS SERUM: CPT

## 2024-09-12 PROCEDURE — 80053 COMPREHEN METABOLIC PANEL: CPT

## 2024-09-12 PROCEDURE — 82728 ASSAY OF FERRITIN: CPT

## 2024-09-12 PROCEDURE — 82784 ASSAY IGA/IGD/IGG/IGM EACH: CPT

## 2024-09-12 PROCEDURE — 84466 ASSAY OF TRANSFERRIN: CPT

## 2024-09-12 PROCEDURE — 85025 COMPLETE CBC W/AUTO DIFF WBC: CPT

## 2024-09-12 RX ORDER — LEVOFLOXACIN 500 MG/1
500 TABLET, FILM COATED ORAL DAILY
Qty: 7 TABLET | Refills: 0 | Status: ON HOLD | OUTPATIENT
Start: 2024-09-12 | End: 2024-09-27

## 2024-09-13 LAB
ALBUMIN SERPL ELPH-MCNC: 3.4 G/DL (ref 2.9–4.4)
ALBUMIN/GLOB SERPL: 1.5 {RATIO} (ref 0.7–1.7)
ALPHA1 GLOB SERPL ELPH-MCNC: 0.3 G/DL (ref 0–0.4)
ALPHA2 GLOB SERPL ELPH-MCNC: 0.9 G/DL (ref 0.4–1)
B-GLOBULIN SERPL ELPH-MCNC: 0.8 G/DL (ref 0.7–1.3)
GAMMA GLOB SERPL ELPH-MCNC: 0.3 G/DL (ref 0.4–1.8)
GLOBULIN SER-MCNC: 2.3 G/DL (ref 2.2–3.9)
IGA SERPL-MCNC: 14 MG/DL (ref 61–437)
IGG SERPL-MCNC: 351 MG/DL (ref 603–1613)
IGM SERPL-MCNC: 7 MG/DL (ref 15–143)
INTERPRETATION SERPL IEP-IMP: ABNORMAL
KAPPA LC FREE SER-MCNC: 8.5 MG/L (ref 3.3–19.4)
KAPPA LC FREE/LAMBDA FREE SER: 0.66 {RATIO} (ref 0.26–1.65)
LABORATORY COMMENT REPORT: ABNORMAL
LAMBDA LC FREE SERPL-MCNC: 12.8 MG/L (ref 5.7–26.3)
Lab: NORMAL
M PROTEIN SERPL ELPH-MCNC: 0.1 G/DL
PROT SERPL-MCNC: 5.7 G/DL (ref 6–8.5)

## 2024-09-26 ENCOUNTER — ANESTHESIA EVENT (OUTPATIENT)
Dept: PERIOP | Facility: HOSPITAL | Age: 84
End: 2024-09-26
Payer: COMMERCIAL

## 2024-09-26 ENCOUNTER — ANESTHESIA (OUTPATIENT)
Dept: PERIOP | Facility: HOSPITAL | Age: 84
End: 2024-09-26
Payer: COMMERCIAL

## 2024-09-26 ENCOUNTER — HOSPITAL ENCOUNTER (INPATIENT)
Facility: HOSPITAL | Age: 84
LOS: 9 days | Discharge: HOME HEALTH | DRG: 326 | End: 2024-10-05
Attending: FAMILY MEDICINE | Admitting: INTERNAL MEDICINE
Payer: COMMERCIAL

## 2024-09-26 ENCOUNTER — APPOINTMENT (OUTPATIENT)
Dept: CT IMAGING | Facility: HOSPITAL | Age: 84
DRG: 326 | End: 2024-09-26
Payer: COMMERCIAL

## 2024-09-26 DIAGNOSIS — K25.1 ACUTE GASTRIC ULCER WITH PERFORATION: ICD-10-CM

## 2024-09-26 DIAGNOSIS — Z74.09 IMPAIRED MOBILITY: ICD-10-CM

## 2024-09-26 DIAGNOSIS — K66.8 PNEUMOPERITONEUM: Primary | ICD-10-CM

## 2024-09-26 LAB
ABO GROUP BLD: NORMAL
ALBUMIN SERPL-MCNC: 4 G/DL (ref 3.5–5.2)
ALBUMIN/GLOB SERPL: 1.7 G/DL
ALP SERPL-CCNC: 123 U/L (ref 39–117)
ALT SERPL W P-5'-P-CCNC: 12 U/L (ref 1–41)
ANION GAP SERPL CALCULATED.3IONS-SCNC: 13 MMOL/L (ref 5–15)
AST SERPL-CCNC: 13 U/L (ref 1–40)
BASOPHILS # BLD AUTO: 0.02 10*3/MM3 (ref 0–0.2)
BASOPHILS NFR BLD AUTO: 0.2 % (ref 0–1.5)
BILIRUB SERPL-MCNC: 0.8 MG/DL (ref 0–1.2)
BILIRUB UR QL STRIP: NEGATIVE
BLD GP AB SCN SERPL QL: NEGATIVE
BUN SERPL-MCNC: 21 MG/DL (ref 8–23)
BUN/CREAT SERPL: 18.6 (ref 7–25)
CALCIUM SPEC-SCNC: 9 MG/DL (ref 8.6–10.5)
CHLORIDE SERPL-SCNC: 96 MMOL/L (ref 98–107)
CLARITY UR: CLEAR
CO2 SERPL-SCNC: 25 MMOL/L (ref 22–29)
COLOR UR: YELLOW
CREAT SERPL-MCNC: 1.13 MG/DL (ref 0.76–1.27)
DEPRECATED RDW RBC AUTO: 40.1 FL (ref 37–54)
EGFRCR SERPLBLD CKD-EPI 2021: 65.3 ML/MIN/1.73
EOSINOPHIL # BLD AUTO: 0 10*3/MM3 (ref 0–0.4)
EOSINOPHIL NFR BLD AUTO: 0 % (ref 0.3–6.2)
ERYTHROCYTE [DISTWIDTH] IN BLOOD BY AUTOMATED COUNT: 12.5 % (ref 12.3–15.4)
GLOBULIN UR ELPH-MCNC: 2.3 GM/DL
GLUCOSE SERPL-MCNC: 151 MG/DL (ref 65–99)
GLUCOSE UR STRIP-MCNC: NEGATIVE MG/DL
HCT VFR BLD AUTO: 34.1 % (ref 37.5–51)
HGB BLD-MCNC: 11.5 G/DL (ref 13–17.7)
HGB UR QL STRIP.AUTO: NEGATIVE
HOLD SPECIMEN: NORMAL
IMM GRANULOCYTES # BLD AUTO: 0.05 10*3/MM3 (ref 0–0.05)
IMM GRANULOCYTES NFR BLD AUTO: 0.5 % (ref 0–0.5)
KETONES UR QL STRIP: NEGATIVE
LEUKOCYTE ESTERASE UR QL STRIP.AUTO: NEGATIVE
LIPASE SERPL-CCNC: 43 U/L (ref 13–60)
LYMPHOCYTES # BLD AUTO: 2.56 10*3/MM3 (ref 0.7–3.1)
LYMPHOCYTES NFR BLD AUTO: 26.9 % (ref 19.6–45.3)
MAGNESIUM SERPL-MCNC: 2 MG/DL (ref 1.6–2.4)
MCH RBC QN AUTO: 29.7 PG (ref 26.6–33)
MCHC RBC AUTO-ENTMCNC: 33.7 G/DL (ref 31.5–35.7)
MCV RBC AUTO: 88.1 FL (ref 79–97)
MONOCYTES # BLD AUTO: 0.67 10*3/MM3 (ref 0.1–0.9)
MONOCYTES NFR BLD AUTO: 7 % (ref 5–12)
NEUTROPHILS NFR BLD AUTO: 6.23 10*3/MM3 (ref 1.7–7)
NEUTROPHILS NFR BLD AUTO: 65.4 % (ref 42.7–76)
NITRITE UR QL STRIP: NEGATIVE
NRBC BLD AUTO-RTO: 0 /100 WBC (ref 0–0.2)
PH UR STRIP.AUTO: 6.5 [PH] (ref 5–8)
PLATELET # BLD AUTO: 239 10*3/MM3 (ref 140–450)
PMV BLD AUTO: 8.6 FL (ref 6–12)
POTASSIUM SERPL-SCNC: 4.4 MMOL/L (ref 3.5–5.2)
PROT SERPL-MCNC: 6.3 G/DL (ref 6–8.5)
PROT UR QL STRIP: NEGATIVE
RBC # BLD AUTO: 3.87 10*6/MM3 (ref 4.14–5.8)
RH BLD: POSITIVE
SODIUM SERPL-SCNC: 134 MMOL/L (ref 136–145)
SP GR UR STRIP: >1.03 (ref 1–1.03)
T&S EXPIRATION DATE: NORMAL
TROPONIN T SERPL HS-MCNC: 11 NG/L
UROBILINOGEN UR QL STRIP: ABNORMAL
WBC NRBC COR # BLD AUTO: 9.53 10*3/MM3 (ref 3.4–10.8)
WHOLE BLOOD HOLD COAG: NORMAL
WHOLE BLOOD HOLD SPECIMEN: NORMAL

## 2024-09-26 PROCEDURE — 74174 CTA ABD&PLVS W/CONTRAST: CPT

## 2024-09-26 PROCEDURE — 93010 ELECTROCARDIOGRAM REPORT: CPT | Performed by: EMERGENCY MEDICINE

## 2024-09-26 PROCEDURE — 93005 ELECTROCARDIOGRAM TRACING: CPT | Performed by: FAMILY MEDICINE

## 2024-09-26 PROCEDURE — 25010000002 ONDANSETRON PER 1 MG: Performed by: NURSE ANESTHETIST, CERTIFIED REGISTERED

## 2024-09-26 PROCEDURE — 83735 ASSAY OF MAGNESIUM: CPT | Performed by: FAMILY MEDICINE

## 2024-09-26 PROCEDURE — 86850 RBC ANTIBODY SCREEN: CPT | Performed by: STUDENT IN AN ORGANIZED HEALTH CARE EDUCATION/TRAINING PROGRAM

## 2024-09-26 PROCEDURE — 25010000002 MORPHINE SULFATE (PF) 2 MG/ML SOLUTION 1 ML CARTRIDGE: Performed by: STUDENT IN AN ORGANIZED HEALTH CARE EDUCATION/TRAINING PROGRAM

## 2024-09-26 PROCEDURE — 25810000003 LACTATED RINGERS PER 1000 ML: Performed by: NURSE ANESTHETIST, CERTIFIED REGISTERED

## 2024-09-26 PROCEDURE — 0 HYDROMORPHONE 1 MG/ML SOLUTION: Performed by: STUDENT IN AN ORGANIZED HEALTH CARE EDUCATION/TRAINING PROGRAM

## 2024-09-26 PROCEDURE — 25010000002 SUGAMMADEX 200 MG/2ML SOLUTION: Performed by: NURSE ANESTHETIST, CERTIFIED REGISTERED

## 2024-09-26 PROCEDURE — 86901 BLOOD TYPING SEROLOGIC RH(D): CPT | Performed by: STUDENT IN AN ORGANIZED HEALTH CARE EDUCATION/TRAINING PROGRAM

## 2024-09-26 PROCEDURE — 25810000003 LACTATED RINGERS SOLUTION: Performed by: FAMILY MEDICINE

## 2024-09-26 PROCEDURE — 25510000001 IOPAMIDOL PER 1 ML: Performed by: FAMILY MEDICINE

## 2024-09-26 PROCEDURE — 25010000002 MORPHINE PER 10 MG: Performed by: FAMILY MEDICINE

## 2024-09-26 PROCEDURE — 25010000002 ONDANSETRON PER 1 MG: Performed by: FAMILY MEDICINE

## 2024-09-26 PROCEDURE — 43659 UNLISTED LAPS PX STOMACH: CPT | Performed by: STUDENT IN AN ORGANIZED HEALTH CARE EDUCATION/TRAINING PROGRAM

## 2024-09-26 PROCEDURE — 25010000002 FENTANYL CITRATE (PF) 50 MCG/ML SOLUTION: Performed by: NURSE ANESTHETIST, CERTIFIED REGISTERED

## 2024-09-26 PROCEDURE — 99285 EMERGENCY DEPT VISIT HI MDM: CPT

## 2024-09-26 PROCEDURE — 83690 ASSAY OF LIPASE: CPT | Performed by: FAMILY MEDICINE

## 2024-09-26 PROCEDURE — 49329 UNLSTD LAPS PX ABD PERTM&OMN: CPT | Performed by: STUDENT IN AN ORGANIZED HEALTH CARE EDUCATION/TRAINING PROGRAM

## 2024-09-26 PROCEDURE — 86900 BLOOD TYPING SEROLOGIC ABO: CPT | Performed by: STUDENT IN AN ORGANIZED HEALTH CARE EDUCATION/TRAINING PROGRAM

## 2024-09-26 PROCEDURE — S2900 ROBOTIC SURGICAL SYSTEM: HCPCS | Performed by: STUDENT IN AN ORGANIZED HEALTH CARE EDUCATION/TRAINING PROGRAM

## 2024-09-26 PROCEDURE — 80053 COMPREHEN METABOLIC PANEL: CPT | Performed by: FAMILY MEDICINE

## 2024-09-26 PROCEDURE — 25010000002 LABETALOL 5 MG/ML SOLUTION: Performed by: NURSE ANESTHETIST, CERTIFIED REGISTERED

## 2024-09-26 PROCEDURE — 84484 ASSAY OF TROPONIN QUANT: CPT | Performed by: FAMILY MEDICINE

## 2024-09-26 PROCEDURE — 25010000002 CEFTRIAXONE PER 250 MG: Performed by: STUDENT IN AN ORGANIZED HEALTH CARE EDUCATION/TRAINING PROGRAM

## 2024-09-26 PROCEDURE — 25010000002 DEXAMETHASONE PER 1 MG: Performed by: STUDENT IN AN ORGANIZED HEALTH CARE EDUCATION/TRAINING PROGRAM

## 2024-09-26 PROCEDURE — 81003 URINALYSIS AUTO W/O SCOPE: CPT | Performed by: FAMILY MEDICINE

## 2024-09-26 PROCEDURE — 25010000002 DROPERIDOL PER 5 MG: Performed by: NURSE ANESTHETIST, CERTIFIED REGISTERED

## 2024-09-26 PROCEDURE — 85025 COMPLETE CBC W/AUTO DIFF WBC: CPT | Performed by: FAMILY MEDICINE

## 2024-09-26 PROCEDURE — 36415 COLL VENOUS BLD VENIPUNCTURE: CPT

## 2024-09-26 PROCEDURE — 25810000003 SODIUM CHLORIDE PER 500 ML: Performed by: STUDENT IN AN ORGANIZED HEALTH CARE EDUCATION/TRAINING PROGRAM

## 2024-09-26 PROCEDURE — 25010000002 KETOROLAC TROMETHAMINE PER 15 MG: Performed by: FAMILY MEDICINE

## 2024-09-26 PROCEDURE — 25010000002 HYDROMORPHONE PER 4 MG: Performed by: NURSE ANESTHETIST, CERTIFIED REGISTERED

## 2024-09-26 PROCEDURE — 25010000002 BUPIVACAINE 0.5 % SOLUTION 50 ML VIAL: Performed by: STUDENT IN AN ORGANIZED HEALTH CARE EDUCATION/TRAINING PROGRAM

## 2024-09-26 PROCEDURE — 99223 1ST HOSP IP/OBS HIGH 75: CPT | Performed by: STUDENT IN AN ORGANIZED HEALTH CARE EDUCATION/TRAINING PROGRAM

## 2024-09-26 PROCEDURE — 25810000003 SODIUM CHLORIDE 0.9 % SOLUTION: Performed by: FAMILY MEDICINE

## 2024-09-26 PROCEDURE — 71275 CT ANGIOGRAPHY CHEST: CPT

## 2024-09-26 RX ORDER — FLUMAZENIL 0.1 MG/ML
0.2 INJECTION INTRAVENOUS AS NEEDED
Status: DISCONTINUED | OUTPATIENT
Start: 2024-09-26 | End: 2024-09-27 | Stop reason: HOSPADM

## 2024-09-26 RX ORDER — SODIUM CHLORIDE 9 MG/ML
INJECTION, SOLUTION INTRAVENOUS AS NEEDED
Status: DISCONTINUED | OUTPATIENT
Start: 2024-09-26 | End: 2024-09-26 | Stop reason: HOSPADM

## 2024-09-26 RX ORDER — HEPARIN SODIUM 5000 [USP'U]/ML
5000 INJECTION, SOLUTION INTRAVENOUS; SUBCUTANEOUS EVERY 8 HOURS SCHEDULED
Status: DISCONTINUED | OUTPATIENT
Start: 2024-09-26 | End: 2024-10-05 | Stop reason: HOSPADM

## 2024-09-26 RX ORDER — DROPERIDOL 2.5 MG/ML
0.62 INJECTION, SOLUTION INTRAMUSCULAR; INTRAVENOUS ONCE AS NEEDED
Status: COMPLETED | OUTPATIENT
Start: 2024-09-26 | End: 2024-09-26

## 2024-09-26 RX ORDER — IOPAMIDOL 755 MG/ML
100 INJECTION, SOLUTION INTRAVASCULAR
Status: COMPLETED | OUTPATIENT
Start: 2024-09-26 | End: 2024-09-26

## 2024-09-26 RX ORDER — ONDANSETRON 2 MG/ML
4 INJECTION INTRAMUSCULAR; INTRAVENOUS ONCE
Status: COMPLETED | OUTPATIENT
Start: 2024-09-26 | End: 2024-09-26

## 2024-09-26 RX ORDER — METRONIDAZOLE 500 MG/100ML
500 INJECTION, SOLUTION INTRAVENOUS ONCE
Status: COMPLETED | OUTPATIENT
Start: 2024-09-26 | End: 2024-09-26

## 2024-09-26 RX ORDER — SODIUM CHLORIDE, SODIUM LACTATE, POTASSIUM CHLORIDE, CALCIUM CHLORIDE 600; 310; 30; 20 MG/100ML; MG/100ML; MG/100ML; MG/100ML
INJECTION, SOLUTION INTRAVENOUS CONTINUOUS PRN
Status: DISCONTINUED | OUTPATIENT
Start: 2024-09-26 | End: 2024-09-27 | Stop reason: SURG

## 2024-09-26 RX ORDER — SODIUM CHLORIDE 0.9 % (FLUSH) 0.9 %
10 SYRINGE (ML) INJECTION AS NEEDED
Status: DISCONTINUED | OUTPATIENT
Start: 2024-09-26 | End: 2024-09-27

## 2024-09-26 RX ORDER — KETOROLAC TROMETHAMINE 15 MG/ML
15 INJECTION, SOLUTION INTRAMUSCULAR; INTRAVENOUS EVERY 6 HOURS PRN
Status: DISCONTINUED | OUTPATIENT
Start: 2024-09-26 | End: 2024-09-27

## 2024-09-26 RX ORDER — OXYCODONE AND ACETAMINOPHEN 10; 325 MG/1; MG/1
1 TABLET ORAL EVERY 4 HOURS PRN
Status: DISCONTINUED | OUTPATIENT
Start: 2024-09-26 | End: 2024-09-27 | Stop reason: HOSPADM

## 2024-09-26 RX ORDER — ONDANSETRON 2 MG/ML
INJECTION INTRAMUSCULAR; INTRAVENOUS AS NEEDED
Status: DISCONTINUED | OUTPATIENT
Start: 2024-09-26 | End: 2024-09-27 | Stop reason: SURG

## 2024-09-26 RX ORDER — ROCURONIUM BROMIDE 10 MG/ML
INJECTION, SOLUTION INTRAVENOUS AS NEEDED
Status: DISCONTINUED | OUTPATIENT
Start: 2024-09-26 | End: 2024-09-27 | Stop reason: SURG

## 2024-09-26 RX ORDER — FENTANYL CITRATE 50 UG/ML
50 INJECTION, SOLUTION INTRAMUSCULAR; INTRAVENOUS
Status: DISCONTINUED | OUTPATIENT
Start: 2024-09-26 | End: 2024-09-27 | Stop reason: HOSPADM

## 2024-09-26 RX ORDER — HYDROMORPHONE HYDROCHLORIDE 1 MG/ML
0.5 INJECTION, SOLUTION INTRAMUSCULAR; INTRAVENOUS; SUBCUTANEOUS
Status: DISCONTINUED | OUTPATIENT
Start: 2024-09-26 | End: 2024-09-27 | Stop reason: HOSPADM

## 2024-09-26 RX ORDER — LABETALOL HYDROCHLORIDE 5 MG/ML
5 INJECTION, SOLUTION INTRAVENOUS
Status: DISCONTINUED | OUTPATIENT
Start: 2024-09-26 | End: 2024-09-27 | Stop reason: HOSPADM

## 2024-09-26 RX ORDER — LIDOCAINE HYDROCHLORIDE 20 MG/ML
INJECTION, SOLUTION EPIDURAL; INFILTRATION; INTRACAUDAL; PERINEURAL AS NEEDED
Status: DISCONTINUED | OUTPATIENT
Start: 2024-09-26 | End: 2024-09-27 | Stop reason: SURG

## 2024-09-26 RX ORDER — NALOXONE HCL 0.4 MG/ML
0.04 VIAL (ML) INJECTION AS NEEDED
Status: DISCONTINUED | OUTPATIENT
Start: 2024-09-26 | End: 2024-09-27 | Stop reason: HOSPADM

## 2024-09-26 RX ORDER — IBUPROFEN 600 MG/1
600 TABLET, FILM COATED ORAL EVERY 6 HOURS PRN
Status: DISCONTINUED | OUTPATIENT
Start: 2024-09-26 | End: 2024-09-27 | Stop reason: HOSPADM

## 2024-09-26 RX ORDER — FENTANYL CITRATE 50 UG/ML
INJECTION, SOLUTION INTRAMUSCULAR; INTRAVENOUS AS NEEDED
Status: DISCONTINUED | OUTPATIENT
Start: 2024-09-26 | End: 2024-09-27 | Stop reason: SURG

## 2024-09-26 RX ORDER — ONDANSETRON 2 MG/ML
4 INJECTION INTRAMUSCULAR; INTRAVENOUS
Status: DISCONTINUED | OUTPATIENT
Start: 2024-09-26 | End: 2024-09-27 | Stop reason: HOSPADM

## 2024-09-26 RX ADMIN — ONDANSETRON 4 MG: 2 INJECTION INTRAMUSCULAR; INTRAVENOUS at 23:06

## 2024-09-26 RX ADMIN — LABETALOL HYDROCHLORIDE 5 MG: 5 INJECTION INTRAVENOUS at 23:46

## 2024-09-26 RX ADMIN — ROCURONIUM 50 MG: 50 INJECTION, SOLUTION INTRAVENOUS at 22:10

## 2024-09-26 RX ADMIN — HYDROMORPHONE HYDROCHLORIDE 1 MG: 1 INJECTION, SOLUTION INTRAMUSCULAR; INTRAVENOUS; SUBCUTANEOUS at 21:11

## 2024-09-26 RX ADMIN — SODIUM CHLORIDE, POTASSIUM CHLORIDE, SODIUM LACTATE AND CALCIUM CHLORIDE: 600; 310; 30; 20 INJECTION, SOLUTION INTRAVENOUS at 21:55

## 2024-09-26 RX ADMIN — MORPHINE SULFATE 4 MG: 4 INJECTION, SOLUTION INTRAMUSCULAR; INTRAVENOUS at 19:31

## 2024-09-26 RX ADMIN — LIDOCAINE HYDROCHLORIDE 100 MG: 20 INJECTION, SOLUTION EPIDURAL; INFILTRATION; INTRACAUDAL; PERINEURAL at 22:02

## 2024-09-26 RX ADMIN — SODIUM CHLORIDE, POTASSIUM CHLORIDE, SODIUM LACTATE AND CALCIUM CHLORIDE 1000 ML: 600; 310; 30; 20 INJECTION, SOLUTION INTRAVENOUS at 21:05

## 2024-09-26 RX ADMIN — ONDANSETRON 4 MG: 2 INJECTION INTRAMUSCULAR; INTRAVENOUS at 19:31

## 2024-09-26 RX ADMIN — IOPAMIDOL 100 ML: 755 INJECTION, SOLUTION INTRAVENOUS at 19:50

## 2024-09-26 RX ADMIN — FENTANYL CITRATE 250 MCG: 50 INJECTION, SOLUTION INTRAMUSCULAR; INTRAVENOUS at 22:02

## 2024-09-26 RX ADMIN — HYDROMORPHONE HYDROCHLORIDE 0.5 MG: 1 INJECTION, SOLUTION INTRAMUSCULAR; INTRAVENOUS; SUBCUTANEOUS at 23:29

## 2024-09-26 RX ADMIN — FENTANYL CITRATE 50 MCG: 50 INJECTION, SOLUTION INTRAMUSCULAR; INTRAVENOUS at 23:46

## 2024-09-26 RX ADMIN — DROPERIDOL 0.62 MG: 2.5 INJECTION, SOLUTION INTRAMUSCULAR; INTRAVENOUS at 23:37

## 2024-09-26 RX ADMIN — LABETALOL HYDROCHLORIDE 5 MG: 5 INJECTION INTRAVENOUS at 23:53

## 2024-09-26 RX ADMIN — METRONIDAZOLE 500 MG: 500 INJECTION, SOLUTION INTRAVENOUS at 22:07

## 2024-09-26 RX ADMIN — KETOROLAC TROMETHAMINE 15 MG: 15 INJECTION, SOLUTION INTRAMUSCULAR; INTRAVENOUS at 20:29

## 2024-09-26 RX ADMIN — LABETALOL HYDROCHLORIDE 5 MG: 5 INJECTION INTRAVENOUS at 23:29

## 2024-09-26 RX ADMIN — SODIUM CHLORIDE 1000 MG: 900 INJECTION INTRAVENOUS at 21:12

## 2024-09-26 RX ADMIN — SODIUM CHLORIDE, SODIUM LACTATE, POTASSIUM CHLORIDE, CALCIUM CHLORIDE: 600; 310; 30; 20 INJECTION, SOLUTION INTRAVENOUS at 21:55

## 2024-09-26 RX ADMIN — Medication 50 MG: at 22:02

## 2024-09-26 RX ADMIN — ROCURONIUM 10 MG: 50 INJECTION, SOLUTION INTRAVENOUS at 22:02

## 2024-09-26 RX ADMIN — SODIUM CHLORIDE 1000 ML: 9 INJECTION, SOLUTION INTRAVENOUS at 19:32

## 2024-09-26 RX ADMIN — SUGAMMADEX 200 MG: 100 INJECTION, SOLUTION INTRAVENOUS at 23:08

## 2024-09-26 NOTE — Clinical Note
Level of Care: Med/Surg [1]   Diagnosis: Pneumoperitoneum [044756]   Admitting Physician: LIZANDRO GRUBER [[id number]]   Attending Physician: LIZANDRO GRUBER [[id number]]   Certification: I Certify That Inpatient Hospital Services Are Medically Necessary For Greater Than 2 Midnights

## 2024-09-27 PROBLEM — K25.5 PERFORATED GASTRIC ULCER: Status: ACTIVE | Noted: 2024-09-27

## 2024-09-27 LAB
ALBUMIN SERPL-MCNC: 3.1 G/DL (ref 3.5–5.2)
ALBUMIN/GLOB SERPL: 1.6 G/DL
ALP SERPL-CCNC: 98 U/L (ref 39–117)
ALT SERPL W P-5'-P-CCNC: 12 U/L (ref 1–41)
ANION GAP SERPL CALCULATED.3IONS-SCNC: 12 MMOL/L (ref 5–15)
AST SERPL-CCNC: 16 U/L (ref 1–40)
BASOPHILS # BLD AUTO: 0.03 10*3/MM3 (ref 0–0.2)
BASOPHILS NFR BLD AUTO: 0.2 % (ref 0–1.5)
BILIRUB SERPL-MCNC: 0.8 MG/DL (ref 0–1.2)
BUN SERPL-MCNC: 19 MG/DL (ref 8–23)
BUN/CREAT SERPL: 18.4 (ref 7–25)
CALCIUM SPEC-SCNC: 8 MG/DL (ref 8.6–10.5)
CHLORIDE SERPL-SCNC: 100 MMOL/L (ref 98–107)
CO2 SERPL-SCNC: 22 MMOL/L (ref 22–29)
CREAT SERPL-MCNC: 1.03 MG/DL (ref 0.76–1.27)
D-LACTATE SERPL-SCNC: 1.2 MMOL/L (ref 0.5–2)
D-LACTATE SERPL-SCNC: 2.1 MMOL/L (ref 0.5–2)
D-LACTATE SERPL-SCNC: 2.1 MMOL/L (ref 0.5–2)
D-LACTATE SERPL-SCNC: 2.3 MMOL/L (ref 0.5–2)
D-LACTATE SERPL-SCNC: 3.4 MMOL/L (ref 0.5–2)
DEPRECATED RDW RBC AUTO: 41.8 FL (ref 37–54)
EGFRCR SERPLBLD CKD-EPI 2021: 73 ML/MIN/1.73
EOSINOPHIL # BLD AUTO: 0 10*3/MM3 (ref 0–0.4)
EOSINOPHIL NFR BLD AUTO: 0 % (ref 0.3–6.2)
ERYTHROCYTE [DISTWIDTH] IN BLOOD BY AUTOMATED COUNT: 12.7 % (ref 12.3–15.4)
GLOBULIN UR ELPH-MCNC: 2 GM/DL
GLUCOSE SERPL-MCNC: 135 MG/DL (ref 65–99)
HCT VFR BLD AUTO: 31.7 % (ref 37.5–51)
HGB BLD-MCNC: 10.5 G/DL (ref 13–17.7)
IMM GRANULOCYTES # BLD AUTO: 0.09 10*3/MM3 (ref 0–0.05)
IMM GRANULOCYTES NFR BLD AUTO: 0.6 % (ref 0–0.5)
LYMPHOCYTES # BLD AUTO: 1.26 10*3/MM3 (ref 0.7–3.1)
LYMPHOCYTES NFR BLD AUTO: 8 % (ref 19.6–45.3)
MAGNESIUM SERPL-MCNC: 1.9 MG/DL (ref 1.6–2.4)
MCH RBC QN AUTO: 29.7 PG (ref 26.6–33)
MCHC RBC AUTO-ENTMCNC: 33.1 G/DL (ref 31.5–35.7)
MCV RBC AUTO: 89.8 FL (ref 79–97)
MONOCYTES # BLD AUTO: 0.83 10*3/MM3 (ref 0.1–0.9)
MONOCYTES NFR BLD AUTO: 5.3 % (ref 5–12)
NEUTROPHILS NFR BLD AUTO: 13.57 10*3/MM3 (ref 1.7–7)
NEUTROPHILS NFR BLD AUTO: 85.9 % (ref 42.7–76)
NRBC BLD AUTO-RTO: 0 /100 WBC (ref 0–0.2)
PHOSPHATE SERPL-MCNC: 3.7 MG/DL (ref 2.5–4.5)
PLATELET # BLD AUTO: 203 10*3/MM3 (ref 140–450)
PMV BLD AUTO: 8.9 FL (ref 6–12)
POTASSIUM SERPL-SCNC: 4.5 MMOL/L (ref 3.5–5.2)
PROT SERPL-MCNC: 5.1 G/DL (ref 6–8.5)
RBC # BLD AUTO: 3.53 10*6/MM3 (ref 4.14–5.8)
SODIUM SERPL-SCNC: 134 MMOL/L (ref 136–145)
WBC NRBC COR # BLD AUTO: 15.78 10*3/MM3 (ref 3.4–10.8)

## 2024-09-27 PROCEDURE — 87040 BLOOD CULTURE FOR BACTERIA: CPT

## 2024-09-27 PROCEDURE — 83735 ASSAY OF MAGNESIUM: CPT | Performed by: STUDENT IN AN ORGANIZED HEALTH CARE EDUCATION/TRAINING PROGRAM

## 2024-09-27 PROCEDURE — 85025 COMPLETE CBC W/AUTO DIFF WBC: CPT | Performed by: PHYSICIAN ASSISTANT

## 2024-09-27 PROCEDURE — 99024 POSTOP FOLLOW-UP VISIT: CPT | Performed by: STUDENT IN AN ORGANIZED HEALTH CARE EDUCATION/TRAINING PROGRAM

## 2024-09-27 PROCEDURE — 97166 OT EVAL MOD COMPLEX 45 MIN: CPT

## 2024-09-27 PROCEDURE — 25010000002 HEPARIN (PORCINE) PER 1000 UNITS

## 2024-09-27 PROCEDURE — 25810000003 LACTATED RINGERS SOLUTION: Performed by: INTERNAL MEDICINE

## 2024-09-27 PROCEDURE — 25810000003 LACTATED RINGERS PER 1000 ML: Performed by: STUDENT IN AN ORGANIZED HEALTH CARE EDUCATION/TRAINING PROGRAM

## 2024-09-27 PROCEDURE — 83605 ASSAY OF LACTIC ACID: CPT

## 2024-09-27 PROCEDURE — 25010000002 METRONIDAZOLE 500 MG/100ML SOLUTION: Performed by: STUDENT IN AN ORGANIZED HEALTH CARE EDUCATION/TRAINING PROGRAM

## 2024-09-27 PROCEDURE — 0DQ64ZZ REPAIR STOMACH, PERCUTANEOUS ENDOSCOPIC APPROACH: ICD-10-PCS | Performed by: STUDENT IN AN ORGANIZED HEALTH CARE EDUCATION/TRAINING PROGRAM

## 2024-09-27 PROCEDURE — 25010000002 CEFEPIME PER 500 MG: Performed by: INTERNAL MEDICINE

## 2024-09-27 PROCEDURE — 97162 PT EVAL MOD COMPLEX 30 MIN: CPT

## 2024-09-27 PROCEDURE — 80053 COMPREHEN METABOLIC PANEL: CPT | Performed by: STUDENT IN AN ORGANIZED HEALTH CARE EDUCATION/TRAINING PROGRAM

## 2024-09-27 PROCEDURE — 8E0W4CZ ROBOTIC ASSISTED PROCEDURE OF TRUNK REGION, PERCUTANEOUS ENDOSCOPIC APPROACH: ICD-10-PCS | Performed by: STUDENT IN AN ORGANIZED HEALTH CARE EDUCATION/TRAINING PROGRAM

## 2024-09-27 PROCEDURE — 25010000002 FLUCONAZOLE PER 200 MG: Performed by: STUDENT IN AN ORGANIZED HEALTH CARE EDUCATION/TRAINING PROGRAM

## 2024-09-27 PROCEDURE — 25010000002 HYDROMORPHONE PER 4 MG: Performed by: STUDENT IN AN ORGANIZED HEALTH CARE EDUCATION/TRAINING PROGRAM

## 2024-09-27 PROCEDURE — 25010000002 CEFEPIME PER 500 MG: Performed by: STUDENT IN AN ORGANIZED HEALTH CARE EDUCATION/TRAINING PROGRAM

## 2024-09-27 PROCEDURE — 94799 UNLISTED PULMONARY SVC/PX: CPT

## 2024-09-27 PROCEDURE — 84100 ASSAY OF PHOSPHORUS: CPT | Performed by: STUDENT IN AN ORGANIZED HEALTH CARE EDUCATION/TRAINING PROGRAM

## 2024-09-27 RX ORDER — HYDROMORPHONE HYDROCHLORIDE 1 MG/ML
0.5 INJECTION, SOLUTION INTRAMUSCULAR; INTRAVENOUS; SUBCUTANEOUS
Status: DISPENSED | OUTPATIENT
Start: 2024-09-27 | End: 2024-10-02

## 2024-09-27 RX ORDER — POLYETHYLENE GLYCOL 3350 17 G/17G
17 POWDER, FOR SOLUTION ORAL DAILY PRN
Status: DISCONTINUED | OUTPATIENT
Start: 2024-09-27 | End: 2024-10-02

## 2024-09-27 RX ORDER — LIDOCAINE 4 G/G
2 PATCH TOPICAL
Status: DISCONTINUED | OUTPATIENT
Start: 2024-09-27 | End: 2024-10-05 | Stop reason: HOSPADM

## 2024-09-27 RX ORDER — AMOXICILLIN 250 MG
2 CAPSULE ORAL 2 TIMES DAILY
Status: DISCONTINUED | OUTPATIENT
Start: 2024-09-27 | End: 2024-09-27

## 2024-09-27 RX ORDER — METRONIDAZOLE 500 MG/100ML
500 INJECTION, SOLUTION INTRAVENOUS EVERY 8 HOURS
Status: COMPLETED | OUTPATIENT
Start: 2024-09-27 | End: 2024-09-30

## 2024-09-27 RX ORDER — ACETAMINOPHEN 650 MG/1
650 SUPPOSITORY RECTAL EVERY 4 HOURS PRN
Status: DISCONTINUED | OUTPATIENT
Start: 2024-09-27 | End: 2024-10-05 | Stop reason: HOSPADM

## 2024-09-27 RX ORDER — ONDANSETRON 2 MG/ML
4 INJECTION INTRAMUSCULAR; INTRAVENOUS EVERY 6 HOURS PRN
Status: DISCONTINUED | OUTPATIENT
Start: 2024-09-27 | End: 2024-10-02

## 2024-09-27 RX ORDER — FLUCONAZOLE 2 MG/ML
400 INJECTION, SOLUTION INTRAVENOUS EVERY 24 HOURS
Status: COMPLETED | OUTPATIENT
Start: 2024-09-27 | End: 2024-09-30

## 2024-09-27 RX ORDER — NALOXONE HCL 0.4 MG/ML
0.1 VIAL (ML) INJECTION
Status: DISCONTINUED | OUTPATIENT
Start: 2024-09-27 | End: 2024-10-05 | Stop reason: HOSPADM

## 2024-09-27 RX ORDER — AMOXICILLIN 250 MG
2 CAPSULE ORAL 2 TIMES DAILY
Status: DISCONTINUED | OUTPATIENT
Start: 2024-09-27 | End: 2024-10-02

## 2024-09-27 RX ORDER — BISACODYL 10 MG
10 SUPPOSITORY, RECTAL RECTAL DAILY PRN
Status: DISCONTINUED | OUTPATIENT
Start: 2024-09-27 | End: 2024-09-27

## 2024-09-27 RX ORDER — LATANOPROST 50 UG/ML
1 SOLUTION/ DROPS OPHTHALMIC NIGHTLY
Status: DISCONTINUED | OUTPATIENT
Start: 2024-09-27 | End: 2024-10-05 | Stop reason: HOSPADM

## 2024-09-27 RX ORDER — PANTOPRAZOLE SODIUM 40 MG/10ML
40 INJECTION, POWDER, LYOPHILIZED, FOR SOLUTION INTRAVENOUS EVERY 12 HOURS SCHEDULED
Status: DISCONTINUED | OUTPATIENT
Start: 2024-09-27 | End: 2024-10-05 | Stop reason: HOSPADM

## 2024-09-27 RX ORDER — SODIUM CHLORIDE 0.9 % (FLUSH) 0.9 %
10 SYRINGE (ML) INJECTION EVERY 12 HOURS SCHEDULED
Status: DISCONTINUED | OUTPATIENT
Start: 2024-09-27 | End: 2024-10-05 | Stop reason: HOSPADM

## 2024-09-27 RX ORDER — CHLORHEXIDINE GLUCONATE 500 MG/1
1 CLOTH TOPICAL EVERY 24 HOURS
Status: DISCONTINUED | OUTPATIENT
Start: 2024-09-28 | End: 2024-09-27

## 2024-09-27 RX ORDER — BISACODYL 10 MG
10 SUPPOSITORY, RECTAL RECTAL DAILY PRN
Status: DISCONTINUED | OUTPATIENT
Start: 2024-09-27 | End: 2024-10-02

## 2024-09-27 RX ORDER — IPRATROPIUM BROMIDE AND ALBUTEROL SULFATE 2.5; .5 MG/3ML; MG/3ML
3 SOLUTION RESPIRATORY (INHALATION) EVERY 4 HOURS PRN
Status: DISCONTINUED | OUTPATIENT
Start: 2024-09-27 | End: 2024-09-28

## 2024-09-27 RX ORDER — LATANOPROST 50 UG/ML
1 SOLUTION/ DROPS OPHTHALMIC DAILY
Status: DISCONTINUED | OUTPATIENT
Start: 2024-09-27 | End: 2024-09-27

## 2024-09-27 RX ORDER — POLYETHYLENE GLYCOL 3350 17 G/17G
17 POWDER, FOR SOLUTION ORAL DAILY PRN
Status: DISCONTINUED | OUTPATIENT
Start: 2024-09-27 | End: 2024-09-27

## 2024-09-27 RX ORDER — ONDANSETRON 4 MG/1
4 TABLET, ORALLY DISINTEGRATING ORAL EVERY 6 HOURS PRN
Status: DISCONTINUED | OUTPATIENT
Start: 2024-09-27 | End: 2024-09-27

## 2024-09-27 RX ORDER — SODIUM CHLORIDE, SODIUM LACTATE, POTASSIUM CHLORIDE, CALCIUM CHLORIDE 600; 310; 30; 20 MG/100ML; MG/100ML; MG/100ML; MG/100ML
100 INJECTION, SOLUTION INTRAVENOUS CONTINUOUS
Status: DISCONTINUED | OUTPATIENT
Start: 2024-09-27 | End: 2024-10-02

## 2024-09-27 RX ORDER — DROPERIDOL 2.5 MG/ML
0.62 INJECTION, SOLUTION INTRAMUSCULAR; INTRAVENOUS ONCE AS NEEDED
Status: DISCONTINUED | OUTPATIENT
Start: 2024-09-27 | End: 2024-09-27

## 2024-09-27 RX ORDER — ONDANSETRON 2 MG/ML
4 INJECTION INTRAMUSCULAR; INTRAVENOUS EVERY 6 HOURS PRN
Status: DISCONTINUED | OUTPATIENT
Start: 2024-09-27 | End: 2024-09-27

## 2024-09-27 RX ORDER — METOPROLOL TARTRATE 1 MG/ML
5 INJECTION, SOLUTION INTRAVENOUS EVERY 8 HOURS
Status: DISCONTINUED | OUTPATIENT
Start: 2024-09-27 | End: 2024-09-28

## 2024-09-27 RX ORDER — ONDANSETRON 4 MG/1
4 TABLET, ORALLY DISINTEGRATING ORAL EVERY 6 HOURS PRN
Status: DISCONTINUED | OUTPATIENT
Start: 2024-09-27 | End: 2024-10-02

## 2024-09-27 RX ORDER — NITROGLYCERIN 0.4 MG/1
0.4 TABLET SUBLINGUAL
Status: DISCONTINUED | OUTPATIENT
Start: 2024-09-27 | End: 2024-10-05 | Stop reason: HOSPADM

## 2024-09-27 RX ORDER — SODIUM CHLORIDE 0.9 % (FLUSH) 0.9 %
10 SYRINGE (ML) INJECTION AS NEEDED
Status: DISCONTINUED | OUTPATIENT
Start: 2024-09-27 | End: 2024-10-05 | Stop reason: HOSPADM

## 2024-09-27 RX ORDER — CHLORHEXIDINE GLUCONATE 500 MG/1
1 CLOTH TOPICAL ONCE
Status: COMPLETED | OUTPATIENT
Start: 2024-09-27 | End: 2024-09-27

## 2024-09-27 RX ORDER — BISACODYL 5 MG/1
5 TABLET, DELAYED RELEASE ORAL DAILY PRN
Status: DISCONTINUED | OUTPATIENT
Start: 2024-09-27 | End: 2024-09-27

## 2024-09-27 RX ORDER — SODIUM CHLORIDE 9 MG/ML
40 INJECTION, SOLUTION INTRAVENOUS AS NEEDED
Status: DISCONTINUED | OUTPATIENT
Start: 2024-09-27 | End: 2024-10-05 | Stop reason: HOSPADM

## 2024-09-27 RX ADMIN — HYDROMORPHONE HYDROCHLORIDE 0.5 MG: 1 INJECTION, SOLUTION INTRAMUSCULAR; INTRAVENOUS; SUBCUTANEOUS at 14:03

## 2024-09-27 RX ADMIN — SODIUM CHLORIDE, POTASSIUM CHLORIDE, SODIUM LACTATE AND CALCIUM CHLORIDE 100 ML/HR: 600; 310; 30; 20 INJECTION, SOLUTION INTRAVENOUS at 19:33

## 2024-09-27 RX ADMIN — PANTOPRAZOLE SODIUM 40 MG: 40 INJECTION, POWDER, FOR SOLUTION INTRAVENOUS at 08:14

## 2024-09-27 RX ADMIN — METRONIDAZOLE 500 MG: 500 INJECTION, SOLUTION INTRAVENOUS at 00:48

## 2024-09-27 RX ADMIN — FLUCONAZOLE 400 MG: 2 INJECTION, SOLUTION INTRAVENOUS at 00:47

## 2024-09-27 RX ADMIN — METOPROLOL TARTRATE 5 MG: 5 INJECTION INTRAVENOUS at 19:34

## 2024-09-27 RX ADMIN — CHLORHEXIDINE GLUCONATE 1 APPLICATION: 500 CLOTH TOPICAL at 01:50

## 2024-09-27 RX ADMIN — SODIUM CHLORIDE, POTASSIUM CHLORIDE, SODIUM LACTATE AND CALCIUM CHLORIDE 100 ML/HR: 600; 310; 30; 20 INJECTION, SOLUTION INTRAVENOUS at 09:12

## 2024-09-27 RX ADMIN — DOCUSATE SODIUM 50 MG AND SENNOSIDES 8.6 MG 2 TABLET: 8.6; 5 TABLET, FILM COATED ORAL at 08:12

## 2024-09-27 RX ADMIN — LIDOCAINE 2 PATCH: 4 PATCH TOPICAL at 08:12

## 2024-09-27 RX ADMIN — PANTOPRAZOLE SODIUM 40 MG: 40 INJECTION, POWDER, FOR SOLUTION INTRAVENOUS at 01:25

## 2024-09-27 RX ADMIN — MUPIROCIN 1 APPLICATION: 20 OINTMENT TOPICAL at 08:11

## 2024-09-27 RX ADMIN — HEPARIN SODIUM 5000 UNITS: 5000 INJECTION, SOLUTION INTRAVENOUS; SUBCUTANEOUS at 06:25

## 2024-09-27 RX ADMIN — HYDROMORPHONE HYDROCHLORIDE 0.5 MG: 1 INJECTION, SOLUTION INTRAMUSCULAR; INTRAVENOUS; SUBCUTANEOUS at 09:13

## 2024-09-27 RX ADMIN — PANTOPRAZOLE SODIUM 40 MG: 40 INJECTION, POWDER, FOR SOLUTION INTRAVENOUS at 20:37

## 2024-09-27 RX ADMIN — METOPROLOL TARTRATE 5 MG: 5 INJECTION INTRAVENOUS at 01:25

## 2024-09-27 RX ADMIN — Medication 10 ML: at 01:50

## 2024-09-27 RX ADMIN — CEFEPIME 2000 MG: 2 INJECTION, POWDER, FOR SOLUTION INTRAVENOUS at 02:59

## 2024-09-27 RX ADMIN — HYDROMORPHONE HYDROCHLORIDE 0.5 MG: 1 INJECTION, SOLUTION INTRAMUSCULAR; INTRAVENOUS; SUBCUTANEOUS at 19:40

## 2024-09-27 RX ADMIN — SODIUM CHLORIDE, POTASSIUM CHLORIDE, SODIUM LACTATE AND CALCIUM CHLORIDE 250 ML: 600; 310; 30; 20 INJECTION, SOLUTION INTRAVENOUS at 14:56

## 2024-09-27 RX ADMIN — METRONIDAZOLE 500 MG: 500 INJECTION, SOLUTION INTRAVENOUS at 19:34

## 2024-09-27 RX ADMIN — MUPIROCIN 1 APPLICATION: 20 OINTMENT TOPICAL at 01:50

## 2024-09-27 RX ADMIN — HEPARIN SODIUM 5000 UNITS: 5000 INJECTION, SOLUTION INTRAVENOUS; SUBCUTANEOUS at 14:55

## 2024-09-27 RX ADMIN — HYDROMORPHONE HYDROCHLORIDE 0.5 MG: 1 INJECTION, SOLUTION INTRAMUSCULAR; INTRAVENOUS; SUBCUTANEOUS at 23:27

## 2024-09-27 RX ADMIN — METOPROLOL TARTRATE 5 MG: 5 INJECTION INTRAVENOUS at 09:13

## 2024-09-27 RX ADMIN — LATANOPROST 1 DROP: 50 SOLUTION OPHTHALMIC at 20:37

## 2024-09-27 RX ADMIN — SODIUM CHLORIDE, POTASSIUM CHLORIDE, SODIUM LACTATE AND CALCIUM CHLORIDE 100 ML/HR: 600; 310; 30; 20 INJECTION, SOLUTION INTRAVENOUS at 00:47

## 2024-09-27 RX ADMIN — METRONIDAZOLE 500 MG: 500 INJECTION, SOLUTION INTRAVENOUS at 09:15

## 2024-09-27 RX ADMIN — CEFEPIME 2 G: 2 INJECTION, POWDER, FOR SOLUTION INTRAVENOUS at 08:14

## 2024-09-27 RX ADMIN — Medication 10 ML: at 08:07

## 2024-09-27 RX ADMIN — CEFEPIME 2 G: 2 INJECTION, POWDER, FOR SOLUTION INTRAVENOUS at 20:37

## 2024-09-27 RX ADMIN — DOCUSATE SODIUM 50 MG AND SENNOSIDES 8.6 MG 2 TABLET: 8.6; 5 TABLET, FILM COATED ORAL at 20:37

## 2024-09-27 RX ADMIN — HEPARIN SODIUM 5000 UNITS: 5000 INJECTION, SOLUTION INTRAVENOUS; SUBCUTANEOUS at 20:38

## 2024-09-27 NOTE — PLAN OF CARE
Goal Outcome Evaluation:  Plan of Care Reviewed With: patient        Progress: no change  Outcome Evaluation: OT eval completed. Pt in fowlers upon therapist arrival; A&Ox4; [place]; c/o 1/10 abdominal pain; SEGUNDO drain and NG tube in place. Pt reports I with all BADLs/IADLs including fxl ambulation at baseline. Today, Pt performed supine>sit utilizing bedrail with HOB elevated with Min A and verbal cues for sequencing and body mechanics. Pt dependent to tamara B socks while seated EOB. Pt performed sit<>stand and took a few steps from bed>chair with HHA requiring Min A. BUE strength WFL but demos limited fxl activity tolerance. Skilled OT intervention indicated in order to address deficits in fxl mobility, fxl activity tolerance, balance, strength, and use of adaptive techniques/equipment during performance of BADLs. Recommend home with assist and HH at discharge.      Anticipated Discharge Disposition (OT): home with assist, home with home health

## 2024-09-27 NOTE — BRIEF OP NOTE
DIAGNOSTIC LAPAROSCOPY WITH DAVINCI ROBOT  Progress Note    Oral Dey  9/26/2024 - 9/27/2024    Pre-op Diagnosis:   ulcer       Post-Op Diagnosis Codes:   Perforated gastric ulcer     Procedure/CPT® Codes:        Procedure(s):  ROBOTIC REPAIR OF PERFORATED GASTRIC ULCER              Surgeon(s):  Petrona Malone MD    Anesthesia: General    Staff:   Circulator: Sharron Wilkinson RN; Matti Malone RN  Scrub Person: Rosalio Vargas; Gissell Valentine; Bola Singh         Estimated Blood Loss:  20 mL     Urine Voided: 350 mL    Specimens:                None          Drains:   Closed/Suction Drain 1 LLQ Bulb 15 Fr. (Active)       Urethral Catheter Silicone 16 Fr. (Active)       Findings: 1 cm perforated gastric ulcer s/p reapproximation and omental patch. 15 Fr SEGUNDO drain. NGT in place. Leak test via NGT negative in the OR.         Complications: none apparent           Petrona Malone MD     Date: 9/26/2024  Time: 23:26 CDT

## 2024-09-27 NOTE — PLAN OF CARE
Goal Outcome Evaluation:  Plan of Care Reviewed With: patient           Outcome Evaluation: PT eval complete. He is alert and oriented x 4, hard of hearing. He reports being independent in his mobility and ADL's prior to admit. Today he needs min assist to get to the EOB and min assist to stand and t.f to the bedside chair. Demos weakness and reports minimal surgical pain. PT will cont with mobility, balance, and strengthening. I am hopeful he will recover and d.c home w assist. Consider HH therapy if needed at time of d.c.      Anticipated Discharge Disposition (PT): home with assist, home with 24/7 care, home with home health, sub acute care setting

## 2024-09-27 NOTE — PLAN OF CARE
Patient admitted earlier this morning after presenting to ED on 9/26/2024 with severe, sharp abdominal pain.  He was found to have pneumoperitoneum secondary to perforated gastric ulcer.  He was taken by Dr. Malone for emergent robotic surgical repair of perforated gastric ulcer with reapproximation and omental patch.  He was then transferred to the ICU where he was admitted by our team.  He remains on cefepime, Diflucan, and Flagyl.  VSS on 3 L nasal cannula.  He has not required any pressors.  He does have a leukocytosis with WBC 15.7 8K, which is expected following surgery.  Cultures have shown no growth.  His latest lactate is 2.1 and trending down.  NG tube is to remain in place until postop day 5 per general surgery.       Patient is now stable to be transferred out of the unit to the medical floor.  Dr. Malone requested that hospitalist be consulted once patient is transferred to the medical floor, and she would take over as the attending physician.  I have spoken with Dr. Noble, hospitalist, who requested that Dr. Maldonado be added to the team as the consulting hospitalist.  These changes have been made.  Patient will be moving to room 373.

## 2024-09-27 NOTE — OP NOTE
Laparoscopic Robotic-Assisted repair of a Perforated Gastric Ulcer with an Omental Patch Operative Report:     Patient: Oral Dey MRN: [id number]    YOB: 1940  Age: 83 y.o.  Sex: male   Unit: [facility] CARDIAC CARE Room/Bed: C011/1 Location: [facility]    Admitting Physician: MARCO KENNEDY    Primary Care Physician: Jorge Shepherd MD             INDICATIONS: Mr. Dey is an 83 year old male with a perforated gastric ulcer and pneumoperitoneum. He has two areas of concern for ulceration on the stomach - anterior wall of the distal body and at the gastric outlet. He has peritonitis on exam and tachycardic. His pain is not improved with IV narcotics. I have recommended a robotic repair of a gastric ulcer, possible open. We discussed the risks including bleeding, infection, damage to surrounding structures and cardiopulmonary effects of anesthesia. Consent obtained. We discussed that he will have an NGT for a minimum of 5 days post op when we will plan to study the repair before removing the NGT. He will be admitted to the intensivist in the ICU post op. To OR tonight. Rocephin, Flagyl, subcutaneous heparin, and type and screen ordered.     DATE OF OPERATION: 9/26/2024 - 9/27/2024     Surgeons and Role:     * Petrona Malone MD - Primary    ANESTHESIA: General     PREOPERATIVE DIAGNOSIS: ulcer    POSTOPERATIVE DIAGNOSIS: Same    PROCEDURES PERFORMED:  Laparoscopic robotic-assisted repair of a perforated gastric ulcer with an omental patch     PROCEDURE DETAILS:   Preoperative antibiotics and DVT PPX were given. The patient was brought into the OR and placed in the supine position.  General anesthesia was induced.  The patient's abdomen was prepped and draped in the usual sterile fashion.  A timeout was performed verifying the patient and the procedure. An 8 mm incision was made along the left subcostal margin and a Veress needle inserted.  The abdomen was inflated to 15 mmHg with  CO2 gas. An 8 mm trocar was placed followed by the robotic camera.  A laparoscopic TAP block was performed with my deep local. Four additional 8 mm trocars were placed across the mid abdomen and the original trocar was replaced with an airseal.  The patient was placed in slight reversed Trendelenburg position with right side up. The robot was docked. The liver was retracted with the tip up. There was bile in the abdomen from an obvious anterior distal gastric perforation. The hole was approximately 1 cm in size with active leakage of bile. I suctioned out the bile. I re-approximated the edges of the perforation with interrupted 2-0 silk sutures, ensuring not to tie them down to the point of tearing the surrounding tissue. I then pulled up a tongue of omentum and proceeded with an omental patch. The omental patch was sewn in place with lembert 2-0 silk sutures. It was tight to the defect. The stomach was inflated with air through the NGT and water was placed over the defect. No bubbles were noted. The contamination was suctioned out of the abdomen. The abdomen was irrigated and the irrigation suctioned out. A 15 Fr round SEGUNDO was placed through the left sided trocar and placed adjacent to the patch. It was sewn in place at the skin with a silk suture. The robot was undocked. The trocars were removed under direct visualization. The skin was closed with 4-0 Monocryl followed by skin glue.  The patient tolerated the procedure well, was extubated in the OR and transferred to the PACU in stable condition    Findings: 1 cm perforated gastric ulcer s/p reapproximation and omental patch. 15 Fr SEGUNDO drain. NGT in place. Leak test via NGT negative in the OR.   Estimated Blood Loss:  20 mL   Complications: none apparent           Specimens: none      Disposition: PACU - hemodynamically stable.           Condition: stable    Petrona Malone MD  09/27/24

## 2024-09-27 NOTE — ED PROVIDER NOTES
"Subjective   History of Present Illness  83-year-old  male presents to [facility] ER with chief complaint of abdominal pain.  Discusses that earlier today he had a sensation that \"something was wrong in his abdomen.\"  The sensation worsened to the point of a sharp pain centrally located tearing in nature radiating both inferiorly deep into the pelvis as well as superiorly into the chest.  He has never had such a sensation in his life.  It is unremitting and intolerable.  Denies nausea emesis or change in stool.   unremarkable.  Chest and lungs no alondra chest pain at rest or with exertion, similarly denies pleurisy.  Denies cough shortness of breath or dyspnea.  The tenderness from the abdomen does radiate into the chest on an intermittent basis and when it does it \"hits hard.\"  Neuro unremarkable.  Denies constitutional symptomatology.        Review of Systems   All other systems reviewed and are negative.      Past Medical History:   Diagnosis Date    Bladder cancer     CLL (chronic lymphocytic leukemia)     Colon polyp     Gallstone     GERD (gastroesophageal reflux disease)     Glaucoma     Hearing loss     Hypertension     Inguinal hernia     right groin    Macular degeneration, right eye        Allergies   Allergen Reactions    Augmentin [Amoxicillin-Pot Clavulanate] Hives    Latex Itching and Other (See Comments)     And band aids. Causes redness and itching.       Past Surgical History:   Procedure Laterality Date    CATARACT EXTRACTION, BILATERAL      COLONOSCOPY  06/13/2012    CYSTOSCOPY BLADDER BIOPSY      EXCISION MASS HEAD/NECK N/A 3/4/2022    Procedure: EXCISION OF MASS ON POSTERIOR NECK;  Surgeon: Rossana Mahajan MD;  Location: [facility] OR;  Service: General;  Laterality: N/A;    INGUINAL HERNIA REPAIR Left 2007    INGUINAL HERNIA REPAIR Right 12/28/2017    Procedure: RIGHT INGUINAL HERNIA REPAIR WITH MESH ;  Surgeon: Rossana Mahajan MD;  Location: [facility] OR;  Service:        Family " History   Problem Relation Age of Onset    Colon cancer Maternal Grandfather         in his 60's.     Alzheimer's disease Mother     Hypertension Father     Other Father         stent    COPD Sister     Heart attack Brother     No Known Problems Maternal Grandmother     No Known Problems Paternal Grandmother     No Known Problems Paternal Grandfather        Social History     Socioeconomic History    Marital status:    Tobacco Use    Smoking status: Former     Current packs/day: 0.00     Types: Cigarettes     Start date:      Quit date:      Years since quittin.7    Smokeless tobacco: Never   Substance and Sexual Activity    Alcohol use: No    Drug use: No    Sexual activity: Defer           Objective   Physical Exam  Vitals and nursing note reviewed.   Constitutional:       General: He is in acute distress.      Appearance: He is well-developed. He is not ill-appearing, toxic-appearing or diaphoretic.      Comments: Quite thin   HENT:      Head: Normocephalic and atraumatic.   Eyes:      Extraocular Movements: Extraocular movements intact.      Pupils: Pupils are equal, round, and reactive to light.   Cardiovascular:      Rate and Rhythm: Normal rate and regular rhythm.   Pulmonary:      Effort: Pulmonary effort is normal.      Breath sounds: Normal breath sounds.   Abdominal:      Comments: Abdomen appears to be planar, no skin color changes, no accentuated vasculature, I do not appreciate any masses, I do not see any old scars, hypoactive bowel sounds, there is exquisite tenderness centrally with mild palpation, negative Burdick, negative McBurney, negative Rovsing.  The patient is actively guarding   Skin:     General: Skin is warm and dry.   Neurological:      General: No focal deficit present.      Mental Status: He is alert and oriented to person, place, and time.   Psychiatric:         Mood and Affect: Mood normal.         Behavior: Behavior normal.         Procedures           ED Course                                              Medical Decision Making  Discussion with surgeon reveals that they will come see the patient here in the ER.  It is Dr. Malone    Amount and/or Complexity of Data Reviewed  Labs: ordered.     Details: Labs Reviewed  COMPREHENSIVE METABOLIC PANEL - Abnormal; Notable for the following components:     Glucose                       151 (*)                Sodium                        134 (*)                Chloride                      96 (*)                 Alkaline Phosphatase          123 (*)             All other components within normal limits         Narrative: GFR Normal >60                  Chronic Kidney Disease <60                  Kidney Failure <15                                    The GFR formula is only valid for adults with stable renal function between ages 18 and 70.  URINALYSIS W/ MICROSCOPIC IF INDICATED (NO CULTURE) - Abnormal; Notable for the following components:     Specific Gravity, UA          >1.030 (*)            All other components within normal limits         Narrative: Urine microscopic not indicated.  CBC WITH AUTO DIFFERENTIAL - Abnormal; Notable for the following components:     RBC                           3.87 (*)               Hemoglobin                    11.5 (*)               Hematocrit                    34.1 (*)               Eosinophil %                  0.0 (*)             All other components within normal limits  LIPASE - Normal  SINGLE HS TROPONIN T - Normal         Narrative: High Sensitive Troponin T Reference Range:                  <14.0 ng/L- Negative Female for AMI                  <22.0 ng/L- Negative Male for AMI                  >=14 - Abnormal Female indicating possible myocardial injury.                  >=22 - Abnormal Male indicating possible myocardial injury.                   Clinicians would have to utilize clinical acumen, EKG, Troponin, and serial changes to determine if it is an Acute Myocardial Infarction or  myocardial injury due to an underlying chronic condition.                                       MAGNESIUM - Normal  RAINBOW DRAW         Narrative: The following orders were created for panel order [place] Draw.                  Procedure                               Abnormality         Status                                     ---------                               -----------         ------                                     Green Top (Gel)[[id number]]                                  Final result                               Lavender Top[[id number]]                                     Final result                               Red Top[[id number]]                                          Final result                               Gray Top[[id number]]                                         Final result                               Light Blue Top[[id number]]                                   Final result                                                 Please view results for these tests on the individual orders.  GREEN TOP  LAVENDER TOP  RED TOP  GRAY TOP  LIGHT BLUE TOP  CBC AND DIFFERENTIAL    Radiology: ordered.     Details: CT Angiogram Chest   Final Result    1. No evidence of pulmonary thromboembolic disease.    2. Atheromatous calcification of the thoracic aorta and great vessels    with the ascending thoracic aorta measuring up to 3.9 cm in diameter. No    dissection or rate limiting stenosis.    3. Pneumoperitoneum within the upper abdomen which will be further    discussed in the CT abdomen and pelvis report.    4. Patchy groundglass disease in the medial right lower lobe in the    basilar segment as well as within the periphery of the superior segment    of the right lower lobe suggesting a predominantly interstitial    pneumonitis in the right lower lobe.    5. Mediastinal and right hilar lymphadenopathy. Several prominent nodes    within the left axilla are also present but not imaged in their     entirety.         This report was signed and finalized on 9/26/2024 8:16 PM by Dr. Brock Storey MD.          CT Angiogram Abdomen Pelvis   Final Result    1. Pneumoperitoneum. This is felt to be related to a perforated gastric    ulcer with abnormal thickening of the gastric wall within the distal    body and antrum of the stomach extending into the region of the gastric    outlet. There is mild inflammatory stranding along the more distal    greater curvature of the stomach with 2 areas of suspected ulceration 1    along the more anterior wall of the distal body/antrum of the stomach    and one near the gastric outlet. There is associated mucosal enhancement    of the stomach suggesting associated gastritis. No abscess identified.    2. Atheromatous calcification of the abdominal aorta and branch vessels    without evidence of dissection or aneurysm. No rate limiting luminal    stenosis within the abdominal aorta. There is calcific plaquing and mild    stenosis at the origin of the celiac with mild disease at the origin of    the SMA also present. The SMA and ONEL are otherwise widely patent.    Accessory renal arteries are noted to both kidneys. There is calcific    plaquing and associated stenosis at the origin of both main renal    arteries with moderate luminal stenosis of the right main renal artery    at its origin.    3. Mild constipation. A few diverticula are noted in the left colon. No    diverticulitis. No mechanical obstruction.    4. The prostate is enlarged. The urinary bladder is moderately distended    but otherwise normal in appearance. No free fluid in the pelvis. There    is minimal free fluid in the perihepatic space likely related to the    perforated gastric ulcer. No localized fluid collection to suggest    abscess.         This report was signed and finalized on 9/26/2024 8:32 PM by Dr. Brock Storey MD.            ECG/medicine tests: ordered.    Risk  Prescription drug  management.  Decision regarding hospitalization.        Final diagnoses:   None       ED Disposition  ED Disposition       None            No follow-up provider specified.       Medication List      No changes were made to your prescriptions during this visit.            Jose Funez MD  09/26/24 8090

## 2024-09-27 NOTE — THERAPY EVALUATION
Patient Name: Oral Dey  : 1940    MRN: [id number]                              Today's Date: 2024       Admit Date: 2024    Visit Dx:     ICD-10-CM ICD-9-CM   1. Pneumoperitoneum  K66.8 568.89   2. Impaired mobility [Z74.09]  Z74.09 799.89     Patient Active Problem List   Diagnosis    Hx of colonic polyp    Family hx of colon cancer    CLL (chronic lymphocytic leukemia)    Hypertension    IgG lambda monoclonal gammopathy    Iron deficiency    Pneumoperitoneum    Perforated gastric ulcer     Past Medical History:   Diagnosis Date    Bladder cancer     CLL (chronic lymphocytic leukemia)     Colon polyp     Gallstone     GERD (gastroesophageal reflux disease)     Glaucoma     Hearing loss     Hypertension     Inguinal hernia     right groin    Macular degeneration, right eye      Past Surgical History:   Procedure Laterality Date    CATARACT EXTRACTION, BILATERAL      COLONOSCOPY  2012    CYSTOSCOPY BLADDER BIOPSY      EXCISION MASS HEAD/NECK N/A 3/4/2022    Procedure: EXCISION OF MASS ON POSTERIOR NECK;  Surgeon: Rossana Mahajan MD;  Location:  [facility] OR;  Service: General;  Laterality: N/A;    INGUINAL HERNIA REPAIR Left     INGUINAL HERNIA REPAIR Right 2017    Procedure: RIGHT INGUINAL HERNIA REPAIR WITH MESH ;  Surgeon: Rossana Mahajan MD;  Location:  [facility] OR;  Service:       General Information       Row Name 24 0800          Physical Therapy Time and Intention    Document Type evaluation  Abdominal pain. Dx: Perforated gastric ulcer & pneumoperitoneum s/p  robotic repair of perforated gastric ulcer  -NAHOMY     Mode of Treatment physical therapy  -NAHOMY       Row Name 24 0800          General Information    Patient Profile Reviewed yes  -NAHOMY     Prior Level of Function independent:;all household mobility;ADL's;yard work  owns a RW, cane and rollator walker  -NAHOMY     Existing Precautions/Restrictions fall  NG tube, SEGUNDO drain  -NAHOMY     Barriers to Rehab medically  complex  -NAHOMY       Row Name 09/27/24 0800          Living Environment    People in Home spouse  tub shower  -NAHOMY       Row Name 09/27/24 0800          Home Main Entrance    Number of Stairs, Main Entrance three  -NAHOMY     Stair Railings, Main Entrance railing on left side (ascending)  -NAHOMY       Row Name 09/27/24 0800          Cognition    Orientation Status (Cognition) oriented x 4  -NAHOMY       Row Name 09/27/24 0800          Safety Issues, Functional Mobility    Impairments Affecting Function (Mobility) balance;endurance/activity tolerance;strength;shortness of breath;pain  -NAHOMY               User Key  (r) = Recorded By, (t) = Taken By, (c) = Cosigned By      Initials Name Provider Type    Abimael Casey, PT DPT Physical Therapist                   Mobility       Row Name 09/27/24 0800          Bed Mobility    Bed Mobility supine-sit;sit-supine  -NAHOMY     Supine-Sit [place] (Bed Mobility) minimum assist (75% patient effort);1 person assist  -NAHOMY     Sit-Supine [place] (Bed Mobility) not tested  -NAHOMY     Assistive Device (Bed Mobility) head of bed elevated;bed rails  -NAHOMY       Row Name 09/27/24 0800          Bed-Chair Transfer    Bed-Chair [place] (Transfers) minimum assist (75% patient effort)  -NAHOMY       Row Name 09/27/24 0800          Sit-Stand Transfer    Sit-Stand [place] (Transfers) minimum assist (75% patient effort)  -NAHOMY               User Key  (r) = Recorded By, (t) = Taken By, (c) = Cosigned By      Initials Name Provider Type    Abimael Casey, PT DPT Physical Therapist                   Obj/Interventions       Row Name 09/27/24 0800          Range of Motion Comprehensive    General Range of Motion bilateral lower extremity ROM WFL  -NAHOMY       Row Name 09/27/24 0800          Strength Comprehensive (MMT)    Comment, General Manual Muscle Testing (MMT) Assessment B LE grossly 4-/5  -NAHOMY       Row Name 09/27/24 0800          Balance    Balance Assessment sitting dynamic balance;standing  dynamic balance  -NAHOMY     Dynamic Sitting Balance standby assist  -NAHOMY     Position, Sitting Balance unsupported;sitting edge of bed  -NAHOMY     Dynamic Standing Balance minimal assist  -NAHOMY     Position/Device Used, Standing Balance supported  -NAHOMY               User Key  (r) = Recorded By, (t) = Taken By, (c) = Cosigned By      Initials Name Provider Type    Abimael Casey, PT DPT Physical Therapist                   Goals/Plan       Row Name 09/27/24 0800          Bed Mobility Goal 1 (PT)    Activity/Assistive Device (Bed Mobility Goal 1, PT) sit to supine/supine to sit  -NAHOMY     [place] Level/Cues Needed (Bed Mobility Goal 1, PT) standby assist  -NAHOMY     Time Frame (Bed Mobility Goal 1, PT) long term goal (LTG);10 days  -NAHOMY     Progress/Outcomes (Bed Mobility Goal 1, PT) new goal  -NAHOMY       Row Name 09/27/24 0800          Transfer Goal 1 (PT)    Activity/Assistive Device (Transfer Goal 1, PT) sit-to-stand/stand-to-sit;bed-to-chair/chair-to-bed  -NAHOMY     [place] Level/Cues Needed (Transfer Goal 1, PT) standby assist  -NAHOMY     Time Frame (Transfer Goal 1, PT) long term goal (LTG);10 days  -NAHOMY     Progress/Outcome (Transfer Goal 1, PT) new goal  -NAHOMY       Row Name 09/27/24 0800          Gait Training Goal 1 (PT)    Activity/Assistive Device (Gait Training Goal 1, PT) gait (walking locomotion);assistive device use;decrease fall risk;improve balance and speed;increase endurance/gait distance;increase energy conservation  -NAHOMY     [place] Level (Gait Training Goal 1, PT) standby assist  -NAHOMY     Distance (Gait Training Goal 1, PT) 200 ft  -NAHOMY     Time Frame (Gait Training Goal 1, PT) long term goal (LTG);10 days  -NAHOMY     Progress/Outcome (Gait Training Goal 1, PT) new goal  -NAHOMY       Row Name 09/27/24 0800          Stairs Goal 1 (PT)    Activity/Assistive Device (Stairs Goal 1, PT) ascending stairs;descending stairs;using handrail, left  -NAHOMY     [place] Level/Cues Needed (Stairs Goal 1, PT) contact guard  required  -NAHOMY     Number of Stairs (Stairs Goal 1, PT) 3 steps  -NAHOMY     Time Frame (Stairs Goal 1, PT) long term goal (LTG);10 days  -NAHOMY     Progress/Outcome (Stairs Goal 1, PT) new goal  -NAHOYM       Row Name 09/27/24 0800          Therapy Assessment/Plan (PT)    Planned Therapy Interventions (PT) bed mobility training;transfer training;gait training;balance training;home exercise program;patient/family education;postural re-education;stair training;strengthening  -NAHOMY               User Key  (r) = Recorded By, (t) = Taken By, (c) = Cosigned By      Initials Name Provider Type    Abimael Casey, PT DPT Physical Therapist                   Clinical Impression       Row Name 09/27/24 0800          Pain    Pretreatment Pain Rating 1/10  -NAHOMY     Pain Location incisional  -NAHOMY     Pain Location - abdomen  -NAHOMY     Pain Intervention(s) Repositioned;Ambulation/increased activity  -NAHOMY       Row Name 09/27/24 0800          Plan of Care Review    Plan of Care Reviewed With patient  -NAHOMY     Outcome Evaluation PT eval complete. He is alert and oriented x 4, hard of hearing. He reports being independent in his mobility and ADL's prior to admit. Today he needs min assist to get to the EOB and min assist to stand and t.f to the bedside chair. Demos weakness and reports minimal surgical pain. PT will cont with mobility, balance, and strengthening. I am hopeful he will recover and d.c home w assist. Consider HH therapy if needed at time of d.c.  -NAHOMY       Row Name 09/27/24 0800          Therapy Assessment/Plan (PT)    Patient/Family Therapy Goals Statement (PT) go home  -NAHOMY     Rehab Potential (PT) good, to achieve stated therapy goals  -NAHOMY     Criteria for Skilled Interventions Met (PT) yes;meets criteria;skilled treatment is necessary  -NAHOMY     Therapy Frequency (PT) 2 times/day  -NAHOMY     Predicted Duration of Therapy Intervention (PT) unti ld.c  -NAHOMY       Row Name 09/27/24 0800          Positioning and Restraints    Pre-Treatment  Position in bed  -NAHOMY     Post Treatment Position chair  -NAHOMY     In Chair notified nsg;sitting;call light within reach;encouraged to call for assist;patient within staff view;RUE elevated;LUE elevated  -NAHOMY               User Key  (r) = Recorded By, (t) = Taken By, (c) = Cosigned By      Initials Name Provider Type    Abimael Casey, PT DPT Physical Therapist                   Outcome Measures       Row Name 09/27/24 0801 09/27/24 0800       How much help from another person do you currently need...    Turning from your back to your side while in flat bed without using bedrails? 3  -NAHOMY 3  -JA    Moving from lying on back to sitting on the side of a flat bed without bedrails? 3  -NAHOMY 3  -JA    Moving to and from a bed to a chair (including a wheelchair)? 3  -NAHOMY 3  -JA    Standing up from a chair using your arms (e.g., wheelchair, bedside chair)? 3  -NAHOMY 3  -JA    Climbing 3-5 steps with a railing? 2  -NAHOMY 3  -JA    To walk in hospital room? 3  -NAHOMY 3  -JA    AM-PAC 6 Clicks Score (PT) 17  -NAHOMY 18  -JA    Highest Level of Mobility Goal 5 --> Static standing  -NAHOMY 6 --> Walk 10 steps or more  -JA      Row Name 09/27/24 0635          How much help from another person do you currently need...    Turning from your back to your side while in flat bed without using bedrails? 3  -WR     Moving from lying on back to sitting on the side of a flat bed without bedrails? 3  -WR     Moving to and from a bed to a chair (including a wheelchair)? 3  -WR     Standing up from a chair using your arms (e.g., wheelchair, bedside chair)? 3  -WR     Climbing 3-5 steps with a railing? 3  -WR     To walk in hospital room? 3  -WR     AM-PAC 6 Clicks Score (PT) 18  -WR     Highest Level of Mobility Goal 6 --> Walk 10 steps or more  -WR       Row Name 09/27/24 0801          Functional Assessment    Outcome Measure Options AM-PAC 6 Clicks Basic Mobility (PT)  -NAHOMY               User Key  (r) = Recorded By, (t) = Taken By, (c) = Cosigned By       Initials Name Provider Type    Abimael Casey, PT DPT Physical Therapist    Jae Alexander, RN Registered Nurse    Jayda Arias RN Registered Nurse                                 Physical Therapy Education       Title: PT OT SLP Therapies (In Progress)       Topic: Physical Therapy (In Progress)       Point: Mobility training (Done)       Learning Progress Summary             Patient Acceptance, E, CLAY,DU,NR by NAHOMY at 9/27/2024 0800    Comment: benefits of activity, progression of PT                         Point: Home exercise program (Not Started)       Learner Progress:  Not documented in this visit.              Point: Body mechanics (Not Started)       Learner Progress:  Not documented in this visit.              Point: Precautions (Not Started)       Learner Progress:  Not documented in this visit.                              User Key       Initials Effective Dates Name Provider Type Discipline    NAHOMY 02/03/23 -  Abimael Lees PT DPT Physical Therapist PT                  PT Recommendation and Plan  Planned Therapy Interventions (PT): bed mobility training, transfer training, gait training, balance training, home exercise program, patient/family education, postural re-education, stair training, strengthening  Plan of Care Reviewed With: patient  Outcome Evaluation: PT eval complete. He is alert and oriented x 4, hard of hearing. He reports being independent in his mobility and ADL's prior to admit. Today he needs min assist to get to the EOB and min assist to stand and t.f to the bedside chair. Demos weakness and reports minimal surgical pain. PT will cont with mobility, balance, and strengthening. I am hopeful he will recover and d.c home w assist. Consider HH therapy if needed at time of d.c.     Time Calculation:         PT Charges       Row Name 09/27/24 0913             Time Calculation    Start Time 0800  -NAHOMY      Stop Time 0900  -NAHOMY      Time Calculation (min) 60 min  -NAHOMY      PT  Received On 09/27/24  -NAHOMY      PT Goal Re-Cert Due Date 10/07/24  -NAHOMY                User Key  (r) = Recorded By, (t) = Taken By, (c) = Cosigned By      Initials Name Provider Type    Abimael Casey, PT DPT Physical Therapist                  Therapy Charges for Today       Code Description Service Date Service Provider Modifiers Qty    69019696001 HC PT EVAL MOD COMPLEXITY 4 9/27/2024 Abimael Lees, PT DPT GP 1            PT G-Codes  Outcome Measure Options: AM-PAC 6 Clicks Basic Mobility (PT)  AM-PAC 6 Clicks Score (PT): 17  PT Discharge Summary  Anticipated Discharge Disposition (PT): home with assist, home with 24/7 care, home with home health, sub acute care setting    Abimael Lees, PT DPT  9/27/2024

## 2024-09-27 NOTE — THERAPY EVALUATION
Patient Name: Oral Dey  : 1940    MRN: [id number]                              Today's Date: 2024       Admit Date: 2024    Visit Dx:     ICD-10-CM ICD-9-CM   1. Pneumoperitoneum  K66.8 568.89   2. Impaired mobility [Z74.09]  Z74.09 799.89     Patient Active Problem List   Diagnosis    Hx of colonic polyp    Family hx of colon cancer    CLL (chronic lymphocytic leukemia)    Hypertension    IgG lambda monoclonal gammopathy    Iron deficiency    Pneumoperitoneum    Perforated gastric ulcer     Past Medical History:   Diagnosis Date    Bladder cancer     CLL (chronic lymphocytic leukemia)     Colon polyp     Gallstone     GERD (gastroesophageal reflux disease)     Glaucoma     Hearing loss     Hypertension     Inguinal hernia     right groin    Macular degeneration, right eye      Past Surgical History:   Procedure Laterality Date    CATARACT EXTRACTION, BILATERAL      COLONOSCOPY  2012    CYSTOSCOPY BLADDER BIOPSY      DIAGNOSTIC LAPAROSCOPY N/A 2024    Procedure: ROBOTIC REPAIR OF PERFORATED GASTRIC ULCER;  Surgeon: Petrona Malone MD;  Location:  [facility];  Service: General;  Laterality: N/A;  WITH REPAIR OF GASTRIC ULCER PERFORATION    EXCISION MASS HEAD/NECK N/A 3/4/2022    Procedure: EXCISION OF MASS ON POSTERIOR NECK;  Surgeon: Rossana Mahajan MD;  Location:  [facility];  Service: General;  Laterality: N/A;    INGUINAL HERNIA REPAIR Left     INGUINAL HERNIA REPAIR Right 2017    Procedure: RIGHT INGUINAL HERNIA REPAIR WITH MESH ;  Surgeon: Rossana Mahajan MD;  Location:  [facility];  Service:       General Information       Row Name 24 0839          OT Time and Intention    Document Type evaluation  cc: abdominal pain. Dx: Perforated gastric ulcer. Pt now s/p repair of perforated gastric ulcer on .  -LS     Mode of Treatment occupational therapy  -LS       Row Name 24 0839          General Information    Patient Profile Reviewed yes  -LS     Prior  Level of Function independent:;all household mobility;community mobility;home management;cooking;cleaning;driving;shopping  -     Existing Precautions/Restrictions fall  NG tube, SEGUNDO drain  -     Barriers to Rehab medically complex;hearing deficit  -LS       Row Name 09/27/24 0839          Occupational Profile    Environmental Supports and Barriers (Occupational Profile) Daughter lives next door. Tub shower with grab bar. Other AD/DME: rwx, rollator, cane  -LS       Row Name 09/27/24 0839          Living Environment    People in Home spouse  -LS       Row Name 09/27/24 0839          Home Main Entrance    Number of Stairs, Main Entrance three  -LS     Stair Railings, Main Entrance railing on left side (ascending)  -LS       Row Name 09/27/24 0839          Stairs Within Home, Primary    Number of Stairs, Within Home, Primary none  -LS       Row Name 09/27/24 0839          Cognition    Orientation Status (Cognition) oriented x 4  -LS       Row Name 09/27/24 0839          Safety Issues, Functional Mobility    Impairments Affecting Function (Mobility) balance;endurance/activity tolerance;strength;shortness of breath;pain  -               User Key  (r) = Recorded By, (t) = Taken By, (c) = Cosigned By      Initials Name Provider Type    LS Page Mayer, OTR/L Occupational Therapist                     Mobility/ADL's       Row Name 09/27/24 0839          Bed Mobility    Bed Mobility supine-sit  -LS     Supine-Sit [place] (Bed Mobility) minimum assist (75% patient effort)  -LS     Assistive Device (Bed Mobility) bed rails;head of bed elevated  -       Row Name 09/27/24 0839          Transfers    Transfers sit-stand transfer;bed-chair transfer  -       Row Name 09/27/24 0839          Bed-Chair Transfer    Bed-Chair [place] (Transfers) minimum assist (75% patient effort)  -LS     Assistive Device (Bed-Chair Transfers) other (see comments)  HHA  -       Row Name 09/27/24 0839          Sit-Stand Transfer     Sit-Stand [place] (Transfers) minimum assist (75% patient effort)  -     Assistive Device (Sit-Stand Transfers) other (see comments)  HHA  -       Row Name 09/27/24 0839          Activities of Daily Living    BADL Assessment/Intervention upper body dressing;lower body dressing;toileting  -       Row Name 09/27/24 08          Upper Body Dressing Assessment/Training    [place] Level (Upper Body Dressing) upper body dressing skills;standby assist  -     Position (Upper Body Dressing) edge of bed sitting  -       Row Name 09/27/24 0839          Lower Body Dressing Assessment/Training    [place] Level (Lower Body Dressing) lower body dressing skills;maximum assist (25% patient effort)  -LS     Position (Lower Body Dressing) unsupported sitting;supported standing  -       Row Name 09/27/24 0839          Toileting Assessment/Training    [place] Level (Toileting) toileting skills;maximum assist (25% patient effort)  -LS     Position (Toileting) unsupported sitting;supported standing  -               User Key  (r) = Recorded By, (t) = Taken By, (c) = Cosigned By      Initials Name Provider Type     Page Mayer OTR/L Occupational Therapist                   Obj/Interventions       Row Name 09/27/24 0839          Sensory Assessment (Somatosensory)    Sensory Assessment (Somatosensory) UE sensation intact  -[facility] Name 09/27/24 0839          Vision Assessment/Intervention    Visual Impairment/Limitations WFL  -[facility] Name 09/27/24 0839          Range of Motion Comprehensive    General Range of Motion bilateral upper extremity ROM WFL  -[facility] Name 09/27/24 0839          Strength Comprehensive (MMT)    Comment, General Manual Muscle Testing (MMT) Assessment BUE strength grossly 4+/5  -       Row Name 09/27/24 0839          Balance    Balance Assessment sitting static balance;sitting dynamic balance;standing static balance;standing dynamic balance  -     Static  Sitting Balance standby assist  -LS     Dynamic Sitting Balance standby assist  -LS     Position, Sitting Balance unsupported;sitting edge of bed  -LS     Static Standing Balance contact guard  -LS     Dynamic Standing Balance minimal assist  -LS     Position/Device Used, Standing Balance supported;other (see comments)  HHA  -LS               User Key  (r) = Recorded By, (t) = Taken By, (c) = Cosigned By      Initials Name Provider Type    LS Page Mayer, OTR/L Occupational Therapist                   Goals/Plan       Row Name 09/27/24 0839          Transfer Goal 1 (OT)    Activity/Assistive Device (Transfer Goal 1, OT) toilet;tub  -LS     [place] Level/Cues Needed (Transfer Goal 1, OT) standby assist  -LS     Time Frame (Transfer Goal 1, OT) long term goal (LTG);10 days  -LS     Progress/Outcome (Transfer Goal 1, OT) new goal  -LS       Row Name 09/27/24 0839          Dressing Goal 1 (OT)    Activity/Device (Dressing Goal 1, OT) dressing skills, all  -LS     [place]/Cues Needed (Dressing Goal 1, OT) standby assist  -LS     Time Frame (Dressing Goal 1, OT) long term goal (LTG);10 days  -LS     Progress/Outcome (Dressing Goal 1, OT) new goal  -LS       Row Name 09/27/24 0839          Toileting Goal 1 (OT)    Activity/Device (Toileting Goal 1, OT) toileting skills, all  -LS     [place] Level/Cues Needed (Toileting Goal 1, OT) standby assist  -LS     Time Frame (Toileting Goal 1, OT) long term goal (LTG);10 days  -LS     Progress/Outcome (Toileting Goal 1, OT) new goal  -LS       Row Name 09/27/24 0839          Problem Specific Goal 1 (OT)    Problem Specific Goal 1 (OT) Pt will independently implement one pain management technique to decrease pain and improve functional performance.  -LS     Time Frame (Problem Specific Goal 1, OT) long term goal (LTG);by discharge  -LS     Progress/Outcome (Problem Specific Goal 1, OT) new goal  -LS       Row Name 09/27/24 0839          Therapy Assessment/Plan (OT)     Planned Therapy Interventions (OT) transfer/mobility retraining;strengthening exercise;patient/caregiver education/training;occupation/activity based interventions;functional balance retraining;activity tolerance training;BADL retraining;ROM/therapeutic exercise;adaptive equipment training  -               User Key  (r) = Recorded By, (t) = Taken By, (c) = Cosigned By      Initials Name Provider Type     Page Mayer, OTR/L Occupational Therapist                   Clinical Impression       Row Name 09/27/24 0839          Pain Assessment    Pretreatment Pain Rating 1/10  -LS     Posttreatment Pain Rating 2/10  -     Pain Location - abdomen  -LS     Pain Intervention(s) Repositioned;Ambulation/increased activity;Nursing Notified  -       Row Name 09/27/24 0839          Plan of Care Review    Plan of Care Reviewed With patient  -     Progress no change  -     Outcome Evaluation OT eval completed. Pt in fowlers upon therapist arrival; A&Ox4; [place]; c/o 1/10 abdominal pain; SEGUNDO drain and NG tube in place. Pt reports I with all BADLs/IADLs including fxl ambulation at baseline. Today, Pt performed supine>sit utilizing bedrail with HOB elevated with Min A and verbal cues for sequencing and body mechanics. Pt dependent to tamara B socks while seated EOB. Pt performed sit<>stand and took a few steps from bed>chair with HHA requiring Min A. BUE strength WFL but demos limited fxl activity tolerance. Skilled OT intervention indicated in order to address deficits in fxl mobility, fxl activity tolerance, balance, strength, and use of adaptive techniques/equipment during performance of BADLs. Recommend home with assist and HH at discharge.  -       Row Name 09/27/24 0839          Therapy Assessment/Plan (OT)    Rehab Potential (OT) good, to achieve stated therapy goals  -     Criteria for Skilled Therapeutic Interventions Met (OT) yes;skilled treatment is necessary  -     Therapy Frequency (OT) 5 times/wk  -        Row Name 09/27/24 0839          Therapy Plan Review/Discharge Plan (OT)    Anticipated Discharge Disposition (OT) home with assist;home with home health  -LS       Row Name 09/27/24 0839          Positioning and Restraints    Pre-Treatment Position in bed  -LS     Post Treatment Position chair  -LS     In Chair reclined;call light within reach;encouraged to call for assist;legs elevated;notified nsg  -LS               User Key  (r) = Recorded By, (t) = Taken By, (c) = Cosigned By      Initials Name Provider Type    Page Marie, OTR/L Occupational Therapist                   Outcome Measures       Row Name 09/27/24 0839          How much help from another is currently needed...    Putting on and taking off regular lower body clothing? 2  -LS     Bathing (including washing, rinsing, and drying) 2  -LS     Toileting (which includes using toilet bed pan or urinal) 2  -LS     Putting on and taking off regular upper body clothing 3  -LS     Taking care of personal grooming (such as brushing teeth) 4  -LS     Eating meals 4  -LS     AM-PAC 6 Clicks Score (OT) 17  -LS       Row Name 09/27/24 0801 09/27/24 0800       How much help from another person do you currently need...    Turning from your back to your side while in flat bed without using bedrails? 3  -NAHOMY 3  -JA    Moving from lying on back to sitting on the side of a flat bed without bedrails? 3  -NAHOMY 3  -JA    Moving to and from a bed to a chair (including a wheelchair)? 3  -NAHOMY 3  -JA    Standing up from a chair using your arms (e.g., wheelchair, bedside chair)? 3  -NAHOMY 3  -JA    Climbing 3-5 steps with a railing? 2  -NAHOMY 3  -JA    To walk in hospital room? 3  -NAHOMY 3  -JA    AM-PAC 6 Clicks Score (PT) 17  -NAHOMY 18  -JA    Highest Level of Mobility Goal 5 --> Static standing  -NAHOMY 6 --> Walk 10 steps or more  -INOCENTE      Row Name 09/27/24 0635          How much help from another person do you currently need...    Turning from your back to your side while in flat bed  without using bedrails? 3  -WR     Moving from lying on back to sitting on the side of a flat bed without bedrails? 3  -WR     Moving to and from a bed to a chair (including a wheelchair)? 3  -WR     Standing up from a chair using your arms (e.g., wheelchair, bedside chair)? 3  -WR     Climbing 3-5 steps with a railing? 3  -WR     To walk in hospital room? 3  -WR     AM-PAC 6 Clicks Score (PT) 18  -WR     Highest Level of Mobility Goal 6 --> Walk 10 steps or more  -WR       Row Name 09/27/24 0839 09/27/24 0801       Functional Assessment    Outcome Measure Options AM-PAC 6 Clicks Daily Activity (OT)  -LS AM-PAC 6 Clicks Basic Mobility (PT)  -NAHOMY              User Key  (r) = Recorded By, (t) = Taken By, (c) = Cosigned By      Initials Name Provider Type    Abimael Casey, PT DPT Physical Therapist    Jae Alexander, RN Registered Nurse    Page Marie, OTR/L Occupational Therapist    Jayda Arias RN Registered Nurse                    Occupational Therapy Education       Title: PT OT SLP Therapies (In Progress)       Topic: Occupational Therapy (In Progress)       Point: ADL training (Done)       Description:   Instruct learner(s) on proper safety adaptation and remediation techniques during self care or transfers.   Instruct in proper use of assistive devices.                  Learning Progress Summary             Patient Acceptance, E, VU by  at 9/27/2024 0928                         Point: Home exercise program (Not Started)       Description:   Instruct learner(s) on appropriate technique for monitoring, assisting and/or progressing therapeutic exercises/activities.                  Learner Progress:  Not documented in this visit.              Point: Precautions (Done)       Description:   Instruct learner(s) on prescribed precautions during self-care and functional transfers.                  Learning Progress Summary             Patient Acceptance, E, VU by  at 9/27/2024 0928                          Point: Body mechanics (Done)       Description:   Instruct learner(s) on proper positioning and spine alignment during self-care, functional mobility activities and/or exercises.                  Learning Progress Summary             Patient Acceptance, E, VU by  at 9/27/2024 0928                                         User Key       Initials Effective Dates Name Provider Type Discipline     06/20/22 -  Page Mayer, OTR/L Occupational Therapist OT                  OT Recommendation and Plan  Planned Therapy Interventions (OT): transfer/mobility retraining, strengthening exercise, patient/caregiver education/training, occupation/activity based interventions, functional balance retraining, activity tolerance training, BADL retraining, ROM/therapeutic exercise, adaptive equipment training  Therapy Frequency (OT): 5 times/wk  Plan of Care Review  Plan of Care Reviewed With: patient  Progress: no change  Outcome Evaluation: OT eval completed. Pt in fowlers upon therapist arrival; A&Ox4; [place]; c/o 1/10 abdominal pain; SEGUNDO drain and NG tube in place. Pt reports I with all BADLs/IADLs including fxl ambulation at baseline. Today, Pt performed supine>sit utilizing bedrail with HOB elevated with Min A and verbal cues for sequencing and body mechanics. Pt dependent to tamara B socks while seated EOB. Pt performed sit<>stand and took a few steps from bed>chair with HHA requiring Min A. BUE strength WFL but demos limited fxl activity tolerance. Skilled OT intervention indicated in order to address deficits in fxl mobility, fxl activity tolerance, balance, strength, and use of adaptive techniques/equipment during performance of BADLs. Recommend home with assist and HH at discharge.     Time Calculation:         Time Calculation- OT       Row Name 09/27/24 0839             Time Calculation- OT    OT Start Time 0839  +10 minutes chart review  -      OT Stop Time 0910  -      OT Time Calculation (min) 31 min  -       OT Non-Billable Time (min) 41 min  -      OT Received On 09/27/24  -      OT Goal Re-Cert Due Date 10/07/24  -                User Key  (r) = Recorded By, (t) = Taken By, (c) = Cosigned By      Initials Name Provider Type    Page Marie OTR/L Occupational Therapist                  Therapy Charges for Today       Code Description Service Date Service Provider Modifiers Qty    [id number] HC OT EVAL MOD COMPLEXITY 3 9/27/2024 Page Mayer OTR/L GO 1                 Page Mayer OTR/ALY  9/27/2024

## 2024-09-27 NOTE — PROGRESS NOTES
Assessment tool to be used for patients with existing breathing treatments ordered by hospitalist                               Respiratory Therapist Driven Protocol - RT to Assess and Treat Algorithm    Item 0 Points 1 Point 2 Points 3 Points 4 Points Subtotal   Mental Status Alert, orientated, cooperative Lethargic, follows commands Confused, not following commands Obtunded or Somnolent Comatose 0   Respiratory Pattern Regular RR  8-16 breaths/minute Increased RR  18-25 breaths/minute Dyspnea on exertion, irregular RR  26-30/minute Shortness of breath,  RR 31-35 breaths/minute Accessory muscle use, severe SOB  RR > 35 breath/minute 0   Breath Sounds Clear Decreased unilaterally Decreased bilaterally Basilar crackles Wheezing and/or rhonchi 0   Cough Strong, spontaneous non-productive Strong productive Weak, non-productive Weak productive or weak with rhonchi Absent or may require suctioning 0   Pulmonary Status Nonsmoker or no previous history > 1 year quit < 1 PPD  < 1 year quit >  or = 1 PPD Diagnosed pulmonary disease (severe or chronic) Severe or chronic pulmonary disease with exacerbation 0   Surgical Status None General surgery (non-abdominal or non-thoracic) Lower abdominal Thoracic or upper abdominal Thoracic with pulmonary disease 3   Chest X-ray Clear Chronic changes Infiltrates, atelectasis or pleural effusion Infiltrates > 1 lobe Diffuse infiltrates and atelectasis and/or effusions 0   Activity level Ambulatory Ambulatory with assistance Non-ambulatory Paraplegic Quadriplegic NA                     Total Score 3   Score    Drug Therapy Frequency  20 or >    Q4 Duoneb & Q3 Albuterol PRN 15 - 19     Q6 Duoneb & Q4 Albuterol PRN 10 - 14    QID Duoneb & Q4 Albuterol PRN 5 - 9    TID Duoneb & Q6 Albuterol PRN 0 - 4    Q4 PRN Duoneb or Q4 PRN Albuterol    Incentive Spirometry - Initial RT instruct    Lung Expansion Therapy (PEP) Bronchopulmonary Hygiene (CPT)   Q4 & PRN - Severe  atelectasis, poor oxygenation Q4 - copious secretions, dyspnea, unable to sleep, mucus plugging   QID - High risk for persistent atelectasis, existence of atelectasis QID & Q4 PRN - Moderate secretion production   TID - At risk for developing atelectasis TID - small amounts of secretions with poor cough   BID - prevention of atelectasis BID - unable to breathe deeply and cough spontaneously   *RT Protocol patients will be re-assessed/re-evaluated every 48 hours.    *Patients who are home nebulizer treatments will be protocoled to no less than their home regimen and will remain     on their home regimen with re-evaluations as needed with changes in patient condition.    RT Comments/Recommendations: Q4PRN DUONEB

## 2024-09-27 NOTE — PLAN OF CARE
Problem: Adult Inpatient Plan of Care  Goal: Absence of Hospital-Acquired Illness or Injury  Intervention: Identify and Manage Fall Risk  Description: Perform standard risk assessment on admission using a validated tool or comprehensive approach appropriate to the patient; reassess fall risk frequently, with change in status or transfer to another level of care.  Communicate fall injury risk to interprofessional healthcare team.  Determine need for increased observation, equipment and environmental modification, such as low bed, signage and supportive, nonskid footwear.  Adjust safety measures to individual developmental age, stage and identified risk factors.  Reinforce the importance of safety and physical activity with patient and family.  Perform regular intentional rounding to assess need for position change, pain assessment and personal needs, including assistance with toileting.  Recent Flowsheet Documentation  Taken 9/27/2024 0600 by Jayda Watson RN  Safety Promotion/Fall Prevention: safety round/check completed  Taken 9/27/2024 0500 by Jayda Watson RN  Safety Promotion/Fall Prevention:   safety round/check completed   fall prevention program maintained  Taken 9/27/2024 0400 by Jayda Watson RN  Safety Promotion/Fall Prevention:   safety round/check completed   fall prevention program maintained  Taken 9/27/2024 0300 by Jayda Watson RN  Safety Promotion/Fall Prevention: safety round/check completed  Taken 9/27/2024 0200 by Jayda Watson RN  Safety Promotion/Fall Prevention: safety round/check completed  Taken 9/27/2024 0100 by Jayda Watson RN  Safety Promotion/Fall Prevention: safety round/check completed  Taken 9/27/2024 0030 by Jayda Watson RN  Safety Promotion/Fall Prevention: safety round/check completed  Intervention: Prevent Skin Injury  Description: Perform a screening for skin injury risk, such as pressure or moisture associated skin damage on admission and at regular  intervals throughout hospital stay.  Keep all areas of skin (especially folds) clean and dry.  Maintain adequate skin hydration.  Relieve and redistribute pressure and protect bony prominences; implement measures based on patient-specific risk factors.  Match turning and repositioning schedule to clinical condition.  Encourage weight shift frequently; assist with reposition if unable to complete independently.  Float heels off bed; avoid pressure on the Achilles tendon.  Keep skin free from extended contact with medical devices.  Encourage functional activity and mobility, as early as tolerated.  Use aids (e.g., slide boards, mechanical lift) during transfer.  Recent Flowsheet Documentation  Taken 9/27/2024 0500 by Jayda Watson RN  Body Position: right  Taken 9/27/2024 0300 by Jayda Watson RN  Body Position: left  Taken 9/27/2024 0100 by Jayda Watson RN  Body Position: supine  Intervention: Prevent and Manage VTE (Venous Thromboembolism) Risk  Description: Assess for VTE (venous thromboembolism) risk.  Encourage and assist with early ambulation.  Initiate and maintain compression or other therapy, as indicated, based on identified risk in accordance with organizational protocol and provider order.  Encourage both active and passive leg exercises while in bed, if unable to ambulate.  Recent Flowsheet Documentation  Taken 9/27/2024 0400 by Jayda Watson RN  VTE Prevention/Management:   bilateral   sequential compression devices on  Taken 9/27/2024 0030 by Jayda Watson RN  VTE Prevention/Management:   bilateral   sequential compression devices on  Goal: Readiness for Transition of Care  Intervention: Mutually Develop Transition Plan  Description: Identify available resources for support (e.g., family, friends, community).  Identify and address barriers to ongoing treatment and home management (e.g., environmental, financial).  Provide opportunities to practice self-management skills.  Assess and  monitor emotional readiness for transition.  Establish or reconnect linkage with outpatient providers or community-based services.  Recent Flowsheet Documentation  Taken 9/27/2024 0636 by Jayda Watson RN  Equipment Currently Used at Home: none  Transportation Anticipated: family or friend will provide  Patient/Family Anticipated Services at Transition: none  Patient/Family Anticipates Transition to: home with family     Problem: Pain Acute  Goal: Acceptable Pain Control and Functional Ability  Intervention: Prevent or Manage Pain  Description: Evaluate pain level, effect of treatment and patient response at regular intervals.  Minimize painful stimuli; coordinate care and adjust environment (e.g., light, noise, unnecessary movement); promote sleep/rest.  Match pharmacologic analgesia to severity and type of pain mechanism (e.g., neuropathic, muscle, inflammatory); consider multimodal approach (e.g., nonopioid, opioid, adjuvant).  Provide medication at regular intervals; titrate to patient response; premedicate for painful procedures.  Manage breakthrough pain with additional doses; consider rotation or switching medication.  Monitor for signs of substance tolerance (increased dose to reach desired effect, decreased effect with same dose).  Manage medication-induced effects, such as constipation, nausea, pruritus, urinary retention, somnolence and dizziness.  Provide multimodal interventions, such as as physical activity, therapeutic exercise, yoga, TENS (transcutaneous electrical nerve stimulation) and manual therapy.  Train in functional activity modifications, such as body mechanics, posture, ergonomics, energy conservation and activity pacing.  Consider addition of complementary or alternative therapy, such as acupuncture, hypnosis or therapeutic touch.  Recent Flowsheet Documentation  Taken 9/27/2024 0600 by Jayda Watson, RN  Medication Review/Management: medications reviewed  Taken 9/27/2024 0500 by  Jayda Watson RN  Medication Review/Management: medications reviewed  Taken 9/27/2024 0400 by Jayda Watson RN  Medication Review/Management: medications reviewed  Taken 9/27/2024 0300 by Jayda Watson RN  Medication Review/Management: medications reviewed  Taken 9/27/2024 0200 by Jayda Watson RN  Medication Review/Management: medications reviewed  Taken 9/27/2024 0100 by Jayda Watson RN  Medication Review/Management: medications reviewed  Intervention: Optimize Psychosocial Wellbeing  Description: Facilitate patient’s self-control over pain by providing pain information and allowing choices in treatment.  Consider and address emotional response to pain.  Explore and promote use of coping strategies; address barriers to successful coping.  Evaluate and assist with psychosocial, cultural and spiritual factors impacting pain.  Modify pain perception using techniques, such as distraction, mindfulness, guided imagery, meditation or music.  Assess for risk factors for developing chronic pain, such as depression, fear, pain avoidance and pain catastrophizing.  Consider referral for ongoing coping support, such as education, relaxation training and role of thoughts.  Recent Flowsheet Documentation  Taken 9/27/2024 0400 by Jayda Watson RN  Diversional Activities: television  Taken 9/27/2024 0200 by Jayda Watson RN  Diversional Activities: television     Problem: Fall Injury Risk  Goal: Absence of Fall and Fall-Related Injury  Intervention: Identify and Manage Contributors  Description: Develop a fall prevention plan with the patient and caregiver/family.  Provide reorientation, appropriate sensory stimulation and routines with changes in mental status to decrease risk of fall.  Promote use of personal vision and auditory aids.  Assess assistance level required for safe and effective self-care; provide support as needed, such as toileting, mobilization. For age 65 and older, implement timed toileting  with assistance.  Encourage physical activity, such as performance of mobility and self-care at highest level of patient ability, multicomponent exercise program and provision of appropriate assistive devices.  If fall occurs, assess the severity of injury; implement fall injury protocol. Determine the cause and revise fall injury prevention plan.  Regularly review medication contribution to fall risk; adjust medication administration times to minimize risk of falling.  Consider risk related to polypharmacy and age.  Balance adequate pain management with potential for oversedation.  Recent Flowsheet Documentation  Taken 9/27/2024 0600 by Jayda Watson RN  Medication Review/Management: medications reviewed  Taken 9/27/2024 0500 by Jayda Watson RN  Medication Review/Management: medications reviewed  Taken 9/27/2024 0400 by Jayda Watson RN  Medication Review/Management: medications reviewed  Taken 9/27/2024 0300 by Jayda Watson RN  Medication Review/Management: medications reviewed  Taken 9/27/2024 0200 by Jayda Watson RN  Medication Review/Management: medications reviewed  Taken 9/27/2024 0100 by Jayda Watson RN  Medication Review/Management: medications reviewed  Intervention: Promote Injury-Free Environment  Description: Provide a safe, barrier-free environment that encourages independent activity.  Keep care area uncluttered and well-lighted.  Determine need for increased observation or monitoring.  Avoid use of devices that minimize mobility, such as restraints or indwelling urinary catheter.  Recent Flowsheet Documentation  Taken 9/27/2024 0600 by Jayda Watson RN  Safety Promotion/Fall Prevention: safety round/check completed  Taken 9/27/2024 0500 by Jayda Watson RN  Safety Promotion/Fall Prevention:   safety round/check completed   fall prevention program maintained  Taken 9/27/2024 0400 by Jayda Watson RN  Safety Promotion/Fall Prevention:   safety round/check completed   fall  prevention program maintained  Taken 9/27/2024 0300 by Jayda Watson RN  Safety Promotion/Fall Prevention: safety round/check completed  Taken 9/27/2024 0200 by Jayda Watson RN  Safety Promotion/Fall Prevention: safety round/check completed  Taken 9/27/2024 0100 by Jayda Watson RN  Safety Promotion/Fall Prevention: safety round/check completed  Taken 9/27/2024 0030 by Jayda Watson RN  Safety Promotion/Fall Prevention: safety round/check completed     Problem: Restraint, Nonviolent  Goal: Absence of Harm or Injury  Intervention: Implement Least Restrictive Safety Strategies  Description: Consider personal and environmental factors contributing to nonviolent or self-destructive behavior.  Utilize diversional activity/alternative device protection.  Document alternative strategies and less restrictive intervention attempts and their effects.  Clearly describe/record behavior leading to need for restraint.  Use the least restrictive method of restraint.  Recognize restraint should only be used if needed to improve the patient's wellbeing and less restrictive interventions have been determined to be ineffective.  Reassess continued need frequently. Remove restraint as soon as unsafe situation is resolved.  Recent Flowsheet Documentation  Taken 9/27/2024 0400 by Jayda Watson RN  Medical Device Protection:   IV pole/bag removed from visual field   torso covered   tubing secured  Less Restrictive Alternative: sensory stimulation limited  De-Escalation Techniques: stimulation decreased  Diversional Activities: television  Taken 9/27/2024 0200 by Jayda Watson RN  Medical Device Protection:   tubing secured   torso covered   IV pole/bag removed from visual field  Less Restrictive Alternative:   sensory stimulation provided   sensory stimulation limited   calming techniques promoted  De-Escalation Techniques: reoriented  Diversional Activities: television  Intervention: Protect Skin and Joint  Integrity  Description: Frequently check restraint application site and document findings.  Release and replace at regular intervals per facility protocol.  Assist with frequent joint range of motion activity.  Recent Flowsheet Documentation  Taken 9/27/2024 0500 by Jayda Watson RN  Body Position: right  Taken 9/27/2024 0300 by Jayda Watson RN  Body Position: left  Taken 9/27/2024 0100 by Jayda Watson RN  Body Position: supine   Goal Outcome Evaluation:

## 2024-09-27 NOTE — PAYOR COMM NOTE
"REF:  [id number]    Psychiatric  [facility]ARIDA PENA   [phone]  OR   FAX   [phone]    Oral Mao (83 y.o. Male)       Date of Birth   1940    Social Security Number       Address   [address] [place] [address]    Home Phone   [phone]    MRN   [id number]       [employer]   Other    Marital Status                               Admission Date   9/26/24    Admission Type   Emergency    Admitting Provider   Salvatore Laughlin DO    Attending Provider   Salvatore Laughlin DO    Department, Room/Bed   Psychiatric CARDIAC CARE, C011/1       Discharge Date       Discharge Disposition       Discharge Destination                                 Attending Provider: Salvatore Laughlin DO    Allergies: Augmentin [Amoxicillin-pot Clavulanate], Latex    Isolation: None   Infection: None   Code Status: CPR    Ht: 177.8 cm (70\")   Wt: 61.9 kg (136 lb 7.4 oz)    Admission Cmt: None   Principal Problem: Pneumoperitoneum [K66.8]                   Active Insurance as of 9/26/2024       Primary Coverage       Payor Plan Insurance Group Employer/Plan Group    [employer]ET[employer] COMMERCIAL AETNA [id number]       Payor Plan Address Payor Plan Phone Number Payor Plan Fax Number Effective Dates    [address] [phone]  7/1/2017 - None Entered    [facility] [id number]         Subscriber Name Subscriber Birth Date Member ID       ORAL MAO 1940 [id number]                     Emergency Contacts        (Rel.) Home Phone Work Phone Mobile Phone    YisselAlaina (Spouse) [phone] -- --    TONNYMAYRA LANGFORD (Daughter) -- -- [phone]          9/26/2024 Event Details User   18:51 Patient roomed in ED To room HALB Sadie Jaeger RN   18:51 Patient arrival  Jae Jules II, RN   18:51 In Facility Status: Arrived --   18:52 Vitals Assessment  Sadie Jaeger RN   18:52 Vital Signs  Vital Signs  Temp: 97.8 °F (36.6 °C)  Heart Rate: 74  Heart " "Rate Source: Monitor  Resp: 18  Resp Rate Source: Monitor  BP: 173/79  BMI (Calculated): 19.7  Oxygen Therapy  SpO2: 98 %  Device (Oxygen Therapy): room air  Vitals Timer  Restart Vitals Timer: Yes  Height and Weight  Height: 177.8 cm (70\")  Height Method: Stated  Weight: 62.1 kg (137 lb)  Other flowsheet entries  Ideal Body Weight k Sadie Jaeger RN   18:52:17 Trigger for Triage Start  Sadie Jaeger RN   18:52:17 Triage Started  Sadie Jaeger RN   18:52:17 Chief Complaints Updated Abdominal Pain Sadie Jaeger RN     18:53 Pain Pain (Adult)  (0-10) Pain Rating: Rest: 9  (0-10) Pain Rating: Activity: 9 Sadie Jaeger RN   18:53 HPI HPI (Adult)  Stated Reason for Visit: pr presents via ems for c/o abdominal pain that began today,worsened at 1730. denies n/v/d. Abcs intact. skin wpd. a&ox4.  History Obtained From: patient; EMS Sadie Jaeger RN     19:31 Pain Medication Administered - Adult Given - morphine injection 4 mg Sadie Jaeger RN   19:31 Medication Given morphine injection 4 mg - Dose: 4 mg ; Route: Intravenous ; Line: Peripheral IV 24 1855 Left Antecubital ; Scheduled Time:  Sadie Jaeger RN   19:31 Medication Given ondansetron (ZOFRAN) injection 4 mg - Dose: 4 mg ; Route: Intravenous ; Line: Peripheral IV 24 1855 Left Antecubital ; Scheduled Time:  Sadie Jaeger RN     19:32 Medication New Bag sodium chloride 0.9 % bolus 1,000 mL - Dose: 1,000 mL ; Rate: 2,000 mL/hr ; Route: Intravenous ; Line: Peripheral IV 24 1855 Left Antecubital ; Scheduled Time:  Sadie Jaeger RN   19:32 ED Quick Updates Quick Updates  Quick Updates - Free Text: PATIENT STATES THAT THE PAIN WAS WORSE AFTER ADMINISTRATION OF MORPHINE 4 MG IV, PATIENT BECAME PALE AND DIAPHORETIC.  Sadie Jaeger RN     20:29 Medication Given ketorolac (TORADOL) injection 15 mg - Dose: 15 mg ; Route: Intravenous ; Line: Peripheral IV 24 8172 Left Antecubital Sadie Jaeger RN     21:05 Medication New Bag lactated " ringers bolus 1,000 mL - Dose: 1,000 mL ; Rate: 2,000 mL/hr ; Route: Intravenous ; Line: Peripheral IV 09/26/24 2105 Anterior;Right Forearm ; Scheduled Time: 2114 Sadie Jaeger RN     21:11 Medication Given HYDROmorphone (DILAUDID) injection 1 mg - Dose: 1 mg ; Route: Intravenous ; Line: Peripheral IV 09/26/24 1855 Left Antecubital ; Scheduled Time: 2124 Sadie Jaeger RN   21:12 Medication New Bag cefTRIAXone (ROCEPHIN) 1,000 mg in sodium chloride 0.9 % 100 mL MBP - Dose: 1,000 mg ; Rate: 200 mL/hr ; Route: Intravenous ; Line: Peripheral IV 09/26/24 2105 Anterior;Right Forearm ; Scheduled Time: 2124 Sadie Jaeger RN     21:39:36 Bed Assigned Assigned: [facility] CCU - C011/1 Lyudmila Marx RN   21:39:36 Hospital bed ready Bed Ready: [facility] CCU - C011/1 Lyudmila Marx RN          History & Physical        Estevan Marx APRN at 09/27/24 0034               [facility] Intensivist Services    Date of Admission: 9/26/2024  Date of Note: 09/27/24  Primary Care Physician: Jorge Shepherd MD    History   Mr. Dey is an 83-year-old male with past medical history, per EMR record, showing hypertension, GERD, bladder cancer, and chronic lymphocytic leukemia. My HPI comes from ER physician note and supervising physician, Dr. Laughlin who received consult report from ER physician, Dr. Funez. It was reported that Mr. Dey presented to ER with complaint of abdominal pain.  The pain was described as sharp and tearing, centrally located in the abdomen. The pain was out of proportion and further evaluation with CTA abd/pel showed pneumoperitoneum, felt to be related to perforated gastric ulcer with abdominal thickening of the gastric wall within the distal body and antrum of the stomach extending into the region of the gastric outlet, with mild inflammatory stranding along the more distal greater curvature of the stomach with 2 areas of suspected ulceration 1 along the more anterior wall of the  distal body/antrum of the stomach and one near the gastric outlet. With these findings general surgeon on call, Dr. Malone, was consulted and elected to take patient for emergent robotic repair of perforated gastric ulcer.     The critical care team was asked to further manage acute medical needs postoperatively in the critical care unit.     I have seen and evaluated Mr. Dey immediately upon his return from the OR.  He presents extubated, breathing is unassisted and nonlabored.  He is quite somnolent, withdraws from pain, and moans, otherwise does not open eyes or respond to stimuli at this time. He is stable on the monitor. He is on no drips at this time. SEGUNDO drain exiting left quadrant of the abdomen, minimal serosanguineous output at this time.  Past Medical History     Active and Resolved Problems  Active Hospital Problems    Diagnosis  POA    **Pneumoperitoneum [K66.8]  Yes    Perforated gastric ulcer [K25.5]  Yes      Resolved Hospital Problems   No resolved problems to display.       Past Medical History:   Past Medical History:   Diagnosis Date    Bladder cancer     CLL (chronic lymphocytic leukemia)     Colon polyp     Gallstone     GERD (gastroesophageal reflux disease)     Glaucoma     Hearing loss     Hypertension     Inguinal hernia     right groin    Macular degeneration, right eye        Prior Surgeries: He  has a past surgical history that includes Colonoscopy (06/13/2012); Inguinal hernia repair (Left, 2007); Inguinal Hernia Repair (Right, 12/28/2017); cystoscopy bladder biopsy; Cataract extraction, bilateral; and Excision Mass Head/Neck (N/A, 3/4/2022).    Past Surgical History:   Past Surgical History:   Procedure Laterality Date    CATARACT EXTRACTION, BILATERAL      COLONOSCOPY  06/13/2012    CYSTOSCOPY BLADDER BIOPSY      EXCISION MASS HEAD/NECK N/A 3/4/2022    Procedure: EXCISION OF MASS ON POSTERIOR NECK;  Surgeon: Rossana Mahajan MD;  Location: [facility];  Service: General;   Laterality: N/A;    INGUINAL HERNIA REPAIR Left 2007    INGUINAL HERNIA REPAIR Right 12/28/2017    Procedure: RIGHT INGUINAL HERNIA REPAIR WITH MESH ;  Surgeon: Rossana Mahajan MD;  Location: [facility];  Service:        Social and Family History     Family History:  family history includes Alzheimer's disease in his mother; COPD in his sister; Colon cancer in his maternal grandfather; Heart attack in his brother; Hypertension in his father; No Known Problems in his maternal grandmother, paternal grandfather, and paternal grandmother; Other in his father.    Tobacco/Social History:  reports that he quit smoking about 52 years ago. His smoking use included cigarettes. He started smoking about 67 years ago. He has never used smokeless tobacco. He reports that he does not drink alcohol and does not use drugs.    Allergies     Allergies:   He is allergic to augmentin [amoxicillin-pot clavulanate] and latex.    Allergies   Allergen Reactions    Augmentin [Amoxicillin-Pot Clavulanate] Hives    Latex Itching and Other (See Comments)     And band aids. Causes redness and itching.       Labs     Basic Labs:  Common labs          3/12/2024    10:46 9/12/2024    11:01 9/26/2024    18:58   Common Labs   Glucose 96  102  151    BUN 15  13  21    Creatinine 1.17  0.92  1.13    Sodium 142  134  134    Potassium 4.4  4.5  4.4    Chloride 103  98  96    Calcium 9.8  9.0  9.0    Total Protein 6.2  5.7     Albumin 4.4     3.9  3.8     3.4  4.0    Total Bilirubin 1.0  0.6  0.8    Alkaline Phosphatase 102  96  123    AST (SGOT) 12  9  13    ALT (SGPT) 9  5  12    WBC 6.50  8.45  9.53    Hemoglobin 12.5  11.5  11.5    Hematocrit 38.7  34.7  34.1    Platelets 148  131  239        Diabetic:      Inpatient Medications     Medications: Scheduled Meds:cefepime, 2,000 mg, Intravenous, Once  cefepime, 2 g, Intravenous, Q8H  Chlorhexidine Gluconate Cloth, 1 Application, Topical, Once  [START ON 9/28/2024] Chlorhexidine Gluconate Cloth, 1  "Application, Topical, Q24H  fluconazole, 400 mg, Intravenous, Q24H  heparin (porcine), 5,000 Units, Subcutaneous, Q8H  Lidocaine, 2 patch, Transdermal, Q24H  metoprolol tartrate, 5 mg, Intravenous, Q8H  metroNIDAZOLE, 500 mg, Intravenous, Q8H  mupirocin, 1 Application, Each Nare, BID  pantoprazole, 40 mg, Intravenous, Q12H  senna-docusate sodium, 2 tablet, Oral, BID  sodium chloride, 10 mL, Intravenous, Q12H      Continuous Infusions:lactated ringers, 100 mL/hr      PRN Meds:.  acetaminophen    senna-docusate sodium **AND** polyethylene glycol **AND** bisacodyl **AND** bisacodyl    Calcium Replacement - Follow Nurse / BPA Driven Protocol    HYDROmorphone **AND** naloxone    Magnesium Standard Dose Replacement - Follow Nurse / BPA Driven Protocol    nitroglycerin    ondansetron ODT **OR** ondansetron    Phosphorus Replacement - Follow Nurse / BPA Driven Protocol    Potassium Replacement - Follow Nurse / BPA Driven Protocol    sodium chloride    sodium chloride    I have reviewed the patient's current medications.   Outpatient Medications     Outpatient Medications:   No outpatient medications have been marked as taking for the 9/26/24 encounter (Hospital Encounter).       Current Antibiotics     cefepime 2000 mg IVPB in 100 mL NS (MBP)  fluconazole - 400-0.9 MG/200ML-%  levoFLOXacin - 500 MG  metroNIDAZOLE - 500-0.79 MG/100ML-%    Exam     Vitals: His  height is 177.8 cm (70\") and weight is 62.1 kg (137 lb). His temporal temperature is 99.2 °F (37.3 °C). His blood pressure is 193/52 (abnormal) and his pulse is 92. His respiration is 16 and oxygen saturation is 100%.     GENERAL: Somnolent  SKIN:  Warm, dry.  Subcutaneous emphysema noted to the upper chest and neck  EYES:  Pupils equal, round and reactive to light.  EOMs intact.    HEAD:  Normocephalic.  NECK:  Supple   RESP:  Lungs clear to auscultation. Good airflow. Normal respiratory effort.   CARDIAC:  Regular rate and rhythm. Normal S1,S2. No edema  GI: " "Postoperative laparoscopic incision sites without drainage. SEGUNDO drain existing LQ with minimal serosanguineous output  MSK:  Normal muscle bulk, tone  NEUROLOGICAL: Somnolent.  PSYCHIATRIC: Somnolent    Results and Cultures Review     Result Review:  I have personally reviewed the results from the time of this admission to 9/27/2024 00:36 CDT and agree with these findings:  [x]  Laboratory list / accordion  []  Microbiology  [x]  Radiology  []  EKG/Telemetry   []  Cardiology/Vascular   []  Pathology  []  Old records  []  Other:  Most notable findings include: per HPI    Culture Data:   No results found for: \"BLOODCX\", \"URINECX\", \"WOUNDCX\", \"MRSACX\", \"RESPCX\", \"STOOLCX\"    Assessment/Plan   *Perforated gastric ulcer    This is an 83-year-old male presented to [facility] emergency department yesterday evening for acute onset abdominal pain.  Course workup showed pneumoperitoneum with evidence for perforated gastric ulcer as evident from CT angiography of the abdomen.  He was taken directly to the OR by general surgeon Dr. Malone.  Critical care team was consulted to manage acute medical needs postoperatively.     Treatment Plan    *Perforated gastric ulcer  -S/p laparoscopy with robotic repair of perforated gastric ulcer by general surgeon Dr. Malone  -Bowel rest with NGT to low intermittent suction  -Blood cultures pending  -Empiric antibiotic coverage with cefepime, fluconazole, Flagyl  -pain control  -Gen Sx will continue to manage post surgical bowel considerations and we appreciate their continued support.       VTE Prophylaxis:    Pharmacologic & mechanical VTE prophylaxis orders are present.      Total critical care time: Approximately 45 minutes    Due to a high probability of clinically significant, life threatening deterioration, the patient required my highest level of preparedness to intervene emergently and I personally spent this critical care time directly and personally managing the " patient.     This critical care time included obtaining a history; examining the patient; pulse oximetry; ordering and review of studies; arranging urgent treatment with development of a management plan; evaluation of patient's response to treatment; frequent reassessment; and, discussions with other providers.    This critical care time was performed to assess and manage the high probability of imminent, life-threatening deterioration that could result in multi-organ failure. It was exclusive of separately billable procedures and treating other patients and teaching time.    Please see MDM section and the rest of the note for further information on patient assessment and treatment.    Part of this note may be an electronic transcription/translation of spoken language to printed text using the Dragon Dictation System.    Electronically signed by RENUKA Delarosa on 9/27/2024 at 00:36 CDT    Electronically signed by Estevan Marx APRN at 09/27/24 0310       Petrona Malone MD at 09/26/24 2120          Petrona Malone MD - General Surgery History and Physical     Referring Provider: No ref. provider found    Patient Care Team:  Joreg Shepherd MD as PCP - General (Internal Medicine)    Chief complaint abdominal pain     Subjective.     History of present illness:  The patient is a 83 y.o. male who presented to the ED with abdominal pain. The pain started today with a wrong sensation and progressed to central sharp abdominal pain that he reports as a tearing sensation that radiates to his chest and pelvis. He has never had pain like this before. He denies a history of gastric ulcers. He denies nausea and vomiting. No bloody stools. No fevers nor chills. No cough nor shortness of breath. He denies NSAID use. No blood thinners. Non-smoker. PSH on the abdomen includes bilateral inguinal hernia repairs.     Review of Systems    Review of Systems - General ROS: positive for  - chills and fatigue  ENT ROS:  negative  Respiratory ROS: no cough, shortness of breath, or wheezing  Cardiovascular ROS: no chest pain or dyspnea on exertion  Gastrointestinal ROS: positive for - abdominal pain  Genito-Urinary ROS: no dysuria, trouble voiding, or hematuria  Dermatological ROS: negative   Breast ROS: negative for breast lumps  Hematological and Lymphatic ROS: negative  Musculoskeletal ROS: negative   Neurological ROS: no TIA or stroke symptoms    Psychological ROS: negative  Endocrine ROS: negative    History  Past Medical History:   Diagnosis Date    Bladder cancer     CLL (chronic lymphocytic leukemia)     Colon polyp     Gallstone     GERD (gastroesophageal reflux disease)     Glaucoma     Hearing loss     Hypertension     Inguinal hernia     right groin    Macular degeneration, right eye    ,   Past Surgical History:   Procedure Laterality Date    CATARACT EXTRACTION, BILATERAL      COLONOSCOPY  2012    CYSTOSCOPY BLADDER BIOPSY      EXCISION MASS HEAD/NECK N/A 3/4/2022    Procedure: EXCISION OF MASS ON POSTERIOR NECK;  Surgeon: Rossana Mahajan MD;  Location: [facility] OR;  Service: General;  Laterality: N/A;    INGUINAL HERNIA REPAIR Left     INGUINAL HERNIA REPAIR Right 2017    Procedure: RIGHT INGUINAL HERNIA REPAIR WITH MESH ;  Surgeon: Rossana Mahajan MD;  Location:  [facility] OR;  Service:    ,   Family History   Problem Relation Age of Onset    Colon cancer Maternal Grandfather         in his 60's.     Alzheimer's disease Mother     Hypertension Father     Other Father         stent    COPD Sister     Heart attack Brother     No Known Problems Maternal Grandmother     No Known Problems Paternal Grandmother     No Known Problems Paternal Grandfather    ,   Social History     Tobacco Use    Smoking status: Former     Current packs/day: 0.00     Types: Cigarettes     Start date:      Quit date:      Years since quittin.7    Smokeless tobacco: Never   Substance Use Topics    Alcohol use: No    Drug  use: No   , (Not in a hospital admission)   and Allergies:  Augmentin [amoxicillin-pot clavulanate] and Latex    Current Facility-Administered Medications:     cefTRIAXone (ROCEPHIN) 1,000 mg in sodium chloride 0.9 % 100 mL MBP, 1,000 mg, Intravenous, Once, Petrona Malone MD, Last Rate: 200 mL/hr at 09/26/24 2112, 1,000 mg at 09/26/24 2112    ketorolac (TORADOL) injection 15 mg, 15 mg, Intravenous, Q6H PRN, Jose Funez MD, 15 mg at 09/26/24 2029    lactated ringers bolus 1,000 mL, 1,000 mL, Intravenous, Once, Jose Funez MD, Last Rate: 2,000 mL/hr at 09/26/24 2105, 1,000 mL at 09/26/24 2105    metroNIDAZOLE (FLAGYL) IVPB 500 mg, 500 mg, Intravenous, Once, Jose Funez MD    morphine injection 4 mg, 4 mg, Intravenous, Once, Jose Funez MD    sodium chloride 0.9 % flush 10 mL, 10 mL, Intravenous, PRN, Emergency, Triage Protocol, MD    [COMPLETED] Insert Peripheral IV, , , Once **AND** sodium chloride 0.9 % flush 10 mL, 10 mL, Intravenous, PRN, Jose Funez MD    Current Outpatient Medications:     latanoprost (XALATAN) 0.005 % ophthalmic solution, Administer 1 drop to the right eye Daily., Disp: , Rfl:     levoFLOXacin (Levaquin) 500 MG tablet, Take 1 tablet by mouth Daily., Disp: 7 tablet, Rfl: 0    metoprolol tartrate (LOPRESSOR) 25 MG tablet, Take 1 tablet by mouth 2 (Two) Times a Day., Disp: , Rfl:     multivitamins-minerals (PRESERVISION AREDS 2) capsule capsule, Take 1 capsule by mouth 2 (Two) Times a Day., Disp: , Rfl:     tamsulosin (FLOMAX) 0.4 MG capsule 24 hr capsule, Take 1 capsule by mouth Daily., Disp: , Rfl:     vitamin B-12 (CYANOCOBALAMIN) 1000 MCG tablet, Take 1 tablet by mouth Daily., Disp: , Rfl:     vitamin D3 125 MCG (5000 UT) capsule capsule, Take 1 capsule by mouth Daily., Disp: , Rfl:     Objective    Vital Signs   Temp:  [97.8 °F (36.6 °C)] 97.8 °F (36.6 °C)  Heart Rate:  [74] 74  Resp:  [18] 18  BP: (173)/(79) 173/79    Physical Exam:  General appearance -  ill-appearing  Mental status - alert, oriented to person, place, and time  Eyes - pupils equal and reactive, extraocular eye movements intact  Neck - supple, no significant adenopathy  Chest - no tachypnea, retractions or cyanosis  Heart - tachycardic   Abdomen - soft, non-distended, severe tenderness to palpation in the upper abdomen   Neurological - alert, oriented, normal speech, no focal findings or movement disorder noted    Results Review:     Lab Results (last 24 hours)       Procedure Component Value Units Date/Time    Urinalysis With Microscopic If Indicated (No Culture) - Urine, Clean Catch [[id number]]  (Abnormal) Collected: 09/26/24 2031    Specimen: Urine, Clean Catch Updated: 09/26/24 2043     Color, UA Yellow     Appearance, UA Clear     pH, UA 6.5     Specific Gravity, UA >1.030     Glucose, UA Negative     Ketones, UA Negative     Bilirubin, UA Negative     Blood, UA Negative     Protein, UA Negative     Leuk Esterase, UA Negative     Nitrite, UA Negative     Urobilinogen, UA 0.2 E.U./dL    Narrative:      Urine microscopic not indicated.    Comprehensive Metabolic Panel [[id number]]  (Abnormal) Collected: 09/26/24 1858    Specimen: Blood Updated: 09/26/24 1936     Glucose 151 mg/dL      BUN 21 mg/dL      Creatinine 1.13 mg/dL      Sodium 134 mmol/L      Potassium 4.4 mmol/L      Chloride 96 mmol/L      CO2 25.0 mmol/L      Calcium 9.0 mg/dL      Total Protein 6.3 g/dL      Albumin 4.0 g/dL      ALT (SGPT) 12 U/L      AST (SGOT) 13 U/L      Alkaline Phosphatase 123 U/L      Total Bilirubin 0.8 mg/dL      Globulin 2.3 gm/dL      A/G Ratio 1.7 g/dL      BUN/Creatinine Ratio 18.6     Anion Gap 13.0 mmol/L      eGFR 65.3 mL/min/1.73     Narrative:      GFR Normal >60  Chronic Kidney Disease <60  Kidney Failure <15    The GFR formula is only valid for adults with stable renal function between ages 18 and 70.    Magnesium [[id number]]  (Normal) Collected: 09/26/24 1858    Specimen: Blood Updated: 09/26/24  1936     Magnesium 2.0 mg/dL     Single High Sensitivity Troponin T [[id number]]  (Normal) Collected: 09/26/24 1858    Specimen: Blood Updated: 09/26/24 1934     HS Troponin T 11 ng/L     Narrative:      High Sensitive Troponin T Reference Range:  <14.0 ng/L- Negative Female for AMI  <22.0 ng/L- Negative Male for AMI  >=14 - Abnormal Female indicating possible myocardial injury.  >=22 - Abnormal Male indicating possible myocardial injury.   Clinicians would have to utilize clinical acumen, EKG, Troponin, and serial changes to determine if it is an Acute Myocardial Infarction or myocardial injury due to an underlying chronic condition.         Lipase [[id number]]  (Normal) Collected: 09/26/24 1858    Specimen: Blood Updated: 09/26/24 1931     Lipase 43 U/L     CBC & Differential [[id number]]  (Abnormal) Collected: 09/26/24 1858    Specimen: Blood Updated: 09/26/24 1922    Narrative:      The following orders were created for panel order CBC & Differential.  Procedure                               Abnormality         Status                     ---------                               -----------         ------                     CBC Auto Differential[[id number]]        Abnormal            Final result                 Please view results for these tests on the individual orders.    CBC Auto Differential [[id number]]  (Abnormal) Collected: 09/26/24 1858    Specimen: Blood Updated: 09/26/24 1922     WBC 9.53 10*3/mm3      RBC 3.87 10*6/mm3      Hemoglobin 11.5 g/dL      Hematocrit 34.1 %      MCV 88.1 fL      MCH 29.7 pg      MCHC 33.7 g/dL      RDW 12.5 %      RDW-SD 40.1 fl      MPV 8.6 fL      Platelets 239 10*3/mm3      Neutrophil % 65.4 %      Lymphocyte % 26.9 %      Monocyte % 7.0 %      Eosinophil % 0.0 %      Basophil % 0.2 %      Immature Grans % 0.5 %      Neutrophils, Absolute 6.23 10*3/mm3      Lymphocytes, Absolute 2.56 10*3/mm3      Monocytes, Absolute 0.67 10*3/mm3      Eosinophils, Absolute 0.00 10*3/mm3       Basophils, Absolute 0.02 10*3/mm3      Immature Grans, Absolute 0.05 10*3/mm3      nRBC 0.0 /100 WBC     [place] Draw [[id number]] Collected: 09/26/24 1858    Specimen: Blood Updated: 09/26/24 1915    Narrative:      The following orders were created for panel order [place] Draw.  Procedure                               Abnormality         Status                     ---------                               -----------         ------                     Green Top (Gel)[[id number]]                                  Final result               Lavender Top[[id number]]                                     Final result               Red Top[[id number]]                                          Final result               Henry Top[[id number]]                                         Final result               Light Blue Top[[id number]]                                   Final result                 Please view results for these tests on the individual orders.    Green Top (Gel) [[id number]] Collected: 09/26/24 1858    Specimen: Blood Updated: 09/26/24 1915     Extra Tube Hold for add-ons.     Comment: Auto resulted.       Lavender Top [[id number]] Collected: 09/26/24 1858    Specimen: Blood Updated: 09/26/24 1915     Extra Tube hold for add-on     Comment: Auto resulted       Red Top [[id number]] Collected: 09/26/24 1858    Specimen: Blood Updated: 09/26/24 1915     Extra Tube Hold for add-ons.     Comment: Auto resulted.       Henry Top [[id number]] Collected: 09/26/24 1858    Specimen: Blood Updated: 09/26/24 1915     Extra Tube Hold for add-ons.     Comment: Auto resulted.       Light Blue Top [[id number]] Collected: 09/26/24 1858    Specimen: Blood Updated: 09/26/24 1915     Extra Tube Hold for add-ons.     Comment: Auto resulted             Imaging Results (Last 24 Hours)       Procedure Component Value Units Date/Time    CT Angiogram Abdomen Pelvis [[id number]] Collected: 09/26/24 2017     Updated: 09/26/24 2035    Narrative:       EXAMINATION: CT ANGIOGRAM ABDOMEN PELVIS-  9/26/2024 8:18 PM     HISTORY: Tearing pain.     DOSE: 440.6 mGycm. All CT scans are performed using dose optimization  techniques as appropriate to the performed exam and including at least  one of the following: Automated exposure control, adjustment of the mA  and/or kV according to size, and the use of the iterative reconstruction  technique..     FINDINGS: Multiple contiguous axial images were obtained through the  abdomen and pelvis both with and without IV contrast administration.  There is patchy groundglass disease in the medial basilar segment of the  right lower lobe. Lung bases are otherwise clear. The base of the heart  is unremarkable. The descending thoracic aorta demonstrates calcific  plaquing without evidence of aneurysm or dissection.     There is atheromatous calcification of the abdominal aorta and iliac  arteries with no evidence of aneurysm or dissection. No rate limiting  stenosis in the abdominal aorta. There is mild stenosis at the origin of  the celiac trunk. The SMA and ONEL are widely patent. There are accessory  renal arteries to both kidneys. There is calcific plaquing with moderate  stenosis of the main right renal artery. There is also calcific plaquing  and mild stenosis at the origin of the left main renal artery.     There is calcific plaquing involving both common iliac arteries without  rate limiting stenosis. There is also mild disease of the internal iliac  arteries. Both external iliac arteries are widely patent to the common  femoral.     There is pneumoperitoneum within the upper abdomen. This is felt to be  secondary to a perforated gastric ulcer with diffuse thickening of the  wall of the more distal body and antrum of the stomach. There are 2  areas of suspected ulceration including on image 83 of series 5 along  the more anterior wall of the stomach along the greater curvature and on  image 75 of series 5 near the gastric  outlet. There is associated  stranding and induration along the anterior wall of the distal stomach  and region of the gastric outlet. There is mild prominence of the wall  within the duodenal bulb and proximal descending duodenum. A small  amount of free air is noted in the anterior aspect of the falciform  ligament. Minimal fluid in the perihepatic space present.     The liver is homogeneous in density. No discrete hepatic mass.     Normal appearance of the gallbladder. No biliary dilatation is present.     The pancreas is normal in appearance with no mass or ductal dilatation.  No acute pancreatitis.     Normal appearance of the spleen.     Both adrenals are unremarkable. There is homogeneous enhancement of the  kidneys. No nephrolithiasis or perinephric fluid collection. Both  ureters are decompressed and normal in appearance.     The small bowel mesentery is normal in appearance with no mass or  adenopathy.     There is mild constipation. A few diverticula are noted of the  descending and sigmoid colon with no diverticulitis. No mechanical  obstruction. The small bowel is normal in distribution and appearance.  Normal appendix.     A fat-containing periumbilical hernia is present. No free fluid in the  paracolic gutters or pelvis. The prostate gland is enlarged. The urinary  bladder is mildly distended but otherwise normal in appearance. No acute  or suspicious bony abnormalities present.       Impression:      1. Pneumoperitoneum. This is felt to be related to a perforated gastric  ulcer with abnormal thickening of the gastric wall within the distal  body and antrum of the stomach extending into the region of the gastric  outlet. There is mild inflammatory stranding along the more distal  greater curvature of the stomach with 2 areas of suspected ulceration 1  along the more anterior wall of the distal body/antrum of the stomach  and one near the gastric outlet. There is associated mucosal enhancement  of the  stomach suggesting associated gastritis. No abscess identified.  2. Atheromatous calcification of the abdominal aorta and branch vessels  without evidence of dissection or aneurysm. No rate limiting luminal  stenosis within the abdominal aorta. There is calcific plaquing and mild  stenosis at the origin of the celiac with mild disease at the origin of  the SMA also present. The SMA and ONEL are otherwise widely patent.  Accessory renal arteries are noted to both kidneys. There is calcific  plaquing and associated stenosis at the origin of both main renal  arteries with moderate luminal stenosis of the right main renal artery  at its origin.  3. Mild constipation. A few diverticula are noted in the left colon. No  diverticulitis. No mechanical obstruction.  4. The prostate is enlarged. The urinary bladder is moderately distended  but otherwise normal in appearance. No free fluid in the pelvis. There  is minimal free fluid in the perihepatic space likely related to the  perforated gastric ulcer. No localized fluid collection to suggest  abscess.     This report was signed and finalized on 9/26/2024 8:32 PM by Dr. Brock Storey MD.       CT Angiogram Chest [[id number]] Collected: 09/26/24 2009     Updated: 09/26/24 2019    Narrative:      Exam: CT angiogram of the of the chest with intravenous contrast.  9/26/2024     CLINICAL HISTORY: Chest and abdominal pain     DOSE:  884.76 mGy.cm . All CT scans are performed using dose  optimization techniques as appropriate to the performed exam and  including at least one of the following: Automated exposure control,  adjustment of the mA and/or kV according to size, and the use of the  iterative reconstruction technique.     TECHNIQUE: Contiguous axial images were obtained through the thorax  following intravenous contrast administration with reformatted images  obtained in the sagittal and coronal projections from the original data  set. Three-dimensional reconstructions are  also obtained.     COMPARISON: None available     FINDINGS:     Pulmonary arteries:  There is normal enhancement of the pulmonary  arteries with no evidence of pulmonary thromboembolic disease.     Aorta: The thoracic aorta and great vessels are remarkable for an  ascending thoracic aorta which measures 3.9 cm in transverse diameter.  There is atheromatous calcification. No rate limiting stenosis or  dissection.     LUNGS: There is patchy groundglass disease within the medial right lower  lobe as well as within the periphery of the superior segment of the  right lower lobe suggesting infiltrate. There is mild scarring in the  apices. No additional consolidative pneumonia present. No evidence of  effusion.     Pleural spaces: Unremarkable. No evidence of pleural effusion or  pneumothorax.     HEART: No evidence of cardiac enlargement or right ventricular  dysfunction. No pericardial effusion is present. Extensive coronary  calcifications are present.     Bones: No acute osseous abnormalities are demonstrated.     CHEST WALL: No chest wall abnormalities are demonstrated.     Lymph nodes: Enlarged mediastinal nodes are present including a 1.6 cm  precarinal node. There are several small shotty nodes in the superior  mediastinum. An AP window node measures 1.1 cm in short axis. There is a  subcarinal isidro mass measuring 2.2 cm in short axis. A 1.2 cm right  hilar node is present. Several prominent left axillary nodes are also  present but not imaged in their entirety.     Upper abdomen: There is pneumoperitoneum within the upper abdomen. That  will be further discussed on the CT abdomen and pelvis report.       Impression:      1. No evidence of pulmonary thromboembolic disease.  2. Atheromatous calcification of the thoracic aorta and great vessels  with the ascending thoracic aorta measuring up to 3.9 cm in diameter. No  dissection or rate limiting stenosis.  3. Pneumoperitoneum within the upper abdomen which will be  "further  discussed in the CT abdomen and pelvis report.  4. 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  5. Mediastinal and right hilar lymphadenopathy. Several prominent nodes  within the left axilla are also present but not imaged in their  entirety.     This report was signed and finalized on 9/26/2024 8:16 PM by Dr. Brock Storey MD.                 Assessment & Plan    Perforated gastric ulcer     Mr. Dey is an 83 year old male with a perforated gastric ulcer and pneumoperitoneum. He has two areas of concern for ulceration on the stomach - anterior wall of the distal body and at the gastric outlet. He has peritonitis on exam and tachycardic. His pain is not improved with IV narcotics. I have recommended a robotic repair of a gastric ulcer, possible open. We discussed the risks including bleeding, infection, damage to surrounding structures and cardiopulmonary effects of anesthesia. Consent obtained. We discussed that he will have an NGT for a minimum of 5 days post op when we will plan to study the repair before removing the NGT. He will be admitted to the intensivist in the ICU post op. To OR tonight. Rocephin, Flagyl, subcutaneous heparin, and type and screen ordered.       Petrona Malone MD  09/26/24  21:20 CDT                Electronically signed by Petrona Malone MD at 09/26/24 2126          Emergency Department Notes        Jose Funez MD at 09/26/24 1952          Subjective   History of Present Illness  83-year-old  male presents to [facility] ER with chief complaint of abdominal pain.  Discusses that earlier today he had a sensation that \"something was wrong in his abdomen.\"  The sensation worsened to the point of a sharp pain centrally located tearing in nature radiating " "both inferiorly deep into the pelvis as well as superiorly into the chest.  He has never had such a sensation in his life.  It is unremitting and intolerable.  Denies nausea emesis or change in stool.   unremarkable.  Chest and lungs no alondra chest pain at rest or with exertion, similarly denies pleurisy.  Denies cough shortness of breath or dyspnea.  The tenderness from the abdomen does radiate into the chest on an intermittent basis and when it does it \"hits hard.\"  Neuro unremarkable.  Denies constitutional symptomatology.        Review of Systems   All other systems reviewed and are negative.      Past Medical History:   Diagnosis Date    Bladder cancer     CLL (chronic lymphocytic leukemia)     Colon polyp     Gallstone     GERD (gastroesophageal reflux disease)     Glaucoma     Hearing loss     Hypertension     Inguinal hernia     right groin    Macular degeneration, right eye        Allergies   Allergen Reactions    Augmentin [Amoxicillin-Pot Clavulanate] Hives    Latex Itching and Other (See Comments)     And band aids. Causes redness and itching.       Past Surgical History:   Procedure Laterality Date    CATARACT EXTRACTION, BILATERAL      COLONOSCOPY  06/13/2012    CYSTOSCOPY BLADDER BIOPSY      EXCISION MASS HEAD/NECK N/A 3/4/2022    Procedure: EXCISION OF MASS ON POSTERIOR NECK;  Surgeon: Rossana Mahajan MD;  Location:  [facility] OR;  Service: General;  Laterality: N/A;    INGUINAL HERNIA REPAIR Left 2007    INGUINAL HERNIA REPAIR Right 12/28/2017    Procedure: RIGHT INGUINAL HERNIA REPAIR WITH MESH ;  Surgeon: Rossana Mahajan MD;  Location:  [facility] OR;  Service:        Family History   Problem Relation Age of Onset    Colon cancer Maternal Grandfather         in his 60's.     Alzheimer's disease Mother     Hypertension Father     Other Father         stent    COPD Sister     Heart attack Brother     No Known Problems Maternal Grandmother     No Known Problems Paternal Grandmother     No Known Problems " Paternal Grandfather        Social History     Socioeconomic History    Marital status:    Tobacco Use    Smoking status: Former     Current packs/day: 0.00     Types: Cigarettes     Start date:      Quit date:      Years since quittin.7    Smokeless tobacco: Never   Substance and Sexual Activity    Alcohol use: No    Drug use: No    Sexual activity: Defer           Objective   Physical Exam  Vitals and nursing note reviewed.   Constitutional:       General: He is in acute distress.      Appearance: He is well-developed. He is not ill-appearing, toxic-appearing or diaphoretic.      Comments: Quite thin   HENT:      Head: Normocephalic and atraumatic.   Eyes:      Extraocular Movements: Extraocular movements intact.      Pupils: Pupils are equal, round, and reactive to light.   Cardiovascular:      Rate and Rhythm: Normal rate and regular rhythm.   Pulmonary:      Effort: Pulmonary effort is normal.      Breath sounds: Normal breath sounds.   Abdominal:      Comments: Abdomen appears to be planar, no skin color changes, no accentuated vasculature, I do not appreciate any masses, I do not see any old scars, hypoactive bowel sounds, there is exquisite tenderness centrally with mild palpation, negative Burdick, negative McBurney, negative Rovsing.  The patient is actively guarding   Skin:     General: Skin is warm and dry.   Neurological:      General: No focal deficit present.      Mental Status: He is alert and oriented to person, place, and time.   Psychiatric:         Mood and Affect: Mood normal.         Behavior: Behavior normal.         Procedures          ED Course                                             Medical Decision Making  Discussion with surgeon reveals that they will come see the patient here in the ER.  It is Dr. Malone    Amount and/or Complexity of Data Reviewed  Labs: ordered.     Details: Labs Reviewed  COMPREHENSIVE METABOLIC PANEL - Abnormal; Notable for the following  components:     Glucose                       151 (*)                Sodium                        134 (*)                Chloride                      96 (*)                 Alkaline Phosphatase          123 (*)             All other components within normal limits         Narrative: GFR Normal >60                  Chronic Kidney Disease <60                  Kidney Failure <15                                    The GFR formula is only valid for adults with stable renal function between ages 18 and 70.  URINALYSIS W/ MICROSCOPIC IF INDICATED (NO CULTURE) - Abnormal; Notable for the following components:     Specific Gravity, UA          >1.030 (*)            All other components within normal limits         Narrative: Urine microscopic not indicated.  CBC WITH AUTO DIFFERENTIAL - Abnormal; Notable for the following components:     RBC                           3.87 (*)               Hemoglobin                    11.5 (*)               Hematocrit                    34.1 (*)               Eosinophil %                  0.0 (*)             All other components within normal limits  LIPASE - Normal  SINGLE HS TROPONIN T - Normal         Narrative: High Sensitive Troponin T Reference Range:                  <14.0 ng/L- Negative Female for AMI                  <22.0 ng/L- Negative Male for AMI                  >=14 - Abnormal Female indicating possible myocardial injury.                  >=22 - Abnormal Male indicating possible myocardial injury.                   Clinicians would have to utilize clinical acumen, EKG, Troponin, and serial changes to determine if it is an Acute Myocardial Infarction or myocardial injury due to an underlying chronic condition.                                       MAGNESIUM - Normal  RAINBOW DRAW         Narrative: The following orders were created for panel order [facility] Draw.                  Procedure                               Abnormality         Status                                      ---------                               -----------         ------                                     Green Top (Gel)[[id number]]                                  Final result                               Lavender Top[[id number]]                                     Final result                               Red Top[[id number]]                                          Final result                               Gray Top[[id number]]                                         Final result                               Light Blue Top[[id number]]                                   Final result                                                 Please view results for these tests on the individual orders.  GREEN TOP  LAVENDER TOP  RED TOP  GRAY TOP  LIGHT BLUE TOP  CBC AND DIFFERENTIAL    Radiology: ordered.     Details: CT Angiogram Chest   Final Result    1. No evidence of pulmonary thromboembolic disease.    2. Atheromatous calcification of the thoracic aorta and great vessels    with the ascending thoracic aorta measuring up to 3.9 cm in diameter. No    dissection or rate limiting stenosis.    3. Pneumoperitoneum within the upper abdomen which will be further    discussed in the CT abdomen and pelvis report.    4. Patchy groundglass disease in the medial right lower lobe in the    basilar segment as well as within the periphery of the superior segment    of the right lower lobe suggesting a predominantly interstitial    pneumonitis in the right lower lobe.    5. Mediastinal and right hilar lymphadenopathy. Several prominent nodes    within the left axilla are also present but not imaged in their    entirety.         This report was signed and finalized on 9/26/2024 8:16 PM by Dr. Brock Storey MD.          CT Angiogram Abdomen Pelvis   Final Result    1. Pneumoperitoneum. This is felt to be related to a perforated gastric    ulcer with abnormal thickening of the gastric wall within the distal    body and antrum of the  stomach extending into the region of the gastric    outlet. There is mild inflammatory stranding along the more distal    greater curvature of the stomach with 2 areas of suspected ulceration 1    along the more anterior wall of the distal body/antrum of the stomach    and one near the gastric outlet. There is associated mucosal enhancement    of the stomach suggesting associated gastritis. No abscess identified.    2. Atheromatous calcification of the abdominal aorta and branch vessels    without evidence of dissection or aneurysm. No rate limiting luminal    stenosis within the abdominal aorta. There is calcific plaquing and mild    stenosis at the origin of the celiac with mild disease at the origin of    the SMA also present. The SMA and ONEL are otherwise widely patent.    Accessory renal arteries are noted to both kidneys. There is calcific    plaquing and associated stenosis at the origin of both main renal    arteries with moderate luminal stenosis of the right main renal artery    at its origin.    3. Mild constipation. A few diverticula are noted in the left colon. No    diverticulitis. No mechanical obstruction.    4. The prostate is enlarged. The urinary bladder is moderately distended    but otherwise normal in appearance. No free fluid in the pelvis. There    is minimal free fluid in the perihepatic space likely related to the    perforated gastric ulcer. No localized fluid collection to suggest    abscess.         This report was signed and finalized on 9/26/2024 8:32 PM by Dr. Brock Storey MD.            ECG/medicine tests: ordered.    Risk  Prescription drug management.  Decision regarding hospitalization.        Final diagnoses:   None       ED Disposition  ED Disposition       None            No follow-up provider specified.       Medication List      No changes were made to your prescriptions during this visit.            Jose Funez MD  09/26/24 1393      Electronically signed by  Jose Funez MD at 09/26/24 2106       Vital Signs (last 2 days)       Date/Time Temp Temp src Pulse Resp BP Patient Position SpO2    09/27/24 0800 98.8 (37.1) Axillary 77 20 131/83 Lying 99    09/27/24 0700 -- -- 71 20 112/51 Lying 97    09/27/24 0600 -- -- 73 -- 115/50 -- 98    09/27/24 0500 -- -- 77 14 140/65 -- 98    09/27/24 0400 98.8 (37.1) -- 77 14 114/56 -- 100    09/27/24 0350 -- -- 87 14 -- -- 97    09/27/24 0300 -- -- 85 -- 138/73 -- 96    09/27/24 0200 -- -- 78 -- 122/62 -- 100    09/27/24 0100 101.8 (38.8) -- 84 -- 121/55 -- 98    09/27/24 0000 -- -- 92 16 -- -- 100    09/26/24 2353 -- -- 98 -- 193/52 -- --    09/26/24 2350 -- -- 100 16 182/65 -- 98    09/26/24 2346 -- -- 101 -- 209/58  -- --    BP: a-line at 09/26/24 2346 09/26/24 2335 -- -- -- 14 -- -- --    09/26/24 2330 -- -- 106 14 157/68 -- 98    09/26/24 2329 -- -- 108 14 231/61 -- 98    09/26/24 2325 -- -- 107 14 154/58 -- 99    09/26/24 2320 99.2 (37.3) Temporal 106 14 154/58 Lying 99    09/26/24 1852 97.8 (36.6) -- 74 18 173/79 -- 98          Oxygen Therapy (last 2 days)       Date/Time SpO2 Device (Oxygen Therapy) Flow (L/min) Oxygen Concentration (%) ETCO2 (mmHg)    09/27/24 0800 99 nasal cannula 3 -- --    09/27/24 0700 97 nasal cannula 3 -- --    09/27/24 0600 98 -- -- -- --    09/27/24 0500 98 -- -- -- --    09/27/24 0400 100 -- -- -- --    09/27/24 0350 97 nasal cannula 3 -- --    09/27/24 0300 96 -- -- -- --    09/27/24 0200 100 -- -- -- --    09/27/24 0100 98 -- -- -- --    09/27/24 0000 100 -- -- -- --    09/26/24 2350 98 nasal cannula 3 -- --    09/26/24 2330 98 -- -- -- --    09/26/24 2329 98 -- -- -- --    09/26/24 2325 99 -- -- -- --    09/26/24 2320 99 simple face mask 8 -- --    09/26/24 1852 98 room air -- -- --          Intake & Output (last 2 days)         09/25 0701 09/26 0700 09/26 0701 09/27 0700 09/27 0701 09/28 0700    I.V. (mL/kg)  1721.7 (27.8) 277.7 (4.5)    Total Intake(mL/kg)  1721.7 (27.8) 277.7 (4.5)     Urine (mL/kg/hr)  1250 125 (1.1)    Drains  5 20    Total Output  1255 145    Net  +466.7 +132.7                 Facility-Administered Medications as of 9/27/2024   Medication Dose Route Frequency Provider Last Rate Last Admin    acetaminophen (TYLENOL) suppository 650 mg  650 mg Rectal Q4H PRN Petrona Malone MD        sennosides-docusate (PERICOLACE) 8.6-50 MG per tablet 2 tablet  2 tablet Nasogastric BID Salvatore Laughlin DO   2 tablet at 09/27/24 0812    And    polyethylene glycol (MIRALAX) packet 17 g  17 g Nasogastric Daily PRN Salvatore Laughlin DO        And    bisacodyl (DULCOLAX) suppository 10 mg  10 mg Rectal Daily PRN Salvatore Laughlin DO        Calcium Replacement - Follow Nurse / BPA Driven Protocol   Does not apply PRN Salvatore Laughlin DO        [COMPLETED] cefepime 2000 mg IVPB in 100 mL NS (MBP)  2,000 mg Intravenous Once Petrona Malone MD   2,000 mg at 09/27/24 0259    cefepime 2000 mg IVPB in 100 mL NS (MBP)  2 g Intravenous Q8H Petrona Malone MD   2 g at 09/27/24 0814    [COMPLETED] cefTRIAXone (ROCEPHIN) 1,000 mg in sodium chloride 0.9 % 100 mL MBP  1,000 mg Intravenous Once Petrona Malone  mL/hr at 09/26/24 2112 1,000 mg at 09/26/24 2112    [COMPLETED] Chlorhexidine Gluconate Cloth 2 % pads 1 Application  1 Application Topical Once Salvatore Laughlin DO   1 Application at 09/27/24 0150    [START ON 9/28/2024] Chlorhexidine Gluconate Cloth 2 % pads 1 Application  1 Application Topical Q24H Salvatore Laughlin DO        dexmedetomidine (PRECEDEX) 20 mcg/5 mL Syringe   Intravenous PRN Estevan Armenta CRNA   20 mcg at 09/26/24 2317    [COMPLETED] droperidol (INAPSINE) injection 0.625 mg  0.625 mg Intravenous Once PRN Estevan Armenta CRNA   0.625 mg at 09/26/24 2329    Or    [COMPLETED] droperidol (INAPSINE) injection 0.625 mg  0.625 mg Intramuscular Once PRN Estevan Armenta, CRNA        fentaNYL citrate  (PF) (SUBLIMAZE) injection   Intravenous PRN Estevan Armenta CRNA   250 mcg at 09/26/24 2202    fluconazole (DIFLUCAN) IVPB 400 mg  400 mg Intravenous Q24H Petrona Malone  mL/hr at 09/27/24 0047 400 mg at 09/27/24 0047    heparin (porcine) 5000 UNIT/ML injection 5,000 Units  5,000 Units Subcutaneous Q8H Estevan Marx APRN   5,000 Units at 09/27/24 0625    HYDROmorphone (DILAUDID) injection 0.5 mg  0.5 mg Intravenous Q2H PRN Petrona Malone MD        And    naloxone (NARCAN) injection 0.1 mg  0.1 mg Intravenous Q5 Min PRN Petrona Malone MD        [COMPLETED] HYDROmorphone (DILAUDID) injection 1 mg  1 mg Intravenous Once Petrona Malone MD   1 mg at 09/26/24 2111    [COMPLETED] iopamidol (ISOVUE-370) 76 % injection 100 mL  100 mL Intravenous Once in imaging Jose Funez MD   100 mL at 09/26/24 1950    ipratropium-albuterol (DUO-NEB) nebulizer solution 3 mL  3 mL Nebulization Q4H PRN Petrona Malone MD        ketamine hcl syringe solution prefilled syringe   Intravenous PRN Estevan Armenta CRNA   50 mg at 09/26/24 2202    [COMPLETED] lactated ringers bolus 1,000 mL  1,000 mL Intravenous Once Jose Funez MD 2,000 mL/hr at 09/26/24 2105 1,000 mL at 09/26/24 2105    lactated ringers infusion   Intravenous Continuous PRN Estevan Armenta CRNA   New Bag at 09/26/24 2155    lactated ringers infusion   Intravenous Continuous PRN Estevan Armenta CRNA   New Bag at 09/26/24 2155    lactated ringers infusion  100 mL/hr Intravenous Continuous Petrona Malone  mL/hr at 09/27/24 0047 100 mL/hr at 09/27/24 0047    Lidocaine 4 % 2 patch  2 patch Transdermal Q24H Petrona Malone MD   2 patch at 09/27/24 0812    lidocaine PF 2% (XYLOCAINE) injection   Intravenous PRN Estevan Armenta, CRNA   100 mg at 09/26/24 2202    Magnesium Standard Dose Replacement - Follow Nurse / BPA Driven Protocol   Does not apply PRN Salvatore Laughlin DO        metoprolol  tartrate (LOPRESSOR) injection 5 mg  5 mg Intravenous Q8H Petrona Malone MD   5 mg at 09/27/24 0125    [COMPLETED] metroNIDAZOLE (FLAGYL) IVPB 500 mg  500 mg Intravenous Once Jose Funez MD   500 mg at 09/26/24 2207    metroNIDAZOLE (FLAGYL) IVPB 500 mg  500 mg Intravenous Q8H Petrona Malone  mL/hr at 09/27/24 0048 500 mg at 09/27/24 0048    [COMPLETED] morphine injection 4 mg  4 mg Intravenous Once Jose Funez MD   4 mg at 09/26/24 1931    mupirocin (BACTROBAN) 2 % nasal ointment 1 Application  1 Application Each Nare BID Salvatore Laughlin DO   1 Application at 09/27/24 0811    nitroglycerin (NITROSTAT) SL tablet 0.4 mg  0.4 mg Sublingual Q5 Min PRN Petrona Malone MD        [COMPLETED] ondansetron (ZOFRAN) injection 4 mg  4 mg Intravenous Once Jose Funez MD   4 mg at 09/26/24 1931    ondansetron ODT (ZOFRAN-ODT) disintegrating tablet 4 mg  4 mg Nasogastric Q6H PRN Salvatore Laughlin DO        Or    ondansetron (ZOFRAN) injection 4 mg  4 mg Intravenous Q6H PRN Salvatore Laughlin DO        ondansetron (ZOFRAN) injection   Intravenous PRN Estevan Armenta CRNA   4 mg at 09/26/24 2306    pantoprazole (PROTONIX) injection 40 mg  40 mg Intravenous Q12H Petrona Malone MD   40 mg at 09/27/24 0814    Phosphorus Replacement - Follow Nurse / BPA Driven Protocol   Does not apply PRN Salvatore Laughlin DO        Potassium Replacement - Follow Nurse / BPA Driven Protocol   Does not apply PRN Salvatore Laughlin DO        rocuronium (ZEMURON) injection   Intravenous PRN Estevan Armenta CRNA   50 mg at 09/26/24 2210    [COMPLETED] sodium chloride 0.9 % bolus 1,000 mL  1,000 mL Intravenous Once Jose Funez MD   Stopped at 09/26/24 2113    sodium chloride 0.9 % flush 10 mL  10 mL Intravenous Q12H Salvatore Laughlin, DO   10 mL at 09/27/24 0807    sodium chloride 0.9 % flush 10 mL  10 mL Intravenous PRN Salvatore Laughlin, DO  "       sodium chloride 0.9 % infusion 40 mL  40 mL Intravenous PRN Salvatore Laughlin,         sugammadex (BRIDION) injection   Intravenous PRN Estevan Armenta, CRNA   200 mg at 09/26/24 2308     Orders (last 48 hrs)        Start     Ordered    09/28/24 0400  Chlorhexidine Gluconate Cloth 2 % pads 1 Application  Every 24 Hours         09/27/24 0025    09/27/24 0900  Lidocaine 4 % 2 patch  Every 24 Hours Scheduled         09/27/24 0032    09/27/24 0900  sennosides-docusate (PERICOLACE) 8.6-50 MG per tablet 2 tablet  2 Times Daily        Placed in \"And\" Linked Group    09/27/24 0746    09/27/24 0832  Follow Pressure Ulcer Prevention Measures Policy  Continuous        Comments: Implement Appropriate Pressure Ulcer Prevention Measures  - Open Order Report to View Full Instructions  Enter Wound LDA & Document Assessment  Add Wound Care Plan  Add Patient Education Per Policy    09/27/24 0832    09/27/24 0832  Turn Patient  Now Then Every 2 Hours         09/27/24 0832    09/27/24 0832  Elevate Heels Off of Bed  Until Discontinued         09/27/24 0832    09/27/24 0832  Use Seat Cushion When Up In Chair  Continuous         09/27/24 0832    09/27/24 0832  Silicone Border Dressing to Bony Prominences  Per Order Details        Comments: Apply silicone border foam dressing per protocol to sacral spine/bilateral heels for protection.  Nursing to change dressing every 3 days and PRN if soiled. Nursing is to peel back dressing with every assessment to assess skin underneath dressing. No barrier cream under dressing.    09/27/24 0832    09/27/24 0832  Use Repositioning Wedge to Position Patient  Continuous        Comments: Use Comfort Glide repositioning sheet and wedges to position patient.    09/27/24 0832    09/27/24 0815  cefepime 2000 mg IVPB in 100 mL NS (MBP)  Every 8 Hours         09/27/24 0032    09/27/24 0800  Strip SEGUNDO drain  Every 8 Hours         09/27/24 0032    09/27/24 0753  STAT Lactic Acid, Reflex  " "PROCEDURE ONCE         09/27/24 0529    09/27/24 0747  ondansetron ODT (ZOFRAN-ODT) disintegrating tablet 4 mg  Every 6 Hours PRN        Placed in \"Or\" Linked Group    09/27/24 0747    09/27/24 0747  ondansetron (ZOFRAN) injection 4 mg  Every 6 Hours PRN        Placed in \"Or\" Linked Group    09/27/24 0747    09/27/24 0745  polyethylene glycol (MIRALAX) packet 17 g  Daily PRN        Placed in \"And\" Linked Group    09/27/24 0746    09/27/24 0745  bisacodyl (DULCOLAX) suppository 10 mg  Daily PRN        Placed in \"And\" Linked Group    09/27/24 0746    09/27/24 0600  Incentive Spirometry  Every Hour While Awake       09/27/24 0032    09/27/24 0600  CBC & Differential  Daily       09/27/24 0032    09/27/24 0600  Comprehensive Metabolic Panel  Daily         09/27/24 0032    09/27/24 0600  Magnesium  Daily       09/27/24 0032    09/27/24 0600  Phosphorus  Daily       09/27/24 0032    09/27/24 0600  Basic Metabolic Panel  Daily       09/27/24 0025    09/27/24 0600  CBC & Differential  Daily,   Status:  Canceled       09/27/24 0025    09/27/24 0600  CBC Auto Differential  PROCEDURE ONCE,   Status:  Canceled         09/27/24 0025    09/27/24 0600  Lactic Acid, Plasma  Morning Draw         09/27/24 0255    09/27/24 0354  ipratropium-albuterol (DUO-NEB) nebulizer solution 3 mL  Every 4 Hours PRN         09/27/24 0356    09/27/24 0255  Blood Culture With DEV - Blood, Hand, Left  Once         09/27/24 0255    09/27/24 0255  Blood Culture With DEV - Blood, Hand, Right  Once         09/27/24 0255    09/27/24 0200  Restraints Non-Violent or Non-Self Destructive  Calendar Day         09/27/24 0214    09/27/24 0130  lactated ringers infusion  Continuous         09/27/24 0032    09/27/24 0130  cefepime 2000 mg IVPB in 100 mL NS (MBP)  Once         09/27/24 0032    09/27/24 0130  pantoprazole (PROTONIX) injection 40 mg  Every 12 Hours Scheduled         09/27/24 0032    09/27/24 0115  sodium chloride 0.9 % flush 10 mL  Every 12 Hours " "Scheduled         09/27/24 0025    09/27/24 0115  mupirocin (BACTROBAN) 2 % nasal ointment 1 Application  2 Times Daily         09/27/24 0025    09/27/24 0115  Chlorhexidine Gluconate Cloth 2 % pads 1 Application  Once         09/27/24 0025    09/27/24 0115  sennosides-docusate (PERICOLACE) 8.6-50 MG per tablet 2 tablet  2 Times Daily,   Status:  Discontinued        Placed in \"And\" Linked Group    09/27/24 0025    09/27/24 0100  Strict Intake and Output  Every Hour       09/27/24 0032    09/27/24 0100  Turn Cough & Deep Breathe  Every Hour       09/27/24 0032    09/27/24 0100  Vital Signs Every Hour and Per Hospital Policy Based on Patient Condition  Every Hour       09/27/24 0025    09/27/24 0100  Intake & Output  Every Hour       09/27/24 0025    09/27/24 0038  Oxygen Therapy- Nasal Cannula; Titrate 1-6 LPM Per SpO2; 90 - 95%  Continuous         09/27/24 0037    09/27/24 0038  Continuous Pulse Oximetry  Continuous         09/27/24 0037    09/27/24 0032  Code Status and Medical Interventions: CPR (Attempt to Resuscitate); Full Support  Continuous         09/27/24 0032    09/27/24 0032  Maintain Sequential Compression Device  Continuous         09/27/24 0032    09/27/24 0032  Continuous Cardiac Monitoring  Continuous        Comments: Follow Standing Orders As Outlined in Process Instructions (Open Order Report to View Full Instructions)    09/27/24 0032    09/27/24 0032  Maintain IV Access  Continuous         09/27/24 0032    09/27/24 0032  Telemetry - Place Orders & Notify Provider of Results When Patient Experiences Acute Chest Pain, Dysrhythmia or Respiratory Distress  Continuous        Comments: Open Order Report to View Parameters Requiring Provider Notification    09/27/24 0032    09/27/24 0032  May Be Off Telemetry for Tests  Continuous         09/27/24 0032    09/27/24 0032  PT Consult: Eval & Treat Post Surgery Care  Once         09/27/24 0032    09/27/24 0032  OT Consult: Eval & Treat Post-Surgery Care  " "Once         09/27/24 0032    09/27/24 0032  NG Tube to Low Wall Suction  Continuous         09/27/24 0032    09/27/24 0032  RT to Initiate Breathing Treatment Protocol  Once         09/27/24 0032    09/27/24 0032  NPO Diet NPO Type: Strict NPO  Diet Effective Now        Comments: Strict with critical ngt    09/27/24 0032    09/27/24 0032  Antibiotic Previously Ordered  Once         09/27/24 0032    09/27/24 0031  metroNIDAZOLE (FLAGYL) IVPB 500 mg  Every 8 Hours         09/27/24 0032    09/27/24 0031  fluconazole (DIFLUCAN) IVPB 400 mg  Every 24 Hours         09/27/24 0032    09/27/24 0031  HYDROmorphone (DILAUDID) injection 0.5 mg  Every 2 Hours PRN        Placed in \"And\" Linked Group    09/27/24 0032    09/27/24 0031  naloxone (NARCAN) injection 0.1 mg  Every 5 Minutes PRN        Placed in \"And\" Linked Group    09/27/24 0032    09/27/24 0031  acetaminophen (TYLENOL) suppository 650 mg  Every 4 Hours PRN         09/27/24 0032    09/27/24 0031  ondansetron ODT (ZOFRAN-ODT) disintegrating tablet 4 mg  Every 6 Hours PRN,   Status:  Discontinued        Placed in \"Or\" Linked Group    09/27/24 0032    09/27/24 0031  ondansetron (ZOFRAN) injection 4 mg  Every 6 Hours PRN,   Status:  Discontinued        Placed in \"Or\" Linked Group    09/27/24 0032    09/27/24 0031  metoprolol tartrate (LOPRESSOR) injection 5 mg  Every 8 Hours         09/27/24 0032    09/27/24 0031  nitroglycerin (NITROSTAT) SL tablet 0.4 mg  Every 5 Minutes PRN         09/27/24 0032    09/27/24 0031  droperidol (INAPSINE) injection 0.625 mg  Once As Needed,   Status:  Discontinued        Placed in \"Or\" Linked Group    09/27/24 0032    09/27/24 0031  droperidol (INAPSINE) injection 0.625 mg  Once As Needed,   Status:  Discontinued        Placed in \"Or\" Linked Group    09/27/24 0032    09/27/24 0026  Daily Weights  Daily       09/27/24 0025    09/27/24 0025  Potassium Replacement - Follow Nurse / BPA Driven Protocol  As Needed         09/27/24 0025    " 09/27/24 0025  Magnesium Standard Dose Replacement - Follow Nurse / BPA Driven Protocol  As Needed         09/27/24 0025    09/27/24 0025  Phosphorus Replacement - Follow Nurse / BPA Driven Protocol  As Needed         09/27/24 0025    09/27/24 0025  Calcium Replacement - Follow Nurse / BPA Driven Protocol  As Needed         09/27/24 0025    09/27/24 0025  Continuous Cardiac Monitoring  Continuous,   Status:  Canceled        Comments: Follow Standing Orders As Outlined in Process Instructions (Open Order Report to View Full Instructions)    09/27/24 0025    09/27/24 0025  Maintain IV Access  Continuous,   Status:  Canceled         09/27/24 0025    09/27/24 0025  Telemetry - Place Orders & Notify Provider of Results When Patient Experiences Acute Chest Pain, Dysrhythmia or Respiratory Distress  Continuous        Comments: Open Order Report to View Parameters Requiring Provider Notification    09/27/24 0025    09/27/24 0025  Continuous Pulse Oximetry  Continuous,   Status:  Canceled         09/27/24 0025    09/27/24 0025  Height & Weight  Once         09/27/24 0025    09/27/24 0025  Oral Care - Patient Not on NPPV & Not Intubated  Every Shift       09/27/24 0025    09/27/24 0025  Target Arousal Level RASS 0 to -2  Continuous         09/27/24 0025    09/27/24 0025  Use Mobility Guidelines for Advancement of Activity  Continuous         09/27/24 0025    09/27/24 0025  Appropriate VTE Prophylaxis Orders in Place  Once         09/27/24 0025    09/27/24 0025  Insert Peripheral IV  Once         09/27/24 0025    09/27/24 0025  Saline Lock & Maintain IV Access  Continuous,   Status:  Canceled         09/27/24 0025    09/27/24 0025  Code Status and Medical Interventions: CPR (Attempt to Resuscitate); Full Support  Continuous,   Status:  Canceled         09/27/24 0025    09/27/24 0025  If Patient has BG Less Than 80 & is Symptomatic But Not on IV Insulin Protocol - Use Adult Hypoglycemia Treatment Orders  Continuous          "09/27/24 0025    09/27/24 0024  sodium chloride 0.9 % flush 10 mL  As Needed         09/27/24 0025    09/27/24 0024  sodium chloride 0.9 % infusion 40 mL  As Needed         09/27/24 0025    09/27/24 0024  polyethylene glycol (MIRALAX) packet 17 g  Daily PRN,   Status:  Discontinued        Placed in \"And\" Linked Group    09/27/24 0025    09/27/24 0024  bisacodyl (DULCOLAX) EC tablet 5 mg  Daily PRN,   Status:  Discontinued        Placed in \"And\" Linked Group    09/27/24 0025    09/27/24 0024  bisacodyl (DULCOLAX) suppository 10 mg  Daily PRN,   Status:  Discontinued        Placed in \"And\" Linked Group    09/27/24 0025    09/27/24 0024  Inpatient Admission  Once         09/27/24 0025 09/26/24 2324  Continue Indwelling Urinary Catheter  Once        Placed in \"And\" Linked Group    09/26/24 2326 09/26/24 2324  Assess Need for Indwelling Urinary Catheter - Follow Removal Protocol  Continuous        Comments: Follow Protocol As Outlined in Process Instructions (Open Order Report to View Full Instructions)   Placed in \"And\" Linked Group    09/26/24 2326 09/26/24 2324  Urinary Catheter Care  Every Shift      Placed in \"And\" Linked Group    09/26/24 2326 09/26/24 2324  Notify Provider  Continuous        Comments: Open Order Report to View Parameters Requiring Provider Notification    09/26/24 2326 09/26/24 2323  Place Sequential Compression Device  Once         09/26/24 2326 09/26/24 2323  Vital Signs  Per Hospital Policy         09/26/24 2326 09/26/24 2320  POC Glucose STAT  STAT,   Status:  Canceled        Comments: Post op Glucose Check on All Diabetic Patients, Notify Anesthesia if Blood Sugar is Less Than 80 mg/dL or Greater Than 250mg/dL      09/26/24 2320 09/26/24 2320  Vital signs every 5 minutes for 15 minutes, every 15 minutes thereafter.  Once,   Status:  Canceled         09/26/24 2320 09/26/24 2320  Call Anesthesiologist for additional IV Fluid bolus for Hypotension/Tachycardia  " "Continuous,   Status:  Canceled         09/26/24 2320 09/26/24 2320  Notify Anesthesia of Any Acute Changes in Patient Condition  Until Discontinued,   Status:  Canceled         09/26/24 2320 09/26/24 2320  Notify Anesthesia for Unrelieved Pain  Until Discontinued,   Status:  Canceled         09/26/24 2320 09/26/24 2320  droperidol (INAPSINE) injection 0.625 mg  Once As Needed        Placed in \"Or\" Linked Group    09/26/24 2320 09/26/24 2320  droperidol (INAPSINE) injection 0.625 mg  Once As Needed        Placed in \"Or\" Linked Group    09/26/24 2320 09/26/24 2320  Once DC criteria to floor met, follow surgeon's orders.  Until Discontinued,   Status:  Canceled         09/26/24 2320 09/26/24 2320  Discharge patient from PACU when discharge criteria is met.  Until Discontinued,   Status:  Canceled         09/26/24 2320 09/26/24 2319  Apply warming blanket  As Needed,   Status:  Canceled      Comments: For a recorded temp of <36.9 C    09/26/24 2320 09/26/24 2319  ibuprofen (ADVIL,MOTRIN) tablet 600 mg  Every 6 Hours PRN,   Status:  Discontinued         09/26/24 2320 09/26/24 2319  oxyCODONE-acetaminophen (PERCOCET)  MG per tablet 1 tablet  Every 4 Hours PRN,   Status:  Discontinued         09/26/24 2320 09/26/24 2319  HYDROmorphone (DILAUDID) injection 0.5 mg  Every 10 Minutes PRN,   Status:  Discontinued         09/26/24 2320 09/26/24 2319  fentaNYL citrate (PF) (SUBLIMAZE) injection 50 mcg  Every 10 Minutes PRN,   Status:  Discontinued         09/26/24 2320 09/26/24 2319  naloxone (NARCAN) injection 0.04 mg  As Needed,   Status:  Discontinued         09/26/24 2320 09/26/24 2319  flumazenil (ROMAZICON) injection 0.2 mg  As Needed,   Status:  Discontinued         09/26/24 2320 09/26/24 2319  ondansetron (ZOFRAN) injection 4 mg  Every 15 Minutes PRN,   Status:  Discontinued         09/26/24 2320 09/26/24 2319  labetalol (NORMODYNE,TRANDATE) injection 5 mg  Every 5 " Minutes PRN,   Status:  Discontinued         09/26/24 2320 09/26/24 2319  atropine sulfate injection 0.5 mg  Once As Needed,   Status:  Discontinued         09/26/24 2320 09/26/24 2246  sodium chloride 0.9 % solution  As Needed,   Status:  Discontinued         09/26/24 2246 09/26/24 2246  bupivacaine (MARCAINE) 0.5 % 30 mL, lidocaine 1% - EPINEPHrine 1:728502 (XYLOCAINE W/EPI) 12 mL  As Needed,   Status:  Discontinued         09/26/24 2246 09/26/24 2245  bupivacaine (MARCAINE) 0.5 % 20 mL, lidocaine 1% - EPINEPHrine 1:960452 (XYLOCAINE W/EPI) 8 mL, dexAMETHasone (DECADRON) 1 mL, Morphine sulfate (PF) 1 mL  As Needed,   Status:  Discontinued         09/26/24 2246 09/26/24 2200  heparin (porcine) 5000 UNIT/ML injection 5,000 Units  Every 8 Hours Scheduled         09/26/24 2126 09/26/24 2124  HYDROmorphone (DILAUDID) injection 1 mg  Once         09/26/24 2108 09/26/24 2124  cefTRIAXone (ROCEPHIN) 1,000 mg in sodium chloride 0.9 % 100 mL MBP  Once         09/26/24 2108 09/26/24 2116  metroNIDAZOLE (FLAGYL) IVPB 500 mg  Once         09/26/24 2100 09/26/24 2114  lactated ringers bolus 1,000 mL  Once         09/26/24 2058 09/26/24 2114  Cardiac Monitoring  Continuous,   Status:  Canceled        Comments: Follow Standing Orders As Outlined in Process Instructions (Open Order Report to View Full Instructions)    09/26/24 2113 09/26/24 2114  Discontinue Cardiac Monitoring  Once,   Status:  Canceled         09/26/24 2113 09/26/24 2113  Inpatient Admission  Once         09/26/24 2113 09/26/24 2109  Send To OR  Once         09/26/24 2108 09/26/24 2108  Type & Screen  STAT         09/26/24 2108 09/26/24 2107  Inpatient Admission  Once         09/26/24 2107 09/26/24 2026  ketorolac (TORADOL) injection 15 mg  Every 6 Hours PRN,   Status:  Discontinued         09/26/24 2026 09/26/24 1953  morphine injection 4 mg  Once,   Status:  Discontinued         09/26/24 1937 09/26/24 1949   "iopamidol (ISOVUE-370) 76 % injection 100 mL  Once in Imaging         09/26/24 1933    09/26/24 1937  sodium chloride 0.9 % bolus 1,000 mL  Once         09/26/24 1921    09/26/24 1937  morphine injection 4 mg  Once         09/26/24 1921    09/26/24 1937  ondansetron (ZOFRAN) injection 4 mg  Once         09/26/24 1921    09/26/24 1923  CBC Auto Differential  Once         09/26/24 1922    09/26/24 1922  Insert Peripheral IV  Once        Placed in \"And\" Linked Group    09/26/24 1921    09/26/24 1921  sodium chloride 0.9 % flush 10 mL  As Needed,   Status:  Discontinued        Placed in \"And\" Linked Group    09/26/24 1921 09/26/24 1921  CT Angiogram Chest  1 Time Imaging         09/26/24 1921    09/26/24 1921  CT Angiogram Abdomen Pelvis  1 Time Imaging         09/26/24 1921    09/26/24 1920  CBC & Differential  Once         09/26/24 1921    09/26/24 1920  Comprehensive Metabolic Panel  Once         09/26/24 1921    09/26/24 1920  Lipase  Once         09/26/24 1921    09/26/24 1920  Urinalysis With Microscopic If Indicated (No Culture) - Urine, Clean Catch  Once         09/26/24 1921    09/26/24 1920  Single High Sensitivity Troponin T  Once         09/26/24 1921    09/26/24 1920  Magnesium  Once         09/26/24 1921    09/26/24 1920  ECG 12 Lead Chest Pain  Once,   Status:  Canceled         09/26/24 1921    09/26/24 1904  ECG 12 Lead Chest Pain  STAT         09/26/24 1904    09/26/24 1855  Insert peripheral IV  Once         09/26/24 1855 09/26/24 1855  Perley Draw  Once         09/26/24 1855 09/26/24 1855  Green Top (Gel)  PROCEDURE ONCE         09/26/24 1855 09/26/24 1855  Lavender Top  PROCEDURE ONCE         09/26/24 1855 09/26/24 1855  Red Top  PROCEDURE ONCE         09/26/24 1855 09/26/24 1855  Henry Top  PROCEDURE ONCE         09/26/24 1855 09/26/24 1855  Light Blue Top  PROCEDURE ONCE         09/26/24 1855 09/26/24 1854  sodium chloride 0.9 % flush 10 mL  As Needed,   Status:  " Discontinued         09/26/24 1855    Unscheduled  Wound Care  As Needed       09/27/24 0832                     Operative/Procedure Notes (last 48 hours)        Petrona Malone MD at 09/26/24 2222          DIAGNOSTIC LAPAROSCOPY WITH DAVINCI ROBOT  Progress Note    Oral Dey  9/26/2024 - 9/27/2024    Pre-op Diagnosis:   ulcer       Post-Op Diagnosis Codes:   Perforated gastric ulcer     Procedure/CPT® Codes:        Procedure(s):  ROBOTIC REPAIR OF PERFORATED GASTRIC ULCER              Surgeon(s):  Petrona Malone MD    Anesthesia: General    Staff:   Circulator: Sharron Wilkinson RN; Matti Malone RN  Scrub Person: Rosalio Vargas; Gissell Valentine Lynndee N         Estimated Blood Loss:  20 mL     Urine Voided: 350 mL    Specimens:                None          Drains:   Closed/Suction Drain 1 LLQ Bulb 15 Fr. (Active)       Urethral Catheter Silicone 16 Fr. (Active)       Findings: 1 cm perforated gastric ulcer s/p reapproximation and omental patch. 15 Fr SEGUNDO drain. NGT in place. Leak test via NGT negative in the OR.         Complications: none apparent           Petrona Malone MD     Date: 9/26/2024  Time: 23:26 CDT          Electronically signed by Petrona Malone MD at 09/26/24 5220

## 2024-09-27 NOTE — PROGRESS NOTES
"Pharmacy Renal Dose Conversion    Oral Dey is a 83 y.o. year old male  177.8 cm (70\") 61.9 kg (136 lb 7.4 oz)    Estimated Creatinine Clearance: 49.2 mL/min (by C-G formula based on SCr of 1.03 mg/dL).   Creatinine   Date Value Ref Range Status   09/27/2024 1.03 0.76 - 1.27 mg/dL Final   09/26/2024 1.13 0.76 - 1.27 mg/dL Final   09/12/2024 0.92 0.76 - 1.27 mg/dL Final   01/10/2022 1.0 0.5 - 1.2 mg/dL Final       PLAN  Based on prescribing information provided by the drug , Cefepime 2000mg iv Q8H,  has been changed to Cefepime 2000mg iv Q12H (extended infusion).    Adjusted per the directives and guidelines established by [facility] and Therapeutics Committee and [facility] Executive Committee.  Pharmacy will continue to monitor daily and make further adjustment(s) accordingly.    Steven Hobbs, PharmD  09/27/2415:14 CDT    "

## 2024-09-27 NOTE — CASE MANAGEMENT/SOCIAL WORK
Discharge Planning Assessment  [facility]     Patient Name: Oral Dey  MRN: [id number]  Today's Date: 9/27/2024    Admit Date: 9/26/2024        Discharge Needs Assessment       Row Name 09/27/24 0957       Living Environment    People in Home spouse    Name(s) of People in Home Alaina    Current Living Arrangements home    Primary Care Provided by self    Provides Primary Care For no one    Family Caregiver if Needed spouse    Family Caregiver Names Alaina    Able to Return to Prior Arrangements yes       Resource/Environmental Concerns    Resource/Environmental Concerns none       Transition Planning    Patient/Family Anticipates Transition to home with family    Transportation Anticipated family or friend will provide       Discharge Needs Assessment    Readmission Within the Last 30 Days no previous admission in last 30 days    Equipment Currently Used at Home none    Concerns to be Addressed no discharge needs identified    Equipment Needed After Discharge none    Discharge Coordination/Progress spoke to patient who lives with spouse and is independent at home prior to illness;he has a walker and wheelchair at home but does not use them; has RX coverage and PCP; will follow for DC needs                   Discharge Plan    No documentation.                 Continued Care and Services - Admitted Since 9/26/2024    No active coordination exists for this encounter.          Demographic Summary    No documentation.                  Functional Status    No documentation.                  Psychosocial    No documentation.                  Abuse/Neglect    No documentation.                  Legal    No documentation.                  Substance Abuse    No documentation.                  Patient Forms    No documentation.                     Michelle Varner RN

## 2024-09-27 NOTE — PROGRESS NOTES
Petrona Malone MD - General Surgery  Progress Note     LOS: 1 day   Patient Care Team:  Jorge Shepherd MD as PCP - General (Internal Medicine)      Subjective     Interval History:     POD 1 s/p robotic repair of perforated gastric ulcer. Doing well. Pain much improved. Denies nausea. NGT annoying     Objective     Vital Signs  Temp:  [97.8 °F (36.6 °C)-101.8 °F (38.8 °C)] 98.8 °F (37.1 °C)  Heart Rate:  [] 72  Resp:  [14-21] 21  BP: (103-231)/(46-83) 103/46    Physical Exam:  General appearance - alert, well appearing, and in no distress  Mental status - alert, oriented to person, place, and time  Eyes - pupils equal and reactive, extraocular eye movements intact  Neck - supple, no significant adenopathy  Chest - no tachypnea, retractions or cyanosis, NGT in place   Heart - normal rate and regular rhythm  Abdomen - soft, non-distended, appropriately tender. Incisions c/d/I. SEGUNDO drain output serosanguinous   Neurological - alert, oriented, normal speech, no focal findings or movement disorder noted      Results Review:    Lab Results (last 24 hours)       Procedure Component Value Units Date/Time    STAT Lactic Acid, Reflex [[id number]] Collected: 09/27/24 1018    Specimen: Blood Updated: 09/27/24 1023    CBC & Differential [[id number]] Collected: 09/27/24 1018    Specimen: Blood Updated: 09/27/24 1023    Narrative:      The following orders were created for panel order CBC & Differential.  Procedure                               Abnormality         Status                     ---------                               -----------         ------                     CBC Auto Differential[[id number]]                            In process                   Please view results for these tests on the individual orders.    CBC Auto Differential [[id number]] Collected: 09/27/24 1018    Specimen: Blood Updated: 09/27/24 1023    Lactic Acid, Plasma [[id number]]  (Abnormal) Collected: 09/27/24 0453    Specimen: Blood  Updated: 09/27/24 0529     Lactate 2.3 mmol/L     Blood Culture With DEV - Blood, Hand, Right [[id number]] Collected: 09/27/24 0453    Specimen: Blood from Hand, Right Updated: 09/27/24 0508    Blood Culture With DEV - Blood, Hand, Left [[id number]] Collected: 09/27/24 0448    Specimen: Blood from Hand, Left Updated: 09/27/24 0508    Comprehensive Metabolic Panel [[id number]]  (Abnormal) Collected: 09/27/24 0214    Specimen: Blood Updated: 09/27/24 0306     Glucose 135 mg/dL      BUN 19 mg/dL      Creatinine 1.03 mg/dL      Sodium 134 mmol/L      Potassium 4.5 mmol/L      Comment: Slight hemolysis detected by analyzer. Result may be falsely elevated.        Chloride 100 mmol/L      CO2 22.0 mmol/L      Calcium 8.0 mg/dL      Total Protein 5.1 g/dL      Albumin 3.1 g/dL      ALT (SGPT) 12 U/L      AST (SGOT) 16 U/L      Comment: Slight hemolysis detected by analyzer. Result may be falsely elevated.        Alkaline Phosphatase 98 U/L      Total Bilirubin 0.8 mg/dL      Globulin 2.0 gm/dL      A/G Ratio 1.6 g/dL      BUN/Creatinine Ratio 18.4     Anion Gap 12.0 mmol/L      eGFR 73.0 mL/min/1.73     Narrative:      GFR Normal >60  Chronic Kidney Disease <60  Kidney Failure <15    The GFR formula is only valid for adults with stable renal function between ages 18 and 70.    Magnesium [[id number]]  (Normal) Collected: 09/27/24 0214    Specimen: Blood Updated: 09/27/24 0306     Magnesium 1.9 mg/dL     Phosphorus [[id number]]  (Normal) Collected: 09/27/24 0214    Specimen: Blood Updated: 09/27/24 0305     Phosphorus 3.7 mg/dL     Urinalysis With Microscopic If Indicated (No Culture) - Urine, Clean Catch [[id number]]  (Abnormal) Collected: 09/26/24 2031    Specimen: Urine, Clean Catch Updated: 09/26/24 2043     Color, UA Yellow     Appearance, UA Clear     pH, UA 6.5     Specific Gravity, UA >1.030     Glucose, UA Negative     Ketones, UA Negative     Bilirubin, UA Negative     Blood, UA Negative     Protein, UA Negative     Leuk  Esterase, UA Negative     Nitrite, UA Negative     Urobilinogen, UA 0.2 E.U./dL    Narrative:      Urine microscopic not indicated.    Comprehensive Metabolic Panel [[id number]]  (Abnormal) Collected: 09/26/24 1858    Specimen: Blood Updated: 09/26/24 1936     Glucose 151 mg/dL      BUN 21 mg/dL      Creatinine 1.13 mg/dL      Sodium 134 mmol/L      Potassium 4.4 mmol/L      Chloride 96 mmol/L      CO2 25.0 mmol/L      Calcium 9.0 mg/dL      Total Protein 6.3 g/dL      Albumin 4.0 g/dL      ALT (SGPT) 12 U/L      AST (SGOT) 13 U/L      Alkaline Phosphatase 123 U/L      Total Bilirubin 0.8 mg/dL      Globulin 2.3 gm/dL      A/G Ratio 1.7 g/dL      BUN/Creatinine Ratio 18.6     Anion Gap 13.0 mmol/L      eGFR 65.3 mL/min/1.73     Narrative:      GFR Normal >60  Chronic Kidney Disease <60  Kidney Failure <15    The GFR formula is only valid for adults with stable renal function between ages 18 and 70.    Magnesium [[id number]]  (Normal) Collected: 09/26/24 1858    Specimen: Blood Updated: 09/26/24 1936     Magnesium 2.0 mg/dL     Single High Sensitivity Troponin T [[id number]]  (Normal) Collected: 09/26/24 1858    Specimen: Blood Updated: 09/26/24 1934     HS Troponin T 11 ng/L     Narrative:      High Sensitive Troponin T Reference Range:  <14.0 ng/L- Negative Female for AMI  <22.0 ng/L- Negative Male for AMI  >=14 - Abnormal Female indicating possible myocardial injury.  >=22 - Abnormal Male indicating possible myocardial injury.   Clinicians would have to utilize clinical acumen, EKG, Troponin, and serial changes to determine if it is an Acute Myocardial Infarction or myocardial injury due to an underlying chronic condition.         Lipase [[id number]]  (Normal) Collected: 09/26/24 1858    Specimen: Blood Updated: 09/26/24 1931     Lipase 43 U/L     CBC & Differential [[id number]]  (Abnormal) Collected: 09/26/24 1858    Specimen: Blood Updated: 09/26/24 1922    Narrative:      The following orders were created for panel  order CBC & Differential.  Procedure                               Abnormality         Status                     ---------                               -----------         ------                     CBC Auto Differential[[id number]]        Abnormal            Final result                 Please view results for these tests on the individual orders.    CBC Auto Differential [[id number]]  (Abnormal) Collected: 09/26/24 1858    Specimen: Blood Updated: 09/26/24 1922     WBC 9.53 10*3/mm3      RBC 3.87 10*6/mm3      Hemoglobin 11.5 g/dL      Hematocrit 34.1 %      MCV 88.1 fL      MCH 29.7 pg      MCHC 33.7 g/dL      RDW 12.5 %      RDW-SD 40.1 fl      MPV 8.6 fL      Platelets 239 10*3/mm3      Neutrophil % 65.4 %      Lymphocyte % 26.9 %      Monocyte % 7.0 %      Eosinophil % 0.0 %      Basophil % 0.2 %      Immature Grans % 0.5 %      Neutrophils, Absolute 6.23 10*3/mm3      Lymphocytes, Absolute 2.56 10*3/mm3      Monocytes, Absolute 0.67 10*3/mm3      Eosinophils, Absolute 0.00 10*3/mm3      Basophils, Absolute 0.02 10*3/mm3      Immature Grans, Absolute 0.05 10*3/mm3      nRBC 0.0 /100 WBC     [place] Draw [[id number]] Collected: 09/26/24 1858    Specimen: Blood Updated: 09/26/24 1915    Narrative:      The following orders were created for panel order [place] Draw.  Procedure                               Abnormality         Status                     ---------                               -----------         ------                     Green Top (Gel)[[id number]]                                  Final result               Lavender Top[[id number]]                                     Final result               Red Top[[id number]]                                          Final result               Henry Top[[id number]]                                         Final result               Light Blue Top[[id number]]                                   Final result                 Please view results for these tests on the  individual orders.    Green Top (Gel) [[id number]] Collected: 09/26/24 1858    Specimen: Blood Updated: 09/26/24 1915     Extra Tube Hold for add-ons.     Comment: Auto resulted.       Lavender Top [[id number]] Collected: 09/26/24 1858    Specimen: Blood Updated: 09/26/24 1915     Extra Tube hold for add-on     Comment: Auto resulted       Red Top [[id number]] Collected: 09/26/24 1858    Specimen: Blood Updated: 09/26/24 1915     Extra Tube Hold for add-ons.     Comment: Auto resulted.       Henry Top [[id number]] Collected: 09/26/24 1858    Specimen: Blood Updated: 09/26/24 1915     Extra Tube Hold for add-ons.     Comment: Auto resulted.       Light Blue Top [[id number]] Collected: 09/26/24 1858    Specimen: Blood Updated: 09/26/24 1915     Extra Tube Hold for add-ons.     Comment: Auto resulted             Imaging Results (Last 24 Hours)       Procedure Component Value Units Date/Time    CT Angiogram Abdomen Pelvis [[id number]] Collected: 09/26/24 2017     Updated: 09/26/24 2035    Narrative:      EXAMINATION: CT ANGIOGRAM ABDOMEN PELVIS-  9/26/2024 8:18 PM     HISTORY: Tearing pain.     DOSE: 440.6 mGycm. All CT scans are performed using dose optimization  techniques as appropriate to the performed exam and including at least  one of the following: Automated exposure control, adjustment of the mA  and/or kV according to size, and the use of the iterative reconstruction  technique..     FINDINGS: Multiple contiguous axial images were obtained through the  abdomen and pelvis both with and without IV contrast administration.  There is patchy groundglass disease in the medial basilar segment of the  right lower lobe. Lung bases are otherwise clear. The base of the heart  is unremarkable. The descending thoracic aorta demonstrates calcific  plaquing without evidence of aneurysm or dissection.     There is atheromatous calcification of the abdominal aorta and iliac  arteries with no evidence of aneurysm or dissection. No  rate limiting  stenosis in the abdominal aorta. There is mild stenosis at the origin of  the celiac trunk. The SMA and ONEL are widely patent. There are accessory  renal arteries to both kidneys. There is calcific plaquing with moderate  stenosis of the main right renal artery. There is also calcific plaquing  and mild stenosis at the origin of the left main renal artery.     There is calcific plaquing involving both common iliac arteries without  rate limiting stenosis. There is also mild disease of the internal iliac  arteries. Both external iliac arteries are widely patent to the common  femoral.     There is pneumoperitoneum within the upper abdomen. This is felt to be  secondary to a perforated gastric ulcer with diffuse thickening of the  wall of the more distal body and antrum of the stomach. There are 2  areas of suspected ulceration including on image 83 of series 5 along  the more anterior wall of the stomach along the greater curvature and on  image 75 of series 5 near the gastric outlet. There is associated  stranding and induration along the anterior wall of the distal stomach  and region of the gastric outlet. There is mild prominence of the wall  within the duodenal bulb and proximal descending duodenum. A small  amount of free air is noted in the anterior aspect of the falciform  ligament. Minimal fluid in the perihepatic space present.     The liver is homogeneous in density. No discrete hepatic mass.     Normal appearance of the gallbladder. No biliary dilatation is present.     The pancreas is normal in appearance with no mass or ductal dilatation.  No acute pancreatitis.     Normal appearance of the spleen.     Both adrenals are unremarkable. There is homogeneous enhancement of the  kidneys. No nephrolithiasis or perinephric fluid collection. Both  ureters are decompressed and normal in appearance.     The small bowel mesentery is normal in appearance with no mass or  adenopathy.     There is mild  constipation. A few diverticula are noted of the  descending and sigmoid colon with no diverticulitis. No mechanical  obstruction. The small bowel is normal in distribution and appearance.  Normal appendix.     A fat-containing periumbilical hernia is present. No free fluid in the  paracolic gutters or pelvis. The prostate gland is enlarged. The urinary  bladder is mildly distended but otherwise normal in appearance. No acute  or suspicious bony abnormalities present.       Impression:      1. Pneumoperitoneum. This is felt to be related to a perforated gastric  ulcer with abnormal thickening of the gastric wall within the distal  body and antrum of the stomach extending into the region of the gastric  outlet. There is mild inflammatory stranding along the more distal  greater curvature of the stomach with 2 areas of suspected ulceration 1  along the more anterior wall of the distal body/antrum of the stomach  and one near the gastric outlet. There is associated mucosal enhancement  of the stomach suggesting associated gastritis. No abscess identified.  2. Atheromatous calcification of the abdominal aorta and branch vessels  without evidence of dissection or aneurysm. No rate limiting luminal  stenosis within the abdominal aorta. There is calcific plaquing and mild  stenosis at the origin of the celiac with mild disease at the origin of  the SMA also present. The SMA and ONEL are otherwise widely patent.  Accessory renal arteries are noted to both kidneys. There is calcific  plaquing and associated stenosis at the origin of both main renal  arteries with moderate luminal stenosis of the right main renal artery  at its origin.  3. Mild constipation. A few diverticula are noted in the left colon. No  diverticulitis. No mechanical obstruction.  4. The prostate is enlarged. The urinary bladder is moderately distended  but otherwise normal in appearance. No free fluid in the pelvis. There  is minimal free fluid in the  perihepatic space likely related to the  perforated gastric ulcer. No localized fluid collection to suggest  abscess.     This report was signed and finalized on 9/26/2024 8:32 PM by Dr. Brock Storey MD.       CT Angiogram Chest [[id number]] Collected: 09/26/24 2009     Updated: 09/26/24 2019    Narrative:      Exam: CT angiogram of the of the chest with intravenous contrast.  9/26/2024     CLINICAL HISTORY: Chest and abdominal pain     DOSE:  884.76 mGy.cm . All CT scans are performed using dose  optimization techniques as appropriate to the performed exam and  including at least one of the following: Automated exposure control,  adjustment of the mA and/or kV according to size, and the use of the  iterative reconstruction technique.     TECHNIQUE: Contiguous axial images were obtained through the thorax  following intravenous contrast administration with reformatted images  obtained in the sagittal and coronal projections from the original data  set. Three-dimensional reconstructions are also obtained.     COMPARISON: None available     FINDINGS:     Pulmonary arteries:  There is normal enhancement of the pulmonary  arteries with no evidence of pulmonary thromboembolic disease.     Aorta: The thoracic aorta and great vessels are remarkable for an  ascending thoracic aorta which measures 3.9 cm in transverse diameter.  There is atheromatous calcification. No rate limiting stenosis or  dissection.     LUNGS: There is patchy groundglass disease within the medial right lower  lobe as well as within the periphery of the superior segment of the  right lower lobe suggesting infiltrate. There is mild scarring in the  apices. No additional consolidative pneumonia present. No evidence of  effusion.     Pleural spaces: Unremarkable. No evidence of pleural effusion or  pneumothorax.     HEART: No evidence of cardiac enlargement or right ventricular  dysfunction. No pericardial effusion is present. Extensive  coronary  calcifications are present.     Bones: No acute osseous abnormalities are demonstrated.     CHEST WALL: No chest wall abnormalities are demonstrated.     Lymph nodes: Enlarged mediastinal nodes are present including a 1.6 cm  precarinal node. There are several small shotty nodes in the superior  mediastinum. An AP window node measures 1.1 cm in short axis. There is a  subcarinal isidro mass measuring 2.2 cm in short axis. A 1.2 cm right  hilar node is present. Several prominent left axillary nodes are also  present but not imaged in their entirety.     Upper abdomen: There is pneumoperitoneum within the upper abdomen. That  will be further discussed on the CT abdomen and pelvis report.       Impression:      1. No evidence of pulmonary thromboembolic disease.  2. Atheromatous calcification of the thoracic aorta and great vessels  with the ascending thoracic aorta measuring up to 3.9 cm in diameter. No  dissection or rate limiting stenosis.  3. Pneumoperitoneum within the upper abdomen which will be further  discussed in the CT abdomen and pelvis report.  4. 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  5. Mediastinal and right hilar lymphadenopathy. Several prominent nodes  within the left axilla are also present but not imaged in their  entirety.     This report was signed and finalized on 9/26/2024 8:16 PM by Dr. Brock Storey MD.                 Assessment & Plan     Perforated gastric ulcer s/p robotic repair and omental patch on 9/26/24     PT and OT consults. OOB ambulation. DC arterial line. DC kidd. Hgb stable. WBC count elevated as expected. Continue Rocephin/Flagyl/Diflucan x 4 days post op. Transfer to floor. NGT to stay in place until POD 5 when we study the repair. DO NOT REMOVE NGT. PRN pain and  nausea control.       Petrona Malone MD  09/27/24  10:35 CDT

## 2024-09-27 NOTE — H&P
[facility] Intensivist Services    Date of Admission: 9/26/2024  Date of Note: 09/27/24  Primary Care Physician: Jorge Shepherd MD    History   Mr. Dey is an 83-year-old male with past medical history, per EMR record, showing hypertension, GERD, bladder cancer, and chronic lymphocytic leukemia. My HPI comes from ER physician note and supervising physician, Dr. Laughlin who received consult report from ER physician, Dr. Funez. It was reported that Mr. Dey presented to ER with complaint of abdominal pain.  The pain was described as sharp and tearing, centrally located in the abdomen. The pain was out of proportion and further evaluation with CTA abd/pel showed pneumoperitoneum, felt to be related to perforated gastric ulcer with abdominal thickening of the gastric wall within the distal body and antrum of the stomach extending into the region of the gastric outlet, with mild inflammatory stranding along the more distal greater curvature of the stomach with 2 areas of suspected ulceration 1 along the more anterior wall of the distal body/antrum of the stomach and one near the gastric outlet. With these findings general surgeon on call, Dr. Malone, was consulted and elected to take patient for emergent robotic repair of perforated gastric ulcer.     The critical care team was asked to further manage acute medical needs postoperatively in the critical care unit.     I have seen and evaluated Mr. Dey immediately upon his return from the OR.  He presents extubated, breathing is unassisted and nonlabored.  He is quite somnolent, withdraws from pain, and moans, otherwise does not open eyes or respond to stimuli at this time. He is stable on the monitor. He is on no drips at this time. SEGUNDO drain exiting left quadrant of the abdomen, minimal serosanguineous output at this time.  Past Medical History     Active and Resolved Problems  Active Hospital Problems    Diagnosis  POA     **Pneumoperitoneum [K66.8]  Yes    Perforated gastric ulcer [K25.5]  Yes      Resolved Hospital Problems   No resolved problems to display.       Past Medical History:   Past Medical History:   Diagnosis Date    Bladder cancer     CLL (chronic lymphocytic leukemia)     Colon polyp     Gallstone     GERD (gastroesophageal reflux disease)     Glaucoma     Hearing loss     Hypertension     Inguinal hernia     right groin    Macular degeneration, right eye        Prior Surgeries: He  has a past surgical history that includes Colonoscopy (06/13/2012); Inguinal hernia repair (Left, 2007); Inguinal Hernia Repair (Right, 12/28/2017); cystoscopy bladder biopsy; Cataract extraction, bilateral; and Excision Mass Head/Neck (N/A, 3/4/2022).    Past Surgical History:   Past Surgical History:   Procedure Laterality Date    CATARACT EXTRACTION, BILATERAL      COLONOSCOPY  06/13/2012    CYSTOSCOPY BLADDER BIOPSY      EXCISION MASS HEAD/NECK N/A 3/4/2022    Procedure: EXCISION OF MASS ON POSTERIOR NECK;  Surgeon: Rossana Mahajan MD;  Location:  [facility] OR;  Service: General;  Laterality: N/A;    INGUINAL HERNIA REPAIR Left 2007    INGUINAL HERNIA REPAIR Right 12/28/2017    Procedure: RIGHT INGUINAL HERNIA REPAIR WITH MESH ;  Surgeon: Rossana Mahajan MD;  Location:  [facility] OR;  Service:        Social and Family History     Family History:  family history includes Alzheimer's disease in his mother; COPD in his sister; Colon cancer in his maternal grandfather; Heart attack in his brother; Hypertension in his father; No Known Problems in his maternal grandmother, paternal grandfather, and paternal grandmother; Other in his father.    Tobacco/Social History:  reports that he quit smoking about 52 years ago. His smoking use included cigarettes. He started smoking about 67 years ago. He has never used smokeless tobacco. He reports that he does not drink alcohol and does not use drugs.    Allergies     Allergies:   He is allergic to  augmentin [amoxicillin-pot clavulanate] and latex.    Allergies   Allergen Reactions    Augmentin [Amoxicillin-Pot Clavulanate] Hives    Latex Itching and Other (See Comments)     And band aids. Causes redness and itching.       Labs     Basic Labs:  Common labs          3/12/2024    10:46 9/12/2024    11:01 9/26/2024    18:58   Common Labs   Glucose 96  102  151    BUN 15  13  21    Creatinine 1.17  0.92  1.13    Sodium 142  134  134    Potassium 4.4  4.5  4.4    Chloride 103  98  96    Calcium 9.8  9.0  9.0    Total Protein 6.2  5.7     Albumin 4.4     3.9  3.8     3.4  4.0    Total Bilirubin 1.0  0.6  0.8    Alkaline Phosphatase 102  96  123    AST (SGOT) 12  9  13    ALT (SGPT) 9  5  12    WBC 6.50  8.45  9.53    Hemoglobin 12.5  11.5  11.5    Hematocrit 38.7  34.7  34.1    Platelets 148  131  239        Diabetic:      Inpatient Medications     Medications: Scheduled Meds:cefepime, 2,000 mg, Intravenous, Once  cefepime, 2 g, Intravenous, Q8H  Chlorhexidine Gluconate Cloth, 1 Application, Topical, Once  [START ON 9/28/2024] Chlorhexidine Gluconate Cloth, 1 Application, Topical, Q24H  fluconazole, 400 mg, Intravenous, Q24H  heparin (porcine), 5,000 Units, Subcutaneous, Q8H  Lidocaine, 2 patch, Transdermal, Q24H  metoprolol tartrate, 5 mg, Intravenous, Q8H  metroNIDAZOLE, 500 mg, Intravenous, Q8H  mupirocin, 1 Application, Each Nare, BID  pantoprazole, 40 mg, Intravenous, Q12H  senna-docusate sodium, 2 tablet, Oral, BID  sodium chloride, 10 mL, Intravenous, Q12H      Continuous Infusions:lactated ringers, 100 mL/hr      PRN Meds:.  acetaminophen    senna-docusate sodium **AND** polyethylene glycol **AND** bisacodyl **AND** bisacodyl    Calcium Replacement - Follow Nurse / BPA Driven Protocol    HYDROmorphone **AND** naloxone    Magnesium Standard Dose Replacement - Follow Nurse / BPA Driven Protocol    nitroglycerin    ondansetron ODT **OR** ondansetron    Phosphorus Replacement - Follow Nurse / BPA Driven  "Protocol    Potassium Replacement - Follow Nurse / BPA Driven Protocol    sodium chloride    sodium chloride    I have reviewed the patient's current medications.   Outpatient Medications     Outpatient Medications:   No outpatient medications have been marked as taking for the 9/26/24 encounter (Hospital Encounter).       Current Antibiotics     cefepime 2000 mg IVPB in 100 mL NS (MBP)  fluconazole - 400-0.9 MG/200ML-%  levoFLOXacin - 500 MG  metroNIDAZOLE - 500-0.79 MG/100ML-%    Exam     Vitals: His  height is 177.8 cm (70\") and weight is 62.1 kg (137 lb). His temporal temperature is 99.2 °F (37.3 °C). His blood pressure is 193/52 (abnormal) and his pulse is 92. His respiration is 16 and oxygen saturation is 100%.     GENERAL: Somnolent  SKIN:  Warm, dry.  Subcutaneous emphysema noted to the upper chest and neck  EYES:  Pupils equal, round and reactive to light.  EOMs intact.    HEAD:  Normocephalic.  NECK:  Supple   RESP:  Lungs clear to auscultation. Good airflow. Normal respiratory effort.   CARDIAC:  Regular rate and rhythm. Normal S1,S2. No edema  GI: Postoperative laparoscopic incision sites without drainage. SEGUNDO drain existing LQ with minimal serosanguineous output  MSK:  Normal muscle bulk, tone  NEUROLOGICAL: Somnolent.  PSYCHIATRIC: Somnolent    Results and Cultures Review     Result Review:  I have personally reviewed the results from the time of this admission to 9/27/2024 00:36 CDT and agree with these findings:  [x]  Laboratory list / accordion  []  Microbiology  [x]  Radiology  []  EKG/Telemetry   []  Cardiology/Vascular   []  Pathology  []  Old records  []  Other:  Most notable findings include: per HPI    Culture Data:   No results found for: \"BLOODCX\", \"URINECX\", \"WOUNDCX\", \"MRSACX\", \"RESPCX\", \"STOOLCX\"    Assessment/Plan   *Perforated gastric ulcer    This is an 83-year-old male presented to [facility] emergency department yesterday evening for acute onset abdominal pain.  Course " workup showed pneumoperitoneum with evidence for perforated gastric ulcer as evident from CT angiography of the abdomen.  He was taken directly to the OR by general surgeon Dr. Malone.  Critical care team was consulted to manage acute medical needs postoperatively.     Treatment Plan    *Perforated gastric ulcer  -S/p laparoscopy with robotic repair of perforated gastric ulcer by general surgeon Dr. Malone  -Bowel rest with NGT to low intermittent suction  -Blood cultures pending  -Empiric antibiotic coverage with cefepime, fluconazole, Flagyl  -pain control  -Gen Sx will continue to manage post surgical bowel considerations and we appreciate their continued support.       VTE Prophylaxis:    Pharmacologic & mechanical VTE prophylaxis orders are present.      Total critical care time: Approximately 45 minutes    Due to a high probability of clinically significant, life threatening deterioration, the patient required my highest level of preparedness to intervene emergently and I personally spent this critical care time directly and personally managing the patient.     This critical care time included obtaining a history; examining the patient; pulse oximetry; ordering and review of studies; arranging urgent treatment with development of a management plan; evaluation of patient's response to treatment; frequent reassessment; and, discussions with other providers.    This critical care time was performed to assess and manage the high probability of imminent, life-threatening deterioration that could result in multi-organ failure. It was exclusive of separately billable procedures and treating other patients and teaching time.    Please see MDM section and the rest of the note for further information on patient assessment and treatment.    Part of this note may be an electronic transcription/translation of spoken language to printed text using the Dragon Dictation System.    Electronically signed by Estevan Marx  APRN on 9/27/2024 at 00:36 CDT

## 2024-09-27 NOTE — H&P
Petrona Malone MD - General Surgery History and Physical     Referring Provider: No ref. provider found    Patient Care Team:  Jorge Shepherd MD as PCP - General (Internal Medicine)    Chief complaint abdominal pain     Subjective .     History of present illness:  The patient is a 83 y.o. male who presented to the ED with abdominal pain. The pain started today with a wrong sensation and progressed to central sharp abdominal pain that he reports as a tearing sensation that radiates to his chest and pelvis. He has never had pain like this before. He denies a history of gastric ulcers. He denies nausea and vomiting. No bloody stools. No fevers nor chills. No cough nor shortness of breath. He denies NSAID use. No blood thinners. Non-smoker. PSH on the abdomen includes bilateral inguinal hernia repairs.     Review of Systems    Review of Systems - General ROS: positive for  - chills and fatigue  ENT ROS: negative  Respiratory ROS: no cough, shortness of breath, or wheezing  Cardiovascular ROS: no chest pain or dyspnea on exertion  Gastrointestinal ROS: positive for - abdominal pain  Genito-Urinary ROS: no dysuria, trouble voiding, or hematuria  Dermatological ROS: negative   Breast ROS: negative for breast lumps  Hematological and Lymphatic ROS: negative  Musculoskeletal ROS: negative   Neurological ROS: no TIA or stroke symptoms    Psychological ROS: negative  Endocrine ROS: negative    History  Past Medical History:   Diagnosis Date    Bladder cancer     CLL (chronic lymphocytic leukemia)     Colon polyp     Gallstone     GERD (gastroesophageal reflux disease)     Glaucoma     Hearing loss     Hypertension     Inguinal hernia     right groin    Macular degeneration, right eye    ,   Past Surgical History:   Procedure Laterality Date    CATARACT EXTRACTION, BILATERAL      COLONOSCOPY  06/13/2012    CYSTOSCOPY BLADDER BIOPSY      EXCISION MASS HEAD/NECK N/A 3/4/2022    Procedure: EXCISION OF MASS ON POSTERIOR  NECK;  Surgeon: Rossana Mahajan MD;  Location:  [facility] OR;  Service: General;  Laterality: N/A;    INGUINAL HERNIA REPAIR Left     INGUINAL HERNIA REPAIR Right 2017    Procedure: RIGHT INGUINAL HERNIA REPAIR WITH MESH ;  Surgeon: Rossana Mahajan MD;  Location:  [facility] OR;  Service:    ,   Family History   Problem Relation Age of Onset    Colon cancer Maternal Grandfather         in his 60's.     Alzheimer's disease Mother     Hypertension Father     Other Father         stent    COPD Sister     Heart attack Brother     No Known Problems Maternal Grandmother     No Known Problems Paternal Grandmother     No Known Problems Paternal Grandfather    ,   Social History     Tobacco Use    Smoking status: Former     Current packs/day: 0.00     Types: Cigarettes     Start date:      Quit date:      Years since quittin.7    Smokeless tobacco: Never   Substance Use Topics    Alcohol use: No    Drug use: No   , (Not in a hospital admission)   and Allergies:  Augmentin [amoxicillin-pot clavulanate] and Latex    Current Facility-Administered Medications:     cefTRIAXone (ROCEPHIN) 1,000 mg in sodium chloride 0.9 % 100 mL MBP, 1,000 mg, Intravenous, Once, Petrona Malone MD, Last Rate: 200 mL/hr at 24, 1,000 mg at 24    ketorolac (TORADOL) injection 15 mg, 15 mg, Intravenous, Q6H PRN, Jose Funez MD, 15 mg at 24    lactated ringers bolus 1,000 mL, 1,000 mL, Intravenous, Once, Jose Funez MD, Last Rate: 2,000 mL/hr at 24, 1,000 mL at 24    metroNIDAZOLE (FLAGYL) IVPB 500 mg, 500 mg, Intravenous, Once, Jose Funez MD    morphine injection 4 mg, 4 mg, Intravenous, Once, Jose Funez MD    sodium chloride 0.9 % flush 10 mL, 10 mL, Intravenous, PRN, Emergency, Triage Protocol, MD    [COMPLETED] Insert Peripheral IV, , , Once **AND** sodium chloride 0.9 % flush 10 mL, 10 mL, Intravenous, PRN, Jose Funez MD    Current Outpatient  Medications:     latanoprost (XALATAN) 0.005 % ophthalmic solution, Administer 1 drop to the right eye Daily., Disp: , Rfl:     levoFLOXacin (Levaquin) 500 MG tablet, Take 1 tablet by mouth Daily., Disp: 7 tablet, Rfl: 0    metoprolol tartrate (LOPRESSOR) 25 MG tablet, Take 1 tablet by mouth 2 (Two) Times a Day., Disp: , Rfl:     multivitamins-minerals (PRESERVISION AREDS 2) capsule capsule, Take 1 capsule by mouth 2 (Two) Times a Day., Disp: , Rfl:     tamsulosin (FLOMAX) 0.4 MG capsule 24 hr capsule, Take 1 capsule by mouth Daily., Disp: , Rfl:     vitamin B-12 (CYANOCOBALAMIN) 1000 MCG tablet, Take 1 tablet by mouth Daily., Disp: , Rfl:     vitamin D3 125 MCG (5000 UT) capsule capsule, Take 1 capsule by mouth Daily., Disp: , Rfl:     Objective     Vital Signs   Temp:  [97.8 °F (36.6 °C)] 97.8 °F (36.6 °C)  Heart Rate:  [74] 74  Resp:  [18] 18  BP: (173)/(79) 173/79    Physical Exam:  General appearance - ill-appearing  Mental status - alert, oriented to person, place, and time  Eyes - pupils equal and reactive, extraocular eye movements intact  Neck - supple, no significant adenopathy  Chest - no tachypnea, retractions or cyanosis  Heart - tachycardic   Abdomen - soft, non-distended, severe tenderness to palpation in the upper abdomen   Neurological - alert, oriented, normal speech, no focal findings or movement disorder noted    Results Review:     Lab Results (last 24 hours)       Procedure Component Value Units Date/Time    Urinalysis With Microscopic If Indicated (No Culture) - Urine, Clean Catch [[id number]]  (Abnormal) Collected: 09/26/24 2031    Specimen: Urine, Clean Catch Updated: 09/26/24 2043     Color, UA Yellow     Appearance, UA Clear     pH, UA 6.5     Specific Gravity, UA >1.030     Glucose, UA Negative     Ketones, UA Negative     Bilirubin, UA Negative     Blood, UA Negative     Protein, UA Negative     Leuk Esterase, UA Negative     Nitrite, UA Negative     Urobilinogen, UA 0.2 E.U./dL     Narrative:      Urine microscopic not indicated.    Comprehensive Metabolic Panel [[id number]]  (Abnormal) Collected: 09/26/24 1858    Specimen: Blood Updated: 09/26/24 1936     Glucose 151 mg/dL      BUN 21 mg/dL      Creatinine 1.13 mg/dL      Sodium 134 mmol/L      Potassium 4.4 mmol/L      Chloride 96 mmol/L      CO2 25.0 mmol/L      Calcium 9.0 mg/dL      Total Protein 6.3 g/dL      Albumin 4.0 g/dL      ALT (SGPT) 12 U/L      AST (SGOT) 13 U/L      Alkaline Phosphatase 123 U/L      Total Bilirubin 0.8 mg/dL      Globulin 2.3 gm/dL      A/G Ratio 1.7 g/dL      BUN/Creatinine Ratio 18.6     Anion Gap 13.0 mmol/L      eGFR 65.3 mL/min/1.73     Narrative:      GFR Normal >60  Chronic Kidney Disease <60  Kidney Failure <15    The GFR formula is only valid for adults with stable renal function between ages 18 and 70.    Magnesium [[id number]]  (Normal) Collected: 09/26/24 1858    Specimen: Blood Updated: 09/26/24 1936     Magnesium 2.0 mg/dL     Single High Sensitivity Troponin T [[id number]]  (Normal) Collected: 09/26/24 1858    Specimen: Blood Updated: 09/26/24 1934     HS Troponin T 11 ng/L     Narrative:      High Sensitive Troponin T Reference Range:  <14.0 ng/L- Negative Female for AMI  <22.0 ng/L- Negative Male for AMI  >=14 - Abnormal Female indicating possible myocardial injury.  >=22 - Abnormal Male indicating possible myocardial injury.   Clinicians would have to utilize clinical acumen, EKG, Troponin, and serial changes to determine if it is an Acute Myocardial Infarction or myocardial injury due to an underlying chronic condition.         Lipase [[id number]]  (Normal) Collected: 09/26/24 1858    Specimen: Blood Updated: 09/26/24 1931     Lipase 43 U/L     CBC & Differential [[id number]]  (Abnormal) Collected: 09/26/24 1858    Specimen: Blood Updated: 09/26/24 1922    Narrative:      The following orders were created for panel order CBC & Differential.  Procedure                               Abnormality          Status                     ---------                               -----------         ------                     CBC Auto Differential[[id number]]        Abnormal            Final result                 Please view results for these tests on the individual orders.    CBC Auto Differential [[id number]]  (Abnormal) Collected: 09/26/24 1858    Specimen: Blood Updated: 09/26/24 1922     WBC 9.53 10*3/mm3      RBC 3.87 10*6/mm3      Hemoglobin 11.5 g/dL      Hematocrit 34.1 %      MCV 88.1 fL      MCH 29.7 pg      MCHC 33.7 g/dL      RDW 12.5 %      RDW-SD 40.1 fl      MPV 8.6 fL      Platelets 239 10*3/mm3      Neutrophil % 65.4 %      Lymphocyte % 26.9 %      Monocyte % 7.0 %      Eosinophil % 0.0 %      Basophil % 0.2 %      Immature Grans % 0.5 %      Neutrophils, Absolute 6.23 10*3/mm3      Lymphocytes, Absolute 2.56 10*3/mm3      Monocytes, Absolute 0.67 10*3/mm3      Eosinophils, Absolute 0.00 10*3/mm3      Basophils, Absolute 0.02 10*3/mm3      Immature Grans, Absolute 0.05 10*3/mm3      nRBC 0.0 /100 WBC     [place] Draw [[id number]] Collected: 09/26/24 1858    Specimen: Blood Updated: 09/26/24 1915    Narrative:      The following orders were created for panel order [place] Draw.  Procedure                               Abnormality         Status                     ---------                               -----------         ------                     Green Top (Gel)[[id number]]                                  Final result               Lavender Top[[id number]]                                     Final result               Red Top[[id number]]                                          Final result               Henry Top[[id number]]                                         Final result               Light Blue Top[[id number]]                                   Final result                 Please view results for these tests on the individual orders.    Green Top (Gel) [[id number]] Collected: 09/26/24 1858    Specimen:  Blood Updated: 09/26/24 1915     Extra Tube Hold for add-ons.     Comment: Auto resulted.       Lavender Top [[id number]] Collected: 09/26/24 1858    Specimen: Blood Updated: 09/26/24 1915     Extra Tube hold for add-on     Comment: Auto resulted       Red Top [[id number]] Collected: 09/26/24 1858    Specimen: Blood Updated: 09/26/24 1915     Extra Tube Hold for add-ons.     Comment: Auto resulted.       Henry Top [[id number]] Collected: 09/26/24 1858    Specimen: Blood Updated: 09/26/24 1915     Extra Tube Hold for add-ons.     Comment: Auto resulted.       Light Blue Top [[id number]] Collected: 09/26/24 1858    Specimen: Blood Updated: 09/26/24 1915     Extra Tube Hold for add-ons.     Comment: Auto resulted             Imaging Results (Last 24 Hours)       Procedure Component Value Units Date/Time    CT Angiogram Abdomen Pelvis [[id number]] Collected: 09/26/24 2017     Updated: 09/26/24 2035    Narrative:      EXAMINATION: CT ANGIOGRAM ABDOMEN PELVIS-  9/26/2024 8:18 PM     HISTORY: Tearing pain.     DOSE: 440.6 mGycm. All CT scans are performed using dose optimization  techniques as appropriate to the performed exam and including at least  one of the following: Automated exposure control, adjustment of the mA  and/or kV according to size, and the use of the iterative reconstruction  technique..     FINDINGS: Multiple contiguous axial images were obtained through the  abdomen and pelvis both with and without IV contrast administration.  There is patchy groundglass disease in the medial basilar segment of the  right lower lobe. Lung bases are otherwise clear. The base of the heart  is unremarkable. The descending thoracic aorta demonstrates calcific  plaquing without evidence of aneurysm or dissection.     There is atheromatous calcification of the abdominal aorta and iliac  arteries with no evidence of aneurysm or dissection. No rate limiting  stenosis in the abdominal aorta. There is mild stenosis at the origin  of  the celiac trunk. The SMA and ONEL are widely patent. There are accessory  renal arteries to both kidneys. There is calcific plaquing with moderate  stenosis of the main right renal artery. There is also calcific plaquing  and mild stenosis at the origin of the left main renal artery.     There is calcific plaquing involving both common iliac arteries without  rate limiting stenosis. There is also mild disease of the internal iliac  arteries. Both external iliac arteries are widely patent to the common  femoral.     There is pneumoperitoneum within the upper abdomen. This is felt to be  secondary to a perforated gastric ulcer with diffuse thickening of the  wall of the more distal body and antrum of the stomach. There are 2  areas of suspected ulceration including on image 83 of series 5 along  the more anterior wall of the stomach along the greater curvature and on  image 75 of series 5 near the gastric outlet. There is associated  stranding and induration along the anterior wall of the distal stomach  and region of the gastric outlet. There is mild prominence of the wall  within the duodenal bulb and proximal descending duodenum. A small  amount of free air is noted in the anterior aspect of the falciform  ligament. Minimal fluid in the perihepatic space present.     The liver is homogeneous in density. No discrete hepatic mass.     Normal appearance of the gallbladder. No biliary dilatation is present.     The pancreas is normal in appearance with no mass or ductal dilatation.  No acute pancreatitis.     Normal appearance of the spleen.     Both adrenals are unremarkable. There is homogeneous enhancement of the  kidneys. No nephrolithiasis or perinephric fluid collection. Both  ureters are decompressed and normal in appearance.     The small bowel mesentery is normal in appearance with no mass or  adenopathy.     There is mild constipation. A few diverticula are noted of the  descending and sigmoid colon with  no diverticulitis. No mechanical  obstruction. The small bowel is normal in distribution and appearance.  Normal appendix.     A fat-containing periumbilical hernia is present. No free fluid in the  paracolic gutters or pelvis. The prostate gland is enlarged. The urinary  bladder is mildly distended but otherwise normal in appearance. No acute  or suspicious bony abnormalities present.       Impression:      1. Pneumoperitoneum. This is felt to be related to a perforated gastric  ulcer with abnormal thickening of the gastric wall within the distal  body and antrum of the stomach extending into the region of the gastric  outlet. There is mild inflammatory stranding along the more distal  greater curvature of the stomach with 2 areas of suspected ulceration 1  along the more anterior wall of the distal body/antrum of the stomach  and one near the gastric outlet. There is associated mucosal enhancement  of the stomach suggesting associated gastritis. No abscess identified.  2. Atheromatous calcification of the abdominal aorta and branch vessels  without evidence of dissection or aneurysm. No rate limiting luminal  stenosis within the abdominal aorta. There is calcific plaquing and mild  stenosis at the origin of the celiac with mild disease at the origin of  the SMA also present. The SMA and ONEL are otherwise widely patent.  Accessory renal arteries are noted to both kidneys. There is calcific  plaquing and associated stenosis at the origin of both main renal  arteries with moderate luminal stenosis of the right main renal artery  at its origin.  3. Mild constipation. A few diverticula are noted in the left colon. No  diverticulitis. No mechanical obstruction.  4. The prostate is enlarged. The urinary bladder is moderately distended  but otherwise normal in appearance. No free fluid in the pelvis. There  is minimal free fluid in the perihepatic space likely related to the  perforated gastric ulcer. No localized fluid  collection to suggest  abscess.     This report was signed and finalized on 9/26/2024 8:32 PM by Dr. Brock Storey MD.       CT Angiogram Chest [[id number]] Collected: 09/26/24 2009     Updated: 09/26/24 2019    Narrative:      Exam: CT angiogram of the of the chest with intravenous contrast.  9/26/2024     CLINICAL HISTORY: Chest and abdominal pain     DOSE:  884.76 mGy.cm . All CT scans are performed using dose  optimization techniques as appropriate to the performed exam and  including at least one of the following: Automated exposure control,  adjustment of the mA and/or kV according to size, and the use of the  iterative reconstruction technique.     TECHNIQUE: Contiguous axial images were obtained through the thorax  following intravenous contrast administration with reformatted images  obtained in the sagittal and coronal projections from the original data  set. Three-dimensional reconstructions are also obtained.     COMPARISON: None available     FINDINGS:     Pulmonary arteries:  There is normal enhancement of the pulmonary  arteries with no evidence of pulmonary thromboembolic disease.     Aorta: The thoracic aorta and great vessels are remarkable for an  ascending thoracic aorta which measures 3.9 cm in transverse diameter.  There is atheromatous calcification. No rate limiting stenosis or  dissection.     LUNGS: There is patchy groundglass disease within the medial right lower  lobe as well as within the periphery of the superior segment of the  right lower lobe suggesting infiltrate. There is mild scarring in the  apices. No additional consolidative pneumonia present. No evidence of  effusion.     Pleural spaces: Unremarkable. No evidence of pleural effusion or  pneumothorax.     HEART: No evidence of cardiac enlargement or right ventricular  dysfunction. No pericardial effusion is present. Extensive coronary  calcifications are present.     Bones: No acute osseous abnormalities are demonstrated.      CHEST WALL: No chest wall abnormalities are demonstrated.     Lymph nodes: Enlarged mediastinal nodes are present including a 1.6 cm  precarinal node. There are several small shotty nodes in the superior  mediastinum. An AP window node measures 1.1 cm in short axis. There is a  subcarinal isidro mass measuring 2.2 cm in short axis. A 1.2 cm right  hilar node is present. Several prominent left axillary nodes are also  present but not imaged in their entirety.     Upper abdomen: There is pneumoperitoneum within the upper abdomen. That  will be further discussed on the CT abdomen and pelvis report.       Impression:      1. No evidence of pulmonary thromboembolic disease.  2. Atheromatous calcification of the thoracic aorta and great vessels  with the ascending thoracic aorta measuring up to 3.9 cm in diameter. No  dissection or rate limiting stenosis.  3. Pneumoperitoneum within the upper abdomen which will be further  discussed in the CT abdomen and pelvis report.  4. 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  5. Mediastinal and right hilar lymphadenopathy. Several prominent nodes  within the left axilla are also present but not imaged in their  entirety.     This report was signed and finalized on 9/26/2024 8:16 PM by Dr. Brock Storey MD.                 Assessment & Plan     Perforated gastric ulcer     Mr. Dey is an 83 year old male with a perforated gastric ulcer and pneumoperitoneum. He has two areas of concern for ulceration on the stomach - anterior wall of the distal body and at the gastric outlet. He has peritonitis on exam and tachycardic. His pain is not improved with IV narcotics. I have recommended a robotic repair of a gastric ulcer, possible open. We discussed the risks including bleeding,  infection, damage to surrounding structures and cardiopulmonary effects of anesthesia. Consent obtained. We discussed that he will have an NGT for a minimum of 5 days post op when we will plan to study the repair before removing the NGT. He will be admitted to the intensivist in the ICU post op. To OR tonight. Rocephin, Flagyl, subcutaneous heparin, and type and screen ordered.       Petrona Malone MD  09/26/24  21:20 CDT

## 2024-09-27 NOTE — NURSING NOTE
[facility]  INPATIENT WOUND & OSTOMY CARE    Today's Date: 09/27/24    Patient Name: Oral Dey  MRN: [id number]  [facility]: [id number]  PCP: Jorge Shepherd MD  Attending Provider: Salvatore Laughlin*  Length of Stay: 1    I placed pressure injury prevention measures orders per protocol due to patient being at risk for skin breakdown and being admitted to the unit.     Apply silicone foam border dressing per protocol to sacral spine/bilateral heels for protection.  Nursing to change dressing every 3 days and PRN if soiled. Nursing is to peel back dressing with every assessment to assess skin underneath dressing. No barrier cream under dressing.    Please reach out to wound care nurse if any skin issues arise.       This document has been electronically signed by Joselyn Macdonald RN on 9/27/2024 08:32 CDT

## 2024-09-28 PROBLEM — D64.9 ANEMIA: Status: ACTIVE | Noted: 2024-09-28

## 2024-09-28 PROBLEM — R05.9 COUGH: Status: ACTIVE | Noted: 2024-09-28

## 2024-09-28 LAB
ANION GAP SERPL CALCULATED.3IONS-SCNC: 8 MMOL/L (ref 5–15)
B PARAPERT DNA SPEC QL NAA+PROBE: NOT DETECTED
B PERT DNA SPEC QL NAA+PROBE: NOT DETECTED
BASOPHILS # BLD AUTO: 0.02 10*3/MM3 (ref 0–0.2)
BASOPHILS NFR BLD AUTO: 0.1 % (ref 0–1.5)
BUN SERPL-MCNC: 22 MG/DL (ref 8–23)
BUN/CREAT SERPL: 20.4 (ref 7–25)
C PNEUM DNA NPH QL NAA+NON-PROBE: NOT DETECTED
CALCIUM SPEC-SCNC: 8.6 MG/DL (ref 8.6–10.5)
CHLORIDE SERPL-SCNC: 100 MMOL/L (ref 98–107)
CO2 SERPL-SCNC: 25 MMOL/L (ref 22–29)
CREAT SERPL-MCNC: 1.08 MG/DL (ref 0.76–1.27)
DEPRECATED RDW RBC AUTO: 42.4 FL (ref 37–54)
EGFRCR SERPLBLD CKD-EPI 2021: 68.9 ML/MIN/1.73
EOSINOPHIL # BLD AUTO: 0 10*3/MM3 (ref 0–0.4)
EOSINOPHIL NFR BLD AUTO: 0 % (ref 0.3–6.2)
ERYTHROCYTE [DISTWIDTH] IN BLOOD BY AUTOMATED COUNT: 12.8 % (ref 12.3–15.4)
FLUAV SUBTYP SPEC NAA+PROBE: NOT DETECTED
FLUBV RNA ISLT QL NAA+PROBE: NOT DETECTED
GLUCOSE SERPL-MCNC: 113 MG/DL (ref 65–99)
HADV DNA SPEC NAA+PROBE: NOT DETECTED
HCOV 229E RNA SPEC QL NAA+PROBE: NOT DETECTED
HCOV HKU1 RNA SPEC QL NAA+PROBE: NOT DETECTED
HCOV NL63 RNA SPEC QL NAA+PROBE: NOT DETECTED
HCOV OC43 RNA SPEC QL NAA+PROBE: NOT DETECTED
HCT VFR BLD AUTO: 31.5 % (ref 37.5–51)
HGB BLD-MCNC: 10 G/DL (ref 13–17.7)
HMPV RNA NPH QL NAA+NON-PROBE: NOT DETECTED
HPIV1 RNA ISLT QL NAA+PROBE: NOT DETECTED
HPIV2 RNA SPEC QL NAA+PROBE: NOT DETECTED
HPIV3 RNA NPH QL NAA+PROBE: NOT DETECTED
HPIV4 P GENE NPH QL NAA+PROBE: NOT DETECTED
IMM GRANULOCYTES # BLD AUTO: 0.12 10*3/MM3 (ref 0–0.05)
IMM GRANULOCYTES NFR BLD AUTO: 0.9 % (ref 0–0.5)
LYMPHOCYTES # BLD AUTO: 2.6 10*3/MM3 (ref 0.7–3.1)
LYMPHOCYTES NFR BLD AUTO: 19 % (ref 19.6–45.3)
M PNEUMO IGG SER IA-ACNC: NOT DETECTED
MAGNESIUM SERPL-MCNC: 2 MG/DL (ref 1.6–2.4)
MCH RBC QN AUTO: 29.1 PG (ref 26.6–33)
MCHC RBC AUTO-ENTMCNC: 31.7 G/DL (ref 31.5–35.7)
MCV RBC AUTO: 91.6 FL (ref 79–97)
MONOCYTES # BLD AUTO: 0.91 10*3/MM3 (ref 0.1–0.9)
MONOCYTES NFR BLD AUTO: 6.7 % (ref 5–12)
NEUTROPHILS NFR BLD AUTO: 10 10*3/MM3 (ref 1.7–7)
NEUTROPHILS NFR BLD AUTO: 73.3 % (ref 42.7–76)
NRBC BLD AUTO-RTO: 0 /100 WBC (ref 0–0.2)
PHOSPHATE SERPL-MCNC: 3.3 MG/DL (ref 2.5–4.5)
PLATELET # BLD AUTO: 172 10*3/MM3 (ref 140–450)
PMV BLD AUTO: 8.4 FL (ref 6–12)
POTASSIUM SERPL-SCNC: 4.8 MMOL/L (ref 3.5–5.2)
RBC # BLD AUTO: 3.44 10*6/MM3 (ref 4.14–5.8)
RHINOVIRUS RNA SPEC NAA+PROBE: NOT DETECTED
RSV RNA NPH QL NAA+NON-PROBE: NOT DETECTED
SARS-COV-2 RNA NPH QL NAA+NON-PROBE: DETECTED
SODIUM SERPL-SCNC: 133 MMOL/L (ref 136–145)
WBC NRBC COR # BLD AUTO: 13.65 10*3/MM3 (ref 3.4–10.8)

## 2024-09-28 PROCEDURE — 85025 COMPLETE CBC W/AUTO DIFF WBC: CPT | Performed by: STUDENT IN AN ORGANIZED HEALTH CARE EDUCATION/TRAINING PROGRAM

## 2024-09-28 PROCEDURE — 93005 ELECTROCARDIOGRAM TRACING: CPT | Performed by: INTERNAL MEDICINE

## 2024-09-28 PROCEDURE — 0202U NFCT DS 22 TRGT SARS-COV-2: CPT | Performed by: INTERNAL MEDICINE

## 2024-09-28 PROCEDURE — 94799 UNLISTED PULMONARY SVC/PX: CPT

## 2024-09-28 PROCEDURE — 94640 AIRWAY INHALATION TREATMENT: CPT

## 2024-09-28 PROCEDURE — 84100 ASSAY OF PHOSPHORUS: CPT | Performed by: STUDENT IN AN ORGANIZED HEALTH CARE EDUCATION/TRAINING PROGRAM

## 2024-09-28 PROCEDURE — 25010000002 FLUCONAZOLE PER 200 MG: Performed by: STUDENT IN AN ORGANIZED HEALTH CARE EDUCATION/TRAINING PROGRAM

## 2024-09-28 PROCEDURE — 25010000002 HEPARIN (PORCINE) PER 1000 UNITS

## 2024-09-28 PROCEDURE — 25010000002 CEFEPIME PER 500 MG: Performed by: INTERNAL MEDICINE

## 2024-09-28 PROCEDURE — 25010000002 HYDROMORPHONE PER 4 MG: Performed by: STUDENT IN AN ORGANIZED HEALTH CARE EDUCATION/TRAINING PROGRAM

## 2024-09-28 PROCEDURE — 93010 ELECTROCARDIOGRAM REPORT: CPT | Performed by: EMERGENCY MEDICINE

## 2024-09-28 PROCEDURE — 25010000002 METRONIDAZOLE 500 MG/100ML SOLUTION: Performed by: STUDENT IN AN ORGANIZED HEALTH CARE EDUCATION/TRAINING PROGRAM

## 2024-09-28 PROCEDURE — 80048 BASIC METABOLIC PNL TOTAL CA: CPT | Performed by: INTERNAL MEDICINE

## 2024-09-28 PROCEDURE — 83735 ASSAY OF MAGNESIUM: CPT | Performed by: STUDENT IN AN ORGANIZED HEALTH CARE EDUCATION/TRAINING PROGRAM

## 2024-09-28 PROCEDURE — 99024 POSTOP FOLLOW-UP VISIT: CPT | Performed by: STUDENT IN AN ORGANIZED HEALTH CARE EDUCATION/TRAINING PROGRAM

## 2024-09-28 RX ORDER — DILTIAZEM HCL/D5W 125 MG/125
5-15 PLASTIC BAG, INJECTION (ML) INTRAVENOUS
Status: DISCONTINUED | OUTPATIENT
Start: 2024-09-28 | End: 2024-09-28

## 2024-09-28 RX ORDER — IPRATROPIUM BROMIDE AND ALBUTEROL SULFATE 2.5; .5 MG/3ML; MG/3ML
3 SOLUTION RESPIRATORY (INHALATION)
Status: DISCONTINUED | OUTPATIENT
Start: 2024-09-28 | End: 2024-09-28

## 2024-09-28 RX ORDER — LABETALOL HYDROCHLORIDE 5 MG/ML
10 INJECTION, SOLUTION INTRAVENOUS EVERY 4 HOURS PRN
Status: DISCONTINUED | OUTPATIENT
Start: 2024-09-28 | End: 2024-10-05 | Stop reason: HOSPADM

## 2024-09-28 RX ORDER — DILTIAZEM HYDROCHLORIDE 5 MG/ML
10 INJECTION INTRAVENOUS ONCE
Status: COMPLETED | OUTPATIENT
Start: 2024-09-28 | End: 2024-09-28

## 2024-09-28 RX ORDER — LEVALBUTEROL INHALATION SOLUTION 0.63 MG/3ML
0.63 SOLUTION RESPIRATORY (INHALATION)
Status: DISCONTINUED | OUTPATIENT
Start: 2024-09-28 | End: 2024-09-29

## 2024-09-28 RX ORDER — ALBUTEROL SULFATE 90 UG/1
2 INHALANT RESPIRATORY (INHALATION)
Status: DISCONTINUED | OUTPATIENT
Start: 2024-09-28 | End: 2024-10-05 | Stop reason: HOSPADM

## 2024-09-28 RX ORDER — METOPROLOL TARTRATE 1 MG/ML
5 INJECTION, SOLUTION INTRAVENOUS EVERY 6 HOURS
Status: DISCONTINUED | OUTPATIENT
Start: 2024-09-29 | End: 2024-10-05 | Stop reason: HOSPADM

## 2024-09-28 RX ADMIN — METRONIDAZOLE 500 MG: 500 INJECTION, SOLUTION INTRAVENOUS at 00:41

## 2024-09-28 RX ADMIN — Medication 10 ML: at 10:16

## 2024-09-28 RX ADMIN — HYDROMORPHONE HYDROCHLORIDE 0.5 MG: 1 INJECTION, SOLUTION INTRAMUSCULAR; INTRAVENOUS; SUBCUTANEOUS at 10:15

## 2024-09-28 RX ADMIN — METRONIDAZOLE 500 MG: 500 INJECTION, SOLUTION INTRAVENOUS at 09:19

## 2024-09-28 RX ADMIN — HYDROMORPHONE HYDROCHLORIDE 0.5 MG: 1 INJECTION, SOLUTION INTRAMUSCULAR; INTRAVENOUS; SUBCUTANEOUS at 03:19

## 2024-09-28 RX ADMIN — METOPROLOL TARTRATE 5 MG: 5 INJECTION INTRAVENOUS at 00:31

## 2024-09-28 RX ADMIN — Medication 10 ML: at 20:00

## 2024-09-28 RX ADMIN — PANTOPRAZOLE SODIUM 40 MG: 40 INJECTION, POWDER, FOR SOLUTION INTRAVENOUS at 20:00

## 2024-09-28 RX ADMIN — IPRATROPIUM BROMIDE AND ALBUTEROL SULFATE 3 ML: .5; 3 SOLUTION RESPIRATORY (INHALATION) at 15:16

## 2024-09-28 RX ADMIN — FLUCONAZOLE 400 MG: 2 INJECTION, SOLUTION INTRAVENOUS at 01:39

## 2024-09-28 RX ADMIN — CEFEPIME 2 G: 2 INJECTION, POWDER, FOR SOLUTION INTRAVENOUS at 09:19

## 2024-09-28 RX ADMIN — DOCUSATE SODIUM 50 MG AND SENNOSIDES 8.6 MG 2 TABLET: 8.6; 5 TABLET, FILM COATED ORAL at 20:00

## 2024-09-28 RX ADMIN — CEFEPIME 2 G: 2 INJECTION, POWDER, FOR SOLUTION INTRAVENOUS at 19:53

## 2024-09-28 RX ADMIN — DOCUSATE SODIUM 50 MG AND SENNOSIDES 8.6 MG 2 TABLET: 8.6; 5 TABLET, FILM COATED ORAL at 09:19

## 2024-09-28 RX ADMIN — HEPARIN SODIUM 5000 UNITS: 5000 INJECTION, SOLUTION INTRAVENOUS; SUBCUTANEOUS at 05:01

## 2024-09-28 RX ADMIN — HYDROMORPHONE HYDROCHLORIDE 0.5 MG: 1 INJECTION, SOLUTION INTRAMUSCULAR; INTRAVENOUS; SUBCUTANEOUS at 19:53

## 2024-09-28 RX ADMIN — PANTOPRAZOLE SODIUM 40 MG: 40 INJECTION, POWDER, FOR SOLUTION INTRAVENOUS at 09:19

## 2024-09-28 RX ADMIN — LATANOPROST 1 DROP: 50 SOLUTION OPHTHALMIC at 20:00

## 2024-09-28 RX ADMIN — METOPROLOL TARTRATE 5 MG: 5 INJECTION INTRAVENOUS at 09:19

## 2024-09-28 RX ADMIN — HYDROMORPHONE HYDROCHLORIDE 0.5 MG: 1 INJECTION, SOLUTION INTRAMUSCULAR; INTRAVENOUS; SUBCUTANEOUS at 23:48

## 2024-09-28 RX ADMIN — HYDROMORPHONE HYDROCHLORIDE 0.5 MG: 1 INJECTION, SOLUTION INTRAMUSCULAR; INTRAVENOUS; SUBCUTANEOUS at 05:55

## 2024-09-28 RX ADMIN — HEPARIN SODIUM 5000 UNITS: 5000 INJECTION, SOLUTION INTRAVENOUS; SUBCUTANEOUS at 14:04

## 2024-09-28 RX ADMIN — METRONIDAZOLE 500 MG: 500 INJECTION, SOLUTION INTRAVENOUS at 17:00

## 2024-09-28 RX ADMIN — DILTIAZEM HYDROCHLORIDE 10 MG: 5 INJECTION INTRAVENOUS at 16:05

## 2024-09-28 RX ADMIN — METOPROLOL TARTRATE 5 MG: 5 INJECTION INTRAVENOUS at 23:48

## 2024-09-28 RX ADMIN — ALBUTEROL SULFATE 2 PUFF: 108 INHALANT RESPIRATORY (INHALATION) at 22:38

## 2024-09-28 RX ADMIN — METOPROLOL TARTRATE 5 MG: 5 INJECTION INTRAVENOUS at 16:59

## 2024-09-28 RX ADMIN — HEPARIN SODIUM 5000 UNITS: 5000 INJECTION, SOLUTION INTRAVENOUS; SUBCUTANEOUS at 20:00

## 2024-09-28 RX ADMIN — HYDROMORPHONE HYDROCHLORIDE 0.5 MG: 1 INJECTION, SOLUTION INTRAMUSCULAR; INTRAVENOUS; SUBCUTANEOUS at 14:05

## 2024-09-28 RX ADMIN — LIDOCAINE 2 PATCH: 4 PATCH TOPICAL at 10:16

## 2024-09-28 RX ADMIN — DILTIAZEM HYDROCHLORIDE 10 MG: 5 INJECTION INTRAVENOUS at 18:56

## 2024-09-28 NOTE — PROGRESS NOTES
[facility] Medicine Services  INPATIENT PROGRESS NOTE    Patient Name: Oral Dey  Date of Admission: 9/26/2024  Today's Date: 09/28/24  Length of Stay: 2  Primary Care Physician: Jorge Shepherd MD    Subjective   Chief Complaint: follow-up gastric ulcer  HPI   Patient reports that he went on a short walk earlier this morning.  He is sitting up at the bedside chair visiting with family.  He states that he has been struggling with a cough for the past 3 weeks.  Recently completed an outpatient Z-Alec.  Reports that his cough is productive of white and occasionally tan sputum.  Denies any shortness of breath symptoms.  Reports that he recently received some pain medication so his abdominal pain symptoms are adequately controlled at this time.    Review of Systems   All pertinent negatives and positives are as above. All other systems have been reviewed and are negative unless otherwise stated.     Objective    Temp:  [97.2 °F (36.2 °C)-98.4 °F (36.9 °C)] 97.9 °F (36.6 °C)  Heart Rate:  [69-86] 86  Resp:  [18-20] 18  BP: (124-170)/(42-69) 167/69  Physical Exam  Vitals reviewed.   Constitutional:       General: He is not in acute distress.     Comments: Sitting up in bedside chair   HENT:      Head: Normocephalic.      Nose:      Comments: NG in place     Mouth/Throat:      Mouth: Mucous membranes are dry.   Cardiovascular:      Rate and Rhythm: Normal rate.   Pulmonary:      Effort: Pulmonary effort is normal. No respiratory distress.      Comments: Diminished at bases; persistent cough throughout exam; productive of white and occasional tan sputum. Some SQ emphysema noted with palpation along lateral chest wall  Abdominal:      Comments: SEGUNDO noted on the left; surgical sites C/D/I   Musculoskeletal:      Right lower leg: No edema.      Left lower leg: No edema.   Skin:     General: Skin is warm.   Neurological:      General: No focal deficit present.      Mental Status: He is  alert.   Psychiatric:         Mood and Affect: Mood normal.         Results Review:  I have reviewed the labs, radiology results, and diagnostic studies.    Laboratory Data:   Results from last 7 days   Lab Units 09/28/24  0602 09/27/24  1018 09/26/24  1858   WBC 10*3/mm3 13.65* 15.78* 9.53   HEMOGLOBIN g/dL 10.0* 10.5* 11.5*   HEMATOCRIT % 31.5* 31.7* 34.1*   PLATELETS 10*3/mm3 172 203 239        Results from last 7 days   Lab Units 09/28/24  0602 09/27/24  0214 09/26/24  1858   SODIUM mmol/L 133* 134* 134*   POTASSIUM mmol/L 4.8 4.5 4.4   CHLORIDE mmol/L 100 100 96*   CO2 mmol/L 25.0 22.0 25.0   BUN mg/dL 22 19 21   CREATININE mg/dL 1.08 1.03 1.13   CALCIUM mg/dL 8.6 8.0* 9.0   BILIRUBIN mg/dL  --  0.8 0.8   ALK PHOS U/L  --  98 123*   ALT (SGPT) U/L  --  12 12   AST (SGOT) U/L  --  16 13   GLUCOSE mg/dL 113* 135* 151*       Culture Data:   Blood Culture   Date Value Ref Range Status   09/27/2024 No growth at 24 hours  Preliminary   09/27/2024 No growth at 24 hours  Preliminary       Radiology Data:   Imaging Results (Last 24 Hours)       ** No results found for the last 24 hours. **            I have reviewed the patient's current medications.     Assessment/Plan   Assessment  Active Hospital Problems    Diagnosis     **Pneumoperitoneum     Cough     Anemia     Perforated gastric ulcer     Hypertension        Treatment Plan  Postoperative day #2 laparoscopic robotic assisted repair of a perforated gastric ulcer with omental patch by Dr. Malone  N.p.o.  NG tube in place  Heparin prophylaxis  Currently on broad-spectrum antibiotics with cefepime, Flagyl, and also on IV fluconazole  IV PPI  IV fluids with LR  Pain control  Patient takes scheduled metoprolol in the outpatient setting; currently on IV Lopressor.  Will also add PRN IV Labetalol per parameters  Add scheduled neb treatments to assist with cough  Will also check respiratory PCR panel - admission CT Chest with findings of ?interstitial  pneumonitis  Mobilize; ambulate  Add flutter valve    Medical Decision Making  Number and Complexity of problems: 2 acute; high complexity      Conditions and Status        Condition is unchanged.     [facility] Data  External documents reviewed: none  Cardiac tracing (EKG, telemetry) interpretation: no new EKGs  Radiology interpretation: CT Chest on arrival reviewed - ? pneumonitis  Labs reviewed: as above  Any tests that were considered but not ordered: none     Decision rules/scores evaluated (example [id number], Wells, etc): none     Discussed with: patient and family at bedside     Care Planning  Shared decision making: Discussed with family at bedside with agreement to proceed with treatment plan as outlined  Code status and discussions: Full code    Disposition  Social Determinants of Health that impact treatment or disposition: None apparent at this time  I expect the patient to be discharged per primary service    Electronically signed by Wilbert Maldonado MD, 09/28/24, 14:58 CDT.

## 2024-09-28 NOTE — PLAN OF CARE
Problem: Adult Inpatient Plan of Care  Goal: Plan of Care Review  Outcome: Progressing  Flowsheets (Taken 9/28/2024 0326)  Progress: improving  Plan of Care Reviewed With: patient  Outcome Evaluation: Pt complains of incisional pain, see MAR. No complaints of nausea. SEGUNDO x1. NG to LIWS. A&O x4. SCDs. IVF and IV abx. Voiding per urinal. Safety maintained.  Ambulated in mack with x2 assist.

## 2024-09-28 NOTE — PLAN OF CARE
Goal Outcome Evaluation:  Plan of Care Reviewed With: patient        Progress: improving  Outcome Evaluation: Pt A&O. C/o pain. See MAR. Family assisted pt to stand to void per urinal. Heart rate increased to 202 at its highest and pt converted to AFib RVR. Doctor notified. EKG obtained. Cardizem given per orders. Scheduled metorprolol to be given as ordered. Will notify doctor if no improvement. Safety maintained.

## 2024-09-28 NOTE — PROGRESS NOTES
Petrona Malone MD - General Surgery  Progress Note     LOS: 2 days   Patient Care Team:  Jorge Shepherd MD as PCP - General (Internal Medicine)      Subjective     Interval History:     POD 2 s/p robotic repair of perforated gastric ulcer.   Overall is doing well, states that pain is improved, does not like NG but is tolerating better  No flatus/no stool  Objective     Vital Signs  Temp:  [97.2 °F (36.2 °C)-98.4 °F (36.9 °C)] 97.9 °F (36.6 °C)  Heart Rate:  [69-86] 84  Resp:  [18] 18  BP: (124-170)/(42-69) 167/69    Physical Exam:  General appearance - alert, well appearing, and in no distress  Mental status - alert, oriented to person, place, and time  Eyes - pupils equal and reactive, extraocular eye movements intact  Neck - supple, no significant adenopathy  Chest - no tachypnea, retractions or cyanosis, NGT in place   Heart - normal rate and regular rhythm  Abdomen - soft, non-distended, appropriately tender. Incisions c/d/I. SEGUNDO drain output serosanguinous   Neurological - alert, oriented, normal speech, no focal findings or movement disorder noted      Results Review:    Lab Results (last 24 hours)       Procedure Component Value Units Date/Time    Magnesium [[id number]]  (Normal) Collected: 09/28/24 0602    Specimen: Blood Updated: 09/28/24 0633     Magnesium 2.0 mg/dL     Phosphorus [[id number]]  (Normal) Collected: 09/28/24 0602    Specimen: Blood Updated: 09/28/24 0633     Phosphorus 3.3 mg/dL     Basic Metabolic Panel [[id number]]  (Abnormal) Collected: 09/28/24 0602    Specimen: Blood Updated: 09/28/24 0633     Glucose 113 mg/dL      BUN 22 mg/dL      Creatinine 1.08 mg/dL      Sodium 133 mmol/L      Potassium 4.8 mmol/L      Chloride 100 mmol/L      CO2 25.0 mmol/L      Calcium 8.6 mg/dL      BUN/Creatinine Ratio 20.4     Anion Gap 8.0 mmol/L      eGFR 68.9 mL/min/1.73     Narrative:      GFR Normal >60  Chronic Kidney Disease <60  Kidney Failure <15    The GFR formula is only valid for adults with  stable renal function between ages 18 and 70.    CBC & Differential [[id number]]  (Abnormal) Collected: 09/28/24 0602    Specimen: Blood Updated: 09/28/24 0611    Narrative:      The following orders were created for panel order CBC & Differential.  Procedure                               Abnormality         Status                     ---------                               -----------         ------                     CBC Auto Differential[[id number]]        Abnormal            Final result                 Please view results for these tests on the individual orders.    CBC Auto Differential [[id number]]  (Abnormal) Collected: 09/28/24 0602    Specimen: Blood Updated: 09/28/24 0611     WBC 13.65 10*3/mm3      RBC 3.44 10*6/mm3      Hemoglobin 10.0 g/dL      Hematocrit 31.5 %      MCV 91.6 fL      MCH 29.1 pg      MCHC 31.7 g/dL      RDW 12.8 %      RDW-SD 42.4 fl      MPV 8.4 fL      Platelets 172 10*3/mm3      Neutrophil % 73.3 %      Lymphocyte % 19.0 %      Monocyte % 6.7 %      Eosinophil % 0.0 %      Basophil % 0.1 %      Immature Grans % 0.9 %      Neutrophils, Absolute 10.00 10*3/mm3      Lymphocytes, Absolute 2.60 10*3/mm3      Monocytes, Absolute 0.91 10*3/mm3      Eosinophils, Absolute 0.00 10*3/mm3      Basophils, Absolute 0.02 10*3/mm3      Immature Grans, Absolute 0.12 10*3/mm3      nRBC 0.0 /100 WBC     Blood Culture With DEV - Blood, Hand, Left [[id number]]  (Normal) Collected: 09/27/24 0448    Specimen: Blood from Hand, Left Updated: 09/28/24 0515     Blood Culture No growth at 24 hours    Blood Culture With DEV - Blood, Hand, Right [[id number]]  (Normal) Collected: 09/27/24 0453    Specimen: Blood from Hand, Right Updated: 09/28/24 0515     Blood Culture No growth at 24 hours    STAT Lactic Acid, Reflex [[id number]]  (Normal) Collected: 09/27/24 2206    Specimen: Blood Updated: 09/27/24 2225     Lactate 1.2 mmol/L     STAT Lactic Acid, Reflex [[id number]]  (Abnormal) Collected: 09/27/24 1614    Specimen:  Blood Updated: 09/27/24 1644     Lactate 2.1 mmol/L           Imaging Results (Last 24 Hours)       ** No results found for the last 24 hours. **              Assessment & Plan     Perforated gastric ulcer s/p robotic repair and omental patch on 9/26/24   - PT and OT consults.   - OOB ambulation.   -Hgb stable.   -WBC count elevated but continues to fall.   -Continue Rocephin/Flagyl/Diflucan x 4 days post op. Transfer to floor. NGT to stay in place until POD 5 (10/01/2024) when we study the repair.   - DO NOT REMOVE NGT.   - PRN pain and nausea control.       Robert Mena MD  09/28/24  15:59 CDT

## 2024-09-29 PROBLEM — I48.91 ATRIAL FIBRILLATION WITH RAPID VENTRICULAR RESPONSE: Status: ACTIVE | Noted: 2024-09-29

## 2024-09-29 PROBLEM — U07.1 COVID-19 VIRUS INFECTION: Status: ACTIVE | Noted: 2024-09-29

## 2024-09-29 LAB
ANION GAP SERPL CALCULATED.3IONS-SCNC: 10 MMOL/L (ref 5–15)
BASOPHILS # BLD AUTO: 0.02 10*3/MM3 (ref 0–0.2)
BASOPHILS NFR BLD AUTO: 0.1 % (ref 0–1.5)
BUN SERPL-MCNC: 20 MG/DL (ref 8–23)
BUN/CREAT SERPL: 19.8 (ref 7–25)
CALCIUM SPEC-SCNC: 8.2 MG/DL (ref 8.6–10.5)
CHLORIDE SERPL-SCNC: 97 MMOL/L (ref 98–107)
CO2 SERPL-SCNC: 25 MMOL/L (ref 22–29)
CREAT SERPL-MCNC: 1.01 MG/DL (ref 0.76–1.27)
DEPRECATED RDW RBC AUTO: 42.2 FL (ref 37–54)
EGFRCR SERPLBLD CKD-EPI 2021: 74.7 ML/MIN/1.73
EOSINOPHIL # BLD AUTO: 0.01 10*3/MM3 (ref 0–0.4)
EOSINOPHIL NFR BLD AUTO: 0.1 % (ref 0.3–6.2)
ERYTHROCYTE [DISTWIDTH] IN BLOOD BY AUTOMATED COUNT: 12.8 % (ref 12.3–15.4)
GLUCOSE SERPL-MCNC: 96 MG/DL (ref 65–99)
HCT VFR BLD AUTO: 30.1 % (ref 37.5–51)
HGB BLD-MCNC: 9.8 G/DL (ref 13–17.7)
IMM GRANULOCYTES # BLD AUTO: 0.12 10*3/MM3 (ref 0–0.05)
IMM GRANULOCYTES NFR BLD AUTO: 0.9 % (ref 0–0.5)
LYMPHOCYTES # BLD AUTO: 2.38 10*3/MM3 (ref 0.7–3.1)
LYMPHOCYTES NFR BLD AUTO: 17.6 % (ref 19.6–45.3)
MAGNESIUM SERPL-MCNC: 1.9 MG/DL (ref 1.6–2.4)
MCH RBC QN AUTO: 29.7 PG (ref 26.6–33)
MCHC RBC AUTO-ENTMCNC: 32.6 G/DL (ref 31.5–35.7)
MCV RBC AUTO: 91.2 FL (ref 79–97)
MONOCYTES # BLD AUTO: 0.86 10*3/MM3 (ref 0.1–0.9)
MONOCYTES NFR BLD AUTO: 6.4 % (ref 5–12)
NEUTROPHILS NFR BLD AUTO: 10.13 10*3/MM3 (ref 1.7–7)
NEUTROPHILS NFR BLD AUTO: 74.9 % (ref 42.7–76)
NRBC BLD AUTO-RTO: 0 /100 WBC (ref 0–0.2)
PHOSPHATE SERPL-MCNC: 2.6 MG/DL (ref 2.5–4.5)
PLATELET # BLD AUTO: 197 10*3/MM3 (ref 140–450)
PMV BLD AUTO: 9.4 FL (ref 6–12)
POTASSIUM SERPL-SCNC: 4.3 MMOL/L (ref 3.5–5.2)
QT INTERVAL: 258 MS
QT INTERVAL: 392 MS
QTC INTERVAL: 430 MS
QTC INTERVAL: 437 MS
RBC # BLD AUTO: 3.3 10*6/MM3 (ref 4.14–5.8)
SODIUM SERPL-SCNC: 132 MMOL/L (ref 136–145)
WBC NRBC COR # BLD AUTO: 13.52 10*3/MM3 (ref 3.4–10.8)

## 2024-09-29 PROCEDURE — 97110 THERAPEUTIC EXERCISES: CPT

## 2024-09-29 PROCEDURE — 94761 N-INVAS EAR/PLS OXIMETRY MLT: CPT

## 2024-09-29 PROCEDURE — 94799 UNLISTED PULMONARY SVC/PX: CPT

## 2024-09-29 PROCEDURE — 25010000002 METRONIDAZOLE 500 MG/100ML SOLUTION: Performed by: STUDENT IN AN ORGANIZED HEALTH CARE EDUCATION/TRAINING PROGRAM

## 2024-09-29 PROCEDURE — 94664 DEMO&/EVAL PT USE INHALER: CPT

## 2024-09-29 PROCEDURE — 97116 GAIT TRAINING THERAPY: CPT

## 2024-09-29 PROCEDURE — 83735 ASSAY OF MAGNESIUM: CPT | Performed by: STUDENT IN AN ORGANIZED HEALTH CARE EDUCATION/TRAINING PROGRAM

## 2024-09-29 PROCEDURE — 25010000002 CEFEPIME PER 500 MG: Performed by: INTERNAL MEDICINE

## 2024-09-29 PROCEDURE — 25810000003 LACTATED RINGERS PER 1000 ML: Performed by: STUDENT IN AN ORGANIZED HEALTH CARE EDUCATION/TRAINING PROGRAM

## 2024-09-29 PROCEDURE — 85025 COMPLETE CBC W/AUTO DIFF WBC: CPT | Performed by: STUDENT IN AN ORGANIZED HEALTH CARE EDUCATION/TRAINING PROGRAM

## 2024-09-29 PROCEDURE — 80048 BASIC METABOLIC PNL TOTAL CA: CPT | Performed by: INTERNAL MEDICINE

## 2024-09-29 PROCEDURE — 99024 POSTOP FOLLOW-UP VISIT: CPT | Performed by: STUDENT IN AN ORGANIZED HEALTH CARE EDUCATION/TRAINING PROGRAM

## 2024-09-29 PROCEDURE — 25010000002 HEPARIN (PORCINE) PER 1000 UNITS

## 2024-09-29 PROCEDURE — 25010000002 HYDROMORPHONE PER 4 MG: Performed by: STUDENT IN AN ORGANIZED HEALTH CARE EDUCATION/TRAINING PROGRAM

## 2024-09-29 PROCEDURE — 25010000002 FLUCONAZOLE PER 200 MG: Performed by: STUDENT IN AN ORGANIZED HEALTH CARE EDUCATION/TRAINING PROGRAM

## 2024-09-29 PROCEDURE — 84100 ASSAY OF PHOSPHORUS: CPT | Performed by: STUDENT IN AN ORGANIZED HEALTH CARE EDUCATION/TRAINING PROGRAM

## 2024-09-29 RX ADMIN — PANTOPRAZOLE SODIUM 40 MG: 40 INJECTION, POWDER, FOR SOLUTION INTRAVENOUS at 20:48

## 2024-09-29 RX ADMIN — DOCUSATE SODIUM 50 MG AND SENNOSIDES 8.6 MG 2 TABLET: 8.6; 5 TABLET, FILM COATED ORAL at 09:09

## 2024-09-29 RX ADMIN — FLUCONAZOLE 400 MG: 2 INJECTION, SOLUTION INTRAVENOUS at 01:32

## 2024-09-29 RX ADMIN — IPRATROPIUM BROMIDE 2 PUFF: 17 AEROSOL, METERED RESPIRATORY (INHALATION) at 20:13

## 2024-09-29 RX ADMIN — METOPROLOL TARTRATE 5 MG: 5 INJECTION INTRAVENOUS at 23:37

## 2024-09-29 RX ADMIN — HEPARIN SODIUM 5000 UNITS: 5000 INJECTION, SOLUTION INTRAVENOUS; SUBCUTANEOUS at 21:37

## 2024-09-29 RX ADMIN — PANTOPRAZOLE SODIUM 40 MG: 40 INJECTION, POWDER, FOR SOLUTION INTRAVENOUS at 09:10

## 2024-09-29 RX ADMIN — METOPROLOL TARTRATE 5 MG: 5 INJECTION INTRAVENOUS at 05:38

## 2024-09-29 RX ADMIN — ALBUTEROL SULFATE 2 PUFF: 108 INHALANT RESPIRATORY (INHALATION) at 20:13

## 2024-09-29 RX ADMIN — HEPARIN SODIUM 5000 UNITS: 5000 INJECTION, SOLUTION INTRAVENOUS; SUBCUTANEOUS at 05:38

## 2024-09-29 RX ADMIN — METOPROLOL TARTRATE 5 MG: 5 INJECTION INTRAVENOUS at 13:49

## 2024-09-29 RX ADMIN — ALBUTEROL SULFATE 2 PUFF: 108 INHALANT RESPIRATORY (INHALATION) at 14:53

## 2024-09-29 RX ADMIN — CEFEPIME 2 G: 2 INJECTION, POWDER, FOR SOLUTION INTRAVENOUS at 09:10

## 2024-09-29 RX ADMIN — HYDROMORPHONE HYDROCHLORIDE 0.5 MG: 1 INJECTION, SOLUTION INTRAMUSCULAR; INTRAVENOUS; SUBCUTANEOUS at 21:36

## 2024-09-29 RX ADMIN — METRONIDAZOLE 500 MG: 500 INJECTION, SOLUTION INTRAVENOUS at 00:40

## 2024-09-29 RX ADMIN — HEPARIN SODIUM 5000 UNITS: 5000 INJECTION, SOLUTION INTRAVENOUS; SUBCUTANEOUS at 16:50

## 2024-09-29 RX ADMIN — SODIUM CHLORIDE, POTASSIUM CHLORIDE, SODIUM LACTATE AND CALCIUM CHLORIDE 100 ML/HR: 600; 310; 30; 20 INJECTION, SOLUTION INTRAVENOUS at 18:48

## 2024-09-29 RX ADMIN — SODIUM CHLORIDE, POTASSIUM CHLORIDE, SODIUM LACTATE AND CALCIUM CHLORIDE 100 ML/HR: 600; 310; 30; 20 INJECTION, SOLUTION INTRAVENOUS at 04:21

## 2024-09-29 RX ADMIN — LATANOPROST 1 DROP: 50 SOLUTION OPHTHALMIC at 20:52

## 2024-09-29 RX ADMIN — Medication 10 ML: at 09:10

## 2024-09-29 RX ADMIN — IPRATROPIUM BROMIDE 0.5 MG: 0.5 SOLUTION RESPIRATORY (INHALATION) at 07:12

## 2024-09-29 RX ADMIN — HYDROMORPHONE HYDROCHLORIDE 0.5 MG: 1 INJECTION, SOLUTION INTRAMUSCULAR; INTRAVENOUS; SUBCUTANEOUS at 09:09

## 2024-09-29 RX ADMIN — HYDROMORPHONE HYDROCHLORIDE 0.5 MG: 1 INJECTION, SOLUTION INTRAMUSCULAR; INTRAVENOUS; SUBCUTANEOUS at 04:25

## 2024-09-29 RX ADMIN — METOPROLOL TARTRATE 5 MG: 5 INJECTION INTRAVENOUS at 18:41

## 2024-09-29 RX ADMIN — HYDROMORPHONE HYDROCHLORIDE 0.5 MG: 1 INJECTION, SOLUTION INTRAMUSCULAR; INTRAVENOUS; SUBCUTANEOUS at 18:23

## 2024-09-29 RX ADMIN — ALBUTEROL SULFATE 2 PUFF: 108 INHALANT RESPIRATORY (INHALATION) at 07:12

## 2024-09-29 RX ADMIN — CEFEPIME 2 G: 2 INJECTION, POWDER, FOR SOLUTION INTRAVENOUS at 20:48

## 2024-09-29 RX ADMIN — METRONIDAZOLE 500 MG: 500 INJECTION, SOLUTION INTRAVENOUS at 09:10

## 2024-09-29 RX ADMIN — Medication 10 ML: at 20:48

## 2024-09-29 RX ADMIN — LIDOCAINE 2 PATCH: 4 PATCH TOPICAL at 09:10

## 2024-09-29 RX ADMIN — HYDROMORPHONE HYDROCHLORIDE 0.5 MG: 1 INJECTION, SOLUTION INTRAMUSCULAR; INTRAVENOUS; SUBCUTANEOUS at 13:03

## 2024-09-29 RX ADMIN — METRONIDAZOLE 500 MG: 500 INJECTION, SOLUTION INTRAVENOUS at 18:41

## 2024-09-29 RX ADMIN — DOCUSATE SODIUM 50 MG AND SENNOSIDES 8.6 MG 2 TABLET: 8.6; 5 TABLET, FILM COATED ORAL at 20:48

## 2024-09-29 NOTE — PLAN OF CARE
Goal Outcome Evaluation:  Plan of Care Reviewed With: patient        Progress: improving  Outcome Evaluation: Pt was in bed, agreed to therapy.  Able to perform bed mobility with min assist.  Transfered sit to stand with min assist.  Amb 30' with HHA/min assist.  Will continue to work with increase strength and progress amb.

## 2024-09-29 NOTE — PLAN OF CARE
Problem: Adult Inpatient Plan of Care  Goal: Plan of Care Review  Outcome: Progressing  Flowsheets (Taken 9/29/2024 0332)  Progress: improving  Plan of Care Reviewed With: patient  Outcome Evaluation: Pt complains of pain, see MAR. No complaints of nausea. NG to LIWS. Voiding. Tele running afib. Room air. SCDs. IVF and IV abx. Safety maintained.  Pt sat on edge of bed and ambulated in room this AM with x1 assist.

## 2024-09-29 NOTE — PROGRESS NOTES
"    [facility] Medicine Services  INPATIENT PROGRESS NOTE    Patient Name: Oral Dey  Date of Admission: 9/26/2024  Today's Date: 09/29/24  Length of Stay: 3  Primary Care Physician: Jorge Shepherd MD    Subjective   Chief Complaint: follow-up gastric ulcer  HPI   Patient reports that he is feeling \"rough.\"  Yesterday he had an episode of atrial fibrillation with rapid ventricular response, but denies any significant symptoms of palpitations, chest pain or chest pressure, shortness of breath, etc.  He also tested positive for COVID-19.  He indicated that \"a week or 2 ago\" he had some chills and started developing a cough.  Initial onset of symptoms is still uncertain but based upon his history it does not appear like this was in the recent past.  He does report that he has had multiple vaccinations/boosters for COVID-19.  He denies any current shortness of breath symptoms.  He has a nagging cough.  Denies any sore throat or sinus congestion.  His only other symptoms of some mild \"heartburn.\"    Review of Systems   All pertinent negatives and positives are as above. All other systems have been reviewed and are negative unless otherwise stated.     Objective    Temp:  [97.9 °F (36.6 °C)-100.2 °F (37.9 °C)] 98.9 °F (37.2 °C)  Heart Rate:  [] 103  Resp:  [16-18] 16  BP: (126-169)/(48-78) 152/55  Physical Exam  Vitals reviewed.   Constitutional:       General: He is not in acute distress.     Comments: Resting in bed   HENT:      Head: Normocephalic.      Nose:      Comments: NG in place     Mouth/Throat:      Mouth: Mucous membranes are moist.   Cardiovascular:      Rate and Rhythm: Normal rate.   Pulmonary:      Effort: Pulmonary effort is normal. No respiratory distress.      Comments: Diminished at bases; on room air  Abdominal:      Comments: SEGUNDO noted on the left; surgical sites C/D/I; bowel sounds hypoactive   Musculoskeletal:      Right lower leg: No edema.      Left " lower leg: No edema.   Skin:     General: Skin is warm.   Neurological:      General: No focal deficit present.      Mental Status: He is alert.   Psychiatric:         Mood and Affect: Mood normal.         Results Review:  I have reviewed the labs, radiology results, and diagnostic studies.    Laboratory Data:   Results from last 7 days   Lab Units 09/29/24  0428 09/28/24  0602 09/27/24  1018   WBC 10*3/mm3 13.52* 13.65* 15.78*   HEMOGLOBIN g/dL 9.8* 10.0* 10.5*   HEMATOCRIT % 30.1* 31.5* 31.7*   PLATELETS 10*3/mm3 197 172 203        Results from last 7 days   Lab Units 09/29/24  0428 09/28/24  0602 09/27/24  0214 09/26/24  1858   SODIUM mmol/L 132* 133* 134* 134*   POTASSIUM mmol/L 4.3 4.8 4.5 4.4   CHLORIDE mmol/L 97* 100 100 96*   CO2 mmol/L 25.0 25.0 22.0 25.0   BUN mg/dL 20 22 19 21   CREATININE mg/dL 1.01 1.08 1.03 1.13   CALCIUM mg/dL 8.2* 8.6 8.0* 9.0   BILIRUBIN mg/dL  --   --  0.8 0.8   ALK PHOS U/L  --   --  98 123*   ALT (SGPT) U/L  --   --  12 12   AST (SGOT) U/L  --   --  16 13   GLUCOSE mg/dL 96 113* 135* 151*       Culture Data:   Blood Culture   Date Value Ref Range Status   09/27/2024 No growth at 24 hours  Preliminary   09/27/2024 No growth at 24 hours  Preliminary       Radiology Data:   Imaging Results (Last 24 Hours)       ** No results found for the last 24 hours. **            I have reviewed the patient's current medications.     Assessment/Plan   Assessment  Active Hospital Problems    Diagnosis     **Pneumoperitoneum     Atrial fibrillation with rapid ventricular response     COVID-19 virus infection     Cough     Anemia     Perforated gastric ulcer     Hypertension        Treatment Plan  Postoperative day #3 laparoscopic robotic assisted repair of a perforated gastric ulcer with omental patch by Dr. Malone  N.p.o.  NG tube in place  Respiratory PCR panel obtained yesterday positive for COVID-19  Patient remains on room air  Timeline for onset of COVID symptoms unclear as he reports  "having some new respiratory symptoms dating back \"a week or two\".  No additional COVID specific treatment warranted at this time and will monitor closely for any evolution of symptoms  IV Lopressor - scheduled q6hrs  Continuous telemetry monitoring  Plan for Echocardiogram today  Electrolytes stable  Currently on heparin prophylaxis; will need to calculate [id number] and anticoagulation options - made more complicated by recent perforated gastric ulcer s/p repair  Currently on broad-spectrum antibiotics with cefepime, Flagyl, and also on IV fluconazole  IV PPI  Albuterol and Atrovent HFA.  Currently need to avoid any oral medications to assist with cough  Mobilize; ambulate  Flutter valve    Medical Decision Making  Number and Complexity of problems: 3 acute; high complexity      Conditions and Status        Condition is unchanged.     [facility] Data  External documents reviewed: none  Cardiac tracing (EKG, telemetry) interpretation: on telemetry this morning patient appears to be in atrial flutter  range  Radiology interpretation: no new radiology studies  Labs reviewed: as above  Any tests that were considered but not ordered: none     Decision rules/scores evaluated (example [id number], Wells, etc): none     Discussed with: patient and bedside nurse, Gail     Care Planning  Shared decision making: Discussed with patient with agreement to proceed with treatment plan as outlined  Code status and discussions: Full code    Disposition  Social Determinants of Health that impact treatment or disposition: None apparent at this time  I expect the patient to be discharged per primary service    Electronically signed by Wilbert Maldonado MD, 09/29/24, 09:59 CDT.   "

## 2024-09-29 NOTE — THERAPY TREATMENT NOTE
Acute Care - Physical Therapy Treatment Note   [place]     Patient Name: Oral Dey  : 1940  MRN: [id number]  Today's Date: 2024      Visit Dx:     ICD-10-CM ICD-9-CM   1. Pneumoperitoneum  K66.8 568.89   2. Impaired mobility [Z74.09]  Z74.09 799.89     Patient Active Problem List   Diagnosis    Hx of colonic polyp    Family hx of colon cancer    CLL (chronic lymphocytic leukemia)    Hypertension    IgG lambda monoclonal gammopathy    Iron deficiency    Pneumoperitoneum    Perforated gastric ulcer    Cough    Anemia    Atrial fibrillation with rapid ventricular response    COVID-19 virus infection     Past Medical History:   Diagnosis Date    Bladder cancer     CLL (chronic lymphocytic leukemia)     Colon polyp     Gallstone     GERD (gastroesophageal reflux disease)     Glaucoma     Hearing loss     Hypertension     Inguinal hernia     right groin    Macular degeneration, right eye      Past Surgical History:   Procedure Laterality Date    CATARACT EXTRACTION, BILATERAL      COLONOSCOPY  2012    CYSTOSCOPY BLADDER BIOPSY      DIAGNOSTIC LAPAROSCOPY N/A 2024    Procedure: ROBOTIC REPAIR OF PERFORATED GASTRIC ULCER;  Surgeon: Petrona Malone MD;  Location:  [facility] OR;  Service: General;  Laterality: N/A;  WITH REPAIR OF GASTRIC ULCER PERFORATION    EXCISION MASS HEAD/NECK N/A 3/4/2022    Procedure: EXCISION OF MASS ON POSTERIOR NECK;  Surgeon: Rossana Mahajan MD;  Location:  [facility];  Service: General;  Laterality: N/A;    INGUINAL HERNIA REPAIR Left     INGUINAL HERNIA REPAIR Right 2017    Procedure: RIGHT INGUINAL HERNIA REPAIR WITH MESH ;  Surgeon: Rossana Mahajan MD;  Location:  [facility] OR;  Service:      PT Assessment (Last 12 Hours)       PT Evaluation and Treatment       Row Name 24 1013          Physical Therapy Time and Intention    Subjective Information complains of;weakness;fatigue  -JESSICA     Document Type therapy note (daily note)  -JESSICA     Mode of  Treatment physical therapy  -       Row Name 09/29/24 1013          General Information    Existing Precautions/Restrictions fall  NG tube, SEGUNDO drain  -       Row Name 09/29/24 1013          Pain    Pretreatment Pain Rating 2/10  -JESSICA     Posttreatment Pain Rating 2/10  -JESSICA     Pain Location generalized  -     Pain Intervention(s) Repositioned;Ambulation/increased activity  -       Row Name 09/29/24 1013          Bed Mobility    Supine-Sit [place] (Bed Mobility) verbal cues;minimum assist (75% patient effort)  -     Assistive Device (Bed Mobility) bed rails;head of bed elevated  -       Row Name 09/29/24 1013          Transfers    Transfers sit-stand transfer;stand-sit transfer  -       Row Name 09/29/24 1013          Sit-Stand Transfer    Sit-Stand [place] (Transfers) verbal cues;minimum assist (75% patient effort)  -       Row Name 09/29/24 1013          Stand-Sit Transfer    Stand-Sit [place] (Transfers) verbal cues;minimum assist (75% patient effort)  -       Row Name 09/29/24 1013          Gait/Stairs (Locomotion)    [place] Level (Gait) verbal cues;minimum assist (75% patient effort)  -     Assistive Device (Gait) --  HHA  -JESSICA     Distance in Feet (Gait) 30  -JESSICA       Row Name 09/29/24 1013          Motor Skills    Comments, Therapeutic Exercise sitting AROM BLE X 20  -JESSICA     Additional Documentation Comments, Therapeutic Exercise (Row)  -       Row Name             Wound 09/26/24 2222 abdomen Incision    Wound - Properties Group Placement Date: 09/26/24  -HW Placement Time: 2222 -HW Location: abdomen  -HW Primary Wound Type: Incision  -HW    Retired Wound - Properties Group Placement Date: 09/26/24  -HW Placement Time: 2222 -HW Location: abdomen  -HW Primary Wound Type: Incision  -HW    Retired Wound - Properties Group Date first assessed: 09/26/24  -HW Time first assessed: 2222 -HW Location: abdomen  -HW Primary Wound Type: Incision  -HW      Row Name 09/29/24 1013           Positioning and Restraints    Pre-Treatment Position in bed  -JESSICA     Post Treatment Position chair  -JESSICA     In Chair reclined;call light within reach;encouraged to call for assist  -JESSICA               User Key  (r) = Recorded By, (t) = Taken By, (c) = Cosigned By      Initials Name Provider Type    Kenneth Mullins PTA Physical Therapist Assistant    Matti Villarreal, RN Registered Nurse                    Physical Therapy Education       Title: PT OT SLP Therapies (In Progress)       Topic: Physical Therapy (In Progress)       Point: Mobility training (Done)       Learning Progress Summary             Patient Acceptance, E, VU,DU,NR by NAHOMY at 9/27/2024 0800    Comment: benefits of activity, progression of PT                         Point: Home exercise program (Not Started)       Learner Progress:  Not documented in this visit.              Point: Body mechanics (Not Started)       Learner Progress:  Not documented in this visit.              Point: Precautions (Not Started)       Learner Progress:  Not documented in this visit.                              User Key       Initials Effective Dates Name Provider Type Discipline    NAHOMY 02/03/23 -  Abimael Lees, PT DPT Physical Therapist PT                  PT Recommendation and Plan     Plan of Care Reviewed With: patient  Progress: improving  Outcome Evaluation: Pt was in bed, agreed to therapy.  Able to perform bed mobility with min assist.  Transfered sit to stand with min assist.  Amb 30' with HHA/min assist.  Will continue to work with increase strength and progress amb.   Outcome Measures       Row Name 09/29/24 1013             How much help from another person do you currently need...    Turning from your back to your side while in flat bed without using bedrails? 3  -JESSICA      Moving from lying on back to sitting on the side of a flat bed without bedrails? 3  -JESSICA      Moving to and from a bed to a chair (including a wheelchair)? 3  -JESSICA       Standing up from a chair using your arms (e.g., wheelchair, bedside chair)? 3  -JESSICA      Climbing 3-5 steps with a railing? 2  -JESSICA      To walk in hospital room? 3  -JESSICA      AM-PAC 6 Clicks Score (PT) 17  -JESSICA      Highest Level of Mobility Goal 5 --> Static standing  -JESSICA         Functional Assessment    Outcome Measure Options AM-PAC 6 Clicks Basic Mobility (PT)  -JESSICA                User Key  (r) = Recorded By, (t) = Taken By, (c) = Cosigned By      Initials Name Provider Type    Kenneth Mullins PTA Physical Therapist Assistant                     Time Calculation:    PT Charges       Row Name 09/29/24 1013             Time Calculation    Start Time 1013  -JESSICA      Stop Time 1041  -JESSICA      Time Calculation (min) 28 min  -JESSICA      PT Received On 09/29/24  -JESSICA         Time Calculation- PT    Total Timed Code Minutes- PT 28 minute(s)  -JESSICA         Timed Charges    02756 - PT Therapeutic Exercise Minutes 12  -JESSICA      93535 - Gait Training Minutes  16  -JESSICA         Total Minutes    Timed Charges Total Minutes 28  -JESSICA       Total Minutes 28  -JESSICA                User Key  (r) = Recorded By, (t) = Taken By, (c) = Cosigned By      Initials Name Provider Type    Kenneth Mullins PTA Physical Therapist Assistant                  Therapy Charges for Today       Code Description Service Date Service Provider Modifiers Qty    59198365463 HC GAIT TRAINING EA 15 MIN 9/29/2024 Kenneth Bay PTA GP 1    38731165813 HC PT THER PROC EA 15 MIN 9/29/2024 Kenneth Bay PTA GP 1            PT G-Codes  Outcome Measure Options: AM-PAC 6 Clicks Basic Mobility (PT)  AM-PAC 6 Clicks Score (PT): 17  AM-PAC 6 Clicks Score (OT): 17    Kenneth Bay PTA  9/29/2024

## 2024-09-29 NOTE — PROGRESS NOTES
Petrona Malone MD - General Surgery  Progress Note     LOS: 3 days   Patient Care Team:  Jorge Shepherd MD as PCP - General (Internal Medicine)      Subjective     Interval History:     POD 3 s/p robotic repair of perforated gastric ulcer.   Overall doing about the same, no significant improvement in abdominal pain from yesterday, is coughing up some phlegm, and did test positive for COVID-19 on respiratory panel   No N/V; .2, no subjective fever or chills  WBC 13.5 from 13.7    Objective     Vital Signs  Temp:  [97.9 °F (36.6 °C)-100.2 °F (37.9 °C)] 98.9 °F (37.2 °C)  Heart Rate:  [] 103  Resp:  [16-18] 16  BP: (126-169)/(48-78) 152/55    Physical Exam:  General appearance -thin, elderly male, awake, alert, no acute distress  Mental status - alert, oriented to person, place, and time  Eyes - pupils equal and reactive, extraocular eye movements intact, anicteric  Neck - supple, no significant adenopathy  Chest - no tachypnea, retractions or cyanosis, NGT in place with gastric fluid in collection container  Heart - normal rate and regular rhythm  Abdomen - soft, non-distended, appropriately tender. Incisions c/d/I. SEGUNDO drain with scant SS output   Neurological - alert, oriented, normal speech, no focal findings or movement disorder noted      Results Review:    Lab Results (last 24 hours)       Procedure Component Value Units Date/Time    Magnesium [[id number]]  (Normal) Collected: 09/29/24 0428    Specimen: Blood Updated: 09/29/24 0557     Magnesium 1.9 mg/dL     Phosphorus [[id number]]  (Normal) Collected: 09/29/24 0428    Specimen: Blood Updated: 09/29/24 0557     Phosphorus 2.6 mg/dL     Basic Metabolic Panel [[id number]]  (Abnormal) Collected: 09/29/24 0428    Specimen: Blood Updated: 09/29/24 0557     Glucose 96 mg/dL      BUN 20 mg/dL      Creatinine 1.01 mg/dL      Sodium 132 mmol/L      Potassium 4.3 mmol/L      Chloride 97 mmol/L      CO2 25.0 mmol/L      Calcium 8.2 mg/dL      BUN/Creatinine  Ratio 19.8     Anion Gap 10.0 mmol/L      eGFR 74.7 mL/min/1.73     Narrative:      GFR Normal >60  Chronic Kidney Disease <60  Kidney Failure <15    The GFR formula is only valid for adults with stable renal function between ages 18 and 70.    CBC & Differential [[id number]]  (Abnormal) Collected: 09/29/24 0428    Specimen: Blood Updated: 09/29/24 0536    Narrative:      The following orders were created for panel order CBC & Differential.  Procedure                               Abnormality         Status                     ---------                               -----------         ------                     CBC Auto Differential[[id number]]        Abnormal            Final result                 Please view results for these tests on the individual orders.    CBC Auto Differential [[id number]]  (Abnormal) Collected: 09/29/24 0428    Specimen: Blood Updated: 09/29/24 0536     WBC 13.52 10*3/mm3      RBC 3.30 10*6/mm3      Hemoglobin 9.8 g/dL      Hematocrit 30.1 %      MCV 91.2 fL      MCH 29.7 pg      MCHC 32.6 g/dL      RDW 12.8 %      RDW-SD 42.2 fl      MPV 9.4 fL      Platelets 197 10*3/mm3      Neutrophil % 74.9 %      Lymphocyte % 17.6 %      Monocyte % 6.4 %      Eosinophil % 0.1 %      Basophil % 0.1 %      Immature Grans % 0.9 %      Neutrophils, Absolute 10.13 10*3/mm3      Lymphocytes, Absolute 2.38 10*3/mm3      Monocytes, Absolute 0.86 10*3/mm3      Eosinophils, Absolute 0.01 10*3/mm3      Basophils, Absolute 0.02 10*3/mm3      Immature Grans, Absolute 0.12 10*3/mm3      nRBC 0.0 /100 WBC     Blood Culture With DEV - Blood, Hand, Left [[id number]]  (Normal) Collected: 09/27/24 0448    Specimen: Blood from Hand, Left Updated: 09/29/24 0515     Blood Culture No growth at 2 days    Blood Culture With DEV - Blood, Hand, Right [[id number]]  (Normal) Collected: 09/27/24 0453    Specimen: Blood from Hand, Right Updated: 09/29/24 0515     Blood Culture No growth at 2 days    Respiratory Panel PCR  w/COVID-19(SARS-CoV-2) [facility], NP Swab in UTM/VTM, 2 HR TAT - Swab, Nasopharynx [[id number]]  (Abnormal) Collected: 09/28/24 1700    Specimen: Swab from Nasopharynx Updated: 09/28/24 8128     ADENOVIRUS, PCR Not Detected     Coronavirus 229E Not Detected     Coronavirus HKU1 Not Detected     Coronavirus NL63 Not Detected     Coronavirus OC43 Not Detected     COVID19 Detected     Human Metapneumovirus Not Detected     Human Rhinovirus/Enterovirus Not Detected     Influenza A PCR Not Detected     Influenza B PCR Not Detected     Parainfluenza Virus 1 Not Detected     Parainfluenza Virus 2 Not Detected     Parainfluenza Virus 3 Not Detected     Parainfluenza Virus 4 Not Detected     RSV, PCR Not Detected     Bordetella pertussis pcr Not Detected     Bordetella parapertussis PCR Not Detected     Chlamydophila pneumoniae PCR Not Detected     Mycoplasma pneumo by PCR Not Detected    Narrative:      In the setting of a positive respiratory panel with a viral infection PLUS a negative procalcitonin without other underlying concern for bacterial infection, consider observing off antibiotics or discontinuation of antibiotics and continue supportive care. If the respiratory panel is positive for atypical bacterial infection (Bordetella pertussis, Chlamydophila pneumoniae, or Mycoplasma pneumoniae), consider antibiotic de-escalation to target atypical bacterial infection.          Imaging Results (Last 24 Hours)       ** No results found for the last 24 hours. **              Assessment & Plan     Perforated gastric ulcer s/p robotic repair and omental patch on 9/26/24   - PT and OT consults.   - OOB ambulation.   -Pulmonary toilet with I-S, Aerobika, RT  -Hgb stable.   -WBC count elevated but continues to fall.   -Continue Rocephin/Flagyl/Diflucan x 4 days post op. Transfer to floor. NGT to stay in place until POD 5 (10/01/2024) when we study the repair.   - DO NOT REMOVE NGT.   - PRN pain and nausea  control.       Robert Mena MD  09/29/24  10:14 CDT

## 2024-09-29 NOTE — PLAN OF CARE
Goal Outcome Evaluation:  Plan of Care Reviewed With: patient        Progress: improving  Outcome Evaluation: Pt A&O x4. VSS. C/o pain. See MAR. A fib, rate controlled. Safety maintained.

## 2024-09-30 ENCOUNTER — APPOINTMENT (OUTPATIENT)
Dept: CARDIOLOGY | Facility: HOSPITAL | Age: 84
DRG: 326 | End: 2024-09-30
Payer: COMMERCIAL

## 2024-09-30 LAB
ANION GAP SERPL CALCULATED.3IONS-SCNC: 9 MMOL/L (ref 5–15)
BASOPHILS # BLD AUTO: 0.02 10*3/MM3 (ref 0–0.2)
BASOPHILS NFR BLD AUTO: 0.2 % (ref 0–1.5)
BH CV ECHO MEAS - AO MAX PG: 8.2 MMHG
BH CV ECHO MEAS - AO MEAN PG: 4 MMHG
BH CV ECHO MEAS - AO ROOT DIAM: 3.4 CM
BH CV ECHO MEAS - AO V2 MAX: 143 CM/SEC
BH CV ECHO MEAS - AO V2 VTI: 30.7 CM
BH CV ECHO MEAS - AVA(I,D): 2.41 CM2
BH CV ECHO MEAS - EDV(CUBED): 128 ML
BH CV ECHO MEAS - EDV(MOD-SP4): 72.4 ML
BH CV ECHO MEAS - EF(MOD-SP4): 61.6 %
BH CV ECHO MEAS - ESV(CUBED): 29.2 ML
BH CV ECHO MEAS - ESV(MOD-SP4): 27.8 ML
BH CV ECHO MEAS - FS: 38.9 %
BH CV ECHO MEAS - IVS/LVPW: 1.01 CM
BH CV ECHO MEAS - IVSD: 0.88 CM
BH CV ECHO MEAS - LA DIMENSION: 3.5 CM
BH CV ECHO MEAS - LAT PEAK E' VEL: 9.1 CM/SEC
BH CV ECHO MEAS - LV DIASTOLIC VOL/BSA (35-75): 38.8 CM2
BH CV ECHO MEAS - LV MASS(C)D: 154.5 GRAMS
BH CV ECHO MEAS - LV MAX PG: 3.7 MMHG
BH CV ECHO MEAS - LV MEAN PG: 2 MMHG
BH CV ECHO MEAS - LV SYSTOLIC VOL/BSA (12-30): 14.9 CM2
BH CV ECHO MEAS - LV V1 MAX: 96.3 CM/SEC
BH CV ECHO MEAS - LV V1 VTI: 19.5 CM
BH CV ECHO MEAS - LVIDD: 5 CM
BH CV ECHO MEAS - LVIDS: 3.1 CM
BH CV ECHO MEAS - LVOT AREA: 3.8 CM2
BH CV ECHO MEAS - LVOT DIAM: 2.2 CM
BH CV ECHO MEAS - LVPWD: 0.87 CM
BH CV ECHO MEAS - MED PEAK E' VEL: 9.8 CM/SEC
BH CV ECHO MEAS - MV A MAX VEL: 80.4 CM/SEC
BH CV ECHO MEAS - MV DEC TIME: 0.22 SEC
BH CV ECHO MEAS - MV E MAX VEL: 108 CM/SEC
BH CV ECHO MEAS - MV E/A: 1.34
BH CV ECHO MEAS - RAP SYSTOLE: 3 MMHG
BH CV ECHO MEAS - RVSP: 24.9 MMHG
BH CV ECHO MEAS - SV(LVOT): 74.1 ML
BH CV ECHO MEAS - SV(MOD-SP4): 44.6 ML
BH CV ECHO MEAS - SVI(LVOT): 39.7 ML/M2
BH CV ECHO MEAS - SVI(MOD-SP4): 23.9 ML/M2
BH CV ECHO MEAS - TR MAX PG: 21.9 MMHG
BH CV ECHO MEAS - TR MAX VEL: 234 CM/SEC
BH CV ECHO MEASUREMENTS AVERAGE E/E' RATIO: 11.43
BUN SERPL-MCNC: 22 MG/DL (ref 8–23)
BUN/CREAT SERPL: 23.4 (ref 7–25)
CALCIUM SPEC-SCNC: 7.9 MG/DL (ref 8.6–10.5)
CHLORIDE SERPL-SCNC: 98 MMOL/L (ref 98–107)
CO2 SERPL-SCNC: 25 MMOL/L (ref 22–29)
CREAT SERPL-MCNC: 0.94 MG/DL (ref 0.76–1.27)
DEPRECATED RDW RBC AUTO: 42 FL (ref 37–54)
EGFRCR SERPLBLD CKD-EPI 2021: 81.4 ML/MIN/1.73
EOSINOPHIL # BLD AUTO: 0.08 10*3/MM3 (ref 0–0.4)
EOSINOPHIL NFR BLD AUTO: 0.7 % (ref 0.3–6.2)
ERYTHROCYTE [DISTWIDTH] IN BLOOD BY AUTOMATED COUNT: 12.6 % (ref 12.3–15.4)
GLUCOSE SERPL-MCNC: 103 MG/DL (ref 65–99)
HCT VFR BLD AUTO: 30.1 % (ref 37.5–51)
HGB BLD-MCNC: 9.6 G/DL (ref 13–17.7)
IMM GRANULOCYTES # BLD AUTO: 0.07 10*3/MM3 (ref 0–0.05)
IMM GRANULOCYTES NFR BLD AUTO: 0.7 % (ref 0–0.5)
LEFT ATRIUM VOLUME INDEX: 17.5 ML/M2
LEFT ATRIUM VOLUME: 32.7 ML
LYMPHOCYTES # BLD AUTO: 1.64 10*3/MM3 (ref 0.7–3.1)
LYMPHOCYTES NFR BLD AUTO: 15.2 % (ref 19.6–45.3)
MAGNESIUM SERPL-MCNC: 2 MG/DL (ref 1.6–2.4)
MCH RBC QN AUTO: 29 PG (ref 26.6–33)
MCHC RBC AUTO-ENTMCNC: 31.9 G/DL (ref 31.5–35.7)
MCV RBC AUTO: 90.9 FL (ref 79–97)
MONOCYTES # BLD AUTO: 0.97 10*3/MM3 (ref 0.1–0.9)
MONOCYTES NFR BLD AUTO: 9 % (ref 5–12)
NEUTROPHILS NFR BLD AUTO: 7.98 10*3/MM3 (ref 1.7–7)
NEUTROPHILS NFR BLD AUTO: 74.2 % (ref 42.7–76)
NRBC BLD AUTO-RTO: 0 /100 WBC (ref 0–0.2)
PHOSPHATE SERPL-MCNC: 2.5 MG/DL (ref 2.5–4.5)
PLATELET # BLD AUTO: 198 10*3/MM3 (ref 140–450)
PMV BLD AUTO: 9 FL (ref 6–12)
POTASSIUM SERPL-SCNC: 4.1 MMOL/L (ref 3.5–5.2)
RBC # BLD AUTO: 3.31 10*6/MM3 (ref 4.14–5.8)
SODIUM SERPL-SCNC: 132 MMOL/L (ref 136–145)
WBC NRBC COR # BLD AUTO: 10.76 10*3/MM3 (ref 3.4–10.8)

## 2024-09-30 PROCEDURE — 97535 SELF CARE MNGMENT TRAINING: CPT | Performed by: OCCUPATIONAL THERAPIST

## 2024-09-30 PROCEDURE — 93306 TTE W/DOPPLER COMPLETE: CPT

## 2024-09-30 PROCEDURE — 97110 THERAPEUTIC EXERCISES: CPT

## 2024-09-30 PROCEDURE — 80048 BASIC METABOLIC PNL TOTAL CA: CPT | Performed by: INTERNAL MEDICINE

## 2024-09-30 PROCEDURE — 25010000002 CEFEPIME PER 500 MG: Performed by: INTERNAL MEDICINE

## 2024-09-30 PROCEDURE — 25010000002 HEPARIN (PORCINE) PER 1000 UNITS

## 2024-09-30 PROCEDURE — 97116 GAIT TRAINING THERAPY: CPT

## 2024-09-30 PROCEDURE — 99024 POSTOP FOLLOW-UP VISIT: CPT | Performed by: STUDENT IN AN ORGANIZED HEALTH CARE EDUCATION/TRAINING PROGRAM

## 2024-09-30 PROCEDURE — 25010000002 ONDANSETRON PER 1 MG: Performed by: INTERNAL MEDICINE

## 2024-09-30 PROCEDURE — 93306 TTE W/DOPPLER COMPLETE: CPT | Performed by: INTERNAL MEDICINE

## 2024-09-30 PROCEDURE — 83735 ASSAY OF MAGNESIUM: CPT | Performed by: STUDENT IN AN ORGANIZED HEALTH CARE EDUCATION/TRAINING PROGRAM

## 2024-09-30 PROCEDURE — 94799 UNLISTED PULMONARY SVC/PX: CPT

## 2024-09-30 PROCEDURE — 25010000002 METRONIDAZOLE 500 MG/100ML SOLUTION: Performed by: STUDENT IN AN ORGANIZED HEALTH CARE EDUCATION/TRAINING PROGRAM

## 2024-09-30 PROCEDURE — 25010000002 FLUCONAZOLE PER 200 MG: Performed by: STUDENT IN AN ORGANIZED HEALTH CARE EDUCATION/TRAINING PROGRAM

## 2024-09-30 PROCEDURE — 85025 COMPLETE CBC W/AUTO DIFF WBC: CPT | Performed by: STUDENT IN AN ORGANIZED HEALTH CARE EDUCATION/TRAINING PROGRAM

## 2024-09-30 PROCEDURE — 25010000002 HYDROMORPHONE PER 4 MG: Performed by: STUDENT IN AN ORGANIZED HEALTH CARE EDUCATION/TRAINING PROGRAM

## 2024-09-30 PROCEDURE — 94761 N-INVAS EAR/PLS OXIMETRY MLT: CPT

## 2024-09-30 PROCEDURE — 84100 ASSAY OF PHOSPHORUS: CPT | Performed by: STUDENT IN AN ORGANIZED HEALTH CARE EDUCATION/TRAINING PROGRAM

## 2024-09-30 PROCEDURE — 25810000003 LACTATED RINGERS PER 1000 ML: Performed by: STUDENT IN AN ORGANIZED HEALTH CARE EDUCATION/TRAINING PROGRAM

## 2024-09-30 RX ADMIN — ONDANSETRON 4 MG: 2 INJECTION INTRAMUSCULAR; INTRAVENOUS at 21:29

## 2024-09-30 RX ADMIN — IPRATROPIUM BROMIDE 2 PUFF: 17 AEROSOL, METERED RESPIRATORY (INHALATION) at 20:52

## 2024-09-30 RX ADMIN — METRONIDAZOLE 500 MG: 500 INJECTION, SOLUTION INTRAVENOUS at 17:38

## 2024-09-30 RX ADMIN — PANTOPRAZOLE SODIUM 40 MG: 40 INJECTION, POWDER, FOR SOLUTION INTRAVENOUS at 21:00

## 2024-09-30 RX ADMIN — HYDROMORPHONE HYDROCHLORIDE 0.5 MG: 1 INJECTION, SOLUTION INTRAMUSCULAR; INTRAVENOUS; SUBCUTANEOUS at 05:02

## 2024-09-30 RX ADMIN — LIDOCAINE 2 PATCH: 4 PATCH TOPICAL at 09:10

## 2024-09-30 RX ADMIN — HEPARIN SODIUM 5000 UNITS: 5000 INJECTION, SOLUTION INTRAVENOUS; SUBCUTANEOUS at 05:02

## 2024-09-30 RX ADMIN — HYDROMORPHONE HYDROCHLORIDE 0.5 MG: 1 INJECTION, SOLUTION INTRAMUSCULAR; INTRAVENOUS; SUBCUTANEOUS at 11:52

## 2024-09-30 RX ADMIN — IPRATROPIUM BROMIDE 2 PUFF: 17 AEROSOL, METERED RESPIRATORY (INHALATION) at 09:56

## 2024-09-30 RX ADMIN — METRONIDAZOLE 500 MG: 500 INJECTION, SOLUTION INTRAVENOUS at 10:24

## 2024-09-30 RX ADMIN — SODIUM CHLORIDE, POTASSIUM CHLORIDE, SODIUM LACTATE AND CALCIUM CHLORIDE 100 ML/HR: 600; 310; 30; 20 INJECTION, SOLUTION INTRAVENOUS at 21:02

## 2024-09-30 RX ADMIN — METOPROLOL TARTRATE 5 MG: 5 INJECTION INTRAVENOUS at 11:45

## 2024-09-30 RX ADMIN — HEPARIN SODIUM 5000 UNITS: 5000 INJECTION, SOLUTION INTRAVENOUS; SUBCUTANEOUS at 21:00

## 2024-09-30 RX ADMIN — ALBUTEROL SULFATE 2 PUFF: 108 INHALANT RESPIRATORY (INHALATION) at 20:52

## 2024-09-30 RX ADMIN — HEPARIN SODIUM 5000 UNITS: 5000 INJECTION, SOLUTION INTRAVENOUS; SUBCUTANEOUS at 14:19

## 2024-09-30 RX ADMIN — IPRATROPIUM BROMIDE 2 PUFF: 17 AEROSOL, METERED RESPIRATORY (INHALATION) at 14:06

## 2024-09-30 RX ADMIN — Medication 10 ML: at 21:02

## 2024-09-30 RX ADMIN — Medication 10 ML: at 10:24

## 2024-09-30 RX ADMIN — DOCUSATE SODIUM 50 MG AND SENNOSIDES 8.6 MG 2 TABLET: 8.6; 5 TABLET, FILM COATED ORAL at 09:10

## 2024-09-30 RX ADMIN — METOPROLOL TARTRATE 5 MG: 5 INJECTION INTRAVENOUS at 05:37

## 2024-09-30 RX ADMIN — IPRATROPIUM BROMIDE 2 PUFF: 17 AEROSOL, METERED RESPIRATORY (INHALATION) at 05:52

## 2024-09-30 RX ADMIN — ALBUTEROL SULFATE 2 PUFF: 108 INHALANT RESPIRATORY (INHALATION) at 14:06

## 2024-09-30 RX ADMIN — LATANOPROST 1 DROP: 50 SOLUTION OPHTHALMIC at 21:02

## 2024-09-30 RX ADMIN — ALBUTEROL SULFATE 2 PUFF: 108 INHALANT RESPIRATORY (INHALATION) at 09:56

## 2024-09-30 RX ADMIN — PANTOPRAZOLE SODIUM 40 MG: 40 INJECTION, POWDER, FOR SOLUTION INTRAVENOUS at 09:30

## 2024-09-30 RX ADMIN — FLUCONAZOLE 400 MG: 2 INJECTION, SOLUTION INTRAVENOUS at 01:51

## 2024-09-30 RX ADMIN — METRONIDAZOLE 500 MG: 500 INJECTION, SOLUTION INTRAVENOUS at 00:42

## 2024-09-30 RX ADMIN — CEFEPIME 2 G: 2 INJECTION, POWDER, FOR SOLUTION INTRAVENOUS at 21:00

## 2024-09-30 RX ADMIN — ALBUTEROL SULFATE 2 PUFF: 108 INHALANT RESPIRATORY (INHALATION) at 05:52

## 2024-09-30 RX ADMIN — HYDROMORPHONE HYDROCHLORIDE 0.5 MG: 1 INJECTION, SOLUTION INTRAMUSCULAR; INTRAVENOUS; SUBCUTANEOUS at 17:38

## 2024-09-30 RX ADMIN — DOCUSATE SODIUM 50 MG AND SENNOSIDES 8.6 MG 2 TABLET: 8.6; 5 TABLET, FILM COATED ORAL at 21:00

## 2024-09-30 RX ADMIN — ONDANSETRON 4 MG: 2 INJECTION INTRAMUSCULAR; INTRAVENOUS at 09:38

## 2024-09-30 RX ADMIN — HYDROMORPHONE HYDROCHLORIDE 0.5 MG: 1 INJECTION, SOLUTION INTRAMUSCULAR; INTRAVENOUS; SUBCUTANEOUS at 00:42

## 2024-09-30 RX ADMIN — SODIUM CHLORIDE, POTASSIUM CHLORIDE, SODIUM LACTATE AND CALCIUM CHLORIDE 100 ML/HR: 600; 310; 30; 20 INJECTION, SOLUTION INTRAVENOUS at 09:25

## 2024-09-30 RX ADMIN — METOPROLOL TARTRATE 5 MG: 5 INJECTION INTRAVENOUS at 17:38

## 2024-09-30 RX ADMIN — CEFEPIME 2 G: 2 INJECTION, POWDER, FOR SOLUTION INTRAVENOUS at 09:10

## 2024-09-30 RX ADMIN — METOPROLOL TARTRATE 5 MG: 5 INJECTION INTRAVENOUS at 23:30

## 2024-09-30 RX ADMIN — HYDROMORPHONE HYDROCHLORIDE 0.5 MG: 1 INJECTION, SOLUTION INTRAMUSCULAR; INTRAVENOUS; SUBCUTANEOUS at 23:18

## 2024-09-30 NOTE — PROGRESS NOTES
Nutrition Services    Patient Name:  Oral Dey  YOB: 1940  MRN: [id number]  Admit Date:  9/26/2024    NPO x 3 days. Aware pt is post gastric surgery and has NG tube in place. Pt may benefit from early [enteral/parenteral] feeding if unable to start a po diet soon and if appropriate with POC goals. If desired, RD available for recommendations. Nutrition following per protocol.     Electronically signed by:  Keisha Garcia RDN, JOSE  09/30/24 13:01 CDT

## 2024-09-30 NOTE — PLAN OF CARE
Goal Outcome Evaluation:  Plan of Care Reviewed With: patient        Progress: improving  Outcome Evaluation: OT tx completed.  Pt. is AxO x 4 & pleasant.  He c/o very little pain but endorses fatigue & lack of sleep.  Despite this, Mr. Dey is agreeable to participate.  He required cues & Min A to come EOB.  Extra time whilst seated EOB to reduce reported dizziness.  OTR edu'd in falls risks and pt compliant.  Pt ambulated bed > BR > chair. He would benefit from rwx to reduce his fall risk. Mr. Dey required BUE touch balance to improve his safety, posture, & increase his confidence.  Rec rwx at DC unless pt significantly improves prior to DC.  He was able to manage hospital gown without assist while using g bar for touch balance.  Unsuccessful attempt at BM but improved t/fs on & off toilet.  Mr. Dey would benefit from cont'd OT tx to improve his indep.  Rec for DC is progress pending.      Anticipated Discharge Disposition (OT): home with assist, home with home health

## 2024-09-30 NOTE — PROGRESS NOTES
[facility] Medicine Services  INPATIENT PROGRESS NOTE    Patient Name: Oral Dey  Date of Admission: 9/26/2024  Today's Date: 09/30/24  Length of Stay: 4  Primary Care Physician: Jorge Shepherd MD    Subjective   Chief Complaint: follow-up gastric ulcer  HPI   Patient reports that he is starting to feel better.  He feels like that his cough is also improving.  He continues to produce quite a bit of white, thick sputum but feels like that it is starting to break up.  Denies any chest pain or palpitations.  He does report passing some gas.  He has been taking walks in his room.  No bowel movement.  He states that the surgeon told him earlier today that his NG tube comes out tomorrow, and he was very pleased with this news.    Review of Systems   All pertinent negatives and positives are as above. All other systems have been reviewed and are negative unless otherwise stated.     Objective    Temp:  [98.2 °F (36.8 °C)-99.1 °F (37.3 °C)] 98.2 °F (36.8 °C)  Heart Rate:  [73-93] 78  Resp:  [16] 16  BP: (133-163)/(55-93) 154/70  Physical Exam  Vitals reviewed.   Constitutional:       General: He is not in acute distress.     Comments: Sitting up in bedside chair   HENT:      Head: Normocephalic.      Nose:      Comments: NG in place     Mouth/Throat:      Mouth: Mucous membranes are moist.   Cardiovascular:      Rate and Rhythm: Normal rate and regular rhythm.      Comments: Occasional ectopy (suspected PVCs) noted  Pulmonary:      Effort: Pulmonary effort is normal. No respiratory distress.      Comments: Diminished at bases; on room air  Abdominal:      Comments: bowel sounds hypoactive   Musculoskeletal:      Right lower leg: No edema.      Left lower leg: No edema.   Skin:     General: Skin is warm.   Neurological:      General: No focal deficit present.      Mental Status: He is alert.   Psychiatric:         Mood and Affect: Mood normal.         Results Review:  I have  reviewed the labs, radiology results, and diagnostic studies.    Laboratory Data:   Results from last 7 days   Lab Units 09/30/24  0634 09/29/24  0428 09/28/24  0602   WBC 10*3/mm3 10.76 13.52* 13.65*   HEMOGLOBIN g/dL 9.6* 9.8* 10.0*   HEMATOCRIT % 30.1* 30.1* 31.5*   PLATELETS 10*3/mm3 198 197 172        Results from last 7 days   Lab Units 09/30/24  0634 09/29/24  0428 09/28/24  0602 09/27/24  0214 09/26/24  1858   SODIUM mmol/L 132* 132* 133* 134* 134*   POTASSIUM mmol/L 4.1 4.3 4.8 4.5 4.4   CHLORIDE mmol/L 98 97* 100 100 96*   CO2 mmol/L 25.0 25.0 25.0 22.0 25.0   BUN mg/dL 22 20 22 19 21   CREATININE mg/dL 0.94 1.01 1.08 1.03 1.13   CALCIUM mg/dL 7.9* 8.2* 8.6 8.0* 9.0   BILIRUBIN mg/dL  --   --   --  0.8 0.8   ALK PHOS U/L  --   --   --  98 123*   ALT (SGPT) U/L  --   --   --  12 12   AST (SGOT) U/L  --   --   --  16 13   GLUCOSE mg/dL 103* 96 113* 135* 151*       Culture Data:   Blood Culture   Date Value Ref Range Status   09/27/2024 No growth at 24 hours  Preliminary   09/27/2024 No growth at 24 hours  Preliminary       Radiology Data:   Imaging Results (Last 24 Hours)       ** No results found for the last 24 hours. **            I have reviewed the patient's current medications.     Assessment/Plan   Assessment  Active Hospital Problems    Diagnosis     **Pneumoperitoneum     Atrial fibrillation with rapid ventricular response     COVID-19 virus infection     Cough     Anemia     Perforated gastric ulcer     Hypertension        Treatment Plan  Postoperative day #4 laparoscopic robotic assisted repair of a perforated gastric ulcer with omental patch by Dr. Malone  N.p.o.  NG tube in place  Respiratory PCR panel obtained yesterday positive for COVID-19  Patient remains on room air  Patient appears to have converted back to sinus rhythm around 5627-1150 last PM; remains in sinus rhythm this AM  Continue IV Lopressor and convert to PO when able  Timeline for onset of COVID symptoms unclear as he reports  "having some new respiratory symptoms dating back \"a week or two\".  No additional COVID specific treatment warranted at this time and will monitor closely for any evolution of symptoms  Echocardiogram pending   Currently on heparin prophylaxis; will need to calculate [id number] and anticoagulation options - made more complicated by recent perforated gastric ulcer s/p repair.  It's encouraging that patient now back in sinus rhythm  Currently on broad-spectrum antibiotics with cefepime, Flagyl, and also on IV fluconazole per General Surgery  Albuterol and Atrovent HFA.  Cautious Albuterol given recent HR issues  Mobilize; ambulate  Flutter valve    Medical Decision Making  Number and Complexity of problems: 3 acute; moderate complexity      Conditions and Status        Condition is improving     MDM Data  External documents reviewed: none  Cardiac tracing (EKG, telemetry) interpretation: on telemetry this morning patient appears to be in sinus rhythm    Radiology interpretation: no new radiology studies  Labs reviewed: as above  Any tests that were considered but not ordered: none     Decision rules/scores evaluated (example [id number], [place], etc): none     Discussed with: patient and bedside nurse, Griselda     Care Planning  Shared decision making: Discussed with patient with agreement to proceed with treatment plan as outlined  Code status and discussions: Full code    Disposition  Social Determinants of Health that impact treatment or disposition: None apparent at this time  I expect the patient to be discharged per primary service    Electronically signed by Wilbert Maldonado MD, 09/30/24, 11:33 CDT.   "

## 2024-09-30 NOTE — THERAPY TREATMENT NOTE
Patient Name: Oral Dey  : 1940    MRN: [id number]                              Today's Date: 2024       Admit Date: 2024    Visit Dx:     ICD-10-CM ICD-9-CM   1. Pneumoperitoneum  K66.8 568.89   2. Impaired mobility [Z74.09]  Z74.09 799.89     Patient Active Problem List   Diagnosis    Hx of colonic polyp    Family hx of colon cancer    CLL (chronic lymphocytic leukemia)    Hypertension    IgG lambda monoclonal gammopathy    Iron deficiency    Pneumoperitoneum    Perforated gastric ulcer    Cough    Anemia    Atrial fibrillation with rapid ventricular response    COVID-19 virus infection     Past Medical History:   Diagnosis Date    Bladder cancer     CLL (chronic lymphocytic leukemia)     Colon polyp     Gallstone     GERD (gastroesophageal reflux disease)     Glaucoma     Hearing loss     Hypertension     Inguinal hernia     right groin    Macular degeneration, right eye      Past Surgical History:   Procedure Laterality Date    CATARACT EXTRACTION, BILATERAL      COLONOSCOPY  2012    CYSTOSCOPY BLADDER BIOPSY      DIAGNOSTIC LAPAROSCOPY N/A 2024    Procedure: ROBOTIC REPAIR OF PERFORATED GASTRIC ULCER;  Surgeon: Petrona Malone MD;  Location:  [facility] OR;  Service: General;  Laterality: N/A;  WITH REPAIR OF GASTRIC ULCER PERFORATION    EXCISION MASS HEAD/NECK N/A 3/4/2022    Procedure: EXCISION OF MASS ON POSTERIOR NECK;  Surgeon: Rossana Mahajan MD;  Location:  [facility] OR;  Service: General;  Laterality: N/A;    INGUINAL HERNIA REPAIR Left     INGUINAL HERNIA REPAIR Right 2017    Procedure: RIGHT INGUINAL HERNIA REPAIR WITH MESH ;  Surgeon: Rossana Mahajan MD;  Location:  [facility] OR;  Service:       General Information       Row Name 24 0828          OT Time and Intention    Document Type therapy note (daily note)  -CH     Mode of Treatment occupational therapy  -CH       Row Name 24 0828          General Information    Patient Profile Reviewed yes   -     Existing Precautions/Restrictions fall  ng  -     Barriers to Rehab medically complex  -       Row Name 09/30/24 0828          Cognition    Orientation Status (Cognition) oriented x 4  -       Row Name 09/30/24 0828          Safety Issues, Functional Mobility    Safety Issues Affecting Function (Mobility) friction/shear risk  -     Impairments Affecting Function (Mobility) balance;endurance/activity tolerance;strength;shortness of breath;pain  -               User Key  (r) = Recorded By, (t) = Taken By, (c) = Cosigned By      Initials Name Provider Type     Marlene De La O, OTR/L Occupational Therapist                     Mobility/ADL's       Row Name 09/30/24 0828          Bed Mobility    Bed Mobility supine-sit  -     Supine-Sit [place] (Bed Mobility) verbal cues;minimum assist (75% patient effort)  -     Assistive Device (Bed Mobility) bed rails;head of bed elevated  -       Row Name 09/30/24 0828          Transfers    Transfers sit-stand transfer;stand-sit transfer;toilet transfer  -       Row Name 09/30/24 0828          Sit-Stand Transfer    Sit-Stand [place] (Transfers) verbal cues;minimum assist (75% patient effort)  -     Comment, (Sit-Stand Transfer) HHA, needs rwx  -       Row Name 09/30/24 0828          Stand-Sit Transfer    Stand-Sit [place] (Transfers) contact guard;verbal cues  -[facility] Name 09/30/24 0828          Toilet Transfer    Type (Toilet Transfer) sit-stand;stand-sit;stand pivot/stand step  -     [place] Level (Toilet Transfer) contact guard;verbal cues;nonverbal cues (demo/gesture)  -     Assistive Device (Toilet Transfer) grab bars/safety frame;commode  -       Row Name 09/30/24 0828          Functional Mobility    Functional Mobility- Ind. Level contact guard assist;minimum assist (75% patient effort)  -     Functional Mobility- Safety Issues step length decreased;balance decreased during turns  -     Functional Mobility-  Comment Pt ambulated bed > BR > chair. He would benefit from rwx to reduce his fall risk.  Will communicate needs to PTA.  -       Row Name 09/30/24 0828          Activities of Daily Living    BADL Assessment/Intervention toileting  -       Row Name 09/30/24 0828          Toileting Assessment/Training    [place] Level (Toileting) minimum assist (75% patient effort);adjust/manage clothing  -     Assistive Devices (Toileting) commode  -     Position (Toileting) unsupported sitting;supported standing  -               User Key  (r) = Recorded By, (t) = Taken By, (c) = Cosigned By      Initials Name Provider Type     Marlene De La O, OTR/L Occupational Therapist                   Obj/Interventions       Row Name 09/30/24 0828          Balance    Balance Interventions sitting;standing;sit to stand;supported;static;dynamic;occupation based/functional task  -               User Key  (r) = Recorded By, (t) = Taken By, (c) = Cosigned By      Initials Name Provider Type    Marlene Blanco, OTR/L Occupational Therapist                   Goals/Plan    No documentation.                  Clinical Impression       Row Name 09/30/24 0828          Pain Assessment    Pretreatment Pain Rating 1/10  -     Posttreatment Pain Rating 1/10  -       Row Name 09/30/24 0828          Plan of Care Review    Plan of Care Reviewed With patient  -     Progress improving  -     Outcome Evaluation OT tx completed.  Pt. is AxO x 4 & pleasant.  He c/o very little pain but endorses fatigue & lack of sleep.  Despite this, Mr. Dey is agreeable to participate.  He required cues & Min A to come EOB.  Extra time whilst seated EOB to reduce reported dizziness.  OTR edu'd in falls risks and pt compliant.  Pt ambulated bed > BR > chair. He would benefit from rwx to reduce his fall risk. Mr. Dey required BUE touch balance to improve his safety, posture, & increase his confidence.  Rec rwx at DC unless pt significantly improves  prior to DC.  He was able to manage hospital gown without assist while using g bar for touch balance.  Unsuccessful attempt at BM but improved t/fs on & off toilet.  Mr. Dey would benefit from cont'd OT tx to improve his indep.  Rec for DC is progress pending.  -       Row Name 09/30/24 0828          Therapy Assessment/Plan (OT)    Rehab Potential (OT) good, to achieve stated therapy goals  -     Criteria for Skilled Therapeutic Interventions Met (OT) yes;skilled treatment is necessary  -     Therapy Frequency (OT) 5 times/wk  -     Predicted Duration of Therapy Intervention (OT) 10 days  -       Row Name 09/30/24 0828          Therapy Plan Review/Discharge Plan (OT)    Equipment Needs Upon Discharge (OT) walker, rolling  -     Anticipated Discharge Disposition (OT) home with assist;home with home health  -       Row Name 09/30/24 0828          Positioning and Restraints    Pre-Treatment Position in bed  -     Post Treatment Position chair  -     In Chair sitting;call light within reach;encouraged to call for assist;notified ns  -               User Key  (r) = Recorded By, (t) = Taken By, (c) = Cosigned By      Initials Name Provider Type     Marlene De La O, OTR/L Occupational Therapist                   Outcome Measures       Row Name 09/30/24 0828          How much help from another is currently needed...    Putting on and taking off regular lower body clothing? 2  -CH     Bathing (including washing, rinsing, and drying) 2  -CH     Toileting (which includes using toilet bed pan or urinal) 3  -CH     Putting on and taking off regular upper body clothing 3  -CH     Taking care of personal grooming (such as brushing teeth) 4  -CH     Eating meals 4  -CH     AM-PAC 6 Clicks Score (OT) 18  -       Row Name 09/30/24 0400          How much help from another person do you currently need...    Turning from your back to your side while in flat bed without using bedrails? 3  -SC     Moving from  lying on back to sitting on the side of a flat bed without bedrails? 3  -SC     Moving to and from a bed to a chair (including a wheelchair)? 3  -SC     Standing up from a chair using your arms (e.g., wheelchair, bedside chair)? 3  -SC     Climbing 3-5 steps with a railing? 2  -SC     To walk in hospital room? 3  -SC     AM-PAC 6 Clicks Score (PT) 17  -SC     Highest Level of Mobility Goal 5 --> Static standing  -SC       Row Name 09/30/24 0828          Functional Assessment    Outcome Measure Options AM-PAC 6 Clicks Daily Activity (OT)  -               User Key  (r) = Recorded By, (t) = Taken By, (c) = Cosigned By      Initials Name Provider Type     Marlene De La O, OTR/L Occupational Therapist    Mercedes Perez RN Registered Nurse                    Occupational Therapy Education       Title: PT OT SLP Therapies (In Progress)       Topic: Occupational Therapy (In Progress)       Point: ADL training (Done)       Description:   Instruct learner(s) on proper safety adaptation and remediation techniques during self care or transfers.   Instruct in proper use of assistive devices.                  Learning Progress Summary             Patient Acceptance, E,D, VU by  at 9/30/2024 0925    Acceptance, E, VU by  at 9/27/2024 0928                         Point: Home exercise program (Not Started)       Description:   Instruct learner(s) on appropriate technique for monitoring, assisting and/or progressing therapeutic exercises/activities.                  Learner Progress:  Not documented in this visit.              Point: Precautions (Done)       Description:   Instruct learner(s) on prescribed precautions during self-care and functional transfers.                  Learning Progress Summary             Patient Acceptance, E,D, VU by  at 9/30/2024 0925    Acceptance, E, VU by  at 9/27/2024 0928                         Point: Body mechanics (Done)       Description:   Instruct learner(s) on proper  positioning and spine alignment during self-care, functional mobility activities and/or exercises.                  Learning Progress Summary             Patient Acceptance, E,D, VU by  at 9/30/2024 0925    Acceptance, E, VU by  at 9/27/2024 0928                                         User Key       Initials Effective Dates Name Provider Type Discipline     07/11/23 -  Marlene De LaO, OTR/L Occupational Therapist OT    DOMI 06/20/22 -  Page Mayer OTR/L Occupational Therapist OT                  OT Recommendation and Plan  Therapy Frequency (OT): 5 times/wk  Plan of Care Review  Plan of Care Reviewed With: patient  Progress: improving  Outcome Evaluation: OT tx completed.  Pt. is AxO x 4 & pleasant.  He c/o very little pain but endorses fatigue & lack of sleep.  Despite this, Mr. Dey is agreeable to participate.  He required cues & Min A to come EOB.  Extra time whilst seated EOB to reduce reported dizziness.  OTR edu'd in falls risks and pt compliant.  Pt ambulated bed > BR > chair. He would benefit from rwx to reduce his fall risk. Mr. Dey required BUE touch balance to improve his safety, posture, & increase his confidence.  Rec rwx at DC unless pt significantly improves prior to DC.  He was able to manage hospital gown without assist while using g bar for touch balance.  Unsuccessful attempt at BM but improved t/fs on & off toilet.  Mr. Dey would benefit from cont'd OT tx to improve his indep.  Rec for DC is progress pending.     Time Calculation:         Time Calculation- OT       Row Name 09/30/24 0828             Time Calculation- OT    OT Start Time 0828  -      OT Stop Time 0900  -      OT Time Calculation (min) 32 min  -      Total Timed Code Minutes- OT 32 minute(s)  -      OT Received On 09/30/24  -         Timed Charges    12372 - OT Self Care/Mgmt Minutes 32  -CH         Total Minutes    Timed Charges Total Minutes 32  -CH       Total Minutes 32  -                User Key   (r) = Recorded By, (t) = Taken By, (c) = Cosigned By      Initials Name Provider Type     Marlene De La O OTR/L Occupational Therapist                  Therapy Charges for Today       Code Description Service Date Service Provider Modifiers Qty    09866616994 HC OT SELF CARE/MGMT/TRAIN EA 15 MIN 9/30/2024 Marlene De La O OTR/ALY GO 2                 SALONI Vanessa/ALY  9/30/2024

## 2024-09-30 NOTE — PLAN OF CARE
Goal Outcome Evaluation:  Plan of Care Reviewed With: patient        Progress: improving  Outcome Evaluation: pt in chair, BLE AROM, sit-stand cga-min, amb 20 feet to bathroom cga rwx, then 40 feet in room cga rwx,

## 2024-09-30 NOTE — THERAPY TREATMENT NOTE
Acute Care - Physical Therapy Treatment Note   [place]     Patient Name: Oral Dey  : 1940  MRN: [id number]  Today's Date: 2024      Visit Dx:     ICD-10-CM ICD-9-CM   1. Pneumoperitoneum  K66.8 568.89   2. Impaired mobility [Z74.09]  Z74.09 799.89     Patient Active Problem List   Diagnosis    Hx of colonic polyp    Family hx of colon cancer    CLL (chronic lymphocytic leukemia)    Hypertension    IgG lambda monoclonal gammopathy    Iron deficiency    Pneumoperitoneum    Perforated gastric ulcer    Cough    Anemia    Atrial fibrillation with rapid ventricular response    COVID-19 virus infection     Past Medical History:   Diagnosis Date    Bladder cancer     CLL (chronic lymphocytic leukemia)     Colon polyp     Gallstone     GERD (gastroesophageal reflux disease)     Glaucoma     Hearing loss     Hypertension     Inguinal hernia     right groin    Macular degeneration, right eye      Past Surgical History:   Procedure Laterality Date    CATARACT EXTRACTION, BILATERAL      COLONOSCOPY  2012    CYSTOSCOPY BLADDER BIOPSY      DIAGNOSTIC LAPAROSCOPY N/A 2024    Procedure: ROBOTIC REPAIR OF PERFORATED GASTRIC ULCER;  Surgeon: Petrona Malone MD;  Location:  [facility] OR;  Service: General;  Laterality: N/A;  WITH REPAIR OF GASTRIC ULCER PERFORATION    EXCISION MASS HEAD/NECK N/A 3/4/2022    Procedure: EXCISION OF MASS ON POSTERIOR NECK;  Surgeon: Rossana Mahajan MD;  Location:  [facility] OR;  Service: General;  Laterality: N/A;    INGUINAL HERNIA REPAIR Left     INGUINAL HERNIA REPAIR Right 2017    Procedure: RIGHT INGUINAL HERNIA REPAIR WITH MESH ;  Surgeon: Rossana Mahajan MD;  Location: [facility];  Service:      PT Assessment (Last 12 Hours)       PT Evaluation and Treatment       Row Name 24 1028          Physical Therapy Time and Intention    Subjective Information complains of;weakness;fatigue  -     Document Type therapy note (daily note)  -     Mode of  Treatment physical therapy  -       Row Name 09/30/24 1028          General Information    Existing Precautions/Restrictions fall  NG tube, SEGUNDO drain  -       Row Name 09/30/24 1028          Pain    Pretreatment Pain Rating 2/10  -     Posttreatment Pain Rating 2/10  -     [facility] Location - [facility]     Pain Intervention(s) Repositioned;Ambulation/increased activity  -[facility] Name 09/30/24 1028          Bed Mobility    Comment, (Bed Mobility) chair  -[facility] Name 09/30/24 1028          Transfers    Transfers sit-stand transfer;stand-sit transfer  -[facility] Name 09/30/24 1028          Sit-Stand Transfer    Sit-Stand [place] (Transfers) verbal cues;minimum assist (75% patient effort)  -[facility] Name 09/30/24 1028          Stand-Sit Transfer    Stand-Sit [place] (Transfers) verbal cues;minimum assist (75% patient effort)  -[facility] Name 09/30/24 1028          Toilet Transfer    [place] Level (Toilet Transfer) contact guard;verbal cues  -[facility] Name 09/30/24 1028          Gait/Stairs (Locomotion)    [place] Level (Gait) verbal cues;contact guard  -     Assistive Device (Gait) walker, front-wheeled  -     Distance in Feet (Gait) 60  -       Row Name 09/30/24 1028          Motor Skills    Comments, Therapeutic Exercise BLE AROM  -       Row Name             Wound 09/26/24 2222 abdomen Incision    Wound - Properties Group Placement Date: 09/26/24  - Placement Time: 2222 -HW Location: abdomen  -HW Primary Wound Type: Incision  -HW    Retired Wound - Properties Group Placement Date: 09/26/24  - Placement Time: 2222 -HW Location: abdomen  -HW Primary Wound Type: Incision  -HW    Retired Wound - Properties Group Date first assessed: 09/26/24  - Time first assessed: 2222 -HW Location: abdomen  -HW Primary Wound Type: Incision  -HW      Row Name 09/30/24 1028          Plan of Care Review    Plan of Care Reviewed With patient  -     Progress improving   -     Outcome Evaluation pt in chair, BLE AROM, sit-stand cga-min, amb 20 feet to bathroom cga rwx, then 40 feet in room cga rwx,  -       Row Name 09/30/24 1028          Positioning and Restraints    Pre-Treatment Position sitting in chair/recliner  -     Post Treatment Position chair  -AH     In Chair reclined;call light within reach;encouraged to call for assist;notified [facility]  -               User Key  (r) = Recorded By, (t) = Taken By, (c) = Cosigned By      Initials Name Provider Type     Anjana Madison, PTA Physical Therapist Assistant    Matti Villarreal, RN Registered Nurse                    Physical Therapy Education       Title: PT OT SLP Therapies (In Progress)       Topic: Physical Therapy (In Progress)       Point: Mobility training (Done)       Learning Progress Summary             Patient Acceptance, E, CLAY,JOSE D,NR by NAHOMY at 9/27/2024 0800    Comment: benefits of activity, progression of PT                         Point: Home exercise program (Not Started)       Learner Progress:  Not documented in this visit.              Point: Body mechanics (Not Started)       Learner Progress:  Not documented in this visit.              Point: Precautions (Not Started)       Learner Progress:  Not documented in this visit.                              User Key       Initials Effective Dates Name Provider Type Discipline    NAHOMY 02/03/23 -  Abimael Lees, PT DPT Physical Therapist PT                  PT Recommendation and Plan     Plan of Care Reviewed With: patient  Progress: improving  Outcome Evaluation: pt in chair, BLE AROM, sit-stand cga-min, amb 20 feet to bathroom cga rwx, then 40 feet in room cga rwx,   Outcome Measures       Row Name 09/30/24 1100 09/29/24 1013          How much help from another person do you currently need...    Turning from your back to your side while in flat bed without using bedrails? 3  -AH 3  -JESSICA     Moving from lying on back to sitting on the side of a flat bed  without bedrails? 3  - 3  -JESSICA     Moving to and from a bed to a chair (including a wheelchair)? 3  -AH 3  -JESSICA     Standing up from a chair using your arms (e.g., wheelchair, bedside chair)? 3  - 3  -JESSICA     Climbing 3-5 steps with a railing? 2  - 2  -JESSICA     To walk in hospital room? 3  -AH 3  -JESSICA     AM-PAC 6 Clicks Score (PT) 17  - 17  -JESSICA     Highest Level of Mobility Goal 5 --> Static standing  - 5 --> Static standing  -JESSICA        Functional Assessment    Outcome Measure Options AM-PAC 6 Clicks Basic Mobility (PT)  - AM-PAC 6 Clicks Basic Mobility (PT)  -JESSICA               User Key  (r) = Recorded By, (t) = Taken By, (c) = Cosigned By      Initials Name Provider Type    Anjana Valle PTA Physical Therapist Assistant    Kenneth Mullins PTA Physical Therapist Assistant                     Time Calculation:    PT Charges       Row Name 09/30/24 1105             Time Calculation    Start Time 1028  -      Stop Time 1058  -      Time Calculation (min) 30 min  -      PT Received On 09/30/24  -         Time Calculation- PT    Total Timed Code Minutes- PT 30 minute(s)  -         Timed Charges    40482 - PT Therapeutic Exercise Minutes 10  -      01353 - Gait Training Minutes  20  -         Total Minutes    Timed Charges Total Minutes 30  -       Total Minutes 30  -                User Key  (r) = Recorded By, (t) = Taken By, (c) = Cosigned By      Initials Name Provider Type     Anjana Madison PTA Physical Therapist Assistant                  Therapy Charges for Today       Code Description Service Date Service Provider Modifiers Qty    14530886959 HC PT THER PROC EA 15 MIN 9/30/2024 Anjana Madison, PTA GP 1    51394045683 HC GAIT TRAINING EA 15 MIN 9/30/2024 Anjana Madison, PTA GP 1            PT G-Codes  Outcome Measure Options: AM-PAC 6 Clicks Basic Mobility (PT)  AM-PAC 6 Clicks Score (PT): 17  AM-PAC 6 Clicks Score (OT): 18    Anjana Madison PTA  9/30/2024

## 2024-09-30 NOTE — PLAN OF CARE
Goal Outcome Evaluation:           Progress: no change  Outcome Evaluation: Patient c/o pain see Mar. Up with assist. Ambulated in room with therapy and staff. Patient up in chair most of shift. Voiding. NG to LIWS.

## 2024-09-30 NOTE — CASE MANAGEMENT/SOCIAL WORK
Continued Stay Note  RACHANA Arriaza     Patient Name: Oral Dey  MRN: [id number]  Today's Date: 9/30/2024    Admit Date: 9/26/2024    Plan: Home   Discharge Plan       Row Name 09/30/24 1312       Plan    Plan Home    Patient/Family in Agreement with Plan yes    Plan Comments Pt lives at home with his spouse. He has been working with therapy. He plans to return home at d/c.                   Discharge Codes    No documentation.                       TAYLOR Mcarthur

## 2024-09-30 NOTE — PLAN OF CARE
Problem: Adult Inpatient Plan of Care  Goal: Plan of Care Review  Outcome: Progressing  Flowsheets (Taken 9/30/2024 3680)  Progress: improving  Plan of Care Reviewed With: patient  Outcome Evaluation: Pt complains of pain, see MAR. No complaints of nausea. NG to LIWS. SEGUNDO x1. Voiding. Tele running NSR per monitor room. IVF and IV abx. Room air. Up x1 assist. Safety maintained.

## 2024-10-01 ENCOUNTER — APPOINTMENT (OUTPATIENT)
Dept: GENERAL RADIOLOGY | Facility: HOSPITAL | Age: 84
DRG: 326 | End: 2024-10-01
Payer: COMMERCIAL

## 2024-10-01 LAB
ANION GAP SERPL CALCULATED.3IONS-SCNC: 11 MMOL/L (ref 5–15)
BASOPHILS # BLD AUTO: 0.02 10*3/MM3 (ref 0–0.2)
BASOPHILS NFR BLD AUTO: 0.2 % (ref 0–1.5)
BUN SERPL-MCNC: 19 MG/DL (ref 8–23)
BUN/CREAT SERPL: 24.4 (ref 7–25)
CALCIUM SPEC-SCNC: 8 MG/DL (ref 8.6–10.5)
CHLORIDE SERPL-SCNC: 98 MMOL/L (ref 98–107)
CO2 SERPL-SCNC: 24 MMOL/L (ref 22–29)
CREAT SERPL-MCNC: 0.78 MG/DL (ref 0.76–1.27)
DEPRECATED RDW RBC AUTO: 40.6 FL (ref 37–54)
EGFRCR SERPLBLD CKD-EPI 2021: 89.6 ML/MIN/1.73
EOSINOPHIL # BLD AUTO: 0.16 10*3/MM3 (ref 0–0.4)
EOSINOPHIL NFR BLD AUTO: 1.7 % (ref 0.3–6.2)
ERYTHROCYTE [DISTWIDTH] IN BLOOD BY AUTOMATED COUNT: 12.4 % (ref 12.3–15.4)
GLUCOSE SERPL-MCNC: 97 MG/DL (ref 65–99)
HCT VFR BLD AUTO: 29.8 % (ref 37.5–51)
HGB BLD-MCNC: 9.8 G/DL (ref 13–17.7)
IMM GRANULOCYTES # BLD AUTO: 0.04 10*3/MM3 (ref 0–0.05)
IMM GRANULOCYTES NFR BLD AUTO: 0.4 % (ref 0–0.5)
LYMPHOCYTES # BLD AUTO: 1.87 10*3/MM3 (ref 0.7–3.1)
LYMPHOCYTES NFR BLD AUTO: 19.7 % (ref 19.6–45.3)
MAGNESIUM SERPL-MCNC: 2 MG/DL (ref 1.6–2.4)
MCH RBC QN AUTO: 29.3 PG (ref 26.6–33)
MCHC RBC AUTO-ENTMCNC: 32.9 G/DL (ref 31.5–35.7)
MCV RBC AUTO: 89.2 FL (ref 79–97)
MONOCYTES # BLD AUTO: 0.91 10*3/MM3 (ref 0.1–0.9)
MONOCYTES NFR BLD AUTO: 9.6 % (ref 5–12)
NEUTROPHILS NFR BLD AUTO: 6.47 10*3/MM3 (ref 1.7–7)
NEUTROPHILS NFR BLD AUTO: 68.4 % (ref 42.7–76)
NRBC BLD AUTO-RTO: 0 /100 WBC (ref 0–0.2)
PHOSPHATE SERPL-MCNC: 2.4 MG/DL (ref 2.5–4.5)
PLATELET # BLD AUTO: 198 10*3/MM3 (ref 140–450)
PMV BLD AUTO: 8.8 FL (ref 6–12)
POTASSIUM SERPL-SCNC: 4 MMOL/L (ref 3.5–5.2)
RBC # BLD AUTO: 3.34 10*6/MM3 (ref 4.14–5.8)
SODIUM SERPL-SCNC: 133 MMOL/L (ref 136–145)
WBC NRBC COR # BLD AUTO: 9.47 10*3/MM3 (ref 3.4–10.8)

## 2024-10-01 PROCEDURE — 85025 COMPLETE CBC W/AUTO DIFF WBC: CPT | Performed by: STUDENT IN AN ORGANIZED HEALTH CARE EDUCATION/TRAINING PROGRAM

## 2024-10-01 PROCEDURE — 94761 N-INVAS EAR/PLS OXIMETRY MLT: CPT

## 2024-10-01 PROCEDURE — 84100 ASSAY OF PHOSPHORUS: CPT | Performed by: STUDENT IN AN ORGANIZED HEALTH CARE EDUCATION/TRAINING PROGRAM

## 2024-10-01 PROCEDURE — 25010000002 HYDROMORPHONE PER 4 MG: Performed by: STUDENT IN AN ORGANIZED HEALTH CARE EDUCATION/TRAINING PROGRAM

## 2024-10-01 PROCEDURE — 25010000002 HEPARIN (PORCINE) PER 1000 UNITS

## 2024-10-01 PROCEDURE — 99024 POSTOP FOLLOW-UP VISIT: CPT | Performed by: STUDENT IN AN ORGANIZED HEALTH CARE EDUCATION/TRAINING PROGRAM

## 2024-10-01 PROCEDURE — 80048 BASIC METABOLIC PNL TOTAL CA: CPT | Performed by: INTERNAL MEDICINE

## 2024-10-01 PROCEDURE — 25010000002 CEFEPIME PER 500 MG: Performed by: INTERNAL MEDICINE

## 2024-10-01 PROCEDURE — 0 DIATRIZOATE MEGLUMINE & SODIUM PER 1 ML: Performed by: STUDENT IN AN ORGANIZED HEALTH CARE EDUCATION/TRAINING PROGRAM

## 2024-10-01 PROCEDURE — 94799 UNLISTED PULMONARY SVC/PX: CPT

## 2024-10-01 PROCEDURE — 74240 X-RAY XM UPR GI TRC 1CNTRST: CPT

## 2024-10-01 PROCEDURE — 83735 ASSAY OF MAGNESIUM: CPT | Performed by: STUDENT IN AN ORGANIZED HEALTH CARE EDUCATION/TRAINING PROGRAM

## 2024-10-01 PROCEDURE — 97116 GAIT TRAINING THERAPY: CPT

## 2024-10-01 PROCEDURE — 25810000003 LACTATED RINGERS PER 1000 ML: Performed by: STUDENT IN AN ORGANIZED HEALTH CARE EDUCATION/TRAINING PROGRAM

## 2024-10-01 RX ORDER — DIATRIZOATE MEGLUMINE AND DIATRIZOATE SODIUM 660; 100 MG/ML; MG/ML
120 SOLUTION ORAL; RECTAL
Status: COMPLETED | OUTPATIENT
Start: 2024-10-01 | End: 2024-10-01

## 2024-10-01 RX ADMIN — ALBUTEROL SULFATE 2 PUFF: 108 INHALANT RESPIRATORY (INHALATION) at 06:43

## 2024-10-01 RX ADMIN — Medication 10 ML: at 09:39

## 2024-10-01 RX ADMIN — CEFEPIME 2 G: 2 INJECTION, POWDER, FOR SOLUTION INTRAVENOUS at 09:35

## 2024-10-01 RX ADMIN — LATANOPROST 1 DROP: 50 SOLUTION OPHTHALMIC at 21:17

## 2024-10-01 RX ADMIN — LIDOCAINE 2 PATCH: 4 PATCH TOPICAL at 09:38

## 2024-10-01 RX ADMIN — PANTOPRAZOLE SODIUM 40 MG: 40 INJECTION, POWDER, FOR SOLUTION INTRAVENOUS at 21:16

## 2024-10-01 RX ADMIN — CEFEPIME 2 G: 2 INJECTION, POWDER, FOR SOLUTION INTRAVENOUS at 21:16

## 2024-10-01 RX ADMIN — METOPROLOL TARTRATE 5 MG: 5 INJECTION INTRAVENOUS at 11:57

## 2024-10-01 RX ADMIN — Medication 10 ML: at 21:17

## 2024-10-01 RX ADMIN — HEPARIN SODIUM 5000 UNITS: 5000 INJECTION, SOLUTION INTRAVENOUS; SUBCUTANEOUS at 05:38

## 2024-10-01 RX ADMIN — METOPROLOL TARTRATE 5 MG: 5 INJECTION INTRAVENOUS at 05:38

## 2024-10-01 RX ADMIN — HYDROMORPHONE HYDROCHLORIDE 0.5 MG: 1 INJECTION, SOLUTION INTRAMUSCULAR; INTRAVENOUS; SUBCUTANEOUS at 17:28

## 2024-10-01 RX ADMIN — IPRATROPIUM BROMIDE 2 PUFF: 17 AEROSOL, METERED RESPIRATORY (INHALATION) at 14:49

## 2024-10-01 RX ADMIN — HEPARIN SODIUM 5000 UNITS: 5000 INJECTION, SOLUTION INTRAVENOUS; SUBCUTANEOUS at 14:40

## 2024-10-01 RX ADMIN — DIATRIZOATE MEGLUMINE AND DIATRIZOATE SODIUM 120 ML: 660; 100 LIQUID ORAL; RECTAL at 11:16

## 2024-10-01 RX ADMIN — PANTOPRAZOLE SODIUM 40 MG: 40 INJECTION, POWDER, FOR SOLUTION INTRAVENOUS at 09:35

## 2024-10-01 RX ADMIN — HYDROMORPHONE HYDROCHLORIDE 0.5 MG: 1 INJECTION, SOLUTION INTRAMUSCULAR; INTRAVENOUS; SUBCUTANEOUS at 21:16

## 2024-10-01 RX ADMIN — IPRATROPIUM BROMIDE 2 PUFF: 17 AEROSOL, METERED RESPIRATORY (INHALATION) at 06:42

## 2024-10-01 RX ADMIN — HEPARIN SODIUM 5000 UNITS: 5000 INJECTION, SOLUTION INTRAVENOUS; SUBCUTANEOUS at 21:16

## 2024-10-01 RX ADMIN — METOPROLOL TARTRATE 5 MG: 5 INJECTION INTRAVENOUS at 17:30

## 2024-10-01 RX ADMIN — SODIUM CHLORIDE, POTASSIUM CHLORIDE, SODIUM LACTATE AND CALCIUM CHLORIDE 100 ML/HR: 600; 310; 30; 20 INJECTION, SOLUTION INTRAVENOUS at 14:40

## 2024-10-01 RX ADMIN — ALBUTEROL SULFATE 2 PUFF: 108 INHALANT RESPIRATORY (INHALATION) at 21:11

## 2024-10-01 RX ADMIN — DOCUSATE SODIUM 50 MG AND SENNOSIDES 8.6 MG 2 TABLET: 8.6; 5 TABLET, FILM COATED ORAL at 21:16

## 2024-10-01 RX ADMIN — IPRATROPIUM BROMIDE 2 PUFF: 17 AEROSOL, METERED RESPIRATORY (INHALATION) at 21:11

## 2024-10-01 RX ADMIN — ALBUTEROL SULFATE 2 PUFF: 108 INHALANT RESPIRATORY (INHALATION) at 14:49

## 2024-10-01 NOTE — PROGRESS NOTES
Petrona Malone MD - General Surgery  Progress Note     LOS: 4 days   Patient Care Team:  Jorge Shepherd MD as PCP - General (Internal Medicine)      Subjective     Interval History:     POD 4 s/p robotic repair of perforated gastric ulcer. Doing well. Pain controlled. Denies nausea. Productive cough improving ;covid positive.     Objective     Vital Signs  Temp:  [98 °F (36.7 °C)-98.7 °F (37.1 °C)] 98.2 °F (36.8 °C)  Heart Rate:  [70-78] 71  Resp:  [16] 16  BP: (151-163)/(62-93) 151/62    Physical Exam:  General appearance - alert, well appearing, and in no distress  Mental status - alert, oriented to person, place, and time  Eyes - pupils equal and reactive, extraocular eye movements intact  Neck - supple, no significant adenopathy  Chest - no tachypnea, retractions or cyanosis, NGT in place   Heart - normal rate and regular rhythm  Abdomen - soft, non-distended, appropriately tender. Incisions c/d/I. SEGUNDO drain output serosanguinous   Neurological - alert, oriented, normal speech, no focal findings or movement disorder noted      Results Review:    Lab Results (last 24 hours)       Procedure Component Value Units Date/Time    Magnesium [[id number]]  (Normal) Collected: 09/30/24 0634    Specimen: Blood Updated: 09/30/24 0731     Magnesium 2.0 mg/dL     Phosphorus [[id number]]  (Normal) Collected: 09/30/24 0634    Specimen: Blood Updated: 09/30/24 0731     Phosphorus 2.5 mg/dL     Basic Metabolic Panel [[id number]]  (Abnormal) Collected: 09/30/24 0634    Specimen: Blood Updated: 09/30/24 0731     Glucose 103 mg/dL      BUN 22 mg/dL      Creatinine 0.94 mg/dL      Sodium 132 mmol/L      Potassium 4.1 mmol/L      Chloride 98 mmol/L      CO2 25.0 mmol/L      Calcium 7.9 mg/dL      BUN/Creatinine Ratio 23.4     Anion Gap 9.0 mmol/L      eGFR 81.4 mL/min/1.73     Narrative:      GFR Normal >60  Chronic Kidney Disease <60  Kidney Failure <15    The GFR formula is only valid for adults with stable renal function between  ages 18 and 70.    CBC & Differential [[id number]]  (Abnormal) Collected: 09/30/24 0634    Specimen: Blood Updated: 09/30/24 0710    Narrative:      The following orders were created for panel order CBC & Differential.  Procedure                               Abnormality         Status                     ---------                               -----------         ------                     CBC Auto Differential[[id number]]        Abnormal            Final result                 Please view results for these tests on the individual orders.    CBC Auto Differential [[id number]]  (Abnormal) Collected: 09/30/24 0634    Specimen: Blood Updated: 09/30/24 0710     WBC 10.76 10*3/mm3      RBC 3.31 10*6/mm3      Hemoglobin 9.6 g/dL      Hematocrit 30.1 %      MCV 90.9 fL      MCH 29.0 pg      MCHC 31.9 g/dL      RDW 12.6 %      RDW-SD 42.0 fl      MPV 9.0 fL      Platelets 198 10*3/mm3      Neutrophil % 74.2 %      Lymphocyte % 15.2 %      Monocyte % 9.0 %      Eosinophil % 0.7 %      Basophil % 0.2 %      Immature Grans % 0.7 %      Neutrophils, Absolute 7.98 10*3/mm3      Lymphocytes, Absolute 1.64 10*3/mm3      Monocytes, Absolute 0.97 10*3/mm3      Eosinophils, Absolute 0.08 10*3/mm3      Basophils, Absolute 0.02 10*3/mm3      Immature Grans, Absolute 0.07 10*3/mm3      nRBC 0.0 /100 WBC     Blood Culture With DEV - Blood, Hand, Left [[id number]]  (Normal) Collected: 09/27/24 0448    Specimen: Blood from Hand, Left Updated: 09/30/24 0515     Blood Culture No growth at 3 days    Blood Culture With DEV - Blood, Hand, Right [[id number]]  (Normal) Collected: 09/27/24 0453    Specimen: Blood from Hand, Right Updated: 09/30/24 0515     Blood Culture No growth at 3 days          Imaging Results (Last 24 Hours)       ** No results found for the last 24 hours. **              Assessment & Plan     Perforated gastric ulcer s/p robotic repair and omental patch on 9/26/24     PT and OT consults. OOB ambulation. WBC count normalized.  Rocephin/Flagyl/Diflucan x 4 days post op - to finish today. NGT to stay in place until POD 5 when we study the repair. DO NOT REMOVE NGT. PRN pain and nausea control.       Petrona Malone MD  09/30/24  20:17 CDT

## 2024-10-01 NOTE — PROGRESS NOTES
[facility] Medicine Services  INPATIENT PROGRESS NOTE    Patient Name: Oral Dey  Date of Admission: 9/26/2024  Today's Date: 10/01/24  Length of Stay: 5  Primary Care Physician: Jorge Shepherd MD    Subjective   Chief Complaint: follow-up gastric ulcer   HPI   Resting comfortably no distress or new complaints today. Feeling better after removal of NGT.     Review of Systems   All pertinent negatives and positives are as above. All other systems have been reviewed and are negative unless otherwise stated.     Objective    Temp:  [98 °F (36.7 °C)-98.6 °F (37 °C)] 98 °F (36.7 °C)  Heart Rate:  [60-76] 76  Resp:  [16-18] 18  BP: (151-173)/(62-82) 173/64  Physical Exam  Constitutional:       General: He is not in acute distress.     Comments: Sitting up in bedside chair   HENT:      Head: Normocephalic.      Nose:      Mouth/Throat:      Mouth: Mucous membranes are moist.   Cardiovascular:      Rate and Rhythm: Normal rate and regular rhythm.      Comments: Occasional ectopy (suspected PVCs) noted  Pulmonary:      Effort: Pulmonary effort is normal. No respiratory distress.      Comments: Diminished at bases; on room air  Abdominal:      Comments: bowel sounds hypoactive   Musculoskeletal:      Right lower leg: No edema.      Left lower leg: No edema.   Skin:     General: Skin is warm.   Neurological:      General: No focal deficit present.      Mental Status: He is alert.   Psychiatric:         Mood and Affect: Mood normal.       Results Review:  I have reviewed the labs, radiology results, and diagnostic studies.    Laboratory Data:   Results from last 7 days   Lab Units 10/01/24  0443 09/30/24  0634 09/29/24  0428   WBC 10*3/mm3 9.47 10.76 13.52*   HEMOGLOBIN g/dL 9.8* 9.6* 9.8*   HEMATOCRIT % 29.8* 30.1* 30.1*   PLATELETS 10*3/mm3 198 198 197        Results from last 7 days   Lab Units 10/01/24  0443 09/30/24  0634 09/29/24  0428 09/28/24  0602 09/27/24  0214  09/26/24  1858   SODIUM mmol/L 133* 132* 132*   < > 134* 134*   POTASSIUM mmol/L 4.0 4.1 4.3   < > 4.5 4.4   CHLORIDE mmol/L 98 98 97*   < > 100 96*   CO2 mmol/L 24.0 25.0 25.0   < > 22.0 25.0   BUN mg/dL 19 22 20   < > 19 21   CREATININE mg/dL 0.78 0.94 1.01   < > 1.03 1.13   CALCIUM mg/dL 8.0* 7.9* 8.2*   < > 8.0* 9.0   BILIRUBIN mg/dL  --   --   --   --  0.8 0.8   ALK PHOS U/L  --   --   --   --  98 123*   ALT (SGPT) U/L  --   --   --   --  12 12   AST (SGOT) U/L  --   --   --   --  16 13   GLUCOSE mg/dL 97 103* 96   < > 135* 151*    < > = values in this interval not displayed.       Culture Data:   Blood Culture   Date Value Ref Range Status   09/27/2024 No growth at 4 days  Preliminary   09/27/2024 No growth at 4 days  Preliminary       Radiology Data:   Imaging Results (Last 24 Hours)       ** No results found for the last 24 hours. **            I have reviewed the patient's current medications.     Assessment/Plan   Assessment  Active Hospital Problems    Diagnosis     **Pneumoperitoneum     Atrial fibrillation with rapid ventricular response     COVID-19 virus infection     Cough     Anemia     Perforated gastric ulcer     Hypertension        Treatment Plan  Perforated gastric ulcer with pneumoperitoneum  Postop day 5 robotic assisted repair by Dr. Malone  Vitals every 4 hours  NG tube removed  Diet clear liquid to full liquid  IV fluids lactated Ringer's at 100 cc an hour  Pain control  Rocephin/Flagyl/Diflucan 4 days postop completed  Upper GI series noted  Pain control  Increase activity  Ambulate  Bowel hygiene protocol  Electrolyte replacement protocol  Continue to monitor anemia present before surgery    COVID-19 virus infection  Minimal symptomatic  Patient remains on room air  Symptom onset probably 7 to 14 days ago    Atrial fibrillation RVR  In the context of COVID and a bowel perforation could be considered lone A-fib  Lopressor 5 mg IV every 6 hours  Echocardiogram  Anticoagulation  contraindicated due to bowel perforation status and postop  Continue heparin or Lovenox prophylactic doses    DVT prophylaxis heparin 5000 units every 8 hours subcutaneous     Medical Decision Making  Number and Complexity of problems: 3 acute; moderate complexity        Conditions and Status        Condition is improving     MDM Data  External documents reviewed: none  Cardiac tracing (EKG, telemetry) interpretation: on telemetry this morning patient appears to be in sinus rhythm    Radiology interpretation: no new radiology studies  Labs reviewed: as above  Any tests that were considered but not ordered: none     Decision rules/scores evaluated (example [id number], Wells, etc): none     Discussed with: patient and bedside nurse, Griselda     Care Planning  Shared decision making: Discussed with patient with agreement to proceed with treatment plan as outlined  Code status and discussions: Full code     Disposition  Social Determinants of Health that impact treatment or disposition: None apparent at this time  I expect the patient to be discharged per primary service    Electronically signed by Hipolito Robbins MD, 10/01/24, 10:58 CDT.

## 2024-10-01 NOTE — PROGRESS NOTES
Petrona Malone MD - General Surgery  Progress Note     LOS: 5 days   Patient Care Team:  Jorge Shepherd MD as PCP - General (Internal Medicine)      Subjective     Interval History:     POD 5 s/p robotic repair of perforated gastric ulcer. Doing well. Pain controlled. Denies nausea. Productive cough improving ;covid positive.     Objective     Vital Signs  Temp:  [98 °F (36.7 °C)-98.6 °F (37 °C)] 98.2 °F (36.8 °C)  Heart Rate:  [60-84] 84  Resp:  [16-18] 18  BP: (151-173)/(62-82) 154/62    Physical Exam:  General appearance - alert, well appearing, and in no distress  Mental status - alert, oriented to person, place, and time  Eyes - pupils equal and reactive, extraocular eye movements intact  Neck - supple, no significant adenopathy  Chest - no tachypnea, retractions or cyanosis, NGT in place   Heart - normal rate and regular rhythm  Abdomen - soft, non-distended, appropriately tender. Incisions c/d/I. SEGUNDO drain output serosanguinous   Neurological - alert, oriented, normal speech, no focal findings or movement disorder noted      Results Review:    Lab Results (last 24 hours)       Procedure Component Value Units Date/Time    Magnesium [[id number]]  (Normal) Collected: 10/01/24 0443    Specimen: Blood Updated: 10/01/24 0517     Magnesium 2.0 mg/dL     Phosphorus [[id number]]  (Abnormal) Collected: 10/01/24 0443    Specimen: Blood Updated: 10/01/24 0517     Phosphorus 2.4 mg/dL     Basic Metabolic Panel [[id number]]  (Abnormal) Collected: 10/01/24 0443    Specimen: Blood Updated: 10/01/24 0517     Glucose 97 mg/dL      BUN 19 mg/dL      Creatinine 0.78 mg/dL      Sodium 133 mmol/L      Potassium 4.0 mmol/L      Chloride 98 mmol/L      CO2 24.0 mmol/L      Calcium 8.0 mg/dL      BUN/Creatinine Ratio 24.4     Anion Gap 11.0 mmol/L      eGFR 89.6 mL/min/1.73     Narrative:      GFR Normal >60  Chronic Kidney Disease <60  Kidney Failure <15    The GFR formula is only valid for adults with stable renal function  [Target Wt Loss Goal ___] : Weight Loss Goals: Target weight loss goal [unfilled] lbs between ages 18 and 70.    Blood Culture With DEV - Blood, Hand, Left [[id number]]  (Normal) Collected: 09/27/24 0448    Specimen: Blood from Hand, Left Updated: 10/01/24 0515     Blood Culture No growth at 4 days    Blood Culture With DEV - Blood, Hand, Right [[id number]]  (Normal) Collected: 09/27/24 0453    Specimen: Blood from Hand, Right Updated: 10/01/24 0515     Blood Culture No growth at 4 days    CBC & Differential [[id number]]  (Abnormal) Collected: 10/01/24 0443    Specimen: Blood Updated: 10/01/24 0455    Narrative:      The following orders were created for panel order CBC & Differential.  Procedure                               Abnormality         Status                     ---------                               -----------         ------                     CBC Auto Differential[[id number]]        Abnormal            Final result                 Please view results for these tests on the individual orders.    CBC Auto Differential [[id number]]  (Abnormal) Collected: 10/01/24 0443    Specimen: Blood Updated: 10/01/24 0455     WBC 9.47 10*3/mm3      RBC 3.34 10*6/mm3      Hemoglobin 9.8 g/dL      Hematocrit 29.8 %      MCV 89.2 fL      MCH 29.3 pg      MCHC 32.9 g/dL      RDW 12.4 %      RDW-SD 40.6 fl      MPV 8.8 fL      Platelets 198 10*3/mm3      Neutrophil % 68.4 %      Lymphocyte % 19.7 %      Monocyte % 9.6 %      Eosinophil % 1.7 %      Basophil % 0.2 %      Immature Grans % 0.4 %      Neutrophils, Absolute 6.47 10*3/mm3      Lymphocytes, Absolute 1.87 10*3/mm3      Monocytes, Absolute 0.91 10*3/mm3      Eosinophils, Absolute 0.16 10*3/mm3      Basophils, Absolute 0.02 10*3/mm3      Immature Grans, Absolute 0.04 10*3/mm3      nRBC 0.0 /100 WBC           Imaging Results (Last 24 Hours)       Procedure Component Value Units Date/Time    FL Upper GI Water Soluble [[id number]] Collected: 10/01/24 1118     Updated: 10/01/24 1125    Narrative:      EXAM/TECHNIQUE: FL UPPER GI WATER SOLUBLE-     INDICATION:  [FreeTextEntry3] : a s/p perf gastric ulcer repair/patch, eval for contrast  extravasation; K66.8-Other specified disorders of peritoneum;  Z74.09-Other reduced mobility     COMPARISON: None available.     Number of images: 14  Fluoroscopy dose: 3.02 minutes  Fluoroscopy dose: 72.8 mGy     FINDINGS:     Gastrografin contrast was instilled through the patient's nasogastric  tube and multiple abdominal fluoroscopic images were obtained.     Special attention was paid to the distal stomach. Once there was  appropriate contrast distention, multiple images of the distal stomach  were obtained in various projections/positions including AP and  bilateral oblique views. No evidence of contrast leak. Contrast freely  flows through the stomach and into the duodenum and jejunum. No evidence  of obstruction.       Impression:      No evidence of postoperative leak status post gastric ulcer repair.     This report was signed and finalized on 10/1/2024 11:21 AM by Dr. Steven Giles MD.                 Assessment & Plan     Perforated gastric ulcer s/p robotic repair and omental patch on 9/26/24     PT and OT consults. OOB ambulation. WBC count normalized. Rocephin/Flagyl/Diflucan x 4 days post op - completed. UGI today with no leak - discussed with Dr. Giles. NGT removed. Start clears and clear nutrition supplements.       Petrona Malone MD  10/01/24  15:49 CDT

## 2024-10-01 NOTE — PLAN OF CARE
Goal Outcome Evaluation:           Progress: no change  Outcome Evaluation: Patient no c/o pain see Mar. Up with assist. ambulating in room. voiding. BM noted x2 . Tolerating diet.

## 2024-10-01 NOTE — THERAPY TREATMENT NOTE
Acute Care - Physical Therapy Treatment Note   [place]     Patient Name: Oral Dey  : 1940  MRN: [id number]  Today's Date: 10/1/2024      Visit Dx:     ICD-10-CM ICD-9-CM   1. Pneumoperitoneum  K66.8 568.89   2. Impaired mobility [Z74.09]  Z74.09 799.89     Patient Active Problem List   Diagnosis    Hx of colonic polyp    Family hx of colon cancer    CLL (chronic lymphocytic leukemia)    Hypertension    IgG lambda monoclonal gammopathy    Iron deficiency    Pneumoperitoneum    Perforated gastric ulcer    Cough    Anemia    Atrial fibrillation with rapid ventricular response    COVID-19 virus infection     Past Medical History:   Diagnosis Date    Bladder cancer     CLL (chronic lymphocytic leukemia)     Colon polyp     Gallstone     GERD (gastroesophageal reflux disease)     Glaucoma     Hearing loss     Hypertension     Inguinal hernia     right groin    Macular degeneration, right eye      Past Surgical History:   Procedure Laterality Date    CATARACT EXTRACTION, BILATERAL      COLONOSCOPY  2012    CYSTOSCOPY BLADDER BIOPSY      DIAGNOSTIC LAPAROSCOPY N/A 2024    Procedure: ROBOTIC REPAIR OF PERFORATED GASTRIC ULCER;  Surgeon: Petrona Malone MD;  Location:  [facility] OR;  Service: General;  Laterality: N/A;  WITH REPAIR OF GASTRIC ULCER PERFORATION    EXCISION MASS HEAD/NECK N/A 3/4/2022    Procedure: EXCISION OF MASS ON POSTERIOR NECK;  Surgeon: Rossana Mahajan MD;  Location:  [facility] OR;  Service: General;  Laterality: N/A;    INGUINAL HERNIA REPAIR Left     INGUINAL HERNIA REPAIR Right 2017    Procedure: RIGHT INGUINAL HERNIA REPAIR WITH MESH ;  Surgeon: Rossana Mahajan MD;  Location: [facility] OR;  Service:      PT Assessment (Last 12 Hours)       PT Evaluation and Treatment       Row Name 10/01/24 1134          Physical Therapy Time and Intention    Subjective Information complains of;weakness;fatigue  -     Document Type therapy note (daily note)  -     Mode of  Treatment physical therapy  -[facility] Name 10/01/24 1134          General Information    Existing Precautions/Restrictions fall  NG tube, SEGUNDO drain  -[facility] Name 10/01/24 1134          Pain    Pretreatment Pain Rating 2/10  -     Posttreatment Pain Rating 2/10  -     Pain Location - abdomen  [facility]     Pain Intervention(s) Repositioned;Ambulation/increased activity  -[facility] Name 10/01/24 1134          Bed Mobility    Supine-Sit [place] (Bed Mobility) --  bathroom  -     Sit-Supine [place] (Bed Mobility) --  chair  -[facility] Name 10/01/24 1134          Transfers    Transfers sit-stand transfer;stand-sit transfer  -AH       Row Name 10/01/24 1134          Sit-Stand Transfer    Sit-Stand [place] (Transfers) verbal cues;contact guard  -[facility] Name 10/01/24 1134          Stand-Sit Transfer    Stand-Sit [place] (Transfers) verbal cues;contact guard  -[facility] Name 10/01/24 1134          Toilet Transfer    [place] Level (Toilet Transfer) contact guard;verbal cues  -     Assistive Device (Toilet Transfer) commode;grab bars/safety frame;walker, front-wheeled  -[facility] Name 10/01/24 1134          Gait/Stairs (Locomotion)    [place] Level (Gait) verbal cues;contact guard  -     Assistive Device (Gait) walker, front-wheeled  [facility]     Distance in Feet (Gait) 60  -AH       Row Name 10/01/24 1134          Motor Skills    Comments, Therapeutic Exercise BLE AROM  -AH       Row Name             Wound 09/26/24 2222 abdomen Incision    Wound - Properties Group Placement Date: 09/26/24  - Placement Time: 2222 -HW Location: abdomen  - Primary Wound Type: Incision  -HW    Retired Wound - Properties Group Placement Date: 09/26/24  - Placement Time: 2222 -HW Location: abdomen  - Primary Wound Type: Incision  -HW    Retired Wound - Properties Group Date first assessed: 09/26/24  - Time first assessed: 2222 -HW Location: abdomen  - Primary Wound Type:  Incision  -HW      Row Name 10/01/24 1134          Positioning and Restraints    Pre-Treatment Position bathroom  -AH     Post Treatment Position chair  -     In Chair sitting;call light within reach;encouraged to call for assist;with nsg  -               User Key  (r) = Recorded By, (t) = Taken By, (c) = Cosigned By      Initials Name Provider Type     Anjana Madison, PTA Physical Therapist Assistant    Matti Villarreal, RN Registered Nurse                    Physical Therapy Education       Title: PT OT SLP Therapies (In Progress)       Topic: Physical Therapy (In Progress)       Point: Mobility training (Done)       Learning Progress Summary             Patient Acceptance, E, VU,DU,NR by NAHOMY at 9/27/2024 0800    Comment: benefits of activity, progression of PT                         Point: Home exercise program (Not Started)       Learner Progress:  Not documented in this visit.              Point: Body mechanics (Not Started)       Learner Progress:  Not documented in this visit.              Point: Precautions (Not Started)       Learner Progress:  Not documented in this visit.                              User Key       Initials Effective Dates Name Provider Type Discipline    NAHOMY 02/03/23 -  Abimael Lees, PT DPT Physical Therapist PT                  PT Recommendation and Plan     Plan of Care Reviewed With: patient  Progress: improving  Outcome Evaluation: pt in chair, BLE AROM, sit-stand cga-min, amb 20 feet to bathroom cga rwx, then 40 feet in room cga rwx,   Outcome Measures       Row Name 10/01/24 1200 09/30/24 1100 09/29/24 1013       How much help from another person do you currently need...    Turning from your back to your side while in flat bed without using bedrails? 3  - 3  - 3  -JESSICA    Moving from lying on back to sitting on the side of a flat bed without bedrails? 3  - 3  - 3  -JESSICA    Moving to and from a bed to a chair (including a wheelchair)? 3  - 3  - 3  -JESSICA     Standing up from a chair using your arms (e.g., wheelchair, bedside chair)? 3  -AH 3  -AH 3  -JESSICA    Climbing 3-5 steps with a railing? 2  -AH 2  -AH 2  -JESSICA    To walk in hospital room? 3  -AH 3  -AH 3  -JESSICA    AM-PAC 6 Clicks Score (PT) 17  -AH 17  -AH 17  -JESSICA    Highest Level of Mobility Goal 5 --> Static standing  -AH 5 --> Static standing  -AH 5 --> Static standing  -JESSICA       Functional Assessment    Outcome Measure Options AM-PAC 6 Clicks Basic Mobility (PT)  - AM-PAC 6 Clicks Basic Mobility (PT)  -AH AM-PAC 6 Clicks Basic Mobility (PT)  -JESSICA              User Key  (r) = Recorded By, (t) = Taken By, (c) = Cosigned By      Initials Name Provider Type    Anjana Valle PTA Physical Therapist Assistant    Kenneth Mullins PTA Physical Therapist Assistant                     Time Calculation:    PT Charges       Row Name 10/01/24 1203             Time Calculation    Start Time 1134  -      Stop Time 1200  -AH      Time Calculation (min) 26 min  -      PT Received On 10/01/24  -         Time Calculation- PT    Total Timed Code Minutes- PT 26 minute(s)  -         Timed Charges    91521 - Gait Training Minutes  26  -AH         Total Minutes    Timed Charges Total Minutes 26  -AH       Total Minutes 26  -AH                User Key  (r) = Recorded By, (t) = Taken By, (c) = Cosigned By      Initials Name Provider Type    Anjana Valle PTA Physical Therapist Assistant                  Therapy Charges for Today       Code Description Service Date Service Provider Modifiers Qty    14423704850 HC PT THER PROC EA 15 MIN 9/30/2024 Anjana Madison, PTA GP 1    38584920697 HC GAIT TRAINING EA 15 MIN 9/30/2024 Anjana Madison, PTA GP 1    44254910263 HC GAIT TRAINING EA 15 MIN 10/1/2024 Anjana Madison, PTA GP 2            PT G-Codes  Outcome Measure Options: AM-PAC 6 Clicks Basic Mobility (PT)  AM-PAC 6 Clicks Score (PT): 17  AM-PAC 6 Clicks Score (OT): 18    Anjana Madison PTA  10/1/2024

## 2024-10-01 NOTE — PLAN OF CARE
Goal Outcome Evaluation:      C/O nausea when administering medication through NGT, zofran IV given. Remains on low/int suction. NG output= 100 mLs. SEGUNDO output= 5 mLs. Frequent productive cough. Dilaudid given once this shift. LR @ 100. Call light in reach. Pt safety maintained.

## 2024-10-02 LAB
ANION GAP SERPL CALCULATED.3IONS-SCNC: 10 MMOL/L (ref 5–15)
BACTERIA SPEC AEROBE CULT: NORMAL
BACTERIA SPEC AEROBE CULT: NORMAL
BASOPHILS # BLD AUTO: 0.04 10*3/MM3 (ref 0–0.2)
BASOPHILS NFR BLD AUTO: 0.4 % (ref 0–1.5)
BUN SERPL-MCNC: 17 MG/DL (ref 8–23)
BUN/CREAT SERPL: 20.2 (ref 7–25)
CALCIUM SPEC-SCNC: 7.9 MG/DL (ref 8.6–10.5)
CHLORIDE SERPL-SCNC: 98 MMOL/L (ref 98–107)
CO2 SERPL-SCNC: 26 MMOL/L (ref 22–29)
CREAT SERPL-MCNC: 0.84 MG/DL (ref 0.76–1.27)
DEPRECATED RDW RBC AUTO: 40 FL (ref 37–54)
EGFRCR SERPLBLD CKD-EPI 2021: 87.6 ML/MIN/1.73
EOSINOPHIL # BLD AUTO: 0.23 10*3/MM3 (ref 0–0.4)
EOSINOPHIL NFR BLD AUTO: 2.2 % (ref 0.3–6.2)
ERYTHROCYTE [DISTWIDTH] IN BLOOD BY AUTOMATED COUNT: 12.4 % (ref 12.3–15.4)
GLUCOSE SERPL-MCNC: 103 MG/DL (ref 65–99)
HCT VFR BLD AUTO: 29.8 % (ref 37.5–51)
HGB BLD-MCNC: 9.9 G/DL (ref 13–17.7)
IMM GRANULOCYTES # BLD AUTO: 0.07 10*3/MM3 (ref 0–0.05)
IMM GRANULOCYTES NFR BLD AUTO: 0.7 % (ref 0–0.5)
LYMPHOCYTES # BLD AUTO: 2.09 10*3/MM3 (ref 0.7–3.1)
LYMPHOCYTES NFR BLD AUTO: 19.6 % (ref 19.6–45.3)
MAGNESIUM SERPL-MCNC: 1.9 MG/DL (ref 1.6–2.4)
MCH RBC QN AUTO: 29.5 PG (ref 26.6–33)
MCHC RBC AUTO-ENTMCNC: 33.2 G/DL (ref 31.5–35.7)
MCV RBC AUTO: 88.7 FL (ref 79–97)
MONOCYTES # BLD AUTO: 1.09 10*3/MM3 (ref 0.1–0.9)
MONOCYTES NFR BLD AUTO: 10.2 % (ref 5–12)
NEUTROPHILS NFR BLD AUTO: 66.9 % (ref 42.7–76)
NEUTROPHILS NFR BLD AUTO: 7.12 10*3/MM3 (ref 1.7–7)
NRBC BLD AUTO-RTO: 0 /100 WBC (ref 0–0.2)
PHOSPHATE SERPL-MCNC: 2.4 MG/DL (ref 2.5–4.5)
PLATELET # BLD AUTO: 213 10*3/MM3 (ref 140–450)
PMV BLD AUTO: 9.1 FL (ref 6–12)
POTASSIUM SERPL-SCNC: 3.7 MMOL/L (ref 3.5–5.2)
RBC # BLD AUTO: 3.36 10*6/MM3 (ref 4.14–5.8)
SODIUM SERPL-SCNC: 134 MMOL/L (ref 136–145)
WBC NRBC COR # BLD AUTO: 10.64 10*3/MM3 (ref 3.4–10.8)

## 2024-10-02 PROCEDURE — 25010000002 HEPARIN (PORCINE) PER 1000 UNITS

## 2024-10-02 PROCEDURE — 94761 N-INVAS EAR/PLS OXIMETRY MLT: CPT

## 2024-10-02 PROCEDURE — 84100 ASSAY OF PHOSPHORUS: CPT | Performed by: STUDENT IN AN ORGANIZED HEALTH CARE EDUCATION/TRAINING PROGRAM

## 2024-10-02 PROCEDURE — 25010000002 LABETALOL 5 MG/ML SOLUTION: Performed by: INTERNAL MEDICINE

## 2024-10-02 PROCEDURE — 97110 THERAPEUTIC EXERCISES: CPT

## 2024-10-02 PROCEDURE — 94799 UNLISTED PULMONARY SVC/PX: CPT

## 2024-10-02 PROCEDURE — 85025 COMPLETE CBC W/AUTO DIFF WBC: CPT | Performed by: STUDENT IN AN ORGANIZED HEALTH CARE EDUCATION/TRAINING PROGRAM

## 2024-10-02 PROCEDURE — 83735 ASSAY OF MAGNESIUM: CPT | Performed by: STUDENT IN AN ORGANIZED HEALTH CARE EDUCATION/TRAINING PROGRAM

## 2024-10-02 PROCEDURE — 97116 GAIT TRAINING THERAPY: CPT

## 2024-10-02 PROCEDURE — 80048 BASIC METABOLIC PNL TOTAL CA: CPT | Performed by: INTERNAL MEDICINE

## 2024-10-02 PROCEDURE — 25810000003 LACTATED RINGERS PER 1000 ML: Performed by: STUDENT IN AN ORGANIZED HEALTH CARE EDUCATION/TRAINING PROGRAM

## 2024-10-02 PROCEDURE — 99024 POSTOP FOLLOW-UP VISIT: CPT | Performed by: STUDENT IN AN ORGANIZED HEALTH CARE EDUCATION/TRAINING PROGRAM

## 2024-10-02 RX ORDER — POLYETHYLENE GLYCOL 3350 17 G/17G
17 POWDER, FOR SOLUTION ORAL DAILY PRN
Status: DISCONTINUED | OUTPATIENT
Start: 2024-10-02 | End: 2024-10-05 | Stop reason: HOSPADM

## 2024-10-02 RX ORDER — ONDANSETRON 2 MG/ML
4 INJECTION INTRAMUSCULAR; INTRAVENOUS EVERY 6 HOURS PRN
Status: DISCONTINUED | OUTPATIENT
Start: 2024-10-02 | End: 2024-10-05 | Stop reason: HOSPADM

## 2024-10-02 RX ORDER — OXYCODONE HYDROCHLORIDE 5 MG/1
5 TABLET ORAL EVERY 6 HOURS PRN
Status: DISCONTINUED | OUTPATIENT
Start: 2024-10-02 | End: 2024-10-05 | Stop reason: HOSPADM

## 2024-10-02 RX ORDER — BISACODYL 10 MG
10 SUPPOSITORY, RECTAL RECTAL DAILY PRN
Status: DISCONTINUED | OUTPATIENT
Start: 2024-10-02 | End: 2024-10-05 | Stop reason: HOSPADM

## 2024-10-02 RX ORDER — ONDANSETRON 4 MG/1
4 TABLET, ORALLY DISINTEGRATING ORAL EVERY 6 HOURS PRN
Status: DISCONTINUED | OUTPATIENT
Start: 2024-10-02 | End: 2024-10-05 | Stop reason: HOSPADM

## 2024-10-02 RX ORDER — AMOXICILLIN 250 MG
2 CAPSULE ORAL 2 TIMES DAILY
Status: DISCONTINUED | OUTPATIENT
Start: 2024-10-02 | End: 2024-10-05 | Stop reason: HOSPADM

## 2024-10-02 RX ADMIN — OXYCODONE HYDROCHLORIDE 5 MG: 5 TABLET ORAL at 13:51

## 2024-10-02 RX ADMIN — SODIUM CHLORIDE, POTASSIUM CHLORIDE, SODIUM LACTATE AND CALCIUM CHLORIDE 100 ML/HR: 600; 310; 30; 20 INJECTION, SOLUTION INTRAVENOUS at 00:37

## 2024-10-02 RX ADMIN — IPRATROPIUM BROMIDE 2 PUFF: 17 AEROSOL, METERED RESPIRATORY (INHALATION) at 20:28

## 2024-10-02 RX ADMIN — METOPROLOL TARTRATE 5 MG: 5 INJECTION INTRAVENOUS at 00:37

## 2024-10-02 RX ADMIN — PANTOPRAZOLE SODIUM 40 MG: 40 INJECTION, POWDER, FOR SOLUTION INTRAVENOUS at 21:00

## 2024-10-02 RX ADMIN — ALBUTEROL SULFATE 2 PUFF: 108 INHALANT RESPIRATORY (INHALATION) at 14:26

## 2024-10-02 RX ADMIN — Medication 10 ML: at 21:00

## 2024-10-02 RX ADMIN — HEPARIN SODIUM 5000 UNITS: 5000 INJECTION, SOLUTION INTRAVENOUS; SUBCUTANEOUS at 13:56

## 2024-10-02 RX ADMIN — LABETALOL HYDROCHLORIDE 10 MG: 5 INJECTION INTRAVENOUS at 08:29

## 2024-10-02 RX ADMIN — LATANOPROST 1 DROP: 50 SOLUTION OPHTHALMIC at 21:10

## 2024-10-02 RX ADMIN — PANTOPRAZOLE SODIUM 40 MG: 40 INJECTION, POWDER, FOR SOLUTION INTRAVENOUS at 08:25

## 2024-10-02 RX ADMIN — HEPARIN SODIUM 5000 UNITS: 5000 INJECTION, SOLUTION INTRAVENOUS; SUBCUTANEOUS at 21:00

## 2024-10-02 RX ADMIN — IPRATROPIUM BROMIDE 2 PUFF: 17 AEROSOL, METERED RESPIRATORY (INHALATION) at 06:52

## 2024-10-02 RX ADMIN — METOPROLOL TARTRATE 5 MG: 5 INJECTION INTRAVENOUS at 05:33

## 2024-10-02 RX ADMIN — Medication 10 ML: at 08:26

## 2024-10-02 RX ADMIN — OXYCODONE HYDROCHLORIDE 5 MG: 5 TABLET ORAL at 21:10

## 2024-10-02 RX ADMIN — DOCUSATE SODIUM 50 MG AND SENNOSIDES 8.6 MG 2 TABLET: 8.6; 5 TABLET, FILM COATED ORAL at 20:59

## 2024-10-02 RX ADMIN — ALBUTEROL SULFATE 2 PUFF: 108 INHALANT RESPIRATORY (INHALATION) at 10:34

## 2024-10-02 RX ADMIN — IPRATROPIUM BROMIDE 2 PUFF: 17 AEROSOL, METERED RESPIRATORY (INHALATION) at 14:26

## 2024-10-02 RX ADMIN — ALBUTEROL SULFATE 2 PUFF: 108 INHALANT RESPIRATORY (INHALATION) at 06:52

## 2024-10-02 RX ADMIN — SODIUM CHLORIDE, POTASSIUM CHLORIDE, SODIUM LACTATE AND CALCIUM CHLORIDE 100 ML/HR: 600; 310; 30; 20 INJECTION, SOLUTION INTRAVENOUS at 10:00

## 2024-10-02 RX ADMIN — LIDOCAINE 2 PATCH: 4 PATCH TOPICAL at 08:25

## 2024-10-02 RX ADMIN — HEPARIN SODIUM 5000 UNITS: 5000 INJECTION, SOLUTION INTRAVENOUS; SUBCUTANEOUS at 05:33

## 2024-10-02 RX ADMIN — IPRATROPIUM BROMIDE 2 PUFF: 17 AEROSOL, METERED RESPIRATORY (INHALATION) at 10:35

## 2024-10-02 RX ADMIN — ALBUTEROL SULFATE 2 PUFF: 108 INHALANT RESPIRATORY (INHALATION) at 20:28

## 2024-10-02 RX ADMIN — METOPROLOL TARTRATE 5 MG: 5 INJECTION INTRAVENOUS at 12:19

## 2024-10-02 NOTE — PLAN OF CARE
Goal Outcome Evaluation:  Plan of Care Reviewed With: patient        Progress: improving  Outcome Evaluation: pt in chair, BLE AROM x 20 reps, sit-stand sba, pt amb in room 125 feet rwx cga-sba, trans to chair sba, pt would benefit from HH for balance,strength and endurance

## 2024-10-02 NOTE — THERAPY TREATMENT NOTE
Acute Care - Physical Therapy Treatment Note   [place]     Patient Name: Oral Dey  : 1940  MRN: [id number]  Today's Date: 10/2/2024      Visit Dx:     ICD-10-CM ICD-9-CM   1. Pneumoperitoneum  K66.8 568.89   2. Impaired mobility [Z74.09]  Z74.09 799.89     Patient Active Problem List   Diagnosis    Hx of colonic polyp    Family hx of colon cancer    CLL (chronic lymphocytic leukemia)    Hypertension    IgG lambda monoclonal gammopathy    Iron deficiency    Pneumoperitoneum    Perforated gastric ulcer    Cough    Anemia    Atrial fibrillation with rapid ventricular response    COVID-19 virus infection     Past Medical History:   Diagnosis Date    Bladder cancer     CLL (chronic lymphocytic leukemia)     Colon polyp     Gallstone     GERD (gastroesophageal reflux disease)     Glaucoma     Hearing loss     Hypertension     Inguinal hernia     right groin    Macular degeneration, right eye      Past Surgical History:   Procedure Laterality Date    CATARACT EXTRACTION, BILATERAL      COLONOSCOPY  2012    CYSTOSCOPY BLADDER BIOPSY      DIAGNOSTIC LAPAROSCOPY N/A 2024    Procedure: ROBOTIC REPAIR OF PERFORATED GASTRIC ULCER;  Surgeon: Petrona Malone MD;  Location:  [facility] OR;  Service: General;  Laterality: N/A;  WITH REPAIR OF GASTRIC ULCER PERFORATION    EXCISION MASS HEAD/NECK N/A 3/4/2022    Procedure: EXCISION OF MASS ON POSTERIOR NECK;  Surgeon: Rossana Mahajan MD;  Location:  [facility] OR;  Service: General;  Laterality: N/A;    INGUINAL HERNIA REPAIR Left     INGUINAL HERNIA REPAIR Right 2017    Procedure: RIGHT INGUINAL HERNIA REPAIR WITH MESH ;  Surgeon: Rossana Mahajan MD;  Location:  [facility] OR;  Service:      PT Assessment (Last 12 Hours)       PT Evaluation and Treatment       Row Name 10/02/24 1417 10/02/24 1027       Physical Therapy Time and Intention    Subjective Information complains of;weakness;fatigue  - complains of;weakness;fatigue  -    Document Type  therapy note (daily note)  - therapy note (daily note)  -    Mode of Treatment physical therapy  - physical therapy  -[facility] Name 10/02/24 0858          Physical Therapy Time and Intention    Session Not Performed other (see comments)  -     Comment, Session Not Performed breakfast  -[facility] Name 10/02/24 1417 10/02/24 1027       General Information    Existing Precautions/Restrictions fall  SEGUNDO drain  - fall  SEGUNDO drain  -      Row Name 10/02/24 1417 10/02/24 1027       Pain    Pretreatment Pain Rating 0/10 - no pain  - 0/10 - no pain  -    Posttreatment Pain Rating 0/10 - no pain  - 0/10 - no pain  -[facility] Name 10/02/24 1417 10/02/24 1027       Bed Mobility    Comment, (Bed Mobility) chair  - chair  -[facility] Name 10/02/24 1417 10/02/24 1027       Transfers    Transfers sit-stand transfer;stand-sit transfer  - sit-stand transfer;stand-sit transfer  -[facility] Name 10/02/24 1417 10/02/24 1027       Sit-Stand Transfer    Sit-Stand [place] (Transfers) standby assist  - standby assist  -[facility] Name 10/02/24 1417 10/02/24 1027       Stand-Sit Transfer    Stand-Sit [place] (Transfers) standby assist  [facility] standby assist  -[facility] Name 10/02/24 1417 10/02/24 1027       Gait/Stairs (Locomotion)    [place] Level (Gait) verbal cues;contact guard;standby assist  [facility] verbal cues;contact guard;standby assist  [facility]    Assistive Device (Gait) walker, front-wheeled  - walker, front-wheeled  -    Distance in Feet (Gait) 30  30 x 5  - 30  30 x 4, standing rest due to fatigue  -[facility] Name 10/02/24 1417 10/02/24 1027       Motor Skills    Comments, Therapeutic Exercise BLE HEP x 20 reps  - BLE AROM x 20 reps  -[facility] Name             Wound 09/26/24 2222 abdomen Incision    Wound - Properties Group Placement Date: 09/26/24  -HW Placement Time: 2222 -HW Location: abdomen  - Primary Wound Type: Incision  -HW    Retired Wound - Properties Group Placement  Date: 09/26/24  - Placement Time: 2222 -HW Location: abdomen  - Primary Wound Type: Incision  -HW    Retired Wound - Properties Group Date first assessed: 09/26/24  - Time first assessed: 2222 -HW Location: abdomen  -HW Primary Wound Type: Incision  -HW      Row Name 10/02/24 1027          Plan of Care Review    Plan of Care Reviewed With patient  -     Progress improving  -     Outcome Evaluation pt in chair, BLE AROM x 20 reps, sit-stand sba, pt amb in room 30 feet at a time rwx cga-sba,require standing rest due to fatigue, trans to chair sba, pt would benefit from rehab for balance,strength and endurance  -       Row Name 10/02/24 1417 10/02/24 1027       Positioning and Restraints    Pre-Treatment Position sitting in chair/recliner  -AH sitting in chair/recliner  -AH    Post Treatment Position chair  -AH chair  -AH    In Chair reclined;call light within reach;encouraged to call for assist  -AH sitting;call light within reach;encouraged to call for assist  -AH              User Key  (r) = Recorded By, (t) = Taken By, (c) = Cosigned By      Initials Name Provider Type     Anjana Madisno PTA Physical Therapist Assistant     Matti Malone, RN Registered Nurse                    Physical Therapy Education       Title: PT OT SLP Therapies (In Progress)       Topic: Physical Therapy (In Progress)       Point: Mobility training (Done)       Learning Progress Summary             Patient Acceptance, E, JOSE D WILLIAMSON,NR by NAHOMY at 9/27/2024 0800    Comment: benefits of activity, progression of PT                         Point: Home exercise program (Not Started)       Learner Progress:  Not documented in this visit.              Point: Body mechanics (Not Started)       Learner Progress:  Not documented in this visit.              Point: Precautions (Not Started)       Learner Progress:  Not documented in this visit.                              User Key       Initials Effective Dates Name Provider Type  Discipline    NAHOMY 02/03/23 -  Abimael Lees, PT DPT Physical Therapist PT                  PT Recommendation and Plan     Plan of Care Reviewed With: patient  Progress: improving  Outcome Evaluation: pt in chair, BLE AROM x 20 reps, sit-stand sba, pt amb in room 30 feet at a time rwx cga-sba,require standing rest due to fatigue, trans to chair sba, pt would benefit from rehab for balance,strength and endurance   Outcome Measures       Row Name 10/02/24 1100 10/01/24 1200 09/30/24 1100       How much help from another person do you currently need...    Turning from your back to your side while in flat bed without using bedrails? 3  -AH 3  -AH 3  -AH    Moving from lying on back to sitting on the side of a flat bed without bedrails? 3  -AH 3  -AH 3  -AH    Moving to and from a bed to a chair (including a wheelchair)? 3  -AH 3  -AH 3  -AH    Standing up from a chair using your arms (e.g., wheelchair, bedside chair)? 3  -AH 3  -AH 3  -AH    Climbing 3-5 steps with a railing? 2  -AH 2  -AH 2  -AH    To walk in hospital room? 3  -AH 3  -AH 3  -AH    AM-PAC 6 Clicks Score (PT) 17  -AH 17  -AH 17  -AH    Highest Level of Mobility Goal 5 --> Static standing  -AH 5 --> Static standing  -AH 5 --> Static standing  -AH       Functional Assessment    Outcome Measure Options AM-PAC 6 Clicks Basic Mobility (PT)  - AM-PAC 6 Clicks Basic Mobility (PT)  - AM-PAC 6 Clicks Basic Mobility (PT)  -              User Key  (r) = Recorded By, (t) = Taken By, (c) = Cosigned By      Initials Name Provider Type     Anjana Madison, PTA Physical Therapist Assistant                     Time Calculation:    PT Charges       Row Name 10/02/24 1451 10/02/24 1101          Time Calculation    Start Time 1417  - 1027  -     Stop Time 1451  - 1057  -     Time Calculation (min) 34 min  - 30 min  -     PT Received On -- 10/02/24  -        Time Calculation- PT    Total Timed Code Minutes- PT 34 minute(s)  - 30 minute(s)  -         Timed Charges    80595 - PT Therapeutic Exercise Minutes 15  -AH 10  -AH     32116 - Gait Training Minutes  19  -AH 20  -AH        Total Minutes    Timed Charges Total Minutes 34  -AH 30  -AH      Total Minutes 34  -AH 30  -AH               User Key  (r) = Recorded By, (t) = Taken By, (c) = Cosigned By      Initials Name Provider Type     Anjana Madison, ANAM Physical Therapist Assistant                  Therapy Charges for Today       Code Description Service Date Service Provider Modifiers Qty    83836061082 HC GAIT TRAINING EA 15 MIN 10/1/2024 Anjana Madison, PTA GP 2    89941471884 HC PT THER PROC EA 15 MIN 10/2/2024 Anjana Madison, PTA GP 1    44582755435 HC GAIT TRAINING EA 15 MIN 10/2/2024 Anjana Madison, PTA GP 1    38367707114 HC PT THER PROC EA 15 MIN 10/2/2024 Anjana Madison, PTA GP 1    33063976286 HC GAIT TRAINING EA 15 MIN 10/2/2024 Anjana Madison, PTA GP 1            PT G-Codes  Outcome Measure Options: AM-PAC 6 Clicks Basic Mobility (PT)  AM-PAC 6 Clicks Score (PT): 17  AM-PAC 6 Clicks Score (OT): 18    Anjana Madison PTA  10/2/2024

## 2024-10-02 NOTE — THERAPY TREATMENT NOTE
Acute Care - Physical Therapy Treatment Note   [place]     Patient Name: Oral Dey  : 1940  MRN: [id number]  Today's Date: 10/2/2024      Visit Dx:     ICD-10-CM ICD-9-CM   1. Pneumoperitoneum  K66.8 568.89   2. Impaired mobility [Z74.09]  Z74.09 799.89     Patient Active Problem List   Diagnosis    Hx of colonic polyp    Family hx of colon cancer    CLL (chronic lymphocytic leukemia)    Hypertension    IgG lambda monoclonal gammopathy    Iron deficiency    Pneumoperitoneum    Perforated gastric ulcer    Cough    Anemia    Atrial fibrillation with rapid ventricular response    COVID-19 virus infection     Past Medical History:   Diagnosis Date    Bladder cancer     CLL (chronic lymphocytic leukemia)     Colon polyp     Gallstone     GERD (gastroesophageal reflux disease)     Glaucoma     Hearing loss     Hypertension     Inguinal hernia     right groin    Macular degeneration, right eye      Past Surgical History:   Procedure Laterality Date    CATARACT EXTRACTION, BILATERAL      COLONOSCOPY  2012    CYSTOSCOPY BLADDER BIOPSY      DIAGNOSTIC LAPAROSCOPY N/A 2024    Procedure: ROBOTIC REPAIR OF PERFORATED GASTRIC ULCER;  Surgeon: Petrona Malone MD;  Location: [facility] OR;  Service: General;  Laterality: N/A;  WITH REPAIR OF GASTRIC ULCER PERFORATION    EXCISION MASS HEAD/NECK N/A 3/4/2022    Procedure: EXCISION OF MASS ON POSTERIOR NECK;  Surgeon: Rossana Mahajan MD;  Location:  [facility] OR;  Service: General;  Laterality: N/A;    INGUINAL HERNIA REPAIR Left     INGUINAL HERNIA REPAIR Right 2017    Procedure: RIGHT INGUINAL HERNIA REPAIR WITH MESH ;  Surgeon: Rossana Mahajan MD;  Location: [facility] OR;  Service:      PT Assessment (Last 12 Hours)       PT Evaluation and Treatment       Row Name 10/02/24 1027 10/02/24 0858       Physical Therapy Time and Intention    Subjective Information complains of;weakness;fatigue  - --    Document Type therapy note (daily note)  - --     Mode of Treatment physical therapy  - --    Session Not Performed -- other (see comments)  -    Comment, Session Not Performed -- breakfast  -      Row Name 10/02/24 1027          General Information    Existing Precautions/Restrictions fall  SEGUNDO drain  -[facility] Name 10/02/24 1027          Pain    Pretreatment Pain Rating 0/10 - no pain  -     Posttreatment Pain Rating 0/10 - no pain  -       Row Name 10/02/24 1027          Bed Mobility    Comment, (Bed Mobility) chair  -       Row Name 10/02/24 1027          Transfers    Transfers sit-stand transfer;stand-sit transfer  -       Row Name 10/02/24 1027          Sit-Stand Transfer    Sit-Stand [place] (Transfers) standby assist  -[facility] Name 10/02/24 1027          Stand-Sit Transfer    Stand-Sit [place] (Transfers) standby assist  -[facility] Name 10/02/24 1027          Gait/Stairs (Locomotion)    [place] Level (Gait) verbal cues;contact guard;standby assist  -     Assistive Device (Gait) walker, front-wheeled  -     Distance in Feet (Gait) 125  -       Row Name 10/02/24 1027          Motor Skills    Comments, Therapeutic Exercise BLE AROM x 20 reps  -       Row Name             Wound 09/26/24 2222 abdomen Incision    Wound - Properties Group Placement Date: 09/26/24  - Placement Time: 2222 -HW Location: abdomen  -HW Primary Wound Type: Incision  -HW    Retired Wound - Properties Group Placement Date: 09/26/24  - Placement Time: 2222 -HW Location: abdomen  -HW Primary Wound Type: Incision  -HW    Retired Wound - Properties Group Date first assessed: 09/26/24  - Time first assessed: 2222 - Location: abdomen  -HW Primary Wound Type: Incision  -HW      Row Name 10/02/24 1027          Plan of Care Review    Plan of Care Reviewed With patient  -     Progress improving  -     Outcome Evaluation pt in chair, BLE AROM x 20 reps, sit-stand sba, pt amb in room 125 feet rwx cga-sba, trans to chair sba, pt would  benefit from HH for balance,strength and endurance  -AH       Row Name 10/02/24 1027          Positioning and Restraints    Pre-Treatment Position sitting in chair/recliner  -AH     Post Treatment Position chair  -AH     In Chair sitting;call light within reach;encouraged to call for assist  -AH               User Key  (r) = Recorded By, (t) = Taken By, (c) = Cosigned By      Initials Name Provider Type     Anjana Madison, PTA Physical Therapist Assistant    Matti Villarreal, RN Registered Nurse                    Physical Therapy Education       Title: PT OT SLP Therapies (In Progress)       Topic: Physical Therapy (In Progress)       Point: Mobility training (Done)       Learning Progress Summary             Patient Acceptance, E, VU,DU,NR by NAHOMY at 9/27/2024 0800    Comment: benefits of activity, progression of PT                         Point: Home exercise program (Not Started)       Learner Progress:  Not documented in this visit.              Point: Body mechanics (Not Started)       Learner Progress:  Not documented in this visit.              Point: Precautions (Not Started)       Learner Progress:  Not documented in this visit.                              User Key       Initials Effective Dates Name Provider Type Discipline    NAHOMY 02/03/23 -  Abimael Lees, PT DPT Physical Therapist PT                  PT Recommendation and Plan     Plan of Care Reviewed With: patient  Progress: improving  Outcome Evaluation: pt in chair, BLE AROM x 20 reps, sit-stand sba, pt amb in room 125 feet rwx cga-sba, trans to chair sba, pt would benefit from HH for balance,strength and endurance   Outcome Measures       Row Name 10/02/24 1100 10/01/24 1200 09/30/24 1100       How much help from another person do you currently need...    Turning from your back to your side while in flat bed without using bedrails? 3  -AH 3  -AH 3  -AH    Moving from lying on back to sitting on the side of a flat bed without bedrails? 3   -AH 3  -AH 3  -AH    Moving to and from a bed to a chair (including a wheelchair)? 3  -AH 3  -AH 3  -AH    Standing up from a chair using your arms (e.g., wheelchair, bedside chair)? 3  -AH 3  -AH 3  -AH    Climbing 3-5 steps with a railing? 2  -AH 2  -AH 2  -AH    To walk in hospital room? 3  -AH 3  -AH 3  -AH    AM-PAC 6 Clicks Score (PT) 17  -AH 17  -AH 17  -AH    Highest Level of Mobility Goal 5 --> Static standing  -AH 5 --> Static standing  -AH 5 --> Static standing  -AH       Functional Assessment    Outcome Measure Options AM-PAC 6 Clicks Basic Mobility (PT)  -AH AM-PAC 6 Clicks Basic Mobility (PT)  - AM-PAC 6 Clicks Basic Mobility (PT)  -              User Key  (r) = Recorded By, (t) = Taken By, (c) = Cosigned By      Initials Name Provider Type    Anjana Valle PTA Physical Therapist Assistant                     Time Calculation:    PT Charges       Row Name 10/02/24 1101             Time Calculation    Start Time 1027  -      Stop Time 1057  -      Time Calculation (min) 30 min  -      PT Received On 10/02/24  -         Time Calculation- PT    Total Timed Code Minutes- PT 30 minute(s)  -         Timed Charges    22536 - PT Therapeutic Exercise Minutes 10  -      01879 - Gait Training Minutes  20  -AH         Total Minutes    Timed Charges Total Minutes 30  -AH       Total Minutes 30  -AH                User Key  (r) = Recorded By, (t) = Taken By, (c) = Cosigned By      Initials Name Provider Type    Anjana Valle PTA Physical Therapist Assistant                  Therapy Charges for Today       Code Description Service Date Service Provider Modifiers Qty    80009852984 HC GAIT TRAINING EA 15 MIN 10/1/2024 Anjana Madison, ANAM GP 2    93869364258 HC PT THER PROC EA 15 MIN 10/2/2024 Anjana Madison, PTA GP 1    20559921516 HC GAIT TRAINING EA 15 MIN 10/2/2024 Anjana Madison, PTA GP 1            PT G-Codes  Outcome Measure Options: AM-PAC 6 Clicks Basic Mobility (PT)  AM-PAC 6  Clicks Score (PT): 17  AM-PAC 6 Clicks Score (OT): 18    Anjana Madison, PTA  10/2/2024

## 2024-10-02 NOTE — PROGRESS NOTES
[facility] Medicine Services  INPATIENT PROGRESS NOTE    Patient Name: Oral Dey  Date of Admission: 9/26/2024  Today's Date: 10/02/24  Length of Stay: 6  Primary Care Physician: Jorge Shepherd MD    Subjective   Chief Complaint: follow-up gastric ulcer   HPI   10/1 Resting comfortably no distress or new complaints today. Feeling better after removal of NGT.     Today:  Seen at bedside.  Complains of occasional dry cough.  Overall feeling better.  Advancing diet to full liquids.    Review of Systems   All pertinent negatives and positives are as above. All other systems have been reviewed and are negative unless otherwise stated.     Objective    Temp:  [97.5 °F (36.4 °C)-98.6 °F (37 °C)] 97.7 °F (36.5 °C)  Heart Rate:  [70-80] 75  Resp:  [16-18] 16  BP: (114-179)/(56-71) 147/65  Physical Exam  Constitutional:       General: He is not in acute distress.     Comments: Sitting up in bedside chair   HENT:      Head: Normocephalic.      Nose:      Mouth/Throat:      Mouth: Mucous membranes are moist.   Cardiovascular:      Rate and Rhythm: Normal rate and regular rhythm.      Comments: Occasional ectopy (suspected PVCs) noted  Pulmonary:      Effort: Pulmonary effort is normal. No respiratory distress.      Comments: Diminished at bases; on room air  Abdominal:      Comments: bowel sounds hypoactive   Musculoskeletal:      Right lower leg: No edema.      Left lower leg: No edema.   Skin:     General: Skin is warm.   Neurological:      General: No focal deficit present.      Mental Status: He is alert.   Psychiatric:         Mood and Affect: Mood normal.       Results Review:  I have reviewed the labs, radiology results, and diagnostic studies.    Laboratory Data:   Results from last 7 days   Lab Units 10/02/24  0422 10/01/24  0443 09/30/24  0634   WBC 10*3/mm3 10.64 9.47 10.76   HEMOGLOBIN g/dL 9.9* 9.8* 9.6*   HEMATOCRIT % 29.8* 29.8* 30.1*   PLATELETS 10*3/mm3 213 198 198         Results from last 7 days   Lab Units 10/02/24  0422 10/01/24  0443 09/30/24  0634 09/28/24  0602 09/27/24  0214 09/26/24  1858   SODIUM mmol/L 134* 133* 132*   < > 134* 134*   POTASSIUM mmol/L 3.7 4.0 4.1   < > 4.5 4.4   CHLORIDE mmol/L 98 98 98   < > 100 96*   CO2 mmol/L 26.0 24.0 25.0   < > 22.0 25.0   BUN mg/dL 17 19 22   < > 19 21   CREATININE mg/dL 0.84 0.78 0.94   < > 1.03 1.13   CALCIUM mg/dL 7.9* 8.0* 7.9*   < > 8.0* 9.0   BILIRUBIN mg/dL  --   --   --   --  0.8 0.8   ALK PHOS U/L  --   --   --   --  98 123*   ALT (SGPT) U/L  --   --   --   --  12 12   AST (SGOT) U/L  --   --   --   --  16 13   GLUCOSE mg/dL 103* 97 103*   < > 135* 151*    < > = values in this interval not displayed.       Culture Data:   Blood Culture   Date Value Ref Range Status   09/27/2024 No growth at 4 days  Preliminary   09/27/2024 No growth at 4 days  Preliminary       Radiology Data:   Imaging Results (Last 24 Hours)       ** No results found for the last 24 hours. **            I have reviewed the patient's current medications.     Assessment/Plan   Assessment  Active Hospital Problems    Diagnosis     **Pneumoperitoneum     Atrial fibrillation with rapid ventricular response     COVID-19 virus infection     Cough     Anemia     Perforated gastric ulcer     Hypertension        Treatment Plan  Perforated gastric ulcer with pneumoperitoneum  Postop day 5 robotic assisted repair by Dr. Malone  Vitals every 4 hours  NG tube removed  Diet clear liquid to full liquid  IV fluids lactated Ringer's at 100 cc an hour discontinue now to saline lock  Pain control  Rocephin/Flagyl/Diflucan 4 days postop completed  Upper GI series noted  Pain control  Increase activity  Ambulate  Bowel hygiene protocol  Electrolyte replacement protocol  Continue to monitor anemia present before surgery    COVID-19 virus infection  Minimal symptomatic  Patient remains on room air  Symptom onset probably 7 to 14 days ago    Atrial fibrillation RVR  In  the context of COVID and a bowel perforation could be considered lone A-fib  Lopressor 5 mg IV every 6 hours  Echocardiogram  Anticoagulation contraindicated due to bowel perforation status and postop  Continue heparin or Lovenox prophylactic doses    DVT prophylaxis heparin 5000 units every 8 hours subcutaneous     Medical Decision Making  Number and Complexity of problems: 3 acute; moderate complexity        Conditions and Status        Condition is improving     [facility] Data  External documents reviewed: none  Cardiac tracing (EKG, telemetry) interpretation: on telemetry this morning patient appears to be in sinus rhythm    Radiology interpretation: no new radiology studies  Labs reviewed: as above  Any tests that were considered but not ordered: none     Decision rules/scores evaluated (example [id number], [place], etc): none     Discussed with: patient and bedside nurse, Griselda     Care Planning  Shared decision making: Discussed with patient with agreement to proceed with treatment plan as outlined  Code status and discussions: Full code     Disposition  Social Determinants of Health that impact treatment or disposition: None apparent at this time  I expect the patient to be discharged per primary service    Electronically signed by Hipolito Robbins MD, 10/02/24, 14:02 CDT.

## 2024-10-02 NOTE — THERAPY TREATMENT NOTE
Acute Care - Physical Therapy Treatment Note   [place]     Patient Name: Oral Dey  : 1940  MRN: [id number]  Today's Date: 10/2/2024      Visit Dx:     ICD-10-CM ICD-9-CM   1. Pneumoperitoneum  K66.8 568.89   2. Impaired mobility [Z74.09]  Z74.09 799.89     Patient Active Problem List   Diagnosis    Hx of colonic polyp    Family hx of colon cancer    CLL (chronic lymphocytic leukemia)    Hypertension    IgG lambda monoclonal gammopathy    Iron deficiency    Pneumoperitoneum    Perforated gastric ulcer    Cough    Anemia    Atrial fibrillation with rapid ventricular response    COVID-19 virus infection     Past Medical History:   Diagnosis Date    Bladder cancer     CLL (chronic lymphocytic leukemia)     Colon polyp     Gallstone     GERD (gastroesophageal reflux disease)     Glaucoma     Hearing loss     Hypertension     Inguinal hernia     right groin    Macular degeneration, right eye      Past Surgical History:   Procedure Laterality Date    CATARACT EXTRACTION, BILATERAL      COLONOSCOPY  2012    CYSTOSCOPY BLADDER BIOPSY      DIAGNOSTIC LAPAROSCOPY N/A 2024    Procedure: ROBOTIC REPAIR OF PERFORATED GASTRIC ULCER;  Surgeon: Petrona Malone MD;  Location: [facility] OR;  Service: General;  Laterality: N/A;  WITH REPAIR OF GASTRIC ULCER PERFORATION    EXCISION MASS HEAD/NECK N/A 3/4/2022    Procedure: EXCISION OF MASS ON POSTERIOR NECK;  Surgeon: Rossana Mahajan MD;  Location:  [facility] OR;  Service: General;  Laterality: N/A;    INGUINAL HERNIA REPAIR Left     INGUINAL HERNIA REPAIR Right 2017    Procedure: RIGHT INGUINAL HERNIA REPAIR WITH MESH ;  Surgeon: Rossana Mahajan MD;  Location: [facility] OR;  Service:      PT Assessment (Last 12 Hours)       PT Evaluation and Treatment       Row Name 10/02/24 1027 10/02/24 0858       Physical Therapy Time and Intention    Subjective Information complains of;weakness;fatigue  - --    Document Type therapy note (daily note)  - --     Mode of Treatment physical therapy  - --    Session Not Performed -- other (see comments)  -    Comment, Session Not Performed -- breakfast  -[facility] Name 10/02/24 1027          General Information    Existing Precautions/Restrictions fall  SEGUNDO drain  -[facility] Name 10/02/24 1027          Pain    Pretreatment Pain Rating 0/10 - no pain  -     Posttreatment Pain Rating 0/10 - no pain  -[facility] Name 10/02/24 1027          Bed Mobility    Comment, (Bed Mobility) chair  -[facility] Name 10/02/24 1027          Transfers    Transfers sit-stand transfer;stand-sit transfer  -[facility] Name 10/02/24 1027          Sit-Stand Transfer    Sit-Stand [place] (Transfers) standby assist  -[facility] Name 10/02/24 1027          Stand-Sit Transfer    Stand-Sit [place] (Transfers) standby assist  -[facility] Name 10/02/24 1027          Gait/Stairs (Locomotion)    [place] Level (Gait) verbal cues;contact guard;standby assist  -     Assistive Device (Gait) walker, front-wheeled  -     Distance in Feet (Gait) 30  30 x 4, standing rest due to fatigue  -[facility] Name 10/02/24 1027          Motor Skills    Comments, Therapeutic Exercise BLE AROM x 20 reps  -[facility] Name             Wound 09/26/24 2222 abdomen Incision    Wound - Properties Group Placement Date: 09/26/24  - Placement Time: 2222 -HW Location: abdomen  -HW Primary Wound Type: Incision  -HW    Retired Wound - Properties Group Placement Date: 09/26/24  - Placement Time: 2222 -HW Location: abdomen  -HW Primary Wound Type: Incision  -HW    Retired Wound - Properties Group Date first assessed: 09/26/24  - Time first assessed: 2222 - Location: abdomen  -HW Primary Wound Type: Incision  -[facility] Name 10/02/24 1027          Plan of Care Review    Plan of Care Reviewed With patient  -     Progress improving  -     Outcome Evaluation pt in chair, BLE AROM x 20 reps, sit-stand sba, pt amb in room 30 feet at a  time rwx cga-sba,require standing rest due to fatigue, trans to chair sba, pt would benefit from rehab for balance,strength and endurance  -       Row Name 10/02/24 1027          Positioning and Restraints    Pre-Treatment Position sitting in chair/recliner  -AH     Post Treatment Position chair  -AH     In Chair sitting;call light within reach;encouraged to call for assist  -               User Key  (r) = Recorded By, (t) = Taken By, (c) = Cosigned By      Initials Name Provider Type     Anjana Madison, PTA Physical Therapist Assistant    Matti Villarreal, RN Registered Nurse                    Physical Therapy Education       Title: PT OT SLP Therapies (In Progress)       Topic: Physical Therapy (In Progress)       Point: Mobility training (Done)       Learning Progress Summary             Patient Acceptance, E, CLAY,JOSE D,NR by NAHOMY at 9/27/2024 0800    Comment: benefits of activity, progression of PT                         Point: Home exercise program (Not Started)       Learner Progress:  Not documented in this visit.              Point: Body mechanics (Not Started)       Learner Progress:  Not documented in this visit.              Point: Precautions (Not Started)       Learner Progress:  Not documented in this visit.                              User Key       Initials Effective Dates Name Provider Type Discipline    NAHOMY 02/03/23 -  Abimael Lees, PT DPT Physical Therapist PT                  PT Recommendation and Plan     Plan of Care Reviewed With: patient  Progress: improving  Outcome Evaluation: pt in chair, BLE AROM x 20 reps, sit-stand sba, pt amb in room 30 feet at a time rwx cga-sba,require standing rest due to fatigue, trans to chair sba, pt would benefit from rehab for balance,strength and endurance   Outcome Measures       Row Name 10/02/24 1100 10/01/24 1200 09/30/24 1100       How much help from another person do you currently need...    Turning from your back to your side while in flat  bed without using bedrails? 3  -AH 3  -AH 3  -AH    Moving from lying on back to sitting on the side of a flat bed without bedrails? 3  -AH 3  -AH 3  -AH    Moving to and from a bed to a chair (including a wheelchair)? 3  -AH 3  -AH 3  -AH    Standing up from a chair using your arms (e.g., wheelchair, bedside chair)? 3  -AH 3  -AH 3  -AH    Climbing 3-5 steps with a railing? 2  -AH 2  -AH 2  -AH    To walk in hospital room? 3  -AH 3  -AH 3  -AH    AM-PAC 6 Clicks Score (PT) 17  -AH 17  -AH 17  -AH    Highest Level of Mobility Goal 5 --> Static standing  -AH 5 --> Static standing  -AH 5 --> Static standing  -AH       Functional Assessment    Outcome Measure Options AM-PAC 6 Clicks Basic Mobility (PT)  - AM-PAC 6 Clicks Basic Mobility (PT)  - AM-PAC 6 Clicks Basic Mobility (PT)  -              User Key  (r) = Recorded By, (t) = Taken By, (c) = Cosigned By      Initials Name Provider Type    Anjana Valle PTA Physical Therapist Assistant                     Time Calculation:    PT Charges       Row Name 10/02/24 1101             Time Calculation    Start Time 1027  -      Stop Time 1057  -      Time Calculation (min) 30 min  -      PT Received On 10/02/24  -         Time Calculation- PT    Total Timed Code Minutes- PT 30 minute(s)  -         Timed Charges    49171 - PT Therapeutic Exercise Minutes 10  -AH      57517 - Gait Training Minutes  20  -AH         Total Minutes    Timed Charges Total Minutes 30  -AH       Total Minutes 30  -AH                User Key  (r) = Recorded By, (t) = Taken By, (c) = Cosigned By      Initials Name Provider Type    Anjana Valle PTA Physical Therapist Assistant                  Therapy Charges for Today       Code Description Service Date Service Provider Modifiers Qty    78067199270 HC GAIT TRAINING EA 15 MIN 10/1/2024 Anjana Madison, ANAM GP 2    73495282936 HC PT THER PROC EA 15 MIN 10/2/2024 Anjana Madison, PTA GP 1    98369645213 HC GAIT TRAINING EA 15  MIN 10/2/2024 Anjana Madison, PTA GP 1            PT G-Codes  Outcome Measure Options: AM-PAC 6 Clicks Basic Mobility (PT)  AM-PAC 6 Clicks Score (PT): 17  AM-PAC 6 Clicks Score (OT): 18    Anjana Madison PTA  10/2/2024

## 2024-10-02 NOTE — PLAN OF CARE
Goal Outcome Evaluation:  Plan of Care Reviewed With: patient           Outcome Evaluation: pt up in chair, AOx4; ambulating in room with ast and a walker; c/o pain x1 see MAr for interventions; denies nausea; multiple very small liquid BMs; room air; IVF; safety maintained

## 2024-10-02 NOTE — CASE MANAGEMENT/SOCIAL WORK
Continued Stay Note   [place]     Patient Name: Oral Dey  MRN: [id number]  Today's Date: 10/2/2024    Admit Date: 9/26/2024    Plan: SNF Referrals   Discharge Plan       Row Name 10/02/24 1535       Plan    Plan SNF Referrals    Patient/Family in Agreement with Plan yes    Provided Post Acute Provider List? Yes    Post Acute Provider List Nursing Home    Provided Post Acute Provider Quality & Resource List? Yes    Post Acute Provider Quality and Resource List Nursing Home    Delivered To Patient    Plan Comments Pt is wanting to go to a snf at d/c. He is requesting referrals to [facility]. Referrals being sent.                   Discharge Codes    No documentation.                       TAYLOR Mcarthur

## 2024-10-02 NOTE — PLAN OF CARE
Problem: Fall Injury Risk  Goal: Absence of Fall and Fall-Related Injury  Outcome: Progressing  Intervention: Promote Injury-Free Environment  Recent Flowsheet Documentation  Taken 10/2/2024 0600 by Rachel Moy RN  Safety Promotion/Fall Prevention: safety round/check completed  Taken 10/2/2024 0200 by Rachel Moy RN  Safety Promotion/Fall Prevention: safety round/check completed  Taken 10/2/2024 0012 by Rachel Moy RN  Safety Promotion/Fall Prevention: safety round/check completed  Taken 10/1/2024 2202 by Rachel Moy RN  Safety Promotion/Fall Prevention: safety round/check completed  Taken 10/1/2024 2146 by Rachel Moy RN  Safety Promotion/Fall Prevention: safety round/check completed  Taken 10/1/2024 1932 by Rachel Moy RN  Safety Promotion/Fall Prevention: safety round/check completed     Problem: Adult Inpatient Plan of Care  Goal: Plan of Care Review  Outcome: Progressing  Goal: Patient-Specific Goal (Individualized)  Outcome: Progressing  Goal: Absence of Hospital-Acquired Illness or Injury  Outcome: Progressing  Intervention: Identify and Manage Fall Risk  Recent Flowsheet Documentation  Taken 10/2/2024 0600 by Rachel Moy RN  Safety Promotion/Fall Prevention: safety round/check completed  Taken 10/2/2024 0200 by Rachel Moy RN  Safety Promotion/Fall Prevention: safety round/check completed  Taken 10/2/2024 0012 by Rachel Moy RN  Safety Promotion/Fall Prevention: safety round/check completed  Taken 10/1/2024 2202 by Rachel Moy RN  Safety Promotion/Fall Prevention: safety round/check completed  Taken 10/1/2024 2146 by Rachel Moy RN  Safety Promotion/Fall Prevention: safety round/check completed  Taken 10/1/2024 1932 by Rachel Moy RN  Safety Promotion/Fall Prevention: safety round/check completed  Intervention: Prevent and Manage VTE (Venous Thromboembolism) Risk  Recent Flowsheet Documentation  Taken 10/2/2024 0600 by Rachel Moy  RN  Activity Management: ambulated to bathroom  Taken 10/1/2024 2146 by Rachel Moy, RN  Activity Management:   activity encouraged   ambulated to bathroom   up in chair  Goal: Optimal Comfort and Wellbeing  Outcome: Progressing  Intervention: Provide Person-Centered Care  Recent Flowsheet Documentation  Taken 10/1/2024 2146 by Rachel Moy RN  Trust Relationship/Rapport:   care explained   choices provided  Goal: Readiness for Transition of Care  Outcome: Progressing   Goal Outcome Evaluation:         Covid precautions maintained. Patient coughing up thick white sputum. Up to bathroom multiple times throughout the night. Incisions well approximated, SEGUNDO secure and intact. Safety maintained, no falls or injuries this shift. Patient resting in bed at this time. Denies needs.

## 2024-10-02 NOTE — PLAN OF CARE
Goal Outcome Evaluation:  Plan of Care Reviewed With: patient        Progress: improving  Outcome Evaluation: pt in chair, BLE AROM x 20 reps, sit-stand sba, pt amb in room 30 feet at a time rwx cga-sba,require standing rest due to fatigue, trans to chair sba, pt would benefit from rehab for balance,strength and endurance

## 2024-10-03 ENCOUNTER — TELEPHONE (OUTPATIENT)
Dept: UROLOGY | Age: 84
End: 2024-10-03

## 2024-10-03 PROCEDURE — 94664 DEMO&/EVAL PT USE INHALER: CPT

## 2024-10-03 PROCEDURE — 97535 SELF CARE MNGMENT TRAINING: CPT

## 2024-10-03 PROCEDURE — 97116 GAIT TRAINING THERAPY: CPT

## 2024-10-03 PROCEDURE — 97530 THERAPEUTIC ACTIVITIES: CPT

## 2024-10-03 PROCEDURE — 25010000002 HEPARIN (PORCINE) PER 1000 UNITS

## 2024-10-03 PROCEDURE — 94799 UNLISTED PULMONARY SVC/PX: CPT

## 2024-10-03 PROCEDURE — 97110 THERAPEUTIC EXERCISES: CPT

## 2024-10-03 PROCEDURE — 99024 POSTOP FOLLOW-UP VISIT: CPT | Performed by: STUDENT IN AN ORGANIZED HEALTH CARE EDUCATION/TRAINING PROGRAM

## 2024-10-03 RX ORDER — CLARITHROMYCIN 500 MG
500 TABLET ORAL EVERY 12 HOURS SCHEDULED
Qty: 28 TABLET | Refills: 0 | Status: SHIPPED | OUTPATIENT
Start: 2024-10-03 | End: 2024-10-05

## 2024-10-03 RX ORDER — POLYETHYLENE GLYCOL 3350 17 G/17G
17 POWDER, FOR SOLUTION ORAL DAILY
Qty: 14 PACKET | Refills: 0 | Status: SHIPPED | OUTPATIENT
Start: 2024-10-03 | End: 2024-10-17

## 2024-10-03 RX ORDER — CLARITHROMYCIN 500 MG
500 TABLET ORAL EVERY 12 HOURS SCHEDULED
Status: DISCONTINUED | OUTPATIENT
Start: 2024-10-03 | End: 2024-10-05 | Stop reason: HOSPADM

## 2024-10-03 RX ORDER — ONDANSETRON 4 MG/1
4 TABLET, ORALLY DISINTEGRATING ORAL EVERY 6 HOURS PRN
Qty: 20 TABLET | Refills: 0 | Status: SHIPPED | OUTPATIENT
Start: 2024-10-03

## 2024-10-03 RX ORDER — DOCUSATE SODIUM 100 MG/1
100 CAPSULE, LIQUID FILLED ORAL 2 TIMES DAILY
Qty: 60 CAPSULE | Refills: 0 | Status: SHIPPED | OUTPATIENT
Start: 2024-10-03 | End: 2024-11-02

## 2024-10-03 RX ORDER — METRONIDAZOLE 500 MG/1
500 TABLET ORAL EVERY 8 HOURS SCHEDULED
Status: DISCONTINUED | OUTPATIENT
Start: 2024-10-03 | End: 2024-10-05 | Stop reason: HOSPADM

## 2024-10-03 RX ORDER — PANTOPRAZOLE SODIUM 40 MG/1
40 TABLET, DELAYED RELEASE ORAL 2 TIMES DAILY
Qty: 60 TABLET | Refills: 2 | Status: SHIPPED | OUTPATIENT
Start: 2024-10-03 | End: 2025-01-01

## 2024-10-03 RX ORDER — METRONIDAZOLE 500 MG/1
500 TABLET ORAL EVERY 8 HOURS SCHEDULED
Qty: 42 TABLET | Refills: 0 | Status: SHIPPED | OUTPATIENT
Start: 2024-10-03 | End: 2024-10-05

## 2024-10-03 RX ORDER — ALBUTEROL SULFATE 90 UG/1
2 INHALANT RESPIRATORY (INHALATION)
Qty: 8 G | Refills: 2 | Status: SHIPPED | OUTPATIENT
Start: 2024-10-04

## 2024-10-03 RX ORDER — OXYCODONE HYDROCHLORIDE 5 MG/1
5 TABLET ORAL EVERY 6 HOURS PRN
Qty: 10 TABLET | Refills: 0 | Status: SHIPPED | OUTPATIENT
Start: 2024-10-03 | End: 2024-10-05 | Stop reason: HOSPADM

## 2024-10-03 RX ORDER — SUCRALFATE ORAL 1 G/10ML
1 SUSPENSION ORAL 4 TIMES DAILY
Qty: 420 ML | Refills: 1 | Status: SHIPPED | OUTPATIENT
Start: 2024-10-03 | End: 2025-10-03

## 2024-10-03 RX ADMIN — METRONIDAZOLE 500 MG: 500 TABLET ORAL at 22:06

## 2024-10-03 RX ADMIN — IPRATROPIUM BROMIDE 2 PUFF: 17 AEROSOL, METERED RESPIRATORY (INHALATION) at 06:14

## 2024-10-03 RX ADMIN — ALBUTEROL SULFATE 2 PUFF: 108 INHALANT RESPIRATORY (INHALATION) at 06:14

## 2024-10-03 RX ADMIN — CLARITHROMYCIN 500 MG: 500 TABLET ORAL at 22:19

## 2024-10-03 RX ADMIN — ALBUTEROL SULFATE 2 PUFF: 108 INHALANT RESPIRATORY (INHALATION) at 14:21

## 2024-10-03 RX ADMIN — PANTOPRAZOLE SODIUM 40 MG: 40 INJECTION, POWDER, FOR SOLUTION INTRAVENOUS at 08:32

## 2024-10-03 RX ADMIN — Medication 10 ML: at 22:08

## 2024-10-03 RX ADMIN — IPRATROPIUM BROMIDE 2 PUFF: 17 AEROSOL, METERED RESPIRATORY (INHALATION) at 14:21

## 2024-10-03 RX ADMIN — HEPARIN SODIUM 5000 UNITS: 5000 INJECTION, SOLUTION INTRAVENOUS; SUBCUTANEOUS at 22:05

## 2024-10-03 RX ADMIN — HEPARIN SODIUM 5000 UNITS: 5000 INJECTION, SOLUTION INTRAVENOUS; SUBCUTANEOUS at 16:07

## 2024-10-03 RX ADMIN — LIDOCAINE 2 PATCH: 4 PATCH TOPICAL at 08:32

## 2024-10-03 RX ADMIN — ALBUTEROL SULFATE 2 PUFF: 108 INHALANT RESPIRATORY (INHALATION) at 10:28

## 2024-10-03 RX ADMIN — METOPROLOL TARTRATE 5 MG: 5 INJECTION INTRAVENOUS at 12:50

## 2024-10-03 RX ADMIN — IPRATROPIUM BROMIDE 2 PUFF: 17 AEROSOL, METERED RESPIRATORY (INHALATION) at 18:49

## 2024-10-03 RX ADMIN — DOCUSATE SODIUM 50 MG AND SENNOSIDES 8.6 MG 2 TABLET: 8.6; 5 TABLET, FILM COATED ORAL at 22:06

## 2024-10-03 RX ADMIN — ALBUTEROL SULFATE 2 PUFF: 108 INHALANT RESPIRATORY (INHALATION) at 18:49

## 2024-10-03 RX ADMIN — METOPROLOL TARTRATE 5 MG: 5 INJECTION INTRAVENOUS at 05:30

## 2024-10-03 RX ADMIN — PANTOPRAZOLE SODIUM 40 MG: 40 INJECTION, POWDER, FOR SOLUTION INTRAVENOUS at 22:05

## 2024-10-03 RX ADMIN — METOPROLOL TARTRATE 5 MG: 5 INJECTION INTRAVENOUS at 23:21

## 2024-10-03 RX ADMIN — HEPARIN SODIUM 5000 UNITS: 5000 INJECTION, SOLUTION INTRAVENOUS; SUBCUTANEOUS at 05:26

## 2024-10-03 RX ADMIN — METOPROLOL TARTRATE 5 MG: 5 INJECTION INTRAVENOUS at 18:14

## 2024-10-03 RX ADMIN — METOPROLOL TARTRATE 5 MG: 5 INJECTION INTRAVENOUS at 01:41

## 2024-10-03 RX ADMIN — IPRATROPIUM BROMIDE 2 PUFF: 17 AEROSOL, METERED RESPIRATORY (INHALATION) at 10:27

## 2024-10-03 RX ADMIN — LATANOPROST 1 DROP: 50 SOLUTION OPHTHALMIC at 22:19

## 2024-10-03 NOTE — PLAN OF CARE
Goal Outcome Evaluation:  Plan of Care Reviewed With: patient        Progress: improving  Outcome Evaluation: OT tx completed. Pt seated in chair upon therapist arrival; A&Ox4; c/o abdominal pain during coughing episodes. Pt performed all sit<>stand transfers to/from chair and toilet utilizing rwx and/or grab bar with CGA. Pt ambulated from chair<>BR utilizing rwx with CGA. During toileting tasks, Pt performed clothing management in standing with CGA and posterior cliff hygiene in standing with Min A for thoroughness. Pt completed 3 sets, 10 reps of chair push ups/tricep dips in order to increase BUE strength and improve safety and independence with sit<>stand transfers; Pt tolerated well. Cont OT POC. Recommend SNF vs home with assist and HH at discharge pending Pt progress.      Anticipated Discharge Disposition (OT): skilled nursing facility, home with assist, home with home health

## 2024-10-03 NOTE — PLAN OF CARE
Problem: Fall Injury Risk  Goal: Absence of Fall and Fall-Related Injury  Outcome: Progressing  Intervention: Promote Injury-Free Environment  Recent Flowsheet Documentation  Taken 10/3/2024 0400 by Rachel Moy RN  Safety Promotion/Fall Prevention: safety round/check completed  Taken 10/3/2024 0200 by Rachel Moy RN  Safety Promotion/Fall Prevention: safety round/check completed  Taken 10/3/2024 0000 by Rachel Moy RN  Safety Promotion/Fall Prevention: safety round/check completed  Taken 10/2/2024 2200 by Rachel Moy RN  Safety Promotion/Fall Prevention: safety round/check completed  Taken 10/2/2024 2100 by Rachel Moy RN  Safety Promotion/Fall Prevention:   safety round/check completed   nonskid shoes/slippers when out of bed  Taken 10/2/2024 2032 by Rachel Moy RN  Safety Promotion/Fall Prevention: safety round/check completed  Taken 10/2/2024 1900 by Rachel Moy RN  Safety Promotion/Fall Prevention: safety round/check completed   Goal Outcome Evaluation:               Patient walked to bathroom multiple times this shift. Resting in bed at this time. Denies needs. Safety maintained. No falls or injuries this shift.

## 2024-10-03 NOTE — TELEPHONE ENCOUNTER
Patient Wife called to cancel appt Cystoscopy 10/10 due to being in the hospital. Patient will call back later to r/s

## 2024-10-03 NOTE — CASE MANAGEMENT/SOCIAL WORK
Continued Stay Note   [place]     Patient Name: Oral Dey  MRN: [id number]  Today's Date: 10/3/2024    Admit Date: 9/26/2024    Plan: [facility] pending precert   Discharge Plan       Row Name 10/03/24 0830       Plan    Plan [facility] pending precert    Patient/Family in Agreement with Plan yes    Plan Comments [facility] unable to offer due to pt's insurance. [facility] has offered a bed. Informed pt and he has accepted. They are starting precert.                   Discharge Codes    No documentation.                       TAYLOR Mcarthur

## 2024-10-03 NOTE — THERAPY TREATMENT NOTE
Acute Care - Physical Therapy Treatment Note   [place]     Patient Name: Oral Dey  : 1940  MRN: [id number]  Today's Date: 10/3/2024      Visit Dx:     ICD-10-CM ICD-9-CM   1. Pneumoperitoneum  K66.8 568.89   2. Impaired mobility [Z74.09]  Z74.09 799.89     Patient Active Problem List   Diagnosis    Hx of colonic polyp    Family hx of colon cancer    CLL (chronic lymphocytic leukemia)    Hypertension    IgG lambda monoclonal gammopathy    Iron deficiency    Pneumoperitoneum    Perforated gastric ulcer    Cough    Anemia    Atrial fibrillation with rapid ventricular response    COVID-19 virus infection     Past Medical History:   Diagnosis Date    Bladder cancer     CLL (chronic lymphocytic leukemia)     Colon polyp     Gallstone     GERD (gastroesophageal reflux disease)     Glaucoma     Hearing loss     Hypertension     Inguinal hernia     right groin    Macular degeneration, right eye      Past Surgical History:   Procedure Laterality Date    CATARACT EXTRACTION, BILATERAL      COLONOSCOPY  2012    CYSTOSCOPY BLADDER BIOPSY      DIAGNOSTIC LAPAROSCOPY N/A 2024    Procedure: ROBOTIC REPAIR OF PERFORATED GASTRIC ULCER;  Surgeon: Petrona Malone MD;  Location:  [facility] OR;  Service: General;  Laterality: N/A;  WITH REPAIR OF GASTRIC ULCER PERFORATION    EXCISION MASS HEAD/NECK N/A 3/4/2022    Procedure: EXCISION OF MASS ON POSTERIOR NECK;  Surgeon: Rossana Mahajan MD;  Location:  [facility] OR;  Service: General;  Laterality: N/A;    INGUINAL HERNIA REPAIR Left     INGUINAL HERNIA REPAIR Right 2017    Procedure: RIGHT INGUINAL HERNIA REPAIR WITH MESH ;  Surgeon: Rossana Mahajan MD;  Location: [facility] OR;  Service:      PT Assessment (Last 12 Hours)       PT Evaluation and Treatment       Row Name 10/03/24 1547          Physical Therapy Time and Intention    Subjective Information complains of;pain  -MF     Document Type therapy note (daily note)  -     Mode of Treatment  physical therapy  -       Row Name 10/03/24 1547          General Information    Existing Precautions/Restrictions fall  SEGUNDO drain  -       Row Name 10/03/24 1547          Pain    Pretreatment Pain Rating 2/10  -     Posttreatment Pain Rating 2/10  -     Pain Location generalized  -     Pain Location - abdomen  -     Pain Intervention(s) Repositioned;Ambulation/increased activity  -       Row Name 10/03/24 1547          Bed Mobility    Comment, (Bed Mobility) chair  -       Row Name 10/03/24 1547          Sit-Stand Transfer    Sit-Stand [place] (Transfers) contact guard  -       Row Name 10/03/24 1547          Stand-Sit Transfer    Stand-Sit [place] (Transfers) contact guard  -       Row Name 10/03/24 1547          Gait/Stairs (Locomotion)    [place] Level (Gait) contact guard  -     Assistive Device (Gait) walker, front-wheeled  -     Distance in Feet (Gait) 20  x 4  -       Row Name 10/03/24 1547          Motor Skills    Comments, Therapeutic Exercise B LE AROM x 20 reps in sitting  -       Row Name             Wound 09/26/24 2222 abdomen Incision    Wound - Properties Group Placement Date: 09/26/24  - Placement Time: 2222 -HW Location: abdomen  -HW Primary Wound Type: Incision  -HW    Retired Wound - Properties Group Placement Date: 09/26/24  - Placement Time: 2222 -HW Location: abdomen  -HW Primary Wound Type: Incision  -HW    Retired Wound - Properties Group Date first assessed: 09/26/24  - Time first assessed: 2222 -HW Location: abdomen  -HW Primary Wound Type: Incision  -HW      Row Name 10/03/24 1547          Positioning and Restraints    Pre-Treatment Position sitting in chair/recliner  -     Post Treatment Position chair  -     In Chair reclined;call light within reach;encouraged to call for assist;with nsg  -               User Key  (r) = Recorded By, (t) = Taken By, (c) = Cosigned By      Initials Name Provider Type    Germaine Batista PTA  Physical Therapist Assistant    Matti Villarreal, RN Registered Nurse                    Physical Therapy Education       Title: PT OT SLP Therapies (In Progress)       Topic: Physical Therapy (In Progress)       Point: Mobility training (Done)       Learning Progress Summary             Patient Acceptance, E, CLAY,JOSE D,NR by NAHOMY at 9/27/2024 0800    Comment: benefits of activity, progression of PT                         Point: Home exercise program (Not Started)       Learner Progress:  Not documented in this visit.              Point: Body mechanics (Not Started)       Learner Progress:  Not documented in this visit.              Point: Precautions (Not Started)       Learner Progress:  Not documented in this visit.                              User Key       Initials Effective Dates Name Provider Type Discipline    NAHOMY 02/03/23 -  Abimael Lees, PT DPT Physical Therapist PT                  PT Recommendation and Plan         Outcome Measures       Row Name 10/03/24 1547 10/02/24 1100 10/01/24 1200       How much help from another person do you currently need...    Turning from your back to your side while in flat bed without using bedrails? 3  - 3  -AH 3  -AH    Moving from lying on back to sitting on the side of a flat bed without bedrails? 3  - 3  -AH 3  -AH    Moving to and from a bed to a chair (including a wheelchair)? 3  - 3  -AH 3  -AH    Standing up from a chair using your arms (e.g., wheelchair, bedside chair)? 3  - 3  -AH 3  -AH    Climbing 3-5 steps with a railing? 2  -MF 2  -AH 2  -AH    To walk in hospital room? 3  -MF 3  -AH 3  -AH    AM-PAC 6 Clicks Score (PT) 17  -MF 17  -AH 17  -AH    Highest Level of Mobility Goal 5 --> Static standing  - 5 --> Static standing  - 5 --> Static standing  -AH       Functional Assessment    Outcome Measure Options AM-PAC 6 Clicks Basic Mobility (PT)  -MF AM-PAC 6 Clicks Basic Mobility (PT)  -AH AM-PAC 6 Clicks Basic Mobility (PT)  -AH              User  Key  (r) = Recorded By, (t) = Taken By, (c) = Cosigned By      Initials Name Provider Type    Anjana Valle PTA Physical Therapist Assistant    Germaine Batista PTA Physical Therapist Assistant                     Time Calculation:    PT Charges       Row Name 10/03/24 1614             Time Calculation    Start Time 1547  -MF      Stop Time 1612  -MF      Time Calculation (min) 25 min  -MF      PT Received On 10/03/24  -MF         Time Calculation- PT    Total Timed Code Minutes- PT 25 minute(s)  -MF         Timed Charges    96322 - PT Therapeutic Exercise Minutes 12  -MF      13652 - Gait Training Minutes  13  -MF         Total Minutes    Timed Charges Total Minutes 25  -MF       Total Minutes 25  -MF                User Key  (r) = Recorded By, (t) = Taken By, (c) = Cosigned By      Initials Name Provider Type    Germaine Batista PTA Physical Therapist Assistant                  Therapy Charges for Today       Code Description Service Date Service Provider Modifiers Qty    57894713648 HC PT THER PROC EA 15 MIN 10/3/2024 Germaine Morales PTA GP 1    56801850819 HC GAIT TRAINING EA 15 MIN 10/3/2024 Germaine Morales PTA GP 1            PT G-Codes  Outcome Measure Options: AM-PAC 6 Clicks Basic Mobility (PT)  AM-PAC 6 Clicks Score (PT): 17  AM-PAC 6 Clicks Score (OT): 21    Germaine Morales PTA  10/3/2024

## 2024-10-03 NOTE — THERAPY TREATMENT NOTE
Patient Name: Oral Dey  : 1940    MRN: [id number]                              Today's Date: 10/3/2024       Admit Date: 2024    Visit Dx:     ICD-10-CM ICD-9-CM   1. Pneumoperitoneum  K66.8 568.89   2. Impaired mobility [Z74.09]  Z74.09 799.89     Patient Active Problem List   Diagnosis    Hx of colonic polyp    Family hx of colon cancer    CLL (chronic lymphocytic leukemia)    Hypertension    IgG lambda monoclonal gammopathy    Iron deficiency    Pneumoperitoneum    Perforated gastric ulcer    Cough    Anemia    Atrial fibrillation with rapid ventricular response    COVID-19 virus infection     Past Medical History:   Diagnosis Date    Bladder cancer     CLL (chronic lymphocytic leukemia)     Colon polyp     Gallstone     GERD (gastroesophageal reflux disease)     Glaucoma     Hearing loss     Hypertension     Inguinal hernia     right groin    Macular degeneration, right eye      Past Surgical History:   Procedure Laterality Date    CATARACT EXTRACTION, BILATERAL      COLONOSCOPY  2012    CYSTOSCOPY BLADDER BIOPSY      DIAGNOSTIC LAPAROSCOPY N/A 2024    Procedure: ROBOTIC REPAIR OF PERFORATED GASTRIC ULCER;  Surgeon: Petrona Malone MD;  Location:  [facility];  Service: General;  Laterality: N/A;  WITH REPAIR OF GASTRIC ULCER PERFORATION    EXCISION MASS HEAD/NECK N/A 3/4/2022    Procedure: EXCISION OF MASS ON POSTERIOR NECK;  Surgeon: Rossana Mahajan MD;  Location:  [facility];  Service: General;  Laterality: N/A;    INGUINAL HERNIA REPAIR Left     INGUINAL HERNIA REPAIR Right 2017    Procedure: RIGHT INGUINAL HERNIA REPAIR WITH MESH ;  Surgeon: Rossana Mahajan MD;  Location:  [facility];  Service:       General Information       Row Name 10/03/24 1458          OT Time and Intention    Document Type therapy note (daily note)  -LS     Mode of Treatment occupational therapy  -LS       Row Name 10/03/24 1458          General Information    Patient Profile Reviewed yes   -     Existing Precautions/Restrictions fall;other (see comments)  SEGUNDO drain  -       Row Name 10/03/24 1458          Cognition    Orientation Status (Cognition) oriented x 4  -       Row Name 10/03/24 1458          Safety Issues, Functional Mobility    Safety Issues Affecting Function (Mobility) friction/shear risk  -LS     Impairments Affecting Function (Mobility) balance;endurance/activity tolerance;strength;shortness of breath;pain  -LS               User Key  (r) = Recorded By, (t) = Taken By, (c) = Cosigned By      Initials Name Provider Type    Page Marie, OTR/L Occupational Therapist                     Mobility/ADL's       Row Name 10/03/24 1458          Transfers    Transfers sit-stand transfer;stand-sit transfer;toilet transfer  -       Row Name 10/03/24 1458          Sit-Stand Transfer    Sit-Stand [place] (Transfers) contact guard  -       Row Name 10/03/24 1458          Stand-Sit Transfer    Stand-Sit [place] (Transfers) contact guard  -       Row Name 10/03/24 1458          Toilet Transfer    Type (Toilet Transfer) sit-stand;stand-sit  -     Benton Level (Toilet Transfer) contact guard  -     Assistive Device (Toilet Transfer) grab bars/safety frame  -       Row Name 10/03/24 1458          Functional Mobility    Functional Mobility- Ind. Level contact guard assist  -     Functional Mobility- Device walker, front-wheeled  -       Row Name 10/03/24 1458          Activities of Daily Living    BADL Assessment/Intervention toileting  -       Row Name 10/03/24 1458          Toileting Assessment/Training    [place]enton Level (Toileting) toileting skills;adjust/manage clothing;contact guard assist;perform perineal hygiene;minimum assist (75% patient effort)  -     Position (Toileting) unsupported sitting;supported standing  -               User Key  (r) = Recorded By, (t) = Taken By, (c) = Cosigned By      Initials Name Provider Type    Page Marie, ALISSAR/L  Occupational Therapist                   Obj/Interventions       Row Name 10/03/24 1458          Motor Skills    Therapeutic Exercise other (see comments)  3 sets, 10 reps chair push ups/tricep dips  -LS               User Key  (r) = Recorded By, (t) = Taken By, (c) = Cosigned By      Initials Name Provider Type    Page Marie, OTR/L Occupational Therapist                   Goals/Plan    No documentation.                  Clinical Impression       Row Name 10/03/24 1458          Plan of Care Review    Plan of Care Reviewed With patient  -LS     Progress improving  -LS     Outcome Evaluation OT tx completed. Pt seated in chair upon therapist arrival; A&Ox4; c/o abdominal pain during coughing episodes. Pt performed all sit<>stand transfers to/from chair and toilet utilizing rwx and/or grab bar with CGA. Pt ambulated from chair<>BR utilizing rwx with CGA. During toileting tasks, Pt performed clothing management in standing with CGA and posterior cliff hygiene in standing with Min A for thoroughness. Pt completed 3 sets, 10 reps of chair push ups/tricep dips in order to increase BUE strength and improve safety and independence with sit<>stand transfers; Pt tolerated well. Cont OT POC. Recommend SNF vs home with assist and HH at discharge pending Pt progress.  -       Row Name 10/03/24 145          Therapy Plan Review/Discharge Plan (OT)    Anticipated Discharge Disposition (OT) skilled nursing facility;home with assist;home with home health  -       Row Name 10/03/24 1451          Positioning and Restraints    Pre-Treatment Position sitting in chair/recliner  -LS     Post Treatment Position chair  -LS     In Chair reclined;call light within reach;encouraged to call for assist;legs elevated  -LS               User Key  (r) = Recorded By, (t) = Taken By, (c) = Cosigned By      Initials Name Provider Type    Page Marie, OTR/L Occupational Therapist                   Outcome Measures       Row Name 10/03/24  1458          How much help from another is currently needed...    Putting on and taking off regular lower body clothing? 3  -LS     Bathing (including washing, rinsing, and drying) 3  -LS     Toileting (which includes using toilet bed pan or urinal) 3  -LS     Putting on and taking off regular upper body clothing 4  -LS     Taking care of personal grooming (such as brushing teeth) 4  -LS     Eating meals 4  -LS     AM-PAC 6 Clicks Score (OT) 21  -LS       Row Name 10/03/24 0900          How much help from another person do you currently need...    Turning from your back to your side while in flat bed without using bedrails? 3  -KF     Moving from lying on back to sitting on the side of a flat bed without bedrails? 3  -KF     Moving to and from a bed to a chair (including a wheelchair)? 3  -KF     Standing up from a chair using your arms (e.g., wheelchair, bedside chair)? 3  -KF     Climbing 3-5 steps with a railing? 2  -KF     To walk in hospital room? 3  -KF     AM-PAC 6 Clicks Score (PT) 17  -KF     Highest Level of Mobility Goal 5 --> Static standing  -KF       Row Name 10/03/24 1458          Functional Assessment    Outcome Measure Options AM-PAC 6 Clicks Daily Activity (OT)  -               User Key  (r) = Recorded By, (t) = Taken By, (c) = Cosigned By      Initials Name Provider Type    Page Marie OTR/L Occupational Therapist    Sivakumar Maya RN Registered Nurse                    Occupational Therapy Education       Title: PT OT SLP Therapies (In Progress)       Topic: Occupational Therapy (In Progress)       Point: ADL training (Done)       Description:   Instruct learner(s) on proper safety adaptation and remediation techniques during self care or transfers.   Instruct in proper use of assistive devices.                  Learning Progress Summary             Patient Acceptance, E, VU by DOMI at 10/3/2024 1505    Acceptance, E,D, VU by  at 9/30/2024 0925    Acceptance, E, VU by  at  9/27/2024 0928                         Point: Home exercise program (Not Started)       Description:   Instruct learner(s) on appropriate technique for monitoring, assisting and/or progressing therapeutic exercises/activities.                  Learner Progress:  Not documented in this visit.              Point: Precautions (Done)       Description:   Instruct learner(s) on prescribed precautions during self-care and functional transfers.                  Learning Progress Summary             Patient Acceptance, E, VU by  at 10/3/2024 1505    Acceptance, E,D, VU by  at 9/30/2024 0925    Acceptance, E, VU by  at 9/27/2024 0928                         Point: Body mechanics (Done)       Description:   Instruct learner(s) on proper positioning and spine alignment during self-care, functional mobility activities and/or exercises.                  Learning Progress Summary             Patient Acceptance, E, VU by  at 10/3/2024 1505    Acceptance, E,D, VU by  at 9/30/2024 0925    Acceptance, E, VU by  at 9/27/2024 0928                                         User Key       Initials Effective Dates Name Provider Type Discipline     07/11/23 -  Marlene De La O, OTR/L Occupational Therapist OT     06/20/22 -  Page Mayer OTR/L Occupational Therapist OT                  OT Recommendation and Plan  Planned Therapy Interventions (OT): transfer/mobility retraining, strengthening exercise, patient/caregiver education/training, occupation/activity based interventions, functional balance retraining, activity tolerance training, BADL retraining, ROM/therapeutic exercise, adaptive equipment training  Therapy Frequency (OT): 5 times/wk  Plan of Care Review  Plan of Care Reviewed With: patient  Progress: improving  Outcome Evaluation: OT tx completed. Pt seated in chair upon therapist arrival; A&Ox4; c/o abdominal pain during coughing episodes. Pt performed all sit<>stand transfers to/from chair and toilet utilizing rwx  and/or grab bar with CGA. Pt ambulated from chair<>BR utilizing rwx with CGA. During toileting tasks, Pt performed clothing management in standing with CGA and posterior cliff hygiene in standing with Min A for thoroughness. Pt completed 3 sets, 10 reps of chair push ups/tricep dips in order to increase BUE strength and improve safety and independence with sit<>stand transfers; Pt tolerated well. Cont OT POC. Recommend SNF vs home with assist and HH at discharge pending Pt progress.     Time Calculation:         Time Calculation- OT       Row Name 10/03/24 1458             Time Calculation- OT    OT Start Time 1445  -LS      OT Stop Time 1509  -LS      OT Time Calculation (min) 24 min  -      Total Timed Code Minutes- OT 24 minute(s)  -LS      OT Received On 10/03/24  -                User Key  (r) = Recorded By, (t) = Taken By, (c) = Cosigned By      Initials Name Provider Type    LS Page Mayer OTR/L Occupational Therapist                  Therapy Charges for Today       Code Description Service Date Service Provider Modifiers Qty    46997850162 HC OT SELF CARE/MGMT/TRAIN EA 15 MIN 10/3/2024 Page Mayer OTR/L GO 1    81079903931 HC OT THERAPEUTIC ACT EA 15 MIN 10/3/2024 Page Mayer OTR/L GO 1                 Page Mayer OTR/L  10/3/2024

## 2024-10-03 NOTE — PROGRESS NOTES
Petrona Malone MD - General Surgery  Progress Note     LOS: 7 days   Patient Care Team:  Jorge Shepherd MD as PCP - General (Internal Medicine)      Subjective     Interval History:     POD 7 s/p robotic repair of perforated gastric ulcer. Doing well. Pain controlled. Denies nausea. Tolerating fulls    Objective     Vital Signs  Temp:  [97.4 °F (36.3 °C)-98.6 °F (37 °C)] 98.6 °F (37 °C)  Heart Rate:  [64-90] 81  Resp:  [16] 16  BP: ()/(47-68) 142/68    Physical Exam:  General appearance - alert, well appearing, and in no distress  Mental status - alert, oriented to person, place, and time  Eyes - pupils equal and reactive, extraocular eye movements intact  Neck - supple, no significant adenopathy  Chest - no tachypnea, retractions or cyanosis  Heart - normal rate and regular rhythm  Abdomen - soft, non-distended, appropriately tender. Incisions c/d/I. SEGUNDO drain output serosanguinous   Neurological - alert, oriented, normal speech, no focal findings or movement disorder noted      Results Review:    Lab Results (last 24 hours)       ** No results found for the last 24 hours. **          Imaging Results (Last 24 Hours)       ** No results found for the last 24 hours. **              Assessment & Plan     Perforated gastric ulcer s/p robotic repair and omental patch on 9/26/24     PT and OT consults. OOB ambulation. WBC count normalized. Rocephin/Flagyl/Diflucan x 4 days post op - completed. UGI with no leak, tolerating fulls. Will stay on fulls x one week post op then advance to soft diet. PT and OT recommending SNF; precert pending. H Pylori treatment. Outpatient GI follow up.       Petrona Malone MD  10/03/24  15:33 CDT

## 2024-10-03 NOTE — PLAN OF CARE
Goal Outcome Evaluation:  Plan of Care Reviewed With: patient        Progress: improving  Outcome Evaluation: LOS nutrition assessment. Pt with perforated gastric ulcer s/p robotic repair with omental patch on 9/26. He was started on clear liquids 10/1 and then advanced to full liquids 10/2. He is tolerating diet upgrade. He did consume 75% of breakfast this morning. Supplements were changed from Boost Breeze while on clears to Boost Plus with all meals. Weight is up s/p surgery, could be fluid related. No sig weight changes prior to surgery noted. Pt has a LLQ bulb drain and abdominal incision. No other SBD noted. He has had multiple, small liquid BM's since yesterday. He has pending SNF referrals. He is in COVID isolation at present. Cont to follow and encourage intake as tolerated.

## 2024-10-03 NOTE — PLAN OF CARE
Goal Outcome Evaluation:                   Patient alert and oriented x4. Ambulating in room with standby assistance and walker. Moderate pain being controlled with medication. Multiple soft bowel movements today with some urgency. No needs at this time. Will continue to assess.

## 2024-10-03 NOTE — PROGRESS NOTES
[facility]  INPATIENT PROGRESS NOTE    Patient Name: Oral Dey  Date of Admission: 9/26/2024  Today's Date: 10/03/24  Length of Stay: 7  Primary Care Physician: Jorge Shepherd MD    Subjective   Chief Complaint: follow-up gastric ulcer   HPI   10/1 Resting comfortably no distress or new complaints today. Feeling better after removal of NGT.     10/2  Seen at bedside.  Complains of occasional dry cough.  Overall feeling better.  Advancing diet to full liquids.    Today:  Sitting up resting comfortably, no distress.  Cough is now dry and continues to improve.  Vitals are stable.  Tolerating full liquid diet.  Has moved bowels once.    Review of Systems   All pertinent negatives and positives are as above. All other systems have been reviewed and are negative unless otherwise stated.     Objective    Temp:  [97.4 °F (36.3 °C)-98.5 °F (36.9 °C)] 98.5 °F (36.9 °C)  Heart Rate:  [64-90] 69  Resp:  [16] 16  BP: ()/(47-65) 155/55  Physical Exam  Constitutional:       General: He is not in acute distress.     Comments: Sitting up in bedside chair   HENT:      Head: Normocephalic.      Nose:      Mouth/Throat:      Mouth: Mucous membranes are moist.   Cardiovascular:      Rate and Rhythm: Normal rate and regular rhythm.      Comments: Rate controlled.  Pulmonary:      Effort: Pulmonary effort is normal. No respiratory distress.  No rales.     Comments: Diminished at bases; on room air.  Good air entry bilaterally.  Abdominal:      Comments: bowel sounds hypoactive.  SEGUNDO drain in place with sterile fluid.  Musculoskeletal:      Right lower leg: No edema.      Left lower leg: No edema.   Skin:     General: Skin is warm.   Neurological:      General: No focal deficit present.      Mental Status: He is alert.   Psychiatric:         Mood and Affect: Mood normal.       Results Review:  I have reviewed the labs, radiology results, and diagnostic  studies.    Laboratory Data:   Results from last 7 days   Lab Units 10/02/24  0422 10/01/24  0443 09/30/24  0634   WBC 10*3/mm3 10.64 9.47 10.76   HEMOGLOBIN g/dL 9.9* 9.8* 9.6*   HEMATOCRIT % 29.8* 29.8* 30.1*   PLATELETS 10*3/mm3 213 198 198        Results from last 7 days   Lab Units 10/02/24  0422 10/01/24  0443 09/30/24  0634 09/28/24  0602 09/27/24  0214 09/26/24  1858   SODIUM mmol/L 134* 133* 132*   < > 134* 134*   POTASSIUM mmol/L 3.7 4.0 4.1   < > 4.5 4.4   CHLORIDE mmol/L 98 98 98   < > 100 96*   CO2 mmol/L 26.0 24.0 25.0   < > 22.0 25.0   BUN mg/dL 17 19 22   < > 19 21   CREATININE mg/dL 0.84 0.78 0.94   < > 1.03 1.13   CALCIUM mg/dL 7.9* 8.0* 7.9*   < > 8.0* 9.0   BILIRUBIN mg/dL  --   --   --   --  0.8 0.8   ALK PHOS U/L  --   --   --   --  98 123*   ALT (SGPT) U/L  --   --   --   --  12 12   AST (SGOT) U/L  --   --   --   --  16 13   GLUCOSE mg/dL 103* 97 103*   < > 135* 151*    < > = values in this interval not displayed.       Culture Data:   Blood Culture   Date Value Ref Range Status   09/27/2024 No growth at 4 days  Preliminary   09/27/2024 No growth at 4 days  Preliminary       Radiology Data:   Imaging Results (Last 24 Hours)       ** No results found for the last 24 hours. **            I have reviewed the patient's current medications.     Echocardiogram 9/29/2024    Left ventricular systolic function is normal. Left ventricular ejection fraction appears to be 61 - 65%.    Left ventricular diastolic function was normal.    There is mild calcification of the aortic valve.    Calcified mitral valve chordae are present.    Estimated right ventricular systolic pressure from tricuspid regurgitation is normal (<35 mmHg).    No hemodynamically significant (greater than mild) valvular abnormalities are noted.    Assessment/Plan   Assessment  Active Hospital Problems    Diagnosis     **Pneumoperitoneum     Atrial fibrillation with rapid ventricular response     COVID-19 virus infection     Cough      Anemia     Perforated gastric ulcer     Hypertension        Treatment Plan  Perforated gastric ulcer with pneumoperitoneum  Postop day 5 robotic assisted repair by Dr. Malone  Vitals every 4 hours  NG tube removed  Diet full liquid  IV fluids lactated Ringer's at 100 cc an hour discontinue now to saline lock  Pain control  Rocephin/Flagyl/Diflucan 4 days postop completed  Upper GI series noted  Pain control  Increase activity  Ambulate  Bowel hygiene protocol  Electrolyte replacement protocol  Continue to monitor anemia present before surgery    COVID-19 virus infection  Minimal symptomatic  Patient remains on room air  Symptom onset probably 7 to 14 days ago    Atrial fibrillation RVR, now rate controlled  In the context of COVID and a bowel perforation could be considered lone A-fib  Lopressor 5 mg IV every 6 hours  Echocardiogram noted  Anticoagulation contraindicated due to bowel perforation status and postop  Continue heparin or Lovenox prophylactic doses    DVT prophylaxis heparin 5000 units every 8 hours subcutaneous     Medical Decision Making  Number and Complexity of problems: 3 acute; moderate complexity        Conditions and Status        Condition is improving     [facility] Data  External documents reviewed: none  Cardiac tracing (EKG, telemetry) interpretation: on telemetry this morning patient appears to be in sinus rhythm    Radiology interpretation: no new radiology studies  Labs reviewed: as above  Any tests that were considered but not ordered: none     Decision rules/scores evaluated (example [id number], Wells, etc): none     Discussed with: patient and bedside nurse.    Care Planning  Shared decision making: Discussed with patient with agreement to proceed with treatment plan as outlined  Code status and discussions: Full code     Disposition  Social Determinants of Health that impact treatment or disposition: None apparent at this time  I expect the patient to be discharged per primary service,  agree with plan for SNF subacute rehabilitation.  Assisting with case management for placement.    Electronically signed by Hipolito Robbins MD, 10/03/24, 09:43 CDT.

## 2024-10-03 NOTE — PROGRESS NOTES
Petrona Malone MD - General Surgery  Progress Note     LOS: 7 days   Patient Care Team:  Jorge Shepherd MD as PCP - General (Internal Medicine)      Subjective     Interval History:     POD 6 s/p robotic repair of perforated gastric ulcer. Doing well. Pain controlled. Denies nausea. Tolerating clears.     Objective     Vital Signs  Temp:  [97.4 °F (36.3 °C)-98.2 °F (36.8 °C)] 97.4 °F (36.3 °C)  Heart Rate:  [64-90] 73  Resp:  [16] 16  BP: ()/(47-71) 152/62    Physical Exam:  General appearance - alert, well appearing, and in no distress  Mental status - alert, oriented to person, place, and time  Eyes - pupils equal and reactive, extraocular eye movements intact  Neck - supple, no significant adenopathy  Chest - no tachypnea, retractions or cyanosis, NGT in place   Heart - normal rate and regular rhythm  Abdomen - soft, non-distended, appropriately tender. Incisions c/d/I. SEGUNDO drain output serosanguinous   Neurological - alert, oriented, normal speech, no focal findings or movement disorder noted      Results Review:    Lab Results (last 24 hours)       ** No results found for the last 24 hours. **          Imaging Results (Last 24 Hours)       ** No results found for the last 24 hours. **              Assessment & Plan     Perforated gastric ulcer s/p robotic repair and omental patch on 9/26/24     PT and OT consults. OOB ambulation. WBC count normalized. Rocephin/Flagyl/Diflucan x 4 days post op - completed. UGI with no leak, tolerating clears - advance to full liquids. Anticipate discharge as soon as he is set up for SNF. Case management consult placed.       Petrona Malone MD  10/03/24  08:03 CDT

## 2024-10-04 LAB — GLUCOSE BLDC GLUCOMTR-MCNC: 97 MG/DL (ref 70–130)

## 2024-10-04 PROCEDURE — 82948 REAGENT STRIP/BLOOD GLUCOSE: CPT

## 2024-10-04 PROCEDURE — 94761 N-INVAS EAR/PLS OXIMETRY MLT: CPT

## 2024-10-04 PROCEDURE — 94664 DEMO&/EVAL PT USE INHALER: CPT

## 2024-10-04 PROCEDURE — 94799 UNLISTED PULMONARY SVC/PX: CPT

## 2024-10-04 PROCEDURE — 87338 HPYLORI STOOL AG IA: CPT | Performed by: STUDENT IN AN ORGANIZED HEALTH CARE EDUCATION/TRAINING PROGRAM

## 2024-10-04 PROCEDURE — 97535 SELF CARE MNGMENT TRAINING: CPT

## 2024-10-04 PROCEDURE — 97116 GAIT TRAINING THERAPY: CPT

## 2024-10-04 PROCEDURE — 25010000002 HEPARIN (PORCINE) PER 1000 UNITS

## 2024-10-04 RX ADMIN — METRONIDAZOLE 500 MG: 500 TABLET ORAL at 15:36

## 2024-10-04 RX ADMIN — IPRATROPIUM BROMIDE 2 PUFF: 17 AEROSOL, METERED RESPIRATORY (INHALATION) at 21:49

## 2024-10-04 RX ADMIN — IPRATROPIUM BROMIDE 2 PUFF: 17 AEROSOL, METERED RESPIRATORY (INHALATION) at 14:37

## 2024-10-04 RX ADMIN — HEPARIN SODIUM 5000 UNITS: 5000 INJECTION, SOLUTION INTRAVENOUS; SUBCUTANEOUS at 06:04

## 2024-10-04 RX ADMIN — METRONIDAZOLE 500 MG: 500 TABLET ORAL at 06:04

## 2024-10-04 RX ADMIN — LIDOCAINE 2 PATCH: 4 PATCH TOPICAL at 09:59

## 2024-10-04 RX ADMIN — METRONIDAZOLE 500 MG: 500 TABLET ORAL at 21:57

## 2024-10-04 RX ADMIN — LATANOPROST 1 DROP: 50 SOLUTION OPHTHALMIC at 21:58

## 2024-10-04 RX ADMIN — ALBUTEROL SULFATE 2 PUFF: 108 INHALANT RESPIRATORY (INHALATION) at 06:42

## 2024-10-04 RX ADMIN — Medication 10 ML: at 21:58

## 2024-10-04 RX ADMIN — METOPROLOL TARTRATE 5 MG: 5 INJECTION INTRAVENOUS at 06:04

## 2024-10-04 RX ADMIN — HEPARIN SODIUM 5000 UNITS: 5000 INJECTION, SOLUTION INTRAVENOUS; SUBCUTANEOUS at 15:33

## 2024-10-04 RX ADMIN — IPRATROPIUM BROMIDE 2 PUFF: 17 AEROSOL, METERED RESPIRATORY (INHALATION) at 10:40

## 2024-10-04 RX ADMIN — HEPARIN SODIUM 5000 UNITS: 5000 INJECTION, SOLUTION INTRAVENOUS; SUBCUTANEOUS at 21:57

## 2024-10-04 RX ADMIN — ALBUTEROL SULFATE 2 PUFF: 108 INHALANT RESPIRATORY (INHALATION) at 10:40

## 2024-10-04 RX ADMIN — Medication 10 ML: at 10:00

## 2024-10-04 RX ADMIN — METOPROLOL TARTRATE 5 MG: 5 INJECTION INTRAVENOUS at 17:40

## 2024-10-04 RX ADMIN — METOPROLOL TARTRATE 5 MG: 5 INJECTION INTRAVENOUS at 13:45

## 2024-10-04 RX ADMIN — IPRATROPIUM BROMIDE 2 PUFF: 17 AEROSOL, METERED RESPIRATORY (INHALATION) at 06:43

## 2024-10-04 RX ADMIN — CLARITHROMYCIN 500 MG: 500 TABLET ORAL at 09:59

## 2024-10-04 RX ADMIN — PANTOPRAZOLE SODIUM 40 MG: 40 INJECTION, POWDER, FOR SOLUTION INTRAVENOUS at 09:59

## 2024-10-04 RX ADMIN — PANTOPRAZOLE SODIUM 40 MG: 40 INJECTION, POWDER, FOR SOLUTION INTRAVENOUS at 21:57

## 2024-10-04 RX ADMIN — ALBUTEROL SULFATE 2 PUFF: 108 INHALANT RESPIRATORY (INHALATION) at 21:49

## 2024-10-04 RX ADMIN — CLARITHROMYCIN 500 MG: 500 TABLET ORAL at 21:57

## 2024-10-04 RX ADMIN — ALBUTEROL SULFATE 2 PUFF: 108 INHALANT RESPIRATORY (INHALATION) at 14:37

## 2024-10-04 NOTE — PLAN OF CARE
Goal Outcome Evaluation:           Progress: improving                                  Stool sample sent

## 2024-10-04 NOTE — DISCHARGE SUMMARY
Consults       No orders found from 8/28/2024 to 9/27/2024.         Petrona Malone MD - Discharge Summary    Date of Discharge:  10/3/2024    Discharge Diagnosis: ***    Presenting Problem/History of Present Illness  Pneumoperitoneum [K66.8]  Perforated gastric ulcer [K25.5]     Hospital Course  Patient is a 83 y.o. male presented with ***.      Procedures Performed  Procedure(s):  ROBOTIC REPAIR OF PERFORATED GASTRIC ULCER       Consults:   Consults       No orders found from 8/28/2024 to 9/27/2024.            Condition on Discharge:  ***    Vital Signs  Temp:  [97.4 °F (36.3 °C)-98.6 °F (37 °C)] 98.3 °F (36.8 °C)  Heart Rate:  [69-85] 76  Resp:  [16-18] 18  BP: (141-166)/(47-79) 141/61    Physical Exam:   See History and Physical found in chart.    Discharge Disposition  Home or Self Care    Discharge Medications     Discharge Medications        New Medications        Instructions Start Date   albuterol sulfate  (90 Base) MCG/ACT inhaler  Commonly known as: PROVENTIL HFA;VENTOLIN HFA;PROAIR HFA   2 puffs, Inhalation, 4 Times Daily - RT   Start Date: October 4, 2024     clarithromycin 500 MG tablet  Commonly known as: BIAXIN   500 mg, Oral, Every 12 Hours Scheduled      docusate sodium 100 MG capsule  Commonly known as: Colace   100 mg, Oral, 2 Times Daily      metroNIDAZOLE 500 MG tablet  Commonly known as: FLAGYL   500 mg, Oral, Every 8 Hours Scheduled      ondansetron ODT 4 MG disintegrating tablet  Commonly known as: ZOFRAN-ODT   4 mg, Translingual, Every 6 Hours PRN      oxyCODONE 5 MG immediate release tablet  Commonly known as: ROXICODONE   5 mg, Oral, Every 6 Hours PRN      pantoprazole 40 MG EC tablet  Commonly known as: Protonix   40 mg, Oral, 2 Times Daily      polyethylene glycol 17 g packet  Commonly known as: MIRALAX   17 g, Oral, Daily      sucralfate 1 GM/10ML suspension  Commonly known as: Carafate   1 g, Oral, 4 Times Daily             Continue These Medications        Instructions  Start Date   latanoprost 0.005 % ophthalmic solution  Commonly known as: XALATAN   1 drop, Right Eye, Nightly      metoprolol tartrate 25 MG tablet  Commonly known as: LOPRESSOR   25 mg, Oral, 2 Times Daily      multivitamins-minerals capsule capsule   1 capsule, Oral, 2 Times Daily      tamsulosin 0.4 MG capsule 24 hr capsule  Commonly known as: FLOMAX   1 capsule, Oral, Daily      vitamin B-12 1000 MCG tablet  Commonly known as: CYANOCOBALAMIN   1,000 mcg, Oral, Daily      vitamin D3 125 MCG (5000 UT) capsule capsule   5,000 Units, Oral, Daily               Discharge Diet:     Activity at Discharge:     Follow-up Appointments  Future Appointments   Date Time Provider Department Center   3/6/2025 11:30 AM  [facility]   3/13/2025 11:30 AM Jennifer Cooper APRN [facility]         Test Results Pending at Discharge       Petrona Malone MD  10/03/24  22:08 CDT

## 2024-10-04 NOTE — THERAPY TREATMENT NOTE
Acute Care - Occupational Therapy Treatment Note  [facility]     Patient Name: Oral Dey  : 1940  MRN: [id number]  Today's Date: 10/4/2024             Admit Date: 2024       ICD-10-CM ICD-9-CM   1. Pneumoperitoneum  K66.8 568.89   2. Impaired mobility [Z74.09]  Z74.09 799.89   3. Acute gastric ulcer with perforation  K25.1 531.10     Patient Active Problem List   Diagnosis    Hx of colonic polyp    Family hx of colon cancer    CLL (chronic lymphocytic leukemia)    Hypertension    IgG lambda monoclonal gammopathy    Iron deficiency    Pneumoperitoneum    Perforated gastric ulcer    Cough    Anemia    Atrial fibrillation with rapid ventricular response    COVID-19 virus infection     Past Medical History:   Diagnosis Date    Bladder cancer     CLL (chronic lymphocytic leukemia)     Colon polyp     Gallstone     GERD (gastroesophageal reflux disease)     Glaucoma     Hearing loss     Hypertension     Inguinal hernia     right groin    Macular degeneration, right eye      Past Surgical History:   Procedure Laterality Date    CATARACT EXTRACTION, BILATERAL      COLONOSCOPY  2012    CYSTOSCOPY BLADDER BIOPSY      DIAGNOSTIC LAPAROSCOPY N/A 2024    Procedure: ROBOTIC REPAIR OF PERFORATED GASTRIC ULCER;  Surgeon: Petrona Malone MD;  Location: [facility];  Service: General;  Laterality: N/A;  WITH REPAIR OF GASTRIC ULCER PERFORATION    EXCISION MASS HEAD/NECK N/A 3/4/2022    Procedure: EXCISION OF MASS ON POSTERIOR NECK;  Surgeon: Rossana Mahajan MD;  Location: [facility] OR;  Service: General;  Laterality: N/A;    INGUINAL HERNIA REPAIR Left     INGUINAL HERNIA REPAIR Right 2017    Procedure: RIGHT INGUINAL HERNIA REPAIR WITH MESH ;  Surgeon: Rossana Mahajan MD;  Location: [facility] OR;  Service:          OT ASSESSMENT FLOWSHEET (Last 12 Hours)       OT Evaluation and Treatment       Row Name 10/04/24 1453                   OT Time and Intention    Subjective Information no  complaints  -TS        Document Type therapy note (daily note)  -TS        Mode of Treatment occupational therapy  -TS        Patient Effort good  -TS           General Information    Existing Precautions/Restrictions fall  -TS           Pain Assessment    Pretreatment Pain Rating 0/10 - no pain  -TS        Posttreatment Pain Rating 0/10 - no pain  -TS           Cognition    Orientation Status (Cognition) oriented x 4  -TS        Personal Safety Interventions fall prevention program maintained;gait belt;nonskid shoes/slippers when out of bed  -TS           Activities of Daily Living    BADL Assessment/Intervention upper body dressing;bathing;toileting;grooming  -TS           Bathing Assessment/Intervention    [place] Level (Bathing) upper body;proximal lower extremities;perineal area;set up;standby assist  -TS        Assistive Devices (Bathing) tub bench  -TS        Position (Bathing) sink side;unsupported sitting;supported standing  -TS           Upper Body Dressing Assessment/Training    [place] Level (Upper Body Dressing) don;doff;set up;contact guard assist  -TS        Position (Upper Body Dressing) unsupported sitting  -TS        Comment, (Upper Body Dressing) gown  -TS           Grooming Assessment/Training    [place] Level (Grooming) set up;standby assist;wash face, hands  -TS        Position (Grooming) unsupported sitting;sink side  -TS           Toileting Assessment/Training    [place] Level (Toileting) toileting skills;adjust/manage clothing;set up;standby assist;supervision  -TS        Assistive Devices (Toileting) commode;grab bar/safety frame  -TS        Position (Toileting) unsupported sitting;supported standing  -TS           Functional Mobility    Functional Mobility- Ind. Level contact guard assist  -TS        Functional Mobility- Comment in room, in BR  -TS           Transfer Assessment/Treatment    Transfers sit-stand transfer;stand-sit transfer;toilet transfer  -TS            Sit-Stand Transfer    Sit-Stand [place] (Transfers) standby assist  -TS           Stand-Sit Transfer    Stand-Sit [place] (Transfers) standby assist  -TS           Toilet Transfer    Type (Toilet Transfer) sit-stand;stand-sit  -TS        [place] Level (Toilet Transfer) standby assist  -TS        Assistive Device (Toilet Transfer) commode;grab bars/safety frame  -TS           Wound 09/26/24 2222 abdomen Incision    Wound - Properties Group Placement Date: 09/26/24  -HW Placement Time: 2222 -HW Location: abdomen  -HW Primary Wound Type: Incision  -HW    Retired Wound - Properties Group Placement Date: 09/26/24  -HW Placement Time: 2222 -HW Location: abdomen  -HW Primary Wound Type: Incision  -HW    Retired Wound - Properties Group Date first assessed: 09/26/24  - Time first assessed: 2222 -HW Location: abdomen  -HW Primary Wound Type: Incision  -HW       Plan of Care Review    Plan of Care Reviewed With patient  -TS        Progress improving  -TS        Outcome Evaluation Pt progressing well with OT tx. Pt SBA/S for ADLs and CGA for ambulation in room/BR. Continue OT POC  -TS           Positioning and Restraints    Pre-Treatment Position sitting in chair/recliner  -TS        Post Treatment Position chair  -TS        In Chair sitting;call light within reach;encouraged to call for assist  -TS           Therapy Plan Review/Discharge Plan (OT)    Anticipated Discharge Disposition (OT) home with assist;home with 24/7 care;home with home health  -TS                  User Key  (r) = Recorded By, (t) = Taken By, (c) = Cosigned By      Initials Name Effective Dates    TS Maria Eugenia Garsia COTA 02/03/23 -      Matti Malone RN 02/08/24 -                      Occupational Therapy Education       Title: PT OT SLP Therapies (In Progress)       Topic: Occupational Therapy (In Progress)       Point: ADL training (Done)       Description:   Instruct learner(s) on proper safety adaptation and remediation  techniques during self care or transfers.   Instruct in proper use of assistive devices.                  Learning Progress Summary             Patient Acceptance, E, VU by TS at 10/4/2024 1539    Acceptance, E, VU by LS at 10/3/2024 1505    Acceptance, E,D, VU by CH at 9/30/2024 0925    Acceptance, E, VU by LS at 9/27/2024 0928                         Point: Home exercise program (Not Started)       Description:   Instruct learner(s) on appropriate technique for monitoring, assisting and/or progressing therapeutic exercises/activities.                  Learner Progress:  Not documented in this visit.              Point: Precautions (Done)       Description:   Instruct learner(s) on prescribed precautions during self-care and functional transfers.                  Learning Progress Summary             Patient Acceptance, E, VU by LS at 10/3/2024 1505    Acceptance, E,D, VU by CH at 9/30/2024 0925    Acceptance, E, VU by LS at 9/27/2024 0928                         Point: Body mechanics (Done)       Description:   Instruct learner(s) on proper positioning and spine alignment during self-care, functional mobility activities and/or exercises.                  Learning Progress Summary             Patient Acceptance, E, VU by LS at 10/3/2024 1505    Acceptance, E,D, VU by CH at 9/30/2024 0925    Acceptance, E, VU by LS at 9/27/2024 0928                                         User Key       Initials Effective Dates Name Provider Type Discipline     07/11/23 -  Marlene De La O, OTR/L Occupational Therapist OT    TS 02/03/23 -  Maria Eugenia Garsia COTA Occupational Therapist Assistant OT     06/20/22 -  Page Mayer OTR/L Occupational Therapist OT                      OT Recommendation and Plan     Plan of Care Review  Plan of Care Reviewed With: patient  Progress: improving  Outcome Evaluation: Pt progressing well with OT tx. Pt SBA/S for ADLs and CGA for ambulation in room/BR. Continue OT POC  Plan of Care  Reviewed With: patient  Outcome Evaluation: Pt progressing well with OT tx. Pt SBA/S for ADLs and CGA for ambulation in room/BR. Continue OT POC     Outcome Measures       Row Name 10/04/24 1500 10/04/24 1339 10/03/24 1547       How much help from another person do you currently need...    Turning from your back to your side while in flat bed without using bedrails? -- 3  -WK 3  -MF    Moving from lying on back to sitting on the side of a flat bed without bedrails? -- 3  -WK 3  -MF    Moving to and from a bed to a chair (including a wheelchair)? -- 4  -WK 3  -MF    Standing up from a chair using your arms (e.g., wheelchair, bedside chair)? -- 4  -WK 3  -MF    Climbing 3-5 steps with a railing? -- 3  -WK 2  -MF    To walk in hospital room? -- 3  -WK 3  -MF    AM-PAC 6 Clicks Score (PT) -- 20  -WK 17  -MF    Highest Level of Mobility Goal -- 6 --> Walk 10 steps or more  -WK 5 --> Static standing  -MF       How much help from another is currently needed...    Putting on and taking off regular lower body clothing? 3  -TS -- --    Bathing (including washing, rinsing, and drying) 3  -TS -- --    Toileting (which includes using toilet bed pan or urinal) 3  -TS -- --    Putting on and taking off regular upper body clothing 4  -TS -- --    Taking care of personal grooming (such as brushing teeth) 4  -TS -- --    Eating meals 4  -TS -- --    AM-PAC 6 Clicks Score (OT) 21  -TS -- --       Functional Assessment    Outcome Measure Options -- AM-PAC 6 Clicks Basic Mobility (PT)  -WK AM-PAC 6 Clicks Basic Mobility (PT)  -MF      Row Name 10/02/24 1100             How much help from another person do you currently need...    Turning from your back to your side while in flat bed without using bedrails? 3  -AH      Moving from lying on back to sitting on the side of a flat bed without bedrails? 3  -AH      Moving to and from a bed to a chair (including a wheelchair)? 3  -AH      Standing up from a chair using your arms (e.g.,  wheelchair, bedside chair)? 3  -      Climbing 3-5 steps with a railing? 2  -AH      To walk in hospital room? 3  -AH      AM-PAC 6 Clicks Score (PT) 17  -      Highest Level of Mobility Goal 5 --> Static standing  -         Functional Assessment    Outcome Measure Options AM-PAC 6 Clicks Basic Mobility (PT)  -                User Key  (r) = Recorded By, (t) = Taken By, (c) = Cosigned By      Initials Name Provider Type     Anjana Madison, PTA Physical Therapist Assistant     Maria Eugenia Garsia COTA Occupational Therapist Assistant     Germaine Morales, PTA Physical Therapist Assistant    WK Jayda Martinez, ANAM Physical Therapist Assistant                    Time Calculation:    Time Calculation- OT       Row Name 10/04/24 1540 10/04/24 1437          Time Calculation- OT    OT Start Time 1453  -TS --     OT Stop Time 1540  -TS --     OT Time Calculation (min) 47 min  -TS --     Total Timed Code Minutes- OT 47 minute(s)  -TS --     OT Received On 10/04/24  -TS --        Timed Charges    05182 - Gait Training Minutes  -- 41  -WK     44827 - OT Self Care/Mgmt Minutes 47  -TS --        Total Minutes    Timed Charges Total Minutes 47  -TS 41  -WK      Total Minutes 47  -TS 41  -WK               User Key  (r) = Recorded By, (t) = Taken By, (c) = Cosigned By      Initials Name Provider Type     Maria Eugenia Garsia COTA Occupational Therapist Assistant    WK Jayda Martinez, ANAM Physical Therapist Assistant                  Therapy Charges for Today       Code Description Service Date Service Provider Modifiers Qty    [id number]  OT SELF CARE/MGMT/TRAIN EA 15 MIN 10/4/2024 Maria Eugenia Garsia COTA GO 3                 Maria Eugenia OVALLES. CAR Garsia  10/4/2024

## 2024-10-04 NOTE — CASE MANAGEMENT/SOCIAL WORK
Continued Stay Note   [place]     Patient Name: Oral Dey  MRN: [id number]  Today's Date: 10/4/2024    Admit Date: 9/26/2024    Plan: [facility]   Discharge Plan       Row Name 10/04/24 1425       Plan    Plan [facility]    Patient/Family in Agreement with Plan yes    Plan Comments Precert completed. Spoke with Kathy from [facility] [phone] and since pt is covid positive, they cannot take him until Tuesday, October 8.                   Discharge Codes    No documentation.                 Expected Discharge Date and Time       Expected Discharge Date Expected Discharge Time    Oct 4, 2024               TAYLOR Mcarthur

## 2024-10-04 NOTE — PLAN OF CARE
Goal Outcome Evaluation:  Plan of Care Reviewed With: patient        Progress: improving  Outcome Evaluation: Bed mobility SPV, Sit to stand CGA. Amb 60' CGA-SBA without RW. Pt performed standing activity 15 minutes. Assist pt with clean up and educated pt on POC.

## 2024-10-04 NOTE — PROGRESS NOTES
[facility] Medicine Services  INPATIENT PROGRESS NOTE    Patient Name: Oral Dey  Date of Admission: 9/26/2024  Today's Date: 10/04/24  Length of Stay: 8  Primary Care Physician: Jorge Shepherd MD    Subjective   Chief Complaint: follow-up gastric ulcer   HPI   10/1 Resting comfortably no distress or new complaints today. Feeling better after removal of NGT.   10/2 Seen at bedside.  Complains of occasional dry cough.  Overall feeling better.  Advancing diet to full liquids.  10/3 Sitting up resting comfortably, no distress.  Cough is now dry and continues to improve.  Vitals are stable.  Tolerating full liquid diet.  Has moved bowels once.    Today:  No new complaints.  Patient cleared for discharge from surgery standpoint waiting for facility approval which apparently at this point will be until Tuesday, October 8 due to COVID.    Review of Systems   All pertinent negatives and positives are as above. All other systems have been reviewed and are negative unless otherwise stated.     Objective    Temp:  [97.3 °F (36.3 °C)-98.3 °F (36.8 °C)] 97.6 °F (36.4 °C)  Heart Rate:  [74-86] 79  Resp:  [16-18] 16  BP: (141-162)/(50-79) 162/66  Physical Exam  Constitutional:       General: He is not in acute distress.     Comments: Sitting up in bedside chair   HENT:      Head: Normocephalic.      Nose:      Mouth/Throat:      Mouth: Mucous membranes are moist.   Cardiovascular:      Rate and Rhythm: Normal rate and regular rhythm.      Comments: Rate controlled.  Pulmonary:      Effort: Pulmonary effort is normal. No respiratory distress.  No rales.     Comments: Diminished at bases; on room air.  Good air entry bilaterally.  Abdominal:      Comments: bowel sounds hypoactive.  SEGUNDO drain in place with sterile fluid.  Musculoskeletal:      Right lower leg: No edema.      Left lower leg: No edema.   Skin:     General: Skin is warm.   Neurological:      General: No focal deficit present.       Mental Status: He is alert.   Psychiatric:         Mood and Affect: Mood normal.       Results Review:  I have reviewed the labs, radiology results, and diagnostic studies.    Laboratory Data:   Results from last 7 days   Lab Units 10/02/24  0422 10/01/24  0443 09/30/24  0634   WBC 10*3/mm3 10.64 9.47 10.76   HEMOGLOBIN g/dL 9.9* 9.8* 9.6*   HEMATOCRIT % 29.8* 29.8* 30.1*   PLATELETS 10*3/mm3 213 198 198        Results from last 7 days   Lab Units 10/02/24  0422 10/01/24  0443 09/30/24  0634   SODIUM mmol/L 134* 133* 132*   POTASSIUM mmol/L 3.7 4.0 4.1   CHLORIDE mmol/L 98 98 98   CO2 mmol/L 26.0 24.0 25.0   BUN mg/dL 17 19 22   CREATININE mg/dL 0.84 0.78 0.94   CALCIUM mg/dL 7.9* 8.0* 7.9*   GLUCOSE mg/dL 103* 97 103*       Culture Data:   Blood Culture   Date Value Ref Range Status   09/27/2024 No growth at 4 days  Preliminary   09/27/2024 No growth at 4 days  Preliminary       Radiology Data:   Imaging Results (Last 24 Hours)       ** No results found for the last 24 hours. **            I have reviewed the patient's current medications.     Echocardiogram 9/29/2024    Left ventricular systolic function is normal. Left ventricular ejection fraction appears to be 61 - 65%.    Left ventricular diastolic function was normal.    There is mild calcification of the aortic valve.    Calcified mitral valve chordae are present.    Estimated right ventricular systolic pressure from tricuspid regurgitation is normal (<35 mmHg).    No hemodynamically significant (greater than mild) valvular abnormalities are noted.    Assessment/Plan   Assessment  Active Hospital Problems    Diagnosis     **Pneumoperitoneum     Atrial fibrillation with rapid ventricular response     COVID-19 virus infection     Cough     Anemia     Perforated gastric ulcer     Hypertension        Treatment Plan  Perforated gastric ulcer with pneumoperitoneum  Postop day 5 robotic assisted repair by Dr. Malone  Vitals every 4 hours  NG tube  removed  Diet full liquid  IV fluids lactated Ringer's at 100 cc an hour discontinue now to saline lock  Pain control  Rocephin/Flagyl/Diflucan 4 days postop completed  Upper GI series noted  Pain control  Increase activity  Ambulate  Bowel hygiene protocol  Electrolyte replacement protocol  Continue to monitor anemia present before surgery  IV PPI consider switching to oral in a.m.    COVID-19 virus infection  Minimal symptomatic  Patient remains on room air  Symptom onset probably 7 to 14 days ago    Atrial fibrillation RVR, now rate controlled  In the context of COVID and a bowel perforation could be considered lone A-fib  Lopressor 5 mg IV every 6 hours we will consider switching to oral in a.m.  Echocardiogram noted  Anticoagulation contraindicated due to bowel perforation status and postop  Continue heparin or Lovenox prophylactic doses    DVT prophylaxis heparin 5000 units every 8 hours subcutaneous     Medical Decision Making  Number and Complexity of problems: 3 acute; moderate complexity        Conditions and Status        Condition is improving     [facility] Data  External documents reviewed: none  Cardiac tracing (EKG, telemetry) interpretation: on telemetry this morning patient appears to be in sinus rhythm    Radiology interpretation: no new radiology studies  Labs reviewed: as above  Any tests that were considered but not ordered: none     Decision rules/scores evaluated (example [id number], [place], etc): none     Discussed with: patient and bedside nurse.    Care Planning  Shared decision making: Discussed with patient with agreement to proceed with treatment plan as outlined  Code status and discussions: Full code     Disposition  Social Determinants of Health that impact treatment or disposition: None apparent at this time  I expect the patient to be discharged per primary service, agree with plan for SNF subacute rehabilitation.  Assisting with case management for placement.    Electronically signed by  Hipolito Robbins MD, 10/04/24, 12:22 CDT.

## 2024-10-04 NOTE — PLAN OF CARE
Goal Outcome Evaluation:  Plan of Care Reviewed With: patient        Progress: improving  Outcome Evaluation: Pt progressing well with OT tx. Pt SBA/S for ADLs and CGA for ambulation in room/BR. Continue OT POC      Anticipated Discharge Disposition (OT): home with assist, home with 24/7 care, home with home health

## 2024-10-04 NOTE — THERAPY TREATMENT NOTE
Acute Care - Physical Therapy Treatment Note  [facility]     Patient Name: Oral Dey  : 1940  MRN: [id number]  Today's Date: 10/4/2024      Visit Dx:     ICD-10-CM ICD-9-CM   1. Pneumoperitoneum  K66.8 568.89   2. Impaired mobility [Z74.09]  Z74.09 799.89   3. Acute gastric ulcer with perforation  K25.1 531.10     Patient Active Problem List   Diagnosis    Hx of colonic polyp    Family hx of colon cancer    CLL (chronic lymphocytic leukemia)    Hypertension    IgG lambda monoclonal gammopathy    Iron deficiency    Pneumoperitoneum    Perforated gastric ulcer    Cough    Anemia    Atrial fibrillation with rapid ventricular response    COVID-19 virus infection     Past Medical History:   Diagnosis Date    Bladder cancer     CLL (chronic lymphocytic leukemia)     Colon polyp     Gallstone     GERD (gastroesophageal reflux disease)     Glaucoma     Hearing loss     Hypertension     Inguinal hernia     right groin    Macular degeneration, right eye      Past Surgical History:   Procedure Laterality Date    CATARACT EXTRACTION, BILATERAL      COLONOSCOPY  2012    CYSTOSCOPY BLADDER BIOPSY      DIAGNOSTIC LAPAROSCOPY N/A 2024    Procedure: ROBOTIC REPAIR OF PERFORATED GASTRIC ULCER;  Surgeon: Petrona Malone MD;  Location: [facility] OR;  Service: General;  Laterality: N/A;  WITH REPAIR OF GASTRIC ULCER PERFORATION    EXCISION MASS HEAD/NECK N/A 3/4/2022    Procedure: EXCISION OF MASS ON POSTERIOR NECK;  Surgeon: Rossana Mahajan MD;  Location:  [facility] OR;  Service: General;  Laterality: N/A;    INGUINAL HERNIA REPAIR Left     INGUINAL HERNIA REPAIR Right 2017    Procedure: RIGHT INGUINAL HERNIA REPAIR WITH MESH ;  Surgeon: Rossana Mahajan MD;  Location: [facility] OR;  Service:      PT Assessment (Last 12 Hours)       PT Evaluation and Treatment       Row Name 10/04/24 1339 10/04/24 0923       Physical Therapy Time and Intention    Subjective Information complains of;weakness  -WK --     Document Type therapy note (daily note)  -WK --    Mode of Treatment physical therapy  -WK --    Session Not Performed -- other (see comments)  -WK    Comment -- with PCT  -WK      Row Name 10/04/24 1339          General Information    Existing Precautions/Restrictions fall  -WK       Row Name 10/04/24 1339          Pain    Pretreatment Pain Rating 0/10 - no pain  -WK     Posttreatment Pain Rating 0/10 - no pain  -WK       Row Name 10/04/24 1339          Bed Mobility    Supine-Sit [place] (Bed Mobility) standby assist  -WK       Row Name 10/04/24 1339          Sit-Stand Transfer    Sit-Stand [place] (Transfers) contact guard  -WK     Assistive Device (Sit-Stand Transfers) walker, front-wheeled  -WK       Row Name 10/04/24 1339          Stand-Sit Transfer    Stand-Sit [place] (Transfers) contact guard  -WK       Row Name 10/04/24 1339          Gait/Stairs (Locomotion)    [place] Level (Gait) contact guard;standby assist  -WK     Assistive Device (Gait) --  without RW  -WK     Distance in Feet (Gait) 60  40' with RW  -WK     Bilateral Gait Deviations forward flexed posture  -WK     Comment, (Gait/Stairs) cues for posture, assisted pt into BR standing 15 minutes in BR  -WK       Row Name 10/04/24 1339          Balance    Static Sitting Balance standby assist  -WK     Dynamic Sitting Balance standby assist  -WK     Static Standing Balance standby assist  -WK     Dynamic Standing Balance standby assist  -WK       Row Name             Wound 09/26/24 2222 abdomen Incision    Wound - Properties Group Placement Date: 09/26/24  -HW Placement Time: 2222 -HW Location: abdomen  -HW Primary Wound Type: Incision  -HW    Retired Wound - Properties Group Placement Date: 09/26/24  -HW Placement Time: 2222 -HW Location: abdomen  -HW Primary Wound Type: Incision  -HW    Retired Wound - Properties Group Date first assessed: 09/26/24  -HW Time first assessed: 2222 -HW Location: abdomen  -HW Primary Wound Type:  Incision  -HW      Row Name 10/04/24 1339          Plan of Care Review    Plan of Care Reviewed With patient  -WK     Progress improving  -WK     Outcome Evaluation Bed mobility SPV, Sit to stand CGA. Amb 60' CGA-SBA without RW. Pt performed standing activity 15 minutes. Assist pt with clean up and educated pt on POC.  -WK       Row Name 10/04/24 1339          Positioning and Restraints    Pre-Treatment Position in bed  -WK     Post Treatment Position chair  -WK     In Chair sitting;call light within reach;encouraged to call for assist;notified nsg  -WK               User Key  (r) = Recorded By, (t) = Taken By, (c) = Cosigned By      Initials Name Provider Type    WK Jayda Martinez PTA Physical Therapist Assistant     Matti Malone, RN Registered Nurse                    Physical Therapy Education       Title: PT OT SLP Therapies (In Progress)       Topic: Physical Therapy (In Progress)       Point: Mobility training (Done)       Learning Progress Summary             Patient Acceptance, E, CLAY,DU,NR by NAHOMY at 9/27/2024 0800    Comment: benefits of activity, progression of PT                         Point: Home exercise program (Not Started)       Learner Progress:  Not documented in this visit.              Point: Body mechanics (Not Started)       Learner Progress:  Not documented in this visit.              Point: Precautions (Not Started)       Learner Progress:  Not documented in this visit.                              User Key       Initials Effective Dates Name Provider Type Discipline    NAHOMY 02/03/23 -  Abimael Lees PT DPT Physical Therapist PT                  PT Recommendation and Plan     Plan of Care Reviewed With: patient  Progress: improving  Outcome Evaluation: Bed mobility SPV, Sit to stand CGA. Amb 60' CGA-SBA without RW. Pt performed standing activity 15 minutes. Assist pt with clean up and educated pt on POC.   Outcome Measures       Row Name 10/04/24 1339 10/03/24 1547 10/02/24 1100        How much help from another person do you currently need...    Turning from your back to your side while in flat bed without using bedrails? 3  -WK 3  -MF 3  -AH    Moving from lying on back to sitting on the side of a flat bed without bedrails? 3  -WK 3  -MF 3  -AH    Moving to and from a bed to a chair (including a wheelchair)? 4  -WK 3  -MF 3  -AH    Standing up from a chair using your arms (e.g., wheelchair, bedside chair)? 4  -WK 3  -MF 3  -AH    Climbing 3-5 steps with a railing? 3  -WK 2  -MF 2  -AH    To walk in hospital room? 3  -WK 3  -MF 3  -AH    AM-PAC 6 Clicks Score (PT) 20  -WK 17  -MF 17  -AH    Highest Level of Mobility Goal 6 --> Walk 10 steps or more  -WK 5 --> Static standing  - 5 --> Static standing  -AH       Functional Assessment    Outcome Measure Options AM-PAC 6 Clicks Basic Mobility (PT)  -WK AM-PAC 6 Clicks Basic Mobility (PT)  - AM-PAC 6 Clicks Basic Mobility (PT)  -              User Key  (r) = Recorded By, (t) = Taken By, (c) = Cosigned By      Initials Name Provider Type     Anjana Madison PTA Physical Therapist Assistant     Germaine Morales PTA Physical Therapist Assistant    Jayda Dunaway PTA Physical Therapist Assistant                     Time Calculation:    PT Charges       Row Name 10/04/24 1437             Time Calculation    Start Time 1339  -WK      Stop Time 1420  -WK      Time Calculation (min) 41 min  -WK      PT Received On 10/04/24  -WK         Time Calculation- PT    Total Timed Code Minutes- PT 41 minute(s)  -WK         Timed Charges    89744 - Gait Training Minutes  41  -WK         Total Minutes    Timed Charges Total Minutes 41  -WK       Total Minutes 41  -WK                User Key  (r) = Recorded By, (t) = Taken By, (c) = Cosigned By      Initials Name Provider Type    Jayda Dunaway PTA Physical Therapist Assistant                  Therapy Charges for Today       Code Description Service Date Service Provider Modifiers Qty     [id number]  GAIT TRAINING EA 15 MIN 10/4/2024 Jayda Martinez, PTA GP 3            PT G-Codes  Outcome Measure Options: AM-PAC 6 Clicks Basic Mobility (PT)  AM-PAC 6 Clicks Score (PT): 20  AM-PAC 6 Clicks Score (OT): 21    Jayda Martinez PTA  10/4/2024

## 2024-10-04 NOTE — PLAN OF CARE
Goal Outcome Evaluation:           Progress: no change  Outcome Evaluation: Patient no c/o pain. Up with assist. Voiding. ambulated in room with therapy. covid percautions maintained.

## 2024-10-05 ENCOUNTER — READMISSION MANAGEMENT (OUTPATIENT)
Dept: CALL CENTER | Facility: HOSPITAL | Age: 84
End: 2024-10-05
Payer: COMMERCIAL

## 2024-10-05 VITALS
WEIGHT: 131.61 LBS | TEMPERATURE: 97.4 F | DIASTOLIC BLOOD PRESSURE: 62 MMHG | RESPIRATION RATE: 18 BRPM | HEART RATE: 72 BPM | BODY MASS INDEX: 18.84 KG/M2 | OXYGEN SATURATION: 99 % | SYSTOLIC BLOOD PRESSURE: 149 MMHG | HEIGHT: 70 IN

## 2024-10-05 PROCEDURE — 94664 DEMO&/EVAL PT USE INHALER: CPT

## 2024-10-05 PROCEDURE — 25010000002 HEPARIN (PORCINE) PER 1000 UNITS

## 2024-10-05 PROCEDURE — 94799 UNLISTED PULMONARY SVC/PX: CPT

## 2024-10-05 PROCEDURE — 94761 N-INVAS EAR/PLS OXIMETRY MLT: CPT

## 2024-10-05 RX ORDER — PANTOPRAZOLE SODIUM 40 MG/1
40 TABLET, DELAYED RELEASE ORAL DAILY
Qty: 30 TABLET | Refills: 0 | Status: SHIPPED | OUTPATIENT
Start: 2024-10-05 | End: 2024-10-07

## 2024-10-05 RX ORDER — ACETAMINOPHEN 500 MG
500 TABLET ORAL EVERY 6 HOURS PRN
Qty: 120 TABLET | Refills: 0 | Status: SHIPPED | OUTPATIENT
Start: 2024-10-05

## 2024-10-05 RX ORDER — METRONIDAZOLE 500 MG/1
500 TABLET ORAL EVERY 8 HOURS SCHEDULED
Qty: 42 TABLET | Refills: 0 | Status: SHIPPED | OUTPATIENT
Start: 2024-10-05 | End: 2024-10-19

## 2024-10-05 RX ORDER — CLARITHROMYCIN 500 MG
500 TABLET ORAL EVERY 12 HOURS SCHEDULED
Qty: 28 TABLET | Refills: 0 | Status: SHIPPED | OUTPATIENT
Start: 2024-10-05 | End: 2024-10-19

## 2024-10-05 RX ADMIN — IPRATROPIUM BROMIDE 2 PUFF: 17 AEROSOL, METERED RESPIRATORY (INHALATION) at 11:11

## 2024-10-05 RX ADMIN — ALBUTEROL SULFATE 2 PUFF: 108 INHALANT RESPIRATORY (INHALATION) at 06:13

## 2024-10-05 RX ADMIN — HEPARIN SODIUM 5000 UNITS: 5000 INJECTION, SOLUTION INTRAVENOUS; SUBCUTANEOUS at 05:29

## 2024-10-05 RX ADMIN — METRONIDAZOLE 500 MG: 500 TABLET ORAL at 05:29

## 2024-10-05 RX ADMIN — METOPROLOL TARTRATE 5 MG: 5 INJECTION INTRAVENOUS at 12:03

## 2024-10-05 RX ADMIN — ALBUTEROL SULFATE 2 PUFF: 108 INHALANT RESPIRATORY (INHALATION) at 11:11

## 2024-10-05 RX ADMIN — PANTOPRAZOLE SODIUM 40 MG: 40 INJECTION, POWDER, FOR SOLUTION INTRAVENOUS at 11:49

## 2024-10-05 RX ADMIN — Medication 10 ML: at 11:53

## 2024-10-05 RX ADMIN — METOPROLOL TARTRATE 5 MG: 5 INJECTION INTRAVENOUS at 05:30

## 2024-10-05 RX ADMIN — METOPROLOL TARTRATE 5 MG: 5 INJECTION INTRAVENOUS at 00:05

## 2024-10-05 RX ADMIN — LIDOCAINE 2 PATCH: 4 PATCH TOPICAL at 11:50

## 2024-10-05 RX ADMIN — IPRATROPIUM BROMIDE 2 PUFF: 17 AEROSOL, METERED RESPIRATORY (INHALATION) at 06:13

## 2024-10-05 RX ADMIN — CLARITHROMYCIN 500 MG: 500 TABLET ORAL at 11:49

## 2024-10-05 NOTE — DISCHARGE SUMMARY
[facility]  DISCHARGE SUMMARY       Date of Admission: 9/26/2024  Date of Discharge:  10/5/2024  Primary Care Physician: Jorge Shepherd MD    Presenting Problem/History of Present Illness:  Mr. Dey is an 83-year-old male with past medical history, per EMR record, showing hypertension, GERD, bladder cancer, and chronic lymphocytic leukemia. My HPI comes from ER physician note and supervising physician, Dr. Laughlin who received consult report from ER physician, Dr. Funez. It was reported that Mr. Dey presented to ER with complaint of abdominal pain.  The pain was described as sharp and tearing, centrally located in the abdomen. The pain was out of proportion and further evaluation with CTA abd/pel showed pneumoperitoneum, felt to be related to perforated gastric ulcer with abdominal thickening of the gastric wall within the distal body and antrum of the stomach extending into the region of the gastric outlet, with mild inflammatory stranding along the more distal greater curvature of the stomach with 2 areas of suspected ulceration 1 along the more anterior wall of the distal body/antrum of the stomach and one near the gastric outlet. With these findings general surgeon on call, Dr. Malone, was consulted and elected to take patient for emergent robotic repair of perforated gastric ulcer.     Final Discharge Diagnoses:  Active Hospital Problems    Diagnosis     **Perforated gastric ulcer     Pneumoperitoneum     Atrial fibrillation with rapid ventricular response     COVID-19 virus infection     Cough     Anemia     Hypertension        Consults:   Dr Petrona Becerril MD, general surgery    Procedures Performed:   9/26/2024 PROCEDURES PERFORMED:  Laparoscopic robotic-assisted repair of a perforated gastric ulcer with an omental patch by Dr. Petrona Malone MD, general surgery    Pertinent Test Results:   Results for orders placed during the hospital  encounter of 09/26/24    Adult Transthoracic Echo Complete W/ Cont if Necessary Per Protocol    Interpretation Summary    Left ventricular systolic function is normal. Left ventricular ejection fraction appears to be 61 - 65%.    Left ventricular diastolic function was normal.    There is mild calcification of the aortic valve.    Calcified mitral valve chordae are present.    Estimated right ventricular systolic pressure from tricuspid regurgitation is normal (<35 mmHg).    No hemodynamically significant (greater than mild) valvular abnormalities are noted.      Imaging Results (All)       Procedure Component Value Units Date/Time    FL Upper GI Water Soluble [[id number]] Collected: 10/01/24 1118     Updated: 10/01/24 1125    Narrative:      EXAM/TECHNIQUE: FL UPPER GI WATER SOLUBLE-     INDICATION: s/p perf gastric ulcer repair/patch, eval for contrast  extravasation; K66.8-Other specified disorders of peritoneum;  Z74.09-Other reduced mobility     COMPARISON: None available.     Number of images: 14  Fluoroscopy dose: 3.02 minutes  Fluoroscopy dose: 72.8 mGy     FINDINGS:     Gastrografin contrast was instilled through the patient's nasogastric  tube and multiple abdominal fluoroscopic images were obtained.     Special attention was paid to the distal stomach. Once there was  appropriate contrast distention, multiple images of the distal stomach  were obtained in various projections/positions including AP and  bilateral oblique views. No evidence of contrast leak. Contrast freely  flows through the stomach and into the duodenum and jejunum. No evidence  of obstruction.       Impression:      No evidence of postoperative leak status post gastric ulcer repair.     This report was signed and finalized on 10/1/2024 11:21 AM by Dr. Steven Giles MD.       CT Angiogram Abdomen Pelvis [[id number]] Collected: 09/26/24 2017     Updated: 09/26/24 2035    Narrative:      EXAMINATION: CT ANGIOGRAM ABDOMEN PELVIS-   9/26/2024 8:18 PM     HISTORY: Tearing pain.     DOSE: 440.6 mGycm. All CT scans are performed using dose optimization  techniques as appropriate to the performed exam and including at least  one of the following: Automated exposure control, adjustment of the mA  and/or kV according to size, and the use of the iterative reconstruction  technique..     FINDINGS: Multiple contiguous axial images were obtained through the  abdomen and pelvis both with and without IV contrast administration.  There is patchy groundglass disease in the medial basilar segment of the  right lower lobe. Lung bases are otherwise clear. The base of the heart  is unremarkable. The descending thoracic aorta demonstrates calcific  plaquing without evidence of aneurysm or dissection.     There is atheromatous calcification of the abdominal aorta and iliac  arteries with no evidence of aneurysm or dissection. No rate limiting  stenosis in the abdominal aorta. There is mild stenosis at the origin of  the celiac trunk. The SMA and ONEL are widely patent. There are accessory  renal arteries to both kidneys. There is calcific plaquing with moderate  stenosis of the main right renal artery. There is also calcific plaquing  and mild stenosis at the origin of the left main renal artery.     There is calcific plaquing involving both common iliac arteries without  rate limiting stenosis. There is also mild disease of the internal iliac  arteries. Both external iliac arteries are widely patent to the common  femoral.     There is pneumoperitoneum within the upper abdomen. This is felt to be  secondary to a perforated gastric ulcer with diffuse thickening of the  wall of the more distal body and antrum of the stomach. There are 2  areas of suspected ulceration including on image 83 of series 5 along  the more anterior wall of the stomach along the greater curvature and on  image 75 of series 5 near the gastric outlet. There is associated  stranding and  induration along the anterior wall of the distal stomach  and region of the gastric outlet. There is mild prominence of the wall  within the duodenal bulb and proximal descending duodenum. A small  amount of free air is noted in the anterior aspect of the falciform  ligament. Minimal fluid in the perihepatic space present.     The liver is homogeneous in density. No discrete hepatic mass.     Normal appearance of the gallbladder. No biliary dilatation is present.     The pancreas is normal in appearance with no mass or ductal dilatation.  No acute pancreatitis.     Normal appearance of the spleen.     Both adrenals are unremarkable. There is homogeneous enhancement of the  kidneys. No nephrolithiasis or perinephric fluid collection. Both  ureters are decompressed and normal in appearance.     The small bowel mesentery is normal in appearance with no mass or  adenopathy.     There is mild constipation. A few diverticula are noted of the  descending and sigmoid colon with no diverticulitis. No mechanical  obstruction. The small bowel is normal in distribution and appearance.  Normal appendix.     A fat-containing periumbilical hernia is present. No free fluid in the  paracolic gutters or pelvis. The prostate gland is enlarged. The urinary  bladder is mildly distended but otherwise normal in appearance. No acute  or suspicious bony abnormalities present.       Impression:      1. Pneumoperitoneum. This is felt to be related to a perforated gastric  ulcer with abnormal thickening of the gastric wall within the distal  body and antrum of the stomach extending into the region of the gastric  outlet. There is mild inflammatory stranding along the more distal  greater curvature of the stomach with 2 areas of suspected ulceration 1  along the more anterior wall of the distal body/antrum of the stomach  and one near the gastric outlet. There is associated mucosal enhancement  of the stomach suggesting associated gastritis. No  abscess identified.  2. Atheromatous calcification of the abdominal aorta and branch vessels  without evidence of dissection or aneurysm. No rate limiting luminal  stenosis within the abdominal aorta. There is calcific plaquing and mild  stenosis at the origin of the celiac with mild disease at the origin of  the SMA also present. The SMA and ONEL are otherwise widely patent.  Accessory renal arteries are noted to both kidneys. There is calcific  plaquing and associated stenosis at the origin of both main renal  arteries with moderate luminal stenosis of the right main renal artery  at its origin.  3. Mild constipation. A few diverticula are noted in the left colon. No  diverticulitis. No mechanical obstruction.  4. The prostate is enlarged. The urinary bladder is moderately distended  but otherwise normal in appearance. No free fluid in the pelvis. There  is minimal free fluid in the perihepatic space likely related to the  perforated gastric ulcer. No localized fluid collection to suggest  abscess.     This report was signed and finalized on 9/26/2024 8:32 PM by Dr. Brock Storey MD.       CT Angiogram Chest [[id number]] Collected: 09/26/24 2009     Updated: 09/26/24 2019    Narrative:      Exam: CT angiogram of the of the chest with intravenous contrast.  9/26/2024     CLINICAL HISTORY: Chest and abdominal pain     DOSE:  884.76 mGy.cm . All CT scans are performed using dose  optimization techniques as appropriate to the performed exam and  including at least one of the following: Automated exposure control,  adjustment of the mA and/or kV according to size, and the use of the  iterative reconstruction technique.     TECHNIQUE: Contiguous axial images were obtained through the thorax  following intravenous contrast administration with reformatted images  obtained in the sagittal and coronal projections from the original data  set. Three-dimensional reconstructions are also obtained.     COMPARISON: None  available     FINDINGS:     Pulmonary arteries:  There is normal enhancement of the pulmonary  arteries with no evidence of pulmonary thromboembolic disease.     Aorta: The thoracic aorta and great vessels are remarkable for an  ascending thoracic aorta which measures 3.9 cm in transverse diameter.  There is atheromatous calcification. No rate limiting stenosis or  dissection.     LUNGS: There is patchy groundglass disease within the medial right lower  lobe as well as within the periphery of the superior segment of the  right lower lobe suggesting infiltrate. There is mild scarring in the  apices. No additional consolidative pneumonia present. No evidence of  effusion.     Pleural spaces: Unremarkable. No evidence of pleural effusion or  pneumothorax.     HEART: No evidence of cardiac enlargement or right ventricular  dysfunction. No pericardial effusion is present. Extensive coronary  calcifications are present.     Bones: No acute osseous abnormalities are demonstrated.     CHEST WALL: No chest wall abnormalities are demonstrated.     Lymph nodes: Enlarged mediastinal nodes are present including a 1.6 cm  precarinal node. There are several small shotty nodes in the superior  mediastinum. An AP window node measures 1.1 cm in short axis. There is a  subcarinal isidro mass measuring 2.2 cm in short axis. A 1.2 cm right  hilar node is present. Several prominent left axillary nodes are also  present but not imaged in their entirety.     Upper abdomen: There is pneumoperitoneum within the upper abdomen. That  will be further discussed on the CT abdomen and pelvis report.       Impression:      1. No evidence of pulmonary thromboembolic disease.  2. Atheromatous calcification of the thoracic aorta and great vessels  with the ascending thoracic aorta measuring up to 3.9 cm in diameter. No  dissection or rate limiting stenosis.  3. Pneumoperitoneum within the upper abdomen which will be further  discussed in the CT abdomen  and pelvis report.  4. 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  5. Mediastinal and right hilar lymphadenopathy. Several prominent nodes  within the left axilla are also present but not imaged in their  entirety.     This report was signed and finalized on 9/26/2024 8:16 PM by Dr. Brock Storey MD.             LAB RESULTS:      Lab 10/02/24  0422 10/01/24  0443 09/30/24  0634 09/29/24 0428   WBC 10.64 9.47 10.76 13.52*   HEMOGLOBIN 9.9* 9.8* 9.6* 9.8*   HEMATOCRIT 29.8* 29.8* 30.1* 30.1*   PLATELETS 213 198 198 197   NEUTROS ABS 7.12* 6.47 7.98* 10.13*   IMMATURE GRANS (ABS) 0.07* 0.04 0.07* 0.12*   LYMPHS ABS 2.09 1.87 1.64 2.38   MONOS ABS 1.09* 0.91* 0.97* 0.86   EOS ABS 0.23 0.16 0.08 0.01   MCV 88.7 89.2 90.9 91.2         Lab 10/02/24  0422 10/01/24  0443 09/30/24  0634 09/29/24 0428   SODIUM 134* 133* 132* 132*   POTASSIUM 3.7 4.0 4.1 4.3   CHLORIDE 98 98 98 97*   CO2 26.0 24.0 25.0 25.0   ANION GAP 10.0 11.0 9.0 10.0   BUN 17 19 22 20   CREATININE 0.84 0.78 0.94 1.01   EGFR 87.6 89.6 81.4 74.7   GLUCOSE 103* 97 103* 96   CALCIUM 7.9* 8.0* 7.9* 8.2*   MAGNESIUM 1.9 2.0 2.0 1.9   PHOSPHORUS 2.4* 2.4* 2.5 2.6                         Brief Urine Lab Results  (Last result in the past 365 days)        Color   Clarity   Blood   Leuk Est   Nitrite   Protein   CREAT   Urine HCG        09/26/24 2031 Yellow   Clear   Negative   Negative   Negative   Negative                 Microbiology Results (last 10 days)       Procedure Component Value - Date/Time    Respiratory Panel PCR w/COVID-19(SARS-CoV-2) [facility] In-House, NP Swab in UTM/VTM, 2 HR TAT - Swab, Nasopharynx [[id number]]  (Abnormal) Collected: 09/28/24 1700    Lab Status: Final result Specimen: Swab from Nasopharynx Updated: 09/28/24 1149      ADENOVIRUS, PCR Not Detected     Coronavirus 229E Not Detected     Coronavirus HKU1 Not Detected     Coronavirus NL63 Not Detected     Coronavirus OC43 Not Detected     COVID19 Detected     Human Metapneumovirus Not Detected     Human Rhinovirus/Enterovirus Not Detected     Influenza A PCR Not Detected     Influenza B PCR Not Detected     Parainfluenza Virus 1 Not Detected     Parainfluenza Virus 2 Not Detected     Parainfluenza Virus 3 Not Detected     Parainfluenza Virus 4 Not Detected     RSV, PCR Not Detected     Bordetella pertussis pcr Not Detected     Bordetella parapertussis PCR Not Detected     Chlamydophila pneumoniae PCR Not Detected     Mycoplasma pneumo by PCR Not Detected    Narrative:      In the setting of a positive respiratory panel with a viral infection PLUS a negative procalcitonin without other underlying concern for bacterial infection, consider observing off antibiotics or discontinuation of antibiotics and continue supportive care. If the respiratory panel is positive for atypical bacterial infection (Bordetella pertussis, Chlamydophila pneumoniae, or Mycoplasma pneumoniae), consider antibiotic de-escalation to target atypical bacterial infection.    Blood Culture With DEV - Blood, Hand, Right [[id number]]  (Normal) Collected: 09/27/24 0453    Lab Status: Final result Specimen: Blood from Hand, Right Updated: 10/02/24 0515     Blood Culture No growth at 5 days    Blood Culture With DEV - Blood, Hand, Left [[id number]]  (Normal) Collected: 09/27/24 0448    Lab Status: Final result Specimen: Blood from Hand, Left Updated: 10/02/24 0515     Blood Culture No growth at 5 days            Hospital Course:   Perforated gastric ulcer with pneumoperitoneum  Patient was admitted to critical care and evaluated emergently by Dr. Malone who performed a robotic assisted repair of perforated gastric ulcer on 9/26/2024.  Findings in the OR where a 1 cm perforated gastric ulcer and he was repaired with  "reapproximation and omental patch.  A SEGUNDO drain was placed and patient was discharged from the ER with an NG tube.  This was eventually removed diet was advanced to clear liquids once upper GI series confirmed no leak.  Pain has remained controlled and patient has progressed well.  Postop recommendations where full liquids for 1 week and then advance to soft diet.  He has recover strength and is able to walk without assistance and perform self-care.  He would like to return home.    He completed Rocephin/Flagyl/Diflucan for 4 days in the postop.  Recommended discharge on Flagyl 500 every 8 hours and clarithromycin 500 every 12 hours for 12 more days for presumed H. pylori.     COVID-19 virus infection was found during the stay with minimal symptoms and not requiring oxygen.  Care has been symptomatic for cough and this has continued to improve.     Atrial fibrillation RVR, now rate controlled  In the context of COVID and a bowel perforation could be considered lone A-fib.  Follow-up with cardiology in 4 to 8 weeks.  Treated with Lopressor IV with persistent good rate control will discharge on oral Lopressor.  Echocardiogram noted  Anticoagulation contraindicated due to bowel perforation status and postop    Physical Exam on Discharge:  /66 (BP Location: Left arm, Patient Position: Lying)   Pulse 80   Temp 97.4 °F (36.3 °C) (Oral)   Resp 18   Ht 177.8 cm (70\")   Wt 59.7 kg (131 lb 9.8 oz)   SpO2 98%   BMI 18.88 kg/m²   Physical Exam  Constitutional:       Appearance: He is well-developed. He is not ill-appearing.   HENT:      Head: Normocephalic and atraumatic.      Right Ear: External ear normal.      Left Ear: External ear normal.      Nose: Nose normal.      Mouth/Throat:      Mouth: Mucous membranes are dry.   Eyes:      General:         Right eye: No discharge.         Left eye: No discharge.      Extraocular Movements: Extraocular movements intact.      Conjunctiva/sclera: Conjunctivae normal.      " Pupils: Pupils are equal, round, and reactive to light.   Neck:      Vascular: No JVD.   Cardiovascular:      Rate and Rhythm: Regular rhythm. Tachycardia present.      Heart sounds: Normal heart sounds. No murmur heard.  Pulmonary:      Effort: Pulmonary effort is normal. No respiratory distress.      Breath sounds: Normal breath sounds. No wheezing or rales.   Chest:      Chest wall: No tenderness.   Abdominal:      General: Bowel sounds are normal. There is no distension.      Palpations: Abdomen is soft.      Tenderness: There is no abdominal tenderness. There is no guarding or rebound.      Comments: Laparoscopic wounds clean and dry   Musculoskeletal:         General: No tenderness or deformity. Normal range of motion.      Cervical back: Normal range of motion and neck supple. No rigidity.      Right lower leg: No edema.      Left lower leg: No edema.   Skin:     General: Skin is warm and dry.      Findings: No rash.   Neurological:      General: No focal deficit present.      Mental Status: He is alert and oriented to person, place, and time. Mental status is at baseline.      Cranial Nerves: No cranial nerve deficit.      Sensory: No sensory deficit.      Motor: No abnormal muscle tone.      Deep Tendon Reflexes: Reflexes normal.   Psychiatric:         Mood and Affect: Mood normal.         Behavior: Behavior normal.     Condition on Discharge:   Stable    Discharge Disposition:  Home with home health care    Discharge Medications:     Discharge Medications        New Medications        Instructions Start Date   acetaminophen 500 MG tablet  Commonly known as: TYLENOL   500 mg, Oral, Every 6 Hours PRN      albuterol sulfate  (90 Base) MCG/ACT inhaler  Commonly known as: PROVENTIL HFA;VENTOLIN HFA;PROAIR HFA   2 puffs, Inhalation, 4 Times Daily - RT      clarithromycin 500 MG tablet  Commonly known as: BIAXIN   500 mg, Oral, Every 12 Hours Scheduled      docusate sodium 100 MG capsule  Commonly known  as: Colace   100 mg, Oral, 2 Times Daily      metroNIDAZOLE 500 MG tablet  Commonly known as: FLAGYL   500 mg, Oral, Every 8 Hours Scheduled      ondansetron ODT 4 MG disintegrating tablet  Commonly known as: ZOFRAN-ODT   4 mg, Translingual, Every 6 Hours PRN      pantoprazole 40 MG EC tablet  Commonly known as: Protonix   40 mg, Oral, 2 Times Daily      polyethylene glycol 17 g packet  Commonly known as: MIRALAX   17 g, Oral, Daily      sucralfate 1 GM/10ML suspension  Commonly known as: Carafate   1 g, Oral, 4 Times Daily             Continue These Medications        Instructions Start Date   latanoprost 0.005 % ophthalmic solution  Commonly known as: XALATAN   1 drop, Right Eye, Nightly      metoprolol tartrate 25 MG tablet  Commonly known as: LOPRESSOR   25 mg, Oral, 2 Times Daily      multivitamins-minerals capsule capsule   1 capsule, Oral, 2 Times Daily      tamsulosin 0.4 MG capsule 24 hr capsule  Commonly known as: FLOMAX   1 capsule, Oral, Daily      vitamin B-12 1000 MCG tablet  Commonly known as: CYANOCOBALAMIN   1,000 mcg, Oral, Daily      vitamin D3 125 MCG (5000 UT) capsule capsule   5,000 Units, Oral, Daily               This patient has current or prior documentation of an left ventricular ejection fraction (LVEF) of 60%.    Discharge Diet:   Continue soft diet for another week then advance to cardiac    Activity at Discharge:   Resume usual activity    Follow-up Appointments:   Future Appointments   Date Time Provider Department Center   11/27/2024  9:45 AM Laurie Ordaz APRN [facility]   3/6/2025 11:30 AM  [facility]  [facility]   3/13/2025 11:30 AM Jennifer Cooper APRN [facility]       Test Results Pending at Discharge: -    Electronically signed by Hipolito Robbins MD, 10/05/24, 10:21 CDT.    Time: 38 minutes.

## 2024-10-05 NOTE — CASE MANAGEMENT/SOCIAL WORK
Continued Stay Note   [facility]     Patient Name: Oral Dey  MRN: [id number]  Today's Date: 10/5/2024    Admit Date: 9/26/2024    Plan: Home   Discharge Plan       Row Name 10/05/24 1132       Plan    Plan Home    Plan Comments Patient has now decided to dc home.  Has dc orders for today.    Final Discharge Disposition Code 01 - home or self-care                   Discharge Codes    No documentation.                 Expected Discharge Date and Time       Expected Discharge Date Expected Discharge Time    Oct 5, 2024               KELLY Parsons

## 2024-10-05 NOTE — PROGRESS NOTES
"GENERAL SURGERY INPATIENT PROGRESS NOTE    Patient Name:  Oral Dey  YOB: 1940  MRN: [id number]    SUBJECTIVE    No unexpected events overnight.  The patient reports that he continues to feel better.  His abdominal pain is minimal.  He is tolerating a soft diet.  He is having bowel movements.  No nausea or vomiting.  He is voiding appropriately.    Allergies:  Allergies   Allergen Reactions    Augmentin [Amoxicillin-Pot Clavulanate] Hives    Latex Itching and Other (See Comments)     And band aids. Causes redness and itching.       Medications:  albuterol sulfate HFA, 2 puff, Inhalation, 4x Daily - RT  clarithromycin, 500 mg, Oral, Q12H  heparin (porcine), 5,000 Units, Subcutaneous, Q8H  ipratropium, 2 puff, Inhalation, 4x Daily - RT  latanoprost, 1 drop, Right Eye, Nightly  Lidocaine, 2 patch, Transdermal, Q24H  metoprolol tartrate, 5 mg, Intravenous, Q6H  metroNIDAZOLE, 500 mg, Oral, Q8H  pantoprazole, 40 mg, Intravenous, Q12H  senna-docusate sodium, 2 tablet, Oral, BID  sodium chloride, 10 mL, Intravenous, Q12H         OBJECTIVE    VITAL SIGNS  /62 (BP Location: Right arm, Patient Position: Lying)   Pulse 72   Temp 97.4 °F (36.3 °C) (Oral)   Resp 18   Ht 177.8 cm (70\")   Wt 59.7 kg (131 lb 9.8 oz)   SpO2 99%   BMI 18.88 kg/m²     Intake/Output Summary (Last 24 hours) at 10/5/2024 1548  Last data filed at 10/5/2024 0910  Gross per 24 hour   Intake 580 ml   Output --   Net 580 ml       PHYSICAL EXAM    General: Elderly male, sitting up in the chair, in no acute distress.  HEENT:  NCAT, PERRL, EOM intact bilaterally, hearing intact, nares patent  Heart:  Regular rate, normotensive, no JVD bilaterally  Lungs:  Normal respiratory effort, regular respiratory rate, no wheezing  Abdomen: Incisions are clean dry and intact  Extremities:  No cyanosis, clubbing or edema.   Musculoskeletal:  Normal development of bilateral upper extremities. Normal development of bilateral lower " extremities.  Range of motion intact.  No evidence of trauma.  Skin:  No rashes, ecchymosis or jaundice. Dry and non-diaphoretic. Skin color is consistent with ethnicity.    RESULTS    Labs:  I personally reviewed all pertinent labs.   Imaging:  I personally reviewed all pertinent imaging studies.   Pathology:        ASSESSMENT AND PLAN    Active Hospital Problems    Diagnosis     **Perforated gastric ulcer     Atrial fibrillation with rapid ventricular response     COVID-19 virus infection     Cough     Anemia     Pneumoperitoneum     Hypertension          DAILY CARE PLAN    The patient is appropriate to be discharged from the hospital, whether it be to a nursing facility or home.  He should remain on a soft diet for a short period of time upon discharge.  Follow-up with Dr. Malone in 1.5 - 2 weeks.     I discussed the patient's findings and my recommendations with the patient/family, as well as the primary care team.    Angel Centeno MD, FACS  General Surgery  10/5/2024  15:48 CDT    Please note that portions of this note were completed with a voice recognition program.

## 2024-10-05 NOTE — PLAN OF CARE
Problem: Fall Injury Risk  Goal: Absence of Fall and Fall-Related Injury  Outcome: Progressing  Intervention: Promote Injury-Free Environment  Recent Flowsheet Documentation  Taken 10/5/2024 0400 by Cesar Mccoy RN  Safety Promotion/Fall Prevention: safety round/check completed  Taken 10/5/2024 0200 by Cesar Mccoy RN  Safety Promotion/Fall Prevention: safety round/check completed  Taken 10/5/2024 0000 by Cesar Mccoy RN  Safety Promotion/Fall Prevention: safety round/check completed  Taken 10/4/2024 2200 by Cesar Mccoy RN  Safety Promotion/Fall Prevention: safety round/check completed  Taken 10/4/2024 2100 by Cesar Mccoy RN  Safety Promotion/Fall Prevention: safety round/check completed  Taken 10/4/2024 2001 by Cesar Mccoy RN  Safety Promotion/Fall Prevention: safety round/check completed  Taken 10/4/2024 1900 by Cesar Mccoy RN  Safety Promotion/Fall Prevention: safety round/check completed     Problem: Pain Acute  Goal: Acceptable Pain Control and Functional Ability  Outcome: Progressing     Problem: Bleeding (Surgery Nonspecified)  Goal: Absence of Bleeding  Outcome: Progressing     Problem: Bowel Motility Impaired (Surgery Nonspecified)  Goal: Effective Bowel Elimination  Outcome: Progressing     Problem: Fluid and Electrolyte Imbalance (Surgery Nonspecified)  Goal: Fluid and Electrolyte Balance  Outcome: Progressing     Problem: Glycemic Control Impaired (Surgery Nonspecified)  Goal: Blood Glucose Level Within Targeted Range  Outcome: Progressing     Problem: Infection (Surgery Nonspecified)  Goal: Absence of Infection Signs and Symptoms  Outcome: Progressing     Problem: Ongoing Anesthesia Effects (Surgery Nonspecified)  Goal: Anesthesia/Sedation Recovery  Outcome: Progressing  Intervention: Optimize Anesthesia Recovery  Recent Flowsheet Documentation  Taken 10/5/2024 0400 by Cesar Mccoy RN  Safety Promotion/Fall Prevention: safety round/check completed  Taken 10/5/2024 0200 by Cesar Mccoy  RN  Safety Promotion/Fall Prevention: safety round/check completed  Taken 10/5/2024 0000 by Cesar Mccoy RN  Safety Promotion/Fall Prevention: safety round/check completed  Taken 10/4/2024 2200 by Cesar Mccoy RN  Safety Promotion/Fall Prevention: safety round/check completed  Taken 10/4/2024 2100 by Cesar Mccoy RN  Safety Promotion/Fall Prevention: safety round/check completed  Taken 10/4/2024 2001 by Cesar Mccoy RN  Safety Promotion/Fall Prevention: safety round/check completed  Taken 10/4/2024 1900 by Cesar Mccoy RN  Safety Promotion/Fall Prevention: safety round/check completed     Problem: Pain (Surgery Nonspecified)  Goal: Acceptable Pain Control  Outcome: Progressing     Problem: Postoperative Nausea and Vomiting (Surgery Nonspecified)  Goal: Nausea and Vomiting Relief  Outcome: Progressing     Problem: Postoperative Urinary Retention (Surgery Nonspecified)  Goal: Effective Urinary Elimination  Outcome: Progressing     Problem: Respiratory Compromise (Surgery Nonspecified)  Goal: Effective Oxygenation and Ventilation  Outcome: Progressing     Problem: Adult Inpatient Plan of Care  Goal: Plan of Care Review  Outcome: Progressing  Goal: Patient-Specific Goal (Individualized)  Outcome: Progressing  Goal: Absence of Hospital-Acquired Illness or Injury  Outcome: Progressing  Intervention: Identify and Manage Fall Risk  Recent Flowsheet Documentation  Taken 10/5/2024 0400 by Cesar Mccoy RN  Safety Promotion/Fall Prevention: safety round/check completed  Taken 10/5/2024 0200 by Cesar Mccoy RN  Safety Promotion/Fall Prevention: safety round/check completed  Taken 10/5/2024 0000 by Cesar Mccoy RN  Safety Promotion/Fall Prevention: safety round/check completed  Taken 10/4/2024 2200 by Cesar Mccoy RN  Safety Promotion/Fall Prevention: safety round/check completed  Taken 10/4/2024 2100 by Cesar Mccoy RN  Safety Promotion/Fall Prevention: safety round/check completed  Taken 10/4/2024 2001 by Darius  Cesar, RN  Safety Promotion/Fall Prevention: safety round/check completed  Taken 10/4/2024 1900 by Cesar Mccoy, RN  Safety Promotion/Fall Prevention: safety round/check completed  Intervention: Prevent Skin Injury  Recent Flowsheet Documentation  Taken 10/4/2024 2001 by Cesar Mccoy, RN  Skin Protection: adhesive use limited  Intervention: Prevent and Manage VTE (Venous Thromboembolism) Risk  Recent Flowsheet Documentation  Taken 10/4/2024 2001 by Cesar Mccoy, RN  VTE Prevention/Management:   bilateral   sequential compression devices on  Goal: Optimal Comfort and Wellbeing  Outcome: Progressing  Intervention: Provide Person-Centered Care  Recent Flowsheet Documentation  Taken 10/4/2024 2001 by Cesar Mccoy, RN  Trust Relationship/Rapport: care explained  Goal: Readiness for Transition of Care  Outcome: Progressing   Goal Outcome Evaluation:      Improving

## 2024-10-06 LAB — H PYLORI AG STL QL IA: NEGATIVE

## 2024-10-06 NOTE — THERAPY DISCHARGE NOTE
Acute Care - Physical Therapy Discharge Summary  [facility]       Patient Name: Oral Dey  : 1940  MRN: [id number]    Today's Date: 10/6/2024                 Admit Date: 2024      PT Recommendation and Plan    Visit Dx:    ICD-10-CM ICD-9-CM   1. Pneumoperitoneum  K66.8 568.89   2. Impaired mobility [Z74.09]  Z74.09 799.89   3. Acute gastric ulcer with perforation  K25.1 531.10        Outcome Measures       Row Name 10/04/24 1500 10/04/24 1339 10/03/24 1547       How much help from another person do you currently need...    Turning from your back to your side while in flat bed without using bedrails? -- 3  -WK 3  -MF    Moving from lying on back to sitting on the side of a flat bed without bedrails? -- 3  -WK 3  -MF    Moving to and from a bed to a chair (including a wheelchair)? -- 4  -WK 3  -MF    Standing up from a chair using your arms (e.g., wheelchair, bedside chair)? -- 4  -WK 3  -MF    Climbing 3-5 steps with a railing? -- 3  -WK 2  -MF    To walk in hospital room? -- 3  -WK 3  -MF    AM-PAC 6 Clicks Score (PT) -- 20  -WK 17  -MF    Highest Level of Mobility Goal -- 6 --> Walk 10 steps or more  -WK 5 --> Static standing  -       How much help from another is currently needed...    Putting on and taking off regular lower body clothing? 3  -TS -- --    Bathing (including washing, rinsing, and drying) 3  -TS -- --    Toileting (which includes using toilet bed pan or urinal) 3  -TS -- --    Putting on and taking off regular upper body clothing 4  -TS -- --    Taking care of personal grooming (such as brushing teeth) 4  -TS -- --    Eating meals 4  -TS -- --    AM-PAC 6 Clicks Score (OT) 21  -TS -- --       Functional Assessment    Outcome Measure Options -- AM-PAC 6 Clicks Basic Mobility (PT)  -WK AM-PAC 6 Clicks Basic Mobility (PT)  -              User Key  (r) = Recorded By, (t) = Taken By, (c) = Cosigned By      Initials Name Provider Type    Maria Eugenia Carson COTA Occupational  Therapist Assistant    Germaine Batista, PTA Physical Therapist Assistant    Jayda Dunaway, ANAM Physical Therapist Assistant                         PT Rehab Goals       Row Name 10/06/24 2701             Bed Mobility Goal 1 (PT)    Activity/Assistive Device (Bed Mobility Goal 1, PT) sit to supine/supine to sit  -      [place] Level/Cues Needed (Bed Mobility Goal 1, PT) standby assist  -AH      Time Frame (Bed Mobility Goal 1, PT) long term goal (LTG);10 days  -AH      Progress/Outcomes (Bed Mobility Goal 1, PT) goal met  -AH         Transfer Goal 1 (PT)    Activity/Assistive Device (Transfer Goal 1, PT) sit-to-stand/stand-to-sit;bed-to-chair/chair-to-bed  -AH      [place]/Cues Needed (Transfer Goal 1, PT) standby assist  -AH      Time Frame (Transfer Goal 1, PT) long term goal (LTG);10 days  -AH      Progress/Outcome (Transfer Goal 1, PT) goal met  -         Gait Training Goal 1 (PT)    Activity/Assistive Device (Gait Training Goal 1, PT) gait (walking locomotion);assistive device use;decrease fall risk;improve balance and speed;increase endurance/gait distance;increase energy conservation  -      [place] Level (Gait Training Goal 1, PT) standby assist  -AH      Distance (Gait Training Goal 1, PT) 200 ft  -      Time Frame (Gait Training Goal 1, PT) long term goal (LTG);10 days  -      Progress/Outcome (Gait Training Goal 1, PT) goal not met  -         Stairs Goal 1 (PT)    Activity/Assistive Device (Stairs Goal 1, PT) ascending stairs;descending stairs;using handrail, left  -      [place] Level/Cues Needed (Stairs Goal 1, PT) contact guard required  -      Number of Stairs (Stairs Goal 1, PT) 3 steps  -      Time Frame (Stairs Goal 1, PT) long term goal (LTG);10 days  -AH      Progress/Outcome (Stairs Goal 1, PT) goal not met  -                User Key  (r) = Recorded By, (t) = Taken By, (c) = Cosigned By      Initials Name Provider Type [facility]  Anjana Madison, PTA Physical Therapist Assistant PT                        PT Discharge Summary  Reason for Discharge: Discharge from facility  Outcomes Achieved: Refer to plan of care for updates on goals achieved  Discharge Destination: Home with home health      Anjana Madison, ANAM   10/6/2024

## 2024-10-06 NOTE — OUTREACH NOTE
Prep Survey      Flowsheet Row Responses   Congregational facility patient discharged from? [place]   Is LACE score < 7 ? No   Eligibility Readm Mgmt   Discharge diagnosis Perforated gastric ulcer-Robotic repair   Does the patient have one of the following disease processes/diagnoses(primary or secondary)? General Surgery   Does the patient have Home health ordered? No   Is there a DME ordered? No   Prep survey completed? Yes            MARLI REEVES - Registered Nurse

## 2024-10-06 NOTE — THERAPY DISCHARGE NOTE
Acute Care - Occupational Therapy Discharge Summary  [facility]     Patient Name: Oral Dey  : 1940  MRN: [id number]    Today's Date: 10/6/2024                 Admit Date: 2024        OT Recommendation and Plan    Visit Dx:    ICD-10-CM ICD-9-CM   1. Pneumoperitoneum  K66.8 568.89   2. Impaired mobility [Z74.09]  Z74.09 799.89   3. Acute gastric ulcer with perforation  K25.1 531.10                OT Rehab Goals       Row Name 10/06/24 1300             Transfer Goal 1 (OT)    Activity/Assistive Device (Transfer Goal 1, OT) toilet;tub  -LS      [place] Level/Cues Needed (Transfer Goal 1, OT) standby assist  -LS      Time Frame (Transfer Goal 1, OT) long term goal (LTG);10 days  -LS      Progress/Outcome (Transfer Goal 1, OT) goal not met  -LS         Dressing Goal 1 (OT)    Activity/Device (Dressing Goal 1, OT) dressing skills, all  -LS      [place]/Cues Needed (Dressing Goal 1, OT) standby assist  -LS      Time Frame (Dressing Goal 1, OT) long term goal (LTG);10 days  -LS      Progress/Outcome (Dressing Goal 1, OT) goal not met  -LS         Toileting Goal 1 (OT)    Activity/Device (Toileting Goal 1, OT) toileting skills, all  -LS      [place] Level/Cues Needed (Toileting Goal 1, OT) standby assist  -LS      Time Frame (Toileting Goal 1, OT) long term goal (LTG);10 days  -LS      Progress/Outcome (Toileting Goal 1, OT) goal not met  -LS         Problem Specific Goal 1 (OT)    Problem Specific Goal 1 (OT) Pt will independently implement one pain management technique to decrease pain and improve functional performance.  -LS      Time Frame (Problem Specific Goal 1, OT) long term goal (LTG);by discharge  -LS      Progress/Outcome (Problem Specific Goal 1, OT) goal partially met  -LS                User Key  (r) = Recorded By, (t) = Taken By, (c) = Cosigned By      Initials Name Provider Type Discipline    LS Page Mayer, OTR/L Occupational Therapist OT                     Outcome  Measures       Row Name 10/04/24 1500 10/04/24 1339 10/03/24 1547       How much help from another person do you currently need...    Turning from your back to your side while in flat bed without using bedrails? -- 3  -WK 3  -MF    Moving from lying on back to sitting on the side of a flat bed without bedrails? -- 3  -WK 3  -MF    Moving to and from a bed to a chair (including a wheelchair)? -- 4  -WK 3  -MF    Standing up from a chair using your arms (e.g., wheelchair, bedside chair)? -- 4  -WK 3  -MF    Climbing 3-5 steps with a railing? -- 3  -WK 2  -MF    To walk in hospital room? -- 3  -WK 3  -MF    AM-PAC 6 Clicks Score (PT) -- 20  -WK 17  -MF    Highest Level of Mobility Goal -- 6 --> Walk 10 steps or more  -WK 5 --> Static standing  -       How much help from another is currently needed...    Putting on and taking off regular lower body clothing? 3  -TS -- --    Bathing (including washing, rinsing, and drying) 3  -TS -- --    Toileting (which includes using toilet bed pan or urinal) 3  -TS -- --    Putting on and taking off regular upper body clothing 4  -TS -- --    Taking care of personal grooming (such as brushing teeth) 4  -TS -- --    Eating meals 4  -TS -- --    AM-PAC 6 Clicks Score (OT) 21  -TS -- --       Functional Assessment    Outcome Measure Options -- AM-PAC 6 Clicks Basic Mobility (PT)  -WK AM-PAC 6 Clicks Basic Mobility (PT)  -              User Key  (r) = Recorded By, (t) = Taken By, (c) = Cosigned By      Initials Name Provider Type    TS Maria Eugenia Garsia COTA Occupational Therapist Assistant    Germaine Batista PTA Physical Therapist Assistant    WK Jayda Martinez PTA Physical Therapist Assistant                    Timed Therapy Charges  Total Units: 3      Suggested Charges  Total Units: 3      Procedure Name Documented Minutes Units Code    HC OT SELF CARE/MGMT/TRAIN EA 15 MIN 47 3   13782 (CPT®)                 Documented Minutes  Total Minutes: 47      Therapy  Provided Minutes    77764 - OT Self Care/Mgmt Minutes 47                        OT Discharge Summary  Anticipated Discharge Disposition (OT): home with assist, home with 24/7 care, home with home health  Reason for Discharge: Discharge from facility  Outcomes Achieved: Refer to plan of care for updates on goals achieved  Discharge Destination: Home with assist      Page Mayer OTR/L  10/6/2024

## 2024-10-07 RX ORDER — PANTOPRAZOLE SODIUM 40 MG/1
40 TABLET, DELAYED RELEASE ORAL DAILY
Qty: 90 TABLET | Refills: 0 | Status: SHIPPED | OUTPATIENT
Start: 2024-10-07

## 2024-10-10 ENCOUNTER — READMISSION MANAGEMENT (OUTPATIENT)
Dept: CALL CENTER | Facility: HOSPITAL | Age: 84
End: 2024-10-10
Payer: COMMERCIAL

## 2024-10-10 NOTE — OUTREACH NOTE
General Surgery Week 1 Survey      Flowsheet Row Responses   [facility] patient discharged from? [place]   Does the patient have one of the following disease processes/diagnoses(primary or secondary)? General Surgery   Week 1 attempt successful? Yes   Call start time 1042   Call end time 1052   Discharge diagnosis Perforated gastric ulcer-Robotic repair   Meds reviewed with patient/caregiver? Yes   Is the patient having any side effects they believe may be caused by any medication additions or changes? No   Does the patient have all medications related to this admission filled (includes all antibiotics, pain medications, etc.) Yes   Is the patient taking all medications as directed (includes completed medication regime)? Yes   Does the patient have a follow up appointment scheduled with their surgeon? Yes  [10/23/24]   Has the patient kept scheduled appointments due by today? N/A   Comments PCP appt on 10/15/24   Has home health visited the patient within 72 hours of discharge? N/A   Psychosocial issues? No   Did the patient receive a copy of their discharge instructions? Yes   Nursing interventions Reviewed instructions with patient   What is the patient's perception of their health status since discharge? Improving  [some LE edema]   Nursing interventions Nurse provided patient education   Is the patient /caregiver able to teach back basic post-op care? Lifting as instructed by MD in discharge instructions, Keep incision areas clean,dry and protected, No tub bath, swimming, or hot tub until instructed by MD, Take showers only when approved by MD-sponge bathe until then   Is the patient/caregiver able to teach back signs and symptoms of incisional infection? Increased redness, swelling or pain at the incisonal site, Increased drainage or bleeding   Is the patient/caregiver able to teach back steps to recovery at home? Set small, achievable goals for return to baseline health   Is the patient/caregiver able to  teach back the hierarchy of who to call/visit for symptoms/problems? PCP, Specialist, Home health nurse, Urgent Care, ED, 911 Yes   Week 1 call completed? Yes   Is the patient interested in additional calls from an ambulatory ? No   Would this patient benefit from a Referral to [facility] Social Work? No   Call end time 1052            Erin ASKEW - Registered Nurse

## 2024-10-11 LAB — REF LAB TEST METHOD: NORMAL

## 2024-10-15 ENCOUNTER — TELEPHONE (OUTPATIENT)
Dept: SURGERY | Facility: CLINIC | Age: 84
End: 2024-10-15
Payer: COMMERCIAL

## 2024-10-15 NOTE — TELEPHONE ENCOUNTER
Patient called and wanted to know when he could eat normal food after colon resection  per Grace whittaker medical assistant to Dr Malone  she had spoken to patient and instructed him that he could eat solid food on 10/17/2024

## 2024-10-23 ENCOUNTER — OFFICE VISIT (OUTPATIENT)
Dept: SURGERY | Facility: CLINIC | Age: 84
End: 2024-10-23
Payer: COMMERCIAL

## 2024-10-23 VITALS
BODY MASS INDEX: 18.75 KG/M2 | HEART RATE: 75 BPM | SYSTOLIC BLOOD PRESSURE: 137 MMHG | WEIGHT: 131 LBS | OXYGEN SATURATION: 98 % | DIASTOLIC BLOOD PRESSURE: 73 MMHG | HEIGHT: 70 IN

## 2024-10-23 DIAGNOSIS — K25.1 ACUTE GASTRIC ULCER WITH PERFORATION: Primary | ICD-10-CM

## 2024-10-23 DIAGNOSIS — Z98.890 POST-OPERATIVE STATE: ICD-10-CM

## 2024-10-23 PROCEDURE — 99024 POSTOP FOLLOW-UP VISIT: CPT | Performed by: STUDENT IN AN ORGANIZED HEALTH CARE EDUCATION/TRAINING PROGRAM

## 2024-10-23 RX ORDER — PANTOPRAZOLE SODIUM 40 MG/1
40 TABLET, DELAYED RELEASE ORAL DAILY
Qty: 30 TABLET | Refills: 2 | Status: SHIPPED | OUTPATIENT
Start: 2024-10-23 | End: 2025-01-21

## 2024-10-23 NOTE — PROGRESS NOTES
"Patient: Oral Dey    YOB: 1940    Date: 10/23/2024    Primary Care Provider: Jorge Shepherd MD    Vital Signs:   Vitals:    10/23/24 1049   BP: 137/73   Pulse: 75   SpO2: 98%   Weight: 59.4 kg (131 lb)   Height: 177.8 cm (70\")       The patient is tolerating a regular diet and has no complaints s/p modified leonel patch for a perforated gastric ulcer. The patient denies fevers, chills, nausea, vomiting, and excessive pain. Incisions well healed.     Results Review:   I reviewed the patient's new clinical results.        Assessment / Plan:    Diagnoses and all orders for this visit:    1. Acute gastric ulcer with perforation (Primary)  -     Ambulatory Referral to Physical Therapy for Evaluation & Treatment    2. Post-operative state  -     Ambulatory Referral to Physical Therapy for Evaluation & Treatment    Other orders  -     pantoprazole (Protonix) 40 MG EC tablet; Take 1 tablet by mouth Daily for 90 days.  Dispense: 30 tablet; Refill: 2      He has an appointment with RENUKA Jasso on 11/27/24 in GI for follow up EGD. Follow up with Jeaneth Okeefe PA-C in 2 months.     Electronically signed by Petrona Malone MD  10/23/24  11:12 CDT                  "

## 2024-10-30 DIAGNOSIS — Z85.51 HISTORY OF BLADDER CANCER: ICD-10-CM

## 2024-11-06 ENCOUNTER — PROCEDURE VISIT (OUTPATIENT)
Dept: UROLOGY | Age: 84
End: 2024-11-06
Payer: COMMERCIAL

## 2024-11-06 DIAGNOSIS — Z85.51 HISTORY OF BLADDER CANCER: ICD-10-CM

## 2024-11-06 DIAGNOSIS — C67.1 MALIGNANT NEOPLASM OF DOME OF URINARY BLADDER (HCC): Primary | ICD-10-CM

## 2024-11-06 LAB
APPEARANCE FLUID: CLEAR
BILIRUBIN, POC: NORMAL
BLOOD URINE, POC: NORMAL
CLARITY, POC: CLEAR
COLOR, POC: YELLOW
GLUCOSE URINE, POC: NORMAL MG/DL
KETONES, POC: NORMAL MG/DL
LEUKOCYTE EST, POC: NORMAL
NITRITE, POC: NORMAL
PH, POC: 5.5
PROTEIN, POC: NORMAL MG/DL
SPECIFIC GRAVITY, POC: 1.01
UROBILINOGEN, POC: 0.2 MG/DL

## 2024-11-06 PROCEDURE — 52000 CYSTOURETHROSCOPY: CPT | Performed by: UROLOGY

## 2024-11-06 PROCEDURE — 81002 URINALYSIS NONAUTO W/O SCOPE: CPT | Performed by: UROLOGY

## 2024-11-06 NOTE — PROGRESS NOTES
BLADDER CANCER  Patient was first diagnosed with bladder cancer approximately 2.75 years(s) ago.  January 2022  Last Recurrence: 5/2/2023.  He had lamina propria invasion and completed a 6-week course of induction BCG on 9/13/2023.  He completed a 3-month maintenance BCG on 2/7/2024.  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  None  Last upper tract study: 16 month(s) ago.  Study was a retrograde pyelograms done 6/6/2023        Cystoscopy Operative Note (11/6/24)  Surgeon: Armaan Ng MD  Anesthesia: Urethral 2% Xylocaine   Indications: History of bladder cancer  Position: Supine    Findings:   The patient was prepped and draped in the usual sterile fashion.  The flexible cystoscope was advanced through the urethra and into the bladder under direct vision.  The bladder was thoroughly inspected and the following was noted:    Residual Urine: mild  Urethra: normal appearing urethra with no masses, tenderness or lesions  Prostate: partially obstructing lateral lobes of prostate; median lobe present? no.  He has mild to moderate lateral lobe enlargement with some partial obstruction  Bladder: No tumors or CIS noted.  No bladder diverticulum.  There was moderate trabeculation noted.  No recurrent papillary tumors are seen  Ureters: Clear efflux from both ureters.  Orifices with normal configuration and location.    The cystoscope was removed.  The patient tolerated the procedure well.  The patient was given a post procedure instructions and follow up.    Results for orders placed or performed in visit on 11/06/24   POCT Urinalysis no Micro   Result Value Ref Range    Color, UA yellow     Clarity, UA clear     Glucose, UA POC neg mg/dL    Bilirubin, UA neg     Ketones, UA neg mg/dL    Spec Grav, UA

## 2024-11-22 ENCOUNTER — TELEPHONE (OUTPATIENT)
Dept: SURGERY | Facility: CLINIC | Age: 84
End: 2024-11-22
Payer: COMMERCIAL

## 2024-11-22 DIAGNOSIS — K25.1 ACUTE GASTRIC ULCER WITH PERFORATION: Primary | ICD-10-CM

## 2024-11-22 DIAGNOSIS — K25.5 GASTRIC ULCER WITH PERFORATION, UNSPECIFIED CHRONICITY: ICD-10-CM

## 2024-11-25 RX ORDER — PANTOPRAZOLE SODIUM 40 MG/1
40 TABLET, DELAYED RELEASE ORAL DAILY
Qty: 90 TABLET | Refills: 1 | Status: SHIPPED | OUTPATIENT
Start: 2024-11-25

## 2024-11-27 ENCOUNTER — OFFICE VISIT (OUTPATIENT)
Dept: GASTROENTEROLOGY | Facility: CLINIC | Age: 84
End: 2024-11-27
Payer: COMMERCIAL

## 2024-11-27 VITALS
WEIGHT: 136 LBS | DIASTOLIC BLOOD PRESSURE: 56 MMHG | OXYGEN SATURATION: 99 % | HEIGHT: 70 IN | TEMPERATURE: 97.8 F | HEART RATE: 69 BPM | SYSTOLIC BLOOD PRESSURE: 112 MMHG | BODY MASS INDEX: 19.47 KG/M2

## 2024-11-27 DIAGNOSIS — Z87.11 HISTORY OF GASTRIC ULCER: Primary | ICD-10-CM

## 2024-11-27 NOTE — PROGRESS NOTES
[facility] GASTROENTEROLOGY - OFFICE NOTE    11/27/2024    Oral Dey   1940    Primary Physician: Jorge Shepherd MD    Referring Physician: Petrona Malone MD    Chief Complaint   Patient presents with    GI Problem     Recent ER stay   Gastric ulcer       HISTORY OF PRESENT ILLNESS:     Oral Dey is a 83 y.o. male presents with recent gastric ulcer requiring surgical repair 9/2024 by Dr. Petrona Malone. He reports that prior to surgery he had not been taken any meds that could cause an ulcer. Appetite is good. Currently no abdominal pain. No n/v. No fever.       He is taking pantoprazole daily.    He denies taking asa, nsaids, or arthritis meds.     H. Pylori Antigen, Stool - Stool, Per Rectum (10/04/2024 04:26) negative.     FL Upper GI Water Soluble (10/01/2024 11:11)   CT Angiogram Abdomen Pelvis (09/26/2024 19:43)     Colonoscopy, Scan (06/26/2024 00:00)       Past Medical History:   Diagnosis Date    Bladder cancer     CLL (chronic lymphocytic leukemia)     Colon polyp     Gallstone     GERD (gastroesophageal reflux disease)     Glaucoma     Hearing loss     Hypertension     Inguinal hernia     right groin    Macular degeneration, right eye        Past Surgical History:   Procedure Laterality Date    CATARACT EXTRACTION, BILATERAL      COLONOSCOPY  06/13/2012    CYSTOSCOPY BLADDER BIOPSY      DIAGNOSTIC LAPAROSCOPY N/A 9/26/2024    Procedure: ROBOTIC REPAIR OF PERFORATED GASTRIC ULCER;  Surgeon: Petrona Malone MD;  Location: [facility] OR;  Service: General;  Laterality: N/A;  WITH REPAIR OF GASTRIC ULCER PERFORATION    EXCISION MASS HEAD/NECK N/A 3/4/2022    Procedure: EXCISION OF MASS ON POSTERIOR NECK;  Surgeon: Rossana Mahajan MD;  Location: [facility] OR;  Service: General;  Laterality: N/A;    INGUINAL HERNIA REPAIR Left 2007    INGUINAL HERNIA REPAIR Right 12/28/2017    Procedure: RIGHT INGUINAL HERNIA REPAIR WITH MESH ;  Surgeon: Rossana Mahajan MD;  Location: [facility] OR;   Service:        Outpatient Medications Marked as Taking for the 24 encounter (Office Visit) with Laurie Ordaz APRN   Medication Sig Dispense Refill    Ferrous Sulfate (IRON PO) Take  by mouth Daily.      latanoprost (XALATAN) 0.005 % ophthalmic solution Administer 1 drop to the right eye Every Night.      metoprolol tartrate (LOPRESSOR) 25 MG tablet Take 1 tablet by mouth 2 (Two) Times a Day.      multivitamins-minerals (PRESERVISION AREDS 2) capsule capsule Take 1 capsule by mouth 2 (Two) Times a Day.      pantoprazole (Protonix) 40 MG EC tablet Take 1 tablet by mouth Daily. 90 tablet 1    tamsulosin (FLOMAX) 0.4 MG capsule 24 hr capsule Take 1 capsule by mouth Daily.      vitamin B-12 (CYANOCOBALAMIN) 1000 MCG tablet Take 1 tablet by mouth Daily.      vitamin D3 125 MCG (5000 UT) capsule capsule Take 1 capsule by mouth Daily.         Allergies   Allergen Reactions    Augmentin [Amoxicillin-Pot Clavulanate] Hives    Latex Itching and Other (See Comments)     And band aids. Causes redness and itching.       Social History     Socioeconomic History    Marital status:    Tobacco Use    Smoking status: Former     Current packs/day: 0.00     Types: Cigarettes     Start date:      Quit date:      Years since quittin.9    Smokeless tobacco: Never   Vaping Use    Vaping status: Never Used   Substance and Sexual Activity    Alcohol use: No    Drug use: No    Sexual activity: Defer       Family History   Problem Relation Age of Onset    Colon cancer Maternal Grandfather         in his 60's.     Alzheimer's disease Mother     Hypertension Father     Other Father         stent    COPD Sister     Heart attack Brother     No Known Problems Maternal Grandmother     No Known Problems Paternal Grandmother     No Known Problems Paternal Grandfather        Review of Systems   Constitutional:  Negative for chills, fever and unexpected weight change.   Respiratory:  Negative for shortness of breath.   "  Cardiovascular:  Negative for chest pain.   Gastrointestinal:  Negative for abdominal distention, abdominal pain, anal bleeding, blood in stool, constipation, diarrhea, nausea and vomiting.        Vitals:    11/27/24 0954   BP: 112/56   Pulse: 69   Temp: 97.8 °F (36.6 °C)   SpO2: 99%   Weight: 61.7 kg (136 lb)   Height: 177.8 cm (70\")      Body mass index is 19.51 kg/m².    Physical Exam  Vitals reviewed.   Constitutional:       General: He is not in acute distress.  Cardiovascular:      Rate and Rhythm: Normal rate and regular rhythm.      Heart sounds: Normal heart sounds.   Pulmonary:      Effort: Pulmonary effort is normal.      Breath sounds: Normal breath sounds.   Abdominal:      General: Bowel sounds are normal. There is no distension.      Palpations: Abdomen is soft.      Tenderness: There is no abdominal tenderness.   Skin:     General: Skin is warm and dry.   Neurological:      Mental Status: He is alert.         Results for orders placed or performed during the hospital encounter of 09/26/24   Comprehensive Metabolic Panel    Collection Time: 09/26/24  6:58 PM    Specimen: Blood   Result Value Ref Range    Glucose 151 (H) 65 - 99 mg/dL    BUN 21 8 - 23 mg/dL    Creatinine 1.13 0.76 - 1.27 mg/dL    Sodium 134 (L) 136 - 145 mmol/L    Potassium 4.4 3.5 - 5.2 mmol/L    Chloride 96 (L) 98 - 107 mmol/L    CO2 25.0 22.0 - 29.0 mmol/L    Calcium 9.0 8.6 - 10.5 mg/dL    Total Protein 6.3 6.0 - 8.5 g/dL    Albumin 4.0 3.5 - 5.2 g/dL    ALT (SGPT) 12 1 - 41 U/L    AST (SGOT) 13 1 - 40 U/L    Alkaline Phosphatase 123 (H) 39 - 117 U/L    Total Bilirubin 0.8 0.0 - 1.2 mg/dL    Globulin 2.3 gm/dL    A/G Ratio 1.7 g/dL    BUN/Creatinine Ratio 18.6 7.0 - 25.0    Anion Gap 13.0 5.0 - 15.0 mmol/L    eGFR 65.3 >60.0 mL/min/1.73   Lipase    Collection Time: 09/26/24  6:58 PM    Specimen: Blood   Result Value Ref Range    Lipase 43 13 - 60 U/L   Single High Sensitivity Troponin T    Collection Time: 09/26/24  6:58 PM    " Specimen: Blood   Result Value Ref Range    HS Troponin T 11 <22 ng/L   Magnesium    Collection Time: 09/26/24  6:58 PM    Specimen: Blood   Result Value Ref Range    Magnesium 2.0 1.6 - 2.4 mg/dL   CBC Auto Differential    Collection Time: 09/26/24  6:58 PM    Specimen: Blood   Result Value Ref Range    WBC 9.53 3.40 - 10.80 10*3/mm3    RBC 3.87 (L) 4.14 - 5.80 10*6/mm3    Hemoglobin 11.5 (L) 13.0 - 17.7 g/dL    Hematocrit 34.1 (L) 37.5 - 51.0 %    MCV 88.1 79.0 - 97.0 fL    MCH 29.7 26.6 - 33.0 pg    MCHC 33.7 31.5 - 35.7 g/dL    RDW 12.5 12.3 - 15.4 %    RDW-SD 40.1 37.0 - 54.0 fl    MPV 8.6 6.0 - 12.0 fL    Platelets 239 140 - 450 10*3/mm3    Neutrophil % 65.4 42.7 - 76.0 %    Lymphocyte % 26.9 19.6 - 45.3 %    Monocyte % 7.0 5.0 - 12.0 %    Eosinophil % 0.0 (L) 0.3 - 6.2 %    Basophil % 0.2 0.0 - 1.5 %    Immature Grans % 0.5 0.0 - 0.5 %    Neutrophils, Absolute 6.23 1.70 - 7.00 10*3/mm3    Lymphocytes, Absolute 2.56 0.70 - 3.10 10*3/mm3    Monocytes, Absolute 0.67 0.10 - 0.90 10*3/mm3    Eosinophils, Absolute 0.00 0.00 - 0.40 10*3/mm3    Basophils, Absolute 0.02 0.00 - 0.20 10*3/mm3    Immature Grans, Absolute 0.05 0.00 - 0.05 10*3/mm3    nRBC 0.0 0.0 - 0.2 /100 WBC   Green Top (Gel)    Collection Time: 09/26/24  6:58 PM   Result Value Ref Range    Extra Tube Hold for add-ons.    Lavender Top    Collection Time: 09/26/24  6:58 PM   Result Value Ref Range    Extra Tube hold for add-on    Red Top    Collection Time: 09/26/24  6:58 PM   Result Value Ref Range    Extra Tube Hold for add-ons.    Gray Top    Collection Time: 09/26/24  6:58 PM   Result Value Ref Range    Extra Tube Hold for add-ons.    Light Blue Top    Collection Time: 09/26/24  6:58 PM   Result Value Ref Range    Extra Tube Hold for add-ons.    ECG 12 Lead Chest Pain    Collection Time: 09/26/24  7:07 PM   Result Value Ref Range    QT Interval 392 ms    QTC Interval 437 ms   Urinalysis With Microscopic If Indicated (No Culture) - Urine, Clean Catch     Collection Time: 09/26/24  8:31 PM    Specimen: Urine, Clean Catch   Result Value Ref Range    Color, UA Yellow Yellow, Straw    Appearance, UA Clear Clear    pH, UA 6.5 5.0 - 8.0    Specific Gravity, UA >1.030 (H) 1.005 - 1.030    Glucose, UA Negative Negative    Ketones, UA Negative Negative    Bilirubin, UA Negative Negative    Blood, UA Negative Negative    Protein, UA Negative Negative    Leuk Esterase, UA Negative Negative    Nitrite, UA Negative Negative    Urobilinogen, UA 0.2 E.U./dL 0.2 - 1.0 E.U./dL   Type & Screen    Collection Time: 09/26/24  9:14 PM    Specimen: Blood   Result Value Ref Range    ABO Type A     RH type Positive     Antibody Screen Negative     T&S Expiration Date 9/29/2024 11:59:59 PM    Comprehensive Metabolic Panel    Collection Time: 09/27/24  2:14 AM    Specimen: Blood   Result Value Ref Range    Glucose 135 (H) 65 - 99 mg/dL    BUN 19 8 - 23 mg/dL    Creatinine 1.03 0.76 - 1.27 mg/dL    Sodium 134 (L) 136 - 145 mmol/L    Potassium 4.5 3.5 - 5.2 mmol/L    Chloride 100 98 - 107 mmol/L    CO2 22.0 22.0 - 29.0 mmol/L    Calcium 8.0 (L) 8.6 - 10.5 mg/dL    Total Protein 5.1 (L) 6.0 - 8.5 g/dL    Albumin 3.1 (L) 3.5 - 5.2 g/dL    ALT (SGPT) 12 1 - 41 U/L    AST (SGOT) 16 1 - 40 U/L    Alkaline Phosphatase 98 39 - 117 U/L    Total Bilirubin 0.8 0.0 - 1.2 mg/dL    Globulin 2.0 gm/dL    A/G Ratio 1.6 g/dL    BUN/Creatinine Ratio 18.4 7.0 - 25.0    Anion Gap 12.0 5.0 - 15.0 mmol/L    eGFR 73.0 >60.0 mL/min/1.73   Magnesium    Collection Time: 09/27/24  2:14 AM    Specimen: Blood   Result Value Ref Range    Magnesium 1.9 1.6 - 2.4 mg/dL   Phosphorus    Collection Time: 09/27/24  2:14 AM    Specimen: Blood   Result Value Ref Range    Phosphorus 3.7 2.5 - 4.5 mg/dL   Blood Culture With DEV - Blood, Hand, Left    Collection Time: 09/27/24  4:48 AM    Specimen: Hand, Left; Blood   Result Value Ref Range    Blood Culture No growth at 5 days    Blood Culture With DEV - Blood, Hand, Right     Collection Time: 09/27/24  4:53 AM    Specimen: Hand, Right; Blood   Result Value Ref Range    Blood Culture No growth at 5 days    Lactic Acid, Plasma    Collection Time: 09/27/24  4:53 AM    Specimen: Blood   Result Value Ref Range    Lactate 2.3 (C) 0.5 - 2.0 mmol/L   STAT Lactic Acid, Reflex    Collection Time: 09/27/24 10:18 AM    Specimen: Blood   Result Value Ref Range    Lactate 2.1 (C) 0.5 - 2.0 mmol/L   CBC Auto Differential    Collection Time: 09/27/24 10:18 AM    Specimen: Blood   Result Value Ref Range    WBC 15.78 (H) 3.40 - 10.80 10*3/mm3    RBC 3.53 (L) 4.14 - 5.80 10*6/mm3    Hemoglobin 10.5 (L) 13.0 - 17.7 g/dL    Hematocrit 31.7 (L) 37.5 - 51.0 %    MCV 89.8 79.0 - 97.0 fL    MCH 29.7 26.6 - 33.0 pg    MCHC 33.1 31.5 - 35.7 g/dL    RDW 12.7 12.3 - 15.4 %    RDW-SD 41.8 37.0 - 54.0 fl    MPV 8.9 6.0 - 12.0 fL    Platelets 203 140 - 450 10*3/mm3    Neutrophil % 85.9 (H) 42.7 - 76.0 %    Lymphocyte % 8.0 (L) 19.6 - 45.3 %    Monocyte % 5.3 5.0 - 12.0 %    Eosinophil % 0.0 (L) 0.3 - 6.2 %    Basophil % 0.2 0.0 - 1.5 %    Immature Grans % 0.6 (H) 0.0 - 0.5 %    Neutrophils, Absolute 13.57 (H) 1.70 - 7.00 10*3/mm3    Lymphocytes, Absolute 1.26 0.70 - 3.10 10*3/mm3    Monocytes, Absolute 0.83 0.10 - 0.90 10*3/mm3    Eosinophils, Absolute 0.00 0.00 - 0.40 10*3/mm3    Basophils, Absolute 0.03 0.00 - 0.20 10*3/mm3    Immature Grans, Absolute 0.09 (H) 0.00 - 0.05 10*3/mm3    nRBC 0.0 0.0 - 0.2 /100 WBC   STAT Lactic Acid, Reflex    Collection Time: 09/27/24 12:21 PM    Specimen: Blood   Result Value Ref Range    Lactate 3.4 (C) 0.5 - 2.0 mmol/L   STAT Lactic Acid, Reflex    Collection Time: 09/27/24  4:14 PM    Specimen: Blood   Result Value Ref Range    Lactate 2.1 (C) 0.5 - 2.0 mmol/L   STAT Lactic Acid, Reflex    Collection Time: 09/27/24 10:06 PM    Specimen: Blood   Result Value Ref Range    Lactate 1.2 0.5 - 2.0 mmol/L   Magnesium    Collection Time: 09/28/24  6:02 AM    Specimen: Blood   Result Value  Ref Range    Magnesium 2.0 1.6 - 2.4 mg/dL   Phosphorus    Collection Time: 09/28/24  6:02 AM    Specimen: Blood   Result Value Ref Range    Phosphorus 3.3 2.5 - 4.5 mg/dL   Basic Metabolic Panel    Collection Time: 09/28/24  6:02 AM    Specimen: Blood   Result Value Ref Range    Glucose 113 (H) 65 - 99 mg/dL    BUN 22 8 - 23 mg/dL    Creatinine 1.08 0.76 - 1.27 mg/dL    Sodium 133 (L) 136 - 145 mmol/L    Potassium 4.8 3.5 - 5.2 mmol/L    Chloride 100 98 - 107 mmol/L    CO2 25.0 22.0 - 29.0 mmol/L    Calcium 8.6 8.6 - 10.5 mg/dL    BUN/Creatinine Ratio 20.4 7.0 - 25.0    Anion Gap 8.0 5.0 - 15.0 mmol/L    eGFR 68.9 >60.0 mL/min/1.73   CBC Auto Differential    Collection Time: 09/28/24  6:02 AM    Specimen: Blood   Result Value Ref Range    WBC 13.65 (H) 3.40 - 10.80 10*3/mm3    RBC 3.44 (L) 4.14 - 5.80 10*6/mm3    Hemoglobin 10.0 (L) 13.0 - 17.7 g/dL    Hematocrit 31.5 (L) 37.5 - 51.0 %    MCV 91.6 79.0 - 97.0 fL    MCH 29.1 26.6 - 33.0 pg    MCHC 31.7 31.5 - 35.7 g/dL    RDW 12.8 12.3 - 15.4 %    RDW-SD 42.4 37.0 - 54.0 fl    MPV 8.4 6.0 - 12.0 fL    Platelets 172 140 - 450 10*3/mm3    Neutrophil % 73.3 42.7 - 76.0 %    Lymphocyte % 19.0 (L) 19.6 - 45.3 %    Monocyte % 6.7 5.0 - 12.0 %    Eosinophil % 0.0 (L) 0.3 - 6.2 %    Basophil % 0.1 0.0 - 1.5 %    Immature Grans % 0.9 (H) 0.0 - 0.5 %    Neutrophils, Absolute 10.00 (H) 1.70 - 7.00 10*3/mm3    Lymphocytes, Absolute 2.60 0.70 - 3.10 10*3/mm3    Monocytes, Absolute 0.91 (H) 0.10 - 0.90 10*3/mm3    Eosinophils, Absolute 0.00 0.00 - 0.40 10*3/mm3    Basophils, Absolute 0.02 0.00 - 0.20 10*3/mm3    Immature Grans, Absolute 0.12 (H) 0.00 - 0.05 10*3/mm3    nRBC 0.0 0.0 - 0.2 /100 WBC   ECG 12 Lead Tachycardia    Collection Time: 09/28/24  3:53 PM   Result Value Ref Range    QT Interval 258 ms    QTC Interval 430 ms   Respiratory Panel PCR w/COVID-19(SARS-CoV-2) [facility] In-House, NP Swab in UTM/VTM, 2 HR TAT - Swab, Nasopharynx    Collection Time:  09/28/24  5:00 PM    Specimen: Nasopharynx; Swab   Result Value Ref Range    ADENOVIRUS, PCR Not Detected Not Detected    Coronavirus 229E Not Detected Not Detected    Coronavirus HKU1 Not Detected Not Detected    Coronavirus NL63 Not Detected Not Detected    Coronavirus OC43 Not Detected Not Detected    COVID19 Detected (C) Not Detected - Ref. Range    Human Metapneumovirus Not Detected Not Detected    Human Rhinovirus/Enterovirus Not Detected Not Detected    Influenza A PCR Not Detected Not Detected    Influenza B PCR Not Detected Not Detected    Parainfluenza Virus 1 Not Detected Not Detected    Parainfluenza Virus 2 Not Detected Not Detected    Parainfluenza Virus 3 Not Detected Not Detected    Parainfluenza Virus 4 Not Detected Not Detected    RSV, PCR Not Detected Not Detected    Bordetella pertussis pcr Not Detected Not Detected    Bordetella parapertussis PCR Not Detected Not Detected    Chlamydophila pneumoniae PCR Not Detected Not Detected    Mycoplasma pneumo by PCR Not Detected Not Detected   Magnesium    Collection Time: 09/29/24  4:28 AM    Specimen: Blood   Result Value Ref Range    Magnesium 1.9 1.6 - 2.4 mg/dL   Phosphorus    Collection Time: 09/29/24  4:28 AM    Specimen: Blood   Result Value Ref Range    Phosphorus 2.6 2.5 - 4.5 mg/dL   Basic Metabolic Panel    Collection Time: 09/29/24  4:28 AM    Specimen: Blood   Result Value Ref Range    Glucose 96 65 - 99 mg/dL    BUN 20 8 - 23 mg/dL    Creatinine 1.01 0.76 - 1.27 mg/dL    Sodium 132 (L) 136 - 145 mmol/L    Potassium 4.3 3.5 - 5.2 mmol/L    Chloride 97 (L) 98 - 107 mmol/L    CO2 25.0 22.0 - 29.0 mmol/L    Calcium 8.2 (L) 8.6 - 10.5 mg/dL    BUN/Creatinine Ratio 19.8 7.0 - 25.0    Anion Gap 10.0 5.0 - 15.0 mmol/L    eGFR 74.7 >60.0 mL/min/1.73   CBC Auto Differential    Collection Time: 09/29/24  4:28 AM    Specimen: Blood   Result Value Ref Range    WBC 13.52 (H) 3.40 - 10.80 10*3/mm3    RBC 3.30 (L) 4.14 - 5.80 10*6/mm3    Hemoglobin 9.8 (L)  13.0 - 17.7 g/dL    Hematocrit 30.1 (L) 37.5 - 51.0 %    MCV 91.2 79.0 - 97.0 fL    MCH 29.7 26.6 - 33.0 pg    MCHC 32.6 31.5 - 35.7 g/dL    RDW 12.8 12.3 - 15.4 %    RDW-SD 42.2 37.0 - 54.0 fl    MPV 9.4 6.0 - 12.0 fL    Platelets 197 140 - 450 10*3/mm3    Neutrophil % 74.9 42.7 - 76.0 %    Lymphocyte % 17.6 (L) 19.6 - 45.3 %    Monocyte % 6.4 5.0 - 12.0 %    Eosinophil % 0.1 (L) 0.3 - 6.2 %    Basophil % 0.1 0.0 - 1.5 %    Immature Grans % 0.9 (H) 0.0 - 0.5 %    Neutrophils, Absolute 10.13 (H) 1.70 - 7.00 10*3/mm3    Lymphocytes, Absolute 2.38 0.70 - 3.10 10*3/mm3    Monocytes, Absolute 0.86 0.10 - 0.90 10*3/mm3    Eosinophils, Absolute 0.01 0.00 - 0.40 10*3/mm3    Basophils, Absolute 0.02 0.00 - 0.20 10*3/mm3    Immature Grans, Absolute 0.12 (H) 0.00 - 0.05 10*3/mm3    nRBC 0.0 0.0 - 0.2 /100 WBC   Magnesium    Collection Time: 09/30/24  6:34 AM    Specimen: Blood   Result Value Ref Range    Magnesium 2.0 1.6 - 2.4 mg/dL   Phosphorus    Collection Time: 09/30/24  6:34 AM    Specimen: Blood   Result Value Ref Range    Phosphorus 2.5 2.5 - 4.5 mg/dL   Basic Metabolic Panel    Collection Time: 09/30/24  6:34 AM    Specimen: Blood   Result Value Ref Range    Glucose 103 (H) 65 - 99 mg/dL    BUN 22 8 - 23 mg/dL    Creatinine 0.94 0.76 - 1.27 mg/dL    Sodium 132 (L) 136 - 145 mmol/L    Potassium 4.1 3.5 - 5.2 mmol/L    Chloride 98 98 - 107 mmol/L    CO2 25.0 22.0 - 29.0 mmol/L    Calcium 7.9 (L) 8.6 - 10.5 mg/dL    BUN/Creatinine Ratio 23.4 7.0 - 25.0    Anion Gap 9.0 5.0 - 15.0 mmol/L    eGFR 81.4 >60.0 mL/min/1.73   CBC Auto Differential    Collection Time: 09/30/24  6:34 AM    Specimen: Blood   Result Value Ref Range    WBC 10.76 3.40 - 10.80 10*3/mm3    RBC 3.31 (L) 4.14 - 5.80 10*6/mm3    Hemoglobin 9.6 (L) 13.0 - 17.7 g/dL    Hematocrit 30.1 (L) 37.5 - 51.0 %    MCV 90.9 79.0 - 97.0 fL    MCH 29.0 26.6 - 33.0 pg    MCHC 31.9 31.5 - 35.7 g/dL    RDW 12.6 12.3 - 15.4 %    RDW-SD 42.0 37.0 - 54.0 fl    MPV 9.0 6.0 -  12.0 fL    Platelets 198 140 - 450 10*3/mm3    Neutrophil % 74.2 42.7 - 76.0 %    Lymphocyte % 15.2 (L) 19.6 - 45.3 %    Monocyte % 9.0 5.0 - 12.0 %    Eosinophil % 0.7 0.3 - 6.2 %    Basophil % 0.2 0.0 - 1.5 %    Immature Grans % 0.7 (H) 0.0 - 0.5 %    Neutrophils, Absolute 7.98 (H) 1.70 - 7.00 10*3/mm3    Lymphocytes, Absolute 1.64 0.70 - 3.10 10*3/mm3    Monocytes, Absolute 0.97 (H) 0.10 - 0.90 10*3/mm3    Eosinophils, Absolute 0.08 0.00 - 0.40 10*3/mm3    Basophils, Absolute 0.02 0.00 - 0.20 10*3/mm3    Immature Grans, Absolute 0.07 (H) 0.00 - 0.05 10*3/mm3    nRBC 0.0 0.0 - 0.2 /100 WBC   Adult Transthoracic Echo Complete W/ Cont if Necessary Per Protocol    Collection Time: 09/30/24  5:09 PM   Result Value Ref Range    LVIDd 5.0 cm    LVIDs 3.1 cm    IVSd 0.88 cm    LVPWd 0.87 cm    FS 38.9 %    IVS/LVPW 1.01 cm    ESV(cubed) 29.2 ml    LV Sys Vol (BSA corrected) 14.9 cm2    EDV(cubed) 128.0 ml    LV [place] Vol (BSA corrected) 38.8 cm2    LV mass(C)d 154.5 grams    LVOT area 3.8 cm2    LVOT diam 2.20 cm    EDV(MOD-sp4) 72.4 ml    ESV(MOD-sp4) 27.8 ml    SV(MOD-sp4) 44.6 ml    SVi(MOD-SP4) 23.9 ml/m2    SVi (LVOT) 39.7 ml/m2    EF(MOD-sp4) 61.6 %    MV E max dave 108.0 cm/sec    MV A max dave 80.4 cm/sec    MV dec time 0.22 sec    MV E/A 1.34     LA ESV Index (BP) 17.5 ml/m2    Med Peak E' Dave 9.8 cm/sec    Lat Peak E' Dave 9.1 cm/sec    TR max dave 234.0 cm/sec    Avg E/e' ratio 11.43     SV(LVOT) 74.1 ml    LA dimension (2D)  3.5 cm    LV V1 max 96.3 cm/sec    LV V1 max PG 3.7 mmHg    LV V1 mean PG 2.00 mmHg    LV V1 VTI 19.5 cm    Ao pk dave 143.0 cm/sec    Ao max PG 8.2 mmHg    Ao mean PG 4.0 mmHg    Ao V2 VTI 30.7 cm    SALUD(I,D) 2.41 cm2    TR max PG 21.9 mmHg    RVSP(TR) 24.9 mmHg    RAP systole 3.0 mmHg    Ao root diam 3.4 cm    LA ESV (BP) 32.7 ml   Magnesium    Collection Time: 10/01/24  4:43 AM    Specimen: Blood   Result Value Ref Range    Magnesium 2.0 1.6 - 2.4 mg/dL   Phosphorus    Collection Time:  10/01/24  4:43 AM    Specimen: Blood   Result Value Ref Range    Phosphorus 2.4 (L) 2.5 - 4.5 mg/dL   Basic Metabolic Panel    Collection Time: 10/01/24  4:43 AM    Specimen: Blood   Result Value Ref Range    Glucose 97 65 - 99 mg/dL    BUN 19 8 - 23 mg/dL    Creatinine 0.78 0.76 - 1.27 mg/dL    Sodium 133 (L) 136 - 145 mmol/L    Potassium 4.0 3.5 - 5.2 mmol/L    Chloride 98 98 - 107 mmol/L    CO2 24.0 22.0 - 29.0 mmol/L    Calcium 8.0 (L) 8.6 - 10.5 mg/dL    BUN/Creatinine Ratio 24.4 7.0 - 25.0    Anion Gap 11.0 5.0 - 15.0 mmol/L    eGFR 89.6 >60.0 mL/min/1.73   CBC Auto Differential    Collection Time: 10/01/24  4:43 AM    Specimen: Blood   Result Value Ref Range    WBC 9.47 3.40 - 10.80 10*3/mm3    RBC 3.34 (L) 4.14 - 5.80 10*6/mm3    Hemoglobin 9.8 (L) 13.0 - 17.7 g/dL    Hematocrit 29.8 (L) 37.5 - 51.0 %    MCV 89.2 79.0 - 97.0 fL    MCH 29.3 26.6 - 33.0 pg    MCHC 32.9 31.5 - 35.7 g/dL    RDW 12.4 12.3 - 15.4 %    RDW-SD 40.6 37.0 - 54.0 fl    MPV 8.8 6.0 - 12.0 fL    Platelets 198 140 - 450 10*3/mm3    Neutrophil % 68.4 42.7 - 76.0 %    Lymphocyte % 19.7 19.6 - 45.3 %    Monocyte % 9.6 5.0 - 12.0 %    Eosinophil % 1.7 0.3 - 6.2 %    Basophil % 0.2 0.0 - 1.5 %    Immature Grans % 0.4 0.0 - 0.5 %    Neutrophils, Absolute 6.47 1.70 - 7.00 10*3/mm3    Lymphocytes, Absolute 1.87 0.70 - 3.10 10*3/mm3    Monocytes, Absolute 0.91 (H) 0.10 - 0.90 10*3/mm3    Eosinophils, Absolute 0.16 0.00 - 0.40 10*3/mm3    Basophils, Absolute 0.02 0.00 - 0.20 10*3/mm3    Immature Grans, Absolute 0.04 0.00 - 0.05 10*3/mm3    nRBC 0.0 0.0 - 0.2 /100 WBC   Magnesium    Collection Time: 10/02/24  4:22 AM    Specimen: Blood   Result Value Ref Range    Magnesium 1.9 1.6 - 2.4 mg/dL   Phosphorus    Collection Time: 10/02/24  4:22 AM    Specimen: Blood   Result Value Ref Range    Phosphorus 2.4 (L) 2.5 - 4.5 mg/dL   Basic Metabolic Panel    Collection Time: 10/02/24  4:22 AM    Specimen: Blood   Result Value Ref Range    Glucose 103 (H) 65  - 99 mg/dL    BUN 17 8 - 23 mg/dL    Creatinine 0.84 0.76 - 1.27 mg/dL    Sodium 134 (L) 136 - 145 mmol/L    Potassium 3.7 3.5 - 5.2 mmol/L    Chloride 98 98 - 107 mmol/L    CO2 26.0 22.0 - 29.0 mmol/L    Calcium 7.9 (L) 8.6 - 10.5 mg/dL    BUN/Creatinine Ratio 20.2 7.0 - 25.0    Anion Gap 10.0 5.0 - 15.0 mmol/L    eGFR 87.6 >60.0 mL/min/1.73   CBC Auto Differential    Collection Time: 10/02/24  4:22 AM    Specimen: Blood   Result Value Ref Range    WBC 10.64 3.40 - 10.80 10*3/mm3    RBC 3.36 (L) 4.14 - 5.80 10*6/mm3    Hemoglobin 9.9 (L) 13.0 - 17.7 g/dL    Hematocrit 29.8 (L) 37.5 - 51.0 %    MCV 88.7 79.0 - 97.0 fL    MCH 29.5 26.6 - 33.0 pg    MCHC 33.2 31.5 - 35.7 g/dL    RDW 12.4 12.3 - 15.4 %    RDW-SD 40.0 37.0 - 54.0 fl    MPV 9.1 6.0 - 12.0 fL    Platelets 213 140 - 450 10*3/mm3    Neutrophil % 66.9 42.7 - 76.0 %    Lymphocyte % 19.6 19.6 - 45.3 %    Monocyte % 10.2 5.0 - 12.0 %    Eosinophil % 2.2 0.3 - 6.2 %    Basophil % 0.4 0.0 - 1.5 %    Immature Grans % 0.7 (H) 0.0 - 0.5 %    Neutrophils, Absolute 7.12 (H) 1.70 - 7.00 10*3/mm3    Lymphocytes, Absolute 2.09 0.70 - 3.10 10*3/mm3    Monocytes, Absolute 1.09 (H) 0.10 - 0.90 10*3/mm3    Eosinophils, Absolute 0.23 0.00 - 0.40 10*3/mm3    Basophils, Absolute 0.04 0.00 - 0.20 10*3/mm3    Immature Grans, Absolute 0.07 (H) 0.00 - 0.05 10*3/mm3    nRBC 0.0 0.0 - 0.2 /100 WBC   H. Pylori Antigen, Stool - Stool, Per Rectum    Collection Time: 10/04/24  4:26 AM    Specimen: Per Rectum; Stool   Result Value Ref Range    H pylori Ag, Stl Negative Negative   POC Glucose Once    Collection Time: 10/04/24 11:20 AM    Specimen: Blood   Result Value Ref Range    Glucose 97 70 - 130 mg/dL           ASSESSMENT AND PLAN    Assessment & Plan     Diagnoses and all orders for this visit:    1. History of gastric ulcer (Primary)  Comments:  perforated gastric ulcer 9/2024  Orders:  -     Cancel: Case Request; Standing  -     Cancel: Case Request  -     External Facility  Surgical/Procedural Request; Future    Other orders  -     Cancel: Implement Anesthesia Orders Day of Procedure; Standing    He is asymptomatic. I recommend egd.  I recommend he continue ppi daily until after egd.            ESOPHAGOGASTRODUODENOSCOPY WITH ANESTHESIA (N/A)  Risk, benefits, and alternatives of endoscopy were explained in full.  They understand that there is a risk of bleeding, perforation, and infection.  The risk of perforation goes up with esophageal dilation.  Other options to evaluate UGI complaints could involve barium swallow or UGI series, but these would be diagnostic tests only.  Patient was given time to ask questions.  I answered them to their satisfaction and they are agreeable to proceeding.          No follow-ups on file.          There are no Patient Instructions on file for this visit.      Laurie Ordza, APRN

## 2024-12-23 ENCOUNTER — OFFICE VISIT (OUTPATIENT)
Dept: SURGERY | Facility: CLINIC | Age: 84
End: 2024-12-23
Payer: COMMERCIAL

## 2024-12-23 ENCOUNTER — TELEPHONE (OUTPATIENT)
Dept: SURGERY | Facility: CLINIC | Age: 84
End: 2024-12-23
Payer: COMMERCIAL

## 2024-12-23 ENCOUNTER — TELEPHONE (OUTPATIENT)
Dept: GASTROENTEROLOGY | Facility: CLINIC | Age: 84
End: 2024-12-23
Payer: COMMERCIAL

## 2024-12-23 VITALS
BODY MASS INDEX: 19.47 KG/M2 | HEIGHT: 70 IN | HEART RATE: 73 BPM | DIASTOLIC BLOOD PRESSURE: 64 MMHG | WEIGHT: 136 LBS | SYSTOLIC BLOOD PRESSURE: 127 MMHG | OXYGEN SATURATION: 96 %

## 2024-12-23 DIAGNOSIS — K43.2 INCISIONAL HERNIA, WITHOUT OBSTRUCTION OR GANGRENE: ICD-10-CM

## 2024-12-23 DIAGNOSIS — K25.5 GASTRIC ULCER WITH PERFORATION, UNSPECIFIED CHRONICITY: Primary | ICD-10-CM

## 2024-12-23 DIAGNOSIS — R10.13 EPIGASTRIC PAIN: ICD-10-CM

## 2024-12-23 RX ORDER — PANTOPRAZOLE SODIUM 40 MG/1
40 TABLET, DELAYED RELEASE ORAL 2 TIMES DAILY
Qty: 60 TABLET | Refills: 1 | Status: SHIPPED | OUTPATIENT
Start: 2024-12-23

## 2024-12-23 RX ORDER — SUCRALFATE ORAL 1 G/10ML
1 SUSPENSION ORAL 4 TIMES DAILY
Qty: 473 ML | Refills: 1 | Status: SHIPPED | OUTPATIENT
Start: 2024-12-23

## 2024-12-23 NOTE — PROGRESS NOTES
"Patient: Oral Dey    YOB: 1940    Date: 12/23/2024    Primary Care Provider: Jorge Shepherd MD    Vital Signs:   Vitals:    12/23/24 1018   BP: 127/64   Pulse: 73   SpO2: 96%   Weight: 61.7 kg (136 lb)   Height: 177.8 cm (70\")       Mr. Dey is an 84 year old male who presents to the clinic for follow up after gastric ulcer perforation repair with Dr. Malone in September 2024. He is complaining of epigastric pain that is okay in the mornings but worsens throughout the day. Then when he lays down and goes to bed it subsides. He states that he has been taking the protonix after breakfast. He saw RENUKA Jasso and is supposed to have an EGD done at the end of January.  He also is complaining of a bulge in his RLQ. He states that it is not painful.       Assessment / Plan:    Diagnoses and all orders for this visit:    1. Gastric ulcer with perforation, unspecified chronicity (Primary)    2. Epigastric pain  -     pantoprazole (Protonix) 40 MG EC tablet; Take 1 tablet by mouth 2 (Two) Times a Day.  Dispense: 60 tablet; Refill: 1  -     sucralfate (Carafate) 1 GM/10ML suspension; Take 10 mL by mouth 4 (Four) Times a Day.  Dispense: 473 mL; Refill: 1    3. Incisional hernia, without obstruction or gangrene    Mr. Dey is an 84 year old male who presents to the clinic for follow up of gastric ulcer perforation repair. He is overall doing well other than having epigastric pain. He is tolerating a regular diet without nausea or vomiting. I discussed with Laurie Ordaz, who is going to see if the patient is able to be moved up sooner. He is also going to be placed on BID protonix and 4x a day carafate to see if he has symptomatic relief in the interim. He was called and made aware of this. Prescriptions sent to his pharmacy.     I have recommended returning to see Dr. Malone for discussion of hernia repair in February after the EGD. He voices understanding and is agreeable to the plan. He " will call for an earlier appointment if he has any new problems or concerns.     Follow up:     Return in about 2 months (around 2/23/2025) for RLQ incisional hernia repair discussion with Dr. Malone.        Electronically signed by Jeaneth Okeefe PA-C  12/23/24  13:33 CST

## 2024-12-23 NOTE — TELEPHONE ENCOUNTER
Called patient to let him know that Jeaneth LYNN talked to Laurie with GI. She is going to see when he is on the schedule for his EGD and possibly move him up.Also, let him know that he needs to take the protonix BID and Jeaneth LYNN sent in carafate in liquid form to take 4 times a day. Pt understood.

## 2025-01-07 ENCOUNTER — HOSPITAL ENCOUNTER (EMERGENCY)
Facility: HOSPITAL | Age: 85
Discharge: HOME | End: 2025-01-07
Admitting: FAMILY MEDICINE
Payer: COMMERCIAL

## 2025-01-07 ENCOUNTER — APPOINTMENT (OUTPATIENT)
Dept: CT IMAGING | Facility: HOSPITAL | Age: 85
End: 2025-01-07
Payer: COMMERCIAL

## 2025-01-07 VITALS
HEART RATE: 66 BPM | SYSTOLIC BLOOD PRESSURE: 152 MMHG | WEIGHT: 131 LBS | DIASTOLIC BLOOD PRESSURE: 69 MMHG | RESPIRATION RATE: 20 BRPM | TEMPERATURE: 97.6 F | HEIGHT: 70 IN | BODY MASS INDEX: 18.75 KG/M2 | OXYGEN SATURATION: 100 %

## 2025-01-07 DIAGNOSIS — R10.13 EPIGASTRIC ABDOMINAL PAIN: Primary | ICD-10-CM

## 2025-01-07 LAB
ALBUMIN SERPL-MCNC: 3.8 G/DL (ref 3.5–5.2)
ALBUMIN/GLOB SERPL: 2.2 G/DL
ALP SERPL-CCNC: 100 U/L (ref 39–117)
ALT SERPL W P-5'-P-CCNC: 5 U/L (ref 1–41)
ANION GAP SERPL CALCULATED.3IONS-SCNC: 12 MMOL/L (ref 5–15)
APTT PPP: 30.2 SECONDS (ref 24.5–36)
AST SERPL-CCNC: 9 U/L (ref 1–40)
BASOPHILS # BLD AUTO: 0.04 10*3/MM3 (ref 0–0.2)
BASOPHILS NFR BLD AUTO: 0.5 % (ref 0–1.5)
BILIRUB SERPL-MCNC: 0.8 MG/DL (ref 0–1.2)
BUN SERPL-MCNC: 12 MG/DL (ref 8–23)
BUN/CREAT SERPL: 12 (ref 7–25)
CALCIUM SPEC-SCNC: 9 MG/DL (ref 8.6–10.5)
CHLORIDE SERPL-SCNC: 96 MMOL/L (ref 98–107)
CO2 SERPL-SCNC: 27 MMOL/L (ref 22–29)
CREAT SERPL-MCNC: 1 MG/DL (ref 0.76–1.27)
D-LACTATE SERPL-SCNC: 1 MMOL/L (ref 0.5–2)
DEPRECATED RDW RBC AUTO: 41.8 FL (ref 37–54)
EGFRCR SERPLBLD CKD-EPI 2021: 75.1 ML/MIN/1.73
EOSINOPHIL # BLD AUTO: 0.05 10*3/MM3 (ref 0–0.4)
EOSINOPHIL NFR BLD AUTO: 0.6 % (ref 0.3–6.2)
ERYTHROCYTE [DISTWIDTH] IN BLOOD BY AUTOMATED COUNT: 12.6 % (ref 12.3–15.4)
GEN 5 1HR TROPONIN T REFLEX: 17 NG/L
GLOBULIN UR ELPH-MCNC: 1.7 GM/DL
GLUCOSE SERPL-MCNC: 104 MG/DL (ref 65–99)
HCT VFR BLD AUTO: 31.6 % (ref 37.5–51)
HGB BLD-MCNC: 10.4 G/DL (ref 13–17.7)
IMM GRANULOCYTES # BLD AUTO: 0.03 10*3/MM3 (ref 0–0.05)
IMM GRANULOCYTES NFR BLD AUTO: 0.4 % (ref 0–0.5)
INR PPP: 1.12 (ref 0.91–1.09)
LIPASE SERPL-CCNC: 21 U/L (ref 13–60)
LYMPHOCYTES # BLD AUTO: 3.1 10*3/MM3 (ref 0.7–3.1)
LYMPHOCYTES NFR BLD AUTO: 39 % (ref 19.6–45.3)
MAGNESIUM SERPL-MCNC: 2.1 MG/DL (ref 1.6–2.4)
MCH RBC QN AUTO: 30 PG (ref 26.6–33)
MCHC RBC AUTO-ENTMCNC: 32.9 G/DL (ref 31.5–35.7)
MCV RBC AUTO: 91.1 FL (ref 79–97)
MONOCYTES # BLD AUTO: 0.97 10*3/MM3 (ref 0.1–0.9)
MONOCYTES NFR BLD AUTO: 12.2 % (ref 5–12)
NEUTROPHILS NFR BLD AUTO: 3.75 10*3/MM3 (ref 1.7–7)
NEUTROPHILS NFR BLD AUTO: 47.3 % (ref 42.7–76)
NRBC BLD AUTO-RTO: 0 /100 WBC (ref 0–0.2)
PLATELET # BLD AUTO: 158 10*3/MM3 (ref 140–450)
PMV BLD AUTO: 8.9 FL (ref 6–12)
POTASSIUM SERPL-SCNC: 4.3 MMOL/L (ref 3.5–5.2)
PROT SERPL-MCNC: 5.5 G/DL (ref 6–8.5)
PROTHROMBIN TIME: 15.1 SECONDS (ref 11.8–14.8)
RBC # BLD AUTO: 3.47 10*6/MM3 (ref 4.14–5.8)
SODIUM SERPL-SCNC: 135 MMOL/L (ref 136–145)
TROPONIN T NUMERIC DELTA: 2 NG/L
TROPONIN T SERPL HS-MCNC: 15 NG/L
WBC NRBC COR # BLD AUTO: 7.94 10*3/MM3 (ref 3.4–10.8)

## 2025-01-07 PROCEDURE — 83735 ASSAY OF MAGNESIUM: CPT

## 2025-01-07 PROCEDURE — 36415 COLL VENOUS BLD VENIPUNCTURE: CPT

## 2025-01-07 PROCEDURE — 85610 PROTHROMBIN TIME: CPT

## 2025-01-07 PROCEDURE — 93005 ELECTROCARDIOGRAM TRACING: CPT

## 2025-01-07 PROCEDURE — 25510000001 IOPAMIDOL 61 % SOLUTION

## 2025-01-07 PROCEDURE — 99285 EMERGENCY DEPT VISIT HI MDM: CPT

## 2025-01-07 PROCEDURE — 85025 COMPLETE CBC W/AUTO DIFF WBC: CPT

## 2025-01-07 PROCEDURE — 74177 CT ABD & PELVIS W/CONTRAST: CPT

## 2025-01-07 PROCEDURE — 83690 ASSAY OF LIPASE: CPT

## 2025-01-07 PROCEDURE — 84484 ASSAY OF TROPONIN QUANT: CPT

## 2025-01-07 PROCEDURE — 83605 ASSAY OF LACTIC ACID: CPT

## 2025-01-07 PROCEDURE — 80053 COMPREHEN METABOLIC PANEL: CPT

## 2025-01-07 PROCEDURE — 85730 THROMBOPLASTIN TIME PARTIAL: CPT

## 2025-01-07 RX ORDER — IOPAMIDOL 612 MG/ML
100 INJECTION, SOLUTION INTRAVASCULAR
Status: COMPLETED | OUTPATIENT
Start: 2025-01-07 | End: 2025-01-07

## 2025-01-07 RX ORDER — SODIUM CHLORIDE 0.9 % (FLUSH) 0.9 %
10 SYRINGE (ML) INJECTION AS NEEDED
Status: DISCONTINUED | OUTPATIENT
Start: 2025-01-07 | End: 2025-01-07 | Stop reason: HOSPADM

## 2025-01-07 RX ADMIN — IOPAMIDOL 100 ML: 612 INJECTION, SOLUTION INTRAVENOUS at 17:06

## 2025-01-07 NOTE — ED PROVIDER NOTES
"Subjective   History of Present Illness  Patient is an 84-year-old male that presents to the emergency department for abdominal pain.  Patient reports he had a laparoscopic repair of a perforated gastric ulcer with an omental patch performed on 9/26/2024 with Dr. Malone.  Patient states he felt better following the procedure but since November he has been experiencing epigastric abdominal pain that he reports radiating up into midsternal chest.  Patient describes the pain as a \"squeezing/knot\" sensation. He reports pain is no different than usual.  He denies any new or worsening symptoms.  He reports that the pain is just more frequent now, stating he is experiencing the symptoms multiple times a day with no specific pattern.  He does report that pain is usually worse at night and he is having difficulty sleeping due to this.  Reports he followed up with general surgery in December regarding this as well as his primary care provider and is scheduled for an EGD at the end of January.  Patient was also placed on Protonix twice daily and Carafate 4 times daily.  Patient reports he has been taking these medications with no relief in symptoms.  He denies any nausea, vomiting, constipation or diarrhea.  Denies any hematemesis or dark or bloody stools. Denies any fevers, bodyaches, chills, cough, congestion, sore throat.  Denies any chest pain or shortness of breath.  Denies any lightheadedness, dizziness, blurred vision, headache or near syncope.      Review of Systems   Gastrointestinal:  Positive for abdominal pain.   All other systems reviewed and are negative.      Past Medical History:   Diagnosis Date    Bladder cancer     CLL (chronic lymphocytic leukemia)     Colon polyp     Gallstone     GERD (gastroesophageal reflux disease)     Glaucoma     Hearing loss     Hypertension     Inguinal hernia     right groin    Macular degeneration, right eye        Allergies   Allergen Reactions    Augmentin [Amoxicillin-Pot " Clavulanate] Hives    Latex Itching and Other (See Comments)     And band aids. Causes redness and itching.       Past Surgical History:   Procedure Laterality Date    CATARACT EXTRACTION, BILATERAL      COLONOSCOPY  2012    CYSTOSCOPY BLADDER BIOPSY      DIAGNOSTIC LAPAROSCOPY N/A 2024    Procedure: ROBOTIC REPAIR OF PERFORATED GASTRIC ULCER;  Surgeon: Petrona Malone MD;  Location:  [facility] OR;  Service: General;  Laterality: N/A;  WITH REPAIR OF GASTRIC ULCER PERFORATION    EXCISION MASS HEAD/NECK N/A 3/4/2022    Procedure: EXCISION OF MASS ON POSTERIOR NECK;  Surgeon: Rossana Mahajan MD;  Location:  [facility];  Service: General;  Laterality: N/A;    INGUINAL HERNIA REPAIR Left     INGUINAL HERNIA REPAIR Right 2017    Procedure: RIGHT INGUINAL HERNIA REPAIR WITH MESH ;  Surgeon: Rossana Mahajan MD;  Location:  [facility] OR;  Service:        Family History   Problem Relation Age of Onset    Colon cancer Maternal Grandfather         in his 60's.     Alzheimer's disease Mother     Hypertension Father     Other Father         stent    COPD Sister     Heart attack Brother     No Known Problems Maternal Grandmother     No Known Problems Paternal Grandmother     No Known Problems Paternal Grandfather        Social History     Socioeconomic History    Marital status:    Tobacco Use    Smoking status: Former     Current packs/day: 0.00     Types: Cigarettes     Start date:      Quit date:      Years since quittin.0    Smokeless tobacco: Never   Vaping Use    Vaping status: Never Used   Substance and Sexual Activity    Alcohol use: No    Drug use: No    Sexual activity: Defer           Objective   Physical Exam  Vitals and nursing note reviewed.   Constitutional:       Appearance: Normal appearance.      Comments: Nontoxic appearing. In no acute distress.    HENT:      Head: Normocephalic and atraumatic.      Right Ear: External ear normal.      Left Ear: External ear normal.       Nose: Nose normal.      Mouth/Throat:      Mouth: Mucous membranes are moist.      Pharynx: Oropharynx is clear.   Eyes:      Extraocular Movements: Extraocular movements intact.      Conjunctiva/sclera: Conjunctivae normal.      Pupils: Pupils are equal, round, and reactive to light.   Cardiovascular:      Rate and Rhythm: Normal rate and regular rhythm.      Pulses: Normal pulses.      Heart sounds: Normal heart sounds.   Pulmonary:      Effort: Pulmonary effort is normal. No respiratory distress.      Breath sounds: Normal breath sounds. No wheezing.   Chest:      Chest wall: No tenderness.   Abdominal:      General: Abdomen is flat. Bowel sounds are normal. There is no distension.      Palpations: Abdomen is soft.      Tenderness: There is abdominal tenderness in the epigastric area. There is no right CVA tenderness, left CVA tenderness, guarding or rebound.   Musculoskeletal:         General: Normal range of motion.      Cervical back: Normal range of motion and neck supple.      Right lower leg: No edema.      Left lower leg: No edema.   Skin:     General: Skin is warm and dry.      Capillary Refill: Capillary refill takes less than 2 seconds.   Neurological:      Mental Status: He is alert and oriented to person, place, and time. Mental status is at baseline.   Psychiatric:         Mood and Affect: Mood normal.         Behavior: Behavior normal.         Thought Content: Thought content normal.         Judgment: Judgment normal.       Labs Reviewed   COMPREHENSIVE METABOLIC PANEL - Abnormal; Notable for the following components:       Result Value    Glucose 104 (*)     Sodium 135 (*)     Chloride 96 (*)     Total Protein 5.5 (*)     All other components within normal limits    Narrative:     GFR Categories in Chronic Kidney Disease (CKD)      GFR Category          GFR (mL/min/1.73)    Interpretation  G1                     90 or greater         Normal or high (1)  G2                      60-89                 Mild decrease (1)  G3a                   45-59                Mild to moderate decrease  G3b                   30-44                Moderate to severe decrease  G4                    15-29                Severe decrease  G5                    14 or less           Kidney failure          (1)In the absence of evidence of kidney disease, neither GFR category G1 or G2 fulfill the criteria for CKD.    eGFR calculation 2021 CKD-EPI creatinine equation, which does not include race as a factor   PROTIME-INR - Abnormal; Notable for the following components:    Protime 15.1 (*)     INR 1.12 (*)     All other components within normal limits   CBC WITH AUTO DIFFERENTIAL - Abnormal; Notable for the following components:    RBC 3.47 (*)     Hemoglobin 10.4 (*)     Hematocrit 31.6 (*)     Monocyte % 12.2 (*)     Monocytes, Absolute 0.97 (*)     All other components within normal limits   APTT - Normal   LIPASE - Normal   LACTIC ACID, PLASMA - Normal   MAGNESIUM - Normal   TROPONIN - Normal    Narrative:     High Sensitive Troponin T Reference Range:  <14.0 ng/L- Negative Female for AMI  <22.0 ng/L- Negative Male for AMI  >=14 - Abnormal Female indicating possible myocardial injury.  >=22 - Abnormal Male indicating possible myocardial injury.   Clinicians would have to utilize clinical acumen, EKG, Troponin, and serial changes to determine if it is an Acute Myocardial Infarction or myocardial injury due to an underlying chronic condition.        HIGH SENSITIVITIY TROPONIN T 1HR - Normal    Narrative:     High Sensitive Troponin T Reference Range:  <14.0 ng/L- Negative Female for AMI  <22.0 ng/L- Negative Male for AMI  >=14 - Abnormal Female indicating possible myocardial injury.  >=22 - Abnormal Male indicating possible myocardial injury.   Clinicians would have to utilize clinical acumen, EKG, Troponin, and serial changes to determine if it is an Acute Myocardial Infarction or myocardial injury due to an underlying chronic  condition.        CBC AND DIFFERENTIAL    Narrative:     The following orders were created for panel order CBC & Differential.  Procedure                               Abnormality         Status                     ---------                               -----------         ------                     CBC Auto Differential[[id number]]        Abnormal            Final result                 Please view results for these tests on the individual orders.      CT Abdomen Pelvis With Contrast   Final Result   Addendum (preliminary) 1 of 1   ADDENDUM:       It is also possible that gastric wall thickening simply represents   recurrent or persistent gastric ulcer and adjacent lymphadenopathy is   reactive in nature. However, given very suspicious appearance, tissue   sampling is strongly recommended.       End addendum.       This report was signed and finalized on 1/7/2025 5:55 PM by Dr. Steevn Giles MD.          Final       1.  Marked masslike wall thickening in the distal gastric body and   gastric antrum. 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       2.  Confluent lymphadenopathy in the gastrohepatic ligament and   lymphadenopathy throughout the retroperitoneum. This is highly   suspicious for isidro spread of neoplasm. 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           This report was signed and finalized on 1/7/2025 5:31 PM by Dr. Steven Giles MD.               Procedures           ED Course                                                       Medical Decision Making  Oral Dey is a 84 y.o. male who presents to the ED for abdominal pain.  Patient reports he had a laparoscopic repair of a perforated gastric ulcer with an omental patch performed on 9/26/2024 with Dr. Malone.  Patient states he felt better following the procedure but since November he has been experiencing  "epigastric abdominal pain that he reports radiating up into midsternal chest.  Patient describes the pain as a \"squeezing/knot\" sensation. He reports pain is no different than usual.  He denies any new or worsening symptoms.  He reports that the pain is just more frequent now, stating he is experiencing the symptoms multiple times a day with no specific pattern.  He does report that pain is usually worse at night and he is having difficulty sleeping due to this.  Reports he followed up with general surgery in December regarding this as well as his primary care provider and is scheduled for an EGD at the end of January.  Patient was also placed on Protonix twice daily and Carafate 4 times daily.  Patient reports he has been taking these medications with no relief in symptoms.  He denies any nausea, vomiting, constipation or diarrhea.  Denies any hematemesis or dark or bloody stools. Denies any fevers, bodyaches, chills, cough, congestion, sore throat.  Denies any chest pain or shortness of breath.  Denies any lightheadedness, dizziness, blurred vision, headache or near syncope.    Patient was non-toxic appearing on arrival. No acute distress was noted.  Vital signs stable.     Patient's presentation raises suspicion for differentials including, but not limited to, GERD, esophagitis, ACS, post op complication.     Past medical history, surgical history, and medication regimen reviewed.     Previous notes, labs, imaging, and more reviewed.    Please refer to above section of note for lab and imaging results that were reviewed and interpreted by radiology as well as attending physician.     Given findings described above, patient's presentation is likely consistent with abnormal imaging revealing marked masslike wall thickening in the distal gastric body and gastric antrum worrisome for neoplasm.    I had an in-depth discussion with the patient as well as family present at bedside regarding all lab and imaging results " completed during today's ED encounter.  Discussed imaging revealing masslike gastric wall thickening that could be neoplasm which is why is it very important that patient follows up closely with general surgery as well as gastroenterology as scheduled.  Patient and family were educated on concerning signs and symptoms that would warrant a quick return to the ED and verbalized understanding of this. I answered all the questions regarding the emergency department evaluation, diagnosis, and treatment plan in plain and simple language that was understandable. We discussed that due to always having some diagnostic uncertainty while in the ER, there is always a chance that symptoms may change or new symptoms may reveal themselves after being discharged. Because of this, I stressed the importance of Oral following up with their PCP, GI, and general surgery. Patient informed that appointment will need to be done by calling their office to set up an appointment within the next few days or as soon as reasonably possible so that the symptoms can be re-evaluated for improvement or for any other questions. I also gave Oral common sense return precautions and prompted patient to return to the emergency department within 24 - 48hrs if there are any new, worsening, or concerning symptoms. The patient and family verbalized understanding of the discharge instructions and agreed with them. Oral was discharged in stable condition.     Dragon disclaimer:  Parts of this note may be an electronic transcription/translation of spoken language to printed text using the Dragon dictation system.       Problems Addressed:  Epigastric abdominal pain: complicated acute illness or injury    Amount and/or Complexity of Data Reviewed  Labs: ordered.  Radiology: ordered.    Risk  Prescription drug management.        Final diagnoses:   Epigastric abdominal pain       ED Disposition  ED Disposition       ED Disposition   Discharge    Condition    Stable    Comment   --               Jorge Shepherd MD  [address] [place] [address] [address] [place] [address]  [phone]    Schedule an appointment as soon as possible for a visit       Petrona Malone MD  [address] [facility] [address]  [facility] [address]  [phone]    Schedule an appointment as soon as possible for a visit       [facility] EMERGENCY DEPARTMENT  [address] [id number] [phone]    If symptoms worsen    Chalo Zaman MD  [address] [facility] [address]  [facility] [address]  [phone]    Schedule an appointment as soon as possible for a visit            Medication List      No changes were made to your prescriptions during this visit.            Domenico Sanchez, APRN  01/07/25 1900

## 2025-01-08 ENCOUNTER — TELEPHONE (OUTPATIENT)
Dept: GASTROENTEROLOGY | Facility: CLINIC | Age: 85
End: 2025-01-08
Payer: COMMERCIAL

## 2025-01-08 DIAGNOSIS — K25.5 GASTRIC ULCER WITH PERFORATION, UNSPECIFIED CHRONICITY: Primary | ICD-10-CM

## 2025-01-08 LAB
QT INTERVAL: 426 MS
QTC INTERVAL: 435 MS

## 2025-01-08 NOTE — DISCHARGE INSTRUCTIONS
It was very nice to meet you, Oral. Thank you for allowing us to take care of you today at [facility].    Today you were seen in the emergency department for your symptoms. Please understand that an ER evaluation is just the start of your evaluation. We do the best we can, but we are often unable to fully find what is causing your symptoms from one evaluation.  Because of this, the goal is to determine whether you need to be evaluated in the hospital or if it is safe for you to go home and see other doctors provided such as primary care physicians or specialist on an outpatient basis.     Like we discussed, I strongly urge that you follow up with your primary care doctor, GI, and general surgery. Please call their office to set up an appointment as soon as possible so that you can be re-evaluated for improvement in your symptoms or for any other questions.  I have provided the information needed, including phone number, to call to set up an appointment below in these discharge papers.     Educational material has also been provided in the following pages regarding what we have discussed today.     Please return to the emergency room within 12-48 hours if you experience symptoms such as the following:   Fever, chills, chest pain or shortness of breath, pain with inspiration/expiration, pain that travels to your arms, neck or back, nausea, vomiting, severe headache, tearing pain in your chest, dizziness, feel as though you are about to pass out, OR if you have any worsening symptoms, or any other concerns.

## 2025-01-08 NOTE — TELEPHONE ENCOUNTER
PT called [facility]. He was having some issues. He is scheduled for 01-22-25 @ [place].  I tried to return his call and someone answered and hung up.  I called right back and no answer.

## 2025-01-13 DIAGNOSIS — R10.13 EPIGASTRIC PAIN: ICD-10-CM

## 2025-01-13 RX ORDER — SUCRALFATE ORAL 1 G/10ML
1 SUSPENSION ORAL 4 TIMES DAILY
Qty: 473 ML | Refills: 1 | Status: SHIPPED | OUTPATIENT
Start: 2025-01-13

## 2025-01-22 ENCOUNTER — OUTSIDE FACILITY SERVICE (OUTPATIENT)
Dept: GASTROENTEROLOGY | Facility: CLINIC | Age: 85
End: 2025-01-22
Payer: COMMERCIAL

## 2025-01-22 DIAGNOSIS — K25.5 GASTRIC ULCER WITH PERFORATION, UNSPECIFIED CHRONICITY: ICD-10-CM

## 2025-01-22 DIAGNOSIS — R10.13 EPIGASTRIC PAIN: ICD-10-CM

## 2025-01-22 DIAGNOSIS — R19.8 ABNORMAL FINDINGS ON ESOPHAGOGASTRODUODENOSCOPY (EGD): Primary | ICD-10-CM

## 2025-01-22 PROCEDURE — 88305 TISSUE EXAM BY PATHOLOGIST: CPT | Performed by: INTERNAL MEDICINE

## 2025-01-23 ENCOUNTER — LAB REQUISITION (OUTPATIENT)
Dept: LAB | Facility: HOSPITAL | Age: 85
End: 2025-01-23
Payer: COMMERCIAL

## 2025-01-23 DIAGNOSIS — Z13.9 ENCOUNTER FOR SCREENING, UNSPECIFIED: ICD-10-CM

## 2025-01-24 ENCOUNTER — HOSPITAL ENCOUNTER (EMERGENCY)
Facility: HOSPITAL | Age: 85
Discharge: HOME | End: 2025-01-24
Attending: EMERGENCY MEDICINE
Payer: COMMERCIAL

## 2025-01-24 VITALS
HEIGHT: 70 IN | TEMPERATURE: 97.6 F | SYSTOLIC BLOOD PRESSURE: 143 MMHG | RESPIRATION RATE: 16 BRPM | DIASTOLIC BLOOD PRESSURE: 63 MMHG | WEIGHT: 123.5 LBS | HEART RATE: 66 BPM | OXYGEN SATURATION: 94 % | BODY MASS INDEX: 17.68 KG/M2

## 2025-01-24 DIAGNOSIS — R10.13 EPIGASTRIC PAIN: Primary | ICD-10-CM

## 2025-01-24 PROCEDURE — 99283 EMERGENCY DEPT VISIT LOW MDM: CPT

## 2025-01-24 RX ORDER — HYDROCODONE BITARTRATE AND ACETAMINOPHEN 7.5; 325 MG/1; MG/1
1 TABLET ORAL EVERY 4 HOURS PRN
Qty: 10 TABLET | Refills: 0 | Status: SHIPPED | OUTPATIENT
Start: 2025-01-24

## 2025-01-24 RX ORDER — HYDROCODONE BITARTRATE AND ACETAMINOPHEN 7.5; 325 MG/1; MG/1
1 TABLET ORAL ONCE
Status: COMPLETED | OUTPATIENT
Start: 2025-01-24 | End: 2025-01-24

## 2025-01-24 RX ORDER — FAMOTIDINE 20 MG/1
20 TABLET, FILM COATED ORAL 2 TIMES DAILY
Qty: 30 TABLET | Refills: 0 | Status: SHIPPED | OUTPATIENT
Start: 2025-01-24

## 2025-01-24 RX ADMIN — HYDROCODONE BITARTRATE AND ACETAMINOPHEN 1 TABLET: 7.5; 325 TABLET ORAL at 17:03

## 2025-01-24 NOTE — ED PROVIDER NOTES
Subjective   History of Present Illness  Patient presents with a complaint of upper abdominal pain this been going on for the past several months.  He says he had a perforated ulcer and surgery for that in late September 2024.  Initially after that surgery he was doing pretty well until sometime in late October or early November when he began having upper abdominal pain again.  Is kind of off and on is but has persisted and worsened since that time and is now there constantly.  He has seen his regular family physician and was sent back here to the ER and came here on January 7 of this year had a full ER evaluation.  His CT scan revealed problems within his stomach and he was referred back to his family doctor and GI.  He has since then had an endoscopy about a week ago that he says was told nothing is healing in his stomach properly.  However no one has given him anything for the pain.  He is try to follow-up with his family doctor referred here.  He is try to follow-up with his surgeon but they were not in the office today.  He GI did not give him any extra medication.  He is already on Carafate and a proton pump inhibitor but they do not seem to be helping.  He says he just came here to get some pain relief that is what he was sent here by his family doctor for.He says eating makes the pain worse now.    History provided by:  Patient   used: No    Abdominal Pain  Pain location:  Epigastric  Pain quality: aching    Pain radiates to:  Does not radiate  Pain severity:  Severe  Onset quality:  Gradual  Duration:  10 weeks  Timing:  Constant  Progression:  Unchanged  Chronicity:  New  Context: previous surgery    Context: not alcohol use, not awakening from sleep, not diet changes, not eating, not laxative use, not medication withdrawal, not recent illness, not recent sexual activity, not recent travel, not retching, not sick contacts, not suspicious food intake and not trauma    Relieved by:   Nothing  Worsened by:  Nothing  Ineffective treatments:  None tried  Associated symptoms: anorexia    Associated symptoms: no belching, no chest pain, no chills, no constipation, no cough, no diarrhea, no dysuria, no fatigue, no fever, no flatus, no hematemesis, no hematochezia, no hematuria, no melena, no nausea, no shortness of breath, no sore throat, no vaginal bleeding, no vaginal discharge and no vomiting    Risk factors: being elderly    Risk factors: no alcohol abuse, no aspirin use, has not had multiple surgeries, no NSAID use, not obese, not pregnant and no recent hospitalization        Review of Systems   Constitutional: Negative.  Negative for chills, fatigue and fever.   HENT: Negative.  Negative for sore throat.    Respiratory: Negative.  Negative for cough and shortness of breath.    Cardiovascular: Negative.  Negative for chest pain.   Gastrointestinal:  Positive for abdominal pain and anorexia. Negative for constipation, diarrhea, flatus, hematemesis, hematochezia, melena, nausea and vomiting.   Genitourinary: Negative.  Negative for dysuria, hematuria, vaginal bleeding and vaginal discharge.   Musculoskeletal: Negative.    Skin: Negative.    Neurological: Negative.    Psychiatric/Behavioral: Negative.     All other systems reviewed and are negative.      Past Medical History:   Diagnosis Date    Bladder cancer     CLL (chronic lymphocytic leukemia)     Colon polyp     Gallstone     GERD (gastroesophageal reflux disease)     Glaucoma     Hearing loss     Hypertension     Inguinal hernia     right groin    Macular degeneration, right eye        Allergies   Allergen Reactions    Augmentin [Amoxicillin-Pot Clavulanate] Hives    Latex Itching and Other (See Comments)     And band aids. Causes redness and itching.       Past Surgical History:   Procedure Laterality Date    CATARACT EXTRACTION, BILATERAL      COLONOSCOPY  06/13/2012    CYSTOSCOPY BLADDER BIOPSY      DIAGNOSTIC LAPAROSCOPY N/A 9/26/2024     Procedure: ROBOTIC REPAIR OF PERFORATED GASTRIC ULCER;  Surgeon: Petrona Malone MD;  Location:  [facility] OR;  Service: General;  Laterality: N/A;  WITH REPAIR OF GASTRIC ULCER PERFORATION    EXCISION MASS HEAD/NECK N/A 3/4/2022    Procedure: EXCISION OF MASS ON POSTERIOR NECK;  Surgeon: Rossana Mahajan MD;  Location:  [facility];  Service: General;  Laterality: N/A;    INGUINAL HERNIA REPAIR Left     INGUINAL HERNIA REPAIR Right 2017    Procedure: RIGHT INGUINAL HERNIA REPAIR WITH MESH ;  Surgeon: Rossana Mahajan MD;  Location:  [facility] OR;  Service:        Family History   Problem Relation Age of Onset    Colon cancer Maternal Grandfather         in his 60's.     Alzheimer's disease Mother     Hypertension Father     Other Father         stent    COPD Sister     Heart attack Brother     No Known Problems Maternal Grandmother     No Known Problems Paternal Grandmother     No Known Problems Paternal Grandfather        Social History     Socioeconomic History    Marital status:    Tobacco Use    Smoking status: Former     Current packs/day: 0.00     Types: Cigarettes     Start date:      Quit date:      Years since quittin.1    Smokeless tobacco: Never   Vaping Use    Vaping status: Never Used   Substance and Sexual Activity    Alcohol use: No    Drug use: No    Sexual activity: Defer           Objective   Physical Exam  Vitals and nursing note reviewed.   Constitutional:       Appearance: Normal appearance.      Comments: Very thin appearing   HENT:      Head: Normocephalic and atraumatic.   Cardiovascular:      Rate and Rhythm: Normal rate and regular rhythm.   Pulmonary:      Effort: Pulmonary effort is normal.      Breath sounds: Normal breath sounds.   Abdominal:      General: Abdomen is flat.      Palpations: Abdomen is soft.      Tenderness: There is abdominal tenderness.   Musculoskeletal:         General: Normal range of motion.   Skin:     General: Skin is warm and dry.    Neurological:      General: No focal deficit present.      Mental Status: He is alert and oriented to person, place, and time.   Psychiatric:         Mood and Affect: Mood normal.         Behavior: Behavior normal.         Procedures           ED Course                                                       Medical Decision Making  I told the patient that the ER was the incorrect place to be sent just to get pain control at least for something he may going on for some time.  This is something his primary care physicians and/or other doctors should manage for him.  Since he has no oral options right now I will give him a brief course of pain medicine.  I did tell him would be glad to check him in to make sure nothing else is changed but he says he does not think that would help because he just recently had an endoscopy that shows the problem in his stomach and he has referral to [facility] to get further evaluated.  I will add Pepcid in an attempt to help and give him brief pain control but explained to him that no ER will give him pain medicine to last until his appointment in February with [facility] and that his primary care physician should arrange that in some manner.  If he were unable to do it he should send him to a pain specialist to would.  He is discharged stable condition.    Problems Addressed:  Epigastric pain: complicated acute illness or injury    Risk  Prescription drug management.        Final diagnoses:   Epigastric pain       ED Disposition  ED Disposition       ED Disposition   Discharge    Condition   Stable    Comment   --               Jorge Shepherd MD  [address] [place] [address] [address] [place] [address]  [phone]    Schedule an appointment as soon as possible for a visit            Medication List        New Prescriptions      famotidine 20 MG tablet  Commonly known as: PEPCID  Take 1 tablet by mouth 2 (Two) Times a Day.     HYDROcodone-acetaminophen 7.5-325 MG per tablet  Commonly known as:  NORCO  Take 1 tablet by mouth Every 4 (Four) Hours As Needed for Moderate Pain.               Where to Get Your Medications        These medications were sent to [facility] - [place], [place] - [address] [place] [address] - [phone]  - [phone] [address] [address]      Phone: [phone]   famotidine 20 MG tablet  HYDROcodone-acetaminophen 7.5-325 MG per tablet            Raji Nunn Jr., MD  01/24/25 [id number]

## 2025-01-31 DIAGNOSIS — R39.14 BENIGN PROSTATIC HYPERPLASIA WITH INCOMPLETE BLADDER EMPTYING: ICD-10-CM

## 2025-01-31 DIAGNOSIS — N40.1 BENIGN PROSTATIC HYPERPLASIA WITH INCOMPLETE BLADDER EMPTYING: ICD-10-CM

## 2025-01-31 DIAGNOSIS — R33.9 URINARY RETENTION: ICD-10-CM

## 2025-01-31 LAB
CYTO UR: NORMAL
LAB AP CASE REPORT: NORMAL
LAB AP CLINICAL INFORMATION: NORMAL
LAB AP DIAGNOSIS COMMENT: NORMAL
Lab: NORMAL
PATH REPORT.FINAL DX SPEC: NORMAL
PATH REPORT.GROSS SPEC: NORMAL

## 2025-01-31 RX ORDER — TAMSULOSIN HYDROCHLORIDE 0.4 MG/1
0.4 CAPSULE ORAL DAILY
Qty: 90 CAPSULE | Refills: 3 | Status: SHIPPED | OUTPATIENT
Start: 2025-01-31

## 2025-02-05 ENCOUNTER — TELEPHONE (OUTPATIENT)
Dept: GASTROENTEROLOGY | Facility: CLINIC | Age: 85
End: 2025-02-05
Payer: COMMERCIAL

## 2025-02-05 NOTE — TELEPHONE ENCOUNTER
I called to discuss with him the pathology results that I received today.  He states he was aware that he had a cancer.  He states he went saw [facility] yesterday.  They are planning to coordinate care with his local oncologist, Dr. Boogie.  The patient states that the surgeon contacted Dr. Ferrell's office but he has not heard about an appointment yet.    I offered that if he needs any assistance, and arrange an appointment with Dr. Boogie, to contact my office in a couple days if he has not heard anything.  But it does sound like everything is in the works.  He agreed to do so.

## 2025-02-06 ENCOUNTER — HOSPITAL ENCOUNTER (OUTPATIENT)
Age: 85
Setting detail: SPECIMEN
Discharge: HOME | End: 2025-02-06

## 2025-02-06 DIAGNOSIS — Z85.51 HISTORY OF BLADDER CANCER: ICD-10-CM

## 2025-02-06 NOTE — PROGRESS NOTES
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  He is also seen for stage 0 chronic lymphocytic leukemia (CLL)/small lymphocytic lymphoma (SLL), Stage 0.  He is on surveillance.  He is also seen for anemia from iron deficiency.  He is off oral iron for the past 32 months.  The patient is seen with spouse.  History is obtained from the patient.          Oncology/Hematology History Overview Note   DIAGNOSTIC ABNORMALITIES:  Labs, 05/16/2012, [facility]: WBC 10.9, hemoglobin 14.3, hematocrit 42.2, MCV of 94.2, platelets 133,000, ALC elevated at 4796, ANC normal at 4.8, Absolute monocytosis at 970.  Labs, 11/14/2012, [facility]: WBC 10.2, hemoglobin 15.4, hematocrit 45.4, MCV 94.5, platelets 154,000, ALC elevated at 4,865. Globulin 1.9.  Labs, 11/15/2012, [facility]: IgG 495, IgA 34, IgM 28 (all below normal limits).  Blood film review, 11/16/2012, [facility]: Mild absolute lymphocytosis and monocytosis. Reactive and atypical lymphocytes present. Flow cytometry recommended.  PATHOLOGY: Cytogenetics, 12/12/2012, [facility]:  CLL and CCND1-IGH@FISH: Abnormal results with +12 and 13q-.   Flow cytometry peripheral blood, 12/12/2012, [facility]: Peripheral blood with a CD5+ B cell population showing lambda restriction, 30% cellularity.   Labs, 03/27/2013: GFR 78. glucose 108. IgG 455, IgM 16, IgA 35, M-spike 0.1, [facility]: IgG monoclonal protein with lambda light chain specificity.   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 Negative for  CCND1/IGH gene rearrangement.    Hematopathology report peripheral blood, 03/27/2013 [facility]: Normal white blood cell count with no evidence of lymphocytosis but 23% abnormal intermediate lambda light chain-restricted B cell population identified by flow cytometry.  Mild thrombocytopenia with normal hemoglobin/hematocrit.  See comment.   Flow peripheral blood, 03/27/2013, [facility]:  Abnormal CD5-positive identified, monoclonal lambda. Consistent with chronic lymphocytic leukemia (CLL)/small lymphocytic lymphoma (SLL).     Skeletal survey, 01/11/2013, [facility]: No discrete lytic lesions identified.   Ultrasound abdomen, 01/11/2013, [facility]: No focal pathology.        PREVIOUS INTERVENTION:   Observation.      PREVIOUS INTERVENTIONS: Anemia from iron deficiency.  Ferrous sulfate 325 mg p.o. daily 01/03/2018 through 03/06/2018.  Resumed 09/04/2018 through 10/10/2018.  Resumed 08/27/2019 through 10/24/2019.  Resume 03/10/2022 through 6/9/2022.     CLL (chronic lymphocytic leukemia)   8/27/2019 Initial Diagnosis    CLL (chronic lymphocytic leukemia) (CMS/[facility])         PAST MEDICAL HISTORY:  ALLERGIES:  Allergies   Allergen Reactions    Augmentin [Amoxicillin-Pot Clavulanate] Hives    Latex Itching and Other (See Comments)     And band aids. Causes redness and itching.     CURRENT MEDICATIONS:  Outpatient Encounter Medications as of 2/11/2025   Medication Sig Dispense Refill    famotidine (PEPCID) 20 MG tablet Take 1 tablet by mouth 2 (Two) Times a Day. 30 tablet 0    Ferrous Sulfate (IRON PO) Take  by mouth Daily.      HYDROcodone-acetaminophen (NORCO) 7.5-325 MG per tablet Take 1 tablet by mouth Every 4 (Four) Hours As Needed for Moderate Pain. 10 tablet 0    latanoprost (XALATAN) 0.005 % ophthalmic solution Administer 1 drop to the right eye Every Night.      metoprolol tartrate (LOPRESSOR) 25 MG tablet Take 1 tablet by mouth 2 (Two) Times a Day.      multivitamins-minerals (PRESERVISION AREDS  2) capsule capsule Take 1 capsule by mouth 2 (Two) Times a Day.      pantoprazole (Protonix) 40 MG EC tablet Take 1 tablet by mouth 2 (Two) Times a Day. 60 tablet 1    sucralfate (Carafate) 1 GM/10ML suspension Take 10 mL by mouth 4 (Four) Times a Day. 473 mL 1    tamsulosin (FLOMAX) 0.4 MG capsule 24 hr capsule Take 1 capsule by mouth Daily.      vitamin B-12 (CYANOCOBALAMIN) 1000 MCG tablet Take 1 tablet by mouth Daily.      vitamin D3 125 MCG (5000 UT) capsule capsule Take 1 capsule by mouth Daily.      acetaminophen (TYLENOL) 500 MG tablet Take 1 tablet by mouth Every 6 (Six) Hours As Needed for Mild Pain. (Patient not taking: Reported on 2/11/2025) 120 tablet 0    albuterol sulfate  (90 Base) MCG/ACT inhaler Inhale 2 puffs 4 (Four) Times a Day. (Patient not taking: Reported on 2/11/2025) 8 g 2    ondansetron ODT (ZOFRAN-ODT) 4 MG disintegrating tablet Place 1 tablet on the tongue Every 6 (Six) Hours As Needed for Nausea or Vomiting. (Patient not taking: Reported on 2/11/2025) 20 tablet 0     No facility-administered encounter medications on file as of 2/11/2025.     ADULT ILLNESSES:  Patient Active Problem List   Diagnosis Code    Hx of colonic polyp Z86.0100    Family hx of colon cancer Z80.0    CLL (chronic lymphocytic leukemia) C91.10    Hypertension I10    IgG lambda monoclonal gammopathy D47.2    Iron deficiency E61.1    Pneumoperitoneum K66.8    Perforated gastric ulcer K25.5    Cough R05.9    Anemia D64.9    Atrial fibrillation with rapid ventricular response I48.91    COVID-19 virus infection U07.1    History of gastric ulcer Z87.11     SURGERIES:  Past Surgical History:   Procedure Laterality Date    CATARACT EXTRACTION, BILATERAL      COLONOSCOPY  06/13/2012    CYSTOSCOPY BLADDER BIOPSY      DIAGNOSTIC LAPAROSCOPY N/A 9/26/2024    Procedure: ROBOTIC REPAIR OF PERFORATED GASTRIC ULCER;  Surgeon: Petrona Malone MD;  Location: [facility];  Service: General;  Laterality: N/A;  WITH REPAIR OF  GASTRIC ULCER PERFORATION    EXCISION MASS HEAD/NECK N/A 3/4/2022    Procedure: EXCISION OF MASS ON POSTERIOR NECK;  Surgeon: Rossana Mahajan MD;  Location:  [facility] OR;  Service: General;  Laterality: N/A;    INGUINAL HERNIA REPAIR Left     INGUINAL HERNIA REPAIR Right 2017    Procedure: RIGHT INGUINAL HERNIA REPAIR WITH MESH ;  Surgeon: Rossana Mahajan MD;  Location:  [facility] OR;  Service:      HEALTH MAINTENANCE ITEMS:  Health Maintenance Due   Topic Date Due    Pneumococcal Vaccine 50+ (1 of 2 - PCV) Never done    TDAP/TD VACCINES (1 - Tdap) Never done    ZOSTER VACCINE (1 of 2) Never done    ANNUAL PHYSICAL  Never done    RSV Vaccine - Adults (1 - 1-dose 75+ series) Never done    INFLUENZA VACCINE  2024    COVID-19 Vaccine (2024- season) 2024       <no information>  Last Completed Colonoscopy            COLORECTAL CANCER SCREENING (COLONOSCOPY - Every 10 Years) Tentatively due on 2024  Colonoscopy, Scan    2017  SCANNED - COLONOSCOPY    2012  SCANNED - COLONOSCOPY                  Immunization History   Administered Date(s) Administered    COVID-19 (MODERNA) 1st,2nd,3rd Dose Monovalent 03/10/2021, 2021, 10/25/2021     Last Completed Mammogram       This patient has no relevant Health Maintenance data.              FAMILY HISTORY:  Family History   Problem Relation Age of Onset    Colon cancer Maternal Grandfather         in his 60's.     Alzheimer's disease Mother     Hypertension Father     Other Father         stent    COPD Sister     Heart attack Brother     No Known Problems Maternal Grandmother     No Known Problems Paternal Grandmother     No Known Problems Paternal Grandfather      SOCIAL HISTORY:  Social History     Socioeconomic History    Marital status:    Tobacco Use    Smoking status: Former     Current packs/day: 0.00     Types: Cigarettes     Start date:      Quit date:      Years since quittin.1    Smokeless  "tobacco: Never   Vaping Use    Vaping status: Never Used   Substance and Sexual Activity    Alcohol use: No    Drug use: No    Sexual activity: Defer       REVIEW OF SYSTEMS:    Review of Systems   Constitutional:  Positive for fatigue and unexpected weight change. Negative for fever.        \"Very weak.\" He sits or lay down most of the daytime.   HENT:  Negative for mouth sores.    Eyes:  Negative for discharge and redness.   Respiratory:  Negative for shortness of breath and wheezing.    Gastrointestinal:  Positive for abdominal pain and constipation. Negative for abdominal distention, nausea and vomiting.   Endocrine: Negative for cold intolerance and heat intolerance.   Genitourinary:  Negative for difficulty urinating and dysuria.   Musculoskeletal:  Negative for gait problem and myalgias.   Skin:  Positive for pallor.   Allergic/Immunologic: Negative for food allergies.   Neurological:  Negative for dizziness, speech difficulty and weakness.   Hematological:  Negative for adenopathy. Bruises/bleeds easily.   Psychiatric/Behavioral:  Negative for agitation, confusion and hallucinations.        VITAL SIGNS: /54   Pulse 74   Temp 97.9 °F (36.6 °C) (Temporal)   Ht 177.8 cm (70\")   Wt 55.3 kg (122 lb)   SpO2 98%   BMI 17.51 kg/m²  Lost 25 pounds over 4 months.   Pain Score    02/11/25 1411   PainSc:   8   PainLoc: Abdomen       PHYSICAL EXAMINATION:     Physical Exam  Vitals reviewed.   Constitutional:       Comments: Frail looking.    HENT:      Head:      Comments: Bitemporal wasting.   Eyes:      General: No scleral icterus.  Cardiovascular:      Rate and Rhythm: Normal rate.   Pulmonary:      Effort: No respiratory distress.      Breath sounds: No wheezing.   Abdominal:      General: Bowel sounds are normal.      Palpations: Abdomen is soft.   Musculoskeletal:         General: No swelling.      Cervical back: Neck supple.   Skin:     Coloration: Skin is pale.   Neurological:      Mental Status: He is " oriented to person, place, and time.   Psychiatric:         Mood and Affect: Mood normal.         Behavior: Behavior normal.         Thought Content: Thought content normal.         Judgment: Judgment normal.         LABS    Lab Results - Last 18 Months   Lab Units 01/07/25  1556 10/02/24  0422 10/01/24  0443 09/30/24  0634 09/29/24  0428 09/28/24  0602 09/12/24  1101 03/12/24  1046 09/06/23  1355   HEMOGLOBIN g/dL 10.4* 9.9* 9.8* 9.6* 9.8* 10.0*   < > 12.5* 12.4*   HEMATOCRIT % 31.6* 29.8* 29.8* 30.1* 30.1* 31.5*   < > 38.7 38.3   MCV fL 91.1 88.7 89.2 90.9 91.2 91.6   < > 91.9 91.0   WBC 10*3/mm3 7.94 10.64 9.47 10.76 13.52* 13.65*   < > 6.50 7.30   RDW % 12.6 12.4 12.4 12.6 12.8 12.8   < > 13.2 12.3   MPV fL 8.9 9.1 8.8 9.0 9.4 8.4   < > 9.1 8.4   PLATELETS 10*3/mm3 158 213 198 198 197 172   < > 148 237   IMM GRAN % % 0.4 0.7* 0.4 0.7* 0.9* 0.9*   < >  --   --    NEUTROS ABS 10*3/mm3 3.75 7.12* 6.47 7.98* 10.13* 10.00*   < > 1.38* 1.02*   LYMPHS ABS 10*3/mm3 3.10 2.09 1.87 1.64 2.38 2.60   < >  --   --    MONOS ABS 10*3/mm3 0.97* 1.09* 0.91* 0.97* 0.86 0.91*   < >  --   --    EOS ABS 10*3/mm3 0.05 0.23 0.16 0.08 0.01 0.00   < > 0.13 0.07   BASOS ABS 10*3/mm3 0.04 0.04 0.02 0.02 0.02 0.02   < > 0.07 0.15   IMMATURE GRANS (ABS) 10*3/mm3 0.03 0.07* 0.04 0.07* 0.12* 0.12*   < >  --   --    NRBC /100 WBC 0.0 0.0 0.0 0.0 0.0 0.0   < >  --   --    NEUTROPHIL % %  --   --   --   --   --   --   --  18.2* 14.0*   MONOCYTES % %  --   --   --   --   --   --   --  14.1* 8.0   BASOPHIL % %  --   --   --   --   --   --   --  1.0 2.0*   ATYP LYMPH % %  --   --   --   --   --   --   --  29.3* 28.0*   ANISOCYTOSIS   --   --   --   --   --   --   --  Slight/1+  --     < > = values in this interval not displayed.       Lab Results - Last 18 Months   Lab Units 01/07/25  1556 10/02/24  0422 10/01/24  0443 09/30/24  0634 09/29/24  0428 09/28/24  0602 09/27/24  0214 09/26/24  1858 09/12/24  1101 03/12/24  1046 09/06/23  1355   GLUCOSE  mg/dL 104* 103* 97 103* 96 113* 135* 151* 102* 96 99   SODIUM mmol/L 135* 134* 133* 132* 132* 133* 134* 134* 134* 142 139   POTASSIUM mmol/L 4.3 3.7 4.0 4.1 4.3 4.8 4.5 4.4 4.5 4.4 4.9   CO2 mmol/L 27.0 26.0 24.0 25.0 25.0 25.0 22.0 25.0 29.0 31.0* 29.0   CHLORIDE mmol/L 96* 98 98 98 97* 100 100 96* 98 103 100   ANION GAP mmol/L 12.0 10.0 11.0 9.0 10.0 8.0 12.0 13.0 7.0 8.0 10.0   CREATININE mg/dL 1.00 0.84 0.78 0.94 1.01 1.08 1.03 1.13 0.92 1.17 1.39*   BUN mg/dL 12 17 19 22 20 22 19 21 13 15 19   BUN / CREAT RATIO  12.0 20.2 24.4 23.4 19.8 20.4 18.4 18.6 14.1 12.8 13.7   CALCIUM mg/dL 9.0 7.9* 8.0* 7.9* 8.2* 8.6 8.0* 9.0 9.0 9.8 8.9   ALK PHOS U/L 100  --   --   --   --   --  98 123* 96 102 91   TOTAL PROTEIN g/dL 5.5*  --   --   --   --   --  5.1* 6.3 6.1 6.6 6.1   ALT (SGPT) U/L 5  --   --   --   --   --  12 12 5 9 7   AST (SGOT) U/L 9  --   --   --   --   --  16 13 9 12 10   BILIRUBIN mg/dL 0.8  --   --   --   --   --  0.8 0.8 0.6 1.0 0.5   ALBUMIN g/dL 3.8  --   --   --   --   --  3.1* 4.0 3.8  3.4 4.4  3.9 4.1  3.2   GLOBULIN gm/dL 1.7  --   --   --   --   --  2.0 2.3 2.3 2.2 2.0       Lab Results - Last 18 Months   Lab Units 09/12/24  1101 03/12/24  1046 09/06/23  1355   M-SPIKE g/dL 0.1* 0.1* 0.1*   KAPPA/LAMBDA RATIO, S  0.66 0.68 0.52   FREE LAMBDA LIGHT CHAINS mg/L 12.8 13.5 13.0   IG KAPPA FREE LIGHT CHAIN mg/L 8.5 9.2 6.8   LDH U/L 129* 142  --    REFERENCE LAB REPORT  See Attached Report  --   --        Lab Results - Last 18 Months   Lab Units 09/12/24  1101 03/12/24  1046 09/06/23  1355   IRON mcg/dL 19* 79 74   TIBC mcg/dL 279* 393 302   IRON SATURATION (TSAT) % 7* 20 24   FERRITIN ng/mL 225.30 128.40 250.10       Oral Dey reports a pain score of 8.  Given his pain assessment as noted, treatment options were discussed and the following options were decided upon as a follow-up plan to address the patient's pain: prescription for opiod analgesics.            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  Ki-67 70%.  AJCC stage: Pending PET.  IPI score pending.   Treatment status: Pending mini R CHOP due to advance age and poor performance status.   2.   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  Treatment status: Observation.  Complications: None.  Prognosis: Good.  3.   Abdominal pain from lymphoma. Norco 10 mg given.    4.   Normocytic anemia, from chronic disease and iron deficiency.  Oral iron as needed.  5.   Performance status of 3.  6.   Mild thrombocytopenia likely from idiopathic thrombocytopenic purpura, stable for observation.   7.   History of fever, resolved, ? viral syndrome.  Not compatible with progressing chronic lymphocytic leukemia (CLL). Lymphocyte count is stable, no palpable adenopathies, and fever had resolved.   8.   Bladder cancer  AJCC stage cTa, cN0, cM0.   Treatment status:On observation.   9.  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       PLAN:  1.    Re: Reason for the follow-up.  Patient underwent endoscopy 1/22/2025.  He had abdominal pain 9/2024 due to perforated gastric ulcer.  He had emergent surgery and found to have antral ulceration.  Circumferential ulcerated mucosa at the surgical site suggestive of chronic ischemia, chronic inflammation and ulceration.  Difficult to determine exactly the site of this secondary to markedly distorted anatomy.  Although there were multiple sutures present at this area.  2.   Re: Pathology report 2/5/2025.Small intestine, duodenal bulb, endoscopic biopsy:  Involvement by CD20 positive large B-cell lymphoma, best classified as Lawrence-Barr virus (EBV) positive diffuse large B-cell lymphoma.Ki-67 immunostain proliferative index is reported to be at least 70%.  Extensive ulceration.  3.   Re: FISH report 2/4/2025.  Aggressive B-cell lymphoma.   "Aneuploidy gain of BCL6/3 q.  No evidence of Bcl-2, BCL6 or MYC.  Awaiting revised IPI score for prognosis.  4.   Re: Note from Dr. Noel 2/4/2025. \"No surgery.\"  5.  Order PET to stage.  6.  Order 2D echo to check EF prior to anthracycline.  7.  Blood for CBC with differential, CMP, LDH, B2M, uric acid, and HIV.  8.  Port by general surgery. .  9.  Plan for R-CHOP.  Aware of potential infusion, anaphylaxis, nausea, vomiting, alopecia, secondary malignancy, cardiotoxicity, cytopenias, fatigue, and neuropathy.  10.    Schedule treatment C1 D1 (plan:  Every 21 days for 6 cycles).  Rituxan 375 mg/m2.  Cytoxan 400 mg/m2.  Doxorubicin 25 mg/m2  Vincristine 1mg  Prednisone  60 p.o. daily for 5 days to begin with chemo.  11.  Premedicate with:  Aloxi 0.25 mg IVP.  Decadron 12 mg IV  Emend 150 mg IV  Tylenol 500 mg p.o. and Benadryl 12.5 mg IV prior to Rituxan.   12.  eRx for pegfilgrastim 6 mg day 1 of chemo.  Observe for arthralgias.  13.  eRx for allopurinol 300 mg p.o. daily, number 90, 2 refills.   14.  eRx for for Zofran 8 mg mg p.o. every 8 hours as needed for nausea and vomiting, #60, 2 refills.  15.  eRx for Compazine 10 mg p.o. every 4 hours as needed for nausea vomiting #60, 2 refills.  16.  eRx for prednisone 60 mg p.o. daily for 5 days to begin with chemo, 3 refills.    17.  eRx Norco 10 mg po every 6 hours as needed for pain, 180. Observe for hypersomnolence.  18.  CBC with differential weekly.  19.  Epoetin alpha 40,000 units subcutaneously if hemoglobin is less than 10 and hematocrit less than 30.  Observe for hypertension.  20.  Treatment for CLL is generally reserved until the patient has active disease defined as the presence of disease-related symptoms, such as weight loss greater than 10%, extreme fatigue, fever greater than 100.5 for 2 weeks with night sweats without evidence of infection (\"B\" symptoms), worsening cytopenias, unexplained recurrent infections, worsening adenopathy, or " splenomegaly  21.  Continue ongoing management per primary care physician and other specialists.  22.  Plan of care discussed with patient and spouse.  Understanding expressed.  Patient agreeable to proceed.  23.  Transfuse 1 unit PRBCs if hemoglobin below 7.  Premed Tylenol 500 mg p.o.  Lasix 20 mg IVP after each unit of PRBC.  Observe for transfusion reactions.  24.  Advance Care Planning   ACP discussion was declined by the patient. Patient does not have an advance directive, information provided.   25.  Return to office in 3 weeks with pre-office CMP, LDH, and B2M.  26.  Scan after cycle 4.                I have reviewed the assessment and plan and verified the accuracy of it. No changes to assessment and plan since the information was documented. Marco Boogie MD 02/11/25           I spent 54 total minutes, face-to-face, caring for Oral harding. Greater than 50% of this time involved counseling and/or coordination of care as documented within this note.             cc:  Jorge Shepherd MD         (Petrona Malone MD)         (Chu Chaves MD)         (Chalo Zaman MD)

## 2025-02-11 ENCOUNTER — OFFICE VISIT (OUTPATIENT)
Dept: ONCOLOGY | Facility: CLINIC | Age: 85
End: 2025-02-11
Payer: COMMERCIAL

## 2025-02-11 ENCOUNTER — TELEPHONE (OUTPATIENT)
Dept: ONCOLOGY | Facility: CLINIC | Age: 85
End: 2025-02-11

## 2025-02-11 VITALS
TEMPERATURE: 97.9 F | OXYGEN SATURATION: 98 % | BODY MASS INDEX: 17.47 KG/M2 | DIASTOLIC BLOOD PRESSURE: 54 MMHG | SYSTOLIC BLOOD PRESSURE: 108 MMHG | WEIGHT: 122 LBS | HEART RATE: 74 BPM | HEIGHT: 70 IN

## 2025-02-11 DIAGNOSIS — C91.10 CLL (CHRONIC LYMPHOCYTIC LEUKEMIA): Primary | ICD-10-CM

## 2025-02-11 DIAGNOSIS — C83.33 DIFFUSE LARGE B-CELL LYMPHOMA OF INTRA-ABDOMINAL LYMPH NODES: ICD-10-CM

## 2025-02-11 RX ORDER — PROCHLORPERAZINE MALEATE 10 MG
10 TABLET ORAL EVERY 4 HOURS PRN
Qty: 60 TABLET | Refills: 2 | Status: SHIPPED | OUTPATIENT
Start: 2025-02-11

## 2025-02-11 RX ORDER — PREDNISONE 10 MG/1
50 TABLET ORAL DAILY
Qty: 5 TABLET | Refills: 2 | Status: SHIPPED | OUTPATIENT
Start: 2025-02-11

## 2025-02-11 RX ORDER — ONDANSETRON 8 MG/1
8 TABLET, FILM COATED ORAL EVERY 8 HOURS PRN
Qty: 60 TABLET | Refills: 2 | Status: SHIPPED | OUTPATIENT
Start: 2025-02-11

## 2025-02-11 RX ORDER — HYDROCODONE BITARTRATE AND ACETAMINOPHEN 10; 325 MG/1; MG/1
1 TABLET ORAL EVERY 6 HOURS PRN
Qty: 180 TABLET | Refills: 0 | Status: SHIPPED | OUTPATIENT
Start: 2025-02-11

## 2025-02-11 RX ORDER — ALLOPURINOL 300 MG/1
300 TABLET ORAL DAILY
Qty: 90 TABLET | Refills: 2 | Status: SHIPPED | OUTPATIENT
Start: 2025-02-11

## 2025-02-11 NOTE — TELEPHONE ENCOUNTER
Caller: [facility] - [place], [place] - [address] - [phone] [facility] [phone] FX    Relationship: Pharmacy    Best call back number: [phone]      What was the call regarding: PHARMACY NEEDS CLARIFICATION ON THE PREDNISONE

## 2025-02-12 ENCOUNTER — PROCEDURE VISIT (OUTPATIENT)
Dept: UROLOGY | Age: 85
End: 2025-02-12
Payer: COMMERCIAL

## 2025-02-12 ENCOUNTER — LAB (OUTPATIENT)
Dept: LAB | Facility: HOSPITAL | Age: 85
End: 2025-02-12
Payer: COMMERCIAL

## 2025-02-12 ENCOUNTER — TELEPHONE (OUTPATIENT)
Dept: ONCOLOGY | Facility: CLINIC | Age: 85
End: 2025-02-12
Payer: COMMERCIAL

## 2025-02-12 ENCOUNTER — OFFICE VISIT (OUTPATIENT)
Dept: SURGERY | Facility: CLINIC | Age: 85
End: 2025-02-12
Payer: COMMERCIAL

## 2025-02-12 VITALS
DIASTOLIC BLOOD PRESSURE: 57 MMHG | BODY MASS INDEX: 17.47 KG/M2 | WEIGHT: 122 LBS | SYSTOLIC BLOOD PRESSURE: 105 MMHG | HEIGHT: 70 IN | OXYGEN SATURATION: 97 % | HEART RATE: 67 BPM

## 2025-02-12 VITALS — HEIGHT: 70 IN | TEMPERATURE: 98.1 F | WEIGHT: 121 LBS | BODY MASS INDEX: 17.32 KG/M2

## 2025-02-12 DIAGNOSIS — Z85.51 HISTORY OF BLADDER CANCER: ICD-10-CM

## 2025-02-12 DIAGNOSIS — C83.33 DIFFUSE LARGE B-CELL LYMPHOMA OF INTRA-ABDOMINAL LYMPH NODES: ICD-10-CM

## 2025-02-12 DIAGNOSIS — D50.8 IRON DEFICIENCY ANEMIA SECONDARY TO INADEQUATE DIETARY IRON INTAKE: Primary | ICD-10-CM

## 2025-02-12 DIAGNOSIS — D50.8 IRON DEFICIENCY ANEMIA SECONDARY TO INADEQUATE DIETARY IRON INTAKE: ICD-10-CM

## 2025-02-12 DIAGNOSIS — C91.10 CLL (CHRONIC LYMPHOCYTIC LEUKEMIA): ICD-10-CM

## 2025-02-12 DIAGNOSIS — Z45.2 ENCOUNTER FOR CARE RELATED TO PORT-A-CATH: Primary | ICD-10-CM

## 2025-02-12 DIAGNOSIS — N30.90 CYSTITIS: Primary | ICD-10-CM

## 2025-02-12 LAB
ALBUMIN SERPL-MCNC: 3.6 G/DL (ref 3.5–5.2)
ALBUMIN/GLOB SERPL: 1.7 G/DL
ALP SERPL-CCNC: 85 U/L (ref 39–117)
ALT SERPL W P-5'-P-CCNC: 18 U/L (ref 1–41)
ANION GAP SERPL CALCULATED.3IONS-SCNC: 8 MMOL/L (ref 5–15)
AST SERPL-CCNC: 22 U/L (ref 1–40)
B2 MICROGLOB SERPL-MCNC: 5.3 MG/L (ref 0.8–2.2)
BACTERIA URINE, POC: NORMAL
BASOPHILS # BLD AUTO: 0.05 10*3/MM3 (ref 0–0.2)
BASOPHILS NFR BLD AUTO: 0.7 % (ref 0–1.5)
BILIRUB SERPL-MCNC: 0.5 MG/DL (ref 0–1.2)
BILIRUBIN URINE: 0 MG/DL
BLOOD, URINE: NEGATIVE
BUN SERPL-MCNC: 19 MG/DL (ref 8–23)
BUN/CREAT SERPL: 17.9 (ref 7–25)
CALCIUM SPEC-SCNC: 9.1 MG/DL (ref 8.6–10.5)
CASTS URINE, POC: NORMAL
CHLORIDE SERPL-SCNC: 95 MMOL/L (ref 98–107)
CLARITY, UA: CLEAR
CO2 SERPL-SCNC: 30 MMOL/L (ref 22–29)
COLOR, UA: YELLOW
CREAT SERPL-MCNC: 1.06 MG/DL (ref 0.76–1.27)
CRYSTALS URINE, POC: NORMAL
DEPRECATED RDW RBC AUTO: 38.7 FL (ref 37–54)
EGFRCR SERPLBLD CKD-EPI 2021: 70.1 ML/MIN/1.73
EOSINOPHIL # BLD AUTO: 0.02 10*3/MM3 (ref 0–0.4)
EOSINOPHIL NFR BLD AUTO: 0.3 % (ref 0.3–6.2)
EPI CELLS URINE, POC: NORMAL
ERYTHROCYTE [DISTWIDTH] IN BLOOD BY AUTOMATED COUNT: 11.9 % (ref 12.3–15.4)
FERRITIN SERPL-MCNC: 340.1 NG/ML (ref 30–400)
GLOBULIN UR ELPH-MCNC: 2.1 GM/DL
GLUCOSE SERPL-MCNC: 109 MG/DL (ref 65–99)
GLUCOSE URINE: NORMAL
HCT VFR BLD AUTO: 30.2 % (ref 37.5–51)
HGB BLD-MCNC: 9.5 G/DL (ref 13–17.7)
HIV 1+2 AB+HIV1 P24 AG SERPL QL IA: NORMAL
IMM GRANULOCYTES # BLD AUTO: 0.02 10*3/MM3 (ref 0–0.05)
IMM GRANULOCYTES NFR BLD AUTO: 0.3 % (ref 0–0.5)
IRON 24H UR-MRATE: 12 MCG/DL (ref 59–158)
IRON SATN MFR SERPL: 5 % (ref 20–50)
KETONES, URINE: NEGATIVE
LDH SERPL-CCNC: 126 U/L (ref 135–225)
LEUKOCYTE EST, POC: NORMAL
LYMPHOCYTES # BLD AUTO: 2.68 10*3/MM3 (ref 0.7–3.1)
LYMPHOCYTES NFR BLD AUTO: 37 % (ref 19.6–45.3)
MCH RBC QN AUTO: 28 PG (ref 26.6–33)
MCHC RBC AUTO-ENTMCNC: 31.5 G/DL (ref 31.5–35.7)
MCV RBC AUTO: 89.1 FL (ref 79–97)
MONOCYTES # BLD AUTO: 1.31 10*3/MM3 (ref 0.1–0.9)
MONOCYTES NFR BLD AUTO: 18.1 % (ref 5–12)
NEUTROPHILS NFR BLD AUTO: 3.17 10*3/MM3 (ref 1.7–7)
NEUTROPHILS NFR BLD AUTO: 43.6 % (ref 42.7–76)
NITRITE, URINE: NEGATIVE
NRBC BLD AUTO-RTO: 0 /100 WBC (ref 0–0.2)
PH UA: 7 (ref 4.5–8)
PLATELET # BLD AUTO: 222 10*3/MM3 (ref 140–450)
PMV BLD AUTO: 8.7 FL (ref 6–12)
POTASSIUM SERPL-SCNC: 5.1 MMOL/L (ref 3.5–5.2)
PROT SERPL-MCNC: 5.7 G/DL (ref 6–8.5)
PROTEIN UA: NEGATIVE
RBC # BLD AUTO: 3.39 10*6/MM3 (ref 4.14–5.8)
RBC URINE, POC: NORMAL
RETICS # AUTO: 0.05 10*6/MM3 (ref 0.02–0.13)
RETICS/RBC NFR AUTO: 1.58 % (ref 0.7–1.9)
SODIUM SERPL-SCNC: 133 MMOL/L (ref 136–145)
SPECIFIC GRAVITY UA: 1.01 (ref 1–1.03)
TIBC SERPL-MCNC: 240 MCG/DL (ref 298–536)
TRANSFERRIN SERPL-MCNC: 161 MG/DL (ref 200–360)
URATE SERPL-MCNC: 3.5 MG/DL (ref 3.4–7)
UROBILINOGEN, URINE: NORMAL
WBC NRBC COR # BLD AUTO: 7.25 10*3/MM3 (ref 3.4–10.8)
WBC URINE, POC: 4
YEAST URINE, POC: NORMAL

## 2025-02-12 PROCEDURE — 83615 LACTATE (LD) (LDH) ENZYME: CPT

## 2025-02-12 PROCEDURE — 84466 ASSAY OF TRANSFERRIN: CPT

## 2025-02-12 PROCEDURE — 82728 ASSAY OF FERRITIN: CPT

## 2025-02-12 PROCEDURE — 52000 CYSTOURETHROSCOPY: CPT | Performed by: UROLOGY

## 2025-02-12 PROCEDURE — 80053 COMPREHEN METABOLIC PANEL: CPT

## 2025-02-12 PROCEDURE — 81001 URINALYSIS AUTO W/SCOPE: CPT | Performed by: UROLOGY

## 2025-02-12 PROCEDURE — G0432 EIA HIV-1/HIV-2 SCREEN: HCPCS

## 2025-02-12 PROCEDURE — 83540 ASSAY OF IRON: CPT

## 2025-02-12 PROCEDURE — 85025 COMPLETE CBC W/AUTO DIFF WBC: CPT

## 2025-02-12 PROCEDURE — 82232 ASSAY OF BETA-2 PROTEIN: CPT

## 2025-02-12 PROCEDURE — 85045 AUTOMATED RETICULOCYTE COUNT: CPT

## 2025-02-12 PROCEDURE — 36415 COLL VENOUS BLD VENIPUNCTURE: CPT

## 2025-02-12 PROCEDURE — 84550 ASSAY OF BLOOD/URIC ACID: CPT

## 2025-02-12 RX ORDER — ALLOPURINOL 300 MG/1
300 TABLET ORAL DAILY
COMMUNITY
Start: 2025-02-11

## 2025-02-12 RX ORDER — PREDNISONE 10 MG/1
50 TABLET ORAL DAILY
COMMUNITY
Start: 2025-02-11

## 2025-02-12 RX ORDER — FAMOTIDINE 20 MG/1
20 TABLET, FILM COATED ORAL 2 TIMES DAILY
COMMUNITY
Start: 2025-01-24

## 2025-02-12 RX ORDER — PROCHLORPERAZINE MALEATE 10 MG
10 TABLET ORAL EVERY 4 HOURS PRN
COMMUNITY
Start: 2025-02-11

## 2025-02-12 RX ORDER — PANTOPRAZOLE SODIUM 40 MG/1
TABLET, DELAYED RELEASE ORAL
COMMUNITY
Start: 2024-12-23

## 2025-02-12 RX ORDER — HYDROCODONE BITARTRATE AND ACETAMINOPHEN 10; 325 MG/1; MG/1
1 TABLET ORAL EVERY 6 HOURS PRN
COMMUNITY
Start: 2025-02-11

## 2025-02-12 RX ORDER — NITROFURANTOIN 25; 75 MG/1; MG/1
100 CAPSULE ORAL 2 TIMES DAILY
Qty: 20 CAPSULE | Refills: 0 | Status: SHIPPED | OUTPATIENT
Start: 2025-02-12 | End: 2025-02-22

## 2025-02-12 RX ORDER — SUCRALFATE 1 G/1
TABLET ORAL
COMMUNITY
Start: 2025-01-28

## 2025-02-12 RX ORDER — HYDROCODONE BITARTRATE AND ACETAMINOPHEN 7.5; 325 MG/1; MG/1
1 TABLET ORAL EVERY 4 HOURS PRN
COMMUNITY
Start: 2025-01-24

## 2025-02-12 NOTE — TELEPHONE ENCOUNTER
Returned call to pharmacy for clarification on prednisone.  Patient should be taking 10 mg tabs, 60 mg total dose daily x 5 days starting with chemo.  He was given #30 with 2 refills

## 2025-02-12 NOTE — H&P (VIEW-ONLY)
Office New Patient History and Physical:     Referring Provider: Marco Boogie MD    Chief Complaint   Patient presents with    port placement       Subjective   History of Present Illness  The patient presents for evaluation of port placement.    He is currently under the care of Dr. Boogie in Oncology, who has referred him to discuss the placement of a port. 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 He has no prior history of having a port or any other chest devices such as pacemakers or stents. He reports no difficulties with venous access or intravenous (IV) insertion. He also reports no respiratory issues or the need for supplemental oxygen. He is not currently on anticoagulant therapy and has been abstinent from smoking for several years. His treatment initiation date remains undetermined as cardiac tests are pending to assess his suitability for chemotherapy. He expresses a desire to expedite the process due to feelings of fatigue and low energy. He experiences pain during eating, which has led to reduced food intake. He has been consuming nutritional drinks and occasionally eats bananas. He prefers fish over chicken and inquires about the possibility of including bhagat in his diet. He spends most of his time either sitting in a recliner or lying in bed. He believes his stomach size has decreased. He was prescribed hydrocodone yesterday, which he notes induces sleepiness. He has a scheduled appointment with Dr. Chaves today for urology follow-up due to a history of bladder cancer.    He has been prescribed Carafate but is uncertain of its efficacy and is considering discontinuing it.    SOCIAL HISTORY  He has not smoked for years.    MEDICATIONS  Current: Carafate, hydrocodone       Review of Systems    Review of Systems - General ROS: negative  ENT ROS: negative  Respiratory ROS: no cough, shortness of breath, or wheezing  Cardiovascular ROS: no  chest pain or dyspnea on exertion  Gastrointestinal ROS: no abdominal pain, change in bowel habits, or black or bloody stools  Genito-Urinary ROS: no dysuria, trouble voiding, or hematuria  Dermatological ROS: negative   Breast ROS: negative for breast lumps  Hematological and Lymphatic ROS: positive for - large B cell lymphoma  Musculoskeletal ROS: negative   Neurological ROS: no TIA or stroke symptoms    Psychological ROS: negative  Endocrine ROS: negative    History  Past Medical History:   Diagnosis Date    Bladder cancer     CLL (chronic lymphocytic leukemia)     Colon polyp     Gallstone     GERD (gastroesophageal reflux disease)     Glaucoma     Hearing loss     Hypertension     Inguinal hernia     right groin    Macular degeneration, right eye    ,   Past Surgical History:   Procedure Laterality Date    CATARACT EXTRACTION, BILATERAL      COLONOSCOPY  06/13/2012    CYSTOSCOPY BLADDER BIOPSY      DIAGNOSTIC LAPAROSCOPY N/A 9/26/2024    Procedure: ROBOTIC REPAIR OF PERFORATED GASTRIC ULCER;  Surgeon: Petrona Malone MD;  Location:  [facility];  Service: General;  Laterality: N/A;  WITH REPAIR OF GASTRIC ULCER PERFORATION    EXCISION MASS HEAD/NECK N/A 3/4/2022    Procedure: EXCISION OF MASS ON POSTERIOR NECK;  Surgeon: Rossana Mahajan MD;  Location:  [facility];  Service: General;  Laterality: N/A;    INGUINAL HERNIA REPAIR Left 2007    INGUINAL HERNIA REPAIR Right 12/28/2017    Procedure: RIGHT INGUINAL HERNIA REPAIR WITH MESH ;  Surgeon: Rossana Mahajan MD;  Location:  [facility];  Service:    ,   Family History   Problem Relation Age of Onset    Colon cancer Maternal Grandfather         in his 60's.     Alzheimer's disease Mother     Hypertension Father     Other Father         stent    COPD Sister     Heart attack Brother     No Known Problems Maternal Grandmother     No Known Problems Paternal Grandmother     No Known Problems Paternal Grandfather    ,   Social History     Tobacco Use    Smoking status:  Former     Current packs/day: 0.00     Types: Cigarettes     Start date:      Quit date:      Years since quittin.1    Smokeless tobacco: Never   Vaping Use    Vaping status: Never Used   Substance Use Topics    Alcohol use: No    Drug use: No   , (Not in a hospital admission)   and Allergies:  Augmentin [amoxicillin-pot clavulanate] and Latex    Current Outpatient Medications:     acetaminophen (TYLENOL) 500 MG tablet, Take 1 tablet by mouth Every 6 (Six) Hours As Needed for Mild Pain., Disp: 120 tablet, Rfl: 0    albuterol sulfate  (90 Base) MCG/ACT inhaler, Inhale 2 puffs 4 (Four) Times a Day., Disp: 8 g, Rfl: 2    allopurinol (ZYLOPRIM) 300 MG tablet, Take 1 tablet by mouth Daily., Disp: 90 tablet, Rfl: 2    famotidine (PEPCID) 20 MG tablet, Take 1 tablet by mouth 2 (Two) Times a Day., Disp: 30 tablet, Rfl: 0    Ferrous Sulfate (IRON PO), Take  by mouth Daily., Disp: , Rfl:     HYDROcodone-acetaminophen (NORCO)  MG per tablet, Take 1 tablet by mouth Every 6 (Six) Hours As Needed for Moderate Pain., Disp: 180 tablet, Rfl: 0    latanoprost (XALATAN) 0.005 % ophthalmic solution, Administer 1 drop to the right eye Every Night., Disp: , Rfl:     metoprolol tartrate (LOPRESSOR) 25 MG tablet, Take 1 tablet by mouth 2 (Two) Times a Day., Disp: , Rfl:     multivitamins-minerals (PRESERVISION AREDS 2) capsule capsule, Take 1 capsule by mouth 2 (Two) Times a Day., Disp: , Rfl:     ondansetron (ZOFRAN) 8 MG tablet, Take 1 tablet by mouth Every 8 (Eight) Hours As Needed for Nausea or Vomiting., Disp: 60 tablet, Rfl: 2    ondansetron ODT (ZOFRAN-ODT) 4 MG disintegrating tablet, Place 1 tablet on the tongue Every 6 (Six) Hours As Needed for Nausea or Vomiting., Disp: 20 tablet, Rfl: 0    pantoprazole (Protonix) 40 MG EC tablet, Take 1 tablet by mouth 2 (Two) Times a Day., Disp: 60 tablet, Rfl: 1    predniSONE (DELTASONE) 10 MG tablet, Take 5 tablets by mouth Daily. Daily for 5 days to begin with  "chemo, Disp: 5 tablet, Rfl: 2    prochlorperazine (COMPAZINE) 10 MG tablet, Take 1 tablet by mouth Every 4 (Four) Hours As Needed for Nausea or Vomiting., Disp: 60 tablet, Rfl: 2    sucralfate (Carafate) 1 GM/10ML suspension, Take 10 mL by mouth 4 (Four) Times a Day., Disp: 473 mL, Rfl: 1    tamsulosin (FLOMAX) 0.4 MG capsule 24 hr capsule, Take 1 capsule by mouth Daily., Disp: , Rfl:     vitamin B-12 (CYANOCOBALAMIN) 1000 MCG tablet, Take 1 tablet by mouth Daily., Disp: , Rfl:     vitamin D3 125 MCG (5000 UT) capsule capsule, Take 1 capsule by mouth Daily., Disp: , Rfl:     Objective     Vital Signs   /57   Pulse 67   Ht 177.8 cm (70\")   Wt 55.3 kg (122 lb)   SpO2 97%   BMI 17.51 kg/m²      Physical Exam:  General appearance - alert, well appearing, and in no distress  Mental status - normal mood, behavior, speech, dress, motor activity, and thought processes  Eyes - sclera anicteric  Neck - supple, no significant adenopathy  Chest - no tachypnea, retractions or cyanosis  Heart - normal rate and regular rhythm  Neurological - alert, oriented, normal speech, no focal findings or movement disorder noted  Skin - normal coloration and turgor, no rashes, no suspicious skin lesions noted    Physical Exam       Results Review:  Result Review :            Results         Assessment & Plan     Diagnoses and all orders for this visit:    1. Encounter for care related to Port-a-Cath (Primary)  -     Case Request; Standing  -     ceFAZolin (ANCEF) 2,000 mg in sodium chloride 0.9 % 100 mL IVPB  -     Case Request    2. Diffuse large B-cell lymphoma of intra-abdominal lymph nodes  -     Case Request; Standing  -     ceFAZolin (ANCEF) 2,000 mg in sodium chloride 0.9 % 100 mL IVPB  -     Case Request    Other orders  -     Follow Anesthesia Guidelines / Protocol; Future  -     Follow Anesthesia Guidelines / Protocol; Standing  -     Verify / Perform Chlorhexidine Skin Prep; Standing  -     Provide NPO Instructions to " Patient; Future  -     Chlorhexidine Skin Prep; Future  -     Notify physician (specify); Standing  -     Instructions on coughing, deep breathing, and incentive spirometry.; Standing  -     Oxygen Therapy-; Standing  -     Place Sequential Compression Device; Standing  -     Maintain Sequential Compression Device; Standing         Assessment & Plan  1. Port placement.  A comprehensive discussion was held regarding the procedure for port placement, including the associated risks and benefits. The procedure will be performed as an outpatient surgery, and he will be discharged on the same day. The potential risks, such as infection and malfunction, were explained. He was informed that the procedure involves creating a pocket in the skin on the right side of the chest, inserting a catheter into a vein, and connecting it to the port. The port will be flushed and checked for proper placement and functionality before closing the incision with dissolvable sutures and glue. He was advised to monitor for signs of infection, such as redness, pain, and fever at the incision site. The benefits of the procedure, including readiness for treatment, were discussed. He was informed that the procedure will be scheduled once the results of his cardiac tests are available and if his heart is deemed strong enough for treatment. He was advised to contact the office to schedule the surgery once the tests are completed and results are available, as if he is unable to proceed with chemotherapy treatments, he will not need the port. He was also informed that he can continue with his other medical appointments, including those with Dr. Chaves for urology and bladder cancer follow-up. He was reassured that he does not need to keep the appointment with Dr. Malone on 07/24/2024, as it is not necessary at this time. Orders for the surgery have been placed.     2. Medication management.  He expressed a desire to discontinue Carafate as he does  not feel it is helping and finds the medication cumbersome to take. He was advised that he could stop taking Carafate and monitor for any changes in his symptoms. If he notices that stopping the medication worsens his symptoms, he can contact the office for a new prescription.      Follow up:     BMI is below normal parameters (malnutrition). Recommendations: treating the underlying disease process    Return if symptoms worsen or fail to improve.        Jeaneth Okeefe PA-C  02/12/25  15:16 CST      Patient or patient representative verbalized consent for the use of Ambient Listening during the visit with  Jeaneth Okeefe PA-C for chart documentation. 2/12/2025  15:14 CST

## 2025-02-12 NOTE — PROGRESS NOTES
BLADDER CANCER  Patient was first diagnosed with bladder cancer 3 year(s) ago.     Last Recurrence: 5/2/2023.  He had T1 with lamina propria invasion he completed 6-week course of induction BCG on 9/13/2023.  He had completed a 3-month maintenance BCG on 2/7/2024 and completed a 6-month maintenance BCG on 8/15/2024.  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  None  Last upper tract study: 18 month(s) ago.  Study was a retrograde pyelogram done 6/6/2023    Patient has been diagnosed recently with gastric lymphoma and he is seeing Dr. Noonan of medical oncology    Cystoscopy Operative Note (2/12/25)  Surgeon: Armaan Ng MD  Anesthesia: Urethral 2% Xylocaine   Indications: History of bladder cancer  Position: Supine    Findings:   The patient was prepped and draped in the usual sterile fashion.  The flexible cystoscope was advanced through the urethra and into the bladder under direct vision.  The bladder was thoroughly inspected and the following was noted:    Residual Urine: moderate urine appeared cloudy  Urethra: normal appearing urethra with no masses, tenderness or lesions  Prostate: partially obstructing lateral lobes of prostate; median lobe present? no.    Bladder: No tumors or CIS noted.  No bladder diverticulum.  There was mild trabeculation noted.  No recurrent bladder tumors seen  Ureters: Clear efflux from both ureters.  Orifices with normal configuration and location.    The cystoscope was removed.  The patient tolerated the procedure well.  The patient was given a post procedure instructions and follow up.    Results for orders placed or performed in visit on 02/12/25   POCT Urinalysis Dipstick w/ Micro (Auto)   Result Value Ref Range

## 2025-02-13 ENCOUNTER — TELEPHONE (OUTPATIENT)
Dept: ONCOLOGY | Facility: CLINIC | Age: 85
End: 2025-02-13
Payer: COMMERCIAL

## 2025-02-13 NOTE — TELEPHONE ENCOUNTER
----- Message from Marco Boogie sent at 2/12/2025  3:20 PM CST -----  Ferrous sulfate 325 mg p.o. daily #60 with 2 refills.  Stop and call if intolerant.

## 2025-02-13 NOTE — TELEPHONE ENCOUNTER
Called patient regarding his iron levels.  He is already on Ferrous sulfate, so Dr Boogie said to increase it to twice a day.  He is to call if any problems.      I also informed him of his Echo appt for Feb 18, 2025 and his PET scan from March 3. 2025.      Patient voiced understanding and had appts written down.

## 2025-02-13 NOTE — TELEPHONE ENCOUNTER
----- Message from Marco Boogie sent at 2/13/2025  2:49 PM CST -----  Ust OTC Colace and Dulcolax.  ----- Message -----  From: González Carmen CMA  Sent: 2/13/2025   1:54 PM CST  To: Marco Boogie MD    Pt has not had a bowel movement in days. Miralax not working. Please advise

## 2025-02-14 LAB
BACTERIA UR CULT: ABNORMAL
BACTERIA UR CULT: ABNORMAL
ORGANISM: ABNORMAL

## 2025-02-18 ENCOUNTER — HOSPITAL ENCOUNTER (OUTPATIENT)
Dept: CARDIOLOGY | Facility: HOSPITAL | Age: 85
Discharge: HOME | End: 2025-02-18
Admitting: INTERNAL MEDICINE
Payer: COMMERCIAL

## 2025-02-18 VITALS
WEIGHT: 122 LBS | DIASTOLIC BLOOD PRESSURE: 57 MMHG | HEIGHT: 70 IN | SYSTOLIC BLOOD PRESSURE: 105 MMHG | BODY MASS INDEX: 17.47 KG/M2

## 2025-02-18 DIAGNOSIS — C83.33 DIFFUSE LARGE B-CELL LYMPHOMA OF INTRA-ABDOMINAL LYMPH NODES: ICD-10-CM

## 2025-02-18 DIAGNOSIS — C91.10 CLL (CHRONIC LYMPHOCYTIC LEUKEMIA): ICD-10-CM

## 2025-02-18 LAB
AV MEAN PRESS GRAD SYS DOP V1V2: 5 MMHG
AV VMAX SYS DOP: 138 CM/SEC
BH CV ECHO LEFT VENTRICLE GLOBAL LONGITUDINAL STRAIN: -19.8 %
BH CV ECHO MEAS - AO MAX PG: 7.6 MMHG
BH CV ECHO MEAS - AO ROOT DIAM: 3.5 CM
BH CV ECHO MEAS - AO V2 VTI: 30.8 CM
BH CV ECHO MEAS - AVA(I,D): 2.03 CM2
BH CV ECHO MEAS - EDV(CUBED): 105.2 ML
BH CV ECHO MEAS - EDV(MOD-SP2): 89.4 ML
BH CV ECHO MEAS - EDV(MOD-SP4): 93.2 ML
BH CV ECHO MEAS - EF(MOD-SP2): 62.8 %
BH CV ECHO MEAS - EF(MOD-SP4): 61.2 %
BH CV ECHO MEAS - ESV(CUBED): 40.4 ML
BH CV ECHO MEAS - ESV(MOD-SP2): 33.3 ML
BH CV ECHO MEAS - ESV(MOD-SP4): 36.2 ML
BH CV ECHO MEAS - FS: 27.3 %
BH CV ECHO MEAS - IVS/LVPW: 1.06 CM
BH CV ECHO MEAS - IVSD: 0.97 CM
BH CV ECHO MEAS - LA DIMENSION: 3.1 CM
BH CV ECHO MEAS - LAT PEAK E' VEL: 8.2 CM/SEC
BH CV ECHO MEAS - LV DIASTOLIC VOL/BSA (35-75): 55.1 CM2
BH CV ECHO MEAS - LV MASS(C)D: 153.2 GRAMS
BH CV ECHO MEAS - LV MAX PG: 3 MMHG
BH CV ECHO MEAS - LV MEAN PG: 2 MMHG
BH CV ECHO MEAS - LV SYSTOLIC VOL/BSA (12-30): 21.4 CM2
BH CV ECHO MEAS - LV V1 MAX: 86.5 CM/SEC
BH CV ECHO MEAS - LV V1 VTI: 19.9 CM
BH CV ECHO MEAS - LVIDD: 4.7 CM
BH CV ECHO MEAS - LVIDS: 3.4 CM
BH CV ECHO MEAS - LVOT AREA: 3.1 CM2
BH CV ECHO MEAS - LVOT DIAM: 2 CM
BH CV ECHO MEAS - LVPWD: 0.92 CM
BH CV ECHO MEAS - MED PEAK E' VEL: 7.9 CM/SEC
BH CV ECHO MEAS - MR MAX PG: 88.7 MMHG
BH CV ECHO MEAS - MR MAX VEL: 471 CM/SEC
BH CV ECHO MEAS - MV A MAX VEL: 75.4 CM/SEC
BH CV ECHO MEAS - MV DEC SLOPE: 387 CM/SEC2
BH CV ECHO MEAS - MV DEC TIME: 0.23 SEC
BH CV ECHO MEAS - MV E MAX VEL: 90.4 CM/SEC
BH CV ECHO MEAS - MV E/A: 1.2
BH CV ECHO MEAS - PA V2 MAX: 71.8 CM/SEC
BH CV ECHO MEAS - RAP SYSTOLE: 3 MMHG
BH CV ECHO MEAS - RVSP: 26 MMHG
BH CV ECHO MEAS - SV(LVOT): 62.5 ML
BH CV ECHO MEAS - SV(MOD-SP2): 56.1 ML
BH CV ECHO MEAS - SV(MOD-SP4): 57 ML
BH CV ECHO MEAS - SVI(LVOT): 37 ML/M2
BH CV ECHO MEAS - SVI(MOD-SP2): 33.2 ML/M2
BH CV ECHO MEAS - SVI(MOD-SP4): 33.7 ML/M2
BH CV ECHO MEAS - TAPSE (>1.6): 2.5 CM
BH CV ECHO MEAS - TR MAX PG: 23 MMHG
BH CV ECHO MEAS - TR MAX VEL: 240 CM/SEC
BH CV ECHO MEASUREMENTS AVERAGE E/E' RATIO: 11.23
BH CV XLRA - RV BASE: 4.3 CM
LEFT ATRIUM VOLUME INDEX: 39.3 ML/M2
LEFT ATRIUM VOLUME: 66.5 ML
LV EF BIPLANE MOD: 60.1 %

## 2025-02-18 PROCEDURE — 93306 TTE W/DOPPLER COMPLETE: CPT

## 2025-02-18 PROCEDURE — 93356 MYOCRD STRAIN IMG SPCKL TRCK: CPT

## 2025-02-21 NOTE — PROGRESS NOTES
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  He is also seen for stage 0 chronic lymphocytic leukemia (CLL)/small lymphocytic lymphoma (SLL), Stage 0.  Patient on observation.  He is also seen for anemia from iron deficiency.  He is on oral iron for the past 0.75 month.  The patient is seen with spouse Alaina and daughter Tina.  History obtained from the patient.      DIAGNOSTIC ABNORMALITIES: Lymphoma.  Patient underwent endoscopy 1/22/2025.  He had abdominal pain 9/2024 due to perforated gastric ulcer.  He had emergent surgery and found to have antral ulceration.  Circumferential ulcerated mucosa at the surgical site suggestive of chronic ischemia, chronic inflammation and ulceration.  Difficult to determine exactly the site of this secondary to markedly distorted anatomy.  Although there were multiple sutures present at this area.  Pathology report 2/5/2025.Small intestine, duodenal bulb, endoscopic biopsy:  Involvement by CD20 positive large B-cell lymphoma, best classified as Lawrence-Barr virus (EBV) positive diffuse large B-cell lymphoma.Ki-67 immunostain proliferative index is reported to be at least 70%.  Extensive ulceration  FISH report 2/4/2025.  Aggressive B-cell lymphoma.  Aneuploidy gain of BCL6/3 q.  No evidence of Bcl-2, BCL6 or MYC.          PREVIOUS INTERVENTIONS: Pending.      Oncology/Hematology History Overview Note   DIAGNOSTIC ABNORMALITIES:  Labs, 05/16/2012, [facility]: WBC 10.9, hemoglobin 14.3, hematocrit 42.2, MCV of 94.2, platelets 133,000, ALC elevated at 4796, ANC normal at 4.8, Absolute monocytosis at  970.  Labs, 11/14/2012, [facility]: WBC 10.2, hemoglobin 15.4, hematocrit 45.4, MCV 94.5, platelets 154,000, ALC elevated at 4,865. Globulin 1.9.  Labs, 11/15/2012, [facility]: IgG 495, IgA 34, IgM 28 (all below normal limits).  Blood film review, 11/16/2012, [facility]: Mild absolute lymphocytosis and monocytosis. Reactive and atypical lymphocytes present. Flow cytometry recommended.  PATHOLOGY: Cytogenetics, 12/12/2012, [facility]:  CLL and CCND1-IGH@FISH: Abnormal results with +12 and 13q-.   Flow cytometry peripheral blood, 12/12/2012, [facility]: Peripheral blood with a CD5+ B cell population showing lambda restriction, 30% cellularity.   Labs, 03/27/2013: GFR 78. glucose 108. IgG 455, IgM 16, IgA 35, M-spike 0.1, Immunof[facility]: IgG monoclonal protein with lambda light chain specificity.   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 Negative for CCND1/IGH gene rearrangement.    Hematopathology report peripheral blood, 03/27/2013 [facility]: Normal white blood cell count with no evidence of lymphocytosis but 23% abnormal intermediate lambda light chain-restricted B cell population identified by flow cytometry.  Mild thrombocytopenia with normal hemoglobin/hematocrit.  See comment.   Flow peripheral blood, 03/27/2013, [facility]:  Abnormal CD5-positive identified, monoclonal lambda. Consistent with chronic lymphocytic leukemia (CLL)/small lymphocytic lymphoma (SLL).     Skeletal survey, 01/11/2013, [facility]: No discrete lytic lesions identified.   Ultrasound abdomen, 01/11/2013, [facility]: No focal pathology.        PREVIOUS INTERVENTION:   Observation.      PREVIOUS INTERVENTIONS: Anemia from iron deficiency.  Ferrous sulfate 325 mg p.o. daily 01/03/2018 through 03/06/2018.  Resumed 09/04/2018 through 10/10/2018.  Resumed 08/27/2019 through 10/24/2019.  Resume 03/10/2022 through 6/9/2022.     CLL  (chronic lymphocytic leukemia)   8/27/2019 Initial Diagnosis    CLL (chronic lymphocytic leukemia) (CMS/HCC)     Diffuse large B-cell lymphoma of intra-abdominal lymph nodes   2/11/2025 Initial Diagnosis    Diffuse large B-cell lymphoma of intra-abdominal lymph nodes     2/25/2025 -  Chemotherapy    OP LYMPHOMA Mini-RCHOP RiTUXimab / Cyclophosphamide / DOXOrubicin / VinCRIStine / PredniSONE          PAST MEDICAL HISTORY:  ALLERGIES:  Allergies   Allergen Reactions    Augmentin [Amoxicillin-Pot Clavulanate] Hives    Latex Itching and Other (See Comments)     And band aids. Causes redness and itching.     CURRENT MEDICATIONS:  Outpatient Encounter Medications as of 3/4/2025   Medication Sig Dispense Refill    acetaminophen (TYLENOL) 500 MG tablet Take 1 tablet by mouth Every 6 (Six) Hours As Needed for Mild Pain. 120 tablet 0    albuterol sulfate  (90 Base) MCG/ACT inhaler Inhale 2 puffs 4 (Four) Times a Day. 8 g 2    allopurinol (ZYLOPRIM) 300 MG tablet Take 1 tablet by mouth Daily. 90 tablet 2    famotidine (PEPCID) 20 MG tablet Take 1 tablet by mouth 2 (Two) Times a Day. 30 tablet 0    Ferrous Sulfate (IRON PO) Take  by mouth Daily.      HYDROcodone-acetaminophen (NORCO)  MG per tablet Take 1 tablet by mouth Every 6 (Six) Hours As Needed for Moderate Pain. 180 tablet 0    latanoprost (XALATAN) 0.005 % ophthalmic solution Administer 1 drop to the right eye Every Night.      metoprolol tartrate (LOPRESSOR) 25 MG tablet Take 1 tablet by mouth 2 (Two) Times a Day.      multivitamins-minerals (PRESERVISION AREDS 2) capsule capsule Take 1 capsule by mouth 2 (Two) Times a Day.      ondansetron (ZOFRAN) 8 MG tablet Take 1 tablet by mouth Every 8 (Eight) Hours As Needed for Nausea or Vomiting. 60 tablet 2    ondansetron ODT (ZOFRAN-ODT) 4 MG disintegrating tablet Place 1 tablet on the tongue Every 6 (Six) Hours As Needed for Nausea or Vomiting. 20 tablet 0    pantoprazole (Protonix) 40 MG EC tablet Take 1  tablet by mouth 2 (Two) Times a Day. 60 tablet 1    predniSONE (DELTASONE) 10 MG tablet Take 5 tablets by mouth Daily. Daily for 5 days to begin with chemo 5 tablet 2    prochlorperazine (COMPAZINE) 10 MG tablet Take 1 tablet by mouth Every 4 (Four) Hours As Needed for Nausea or Vomiting. 60 tablet 2    sucralfate (Carafate) 1 GM/10ML suspension Take 10 mL by mouth 4 (Four) Times a Day. 473 mL 1    tamsulosin (FLOMAX) 0.4 MG capsule 24 hr capsule Take 1 capsule by mouth Daily.      vitamin B-12 (CYANOCOBALAMIN) 1000 MCG tablet Take 1 tablet by mouth Daily.      vitamin D3 125 MCG (5000 UT) capsule capsule Take 1 capsule by mouth Daily.      [] fludeoxyglucose F18 injection 1 dose        No facility-administered encounter medications on file as of 3/4/2025.     ADULT ILLNESSES:  Patient Active Problem List   Diagnosis Code    Hx of colonic polyp Z86.0100    Family hx of colon cancer Z80.0    CLL (chronic lymphocytic leukemia) C91.10    Hypertension I10    IgG lambda monoclonal gammopathy D47.2    Iron deficiency E61.1    Pneumoperitoneum K66.8    Perforated gastric ulcer K25.5    Cough R05.9    Anemia D64.9    Atrial fibrillation with rapid ventricular response I48.91    COVID-19 virus infection U07.1    History of gastric ulcer Z87.11    Diffuse large B-cell lymphoma of intra-abdominal lymph nodes C83.33    Encounter for care related to Port-a-Cath Z45.2     SURGERIES:  Past Surgical History:   Procedure Laterality Date    CATARACT EXTRACTION, BILATERAL      COLONOSCOPY  2012    CYSTOSCOPY BLADDER BIOPSY      DIAGNOSTIC LAPAROSCOPY N/A 2024    Procedure: ROBOTIC REPAIR OF PERFORATED GASTRIC ULCER;  Surgeon: Petrona Malone MD;  Location: [facility] OR;  Service: General;  Laterality: N/A;  WITH REPAIR OF GASTRIC ULCER PERFORATION    EXCISION MASS HEAD/NECK N/A 3/4/2022    Procedure: EXCISION OF MASS ON POSTERIOR NECK;  Surgeon: Rossana Mahajan MD;  Location: [facility] OR;  Service: General;   Laterality: N/A;    INGUINAL HERNIA REPAIR Left     INGUINAL HERNIA REPAIR Right 2017    Procedure: RIGHT INGUINAL HERNIA REPAIR WITH MESH ;  Surgeon: Rossana Mahajan MD;  Location: [facility] OR;  Service:      HEALTH MAINTENANCE ITEMS:  Health Maintenance Due   Topic Date Due    Pneumococcal Vaccine 50+ (1 of 2 - PCV) Never done    TDAP/TD VACCINES (1 - Tdap) Never done    ZOSTER VACCINE (1 of 2) Never done    ANNUAL PHYSICAL  Never done    RSV Vaccine - Adults (1 - 1-dose 75+ series) Never done    INFLUENZA VACCINE  2024    COVID-19 Vaccine (4 - - season) 2024       <no information>  Last Completed Colonoscopy            COLORECTAL CANCER SCREENING (COLONOSCOPY - Every 10 Years) Tentatively due on 2024  Colonoscopy, Scan    2017  SCANNED - COLONOSCOPY    2012  SCANNED - COLONOSCOPY                  Immunization History   Administered Date(s) Administered    COVID-19 (MODERNA) 1st,2nd,3rd Dose Monovalent 03/10/2021, 2021, 10/25/2021     Last Completed Mammogram       This patient has no relevant Health Maintenance data.              FAMILY HISTORY:  Family History   Problem Relation Age of Onset    Colon cancer Maternal Grandfather         in his 60's.     Alzheimer's disease Mother     Hypertension Father     Other Father         stent    COPD Sister     Heart attack Brother     No Known Problems Maternal Grandmother     No Known Problems Paternal Grandmother     No Known Problems Paternal Grandfather      SOCIAL HISTORY:  Social History     Socioeconomic History    Marital status:    Tobacco Use    Smoking status: Former     Current packs/day: 0.00     Types: Cigarettes     Start date:      Quit date: 1972     Years since quittin.2    Smokeless tobacco: Never   Vaping Use    Vaping status: Never Used   Substance and Sexual Activity    Alcohol use: No    Drug use: No    Sexual activity: Defer       REVIEW OF SYSTEMS:    Review of  "Systems   Constitutional:  Positive for fatigue and unexpected weight change. Negative for fever.        \"Hard to eat. Tummy ache. Stay in the recliner.\"   HENT:  Negative for congestion and mouth sores.    Eyes:  Negative for discharge and redness.   Respiratory:  Negative for shortness of breath and wheezing.    Cardiovascular:  Negative for chest pain and leg swelling.   Gastrointestinal:  Positive for abdominal pain. Negative for nausea and vomiting.   Endocrine: Negative for cold intolerance and heat intolerance.   Genitourinary:  Negative for difficulty urinating and dysuria.   Musculoskeletal:  Negative for gait problem and myalgias.   Skin:  Positive for pallor.   Allergic/Immunologic: Negative for food allergies.   Neurological:  Negative for dizziness, speech difficulty and weakness.   Hematological:  Negative for adenopathy. Bruises/bleeds easily.   Psychiatric/Behavioral:  Negative for agitation and confusion. The patient is not nervous/anxious.        VITAL SIGNS: /58   Pulse 74   Temp 98.5 °F (36.9 °C)   Resp 16   Ht 177.8 cm (70\")   Wt 51.8 kg (114 lb 4.8 oz)   SpO2 98%   BMI 16.40 kg/m²  Lost 8 pounds.   Pain Score    03/04/25 1308   PainSc: 3   Comment: abdomen       PHYSICAL EXAMINATION:     Physical Exam  Vitals reviewed.   Constitutional:       Comments: Frail looking.    HENT:      Head: Normocephalic and atraumatic.   Eyes:      General: No scleral icterus.  Cardiovascular:      Rate and Rhythm: Normal rate.   Pulmonary:      Effort: No respiratory distress.      Breath sounds: No wheezing.   Abdominal:      General: Bowel sounds are normal.      Palpations: Abdomen is soft.      Tenderness: There is abdominal tenderness. There is no guarding or rebound.   Musculoskeletal:         General: No swelling.      Cervical back: Neck supple.   Skin:     Coloration: Skin is pale.   Neurological:      Mental Status: He is oriented to person, place, and time.   Psychiatric:         Mood and " Affect: Mood normal.         Behavior: Behavior normal.         Thought Content: Thought content normal.         Judgment: Judgment normal.         LABS    Lab Results - Last 18 Months   Lab Units 03/04/25  1232 02/12/25  1242 01/07/25  1556 10/02/24  0422 10/01/24  0443 09/30/24  0634 09/12/24  1101 03/12/24  1046 09/06/23  1355   HEMOGLOBIN g/dL 8.9* 9.5* 10.4* 9.9* 9.8* 9.6*   < > 12.5* 12.4*   HEMATOCRIT % 28.6* 30.2* 31.6* 29.8* 29.8* 30.1*   < > 38.7 38.3   MCV fL 88.5 89.1 91.1 88.7 89.2 90.9   < > 91.9 91.0   WBC 10*3/mm3 10.50 7.25 7.94 10.64 9.47 10.76   < > 6.50 7.30   RDW % 14.0 11.9* 12.6 12.4 12.4 12.6   < > 13.2 12.3   MPV fL 8.2 8.7 8.9 9.1 8.8 9.0   < > 9.1 8.4   PLATELETS 10*3/mm3 260 222 158 213 198 198   < > 148 237   IMM GRAN % % 0.5 0.3 0.4 0.7* 0.4 0.7*   < >  --   --    NEUTROS ABS 10*3/mm3 6.84 3.17 3.75 7.12* 6.47 7.98*   < > 1.38* 1.02*   LYMPHS ABS 10*3/mm3 2.35 2.68 3.10 2.09 1.87 1.64   < >  --   --    MONOS ABS 10*3/mm3 1.15* 1.31* 0.97* 1.09* 0.91* 0.97*   < >  --   --    EOS ABS 10*3/mm3 0.03 0.02 0.05 0.23 0.16 0.08   < > 0.13 0.07   BASOS ABS 10*3/mm3 0.08 0.05 0.04 0.04 0.02 0.02   < > 0.07 0.15   IMMATURE GRANS (ABS) 10*3/mm3 0.05 0.02 0.03 0.07* 0.04 0.07*   < >  --   --    NRBC /100 WBC 0.0 0.0 0.0 0.0 0.0 0.0   < >  --   --    NEUTROPHIL % %  --   --   --   --   --   --   --  18.2* 14.0*   MONOCYTES % %  --   --   --   --   --   --   --  14.1* 8.0   BASOPHIL % %  --   --   --   --   --   --   --  1.0 2.0*   ATYP LYMPH % %  --   --   --   --   --   --   --  29.3* 28.0*   ANISOCYTOSIS   --   --   --   --   --   --   --  Slight/1+  --     < > = values in this interval not displayed.       Lab Results - Last 18 Months   Lab Units 03/04/25  1232 02/12/25  1242 01/07/25  1556 10/02/24  0422 10/01/24  0443 09/30/24  0634 09/28/24  0602 09/27/24  0214 09/26/24  1858 09/12/24  1101   GLUCOSE mg/dL 121* 109* 104* 103* 97 103*   < > 135* 151* 102*   SODIUM mmol/L 131* 133* 135* 134* 133*  132*   < > 134* 134* 134*   POTASSIUM mmol/L 4.4 5.1 4.3 3.7 4.0 4.1   < > 4.5 4.4 4.5   CO2 mmol/L 28.0 30.0* 27.0 26.0 24.0 25.0   < > 22.0 25.0 29.0   CHLORIDE mmol/L 93* 95* 96* 98 98 98   < > 100 96* 98   ANION GAP mmol/L 10.0 8.0 12.0 10.0 11.0 9.0   < > 12.0 13.0 7.0   CREATININE mg/dL 1.10 1.06 1.00 0.84 0.78 0.94   < > 1.03 1.13 0.92   BUN mg/dL 23 19 12 17 19 22   < > 19 21 13   BUN / CREAT RATIO  20.9 17.9 12.0 20.2 24.4 23.4   < > 18.4 18.6 14.1   CALCIUM mg/dL 8.7 9.1 9.0 7.9* 8.0* 7.9*   < > 8.0* 9.0 9.0   ALK PHOS U/L 86 85 100  --   --   --   --  98 123* 96   TOTAL PROTEIN g/dL 5.2* 5.7* 5.5*  --   --   --   --  5.1* 6.3 6.1  5.7*   ALT (SGPT) U/L 15 18 5  --   --   --   --  12 12 5   AST (SGOT) U/L 18 22 9  --   --   --   --  16 13 9   BILIRUBIN mg/dL 0.6 0.5 0.8  --   --   --   --  0.8 0.8 0.6   ALBUMIN g/dL 3.1* 3.6 3.8  --   --   --   --  3.1* 4.0 3.8  3.4   GLOBULIN gm/dL 2.1 2.1 1.7  --   --   --   --  2.0 2.3 2.3   GLOBULINREF g/dL  --   --   --   --   --   --   --   --   --  2.3    < > = values in this interval not displayed.       Lab Results - Last 18 Months   Lab Units 03/04/25  1232 02/12/25  1242 09/12/24  1101 03/12/24  1046 09/06/23  1355   M-SPIKE g/dL  --   --  0.1* 0.1* 0.1*   KAPPA/LAMBDA RATIO, S   --   --  0.66 0.68 0.52   FREE LAMBDA LIGHT CHAINS mg/L  --   --  12.8 13.5 13.0   URIC ACID mg/dL  --  3.5  --   --   --    IG KAPPA FREE LIGHT CHAIN mg/L  --   --  8.5 9.2 6.8   LDH U/L 127* 126* 129* 142  --    REFERENCE LAB REPORT   --   --  See Attached Report  --   --        Lab Results - Last 18 Months   Lab Units 03/04/25  1232 02/12/25  1242 09/12/24  1101 03/12/24  1046 09/06/23  1355   IRON mcg/dL 16* 12* 19* 79 74   TIBC mcg/dL 243* 240* 279* 393 302   IRON SATURATION (TSAT) % 7* 5* 7* 20 24   FERRITIN ng/mL 412.00* 340.10 225.30 128.40 250.10       Oral Dey reports a pain score of 3.  Given his pain assessment as noted, treatment options were discussed and the  following options were decided upon as a follow-up plan to address the patient's pain: continuation of current treatment plan for pain.      ASSESSMENT:  Large cell lymphoma of the duodenal bulb, EBV positive.  Ki-67 70%.  AJCC stage: 1B.  IPI score 2. 3 year overall survival 81%. 3 year PFS 75%.   Treatment status: Pending mini R CHOP due to advance age and poor performance status.   2.   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  Treatment status: Observation.  Complications: None.  Prognosis: Good.  3.   Abdominal pain from lymphoma.  Hydrocodone as needed for pain.    4.   Normocytic anemia, from chronic disease and iron deficiency.  Oral iron 2/13/2025 through present.  5.   Performance status of 3.  6.   Mild thrombocytopenia likely from idiopathic thrombocytopenic purpura, stable for observation.   7.   History of fever, resolved, ? viral syndrome.  Not compatible with progressing chronic lymphocytic leukemia (CLL). Lymphocyte count is stable, no palpable adenopathies, and fever had resolved.   8.   Bladder cancer  AJCC stage cTa, cN0, cM0.   Treatment status:On observation.   9.  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        PLAN:  1.    Re: 2D echo report on 2/18/2025.  EF 61 to 65%.  2.   Re: PET report 3/3/2025.   Marked hypermetabolic wall thickening of the gastric antrum and proximal duodenum. Findings are in keeping with biopsy-proven lymphoma.   Enlarged gastrocolic ligament lymph nodes are again present, though these are not hypermetabolic. Differential includes reactive lymph nodes versus additional site of lymphomatous involvement.  Mildly enlarged hypermetabolic subcarinal lymph node, with differential including lymphoma versus reactive node.  The spleen is mildly enlarged measuring 14.3 cm craniocaudal dimension.   The ascending aorta is  dilated measuring 4.1 cm diameter.  3.   Re: Heme status.  WBC 7.2, hemoglobin 9.5, hematocrit 30.2, MCV 89.1 and platelet 222.  Reticulocyte 1.58.  Ferritin 340.1 and saturation 5%.  4.   Re: CMP.  GFR 71.1 mL/min.  5.   Re: Non reactive HIV 2/12/2025.  6.   Re: Low .  B2M 5.3.  7.  Blood for CBC with differential, CMP, LDH, B2M, uric acid, and HIV.  8.  Port by general surgery 3/5/2025.  9.  Plan for mini R-CHOP.  Aware of potential infusion, anaphylaxis, nausea, vomiting, alopecia, secondary malignancy, cardiotoxicity, cytopenias, fatigue, and neuropathy.  10.  Schedule treatment C1 D1 on 3/10/2025 (plan:  Every 21 days for 6 cycles).  Rituxan 375 mg/m2.  Cyclophosphamide 400 mg/m2.  Doxorubicin 25 mg/m2  Vincristine 1mg  Prednisone  50 p.o. daily for 5 days to begin with chemo.  11.  Premedicate with:  Aloxi 0.25 mg IVP.  Decadron 12 mg IV  Emend 150 mg IV  Tylenol 500 mg p.o. and Benadryl 12.5 mg IV prior to Rituxan.   12.  PEG filgrastim D1 as primary prophylaxis.  High risk for neutropenic fever.  Monitor for arthralgias.  13.  eRx for allopurinol 300 mg p.o. daily, number 90, 2 refills.   14.  eRx for for Zofran 8 mg mg p.o. every 8 hours as needed for nausea and vomiting, #60, 2 refills.  15.  eRx for Compazine 10 mg p.o. every 4 hours as needed for nausea vomiting #60, 2 refills.  16.  eRx for prednisone 50 mg p.o. daily for 5 days to begin with chemo, 3 refills.    17.  eRx Norco 10 mg po every 6 hours as needed for pain, 180. Observe for hypersomnolence.  18.  CBC with differential weekly.  19.  Epoetin alpha 40,000 units subcutaneously if hemoglobin is less than 10 and hematocrit less than 30.  Monitor for elevated blood pressure.  20.  Treatment for CLL is generally reserved until the patient has active disease defined as the presence of disease-related symptoms, such as weight loss greater than 10%, extreme fatigue, fever greater than 100.5 for 2 weeks with night  "sweats without evidence of infection (\"B\" symptoms), worsening cytopenias, unexplained recurrent infections, worsening adenopathy, or splenomegaly  21.  Continue ongoing management per primary care physician and the other specialists.  22.  Plan of care discussed with patient , daughter and his spouse.  Understanding expressed.  He agreed to proceed.  23.  Transfuse 1 unit PRBCs if hemoglobin below 7.  Premed Tylenol 500 mg p.o.  Lasix 20 mg IVP after each unit of PRBC.  Monitor for transfusion reactions.  24.  Advance Care Planning  ACP discussion was declined by the patient. Patient does not have an advance directive, information provided.  25.  Return to office in 3 weeks with pre-office CMP, LDH, and B2M.  26.  PET scan after cycle 4.              I have reviewed the assessment and plan and verified the accuracy of it. No changes to assessment and plan since the information was documented. aMrco Boogie MD 03/04/25            I spent 40 total minutes, face-to-face, caring for Oral today. Greater than 50% of this time involved counseling and/or coordination of care as documented within this note.              cc:  Jorge Shepherd MD         (Petrona Malone MD)         (Chu Chaves MD)         (Chalo Zaman MD)    "

## 2025-02-24 ENCOUNTER — TELEPHONE (OUTPATIENT)
Dept: ONCOLOGY | Facility: CLINIC | Age: 85
End: 2025-02-24

## 2025-02-24 NOTE — TELEPHONE ENCOUNTER
Caller: Oral Dey    Relationship: Self    Best call back number: [phone]    What test was performed: ECHO     When was the test performed: 2/18/25    Where was the test performed:

## 2025-02-25 ENCOUNTER — TELEPHONE (OUTPATIENT)
Dept: ONCOLOGY | Facility: CLINIC | Age: 85
End: 2025-02-25
Payer: COMMERCIAL

## 2025-02-25 NOTE — TELEPHONE ENCOUNTER
Notified Oral of 2 D ECHO results.  Oral stated that he saw the surgeon but was waiting on his heart to see if he needed the port.  Echo ejection fx 61-65%.  Dr. Boogie wants to go ahead with the port because he needs to start treatment.  Notified Dr. Malone office that Oral needs to be scheduled to get his port placed.    Notified Oral that Dr. Boogie wants him to go ahead with the port placement and that I left a message with Dr. Malone office about this.

## 2025-02-26 ENCOUNTER — TELEPHONE (OUTPATIENT)
Dept: SURGERY | Facility: CLINIC | Age: 85
End: 2025-02-26
Payer: COMMERCIAL

## 2025-02-26 NOTE — TELEPHONE ENCOUNTER
Oral Dey is scheduled for surgery with Dr. Malone on 03/05/25 with an arrival time of 11.  You will check in at the main registration desk.   On the day of surgery you will need a .   We ask that you do not eat or drink anything after midnight on the day of surgery.   Please do not take any anti-inflammatory or weight loss medications, vitamins, ibuprofen, aleve, Advil, motrin, Excedrin, mobic, meloxicam for 7 days prior to surgery.   For your pre-work appointment you do not have to fast.   For pre-work you do not need a . You will get some lab work and sign consent for surgery.   You may also get an EKG and/or chest xray if needed.     If you have any questions do not hesitate to reach out.   You can reach us at [phone] hit option 2.       Patient voiced understanding and confirmed all surgery details.    CBC  02/26

## 2025-02-26 NOTE — TELEPHONE ENCOUNTER
Left the patient a voicemail requesting he call us back at his earliest convenience to go over his surgery details.    CBC  02/26

## 2025-03-03 ENCOUNTER — HOSPITAL ENCOUNTER (OUTPATIENT)
Dept: CT IMAGING | Facility: HOSPITAL | Age: 85
Discharge: HOME | End: 2025-03-03
Payer: COMMERCIAL

## 2025-03-03 DIAGNOSIS — C83.33 DIFFUSE LARGE B-CELL LYMPHOMA OF INTRA-ABDOMINAL LYMPH NODES: ICD-10-CM

## 2025-03-03 DIAGNOSIS — C91.10 CLL (CHRONIC LYMPHOCYTIC LEUKEMIA): ICD-10-CM

## 2025-03-03 PROCEDURE — 78815 PET IMAGE W/CT SKULL-THIGH: CPT

## 2025-03-03 PROCEDURE — 34310000005 FLUDEOXYGLUCOSE F18 SOLUTION: Performed by: INTERNAL MEDICINE

## 2025-03-03 PROCEDURE — A9552 F18 FDG: HCPCS | Performed by: INTERNAL MEDICINE

## 2025-03-03 RX ADMIN — FLUDEOXYGLUCOSE F18 1 DOSE: 300 INJECTION INTRAVENOUS at 09:42

## 2025-03-04 ENCOUNTER — LAB (OUTPATIENT)
Dept: LAB | Facility: HOSPITAL | Age: 85
End: 2025-03-04
Payer: COMMERCIAL

## 2025-03-04 ENCOUNTER — OFFICE VISIT (OUTPATIENT)
Dept: ONCOLOGY | Facility: CLINIC | Age: 85
End: 2025-03-04
Payer: COMMERCIAL

## 2025-03-04 VITALS
DIASTOLIC BLOOD PRESSURE: 58 MMHG | SYSTOLIC BLOOD PRESSURE: 122 MMHG | TEMPERATURE: 98.5 F | RESPIRATION RATE: 16 BRPM | BODY MASS INDEX: 16.36 KG/M2 | HEIGHT: 70 IN | OXYGEN SATURATION: 98 % | HEART RATE: 74 BPM | WEIGHT: 114.3 LBS

## 2025-03-04 DIAGNOSIS — Z79.899 ENCOUNTER FOR LONG-TERM (CURRENT) USE OF HIGH-RISK MEDICATION: Primary | ICD-10-CM

## 2025-03-04 DIAGNOSIS — C83.33 DIFFUSE LARGE B-CELL LYMPHOMA OF INTRA-ABDOMINAL LYMPH NODES: ICD-10-CM

## 2025-03-04 DIAGNOSIS — C83.33 DIFFUSE LARGE B-CELL LYMPHOMA OF INTRA-ABDOMINAL LYMPH NODES: Primary | ICD-10-CM

## 2025-03-04 DIAGNOSIS — D47.2 MGUS (MONOCLONAL GAMMOPATHY OF UNKNOWN SIGNIFICANCE): ICD-10-CM

## 2025-03-04 DIAGNOSIS — C91.10 CLL (CHRONIC LYMPHOCYTIC LEUKEMIA): ICD-10-CM

## 2025-03-04 LAB
ALBUMIN SERPL-MCNC: 3.1 G/DL (ref 3.5–5.2)
ALBUMIN/GLOB SERPL: 1.5 G/DL
ALP SERPL-CCNC: 86 U/L (ref 39–117)
ALT SERPL W P-5'-P-CCNC: 15 U/L (ref 1–41)
ANION GAP SERPL CALCULATED.3IONS-SCNC: 10 MMOL/L (ref 5–15)
AST SERPL-CCNC: 18 U/L (ref 1–40)
BASOPHILS # BLD AUTO: 0.08 10*3/MM3 (ref 0–0.2)
BASOPHILS NFR BLD AUTO: 0.8 % (ref 0–1.5)
BILIRUB SERPL-MCNC: 0.6 MG/DL (ref 0–1.2)
BUN SERPL-MCNC: 23 MG/DL (ref 8–23)
BUN/CREAT SERPL: 20.9 (ref 7–25)
CALCIUM SPEC-SCNC: 8.7 MG/DL (ref 8.6–10.5)
CHLORIDE SERPL-SCNC: 93 MMOL/L (ref 98–107)
CO2 SERPL-SCNC: 28 MMOL/L (ref 22–29)
CREAT SERPL-MCNC: 1.1 MG/DL (ref 0.76–1.27)
DEPRECATED RDW RBC AUTO: 44.4 FL (ref 37–54)
EGFRCR SERPLBLD CKD-EPI 2021: 67 ML/MIN/1.73
EOSINOPHIL # BLD AUTO: 0.03 10*3/MM3 (ref 0–0.4)
EOSINOPHIL NFR BLD AUTO: 0.3 % (ref 0.3–6.2)
ERYTHROCYTE [DISTWIDTH] IN BLOOD BY AUTOMATED COUNT: 14 % (ref 12.3–15.4)
FERRITIN SERPL-MCNC: 412 NG/ML (ref 30–400)
GLOBULIN UR ELPH-MCNC: 2.1 GM/DL
GLUCOSE SERPL-MCNC: 121 MG/DL (ref 65–99)
HCT VFR BLD AUTO: 28.6 % (ref 37.5–51)
HGB BLD-MCNC: 8.9 G/DL (ref 13–17.7)
IMM GRANULOCYTES # BLD AUTO: 0.05 10*3/MM3 (ref 0–0.05)
IMM GRANULOCYTES NFR BLD AUTO: 0.5 % (ref 0–0.5)
IRON 24H UR-MRATE: 16 MCG/DL (ref 59–158)
IRON SATN MFR SERPL: 7 % (ref 20–50)
LDH SERPL-CCNC: 127 U/L (ref 135–225)
LYMPHOCYTES # BLD AUTO: 2.35 10*3/MM3 (ref 0.7–3.1)
LYMPHOCYTES NFR BLD AUTO: 22.4 % (ref 19.6–45.3)
MCH RBC QN AUTO: 27.6 PG (ref 26.6–33)
MCHC RBC AUTO-ENTMCNC: 31.1 G/DL (ref 31.5–35.7)
MCV RBC AUTO: 88.5 FL (ref 79–97)
MONOCYTES # BLD AUTO: 1.15 10*3/MM3 (ref 0.1–0.9)
MONOCYTES NFR BLD AUTO: 11 % (ref 5–12)
NEUTROPHILS NFR BLD AUTO: 6.84 10*3/MM3 (ref 1.7–7)
NEUTROPHILS NFR BLD AUTO: 65 % (ref 42.7–76)
NRBC BLD AUTO-RTO: 0 /100 WBC (ref 0–0.2)
PLATELET # BLD AUTO: 260 10*3/MM3 (ref 140–450)
PMV BLD AUTO: 8.2 FL (ref 6–12)
POTASSIUM SERPL-SCNC: 4.4 MMOL/L (ref 3.5–5.2)
PROT SERPL-MCNC: 5.2 G/DL (ref 6–8.5)
RBC # BLD AUTO: 3.23 10*6/MM3 (ref 4.14–5.8)
SODIUM SERPL-SCNC: 131 MMOL/L (ref 136–145)
TIBC SERPL-MCNC: 243 MCG/DL (ref 298–536)
TRANSFERRIN SERPL-MCNC: 163 MG/DL (ref 200–360)
WBC NRBC COR # BLD AUTO: 10.5 10*3/MM3 (ref 3.4–10.8)

## 2025-03-04 PROCEDURE — 84165 PROTEIN E-PHORESIS SERUM: CPT

## 2025-03-04 PROCEDURE — 82784 ASSAY IGA/IGD/IGG/IGM EACH: CPT

## 2025-03-04 PROCEDURE — 83540 ASSAY OF IRON: CPT

## 2025-03-04 PROCEDURE — 84466 ASSAY OF TRANSFERRIN: CPT

## 2025-03-04 PROCEDURE — 82728 ASSAY OF FERRITIN: CPT

## 2025-03-04 PROCEDURE — 86334 IMMUNOFIX E-PHORESIS SERUM: CPT

## 2025-03-04 PROCEDURE — 83521 IG LIGHT CHAINS FREE EACH: CPT

## 2025-03-04 PROCEDURE — 80053 COMPREHEN METABOLIC PANEL: CPT

## 2025-03-04 PROCEDURE — 36415 COLL VENOUS BLD VENIPUNCTURE: CPT

## 2025-03-04 PROCEDURE — 83615 LACTATE (LD) (LDH) ENZYME: CPT

## 2025-03-04 PROCEDURE — 85025 COMPLETE CBC W/AUTO DIFF WBC: CPT

## 2025-03-04 RX ORDER — ACETAMINOPHEN 325 MG/1
650 TABLET ORAL ONCE
OUTPATIENT
Start: 2025-03-10

## 2025-03-04 RX ORDER — DIPHENHYDRAMINE HYDROCHLORIDE 50 MG/ML
50 INJECTION INTRAMUSCULAR; INTRAVENOUS AS NEEDED
OUTPATIENT
Start: 2025-03-10

## 2025-03-04 RX ORDER — SODIUM CHLORIDE 9 MG/ML
20 INJECTION, SOLUTION INTRAVENOUS ONCE
OUTPATIENT
Start: 2025-03-10

## 2025-03-04 RX ORDER — MEPERIDINE HYDROCHLORIDE 25 MG/ML
25 INJECTION INTRAMUSCULAR; INTRAVENOUS; SUBCUTANEOUS ONCE
OUTPATIENT
Start: 2025-03-10 | End: 2025-03-11

## 2025-03-04 RX ORDER — FAMOTIDINE 10 MG/ML
20 INJECTION, SOLUTION INTRAVENOUS AS NEEDED
OUTPATIENT
Start: 2025-03-10

## 2025-03-04 RX ORDER — DOXORUBICIN HYDROCHLORIDE 2 MG/ML
25 INJECTION, SOLUTION INTRAVENOUS ONCE
OUTPATIENT
Start: 2025-03-10

## 2025-03-04 RX ORDER — PALONOSETRON 0.05 MG/ML
0.25 INJECTION, SOLUTION INTRAVENOUS ONCE
OUTPATIENT
Start: 2025-03-10

## 2025-03-04 RX ORDER — HYDROCODONE BITARTRATE AND ACETAMINOPHEN 10; 325 MG/1; MG/1
1 TABLET ORAL EVERY 4 HOURS PRN
Qty: 180 TABLET | Refills: 0 | Status: SHIPPED | OUTPATIENT
Start: 2025-03-04

## 2025-03-04 RX ORDER — HYDROCORTISONE SODIUM SUCCINATE 100 MG/2ML
100 INJECTION INTRAMUSCULAR; INTRAVENOUS AS NEEDED
OUTPATIENT
Start: 2025-03-10

## 2025-03-05 ENCOUNTER — APPOINTMENT (OUTPATIENT)
Dept: GENERAL RADIOLOGY | Facility: HOSPITAL | Age: 85
End: 2025-03-05
Payer: COMMERCIAL

## 2025-03-05 ENCOUNTER — HOSPITAL ENCOUNTER (OUTPATIENT)
Facility: HOSPITAL | Age: 85
Setting detail: HOSPITAL OUTPATIENT SURGERY
Discharge: HOME | End: 2025-03-05
Attending: STUDENT IN AN ORGANIZED HEALTH CARE EDUCATION/TRAINING PROGRAM | Admitting: STUDENT IN AN ORGANIZED HEALTH CARE EDUCATION/TRAINING PROGRAM
Payer: COMMERCIAL

## 2025-03-05 ENCOUNTER — ANESTHESIA (OUTPATIENT)
Dept: PERIOP | Facility: HOSPITAL | Age: 85
End: 2025-03-05
Payer: COMMERCIAL

## 2025-03-05 ENCOUNTER — ANESTHESIA EVENT (OUTPATIENT)
Dept: PERIOP | Facility: HOSPITAL | Age: 85
End: 2025-03-05
Payer: COMMERCIAL

## 2025-03-05 VITALS
HEART RATE: 74 BPM | HEIGHT: 69 IN | TEMPERATURE: 98.6 F | BODY MASS INDEX: 17.04 KG/M2 | DIASTOLIC BLOOD PRESSURE: 56 MMHG | RESPIRATION RATE: 16 BRPM | WEIGHT: 115.08 LBS | OXYGEN SATURATION: 97 % | SYSTOLIC BLOOD PRESSURE: 127 MMHG

## 2025-03-05 DIAGNOSIS — C83.33 DIFFUSE LARGE B-CELL LYMPHOMA OF INTRA-ABDOMINAL LYMPH NODES: ICD-10-CM

## 2025-03-05 DIAGNOSIS — Z45.2 ENCOUNTER FOR CARE RELATED TO PORT-A-CATH: ICD-10-CM

## 2025-03-05 PROCEDURE — 25010000002 CEFAZOLIN PER 500 MG

## 2025-03-05 PROCEDURE — 25010000002 HEPARIN LOCK FLUSH PER 10 UNITS: Performed by: STUDENT IN AN ORGANIZED HEALTH CARE EDUCATION/TRAINING PROGRAM

## 2025-03-05 PROCEDURE — 25010000002 LIDOCAINE 1% - EPINEPHRINE 1:100000 1 %-1:100000 SOLUTION: Performed by: STUDENT IN AN ORGANIZED HEALTH CARE EDUCATION/TRAINING PROGRAM

## 2025-03-05 PROCEDURE — 25810000003 LACTATED RINGERS PER 1000 ML: Performed by: STUDENT IN AN ORGANIZED HEALTH CARE EDUCATION/TRAINING PROGRAM

## 2025-03-05 PROCEDURE — 36561 INSERT TUNNELED CV CATH: CPT

## 2025-03-05 PROCEDURE — 25810000003 SODIUM CHLORIDE PER 500 ML: Performed by: STUDENT IN AN ORGANIZED HEALTH CARE EDUCATION/TRAINING PROGRAM

## 2025-03-05 PROCEDURE — 25010000002 PROPOFOL 1000 MG/100ML EMULSION: Performed by: NURSE ANESTHETIST, CERTIFIED REGISTERED

## 2025-03-05 PROCEDURE — C1788 PORT, INDWELLING, IMP: HCPCS | Performed by: STUDENT IN AN ORGANIZED HEALTH CARE EDUCATION/TRAINING PROGRAM

## 2025-03-05 PROCEDURE — 36561 INSERT TUNNELED CV CATH: CPT | Performed by: STUDENT IN AN ORGANIZED HEALTH CARE EDUCATION/TRAINING PROGRAM

## 2025-03-05 PROCEDURE — 76000 FLUOROSCOPY <1 HR PHYS/QHP: CPT

## 2025-03-05 PROCEDURE — 25010000002 LIDOCAINE PF 2% 2 % SOLUTION: Performed by: NURSE ANESTHETIST, CERTIFIED REGISTERED

## 2025-03-05 PROCEDURE — 77001 FLUOROGUIDE FOR VEIN DEVICE: CPT | Performed by: STUDENT IN AN ORGANIZED HEALTH CARE EDUCATION/TRAINING PROGRAM

## 2025-03-05 DEVICE — POWERPORT CLEARVUE IMPLANTABLE PORT WITH ATTACHABLE 8F POLYURETHANE OPEN-ENDED SINGLE-LUMEN VENOUS CATHETER INTERMEDIATE KIT
Type: IMPLANTABLE DEVICE | Site: CHEST | Status: FUNCTIONAL
Brand: POWERPORT CLEARVUE

## 2025-03-05 RX ORDER — PANTOPRAZOLE SODIUM 40 MG/1
40 TABLET, DELAYED RELEASE ORAL DAILY
Qty: 30 TABLET | Refills: 2 | Status: SHIPPED | OUTPATIENT
Start: 2025-03-05 | End: 2026-03-05

## 2025-03-05 RX ORDER — PROPOFOL 10 MG/ML
INJECTION, EMULSION INTRAVENOUS CONTINUOUS PRN
Status: DISCONTINUED | OUTPATIENT
Start: 2025-03-05 | End: 2025-03-05 | Stop reason: SURG

## 2025-03-05 RX ORDER — FENTANYL CITRATE 50 UG/ML
50 INJECTION, SOLUTION INTRAMUSCULAR; INTRAVENOUS
Status: DISCONTINUED | OUTPATIENT
Start: 2025-03-05 | End: 2025-03-05 | Stop reason: HOSPADM

## 2025-03-05 RX ORDER — FENTANYL CITRATE 50 UG/ML
25 INJECTION, SOLUTION INTRAMUSCULAR; INTRAVENOUS
Status: DISCONTINUED | OUTPATIENT
Start: 2025-03-05 | End: 2025-03-05 | Stop reason: HOSPADM

## 2025-03-05 RX ORDER — FLUMAZENIL 0.1 MG/ML
0.2 INJECTION INTRAVENOUS AS NEEDED
Status: DISCONTINUED | OUTPATIENT
Start: 2025-03-05 | End: 2025-03-05 | Stop reason: HOSPADM

## 2025-03-05 RX ORDER — HYDROCODONE BITARTRATE AND ACETAMINOPHEN 10; 325 MG/1; MG/1
1 TABLET ORAL EVERY 4 HOURS PRN
Status: DISCONTINUED | OUTPATIENT
Start: 2025-03-05 | End: 2025-03-05 | Stop reason: HOSPADM

## 2025-03-05 RX ORDER — SODIUM CHLORIDE, SODIUM LACTATE, POTASSIUM CHLORIDE, CALCIUM CHLORIDE 600; 310; 30; 20 MG/100ML; MG/100ML; MG/100ML; MG/100ML
20 INJECTION, SOLUTION INTRAVENOUS CONTINUOUS
Status: DISCONTINUED | OUTPATIENT
Start: 2025-03-05 | End: 2025-03-05 | Stop reason: HOSPADM

## 2025-03-05 RX ORDER — NALOXONE HCL 0.4 MG/ML
0.4 VIAL (ML) INJECTION AS NEEDED
Status: DISCONTINUED | OUTPATIENT
Start: 2025-03-05 | End: 2025-03-05 | Stop reason: HOSPADM

## 2025-03-05 RX ORDER — TRAMADOL HYDROCHLORIDE 50 MG/1
50 TABLET ORAL EVERY 8 HOURS PRN
Qty: 5 TABLET | Refills: 0 | Status: SHIPPED | OUTPATIENT
Start: 2025-03-05 | End: 2025-03-08

## 2025-03-05 RX ORDER — SODIUM CHLORIDE 0.9 % (FLUSH) 0.9 %
3 SYRINGE (ML) INJECTION AS NEEDED
Status: DISCONTINUED | OUTPATIENT
Start: 2025-03-05 | End: 2025-03-05 | Stop reason: HOSPADM

## 2025-03-05 RX ORDER — ONDANSETRON 2 MG/ML
4 INJECTION INTRAMUSCULAR; INTRAVENOUS ONCE AS NEEDED
Status: DISCONTINUED | OUTPATIENT
Start: 2025-03-05 | End: 2025-03-05 | Stop reason: HOSPADM

## 2025-03-05 RX ORDER — SODIUM CHLORIDE 0.9 % (FLUSH) 0.9 %
10 SYRINGE (ML) INJECTION EVERY 12 HOURS SCHEDULED
Status: DISCONTINUED | OUTPATIENT
Start: 2025-03-05 | End: 2025-03-05 | Stop reason: HOSPADM

## 2025-03-05 RX ORDER — ACETAMINOPHEN 325 MG/1
975 TABLET ORAL EVERY 8 HOURS
Start: 2025-03-05 | End: 2026-03-05

## 2025-03-05 RX ORDER — ONDANSETRON 4 MG/1
4 TABLET, FILM COATED ORAL EVERY 8 HOURS PRN
Qty: 15 TABLET | Refills: 0 | Status: SHIPPED | OUTPATIENT
Start: 2025-03-05 | End: 2026-03-05

## 2025-03-05 RX ORDER — LIDOCAINE HYDROCHLORIDE AND EPINEPHRINE 10; 10 MG/ML; UG/ML
INJECTION, SOLUTION INFILTRATION; PERINEURAL AS NEEDED
Status: DISCONTINUED | OUTPATIENT
Start: 2025-03-05 | End: 2025-03-05 | Stop reason: HOSPADM

## 2025-03-05 RX ORDER — SODIUM CHLORIDE 0.9 % (FLUSH) 0.9 %
10 SYRINGE (ML) INJECTION AS NEEDED
Status: DISCONTINUED | OUTPATIENT
Start: 2025-03-05 | End: 2025-03-05 | Stop reason: HOSPADM

## 2025-03-05 RX ORDER — LIDOCAINE HYDROCHLORIDE 20 MG/ML
INJECTION, SOLUTION EPIDURAL; INFILTRATION; INTRACAUDAL; PERINEURAL AS NEEDED
Status: DISCONTINUED | OUTPATIENT
Start: 2025-03-05 | End: 2025-03-05 | Stop reason: SURG

## 2025-03-05 RX ORDER — IBUPROFEN 600 MG/1
600 TABLET, FILM COATED ORAL EVERY 6 HOURS PRN
Status: DISCONTINUED | OUTPATIENT
Start: 2025-03-05 | End: 2025-03-05 | Stop reason: HOSPADM

## 2025-03-05 RX ORDER — HYDROCODONE BITARTRATE AND ACETAMINOPHEN 5; 325 MG/1; MG/1
1 TABLET ORAL EVERY 4 HOURS PRN
Status: DISCONTINUED | OUTPATIENT
Start: 2025-03-05 | End: 2025-03-05 | Stop reason: HOSPADM

## 2025-03-05 RX ORDER — LABETALOL HYDROCHLORIDE 5 MG/ML
5 INJECTION, SOLUTION INTRAVENOUS
Status: DISCONTINUED | OUTPATIENT
Start: 2025-03-05 | End: 2025-03-05 | Stop reason: HOSPADM

## 2025-03-05 RX ORDER — HEPARIN SODIUM (PORCINE) LOCK FLUSH IV SOLN 100 UNIT/ML 100 UNIT/ML
SOLUTION INTRAVENOUS AS NEEDED
Status: DISCONTINUED | OUTPATIENT
Start: 2025-03-05 | End: 2025-03-05 | Stop reason: HOSPADM

## 2025-03-05 RX ORDER — LIDOCAINE HYDROCHLORIDE 10 MG/ML
0.5 INJECTION, SOLUTION EPIDURAL; INFILTRATION; INTRACAUDAL; PERINEURAL ONCE AS NEEDED
Status: DISCONTINUED | OUTPATIENT
Start: 2025-03-05 | End: 2025-03-05 | Stop reason: HOSPADM

## 2025-03-05 RX ORDER — DEXTROSE MONOHYDRATE 25 G/50ML
12.5 INJECTION, SOLUTION INTRAVENOUS AS NEEDED
Status: DISCONTINUED | OUTPATIENT
Start: 2025-03-05 | End: 2025-03-05 | Stop reason: HOSPADM

## 2025-03-05 RX ORDER — SODIUM CHLORIDE 9 MG/ML
INJECTION, SOLUTION INTRAVENOUS AS NEEDED
Status: DISCONTINUED | OUTPATIENT
Start: 2025-03-05 | End: 2025-03-05 | Stop reason: HOSPADM

## 2025-03-05 RX ADMIN — CEFAZOLIN 2000 MG: 2 INJECTION, POWDER, FOR SOLUTION INTRAMUSCULAR; INTRAVENOUS at 13:25

## 2025-03-05 RX ADMIN — SODIUM CHLORIDE, POTASSIUM CHLORIDE, SODIUM LACTATE AND CALCIUM CHLORIDE 20 ML/HR: 600; 310; 30; 20 INJECTION, SOLUTION INTRAVENOUS at 12:03

## 2025-03-05 RX ADMIN — LIDOCAINE HYDROCHLORIDE 100 MG: 20 INJECTION, SOLUTION EPIDURAL; INFILTRATION; INTRACAUDAL; PERINEURAL at 13:20

## 2025-03-05 RX ADMIN — PROPOFOL 100 MCG/KG/MIN: 10 INJECTION, EMULSION INTRAVENOUS at 13:20

## 2025-03-05 NOTE — DISCHARGE INSTRUCTIONS
Wound:   - you have skin glue on your incisions. Okay to shower tomorrow.   - Leave skin glue in place, it should slowly fall off over 2 weeks   - No swimming/soaking/bathing x 2 weeks to allow incisions to heal.     Activity:   - Activity as tolerated.   - No driving or operating machinery on narcotic pain medication.     Pain medication:   - Take 1000mg of tylenol every 8 hours for 3 days. After three days, take it prn.   - You have a prescription for a narcotic. It will be ultram tabs. Take these only as needed after you have taken the tylenol. If you are taking the ultram, make sure to take a stool softener (colace) with it as it can cause constipation.   - The narcotic may make you nauseated, you will have a prescription for zofran in case of nausea.     Follow up:   - make an appointment to see Jeaneth Okeefe PA-C in 2 week s  - If you have any concerns before then, call me office at [phone]

## 2025-03-05 NOTE — ANESTHESIA POSTPROCEDURE EVALUATION
"Patient: Oral Dey    Procedure Summary       Date: 03/05/25 Room / Location:  [facility] OR 03 /  [facility]    Anesthesia Start: 1317 Anesthesia Stop: 1357    Procedure: Single Lumen Port-a-cath insertion with flouroscopy (Chin to Nipples) Diagnosis:       Encounter for care related to Port-a-Cath      Diffuse large B-cell lymphoma of intra-abdominal lymph nodes      (Encounter for care related to Port-a-Cath [Z45.2])      (Diffuse large B-cell lymphoma of intra-abdominal lymph nodes [C83.33])    Surgeons: Petrona Malone MD Provider: Eduardo Delaney CRNA    Anesthesia Type: MAC ASA Status: 3            Anesthesia Type: MAC    Vitals  Vitals Value Taken Time   /52 03/05/25 1401   Temp 98.6 °F (37 °C) 03/05/25 1357   Pulse 84 03/05/25 1405   Resp 16 03/05/25 1357   SpO2 99 % 03/05/25 1405   Vitals shown include unfiled device data.        Post Anesthesia Care and Evaluation    Patient location during evaluation: PACU  Patient participation: complete - patient participated  Level of consciousness: awake and alert  Pain management: adequate    Airway patency: patent  Anesthetic complications: No anesthetic complications    Cardiovascular status: acceptable  Respiratory status: acceptable  Hydration status: acceptable    Comments: Blood pressure 125/52, pulse 88, temperature 98.6 °F (37 °C), temperature source Temporal, resp. rate 16, height 174.5 cm (68.7\"), weight 52.2 kg (115 lb 1.3 oz), SpO2 100%.    Pt discharged from PACU based on ross score >8    "

## 2025-03-05 NOTE — ANESTHESIA PREPROCEDURE EVALUATION
Anesthesia Evaluation     Patient summary reviewed   no history of anesthetic complications:   NPO Solid Status: > 8 hours             Airway   Mallampati: II  Dental      Pulmonary    (-) COPD, asthma, sleep apnea, not a smoker  Cardiovascular     (+) hypertension, dysrhythmias Atrial Fib  (-) pacemaker, past MI, angina, cardiac stents      Neuro/Psych  (-) seizures, TIA, CVA  GI/Hepatic/Renal/Endo    (+) GERD  (-) liver disease, no renal disease, diabetes    Musculoskeletal     Abdominal    Substance History      OB/GYN          Other      history of cancer                Anesthesia Plan    ASA 3     MAC       Anesthetic plan, risks, benefits, and alternatives have been provided, discussed and informed consent has been obtained with: patient.    CODE STATUS:         
Hide Additional Notes?: No
Detail Level: Zone

## 2025-03-06 ENCOUNTER — CLINICAL SUPPORT (OUTPATIENT)
Dept: ONCOLOGY | Facility: CLINIC | Age: 85
End: 2025-03-06
Payer: COMMERCIAL

## 2025-03-06 ENCOUNTER — TELEPHONE (OUTPATIENT)
Dept: SURGERY | Facility: CLINIC | Age: 85
End: 2025-03-06
Payer: COMMERCIAL

## 2025-03-06 DIAGNOSIS — C83.33 DIFFUSE LARGE B-CELL LYMPHOMA OF INTRA-ABDOMINAL LYMPH NODES: Primary | ICD-10-CM

## 2025-03-06 RX ORDER — LIDOCAINE AND PRILOCAINE 25; 25 MG/G; MG/G
CREAM TOPICAL
Qty: 1 EACH | Refills: 1 | Status: SHIPPED | OUTPATIENT
Start: 2025-03-06

## 2025-03-06 NOTE — TELEPHONE ENCOUNTER
Post OP phone call visit:    Type of surgery: Port-A-Cath placement with fluoroscopy.   How are you feeling? Doing okay.  Are you having any pain or Nausea? Tender. No nausea.  Do you have a normal appetite? Not real well.  How is your activity? Okay.  Any drainage or fever? No redness. No drainage. No fever.

## 2025-03-06 NOTE — PROGRESS NOTES
Subjective     PATIENT NAME:  Oral Dey  YOB: 1940  PATIENTS AGE:  84 y.o.  PATIENTS SEX:  male  DATE OF SERVICE:  03/06/2025  PROVIDER:  [facility] NURSE      ____________________PATIENT EDUCATION____________________    PATIENT EDUCATION:  Today I met with the patient Oral Dey, his wife and Grand Daughter to discuss the chemotherapy regimen(R-CHOP) Rixuan, Cytoxan, Doxorubicin, Vincristine, Prednisone, Allopurinol, Neulasta,  recommended for treatment of his disease.  The patient was given explanation of treatment premed side effects including office policy that prohibits patients to drive if sedating medications are administered, MD explanation given regarding benefits, side effects, toxicities and goals of treatment.  The patient received a Chemotherapy/Biotherapy Plan Summary including diagnosis and specific treatment plan.    SIDE EFFECTS:  Common side effects were discussed with the patient and/or significant other.  Discussion included hair loss/discoloration, anemia/fatigue, infection/chills/fever, appetite, bleeding risk/precautions, constipation, diarrhea, mouth sores, taste alteration, loss of appetite,nausea/vomiting, peripheral neuropathy, skin/nail changes, rash, muscle aches/weakness, photosensitivity, weight gain/loss, hearing loss, dizziness,  sterility, high blood pressure, heart damage, liver damage, lung damage, kidney damage, DVT/PE risk, fluid retention, pleural/pericardial effusion, somnolence, electrolyte/LFT imbalance, vein exercises and/or the possible need for vascular access/port placement.  The patient was advice that although uncommon, leakage of an infused medication from the vein or venous access device (port) may lead to skin breakdown and/or other tissue damage.  The patient was advised that he/she may have pain, bleeding, and/or bruising from the insertion of a needle in their vein or venous access device (port).  The patient was further advised that,  I returned patient call   in spite of proper technique, infection with redness and irritation may rarely occur at the site where the needle was inserted.  The patient was advised that if complications occur, additional medical treatment is available.    Discussion also included side effects specific to drugs in the treatment plan, specifically: Rituxan, Cytoxan, Doxorubicin, Vincristine, Neulasta, Prednisone, allopurinol     Reproductive risks were discussed, including appropriate use of birth control and protection during sexual relations.    A total of  80 minutes were spent with the patient, with 100% of time spent in education and counseling.

## 2025-03-07 ENCOUNTER — TELEPHONE (OUTPATIENT)
Dept: RADIATION ONCOLOGY | Facility: HOSPITAL | Age: 85
End: 2025-03-07
Payer: COMMERCIAL

## 2025-03-07 LAB
ALBUMIN SERPL ELPH-MCNC: 2.5 G/DL (ref 2.9–4.4)
ALBUMIN/GLOB SERPL: 1.1 {RATIO} (ref 0.7–1.7)
ALPHA1 GLOB SERPL ELPH-MCNC: 0.4 G/DL (ref 0–0.4)
ALPHA2 GLOB SERPL ELPH-MCNC: 0.9 G/DL (ref 0.4–1)
B-GLOBULIN SERPL ELPH-MCNC: 0.7 G/DL (ref 0.7–1.3)
GAMMA GLOB SERPL ELPH-MCNC: 0.2 G/DL (ref 0.4–1.8)
GLOBULIN SER-MCNC: 2.3 G/DL (ref 2.2–3.9)
IGA SERPL-MCNC: 12 MG/DL (ref 61–437)
IGG SERPL-MCNC: 206 MG/DL (ref 603–1613)
IGM SERPL-MCNC: 5 MG/DL (ref 15–143)
INTERPRETATION SERPL IEP-IMP: ABNORMAL
KAPPA LC FREE SER-MCNC: 6.5 MG/L (ref 3.3–19.4)
KAPPA LC FREE/LAMBDA FREE SER: 0.61 {RATIO} (ref 0.26–1.65)
LABORATORY COMMENT REPORT: ABNORMAL
LAMBDA LC FREE SERPL-MCNC: 10.7 MG/L (ref 5.7–26.3)
M PROTEIN SERPL ELPH-MCNC: ABNORMAL G/DL
PROT SERPL-MCNC: 4.8 G/DL (ref 6–8.5)

## 2025-03-07 NOTE — TELEPHONE ENCOUNTER
CONSTANCE called Mr. Dey to discuss his distress score from his chemo education visit on 3-6-25 for diffuse large B-cell lymphoma of intra-abdominal lymph nodes. CONSTANCE explained role and source of support. He is 84 years old and lives with his wife. He has a strong support system which includes his daughter, spouse, grandson, and other family members. Currently, he does not have transportation or financial concerns.     Mr. Dey states he was feeling distressed due to having a busy week. He got his port placed, had scans, and follow up appointments this week. He also said he was overwhelmed with the amount of information he's been provided. Emotional support provided and encouraged him to express his feelings. His coping strategies include going to his garage and working on his cars and outdoor activities. He does not take any medication for anxiety/depression, and he does not see a counselor. Mr. Dey remains independent with his ADLs but states he is slower than he use to be. Mr. Dey states he eats a little at a time due to not having an appetite and its difficult for him. He does try to drink one Ensure a day. He did receive sample from the Medical Onc[facility] Office. CONSTANCE will follow up with him once he starts treatment.

## 2025-03-10 ENCOUNTER — INFUSION (OUTPATIENT)
Dept: ONCOLOGY | Facility: HOSPITAL | Age: 85
End: 2025-03-10
Payer: COMMERCIAL

## 2025-03-10 ENCOUNTER — DOCUMENTATION (OUTPATIENT)
Dept: RADIATION ONCOLOGY | Facility: HOSPITAL | Age: 85
End: 2025-03-10
Payer: COMMERCIAL

## 2025-03-10 ENCOUNTER — TELEPHONE (OUTPATIENT)
Dept: ONCOLOGY | Facility: CLINIC | Age: 85
End: 2025-03-10
Payer: COMMERCIAL

## 2025-03-10 ENCOUNTER — LAB (OUTPATIENT)
Dept: LAB | Facility: HOSPITAL | Age: 85
End: 2025-03-10
Payer: COMMERCIAL

## 2025-03-10 VITALS
RESPIRATION RATE: 18 BRPM | WEIGHT: 112.6 LBS | OXYGEN SATURATION: 100 % | HEART RATE: 69 BPM | BODY MASS INDEX: 16.68 KG/M2 | TEMPERATURE: 97 F | DIASTOLIC BLOOD PRESSURE: 46 MMHG | SYSTOLIC BLOOD PRESSURE: 103 MMHG | HEIGHT: 69 IN

## 2025-03-10 DIAGNOSIS — C91.10 CLL (CHRONIC LYMPHOCYTIC LEUKEMIA): Primary | ICD-10-CM

## 2025-03-10 DIAGNOSIS — C83.33 DIFFUSE LARGE B-CELL LYMPHOMA OF INTRA-ABDOMINAL LYMPH NODES: ICD-10-CM

## 2025-03-10 DIAGNOSIS — Z79.899 ENCOUNTER FOR LONG-TERM (CURRENT) USE OF HIGH-RISK MEDICATION: ICD-10-CM

## 2025-03-10 LAB
ALBUMIN SERPL-MCNC: 3.2 G/DL (ref 3.5–5.2)
ALBUMIN/GLOB SERPL: 1.7 G/DL
ALP SERPL-CCNC: 82 U/L (ref 39–117)
ALT SERPL W P-5'-P-CCNC: 12 U/L (ref 1–41)
ANION GAP SERPL CALCULATED.3IONS-SCNC: 9 MMOL/L (ref 5–15)
AST SERPL-CCNC: 15 U/L (ref 1–40)
BASOPHILS # BLD AUTO: 0.07 10*3/MM3 (ref 0–0.2)
BASOPHILS NFR BLD AUTO: 0.6 % (ref 0–1.5)
BILIRUB SERPL-MCNC: 0.7 MG/DL (ref 0–1.2)
BUN SERPL-MCNC: 25 MG/DL (ref 8–23)
BUN/CREAT SERPL: 25.3 (ref 7–25)
CALCIUM SPEC-SCNC: 8.8 MG/DL (ref 8.6–10.5)
CHLORIDE SERPL-SCNC: 93 MMOL/L (ref 98–107)
CO2 SERPL-SCNC: 28 MMOL/L (ref 22–29)
CREAT SERPL-MCNC: 0.99 MG/DL (ref 0.76–1.27)
DEPRECATED RDW RBC AUTO: 44.7 FL (ref 37–54)
EGFRCR SERPLBLD CKD-EPI 2021: 76.1 ML/MIN/1.73
EOSINOPHIL # BLD AUTO: 0.04 10*3/MM3 (ref 0–0.4)
EOSINOPHIL NFR BLD AUTO: 0.4 % (ref 0.3–6.2)
ERYTHROCYTE [DISTWIDTH] IN BLOOD BY AUTOMATED COUNT: 13.6 % (ref 12.3–15.4)
GLOBULIN UR ELPH-MCNC: 1.9 GM/DL
GLUCOSE SERPL-MCNC: 126 MG/DL (ref 65–99)
HBV SURFACE AB SER RIA-ACNC: NORMAL
HBV SURFACE AG SERPL QL IA: NORMAL
HCT VFR BLD AUTO: 28.5 % (ref 37.5–51)
HGB BLD-MCNC: 8.8 G/DL (ref 13–17.7)
IMM GRANULOCYTES # BLD AUTO: 0.04 10*3/MM3 (ref 0–0.05)
IMM GRANULOCYTES NFR BLD AUTO: 0.4 % (ref 0–0.5)
LYMPHOCYTES # BLD AUTO: 1.64 10*3/MM3 (ref 0.7–3.1)
LYMPHOCYTES NFR BLD AUTO: 15 % (ref 19.6–45.3)
MCH RBC QN AUTO: 27.7 PG (ref 26.6–33)
MCHC RBC AUTO-ENTMCNC: 30.9 G/DL (ref 31.5–35.7)
MCV RBC AUTO: 89.6 FL (ref 79–97)
MONOCYTES # BLD AUTO: 0.83 10*3/MM3 (ref 0.1–0.9)
MONOCYTES NFR BLD AUTO: 7.6 % (ref 5–12)
NEUTROPHILS NFR BLD AUTO: 76 % (ref 42.7–76)
NEUTROPHILS NFR BLD AUTO: 8.3 10*3/MM3 (ref 1.7–7)
NRBC BLD AUTO-RTO: 0 /100 WBC (ref 0–0.2)
PLATELET # BLD AUTO: 255 10*3/MM3 (ref 140–450)
PMV BLD AUTO: 8.6 FL (ref 6–12)
POTASSIUM SERPL-SCNC: 4.3 MMOL/L (ref 3.5–5.2)
PROT SERPL-MCNC: 5.1 G/DL (ref 6–8.5)
RBC # BLD AUTO: 3.18 10*6/MM3 (ref 4.14–5.8)
SODIUM SERPL-SCNC: 130 MMOL/L (ref 136–145)
WBC NRBC COR # BLD AUTO: 10.92 10*3/MM3 (ref 3.4–10.8)

## 2025-03-10 PROCEDURE — 86706 HEP B SURFACE ANTIBODY: CPT

## 2025-03-10 PROCEDURE — 36415 COLL VENOUS BLD VENIPUNCTURE: CPT

## 2025-03-10 PROCEDURE — G0463 HOSPITAL OUTPT CLINIC VISIT: HCPCS

## 2025-03-10 PROCEDURE — 85025 COMPLETE CBC W/AUTO DIFF WBC: CPT

## 2025-03-10 PROCEDURE — 80053 COMPREHEN METABOLIC PANEL: CPT

## 2025-03-10 PROCEDURE — 87340 HEPATITIS B SURFACE AG IA: CPT

## 2025-03-10 PROCEDURE — 86704 HEP B CORE ANTIBODY TOTAL: CPT

## 2025-03-10 NOTE — TELEPHONE ENCOUNTER
Called and spoke with the paitnet to let him know that his Hydrocodone was approved. He voiced concern about this treatment being approved. I told him that I would call him as soon as I know when it is approved. I told him that he could call as about his treatment at any time.    This morning voicemail and email was delivered to Hanh Mauro regarding approval. She said that she sent it to his insurance and will let us know when approval is received.

## 2025-03-10 NOTE — PROGRESS NOTES
Treatment held today due lack of pre-authorization of Truxima and Neulasta.Patient instructed to call the office in the next couple of days re: rescheduling appointment - NAPOLEON Guido RN

## 2025-03-10 NOTE — TELEPHONE ENCOUNTER
Caller: Oral Dey    Relationship: Self    Best call back number: [phone]       Who are you requesting to speak with (clinical staff, provider,  specific staff member): CLINICAL      What was the call regarding: PATIENT IS NEEDING A PRIOR AUTHORIZATION ON HYDROCODONE MEDICATION

## 2025-03-10 NOTE — PROGRESS NOTES
CONSTANCE met with Mr. Dey who is here to start treatment for diffuse large B-cell lymphoma of intra-abdominal lymph nodes. He is 84 years old and lives with his wife. He has a strong support system which includes his family. He denied having transportation or financial concerns. Mr. Dey states he is feeling tired but is ready to start treatment. CONSTANCE provided him with contact information and encouraged him to call as needed.

## 2025-03-11 ENCOUNTER — TELEPHONE (OUTPATIENT)
Dept: ONCOLOGY | Facility: CLINIC | Age: 85
End: 2025-03-11

## 2025-03-11 LAB — HBV CORE AB SERPL QL IA: NEGATIVE

## 2025-03-11 NOTE — TELEPHONE ENCOUNTER
Caller: Oral Dey    Relationship: Self    Best call back number:     [phone]   IF NOT ANSWER ON CELL PHONE CALL HOME PHONE AND LEAVE A MESSAGE.     What is the best time to reach you: ANYTIME     Who are you requesting to speak with (clinical staff, provider,  specific staff member): NON-CLINICAL     What was the call regarding: PT IS CALLING TODAY TO SEE IF WE HAVE HEARD ANYTHING MORE ON HE PA FOR HIS TREATMENT. PT IS ANXIOUSLY AWAITING AND WOULD LIKE TO GET START ASAP.

## 2025-03-12 ENCOUNTER — TELEPHONE (OUTPATIENT)
Dept: ONCOLOGY | Facility: CLINIC | Age: 85
End: 2025-03-12
Payer: COMMERCIAL

## 2025-03-12 NOTE — TELEPHONE ENCOUNTER
The patient called today checking to see if his Prior Authorization has come back yet. He is pleasant, but very concerned about not receiving his chemo. His original order was put in on 2/11/25 and we have not received a PA yet. I told him that I sent emails, most recently yesterday and received from Hanh Wade this morning that she submitted additional requested paperwork on 3/10/25 and has not heard anything back yet. I told him that while I am speaking to him I am also sending her another email. He expressed his concern about dying and not receiving his chemotherapy. He said that this is dragging him down and making him depressed. He was concerned that he was bothering us by calling us all the time. I reassured Mr Dey that he is not bothering us at all and that he may call us anytime to check on his status. He was appreciative and voiced understanding.     Concerned about this issue, I contacted my supervisor and she is going to look into this issue as well to see if we can get this resolved any quicker for the patient.

## 2025-03-18 ENCOUNTER — INFUSION (OUTPATIENT)
Dept: ONCOLOGY | Facility: HOSPITAL | Age: 85
End: 2025-03-18
Payer: COMMERCIAL

## 2025-03-18 ENCOUNTER — DOCUMENTATION (OUTPATIENT)
Dept: RADIATION ONCOLOGY | Facility: HOSPITAL | Age: 85
End: 2025-03-18
Payer: COMMERCIAL

## 2025-03-18 ENCOUNTER — LAB (OUTPATIENT)
Dept: LAB | Facility: HOSPITAL | Age: 85
End: 2025-03-18
Payer: COMMERCIAL

## 2025-03-18 ENCOUNTER — TELEPHONE (OUTPATIENT)
Dept: ONCOLOGY | Facility: CLINIC | Age: 85
End: 2025-03-18
Payer: COMMERCIAL

## 2025-03-18 ENCOUNTER — TELEPHONE (OUTPATIENT)
Dept: ONCOLOGY | Facility: CLINIC | Age: 85
End: 2025-03-18

## 2025-03-18 VITALS
SYSTOLIC BLOOD PRESSURE: 104 MMHG | TEMPERATURE: 98.4 F | HEART RATE: 79 BPM | OXYGEN SATURATION: 96 % | WEIGHT: 110 LBS | BODY MASS INDEX: 15.75 KG/M2 | RESPIRATION RATE: 16 BRPM | DIASTOLIC BLOOD PRESSURE: 47 MMHG | HEIGHT: 70 IN

## 2025-03-18 DIAGNOSIS — C91.10 CLL (CHRONIC LYMPHOCYTIC LEUKEMIA): ICD-10-CM

## 2025-03-18 DIAGNOSIS — Z85.6 PERSONAL HISTORY OF CLL (CHRONIC LYMPHOCYTIC LEUKEMIA): Primary | ICD-10-CM

## 2025-03-18 DIAGNOSIS — E61.1 IRON DEFICIENCY: ICD-10-CM

## 2025-03-18 DIAGNOSIS — Z45.2 ENCOUNTER FOR CARE RELATED TO PORT-A-CATH: ICD-10-CM

## 2025-03-18 DIAGNOSIS — T78.40XA ALLERGIC REACTION TO DRUG, INITIAL ENCOUNTER: Primary | ICD-10-CM

## 2025-03-18 DIAGNOSIS — C91.10 CLL (CHRONIC LYMPHOCYTIC LEUKEMIA): Primary | ICD-10-CM

## 2025-03-18 DIAGNOSIS — C83.33 DIFFUSE LARGE B-CELL LYMPHOMA OF INTRA-ABDOMINAL LYMPH NODES: ICD-10-CM

## 2025-03-18 DIAGNOSIS — D64.9 ANEMIA, UNSPECIFIED TYPE: ICD-10-CM

## 2025-03-18 DIAGNOSIS — C83.33 DIFFUSE LARGE B-CELL LYMPHOMA OF INTRA-ABDOMINAL LYMPH NODES: Primary | ICD-10-CM

## 2025-03-18 DIAGNOSIS — I48.91 ATRIAL FIBRILLATION WITH RAPID VENTRICULAR RESPONSE: ICD-10-CM

## 2025-03-18 DIAGNOSIS — D47.2 IGG LAMBDA MONOCLONAL GAMMOPATHY: ICD-10-CM

## 2025-03-18 LAB
BASOPHILS # BLD AUTO: 0.02 10*3/MM3 (ref 0–0.2)
BASOPHILS NFR BLD AUTO: 0.5 % (ref 0–1.5)
DEPRECATED RDW RBC AUTO: 44.1 FL (ref 37–54)
EOSINOPHIL # BLD AUTO: 0.01 10*3/MM3 (ref 0–0.4)
EOSINOPHIL NFR BLD AUTO: 0.3 % (ref 0.3–6.2)
ERYTHROCYTE [DISTWIDTH] IN BLOOD BY AUTOMATED COUNT: 14.3 % (ref 12.3–15.4)
HCT VFR BLD AUTO: 25.1 % (ref 37.5–51)
HGB BLD-MCNC: 8.2 G/DL (ref 13–17.7)
HOLD SPECIMEN: NORMAL
IMM GRANULOCYTES # BLD AUTO: 0.02 10*3/MM3 (ref 0–0.05)
IMM GRANULOCYTES NFR BLD AUTO: 0.5 % (ref 0–0.5)
LYMPHOCYTES # BLD AUTO: 0.97 10*3/MM3 (ref 0.7–3.1)
LYMPHOCYTES NFR BLD AUTO: 24.4 % (ref 19.6–45.3)
MCH RBC QN AUTO: 28.4 PG (ref 26.6–33)
MCHC RBC AUTO-ENTMCNC: 32.7 G/DL (ref 31.5–35.7)
MCV RBC AUTO: 86.9 FL (ref 79–97)
MONOCYTES # BLD AUTO: 0.5 10*3/MM3 (ref 0.1–0.9)
MONOCYTES NFR BLD AUTO: 12.6 % (ref 5–12)
NEUTROPHILS NFR BLD AUTO: 2.46 10*3/MM3 (ref 1.7–7)
NEUTROPHILS NFR BLD AUTO: 61.7 % (ref 42.7–76)
NRBC BLD AUTO-RTO: 0 /100 WBC (ref 0–0.2)
PLATELET # BLD AUTO: 232 10*3/MM3 (ref 140–450)
PMV BLD AUTO: 8.7 FL (ref 6–12)
RBC # BLD AUTO: 2.89 10*6/MM3 (ref 4.14–5.8)
WBC NRBC COR # BLD AUTO: 3.98 10*3/MM3 (ref 3.4–10.8)

## 2025-03-18 PROCEDURE — 96413 CHEMO IV INFUSION 1 HR: CPT

## 2025-03-18 PROCEDURE — 96375 TX/PRO/DX INJ NEW DRUG ADDON: CPT

## 2025-03-18 PROCEDURE — 96376 TX/PRO/DX INJ SAME DRUG ADON: CPT

## 2025-03-18 PROCEDURE — 25810000003 SODIUM CHLORIDE 0.9 % SOLUTION 250 ML FLEX CONT: Performed by: INTERNAL MEDICINE

## 2025-03-18 PROCEDURE — 85025 COMPLETE CBC W/AUTO DIFF WBC: CPT

## 2025-03-18 PROCEDURE — 25010000002 DIPHENHYDRAMINE PER 50 MG: Performed by: INTERNAL MEDICINE

## 2025-03-18 PROCEDURE — 25010000002 PALONOSETRON PER 25 MCG: Performed by: INTERNAL MEDICINE

## 2025-03-18 PROCEDURE — 25010000002 RITUXIMAB-ABBS 100 MG/10ML SOLUTION 10 ML VIAL: Performed by: INTERNAL MEDICINE

## 2025-03-18 PROCEDURE — 25010000002 MEPERIDINE PER 100 MG: Performed by: INTERNAL MEDICINE

## 2025-03-18 PROCEDURE — 25010000002 DEXAMETHASONE PER 1 MG: Performed by: INTERNAL MEDICINE

## 2025-03-18 PROCEDURE — 25010000002 RITUXIMAB-ABBS 500 MG/50ML SOLUTION 50 ML VIAL: Performed by: INTERNAL MEDICINE

## 2025-03-18 PROCEDURE — 25010000002 HEPARIN LOCK FLUSH PER 10 UNITS: Performed by: INTERNAL MEDICINE

## 2025-03-18 PROCEDURE — 25810000003 SODIUM CHLORIDE 0.9 % SOLUTION: Performed by: INTERNAL MEDICINE

## 2025-03-18 PROCEDURE — 63710000001 APREPITANT PER 5 MG: Performed by: INTERNAL MEDICINE

## 2025-03-18 PROCEDURE — 25010000002 HYDROCORTISONE SOD SUC (PF) 100 MG RECONSTITUTED SOLUTION: Performed by: INTERNAL MEDICINE

## 2025-03-18 PROCEDURE — 36415 COLL VENOUS BLD VENIPUNCTURE: CPT

## 2025-03-18 RX ORDER — DOXORUBICIN HYDROCHLORIDE 2 MG/ML
25 INJECTION, SOLUTION INTRAVENOUS ONCE
Status: DISCONTINUED | OUTPATIENT
Start: 2025-03-18 | End: 2025-03-18

## 2025-03-18 RX ORDER — SODIUM CHLORIDE 9 MG/ML
20 INJECTION, SOLUTION INTRAVENOUS ONCE
Status: COMPLETED | OUTPATIENT
Start: 2025-03-18 | End: 2025-03-18

## 2025-03-18 RX ORDER — PALONOSETRON 0.05 MG/ML
0.25 INJECTION, SOLUTION INTRAVENOUS ONCE
Status: CANCELLED | OUTPATIENT
Start: 2025-03-19

## 2025-03-18 RX ORDER — HEPARIN SODIUM (PORCINE) LOCK FLUSH IV SOLN 100 UNIT/ML 100 UNIT/ML
500 SOLUTION INTRAVENOUS AS NEEDED
Status: DISCONTINUED | OUTPATIENT
Start: 2025-03-18 | End: 2025-03-18 | Stop reason: HOSPADM

## 2025-03-18 RX ORDER — ACETAMINOPHEN 325 MG/1
650 TABLET ORAL ONCE
Status: COMPLETED | OUTPATIENT
Start: 2025-03-18 | End: 2025-03-18

## 2025-03-18 RX ORDER — DEXAMETHASONE SODIUM PHOSPHATE 10 MG/ML
10 INJECTION, SOLUTION INTRA-ARTICULAR; INTRALESIONAL; INTRAMUSCULAR; INTRAVENOUS; SOFT TISSUE ONCE
Status: COMPLETED | OUTPATIENT
Start: 2025-03-18 | End: 2025-03-18

## 2025-03-18 RX ORDER — METHYLPREDNISOLONE SODIUM SUCCINATE 125 MG/2ML
125 INJECTION, POWDER, LYOPHILIZED, FOR SOLUTION INTRAMUSCULAR; INTRAVENOUS ONCE
Status: CANCELLED
Start: 2025-03-19

## 2025-03-18 RX ORDER — SODIUM CHLORIDE 9 MG/ML
20 INJECTION, SOLUTION INTRAVENOUS ONCE
Status: CANCELLED | OUTPATIENT
Start: 2025-03-19

## 2025-03-18 RX ORDER — DOXORUBICIN HYDROCHLORIDE 2 MG/ML
25 INJECTION, SOLUTION INTRAVENOUS ONCE
Status: CANCELLED | OUTPATIENT
Start: 2025-03-19

## 2025-03-18 RX ORDER — HEPARIN SODIUM (PORCINE) LOCK FLUSH IV SOLN 100 UNIT/ML 100 UNIT/ML
500 SOLUTION INTRAVENOUS AS NEEDED
Status: CANCELLED | OUTPATIENT
Start: 2025-03-18

## 2025-03-18 RX ORDER — FAMOTIDINE 10 MG/ML
20 INJECTION, SOLUTION INTRAVENOUS AS NEEDED
Status: CANCELLED | OUTPATIENT
Start: 2025-03-19 | End: 2025-03-19

## 2025-03-18 RX ORDER — ACETAMINOPHEN 325 MG/1
650 TABLET ORAL ONCE
Status: CANCELLED | OUTPATIENT
Start: 2025-03-19

## 2025-03-18 RX ORDER — HYDROCORTISONE SODIUM SUCCINATE 100 MG/2ML
100 INJECTION INTRAMUSCULAR; INTRAVENOUS AS NEEDED
Status: COMPLETED | OUTPATIENT
Start: 2025-03-18 | End: 2025-03-18

## 2025-03-18 RX ORDER — PALONOSETRON 0.05 MG/ML
0.25 INJECTION, SOLUTION INTRAVENOUS ONCE
Status: COMPLETED | OUTPATIENT
Start: 2025-03-18 | End: 2025-03-18

## 2025-03-18 RX ORDER — MEPERIDINE HYDROCHLORIDE 50 MG/ML
25 INJECTION INTRAMUSCULAR; INTRAVENOUS; SUBCUTANEOUS ONCE AS NEEDED
Status: CANCELLED | OUTPATIENT
Start: 2025-03-19 | End: 2025-03-19

## 2025-03-18 RX ORDER — DIPHENHYDRAMINE HYDROCHLORIDE 50 MG/ML
50 INJECTION INTRAMUSCULAR; INTRAVENOUS AS NEEDED
Status: DISCONTINUED | OUTPATIENT
Start: 2025-03-18 | End: 2025-03-18

## 2025-03-18 RX ORDER — HYDROCORTISONE SODIUM SUCCINATE 100 MG/2ML
100 INJECTION INTRAMUSCULAR; INTRAVENOUS AS NEEDED
Status: CANCELLED | OUTPATIENT
Start: 2025-03-19 | End: 2025-03-19

## 2025-03-18 RX ORDER — SODIUM CHLORIDE 0.9 % (FLUSH) 0.9 %
10 SYRINGE (ML) INJECTION AS NEEDED
Status: CANCELLED | OUTPATIENT
Start: 2025-03-18

## 2025-03-18 RX ORDER — MEPERIDINE HYDROCHLORIDE 50 MG/ML
25 INJECTION INTRAMUSCULAR; INTRAVENOUS; SUBCUTANEOUS ONCE AS NEEDED
Status: COMPLETED | OUTPATIENT
Start: 2025-03-18 | End: 2025-03-18

## 2025-03-18 RX ORDER — DEXAMETHASONE 4 MG/1
4 TABLET ORAL 2 TIMES DAILY WITH MEALS
Qty: 16 TABLET | Refills: 0 | Status: ON HOLD | OUTPATIENT
Start: 2025-03-18

## 2025-03-18 RX ORDER — SODIUM CHLORIDE 0.9 % (FLUSH) 0.9 %
10 SYRINGE (ML) INJECTION AS NEEDED
Status: DISCONTINUED | OUTPATIENT
Start: 2025-03-18 | End: 2025-03-18 | Stop reason: HOSPADM

## 2025-03-18 RX ORDER — DIPHENHYDRAMINE HYDROCHLORIDE 50 MG/ML
50 INJECTION INTRAMUSCULAR; INTRAVENOUS AS NEEDED
Status: CANCELLED | OUTPATIENT
Start: 2025-03-19

## 2025-03-18 RX ORDER — APREPITANT 40 MG/1
80 CAPSULE ORAL ONCE
Status: COMPLETED | OUTPATIENT
Start: 2025-03-18 | End: 2025-03-18

## 2025-03-18 RX ORDER — FAMOTIDINE 10 MG/ML
20 INJECTION, SOLUTION INTRAVENOUS AS NEEDED
Status: COMPLETED | OUTPATIENT
Start: 2025-03-18 | End: 2025-03-18

## 2025-03-18 RX ORDER — DIPHENHYDRAMINE HYDROCHLORIDE 50 MG/ML
25 INJECTION INTRAMUSCULAR; INTRAVENOUS ONCE
Status: COMPLETED | OUTPATIENT
Start: 2025-03-18 | End: 2025-03-18

## 2025-03-18 RX ADMIN — DIPHENHYDRAMINE HYDROCHLORIDE 25 MG: 50 INJECTION, SOLUTION INTRAMUSCULAR; INTRAVENOUS at 09:12

## 2025-03-18 RX ADMIN — DEXAMETHASONE SODIUM PHOSPHATE 10 MG: 10 INJECTION INTRAMUSCULAR; INTRAVENOUS at 09:09

## 2025-03-18 RX ADMIN — HYDROCORTISONE SODIUM SUCCINATE 100 MG: 100 INJECTION, POWDER, FOR SOLUTION INTRAMUSCULAR; INTRAVENOUS at 10:46

## 2025-03-18 RX ADMIN — ACETAMINOPHEN 650 MG: 325 TABLET, FILM COATED ORAL at 09:09

## 2025-03-18 RX ADMIN — APREPITANT 80 MG: 40 CAPSULE ORAL at 09:07

## 2025-03-18 RX ADMIN — SODIUM CHLORIDE 20 ML/HR: 9 INJECTION, SOLUTION INTRAVENOUS at 09:00

## 2025-03-18 RX ADMIN — DIPHENHYDRAMINE HYDROCHLORIDE 12.5 MG: 50 INJECTION, SOLUTION INTRAMUSCULAR; INTRAVENOUS at 10:55

## 2025-03-18 RX ADMIN — Medication 10 ML: at 12:54

## 2025-03-18 RX ADMIN — Medication 500 UNITS: at 12:54

## 2025-03-18 RX ADMIN — MEPERIDINE HYDROCHLORIDE 25 MG: 50 INJECTION, SOLUTION INTRAMUSCULAR; INTRAVENOUS; SUBCUTANEOUS at 10:40

## 2025-03-18 RX ADMIN — RITUXIMAB-ABBS 630 MG: 10 INJECTION, SOLUTION INTRAVENOUS at 09:25

## 2025-03-18 RX ADMIN — PALONOSETRON HYDROCHLORIDE 0.25 MG: 0.25 INJECTION, SOLUTION INTRAVENOUS at 09:22

## 2025-03-18 RX ADMIN — FAMOTIDINE 20 MG: 10 INJECTION INTRAVENOUS at 10:43

## 2025-03-18 NOTE — PROGRESS NOTES
CONSTANCE met with Mr. Dey who is here to start treatment for diffuse large B-cell lymphoma of intra-abdominal lymph nodes. He is 84 years old and lives with his spouse. They have been  since 1961. Mr. Dey states he does not have any immediate financial concerns but does tend to worry about this. CONSTANCE asked if he as any difficulty receiving his medication, and he denied having trouble but states this is another thing he tends to worry about. Mr. Dey states, “I have always been a worrier.” He was also concerned with how long it took his insurance to approve him for treatment. Mr. Dey states he was mentally ready to start treatment and it took a lot out of him once he heard his insurance did not approve it yet. Emotional support provided and encouraged him to express his feelings. He does not take any medication for anxiety/depression, and he does not see a counselor. CONSTANCE asked him about counseling services, but he declined.    Mr. Dey wants to continue to be independent and care for himself. He is easily fatigued. Mr. Dey states he has not had a bowel movement over the past few days. He does have pain/aches and states he has tried over the counter treatments, but nothing has helped. CONSTANCE informed the nurse and will speak to him. He has been trying to eat and states he still has been drinking a boost a day. CONSTANCE will remain available.

## 2025-03-18 NOTE — PROGRESS NOTES
0908-Spoke with Teresa Lewis regarding patient having trouble with BM, reports no BM in one week.  I told her I instructed him to use Miralax three times per day and take stool softener twice daily, if stpp; gets too loose, cut back on Miralax.  I also encouraged him to drink more fluids and walk in the house as much as possible to fascilitate peristalsis; he is on pain meds and I told him that is a know cause of constipation.  He has tried Mag Citrate without luck and does use Miralax once daily now along with stool stool softener.  Bowel sounds are slightly hypoactive upon auscultation. I told him if this does not help, to call the office for any other suggestions.     Also regarding high alert allergy warning on Neulasta due to latex allergy, spoke with Teresa LEWIS, who spoke with Dr. Boogie and pharmacist, if patient okay with getting the On Pro proceed.  Spoke with patient and he was agreeable to proceed with On Pro, he said it is mostly a reaction to latex on bandaids and causes redness and itching on skin where adhesive was.  He reports no trouble with breathing with the bandaids/latex.

## 2025-03-18 NOTE — TELEPHONE ENCOUNTER
Received message from Infusion Nurses that Mr Dey had not had a bowel movement in 5 days. He does take his pain medications every 4 hours. Patient said that he has been taking Miralax, Dulcolax and tried Mag Citrate, but those have not worked. Dayna instructed the patient to increase his fluids, walk or movement as often as possible, increase Miralax to 3 times daily and Colace to soften the stool.     During the release for OnPro, a latex warning popped up. The patient said that he would like to continue with the onpro since he is only allergic to bandaid adhesive.     Dr. Boogie has been notified of these.

## 2025-03-18 NOTE — PROGRESS NOTES
1100 Patient went to the bathroom around 103. Rituxan bumped up to 75 ml/hr and patient began to have rigors. Rituxan stopped and rescue drugs retrieved and given. Vitals monitored. Rigors stopped and patient stable. Oxygen applied at 3 L of oxygen for depressed respirations after demerol. Oxygen 99% at this time 1107. Dr Boogie's office was notified. Per Teresa FERRERA they will call in oral decadron and will changed decadron premed to solumedrol. Patient and wife aware. No complaints from patient at this time. Okay to discharge once stable.

## 2025-03-18 NOTE — TELEPHONE ENCOUNTER
Caller: [facility] - [place], [place] - [address] - [phone]  - [phone]    Relationship: Pharmacy    Best call back number: [phone]    What is the best time to reach you: ANYTIME    Who are you requesting to speak with (clinical staff, provider,  specific staff member): CLINICAL    What was the call regarding: PLEASE CALL KELLEN FOR INSTRUCTION CLARIFICATION ON PATIENT'S DEXAMETHASONE.

## 2025-03-19 ENCOUNTER — INFUSION (OUTPATIENT)
Dept: ONCOLOGY | Facility: HOSPITAL | Age: 85
End: 2025-03-19
Payer: COMMERCIAL

## 2025-03-19 VITALS
DIASTOLIC BLOOD PRESSURE: 52 MMHG | TEMPERATURE: 98.2 F | BODY MASS INDEX: 15.89 KG/M2 | WEIGHT: 111 LBS | HEIGHT: 70 IN | RESPIRATION RATE: 18 BRPM | OXYGEN SATURATION: 97 % | HEART RATE: 61 BPM | SYSTOLIC BLOOD PRESSURE: 104 MMHG

## 2025-03-19 DIAGNOSIS — C83.33 DIFFUSE LARGE B-CELL LYMPHOMA OF INTRA-ABDOMINAL LYMPH NODES: Primary | ICD-10-CM

## 2025-03-19 DIAGNOSIS — Z45.2 ENCOUNTER FOR CARE RELATED TO PORT-A-CATH: ICD-10-CM

## 2025-03-19 PROCEDURE — 25010000002 RITUXIMAB-ABBS 100 MG/10ML SOLUTION 10 ML VIAL: Performed by: INTERNAL MEDICINE

## 2025-03-19 PROCEDURE — 25010000002 METHYLPREDNISOLONE PER 125 MG: Performed by: INTERNAL MEDICINE

## 2025-03-19 PROCEDURE — 25010000002 VINCRISTINE SULFATE 2 MG/2ML SOLUTION 2 ML VIAL: Performed by: INTERNAL MEDICINE

## 2025-03-19 PROCEDURE — 25810000003 SODIUM CHLORIDE 0.9 % SOLUTION 250 ML FLEX CONT: Performed by: INTERNAL MEDICINE

## 2025-03-19 PROCEDURE — 25010000002 CYCLOPHOSPHAMIDE 1 GM/5ML SOLUTION 5 ML VIAL: Performed by: INTERNAL MEDICINE

## 2025-03-19 PROCEDURE — 25010000002 RITUXIMAB-ABBS 500 MG/50ML SOLUTION 50 ML VIAL: Performed by: INTERNAL MEDICINE

## 2025-03-19 PROCEDURE — 96415 CHEMO IV INFUSION ADDL HR: CPT

## 2025-03-19 PROCEDURE — 25010000002 DOXORUBICIN PER 10 MG: Performed by: INTERNAL MEDICINE

## 2025-03-19 PROCEDURE — 96417 CHEMO IV INFUS EACH ADDL SEQ: CPT

## 2025-03-19 PROCEDURE — 96413 CHEMO IV INFUSION 1 HR: CPT

## 2025-03-19 PROCEDURE — 25010000002 PEGFILGRASTIM 6 MG/0.6ML PREFILLED SYRINGE KIT: Performed by: INTERNAL MEDICINE

## 2025-03-19 PROCEDURE — 96411 CHEMO IV PUSH ADDL DRUG: CPT

## 2025-03-19 PROCEDURE — 25010000002 DIPHENHYDRAMINE PER 50 MG: Performed by: INTERNAL MEDICINE

## 2025-03-19 PROCEDURE — 96377 APPLICATON ON-BODY INJECTOR: CPT

## 2025-03-19 PROCEDURE — 63710000001 APREPITANT PER 5 MG: Performed by: INTERNAL MEDICINE

## 2025-03-19 PROCEDURE — 25810000003 SODIUM CHLORIDE 0.9 % SOLUTION: Performed by: INTERNAL MEDICINE

## 2025-03-19 PROCEDURE — 96375 TX/PRO/DX INJ NEW DRUG ADDON: CPT

## 2025-03-19 PROCEDURE — 25010000002 HEPARIN LOCK FLUSH PER 10 UNITS: Performed by: INTERNAL MEDICINE

## 2025-03-19 RX ORDER — FAMOTIDINE 10 MG/ML
20 INJECTION, SOLUTION INTRAVENOUS AS NEEDED
Status: DISCONTINUED | OUTPATIENT
Start: 2025-03-19 | End: 2025-03-19 | Stop reason: HOSPADM

## 2025-03-19 RX ORDER — METHYLPREDNISOLONE SODIUM SUCCINATE 125 MG/2ML
125 INJECTION, POWDER, LYOPHILIZED, FOR SOLUTION INTRAMUSCULAR; INTRAVENOUS ONCE
Status: COMPLETED | OUTPATIENT
Start: 2025-03-19 | End: 2025-03-19

## 2025-03-19 RX ORDER — PALONOSETRON 0.05 MG/ML
0.25 INJECTION, SOLUTION INTRAVENOUS ONCE
Status: DISCONTINUED | OUTPATIENT
Start: 2025-03-19 | End: 2025-03-19

## 2025-03-19 RX ORDER — SODIUM CHLORIDE 0.9 % (FLUSH) 0.9 %
10 SYRINGE (ML) INJECTION AS NEEDED
OUTPATIENT
Start: 2025-03-19

## 2025-03-19 RX ORDER — HYDROCORTISONE SODIUM SUCCINATE 100 MG/2ML
100 INJECTION INTRAMUSCULAR; INTRAVENOUS AS NEEDED
Status: DISCONTINUED | OUTPATIENT
Start: 2025-03-19 | End: 2025-03-19 | Stop reason: HOSPADM

## 2025-03-19 RX ORDER — DIPHENHYDRAMINE HYDROCHLORIDE 50 MG/ML
25 INJECTION INTRAMUSCULAR; INTRAVENOUS ONCE
Status: COMPLETED | OUTPATIENT
Start: 2025-03-19 | End: 2025-03-19

## 2025-03-19 RX ORDER — HEPARIN SODIUM (PORCINE) LOCK FLUSH IV SOLN 100 UNIT/ML 100 UNIT/ML
500 SOLUTION INTRAVENOUS AS NEEDED
Status: DISCONTINUED | OUTPATIENT
Start: 2025-03-19 | End: 2025-03-19 | Stop reason: HOSPADM

## 2025-03-19 RX ORDER — MEPERIDINE HYDROCHLORIDE 50 MG/ML
25 INJECTION INTRAMUSCULAR; INTRAVENOUS; SUBCUTANEOUS ONCE AS NEEDED
Status: DISCONTINUED | OUTPATIENT
Start: 2025-03-19 | End: 2025-03-19 | Stop reason: HOSPADM

## 2025-03-19 RX ORDER — DIPHENHYDRAMINE HYDROCHLORIDE 50 MG/ML
50 INJECTION INTRAMUSCULAR; INTRAVENOUS AS NEEDED
Status: DISCONTINUED | OUTPATIENT
Start: 2025-03-19 | End: 2025-03-19 | Stop reason: HOSPADM

## 2025-03-19 RX ORDER — SODIUM CHLORIDE 9 MG/ML
20 INJECTION, SOLUTION INTRAVENOUS ONCE
Status: COMPLETED | OUTPATIENT
Start: 2025-03-19 | End: 2025-03-19

## 2025-03-19 RX ORDER — ACETAMINOPHEN 325 MG/1
650 TABLET ORAL ONCE
Status: COMPLETED | OUTPATIENT
Start: 2025-03-19 | End: 2025-03-19

## 2025-03-19 RX ORDER — APREPITANT 40 MG/1
80 CAPSULE ORAL ONCE
Status: COMPLETED | OUTPATIENT
Start: 2025-03-19 | End: 2025-03-19

## 2025-03-19 RX ORDER — SODIUM CHLORIDE 0.9 % (FLUSH) 0.9 %
10 SYRINGE (ML) INJECTION AS NEEDED
Status: DISCONTINUED | OUTPATIENT
Start: 2025-03-19 | End: 2025-03-19 | Stop reason: HOSPADM

## 2025-03-19 RX ORDER — HEPARIN SODIUM (PORCINE) LOCK FLUSH IV SOLN 100 UNIT/ML 100 UNIT/ML
500 SOLUTION INTRAVENOUS AS NEEDED
OUTPATIENT
Start: 2025-03-19

## 2025-03-19 RX ORDER — DOXORUBICIN HYDROCHLORIDE 2 MG/ML
25 INJECTION, SOLUTION INTRAVENOUS ONCE
Status: COMPLETED | OUTPATIENT
Start: 2025-03-19 | End: 2025-03-19

## 2025-03-19 RX ADMIN — DIPHENHYDRAMINE HYDROCHLORIDE 25 MG: 50 INJECTION, SOLUTION INTRAMUSCULAR; INTRAVENOUS at 08:55

## 2025-03-19 RX ADMIN — VINCRISTINE SULFATE 1 MG: 1 INJECTION, SOLUTION INTRAVENOUS at 13:44

## 2025-03-19 RX ADMIN — APREPITANT 80 MG: 40 CAPSULE ORAL at 08:56

## 2025-03-19 RX ADMIN — RITUXIMAB-ABBS 630 MG: 10 INJECTION, SOLUTION INTRAVENOUS at 09:30

## 2025-03-19 RX ADMIN — CYCLOPHOSPHAMIDE 680 MG: 1 INJECTION INTRAVENOUS at 14:04

## 2025-03-19 RX ADMIN — SODIUM CHLORIDE 20 ML/HR: 9 INJECTION, SOLUTION INTRAVENOUS at 08:52

## 2025-03-19 RX ADMIN — METHYLPREDNISOLONE SODIUM SUCCINATE 125 MG: 125 INJECTION, POWDER, FOR SOLUTION INTRAMUSCULAR; INTRAVENOUS at 08:52

## 2025-03-19 RX ADMIN — Medication 10 ML: at 15:16

## 2025-03-19 RX ADMIN — Medication 500 UNITS: at 15:16

## 2025-03-19 RX ADMIN — PEGFILGRASTIM 6 MG: KIT SUBCUTANEOUS at 15:08

## 2025-03-19 RX ADMIN — DOXORUBICIN HYDROCHLORIDE 42 MG: 2 INJECTION, SOLUTION INTRAVENOUS at 13:31

## 2025-03-19 RX ADMIN — ACETAMINOPHEN 650 MG: 325 TABLET, FILM COATED ORAL at 08:52

## 2025-03-19 NOTE — PATIENT INSTRUCTIONS
DO NOT TAKE BLOOD PRESSURE MEDICINE THE DAY OF CHEMO TREATMENTS.    DO NOT TAKE OFF ONPRO UNTIL 7:00PM THURSDAY NIGHT.     If you feel nauseous take Ondansetron as directed on label. If after 30 minutes he is still nauseous take the other bottle Prochlorperazin    TAKE 1 DECADRON THE NIGHT BEFORE CHEMO AND 1 DECADRON THE MORNING OF CHEMO.     If you get a lot of bone pain from the Neulast Onpro take Claritin as directed on the box AND either Tylenol or Aleve.

## 2025-03-22 ENCOUNTER — APPOINTMENT (OUTPATIENT)
Dept: CT IMAGING | Facility: HOSPITAL | Age: 85
End: 2025-03-22
Payer: COMMERCIAL

## 2025-03-22 ENCOUNTER — HOSPITAL ENCOUNTER (INPATIENT)
Facility: HOSPITAL | Age: 85
LOS: 5 days | Discharge: HOSPICE | End: 2025-03-27
Attending: INTERNAL MEDICINE | Admitting: INTERNAL MEDICINE
Payer: COMMERCIAL

## 2025-03-22 DIAGNOSIS — K92.1 GASTROINTESTINAL HEMORRHAGE WITH MELENA: Primary | ICD-10-CM

## 2025-03-22 DIAGNOSIS — R10.9 ABDOMINAL PAIN, UNSPECIFIED ABDOMINAL LOCATION: ICD-10-CM

## 2025-03-22 DIAGNOSIS — C83.30 DIFFUSE LARGE B-CELL LYMPHOMA, UNSPECIFIED BODY REGION: ICD-10-CM

## 2025-03-22 DIAGNOSIS — E87.1 CHRONIC HYPONATREMIA: ICD-10-CM

## 2025-03-22 DIAGNOSIS — D64.9 ANEMIA, UNSPECIFIED TYPE: ICD-10-CM

## 2025-03-22 DIAGNOSIS — D70.9 NEUTROPENIA, UNSPECIFIED TYPE: ICD-10-CM

## 2025-03-22 PROBLEM — K92.2 UPPER GI BLEED: Status: ACTIVE | Noted: 2025-03-22

## 2025-03-22 PROBLEM — K63.1 PERFORATION BOWEL: Status: ACTIVE | Noted: 2025-03-22

## 2025-03-22 PROBLEM — R63.4 WEIGHT LOSS: Status: ACTIVE | Noted: 2025-03-22

## 2025-03-22 LAB
ABO GROUP BLD: NORMAL
ALBUMIN SERPL-MCNC: 2.9 G/DL (ref 3.5–5.2)
ALBUMIN/GLOB SERPL: 1.9 G/DL
ALP SERPL-CCNC: 87 U/L (ref 39–117)
ALT SERPL W P-5'-P-CCNC: 14 U/L (ref 1–41)
ANION GAP SERPL CALCULATED.3IONS-SCNC: 8 MMOL/L (ref 5–15)
APTT PPP: 27.1 SECONDS (ref 24.5–36)
AST SERPL-CCNC: 17 U/L (ref 1–40)
BACTERIA UR QL AUTO: ABNORMAL /HPF
BASOPHILS # BLD MANUAL: 0 10*3/MM3 (ref 0–0.2)
BASOPHILS NFR BLD MANUAL: 0 % (ref 0–1.5)
BILIRUB SERPL-MCNC: 0.5 MG/DL (ref 0–1.2)
BILIRUB UR QL STRIP: NEGATIVE
BLD GP AB SCN SERPL QL: NEGATIVE
BUN SERPL-MCNC: 31 MG/DL (ref 8–23)
BUN/CREAT SERPL: 40.3 (ref 7–25)
BURR CELLS BLD QL SMEAR: ABNORMAL
CALCIUM SPEC-SCNC: 8.2 MG/DL (ref 8.6–10.5)
CHLORIDE SERPL-SCNC: 96 MMOL/L (ref 98–107)
CLARITY UR: CLEAR
CO2 SERPL-SCNC: 27 MMOL/L (ref 22–29)
COD CRY URNS QL: ABNORMAL /HPF
COLOR UR: YELLOW
CREAT SERPL-MCNC: 0.77 MG/DL (ref 0.76–1.27)
D-LACTATE SERPL-SCNC: 1.1 MMOL/L (ref 0.5–2)
DEPRECATED RDW RBC AUTO: 45.3 FL (ref 37–54)
DEVELOPER EXPIRATION DATE: ABNORMAL
DEVELOPER LOT NUMBER: 271
EGFRCR SERPLBLD CKD-EPI 2021: 89.4 ML/MIN/1.73
ELLIPTOCYTES BLD QL SMEAR: ABNORMAL
EOSINOPHIL # BLD MANUAL: 0 10*3/MM3 (ref 0–0.4)
EOSINOPHIL NFR BLD MANUAL: 0 % (ref 0.3–6.2)
ERYTHROCYTE [DISTWIDTH] IN BLOOD BY AUTOMATED COUNT: 14.2 % (ref 12.3–15.4)
EXPIRATION DATE: ABNORMAL
FECAL OCCULT BLOOD SCREEN, POC: POSITIVE
GLOBULIN UR ELPH-MCNC: 1.5 GM/DL
GLUCOSE SERPL-MCNC: 118 MG/DL (ref 65–99)
GLUCOSE UR STRIP-MCNC: NEGATIVE MG/DL
HCT VFR BLD AUTO: 25.3 % (ref 37.5–51)
HGB BLD-MCNC: 7.9 G/DL (ref 13–17.7)
HGB UR QL STRIP.AUTO: NEGATIVE
HYALINE CASTS UR QL AUTO: ABNORMAL /LPF
INR PPP: 1.17 (ref 0.91–1.09)
KETONES UR QL STRIP: NEGATIVE
LEUKOCYTE ESTERASE UR QL STRIP.AUTO: NEGATIVE
LYMPHOCYTES # BLD MANUAL: 0.13 10*3/MM3 (ref 0.7–3.1)
LYMPHOCYTES NFR BLD MANUAL: 1.1 % (ref 5–12)
Lab: 271
MCH RBC QN AUTO: 27.3 PG (ref 26.6–33)
MCHC RBC AUTO-ENTMCNC: 31.2 G/DL (ref 31.5–35.7)
MCV RBC AUTO: 87.5 FL (ref 79–97)
METAMYELOCYTES NFR BLD MANUAL: 1.1 % (ref 0–0)
MONOCYTES # BLD: 0.03 10*3/MM3 (ref 0.1–0.9)
NEGATIVE CONTROL: NEGATIVE
NEUTROPHILS # BLD AUTO: 2.93 10*3/MM3 (ref 1.7–7)
NEUTROPHILS NFR BLD MANUAL: 90.5 % (ref 42.7–76)
NEUTS BAND NFR BLD MANUAL: 3.2 % (ref 0–5)
NEUTS VAC BLD QL SMEAR: ABNORMAL
NITRITE UR QL STRIP: POSITIVE
PH UR STRIP.AUTO: 5.5 [PH] (ref 5–8)
PLAT MORPH BLD: NORMAL
PLATELET # BLD AUTO: 189 10*3/MM3 (ref 140–450)
PMV BLD AUTO: 8.7 FL (ref 6–12)
POIKILOCYTOSIS BLD QL SMEAR: ABNORMAL
POSITIVE CONTROL: POSITIVE
POTASSIUM SERPL-SCNC: 3.9 MMOL/L (ref 3.5–5.2)
PROT SERPL-MCNC: 4.4 G/DL (ref 6–8.5)
PROT UR QL STRIP: ABNORMAL
PROTHROMBIN TIME: 15.6 SECONDS (ref 11.8–14.8)
RBC # BLD AUTO: 2.89 10*6/MM3 (ref 4.14–5.8)
RBC # UR STRIP: ABNORMAL /HPF
REF LAB TEST METHOD: ABNORMAL
RH BLD: POSITIVE
SODIUM SERPL-SCNC: 131 MMOL/L (ref 136–145)
SP GR UR STRIP: 1.03 (ref 1–1.03)
SQUAMOUS #/AREA URNS HPF: ABNORMAL /HPF
T&S EXPIRATION DATE: NORMAL
UROBILINOGEN UR QL STRIP: ABNORMAL
VARIANT LYMPHS NFR BLD MANUAL: 4.2 % (ref 19.6–45.3)
WBC # UR STRIP: ABNORMAL /HPF
WBC NRBC COR # BLD AUTO: 3.13 10*3/MM3 (ref 3.4–10.8)

## 2025-03-22 PROCEDURE — 86923 COMPATIBILITY TEST ELECTRIC: CPT

## 2025-03-22 PROCEDURE — 86901 BLOOD TYPING SEROLOGIC RH(D): CPT

## 2025-03-22 PROCEDURE — 99223 1ST HOSP IP/OBS HIGH 75: CPT | Performed by: GENERAL PRACTICE

## 2025-03-22 PROCEDURE — 82270 OCCULT BLOOD FECES: CPT | Performed by: PHYSICIAN ASSISTANT

## 2025-03-22 PROCEDURE — 99285 EMERGENCY DEPT VISIT HI MDM: CPT

## 2025-03-22 PROCEDURE — 81001 URINALYSIS AUTO W/SCOPE: CPT | Performed by: PHYSICIAN ASSISTANT

## 2025-03-22 PROCEDURE — 86900 BLOOD TYPING SEROLOGIC ABO: CPT

## 2025-03-22 PROCEDURE — 74174 CTA ABD&PLVS W/CONTRAST: CPT

## 2025-03-22 PROCEDURE — 83605 ASSAY OF LACTIC ACID: CPT | Performed by: PHYSICIAN ASSISTANT

## 2025-03-22 PROCEDURE — 85610 PROTHROMBIN TIME: CPT | Performed by: PHYSICIAN ASSISTANT

## 2025-03-22 PROCEDURE — 25510000001 IOPAMIDOL PER 1 ML: Performed by: PHYSICIAN ASSISTANT

## 2025-03-22 PROCEDURE — 86901 BLOOD TYPING SEROLOGIC RH(D): CPT | Performed by: INTERNAL MEDICINE

## 2025-03-22 PROCEDURE — 86850 RBC ANTIBODY SCREEN: CPT | Performed by: INTERNAL MEDICINE

## 2025-03-22 PROCEDURE — 80053 COMPREHEN METABOLIC PANEL: CPT | Performed by: PHYSICIAN ASSISTANT

## 2025-03-22 PROCEDURE — 85025 COMPLETE CBC W/AUTO DIFF WBC: CPT | Performed by: PHYSICIAN ASSISTANT

## 2025-03-22 PROCEDURE — 74176 CT ABD & PELVIS W/O CONTRAST: CPT

## 2025-03-22 PROCEDURE — 85018 HEMOGLOBIN: CPT | Performed by: INTERNAL MEDICINE

## 2025-03-22 PROCEDURE — 25810000003 SODIUM CHLORIDE 0.9 % SOLUTION: Performed by: INTERNAL MEDICINE

## 2025-03-22 PROCEDURE — 85730 THROMBOPLASTIN TIME PARTIAL: CPT | Performed by: PHYSICIAN ASSISTANT

## 2025-03-22 PROCEDURE — 85014 HEMATOCRIT: CPT | Performed by: INTERNAL MEDICINE

## 2025-03-22 PROCEDURE — 85007 BL SMEAR W/DIFF WBC COUNT: CPT | Performed by: PHYSICIAN ASSISTANT

## 2025-03-22 PROCEDURE — 36415 COLL VENOUS BLD VENIPUNCTURE: CPT

## 2025-03-22 PROCEDURE — 86900 BLOOD TYPING SEROLOGIC ABO: CPT | Performed by: INTERNAL MEDICINE

## 2025-03-22 RX ORDER — MORPHINE SULFATE 2 MG/ML
1 INJECTION, SOLUTION INTRAMUSCULAR; INTRAVENOUS EVERY 4 HOURS PRN
Status: DISCONTINUED | OUTPATIENT
Start: 2025-03-22 | End: 2025-03-25

## 2025-03-22 RX ORDER — SODIUM CHLORIDE 0.9 % (FLUSH) 0.9 %
10 SYRINGE (ML) INJECTION EVERY 12 HOURS SCHEDULED
Status: DISCONTINUED | OUTPATIENT
Start: 2025-03-22 | End: 2025-03-27 | Stop reason: HOSPADM

## 2025-03-22 RX ORDER — LATANOPROST 50 UG/ML
1 SOLUTION/ DROPS OPHTHALMIC NIGHTLY
Status: DISCONTINUED | OUTPATIENT
Start: 2025-03-22 | End: 2025-03-27 | Stop reason: HOSPADM

## 2025-03-22 RX ORDER — SODIUM CHLORIDE 0.9 % (FLUSH) 0.9 %
10 SYRINGE (ML) INJECTION AS NEEDED
Status: DISCONTINUED | OUTPATIENT
Start: 2025-03-22 | End: 2025-03-27 | Stop reason: HOSPADM

## 2025-03-22 RX ORDER — IOPAMIDOL 755 MG/ML
100 INJECTION, SOLUTION INTRAVASCULAR
Status: COMPLETED | OUTPATIENT
Start: 2025-03-22 | End: 2025-03-22

## 2025-03-22 RX ORDER — SODIUM CHLORIDE 9 MG/ML
40 INJECTION, SOLUTION INTRAVENOUS AS NEEDED
Status: DISCONTINUED | OUTPATIENT
Start: 2025-03-22 | End: 2025-03-27 | Stop reason: HOSPADM

## 2025-03-22 RX ORDER — SODIUM CHLORIDE 9 MG/ML
100 INJECTION, SOLUTION INTRAVENOUS CONTINUOUS
Status: DISCONTINUED | OUTPATIENT
Start: 2025-03-22 | End: 2025-03-23

## 2025-03-22 RX ORDER — PANTOPRAZOLE SODIUM 40 MG/10ML
80 INJECTION, POWDER, LYOPHILIZED, FOR SOLUTION INTRAVENOUS ONCE
Status: COMPLETED | OUTPATIENT
Start: 2025-03-22 | End: 2025-03-22

## 2025-03-22 RX ORDER — PANTOPRAZOLE SODIUM 40 MG/10ML
40 INJECTION, POWDER, LYOPHILIZED, FOR SOLUTION INTRAVENOUS EVERY 12 HOURS SCHEDULED
Status: DISCONTINUED | OUTPATIENT
Start: 2025-03-22 | End: 2025-03-27 | Stop reason: HOSPADM

## 2025-03-22 RX ORDER — NYSTATIN 100000 [USP'U]/ML
5 SUSPENSION ORAL 4 TIMES DAILY
Status: DISCONTINUED | OUTPATIENT
Start: 2025-03-23 | End: 2025-03-27 | Stop reason: HOSPADM

## 2025-03-22 RX ORDER — ONDANSETRON 2 MG/ML
4 INJECTION INTRAMUSCULAR; INTRAVENOUS EVERY 6 HOURS PRN
Status: DISCONTINUED | OUTPATIENT
Start: 2025-03-22 | End: 2025-03-27 | Stop reason: HOSPADM

## 2025-03-22 RX ADMIN — SODIUM CHLORIDE 100 ML/HR: 9 INJECTION, SOLUTION INTRAVENOUS at 21:02

## 2025-03-22 RX ADMIN — PANTOPRAZOLE SODIUM 40 MG: 40 INJECTION, POWDER, FOR SOLUTION INTRAVENOUS at 21:57

## 2025-03-22 RX ADMIN — LATANOPROST 1 DROP: 50 SOLUTION OPHTHALMIC at 21:58

## 2025-03-22 RX ADMIN — Medication 10 ML: at 21:02

## 2025-03-22 RX ADMIN — IOPAMIDOL 100 ML: 755 INJECTION, SOLUTION INTRAVENOUS at 15:46

## 2025-03-22 RX ADMIN — PANTOPRAZOLE SODIUM 80 MG: 40 INJECTION, POWDER, FOR SOLUTION INTRAVENOUS at 15:55

## 2025-03-22 NOTE — ED PROVIDER NOTES
Subjective   History of Present Illness    Patient is a pleasant 84-year-old gentleman who presents to ED with his wife and daughter.  Chief complaint is tarry stool.    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    Patient describes yesterday, he had lower abdominal discomfort and had some occasional tarry looking stool that is black in coloration.  The patient says he was up all night going to the bathroom frequently.  He is unsure how many bowel movements he has had.  He feels weak.  He has had over 50 pound weight loss in the past several months.  He did start IV chemotherapy treatment 3 days ago.  He believes he gets it every 21 days.  He denies any associated fever, nausea or vomiting.  He has no desire to eat.  He denies any hematuria, bleeding out of his gumline, or blood out of any other orifice except for his stool.  He denies taking NSAIDs or drinking alcohol.  He has not taken any Pepto-Bismol.    Patient does have a history of chronic swelling to his lower extremities as well and is not really sure why.  He denies any history of congestive heart failure, shortness of breath, chest pain, liver or kidney failure.    Review of Systems   Constitutional:  Positive for activity change, appetite change and unexpected weight change. Negative for fever.   HENT: Negative.     Respiratory: Negative.     Cardiovascular:  Positive for leg swelling.   Gastrointestinal:  Positive for abdominal pain, blood in stool and diarrhea.   Genitourinary: Negative.  Negative for hematuria.   Musculoskeletal: Negative.    Skin: Negative.    Neurological:  Positive for weakness.   Psychiatric/Behavioral: Negative.     All other systems reviewed and are negative.      Past  Medical History:   Diagnosis Date    Bladder cancer     CLL (chronic lymphocytic leukemia)     Colon polyp     Gallstone     GERD (gastroesophageal reflux disease)     Glaucoma     Hearing loss     Hypertension     Inguinal hernia     right groin    Macular degeneration, right eye        Allergies   Allergen Reactions    Latex Itching and Other (See Comments)     And band aids. Causes redness and itching.    Augmentin [Amoxicillin-Pot Clavulanate] Hives       Past Surgical History:   Procedure Laterality Date    CATARACT EXTRACTION, BILATERAL      COLONOSCOPY  06/13/2012    CYSTOSCOPY BLADDER BIOPSY      DIAGNOSTIC LAPAROSCOPY N/A 9/26/2024    Procedure: ROBOTIC REPAIR OF PERFORATED GASTRIC ULCER;  Surgeon: Petrona Malone MD;  Location:  [facility] OR;  Service: General;  Laterality: N/A;  WITH REPAIR OF GASTRIC ULCER PERFORATION    EXCISION MASS HEAD/NECK N/A 3/4/2022    Procedure: EXCISION OF MASS ON POSTERIOR NECK;  Surgeon: Rossana Mahajan MD;  Location:  [facility];  Service: General;  Laterality: N/A;    INGUINAL HERNIA REPAIR Left 2007    INGUINAL HERNIA REPAIR Right 12/28/2017    Procedure: RIGHT INGUINAL HERNIA REPAIR WITH MESH ;  Surgeon: Rossana Mahajan MD;  Location:  [facility];  Service:     VENOUS ACCESS DEVICE (PORT) INSERTION N/A 3/5/2025    Procedure: Single Lumen Port-a-cath insertion with flouroscopy;  Surgeon: Petrona Malone MD;  Location:  [facility];  Service: General;  Laterality: N/A;       Family History   Problem Relation Age of Onset    Colon cancer Maternal Grandfather         in his 60's.     Alzheimer's disease Mother     Hypertension Father     Other Father         stent    COPD Sister     Heart attack Brother     No Known Problems Maternal Grandmother     No Known Problems Paternal Grandmother     No Known Problems Paternal Grandfather        Social History     Socioeconomic History    Marital status:    Tobacco Use    Smoking status: Former     Current packs/day: 0.00      Types: Cigarettes     Start date:      Quit date:      Years since quittin.2    Smokeless tobacco: Never   Vaping Use    Vaping status: Never Used   Substance and Sexual Activity    Alcohol use: No    Drug use: No    Sexual activity: Defer       Prior to Admission medications    Medication Sig Start Date End Date Taking? Authorizing Provider   acetaminophen (Tylenol) 325 MG tablet Take 3 tablets by mouth Every 8 (Eight) Hours. Take every 8 hours for 3 days then take prn as needed. 3/5/25 3/5/26  Petrona Malone MD   acetaminophen (TYLENOL) 500 MG tablet Take 1 tablet by mouth Every 6 (Six) Hours As Needed for Mild Pain. 10/5/24   Hipolito Robbins MD   albuterol sulfate  (90 Base) MCG/ACT inhaler Inhale 2 puffs 4 (Four) Times a Day. 10/4/24   Petrona Malone MD   allopurinol (ZYLOPRIM) 300 MG tablet Take 1 tablet by mouth Daily. 25   Marco Boogie MD   dexAMETHasone (DECADRON) 4 MG tablet Take 1 tablet by mouth 2 (Two) Times a Day With Meals. Take 1 tablet the night before chemotherapy treatment and 1 tablet the morning of his chemotherapy treatment. 3/18/25   Marco Boogie MD   famotidine (PEPCID) 20 MG tablet Take 1 tablet by mouth 2 (Two) Times a Day. 25   Raji Nunn Jr., MD   Ferrous Sulfate (IRON PO) Take 1 tablet by mouth Daily.    Lauro Cazares MD   HYDROcodone-acetaminophen (NORCO)  MG per tablet Take 1 tablet by mouth Every 6 (Six) Hours As Needed for Moderate Pain. 25   Marco Boogie MD   HYDROcodone-acetaminophen (NORCO)  MG per tablet Take 1 tablet by mouth Every 4 (Four) Hours As Needed for Moderate Pain. 3/4/25   Marco Boogie MD   latanoprost (XALATAN) 0.005 % ophthalmic solution Administer 1 drop to the right eye Every Night. 17   Lauro Cazares MD   lidocaine-prilocaine (EMLA) 2.5-2.5 % cream 30 minutes prior to use apply generous amount of Emla Cream to port site and cover with clear plastic wrap until  ready for access 3/6/25   Marco Boogie MD   metoprolol tartrate (LOPRESSOR) 25 MG tablet Take 1 tablet by mouth 2 (Two) Times a Day. 6/5/17   ProviderLauro MD   multivitamins-minerals (PRESERVISION AREDS 2) capsule capsule Take 1 capsule by mouth 2 (Two) Times a Day.    Provider, MD Lauro   naloxone (NARCAN) 4 MG/0.1ML nasal spray Call 911. Don't prime. [place] in 1 nostril for overdose. Repeat in 2-3 minutes in other nostril if no or minimal breathing/responsiveness. 3/4/25   Marco Boogie MD   ondansetron (Zofran) 4 MG tablet Take 1 tablet by mouth Every 8 (Eight) Hours As Needed for Nausea or Vomiting. 3/5/25 3/5/26  Petrona Malone MD   ondansetron (ZOFRAN) 8 MG tablet Take 1 tablet by mouth Every 8 (Eight) Hours As Needed for Nausea or Vomiting. 2/11/25   Marco Boogie MD   ondansetron ODT (ZOFRAN-ODT) 4 MG disintegrating tablet Place 1 tablet on the tongue Every 6 (Six) Hours As Needed for Nausea or Vomiting. 10/3/24   Petrona Malone MD   pantoprazole (Protonix) 40 MG EC tablet Take 1 tablet by mouth 2 (Two) Times a Day. 12/23/24   Jeaneth Okeefe PA-C   pantoprazole (Protonix) 40 MG EC tablet Take 1 tablet by mouth Daily. 3/5/25 3/5/26  Petrona Malone MD   predniSONE (DELTASONE) 10 MG tablet Take 5 tablets by mouth Daily. Daily for 5 days to begin with chemo 2/11/25   Marco Boogie MD   prochlorperazine (COMPAZINE) 10 MG tablet Take 1 tablet by mouth Every 4 (Four) Hours As Needed for Nausea or Vomiting. 2/11/25   Marco Boogie MD   sucralfate (Carafate) 1 GM/10ML suspension Take 10 mL by mouth 4 (Four) Times a Day. 1/13/25   Jeaneth Okeefe PA-C   tamsulosin (FLOMAX) 0.4 MG capsule 24 hr capsule Take 1 capsule by mouth Daily. 1/31/24   Provider, MD Lauro   vitamin B-12 (CYANOCOBALAMIN) 1000 MCG tablet Take 1 tablet by mouth Daily.    Provider, MD Lauro   vitamin D3 125 MCG (5000 UT) capsule capsule Take 1 capsule by mouth Daily.    ProviderLauro MD  "      Medications   sodium chloride 0.9 % flush 10 mL (has no administration in time range)   pantoprazole (PROTONIX) injection 80 mg (80 mg Intravenous Given 3/22/25 1555)   iopamidol (ISOVUE-370) 76 % injection 100 mL (100 mL Intravenous Given 3/22/25 1546)       /58   Pulse 83   Temp 98.1 °F (36.7 °C) (Temporal)   Resp 18   Ht 177.8 cm (70\")   Wt 49.9 kg (110 lb)   SpO2 100%   BMI 15.78 kg/m²       Objective   Physical Exam  Vitals reviewed.   Constitutional:       Appearance: Normal appearance. He is ill-appearing.      Comments: Chronically ill-appearing   HENT:      Head: Normocephalic and atraumatic.      Nose: Nose normal.   Eyes:      Extraocular Movements: Extraocular movements intact.   Cardiovascular:      Rate and Rhythm: Normal rate and regular rhythm.   Pulmonary:      Effort: Pulmonary effort is normal.      Breath sounds: Normal breath sounds.   Abdominal:      General: Bowel sounds are normal.      Palpations: Abdomen is soft.      Tenderness: There is abdominal tenderness. There is guarding.   Musculoskeletal:         General: Swelling present.      Cervical back: Normal range of motion and neck supple.      Right lower leg: Edema present.      Left lower leg: Edema present.      Comments: 3+ pitting edema to bilateral lower extremities with palpable pulses bilaterally.   Skin:     General: Skin is warm.   Neurological:      Mental Status: He is alert and oriented to person, place, and time.   Psychiatric:         Mood and Affect: Mood normal.         Behavior: Behavior normal. Behavior is cooperative.         Procedures         Lab Results (last 24 hours)       Procedure Component Value Units Date/Time    CBC & Differential [[id number]]  (Abnormal) Collected: 03/22/25 1512    Specimen: Blood Updated: 03/22/25 1551    Narrative:      The following orders were created for panel order CBC & Differential.  Procedure                               Abnormality         Status                  "    ---------                               -----------         ------                     CBC Auto Differential[[id number]]        Abnormal            Final result                 Please view results for these tests on the individual orders.    Comprehensive Metabolic Panel [[id number]]  (Abnormal) Collected: 03/22/25 1512    Specimen: Blood Updated: 03/22/25 1540     Glucose 118 mg/dL      BUN 31 mg/dL      Creatinine 0.77 mg/dL      Sodium 131 mmol/L      Potassium 3.9 mmol/L      Chloride 96 mmol/L      CO2 27.0 mmol/L      Calcium 8.2 mg/dL      Total Protein 4.4 g/dL      Albumin 2.9 g/dL      ALT (SGPT) 14 U/L      AST (SGOT) 17 U/L      Alkaline Phosphatase 87 U/L      Total Bilirubin 0.5 mg/dL      Globulin 1.5 gm/dL      A/G Ratio 1.9 g/dL      BUN/Creatinine Ratio 40.3     Anion Gap 8.0 mmol/L      eGFR 89.4 mL/min/1.73     Narrative:      GFR Categories in Chronic Kidney Disease (CKD)      GFR Category          GFR (mL/min/1.73)    Interpretation  G1                     90 or greater         Normal or high (1)  G2                      60-89                Mild decrease (1)  G3a                   45-59                Mild to moderate decrease  G3b                   30-44                Moderate to severe decrease  G4                    15-29                Severe decrease  G5                    14 or less           Kidney failure          (1)In the absence of evidence of kidney disease, neither GFR category G1 or G2 fulfill the criteria for CKD.    eGFR calculation 2021 CKD-EPI creatinine equation, which does not include race as a factor    Protime-INR [[id number]]  (Abnormal) Collected: 03/22/25 1512    Specimen: Blood Updated: 03/22/25 1530     Protime 15.6 Seconds      INR 1.17    aPTT [[id number]]  (Normal) Collected: 03/22/25 1512    Specimen: Blood Updated: 03/22/25 1530     PTT 27.1 seconds     Narrative:      PTT = The equivalent PTT values for the therapeutic range of heparin levels at 0.3 to 0.7 U/ml  are 77 - 99 seconds.    Lactic Acid, Plasma [[id number]]  (Normal) Collected: 03/22/25 1512    Specimen: Blood Updated: 03/22/25 1535     Lactate 1.1 mmol/L     CBC Auto Differential [[id number]]  (Abnormal) Collected: 03/22/25 1512    Specimen: Blood Updated: 03/22/25 1551     WBC 3.13 10*3/mm3      RBC 2.89 10*6/mm3      Hemoglobin 7.9 g/dL      Hematocrit 25.3 %      MCV 87.5 fL      MCH 27.3 pg      MCHC 31.2 g/dL      RDW 14.2 %      RDW-SD 45.3 fl      MPV 8.7 fL      Platelets 189 10*3/mm3     Manual Differential [[id number]]  (Abnormal) Collected: 03/22/25 1512    Specimen: Blood Updated: 03/22/25 1551     Neutrophil % 90.5 %      Lymphocyte % 4.2 %      Monocyte % 1.1 %      Eosinophil % 0.0 %      Basophil % 0.0 %      Bands %  3.2 %      Metamyelocyte % 1.1 %      Neutrophils Absolute 2.93 10*3/mm3      Lymphocytes Absolute 0.13 10*3/mm3      Monocytes Absolute 0.03 10*3/mm3      Eosinophils Absolute 0.00 10*3/mm3      Basophils Absolute 0.00 10*3/mm3      Crenated RBC's Slight/1+     Elliptocytes Slight/1+     Poikilocytes Slight/1+     Vacuolated Neutrophils Slight/1+     Platelet Morphology Normal    POC Occult Blood Stool [[id number]]  (Abnormal) Collected: 03/22/25 1527    Specimen: Stool Updated: 03/22/25 1529     Fecal Occult Blood Positive     Lot Number [id number]     Expiration Date 2/28/25     DEVELOPER LOT NUMBER [id number]     DEVELOPER EXPIRATION DATE 2/28/25     Positive Control Positive     Negative Control Negative    Urinalysis With Culture If Indicated - Urine, Clean Catch [[id number]]  (Abnormal) Collected: 03/22/25 1705    Specimen: Urine, Clean Catch Updated: 03/22/25 1736     Color, UA Yellow     Appearance, UA Clear     pH, UA 5.5     Specific Gravity, UA 1.029     Glucose, UA Negative     Ketones, UA Negative     Bilirubin, UA Negative     Blood, UA Negative     Protein, UA Trace     Leuk Esterase, UA Negative     Nitrite, UA Positive     Urobilinogen, UA 1.0 E.U./dL    Narrative:      In absence of  clinical symptoms, the presence of pyuria, bacteria, and/or nitrites on the urinalysis result does not correlate with infection.    Urinalysis, Microscopic Only - Urine, Clean Catch [[id number]]  (Abnormal) Collected: 03/22/25 1705    Specimen: Urine, Clean Catch Updated: 03/22/25 1736     RBC, UA None Seen /HPF      WBC, UA 3-5 /HPF      Comment: Urine culture not indicated.        Bacteria, UA 3+ /HPF      Squamous Epithelial Cells, UA 0-2 /HPF      Hyaline Casts, UA None Seen /LPF      Calcium Oxalate Crystals, UA Small/1+ /HPF      Methodology Manual Light Microscopy            CT Angiogram Abdomen Pelvis  Result Date: 3/22/2025  Narrative: EXAM/TECHNIQUE: CT angiography with 3D MIP images abdomen and pelvis without and with IV contrast  INDICATION: gi bleed. known stomach cancer  COMPARISON: 1/7/2025  DLP: 598.03 mGy.cm. Automated exposure control was utilized to decrease patient radiation dose.  FINDINGS:  Lung bases are clear.  No suspicious liver lesion. Small gallstone. No abnormal gallbladder wall thickening. No biliary ductal dilatation. Pancreas and adrenal glands appear unremarkable. Spleen is mildly enlarged measuring 13.3 cm craniocaudal dimension. No solid renal mass. No urolithiasis or hydronephrosis. No focal urinary bladder abnormality.  Patient has a known lymphoma involving the duodenum and has a history of perforated gastric ulcer. The distal gastric wall is poorly visualized and there appears to be a large amount of extraluminal fluid and gas within the region of the gastric antrum and proximal duodenum, in keeping with bowel perforation. Also within the region of known lymphoma, in the distal gastric lumen, there is an area of contrast pooling on delayed images on axial image 97 series 9, in keeping with an area of active bleeding within the stomach.  Moderate volume stool in the colon. No colonic wall thickening or pericolonic fat stranding. Hyperdense material within the stomach is  hyperdense before giving contrast. No bowel obstruction. Normal appendix.  No ascites or free pelvic fluid. No pelvic mass or collection. Prostate and seminal vesicles are unremarkable.  Nonaneurysmal atherosclerotic abdominal aorta. Celiac artery, SMA, and ONEL are patent. Renal arteries are patent. Redemonstrated mild retroperitoneal periaortic lymphadenopathy. No new or enlarging lymph nodes in the abdomen or pelvis.  Severe diffuse body wall soft tissue edema, in keeping with anasarca. No acute osseous finding.      Impression:  1.  Patient has a known distal gastric/proximal duodenal lymphoma. The distal gastric wall is poorly visualized and there is appearance of extraluminal gas and debris, highly suspicious for contained gastroduodenal perforation. If clinically indicated, this could be further evaluated with limited CT of the abdomen after oral contrast administration.  2.  There is a 1.7 cm oval focus of contrast pooling within the distal gastric lumen on delayed images, in keeping with an area of active bleeding.  3.  Upper abdominal retroperitoneal lymphadenopathy, similar to the prior study.  4.  Severe diffuse abdominal wall soft tissue edema, likely related to anasarca.    This report was signed and finalized on 3/22/2025 4:09 PM by Dr. Steven Giles MD.      XR Chest Post CVA Port  Result Date: 3/5/2025  Narrative: XR CHEST POST CVA PORT- 3/5/2025 12:30 PM  HISTORY: lifeport insertion; Z45.2-Encounter for adjustment and management of vascular access device; C83.33-Diffuse large b-cell lymphoma, intra-abdominal lymph nodes  COMPARISON: None  FLUOROSCOPY TIME: 12 seconds  FLUOROSCOPY DOSE: 0.98 mGy  NUMBER OF IMAGES: 1      Impression:  Intraoperative fluoroscopic images during life port insertion.  Please refer to the operative note for more details.  This report was signed and finalized on 3/5/2025 3:51 PM by Dr Eddie Lincoln.      FL C Arm During Surgery  Result Date: 3/5/2025  Narrative: This  procedure was auto-finalized with no dictation required.    NM PET/CT Skull Base to Mid Thigh  Result Date: 3/3/2025  Narrative: Exam: NM PET/CT SKULL BASE TO MID THIGH-  Indication: Initial staging of lymphoma  Comparison: CT 1/7/2025  -10.2 mCi F-18 FDG was administered IV per protocol via the right wrist. - Blood glucose at the time of injection was 115 mg/dl.  - PET/CT images were obtained from the base of the skull to the proximal femurs approximately 60 minutes after radiotracer administration. - Noncontrast CT images of the neck, chest, abdomen, and pelvis were obtained for attenuation correction and anatomic localization. - DLP: 274.08 mGy.cm. Automated exposure control was also utilized to decrease patient radiation dose.  FINDINGS: Background right hepatic lobe metabolic activity measures max SUV 1.7.  No suspicious hypermetabolic activity in the neck.  No hypermetabolic pulmonary nodule. 1.6 x 1.3 cm subcarinal hypermetabolic lymph node with max SUV 2.5. No other hypermetabolic lymph nodes in the chest.  Marked hypermetabolic wall thickening of the gastric antrum and proximal duodenum with max SUV of 11.2. There is again gastrohepatic ligament lymphadenopathy though these nodes do not appear hypermetabolic. For reference, right gastrohepatic ligament lymph node along the medial liver margin measuring 3.6 x 1.6 cm on image 236 series 4 with max SUV 1.5.  No hypermetabolic pelvic lymphadenopathy. No hypermetabolic bone lesion.  Non-FDG avid findings:  Lower intracranial cavity is unremarkable. No acute orbital finding. Parotid glands appear unremarkable. No large thyroid nodule. No pathologic enlarged cervical lymph nodes. Mastoid air cells and paranasal sinuses are clear. Multilevel cervical spine degenerative change.  Central airways are clear. No consolidation or pleural effusion. Mild biapical parenchymal scarring. No suspicious pulmonary nodule. Ascending aorta is mildly dilated measuring 4.1 cm  diameter and contains atherosclerotic calcification. Heavy coronary artery calcification no pericardial effusion.  Unenhanced liver, gallbladder, pancreas, and adrenal glands are unremarkable. Spleen is enlarged measuring 14.3 cm craniocaudal dimension. No urolithiasis or hydronephrosis. No focal urinary bladder abnormality.  No bowel distention or active bowel inflammation. No ascites or free pelvic fluid. No pelvic mass or collection. Prostate is enlarged. Nonenlarged atherosclerotic abdominal aorta. No acute osseous finding.      Impression:  1.  Marked hypermetabolic wall thickening of the gastric antrum and proximal duodenum. Findings are in keeping with biopsy-proven lymphoma.  2.  Enlarged gastrocolic ligament lymph nodes are again present, though these are not hypermetabolic. Differential includes reactive lymph nodes versus additional site of lymphomatous involvement.  3.  Mildly enlarged hypermetabolic subcarinal lymph node, with differential including lymphoma versus reactive node.  4.  The spleen is mildly enlarged measuring 14.3 cm craniocaudal dimension.  5.  The ascending aorta is dilated measuring 4.1 cm diameter.  This report was signed and finalized on 3/3/2025 11:40 AM by Dr. Steven Giles MD.        ED Course  ED Course as of 03/22/25 1803   Sat Mar 22, 2025   1647 I did review the patient's previous hospitalization when he had the gastric ulcer perforation requiring surgical intervention.  Laboratory data has been reviewed today with previous labs compared.  He is chronically anemic with a relatively stable hemoglobin and hematocrit of 7.9 25.3 respectively.  He is not hypotensive.  I spoke with Dr. Pemberton, general surgeon on-call who is gracious evaluate the patient in the ED and review his images. [TK]   1370 Dr. Pemberton, general surgeon, is gracious evaluate the patient in the ED.  He requests a CT scan be completed with oral contrast for further evaluation of possibly contained  perforation.  He recommends medicine admit the patient and consult gastroenterology as well.  I have spoken to both Dr. Maldonado, hospitalist as well as Dr. Jules, gastroenterologist.  [TK]      ED Course User Index  [TK] Nanda Rivera PA          [facility]     Amount and/or Complexity of Data Reviewed  Decide to obtain previous medical records or to obtain history from someone other than the patient: yes        Final diagnoses:   Gastrointestinal hemorrhage with melena   Diffuse large B-cell lymphoma, unspecified body region   Anemia, unspecified type   Neutropenia, unspecified type   Chronic hyponatremia       Disposition: Patient will be admitted under hospitalists' service with consultations to both general surgery and gastroenterology.       Nanda Rivera PA  03/22/25 1747       Nanda Rivera PA  03/22/25 1808

## 2025-03-22 NOTE — H&P
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 Boogie of hematology/oncology, the presented to our facility today with symptoms of abdominal pain in addition to passing black stool.  Patient indicates that he recently started his first cycle of chemotherapy.  He indicates that he has been having worsening pain and discomfort near the middle of his abdomen, and yesterday began passing some stool which was black in color.  He denies any nausea or vomiting.  No hematemesis.  He reports having ongoing weight loss of approximately 30-40 pounds over the past few months.  He has continued to get progressively more weak.  In the emergency department he has had multiple black stools, occult positive for blood, and also received a CAT scan of the abdomen and pelvis revealing a contained perforation near the site of his malignancy.  Both general surgery and gastroenterology have been notified.  Hospitalist service requested for admission.  Patient's only other complaint is some soreness in his mouth, and concern for possible thrush.  Family is at bedside.  Patient indicates that he does not take any blood thinning medication.  He does not utilize any NSAIDs.    Review of Systems   Otherwise complete ROS reviewed and negative except as mentioned in the HPI.    Past Medical History:   Past Medical History:   Diagnosis Date    Bladder cancer     CLL (chronic lymphocytic leukemia)     Colon polyp     Gallstone     GERD (gastroesophageal reflux disease)     Glaucoma     Hearing loss     Hypertension     Inguinal hernia     right groin     Macular degeneration, right eye      Past Surgical History:  Past Surgical History:   Procedure Laterality Date    CATARACT EXTRACTION, BILATERAL      COLONOSCOPY  06/13/2012    CYSTOSCOPY BLADDER BIOPSY      DIAGNOSTIC LAPAROSCOPY N/A 9/26/2024    Procedure: ROBOTIC REPAIR OF PERFORATED GASTRIC ULCER;  Surgeon: Petrona Malone MD;  Location:  [facility] OR;  Service: General;  Laterality: N/A;  WITH REPAIR OF GASTRIC ULCER PERFORATION    EXCISION MASS HEAD/NECK N/A 3/4/2022    Procedure: EXCISION OF MASS ON POSTERIOR NECK;  Surgeon: Rossana Mahajan MD;  Location:  [facility] OR;  Service: General;  Laterality: N/A;    INGUINAL HERNIA REPAIR Left 2007    INGUINAL HERNIA REPAIR Right 12/28/2017    Procedure: RIGHT INGUINAL HERNIA REPAIR WITH MESH ;  Surgeon: Rossana Mahajna MD;  Location:  [facility] OR;  Service:     VENOUS ACCESS DEVICE (PORT) INSERTION N/A 3/5/2025    Procedure: Single Lumen Port-a-cath insertion with flouroscopy;  Surgeon: Petrona Malone MD;  Location:  [facility] OR;  Service: General;  Laterality: N/A;     Social History:  reports that he quit smoking about 53 years ago. His smoking use included cigarettes. He started smoking about 68 years ago. He has never used smokeless tobacco. He reports that he does not drink alcohol and does not use drugs.    Family History: family history includes Alzheimer's disease in his mother; COPD in his sister; Colon cancer in his maternal grandfather; Heart attack in his brother; Hypertension in his father; No Known Problems in his maternal grandmother, paternal grandfather, and paternal grandmother; Other in his father.       Allergies:  Allergies   Allergen Reactions    Latex Itching and Other (See Comments)     And band aids. Causes redness and itching.    Augmentin [Amoxicillin-Pot Clavulanate] Hives       Medications:  Prior to Admission medications    Medication Sig Start Date End Date Taking? Authorizing Provider   acetaminophen (Tylenol) 325 MG tablet Take 3  tablets by mouth Every 8 (Eight) Hours. Take every 8 hours for 3 days then take prn as needed. 3/5/25 3/5/26  Petrona Malone MD   acetaminophen (TYLENOL) 500 MG tablet Take 1 tablet by mouth Every 6 (Six) Hours As Needed for Mild Pain. 10/5/24   Hipolito Robbins MD   albuterol sulfate  (90 Base) MCG/ACT inhaler Inhale 2 puffs 4 (Four) Times a Day. 10/4/24   Petrona Malone MD   allopurinol (ZYLOPRIM) 300 MG tablet Take 1 tablet by mouth Daily. 2/11/25   Marco Boogie MD   dexAMETHasone (DECADRON) 4 MG tablet Take 1 tablet by mouth 2 (Two) Times a Day With Meals. Take 1 tablet the night before chemotherapy treatment and 1 tablet the morning of his chemotherapy treatment. 3/18/25   Marco Boogie MD   famotidine (PEPCID) 20 MG tablet Take 1 tablet by mouth 2 (Two) Times a Day. 1/24/25   Raji Nunn Jr., MD   Ferrous Sulfate (IRON PO) Take 1 tablet by mouth Daily.    Lauro Cazares MD   HYDROcodone-acetaminophen (NORCO)  MG per tablet Take 1 tablet by mouth Every 6 (Six) Hours As Needed for Moderate Pain. 2/11/25   Marco Boogie MD   HYDROcodone-acetaminophen (NORCO)  MG per tablet Take 1 tablet by mouth Every 4 (Four) Hours As Needed for Moderate Pain. 3/4/25   Marco Boogie MD   latanoprost (XALATAN) 0.005 % ophthalmic solution Administer 1 drop to the right eye Every Night. 6/27/17   Lauro Cazares MD   lidocaine-prilocaine (EMLA) 2.5-2.5 % cream 30 minutes prior to use apply generous amount of Emla Cream to port site and cover with clear plastic wrap until ready for access 3/6/25   Marco Boogie MD   metoprolol tartrate (LOPRESSOR) 25 MG tablet Take 1 tablet by mouth 2 (Two) Times a Day. 6/5/17   Lauro Cazares MD   multivitamins-minerals (PRESERVISION AREDS 2) capsule capsule Take 1 capsule by mouth 2 (Two) Times a Day.    Lauro Cazares MD   naloxone (NARCAN) 4 MG/0.1ML nasal spray Call 911. Don't prime. Gravity in 1 nostril for overdose.  "Repeat in 2-3 minutes in other nostril if no or minimal breathing/responsiveness. 3/4/25   Marco Boogie MD   ondansetron (Zofran) 4 MG tablet Take 1 tablet by mouth Every 8 (Eight) Hours As Needed for Nausea or Vomiting. 3/5/25 3/5/26  Petrona Malone MD   ondansetron (ZOFRAN) 8 MG tablet Take 1 tablet by mouth Every 8 (Eight) Hours As Needed for Nausea or Vomiting. 2/11/25   Marco Boogie MD   ondansetron ODT (ZOFRAN-ODT) 4 MG disintegrating tablet Place 1 tablet on the tongue Every 6 (Six) Hours As Needed for Nausea or Vomiting. 10/3/24   Petrona Malone MD   pantoprazole (Protonix) 40 MG EC tablet Take 1 tablet by mouth 2 (Two) Times a Day. 12/23/24   Jeaneth Okeefe PA-C   pantoprazole (Protonix) 40 MG EC tablet Take 1 tablet by mouth Daily. 3/5/25 3/5/26  Petrona Malone MD   predniSONE (DELTASONE) 10 MG tablet Take 5 tablets by mouth Daily. Daily for 5 days to begin with chemo 2/11/25   Marco Boogie MD   prochlorperazine (COMPAZINE) 10 MG tablet Take 1 tablet by mouth Every 4 (Four) Hours As Needed for Nausea or Vomiting. 2/11/25   Marco Boogie MD   sucralfate (Carafate) 1 GM/10ML suspension Take 10 mL by mouth 4 (Four) Times a Day. 1/13/25   Jeaneth Okeefe PA-C   tamsulosin (FLOMAX) 0.4 MG capsule 24 hr capsule Take 1 capsule by mouth Daily. 1/31/24   Lauro Cazares MD   vitamin B-12 (CYANOCOBALAMIN) 1000 MCG tablet Take 1 tablet by mouth Daily.    Lauro Cazares MD   vitamin D3 125 MCG (5000 UT) capsule capsule Take 1 capsule by mouth Daily.    Lauro Cazares MD     I have utilized all available immediate resources to obtain, update, or review the patient's current medications (including all prescriptions, over-the-counter products, herbals, cannabis/cannabidiol products, and vitamin/mineral/dietary (nutritional) supplements).    Objective     Vital Signs: /58   Pulse 83   Temp 98.1 °F (36.7 °C) (Temporal)   Resp 18   Ht 177.8 cm (70\")   Wt 49.9 kg " (110 lb)   SpO2 100%   BMI 15.78 kg/m²   Physical Exam  Vitals reviewed.   Constitutional:       Appearance: He is ill-appearing.      Comments: Frail and cachectic appearing   HENT:      Head: Normocephalic.      Mouth/Throat:      Mouth: Mucous membranes are dry.      Pharynx: Oropharyngeal exudate present.   Cardiovascular:      Rate and Rhythm: Normal rate.   Pulmonary:      Effort: Pulmonary effort is normal. No respiratory distress.   Abdominal:      Tenderness: There is abdominal tenderness.   Genitourinary:     Comments: Patient did pass black, sticky stool during my assessment  Musculoskeletal:      Right lower leg: Edema present.      Left lower leg: Edema present.   Skin:     General: Skin is warm.      Coloration: Skin is pale.   Neurological:      Mental Status: He is alert.      Motor: Weakness present.          Results Reviewed:  Lab Results (last 24 hours)       Procedure Component Value Units Date/Time    Urinalysis With Culture If Indicated - Urine, Clean Catch [[id number]]  (Abnormal) Collected: 03/22/25 1705    Specimen: Urine, Clean Catch Updated: 03/22/25 1736     Color, UA Yellow     Appearance, UA Clear     pH, UA 5.5     Specific Gravity, UA 1.029     Glucose, UA Negative     Ketones, UA Negative     Bilirubin, UA Negative     Blood, UA Negative     Protein, UA Trace     Leuk Esterase, UA Negative     Nitrite, UA Positive     Urobilinogen, UA 1.0 E.U./dL    Narrative:      In absence of clinical symptoms, the presence of pyuria, bacteria, and/or nitrites on the urinalysis result does not correlate with infection.    Urinalysis, Microscopic Only - Urine, Clean Catch [[id number]]  (Abnormal) Collected: 03/22/25 1705    Specimen: Urine, Clean Catch Updated: 03/22/25 1736     RBC, UA None Seen /HPF      WBC, UA 3-5 /HPF      Comment: Urine culture not indicated.        Bacteria, UA 3+ /HPF      Squamous Epithelial Cells, UA 0-2 /HPF      Hyaline Casts, UA None Seen /LPF      Calcium Oxalate  Crystals, UA Small/1+ /HPF      Methodology Manual Light Microscopy    CBC & Differential [[id number]]  (Abnormal) Collected: 03/22/25 1512    Specimen: Blood Updated: 03/22/25 1551    Narrative:      The following orders were created for panel order CBC & Differential.  Procedure                               Abnormality         Status                     ---------                               -----------         ------                     CBC Auto Differential[[id number]]        Abnormal            Final result                 Please view results for these tests on the individual orders.    CBC Auto Differential [[id number]]  (Abnormal) Collected: 03/22/25 1512    Specimen: Blood Updated: 03/22/25 1551     WBC 3.13 10*3/mm3      RBC 2.89 10*6/mm3      Hemoglobin 7.9 g/dL      Hematocrit 25.3 %      MCV 87.5 fL      MCH 27.3 pg      MCHC 31.2 g/dL      RDW 14.2 %      RDW-SD 45.3 fl      MPV 8.7 fL      Platelets 189 10*3/mm3     Manual Differential [[id number]]  (Abnormal) Collected: 03/22/25 1512    Specimen: Blood Updated: 03/22/25 1551     Neutrophil % 90.5 %      Lymphocyte % 4.2 %      Monocyte % 1.1 %      Eosinophil % 0.0 %      Basophil % 0.0 %      Bands %  3.2 %      Metamyelocyte % 1.1 %      Neutrophils Absolute 2.93 10*3/mm3      Lymphocytes Absolute 0.13 10*3/mm3      Monocytes Absolute 0.03 10*3/mm3      Eosinophils Absolute 0.00 10*3/mm3      Basophils Absolute 0.00 10*3/mm3      Crenated RBC's Slight/1+     Elliptocytes Slight/1+     Poikilocytes Slight/1+     Vacuolated Neutrophils Slight/1+     Platelet Morphology Normal    Comprehensive Metabolic Panel [[id number]]  (Abnormal) Collected: 03/22/25 1512    Specimen: Blood Updated: 03/22/25 1540     Glucose 118 mg/dL      BUN 31 mg/dL      Creatinine 0.77 mg/dL      Sodium 131 mmol/L      Potassium 3.9 mmol/L      Chloride 96 mmol/L      CO2 27.0 mmol/L      Calcium 8.2 mg/dL      Total Protein 4.4 g/dL      Albumin 2.9 g/dL      ALT (SGPT) 14 U/L       AST (SGOT) 17 U/L      Alkaline Phosphatase 87 U/L      Total Bilirubin 0.5 mg/dL      Globulin 1.5 gm/dL      A/G Ratio 1.9 g/dL      BUN/Creatinine Ratio 40.3     Anion Gap 8.0 mmol/L      eGFR 89.4 mL/min/1.73     Narrative:      GFR Categories in Chronic Kidney Disease (CKD)      GFR Category          GFR (mL/min/1.73)    Interpretation  G1                     90 or greater         Normal or high (1)  G2                      60-89                Mild decrease (1)  G3a                   45-59                Mild to moderate decrease  G3b                   30-44                Moderate to severe decrease  G4                    15-29                Severe decrease  G5                    14 or less           Kidney failure          (1)In the absence of evidence of kidney disease, neither GFR category G1 or G2 fulfill the criteria for CKD.    eGFR calculation 2021 CKD-EPI creatinine equation, which does not include race as a factor    Lactic Acid, Plasma [[id number]]  (Normal) Collected: 03/22/25 1512    Specimen: Blood Updated: 03/22/25 1535     Lactate 1.1 mmol/L     Protime-INR [[id number]]  (Abnormal) Collected: 03/22/25 1512    Specimen: Blood Updated: 03/22/25 1530     Protime 15.6 Seconds      INR 1.17    aPTT [[id number]]  (Normal) Collected: 03/22/25 1512    Specimen: Blood Updated: 03/22/25 1530     PTT 27.1 seconds     Narrative:      PTT = The equivalent PTT values for the therapeutic range of heparin levels at 0.3 to 0.7 U/ml are 77 - 99 seconds.    POC Occult Blood Stool [[id number]]  (Abnormal) Collected: 03/22/25 1527    Specimen: Stool Updated: 03/22/25 1529     Fecal Occult Blood Positive     Lot Number [id number]     Expiration Date 2/28/25     DEVELOPER LOT NUMBER [id number]     DEVELOPER EXPIRATION DATE 2/28/25     Positive Control Positive     Negative Control Negative          Imaging Results (Last 24 Hours)       Procedure Component Value Units Date/Time    CT Angiogram Abdomen Pelvis [[id number]] Collected:  03/22/25 1600     Updated: 03/22/25 1612    Narrative:      EXAM/TECHNIQUE: CT angiography with 3D MIP images abdomen and pelvis  without and with IV contrast     INDICATION: gi bleed. known stomach cancer     COMPARISON: 1/7/2025     DLP: 598.03 mGy.cm. Automated exposure control was utilized to decrease  patient radiation dose.     FINDINGS:     Lung bases are clear.     No suspicious liver lesion. Small gallstone. No abnormal gallbladder  wall thickening. No biliary ductal dilatation. Pancreas and adrenal  glands appear unremarkable. Spleen is mildly enlarged measuring 13.3 cm  craniocaudal dimension. No solid renal mass. No urolithiasis or  hydronephrosis. No focal urinary bladder abnormality.     Patient has a known lymphoma involving the duodenum and has a history of  perforated gastric ulcer. The distal gastric wall is poorly visualized  and there appears to be a large amount of extraluminal fluid and gas  within the region of the gastric antrum and proximal duodenum, in  keeping with bowel perforation. Also within the region of known  lymphoma, in the distal gastric lumen, there is an area of contrast  pooling on delayed images on axial image 97 series 9, in keeping with an  area of active bleeding within the stomach.     Moderate volume stool in the colon. No colonic wall thickening or  pericolonic fat stranding. Hyperdense material within the stomach is  hyperdense before giving contrast. No bowel obstruction. Normal  appendix.     No ascites or free pelvic fluid. No pelvic mass or collection. Prostate  and seminal vesicles are unremarkable.     Nonaneurysmal atherosclerotic abdominal aorta. Celiac artery, SMA, and  ONEL are patent. Renal arteries are patent. Redemonstrated mild  retroperitoneal periaortic lymphadenopathy. No new or enlarging lymph  nodes in the abdomen or pelvis.     Severe diffuse body wall soft tissue edema, in keeping with anasarca. No  acute osseous finding.       Impression:          1.  Patient has a known distal gastric/proximal duodenal lymphoma. The  distal gastric wall is poorly visualized and there is appearance of  extraluminal gas and debris, highly suspicious for contained  gastroduodenal perforation. If clinically indicated, this could be  further evaluated with limited CT of the abdomen after oral contrast  administration.     2.  There is a 1.7 cm oval focus of contrast pooling within the distal  gastric lumen on delayed images, in keeping with an area of active  bleeding.     3.  Upper abdominal retroperitoneal lymphadenopathy, similar to the  prior study.     4.  Severe diffuse abdominal wall soft tissue edema, likely related to  anasarca.           This report was signed and finalized on 3/22/2025 4:09 PM by Dr. Steven Giles MD.             I have personally reviewed and interpreted the radiology studies and ECG obtained at time of admission.     Assessment / Plan   Assessment:   Active Hospital Problems    Diagnosis     **Upper GI bleed     Weight loss     Abdominal pain     Suspected contained gastroduodenal perforation     Diffuse large B-cell lymphoma involving duodenal bulb     Anemia     CLL (chronic lymphocytic leukemia)        Treatment Plan  Strict NPO  General Surgery consult  Repeat CT A/P planned with PO contrast  GI consultation  IV PPI  Serial H/Hs  Type and Screen  Start IVFs  IV Morphine PRN pain  IV Zofran PRN  SCDs; avoid all blood thinning medications  Workup ongoing.  Guarded prognosis.    Medical Decision Making  Number and Complexity of problems: 3 acute; high complexity    Conditions and Status        Condition is worsening.     [facility] Data  External documents reviewed: Dr. Boogie's outpatient Hematology/Oncology note from earlier this month reviewed  Cardiac tracing (EKG, telemetry) interpretation: no new EKGs  Radiology interpretation: CT A/P with findings of possible contained perforation gastroduodenal region with possible blood noted in stomach  Labs  reviewed: as above  Any tests that were considered but not ordered: none     Decision rules/scores evaluated (example [id number], Wells, etc): none     Discussed with: patient, family at bedside, Bella (ED PA)     Care Planning  Shared decision making: Discussed with patient with agreement to proceed with treatment plan as outlined  Code status and discussions: I had discussion with him regarding his CODE STATUS.  I informed him and his family that we would keep him as a full code for now, but patient clearly was thinking about his options moving forward but not ready to formally make any change to code decision yet.    Disposition  Social Determinants of Health that impact treatment or disposition: none apparent at this time  Estimated length of stay is 2 days or greater    I confirmed that the patient's advanced care plan is present, code status is documented, and a surrogate decision maker is listed in the patient's medical record.     The patient's surrogate decision maker is his wife, Brittanie        Electronically signed by Wilbert Maldonado MD, 03/22/25, 18:07 CDT.

## 2025-03-23 LAB
ALBUMIN SERPL-MCNC: 2.5 G/DL (ref 3.5–5.2)
ALBUMIN/GLOB SERPL: 2.1 G/DL
ALP SERPL-CCNC: 73 U/L (ref 39–117)
ALT SERPL W P-5'-P-CCNC: 13 U/L (ref 1–41)
ANION GAP SERPL CALCULATED.3IONS-SCNC: 8 MMOL/L (ref 5–15)
ANISOCYTOSIS BLD QL: ABNORMAL
AST SERPL-CCNC: 14 U/L (ref 1–40)
BASOPHILS # BLD MANUAL: 0 10*3/MM3 (ref 0–0.2)
BASOPHILS NFR BLD MANUAL: 0 % (ref 0–1.5)
BILIRUB SERPL-MCNC: 1.2 MG/DL (ref 0–1.2)
BUN SERPL-MCNC: 24 MG/DL (ref 8–23)
BUN/CREAT SERPL: 36.9 (ref 7–25)
CALCIUM SPEC-SCNC: 7.7 MG/DL (ref 8.6–10.5)
CHLORIDE SERPL-SCNC: 99 MMOL/L (ref 98–107)
CLUMPED PLATELETS: PRESENT
CO2 SERPL-SCNC: 25 MMOL/L (ref 22–29)
CREAT SERPL-MCNC: 0.65 MG/DL (ref 0.76–1.27)
DEPRECATED RDW RBC AUTO: 44.6 FL (ref 37–54)
DOHLE BOD BLD QL SMEAR: PRESENT
EGFRCR SERPLBLD CKD-EPI 2021: 94.1 ML/MIN/1.73
EOSINOPHIL # BLD MANUAL: 0 10*3/MM3 (ref 0–0.4)
EOSINOPHIL NFR BLD MANUAL: 0 % (ref 0.3–6.2)
ERYTHROCYTE [DISTWIDTH] IN BLOOD BY AUTOMATED COUNT: 14.3 % (ref 12.3–15.4)
GLOBULIN UR ELPH-MCNC: 1.2 GM/DL
GLUCOSE SERPL-MCNC: 95 MG/DL (ref 65–99)
HCT VFR BLD AUTO: 21.7 % (ref 37.5–51)
HCT VFR BLD AUTO: 23.8 % (ref 37.5–51)
HCT VFR BLD AUTO: 24.1 % (ref 37.5–51)
HCT VFR BLD AUTO: 24.9 % (ref 37.5–51)
HGB BLD-MCNC: 6.8 G/DL (ref 13–17.7)
HGB BLD-MCNC: 7.6 G/DL (ref 13–17.7)
HGB BLD-MCNC: 7.7 G/DL (ref 13–17.7)
HGB BLD-MCNC: 7.8 G/DL (ref 13–17.7)
HYPOCHROMIA BLD QL: ABNORMAL
INR PPP: 1.23 (ref 0.91–1.09)
LYMPHOCYTES # BLD MANUAL: 0.28 10*3/MM3 (ref 0.7–3.1)
LYMPHOCYTES NFR BLD MANUAL: 1 % (ref 5–12)
MCH RBC QN AUTO: 27.6 PG (ref 26.6–33)
MCHC RBC AUTO-ENTMCNC: 31.9 G/DL (ref 31.5–35.7)
MCV RBC AUTO: 86.5 FL (ref 79–97)
MICROCYTES BLD QL: ABNORMAL
MONOCYTES # BLD: 0.02 10*3/MM3 (ref 0.1–0.9)
NEUTROPHILS # BLD AUTO: 1.97 10*3/MM3 (ref 1.7–7)
NEUTROPHILS NFR BLD MANUAL: 85.6 % (ref 42.7–76)
NEUTS BAND NFR BLD MANUAL: 1 % (ref 0–5)
NEUTS VAC BLD QL SMEAR: ABNORMAL
PLATELET # BLD AUTO: 147 10*3/MM3 (ref 140–450)
PMV BLD AUTO: 8.5 FL (ref 6–12)
POTASSIUM SERPL-SCNC: 3.7 MMOL/L (ref 3.5–5.2)
PROT SERPL-MCNC: 3.7 G/DL (ref 6–8.5)
PROTHROMBIN TIME: 16.1 SECONDS (ref 11.8–14.8)
RBC # BLD AUTO: 2.75 10*6/MM3 (ref 4.14–5.8)
SODIUM SERPL-SCNC: 132 MMOL/L (ref 136–145)
TOXIC GRANULATION: ABNORMAL
VARIANT LYMPHS NFR BLD MANUAL: 12.4 % (ref 19.6–45.3)
WBC NRBC COR # BLD AUTO: 2.27 10*3/MM3 (ref 3.4–10.8)

## 2025-03-23 PROCEDURE — 25010000002 CEFTRIAXONE PER 250 MG: Performed by: INTERNAL MEDICINE

## 2025-03-23 PROCEDURE — 86900 BLOOD TYPING SEROLOGIC ABO: CPT

## 2025-03-23 PROCEDURE — P9016 RBC LEUKOCYTES REDUCED: HCPCS

## 2025-03-23 PROCEDURE — 85014 HEMATOCRIT: CPT | Performed by: INTERNAL MEDICINE

## 2025-03-23 PROCEDURE — 85610 PROTHROMBIN TIME: CPT | Performed by: INTERNAL MEDICINE

## 2025-03-23 PROCEDURE — 25810000003 SODIUM CHLORIDE 0.9 % SOLUTION: Performed by: INTERNAL MEDICINE

## 2025-03-23 PROCEDURE — 99233 SBSQ HOSP IP/OBS HIGH 50: CPT | Performed by: INTERNAL MEDICINE

## 2025-03-23 PROCEDURE — 85007 BL SMEAR W/DIFF WBC COUNT: CPT | Performed by: INTERNAL MEDICINE

## 2025-03-23 PROCEDURE — 36430 TRANSFUSION BLD/BLD COMPNT: CPT

## 2025-03-23 PROCEDURE — 85025 COMPLETE CBC W/AUTO DIFF WBC: CPT | Performed by: INTERNAL MEDICINE

## 2025-03-23 PROCEDURE — 25010000002 METRONIDAZOLE 500 MG/100ML SOLUTION: Performed by: INTERNAL MEDICINE

## 2025-03-23 PROCEDURE — 80053 COMPREHEN METABOLIC PANEL: CPT | Performed by: INTERNAL MEDICINE

## 2025-03-23 PROCEDURE — 85018 HEMOGLOBIN: CPT | Performed by: INTERNAL MEDICINE

## 2025-03-23 PROCEDURE — 99233 SBSQ HOSP IP/OBS HIGH 50: CPT | Performed by: GENERAL PRACTICE

## 2025-03-23 RX ORDER — SUCRALFATE 1 G/1
1 TABLET ORAL 4 TIMES DAILY
Status: ON HOLD | COMMUNITY
End: 2025-03-23

## 2025-03-23 RX ORDER — NYSTATIN 100000 [USP'U]/ML
500000 SUSPENSION ORAL 4 TIMES DAILY
COMMUNITY

## 2025-03-23 RX ORDER — PANTOPRAZOLE SODIUM 40 MG/1
40 TABLET, DELAYED RELEASE ORAL DAILY
Status: ON HOLD | COMMUNITY
End: 2025-03-23 | Stop reason: SDUPTHER

## 2025-03-23 RX ORDER — SODIUM CHLORIDE 9 MG/ML
100 INJECTION, SOLUTION INTRAVENOUS CONTINUOUS
Status: DISPENSED | OUTPATIENT
Start: 2025-03-23 | End: 2025-03-24

## 2025-03-23 RX ORDER — HYDROCODONE BITARTRATE AND ACETAMINOPHEN 10; 325 MG/1; MG/1
1 TABLET ORAL EVERY 4 HOURS PRN
Status: ON HOLD | COMMUNITY
End: 2025-03-23 | Stop reason: SDUPTHER

## 2025-03-23 RX ORDER — METRONIDAZOLE 500 MG/100ML
500 INJECTION, SOLUTION INTRAVENOUS EVERY 8 HOURS
Status: DISCONTINUED | OUTPATIENT
Start: 2025-03-23 | End: 2025-03-25

## 2025-03-23 RX ADMIN — Medication 10 ML: at 09:25

## 2025-03-23 RX ADMIN — METRONIDAZOLE 500 MG: 500 INJECTION, SOLUTION INTRAVENOUS at 21:13

## 2025-03-23 RX ADMIN — SODIUM CHLORIDE 100 ML/HR: 9 INJECTION, SOLUTION INTRAVENOUS at 19:20

## 2025-03-23 RX ADMIN — LATANOPROST 1 DROP: 50 SOLUTION OPHTHALMIC at 21:35

## 2025-03-23 RX ADMIN — METRONIDAZOLE 500 MG: 500 INJECTION, SOLUTION INTRAVENOUS at 11:52

## 2025-03-23 RX ADMIN — PANTOPRAZOLE SODIUM 40 MG: 40 INJECTION, POWDER, FOR SOLUTION INTRAVENOUS at 21:12

## 2025-03-23 RX ADMIN — SODIUM CHLORIDE 1000 MG: 900 INJECTION INTRAVENOUS at 11:52

## 2025-03-23 RX ADMIN — NYSTATIN 500000 UNITS: 100000 SUSPENSION ORAL at 17:09

## 2025-03-23 RX ADMIN — Medication 10 ML: at 21:13

## 2025-03-23 RX ADMIN — PANTOPRAZOLE SODIUM 40 MG: 40 INJECTION, POWDER, FOR SOLUTION INTRAVENOUS at 09:25

## 2025-03-23 RX ADMIN — SODIUM CHLORIDE 100 ML/HR: 9 INJECTION, SOLUTION INTRAVENOUS at 09:25

## 2025-03-23 NOTE — PROGRESS NOTES
Gastroenterology Hospitalist follow-up  Patient Identification:  Name: Oral Dey  Age: 84 y.o.  Sex: male  :  1940  MRN: [id number]    Attending: Wilbert Maldonado MD  Gastroenterologist of Record: shruti  Information from:patient and family     CC: GI bleed    History:   He says he is only passing a small amount of fecal smears. No large melenic stools. No abdominal pain. Under the impression he is to undergo EGD tomorrow.     Review of Systems:  Constitutional:  Weak.   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]     History of gastric ulcer [Z87.11]     Atrial fibrillation with rapid ventricular response [I48.91]     COVID-19 virus infection [U07.1]     Cough [R05.9]     Anemia [D64.9]     Perforated gastric ulcer [K25.5]     Pneumoperitoneum [K66.8]     CLL (chronic lymphocytic leukemia) [C91.10]     Hypertension [I10]     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  Morphine    ondansetron    [COMPLETED] Insert Peripheral IV **AND** sodium chloride    sodium chloride    sodium chloride  Allergies:  Allergies   Allergen Reactions    Latex Itching and Other (See Comments)     And band aids.  "Causes redness and itching.    Augmentin [Amoxicillin-Pot Clavulanate] Hives       Intake/Output:     Intake/Output Summary (Last 24 hours) at 3/23/2025 1440  Last data filed at 3/23/2025 0447  Gross per 24 hour   Intake 301.25 ml   Output 300 ml   Net 1.25 ml     New allergies/reactions:  None    Physical Exam:  Vitals:   Temp (24hrs), Av.8 °F (36.6 °C), Min:97.1 °F (36.2 °C), Max:98.7 °F (37.1 °C)    Temp:  [97.1 °F (36.2 °C)-98.7 °F (37.1 °C)] 97.8 °F (36.6 °C)  Heart Rate:  [63-83] 70  Resp:  [16-18] 16  BP: (104-160)/(33-68) 112/40  /40 (BP Location: Right arm, Patient Position: Lying)   Pulse 70   Temp 97.8 °F (36.6 °C) (Oral)   Resp 16   Ht 177.8 cm (70\")   Wt 49.9 kg (110 lb)   SpO2 95%   BMI 15.78 kg/m²     Exam:  NAD. Cachectic  PERRLA. Sclerae and conjunctivae pale  HENT: external inspection normal. Hearing intact.  No respiratory distress. Lungs clear.  Cardiac: no MRG.  Abdomen: bowel sounds active. Soft, non-tender, no HSM.  Alert, oriented, normal affect.     DATA:  Radiology and Labs:   Recent Results (from the past 24 hours)   Comprehensive Metabolic Panel    Collection Time: 25  3:12 PM    Specimen: Blood   Result Value Ref Range    Glucose 118 (H) 65 - 99 mg/dL    BUN 31 (H) 8 - 23 mg/dL    Creatinine 0.77 0.76 - 1.27 mg/dL    Sodium 131 (L) 136 - 145 mmol/L    Potassium 3.9 3.5 - 5.2 mmol/L    Chloride 96 (L) 98 - 107 mmol/L    CO2 27.0 22.0 - 29.0 mmol/L    Calcium 8.2 (L) 8.6 - 10.5 mg/dL    Total Protein 4.4 (L) 6.0 - 8.5 g/dL    Albumin 2.9 (L) 3.5 - 5.2 g/dL    ALT (SGPT) 14 1 - 41 U/L    AST (SGOT) 17 1 - 40 U/L    Alkaline Phosphatase 87 39 - 117 U/L    Total Bilirubin 0.5 0.0 - 1.2 mg/dL    Globulin 1.5 gm/dL    A/G Ratio 1.9 g/dL    BUN/Creatinine Ratio 40.3 (H) 7.0 - 25.0    Anion Gap 8.0 5.0 - 15.0 mmol/L    eGFR 89.4 >60.0 mL/min/1.73   Protime-INR    Collection Time: 25  3:12 PM    Specimen: Blood   Result Value Ref Range    Protime 15.6 (H) 11.8 - 14.8 " Seconds    INR 1.17 (H) 0.91 - 1.09   aPTT    Collection Time: 03/22/25  3:12 PM    Specimen: Blood   Result Value Ref Range    PTT 27.1 24.5 - 36.0 seconds   Lactic Acid, Plasma    Collection Time: 03/22/25  3:12 PM    Specimen: Blood   Result Value Ref Range    Lactate 1.1 0.5 - 2.0 mmol/L   CBC Auto Differential    Collection Time: 03/22/25  3:12 PM    Specimen: Blood   Result Value Ref Range    WBC 3.13 (L) 3.40 - 10.80 10*3/mm3    RBC 2.89 (L) 4.14 - 5.80 10*6/mm3    Hemoglobin 7.9 (L) 13.0 - 17.7 g/dL    Hematocrit 25.3 (L) 37.5 - 51.0 %    MCV 87.5 79.0 - 97.0 fL    MCH 27.3 26.6 - 33.0 pg    MCHC 31.2 (L) 31.5 - 35.7 g/dL    RDW 14.2 12.3 - 15.4 %    RDW-SD 45.3 37.0 - 54.0 fl    MPV 8.7 6.0 - 12.0 fL    Platelets 189 140 - 450 10*3/mm3   Manual Differential    Collection Time: 03/22/25  3:12 PM    Specimen: Blood   Result Value Ref Range    Neutrophil % 90.5 (H) 42.7 - 76.0 %    Lymphocyte % 4.2 (L) 19.6 - 45.3 %    Monocyte % 1.1 (L) 5.0 - 12.0 %    Eosinophil % 0.0 (L) 0.3 - 6.2 %    Basophil % 0.0 0.0 - 1.5 %    Bands %  3.2 0.0 - 5.0 %    Metamyelocyte % 1.1 (H) 0.0 - 0.0 %    Neutrophils Absolute 2.93 1.70 - 7.00 10*3/mm3    Lymphocytes Absolute 0.13 (L) 0.70 - 3.10 10*3/mm3    Monocytes Absolute 0.03 (L) 0.10 - 0.90 10*3/mm3    Eosinophils Absolute 0.00 0.00 - 0.40 10*3/mm3    Basophils Absolute 0.00 0.00 - 0.20 10*3/mm3    Crenated RBC's Slight/1+ None Seen    Elliptocytes Slight/1+ None Seen    Poikilocytes Slight/1+ None Seen    Vacuolated Neutrophils Slight/1+ None Seen    Platelet Morphology Normal Normal   POC Occult Blood Stool    Collection Time: 03/22/25  3:27 PM    Specimen: Stool   Result Value Ref Range    Fecal Occult Blood Positive (A)     Lot Number [id number]     Expiration Date 2/28/25     DEVELOPER LOT NUMBER [id number]     DEVELOPER EXPIRATION DATE 2/28/25     Positive Control Positive     Negative Control Negative    Urinalysis With Culture If Indicated - Urine, Clean Catch    Collection Time:  03/22/25  5:05 PM    Specimen: Urine, Clean Catch   Result Value Ref Range    Color, UA Yellow Yellow, Straw    Appearance, UA Clear Clear    pH, UA 5.5 5.0 - 8.0    Specific Gravity, UA 1.029 1.005 - 1.030    Glucose, UA Negative Negative    Ketones, UA Negative Negative    Bilirubin, UA Negative Negative    Blood, UA Negative Negative    Protein, UA Trace (A) Negative    Leuk Esterase, UA Negative Negative    Nitrite, UA Positive (A) Negative    Urobilinogen, UA 1.0 E.U./dL 0.2 - 1.0 E.U./dL   Urinalysis, Microscopic Only - Urine, Clean Catch    Collection Time: 03/22/25  5:05 PM    Specimen: Urine, Clean Catch   Result Value Ref Range    RBC, UA None Seen None Seen, 0-2 /HPF    WBC, UA 3-5 (A) None Seen, 0-2 /HPF    Bacteria, UA 3+ (A) None Seen /HPF    Squamous Epithelial Cells, UA 0-2 None Seen, 0-2 /HPF    Hyaline Casts, UA None Seen None Seen /LPF    Calcium Oxalate Crystals, UA Small/1+ None Seen /HPF    Methodology Manual Light Microscopy    Type & Screen    Collection Time: 03/22/25  6:20 PM    Specimen: Blood   Result Value Ref Range    ABO Type A     RH type Positive     Antibody Screen Negative     T&S Expiration Date 3/25/2025 11:59:59 PM    Hemoglobin & Hematocrit, Blood    Collection Time: 03/22/25 11:55 PM    Specimen: Blood   Result Value Ref Range    Hemoglobin 6.8 (C) 13.0 - 17.7 g/dL    Hematocrit 21.7 (L) 37.5 - 51.0 %   Prepare RBC, 1 Units    Collection Time: 03/23/25  1:31 AM   Result Value Ref Range    Product Code M3574O75     Unit Number [id number]     UNIT  ABO A     UNIT  RH POS     Crossmatch Interpretation Compatible     Dispense Status IS     Blood Expiration Date [id number]     Blood Type Barcode 6200    Comprehensive Metabolic Panel    Collection Time: 03/23/25  5:54 AM    Specimen: Blood   Result Value Ref Range    Glucose 95 65 - 99 mg/dL    BUN 24 (H) 8 - 23 mg/dL    Creatinine 0.65 (L) 0.76 - 1.27 mg/dL    Sodium 132 (L) 136 - 145 mmol/L    Potassium 3.7 3.5 - 5.2 mmol/L     Chloride 99 98 - 107 mmol/L    CO2 25.0 22.0 - 29.0 mmol/L    Calcium 7.7 (L) 8.6 - 10.5 mg/dL    Total Protein 3.7 (L) 6.0 - 8.5 g/dL    Albumin 2.5 (L) 3.5 - 5.2 g/dL    ALT (SGPT) 13 1 - 41 U/L    AST (SGOT) 14 1 - 40 U/L    Alkaline Phosphatase 73 39 - 117 U/L    Total Bilirubin 1.2 0.0 - 1.2 mg/dL    Globulin 1.2 gm/dL    A/G Ratio 2.1 g/dL    BUN/Creatinine Ratio 36.9 (H) 7.0 - 25.0    Anion Gap 8.0 5.0 - 15.0 mmol/L    eGFR 94.1 >60.0 mL/min/1.73   Protime-INR    Collection Time: 03/23/25  5:54 AM    Specimen: Blood   Result Value Ref Range    Protime 16.1 (H) 11.8 - 14.8 Seconds    INR 1.23 (H) 0.91 - 1.09   CBC Auto Differential    Collection Time: 03/23/25  5:54 AM    Specimen: Blood   Result Value Ref Range    WBC 2.27 (L) 3.40 - 10.80 10*3/mm3    RBC 2.75 (L) 4.14 - 5.80 10*6/mm3    Hemoglobin 7.6 (L) 13.0 - 17.7 g/dL    Hematocrit 23.8 (L) 37.5 - 51.0 %    MCV 86.5 79.0 - 97.0 fL    MCH 27.6 26.6 - 33.0 pg    MCHC 31.9 31.5 - 35.7 g/dL    RDW 14.3 12.3 - 15.4 %    RDW-SD 44.6 37.0 - 54.0 fl    MPV 8.5 6.0 - 12.0 fL    Platelets 147 140 - 450 10*3/mm3   Manual Differential    Collection Time: 03/23/25  5:54 AM    Specimen: Blood   Result Value Ref Range    Neutrophil % 85.6 (H) 42.7 - 76.0 %    Lymphocyte % 12.4 (L) 19.6 - 45.3 %    Monocyte % 1.0 (L) 5.0 - 12.0 %    Eosinophil % 0.0 (L) 0.3 - 6.2 %    Basophil % 0.0 0.0 - 1.5 %    Bands %  1.0 0.0 - 5.0 %    Neutrophils Absolute 1.97 1.70 - 7.00 10*3/mm3    Lymphocytes Absolute 0.28 (L) 0.70 - 3.10 10*3/mm3    Monocytes Absolute 0.02 (L) 0.10 - 0.90 10*3/mm3    Eosinophils Absolute 0.00 0.00 - 0.40 10*3/mm3    Basophils Absolute 0.00 0.00 - 0.20 10*3/mm3    Anisocytosis Slight/1+ None Seen    Hypochromia Slight/1+ None Seen    Microcytes Slight/1+ None Seen    Dohle Bodies Present None Seen    Toxic Granulation Mod/2+ None Seen    Vacuolated Neutrophils Mod/2+ None Seen    Clumped Platelets Present None Seen   Hemoglobin & Hematocrit, Blood     "Collection Time: 03/23/25 11:55 AM    Specimen: Blood   Result Value Ref Range    Hemoglobin 7.7 (L) 13.0 - 17.7 g/dL    Hematocrit 24.1 (L) 37.5 - 51.0 %       Result Review:  I have personally reviewed the results from the time of this admission to 3/23/2025 14:40 CDT and agree with these findings:  [x]  Laboratory list / accordion  []  Microbiology  [x]  Radiology  []  EKG/Telemetry   []  Cardiology/Vascular   [x]  Pathology  [x]  Old records  []  Other:  Most notable findings include: I have carefully reviewed Dr. Zaman's notes from Jan 2025. The neoplasm in the duodenum as seen in the enodophotos is best described as a \"necrotic mess\". Both recent CT scans clearly show the left lobe of the liver outlined by air and fluid from a gastroduodenal perforation.       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 I presume this was provoked by a favorable response to chemotherapy. In any case EGD has no utility here, and would be expected to blow even more air through the perforation, worsening the overall situation. If patient is not a surgical candidate, would strongly consider transfer to an institution possessing interventional  radiology capabilities.     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  Any results not available at time of inpatient discharge and/or GI clinic follow up should be managed by the hospitalist team, PCP, or outpatient gastroenterologist.    "

## 2025-03-23 NOTE — PROGRESS NOTES
LOS: 1 day   Patient Care Team:  Jorge Shepherd MD as PCP - General (Internal Medicine)    Subjective  Patient denies abdominal pain this morning.  Remains NPO.  No nausea or vomiting.  Overnight hemoglobin less than 7 and received 1 unit packed red blood cells.    Objective:   Vital Signs  Temp:  [97.1 °F (36.2 °C)-98.7 °F (37.1 °C)] 98.7 °F (37.1 °C)  Heart Rate:  [63-83] 74  Resp:  [16-18] 16  BP: (104-160)/(33-68) 110/52    Intake & Output (last 3 days)         03/20 0701  03/21 0700 03/21 0701 03/22 0700 03/22 0701 03/23 0700 03/23 0701 03/24 0700    Blood   301.3     Total Intake(mL/kg)   301.3 (6)     Urine (mL/kg/hr)   300     Total Output   300     Net   +1.3             Urine Unmeasured Occurrence   1 x             Physical Exam:     General Appearance:    Alert, cooperative, in no acute distress, frail, cachectic appearing    HEENT:   Normocephalic, atraumatic, EOMI, MMM   Lungs:     Equal chest rise bilaterally.  No increased work of breathing    Heart:    Regular rhythm and normal rate   Abdomen:    soft, nondistended, tenderness in mid epigastric region, no guarding, rebound tenderness, evidence of peritonitis   Extremities:     Moves all extremities spontaneously, no peripheral edema   Results Review:     I reviewed the patient's new clinical results. Significant labs:   I reviewed the patient's new imaging results and agree with the interpretation.    Results from last 7 days   Lab Units 03/23/25  0554 03/22/25  2355 03/22/25  1512 03/18/25  0711   WBC 10*3/mm3 2.27*  --  3.13* 3.98   HEMOGLOBIN g/dL 7.6* 6.8* 7.9* 8.2*   HEMATOCRIT % 23.8* 21.7* 25.3* 25.1*   PLATELETS 10*3/mm3 147  --  189 232        Results from last 7 days   Lab Units 03/23/25  0554 03/22/25  1512   SODIUM mmol/L 132* 131*   POTASSIUM mmol/L 3.7 3.9   CHLORIDE mmol/L 99 96*   CO2 mmol/L 25.0 27.0   BUN mg/dL 24* 31*   CREATININE mg/dL 0.65* 0.77   CALCIUM mg/dL 7.7* 8.2*   BILIRUBIN mg/dL 1.2 0.5   ALK PHOS U/L 73 87   ALT  (SGPT) U/L 13 14   AST (SGOT) U/L 14 17   GLUCOSE mg/dL 95 118*       Assessment and plan:    Assessment & Plan       Upper GI bleed    CLL (chronic lymphocytic leukemia)    Anemia    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 Dey is a 84 y.o. male with past medical history of GERD, HTN, lymphocytic leukemia (CLL)/small lymphocytic lymphoma (SLL) and past surgical history of perforated gastric ulcer status post Wade patch repair in September 2024.  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  Patient presented on 3/22 with bloody bowel movements/melena and CT workup showing contained gastroduodenal perforation.    Extremely difficult case as perforation and bleeding are likely secondary to diffuse large B-cell lymphoma.  Imaging appears to show contained gastroduodenal perforation.  Patient without evidence of peritonitis/sepsis although he is 3 days status post initial chemotherapy and may not mount typical immune response.  Still he does not have good surgical options and will likely require partial gastrectomy with reconstruction due to distal gastric lesion.  Will plan to treat conservatively for now and follow clinically.    Patient also has GI bleed.  Again this is likely associated with his diffuse large B-cell lymphoma mass in his distal stomach.  Appreciate gastroenterology's input.  If bleeding continues and GI unable to intervene patient may benefit from interventional radiology and embolization of vessel.  This would require transfer to tertiary care center.    Recommendations:  N.p.o.  empiric antibiotics  Upper GI Monday/Tuesday  Appreciate GI Recs for GI bleed   Will need to follow-up with oncology to discuss prognosis.    Patient may benefit from palliative care consult/goals  of care conversation.  Patient may benefit for transfer to tertiary center in the future for interventional radiology/advance GI if continued bleeding from gastric mass    Gianfranco Pemberton MD  03/23/25  09:51 CDT    Part of this note may be an electronic transcription/translation of spoken language to printed text using the Dragon Dictation System.

## 2025-03-23 NOTE — SIGNIFICANT NOTE
I was called on this patient for having hemoglobin of 6.8.  Blood pressure is stable and no active bleeding at the moment.  I ordered a unit of packed RBC transfusion.

## 2025-03-23 NOTE — PROGRESS NOTES
[facility]  INPATIENT PROGRESS NOTE    Patient Name: Oral Dey  Date of Admission: 3/22/2025  Today's Date: 03/23/25  Length of Stay: 1  Primary Care Physician: Jorge Shepherd MD    Subjective   Chief Complaint: follow-up abdominal pain, black stool  HPI   Patient states that he feels like that his passage of black stool is slowing down.  He did have a hemoglobin that was less than 7 overnight and already has been transfused with 1 unit of packed red blood cells.  He denies any pain this morning.  He confirmed with me this morning that his last chemotherapy occurred on 3/19/2025.  He voices no new complaints this morning.    Review of Systems   All pertinent negatives and positives are as above. All other systems have been reviewed and are negative unless otherwise stated.     Objective    Temp:  [97.1 °F (36.2 °C)-98.7 °F (37.1 °C)] 98.7 °F (37.1 °C)  Heart Rate:  [63-83] 74  Resp:  [16-18] 16  BP: (104-160)/(33-68) 110/52  Physical Exam  Vitals reviewed.   Constitutional:       Appearance: He is ill-appearing.      Comments: Frail and cachectic appearing   HENT:      Head: Normocephalic.      Mouth/Throat:      Mouth: Mucous membranes are moist.   Cardiovascular:      Rate and Rhythm: Normal rate.   Pulmonary:      Effort: Pulmonary effort is normal. No respiratory distress.   Abdominal:      Tenderness: There is no guarding or rebound.      Comments: Thin, mild PASTORA TTP   Musculoskeletal:      Right lower leg: Edema present.      Left lower leg: Edema present.   Skin:     General: Skin is warm.   Neurological:      Mental Status: He is alert.      Motor: Weakness present.   Psychiatric:         Mood and Affect: Mood normal.         Results Review:  I have reviewed the labs, radiology results, and diagnostic studies.    Laboratory Data:   Results from last 7 days   Lab Units 03/23/25  0554 03/22/25  2355 03/22/25  1512 03/18/25  0711   WBC 10*3/mm3  "2.27*  --  3.13* 3.98   HEMOGLOBIN g/dL 7.6* 6.8* 7.9* 8.2*   HEMATOCRIT % 23.8* 21.7* 25.3* 25.1*   PLATELETS 10*3/mm3 147  --  189 232        Results from last 7 days   Lab Units 03/23/25  0554 03/22/25  1512   SODIUM mmol/L 132* 131*   POTASSIUM mmol/L 3.7 3.9   CHLORIDE mmol/L 99 96*   CO2 mmol/L 25.0 27.0   BUN mg/dL 24* 31*   CREATININE mg/dL 0.65* 0.77   CALCIUM mg/dL 7.7* 8.2*   BILIRUBIN mg/dL 1.2 0.5   ALK PHOS U/L 73 87   ALT (SGPT) U/L 13 14   AST (SGOT) U/L 14 17   GLUCOSE mg/dL 95 118*       Culture Data:   No results found for: \"BLOODCX\", \"URINECX\", \"WOUNDCX\", \"MRSACX\", \"RESPCX\", \"STOOLCX\"    Radiology Data:   Imaging Results (Last 24 Hours)       Procedure Component Value Units Date/Time    CT Abdomen Pelvis Without Contrast [[id number]] Collected: 03/22/25 2032     Updated: 03/22/25 2043    Narrative:      EXAM/TECHNIQUE: CT abdomen and pelvis without contrast     INDICATION: eval for contained perforation on previous CT; K92.1-Melena;  C83.30-Diffuse large B-cell lymphoma, unspecified site; D64.9-Anemia,  unspecified; D70.9-Neutropenia, unspecified; E87.1-Wqtp-zvxongyzlb and  hyponatremia     COMPARISON: None available.     DLP: 126.86 mGy.cm. Automated exposure control was utilized to decrease  patient radiation dose.       Impression:      FINDINGS/IMPRESSION:     1.  Evaluation is difficult as there is severe diffuse soft tissue  edema/anasarca, which results in blurring of fat planes.      2.  Oral contrast was administered which has transited to the stomach,  small bowel, and reached the distal colon. No free intraperitoneal  spillage of contrast. Redemonstrated CT findings which are highly  concerning for contained distal gastric perforation including poor  visualization of the distal gastric wall and infiltrating collection of  gas and debris within the right upper quadrant extending along the  inferior aspect of the liver.        This report was signed and finalized on 3/22/2025 8:40 PM by " Dr. Steven Giles MD.       CT Angiogram Abdomen Pelvis [[id number]] Collected: 03/22/25 1600     Updated: 03/22/25 1612    Narrative:      EXAM/TECHNIQUE: CT angiography with 3D MIP images abdomen and pelvis  without and with IV contrast     INDICATION: gi bleed. known stomach cancer     COMPARISON: 1/7/2025     DLP: 598.03 mGy.cm. Automated exposure control was utilized to decrease  patient radiation dose.     FINDINGS:     Lung bases are clear.     No suspicious liver lesion. Small gallstone. No abnormal gallbladder  wall thickening. No biliary ductal dilatation. Pancreas and adrenal  glands appear unremarkable. Spleen is mildly enlarged measuring 13.3 cm  craniocaudal dimension. No solid renal mass. No urolithiasis or  hydronephrosis. No focal urinary bladder abnormality.     Patient has a known lymphoma involving the duodenum and has a history of  perforated gastric ulcer. The distal gastric wall is poorly visualized  and there appears to be a large amount of extraluminal fluid and gas  within the region of the gastric antrum and proximal duodenum, in  keeping with bowel perforation. Also within the region of known  lymphoma, in the distal gastric lumen, there is an area of contrast  pooling on delayed images on axial image 97 series 9, in keeping with an  area of active bleeding within the stomach.     Moderate volume stool in the colon. No colonic wall thickening or  pericolonic fat stranding. Hyperdense material within the stomach is  hyperdense before giving contrast. No bowel obstruction. Normal  appendix.     No ascites or free pelvic fluid. No pelvic mass or collection. Prostate  and seminal vesicles are unremarkable.     Nonaneurysmal atherosclerotic abdominal aorta. Celiac artery, SMA, and  ONEL are patent. Renal arteries are patent. Redemonstrated mild  retroperitoneal periaortic lymphadenopathy. No new or enlarging lymph  nodes in the abdomen or pelvis.     Severe diffuse body wall soft tissue  edema, in keeping with anasarca. No  acute osseous finding.       Impression:         1.  Patient has a known distal gastric/proximal duodenal lymphoma. The  distal gastric wall is poorly visualized and there is appearance of  extraluminal gas and debris, highly suspicious for contained  gastroduodenal perforation. If clinically indicated, this could be  further evaluated with limited CT of the abdomen after oral contrast  administration.     2.  There is a 1.7 cm oval focus of contrast pooling within the distal  gastric lumen on delayed images, in keeping with an area of active  bleeding.     3.  Upper abdominal retroperitoneal lymphadenopathy, similar to the  prior study.     4.  Severe diffuse abdominal wall soft tissue edema, likely related to  anasarca.           This report was signed and finalized on 3/22/2025 4:09 PM by Dr. Steven Giles MD.               I have reviewed the patient's current medications.     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  Will start trial of empiric Abxs with Rocephin and Flagyl  1 unit of PRBCs administered overnight for Hgb 6.8.  Post transfusion Hgb this morning 7.6  Continue serial H/H monitoring  GI consultation  IV PPI  Continue IVFs  SCDs; avoid all blood thinning medications  Hematology/Oncology consultation for continuity and goals of care; patient received last chemotherapy on 3/19/2025  Workup continues.  Guarded prognosis.     Medical Decision Making  Number and Complexity of problems: 3 acute; high complexity     Conditions and Status        Condition is worsening.      MDM Data  External documents reviewed: Dr. Boogie's outpatient Hematology/Oncology note from earlier this month reviewed  Cardiac tracing (EKG, telemetry) interpretation: no new EKGs  Radiology interpretation: CT A/P with findings of possible contained perforation gastroduodenal region with possible blood noted in stomach  Labs reviewed: as above  Any tests that were considered but not ordered: none     Decision rules/scores evaluated (example [id number], Wells, etc): none     Discussed with: patient, spouse at bedside     Care Planning  Shared decision making: Discussed with patient with agreement to proceed with treatment plan as outlined  Code status and discussions: I had discussion with him regarding his CODE STATUS.  I informed him and his family that we would keep him as a full code for now, but patient clearly was thinking about his options moving forward but not ready to formally make any change to code decision yet.       Disposition  Social Determinants of Health that impact treatment or disposition: none apparent at this time  I expect the patient to be discharged to: TBD        Electronically signed by Wilbert Maldonado MD, 03/23/25, 11:18 CDT.

## 2025-03-23 NOTE — PLAN OF CARE
Goal Outcome Evaluation:  Plan of Care Reviewed With: patient, spouse        Progress: no change  Outcome Evaluation: Patient received alert and orientated with bilateral hearing aids intact due to Andreafski.  Patient drank oral contrast in the ER and this nurse transported patient to CT scan via wheelchair.  Patient is a one person assist to transfer with weak gait.  Patient is incontinent of stool and urine at times with brief changed frequently.  Critical lab of Hgb 6.8 reported to hospitalist with new orders to transfuse PRBCs.  Patient educated on blood transfusion and wife signed consent as patient reports he is too weak to sign his own signature at this time.  Patient skin is intact with no open areas during assessment.  Nurse placed preventative foam dressing to coccyx due to thin layer of skin that is red and blanchable over the ray prominence.  Patient educated on turning and positioning every two hours and patient agreed to turn himself with minimal assistance.  Patient complains of intermittant pain in the abdomen LLQ mostly and has had no request needed for pain management at this time.  Patient placed on telemetry once he returned from CT scan; will continue to assess and treat patient per plan of care.  Patient remains NPO for possible tests in the morning.

## 2025-03-23 NOTE — CONSULTS
Patient: Oral Dey    YOB: 1940    Date: 03/22/2025    Primary Care Provider: Jorge Shepherd MD    Chief Complaint   Patient presents with    Black or Bloody Stool       History of present illness:  Mr. Dey is a 84 y.o. 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  Patient initially presented in September with peritonitis and gastric perforation.  He underwent robot-assisted Wade patch and was subsequently found to have distal stomach/duodenal lymphoma.  Is following up with Dr. Boogie and oncology for chemotherapy and was started on mini R CHOP due to advanced age and poor performance status.    Patient underwent endoscopy 1/22/2025 which showed circumferential ulcerated mucosa at the surgical site suggestive of chronic ischemia, chronic inflammation and ulceration. Pathology report showed small intestine, duodenal bulb, endoscopic biopsy:  Involvement by CD20 positive large B-cell lymphoma, best classified as Lwarence-Barr virus (EBV) positive diffuse large B-cell lymphoma.    Patient has had midepigastric pain for the past 2 to 3 months.  He has presented to the emergency department multiple times for this pain.  On evaluation the patient he states pain is similar to past episodes.  He presents to the emergency department today due to melena and bloody bowel movements which he has had for the past 1 to 2 days.    Of note he began chemotherapy on 3/19, he is now 3 days status post his first round.    Review of Systems  14 point review of systems negative except for above findings.    History:  Past Medical History:   Diagnosis Date    Bladder cancer     CLL (chronic lymphocytic leukemia)     Colon polyp     Gallstone     GERD (gastroesophageal reflux disease)     Glaucoma     Hearing loss      Hypertension     Inguinal hernia     right groin    Macular degeneration, right eye           Past Surgical History:   Procedure Laterality Date    CATARACT EXTRACTION, BILATERAL      COLONOSCOPY  2012    CYSTOSCOPY BLADDER BIOPSY      DIAGNOSTIC LAPAROSCOPY N/A 2024    Procedure: ROBOTIC REPAIR OF PERFORATED GASTRIC ULCER;  Surgeon: Petrona Malone MD;  Location:  [facility] OR;  Service: General;  Laterality: N/A;  WITH REPAIR OF GASTRIC ULCER PERFORATION    EXCISION MASS HEAD/NECK N/A 3/4/2022    Procedure: EXCISION OF MASS ON POSTERIOR NECK;  Surgeon: Rossana Mahajan MD;  Location:  [facility] OR;  Service: General;  Laterality: N/A;    INGUINAL HERNIA REPAIR Left     INGUINAL HERNIA REPAIR Right 2017    Procedure: RIGHT INGUINAL HERNIA REPAIR WITH MESH ;  Surgeon: Rossana Mahajan MD;  Location:  [facility];  Service:     VENOUS ACCESS DEVICE (PORT) INSERTION N/A 3/5/2025    Procedure: Single Lumen Port-a-cath insertion with flouroscopy;  Surgeon: Petrona Malone MD;  Location:  [facility] OR;  Service: General;  Laterality: N/A;       Family History   Problem Relation Age of Onset    Colon cancer Maternal Grandfather         in his 60's.     Alzheimer's disease Mother     Hypertension Father     Other Father         stent    COPD Sister     Heart attack Brother     No Known Problems Maternal Grandmother     No Known Problems Paternal Grandmother     No Known Problems Paternal Grandfather        Social History     Tobacco Use    Smoking status: Former     Current packs/day: 0.00     Types: Cigarettes     Start date:      Quit date:      Years since quittin.2    Smokeless tobacco: Never   Vaping Use    Vaping status: Never Used   Substance Use Topics    Alcohol use: No    Drug use: No       Allergies:  Allergies   Allergen Reactions    Latex Itching and Other (See Comments)     And band aids. Causes redness and itching.    Augmentin [Amoxicillin-Pot Clavulanate] Hives        Medications:     Current Facility-Administered Medications:     latanoprost (XALATAN) 0.005 % ophthalmic solution 1 drop, 1 drop, Right Eye, Nightly, Wilbert Maldonado MD, 1 drop at 03/22/25 2158    Morphine sulfate (PF) injection 1 mg, 1 mg, Intravenous, Q4H PRN, Wilbert Maldonado MD    [START ON 3/23/2025] nystatin (MYCOSTATIN) 100,000 unit/mL suspension 500,000 Units, 5 mL, Oral, 4x Daily, Wilbert Maldonado MD    ondansetron (ZOFRAN) injection 4 mg, 4 mg, Intravenous, Q6H PRN, Wilbert Maldonado MD    pantoprazole (PROTONIX) injection 40 mg, 40 mg, Intravenous, Q12H, Wilbert Maldonado MD, 40 mg at 03/22/25 2157    [COMPLETED] Insert Peripheral IV, , , Once **AND** sodium chloride 0.9 % flush 10 mL, 10 mL, Intravenous, PRN, Wilbert Maldonado MD    sodium chloride 0.9 % flush 10 mL, 10 mL, Intravenous, Q12H, Wilbert Maldonado MD, 10 mL at 03/22/25 2102    sodium chloride 0.9 % flush 10 mL, 10 mL, Intravenous, PRN, Wilbert Maldonado MD    sodium chloride 0.9 % infusion 40 mL, 40 mL, Intravenous, PRN, Wilbert Maldonado MD    sodium chloride 0.9 % infusion, 100 mL/hr, Intravenous, Continuous, Wilbert Maldonado MD, Last Rate: 100 mL/hr at 03/22/25 2102, 100 mL/hr at 03/22/25 2102    Vital Signs:   Vitals:    03/22/25 1747 03/22/25 1802 03/22/25 1853 03/22/25 2045   BP: 160/67 159/68 143/58 (!) 125/35   BP Location:   Right arm Right arm   Patient Position:   Lying Lying   Pulse: 78 81 63 72   Resp:   16 16   Temp:   98.2 °F (36.8 °C) 97.5 °F (36.4 °C)   TempSrc:   Oral Oral   SpO2: 98% 90% 96% 100%   Weight:       Height:           Physical Exam:     General Appearance:    Alert, cooperative, in no acute distress, frail/cachectic   Head:    Normocephalic, without obvious abnormality, atraumatic   Eyes:          PERRLA, EOMI   Ears:    Ears appear intact with no abnormalities noted   Throat:   No oral lesions, oral mucosa moist   Neck:   No adenopathy, supple, trachea midline    Back:     No skin lesions, erythema or scars, no tenderness palpation   Lungs:     B/L chest rise, no increase work of breathing     Heart:    Regular rhythm and normal rate   Chest Wall:    No abnormalities observed, no skin lesions   Abdomen:     Soft, nondistended, tender to deep palpation mid epigastric region.  No rebound tenderness, no guarding.   Rectal:     Deferred   Extremities:   Moves all extremities well, no edema   Pulses:   Pulses palpable and equal bilaterally   Skin:   No bleeding, bruising or rash   Lymph nodes:   No palpable adenopathy   Neurologic:   Cranial nerves 2 - 12 grossly intact, sensation intact       Results Review:     Results from last 7 days   Lab Units 03/22/25  1512 03/18/25  0711   WBC 10*3/mm3 3.13* 3.98   HEMOGLOBIN g/dL 7.9* 8.2*   HEMATOCRIT % 25.3* 25.1*   PLATELETS 10*3/mm3 189 232        Results from last 7 days   Lab Units 03/22/25  1512   SODIUM mmol/L 131*   POTASSIUM mmol/L 3.9   CHLORIDE mmol/L 96*   CO2 mmol/L 27.0   BUN mg/dL 31*   CREATININE mg/dL 0.77   CALCIUM mg/dL 8.2*   BILIRUBIN mg/dL 0.5   ALK PHOS U/L 87   ALT (SGPT) U/L 14   AST (SGOT) U/L 17   GLUCOSE mg/dL 118*       Assessment / Plan:    Mr. Dey is a 84 y.o. 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     CT imaging in the emergency department showed evidence of contained gastroduodenal perforation.  Follow-up imaging with contrast obtained showed similar findings.    The patient is not peritonitic and not tachycardic although he is on chemotherapy and is unlikely to have classic inflammatory response.    I am hesitant to operate as I do not believe he would benefit and I am not convinced it is needed at this time.  The patient has minimal abdominal pain and the perforation appears to be contained without any  free abdominal fluid nor free air. He is 3 days status post last dose of chemotherapy and is likely nearing his rosalba.  On exam he is cachectic, malnourished and would overall be a poor surgical candidate.  Additionally this perforation is likely secondary to the known B-cell lymphoma within the area and would not be amenable to Wade patch repair.  Instead he would require partial gastrectomy with Billroth/Anthony-en-Y reconstruction which I do not believe would be in his best interest.    Will monitor his hemoglobin and consult gastroenterology for possible evaluation of his GI bleed.  I would also like to further evaluate perforation with upper GI.  In the meantime we will keep patient on IV antibiotics with Zosyn.    I had a detailed discussion with the patient and family about the diagnosis.  They stated their understanding and appeared to agree.  They are aware that he may require surgery in the future pending continued workup and clinical direction.     Plan:  GI consult  Trend hemoglobin  Upper GI when able  IV Zosyn  Will reach out to oncology to better understand patient's prognosis    Electronically signed by Gianfranco Pemberton MD  03/22/25  23:05 CDT

## 2025-03-23 NOTE — CASE MANAGEMENT/SOCIAL WORK
Discharge Planning Assessment   [place]     Patient Name: Oral Dey  MRN: [id number]  Today's Date: 3/23/2025    Admit Date: 3/22/2025    Plan: Home   Discharge Needs Assessment       Row Name 03/23/25 0953       Living Environment    People in Home spouse    Name(s) of People in Home Spouse- Alaina    Current Living Arrangements home    Potentially Unsafe Housing Conditions none    In the past 12 months has the electric, gas, oil, or water company threatened to shut off services in your home? No    Primary Care Provided by self    Provides Primary Care For no one    Family Caregiver if Needed spouse    Quality of Family Relationships involved;helpful;supportive    Able to Return to Prior Arrangements yes       Resource/Environmental Concerns    Resource/Environmental Concerns none    Transportation Concerns none       Transportation Needs    In the past 12 months, has lack of transportation kept you from medical appointments or from getting medications? no    In the past 12 months, has lack of transportation kept you from meetings, work, or from getting things needed for daily living? No       Food Insecurity    Within the past 12 months, you worried that your food would run out before you got the money to buy more. Never true    Within the past 12 months, the food you bought just didn't last and you didn't have money to get more. Never true       Transition Planning    Patient/Family Anticipates Transition to home with family    Patient/Family Anticipated Services at Transition none    Transportation Anticipated family or friend will provide       Discharge Needs Assessment    Readmission Within the Last 30 Days no previous admission in last 30 days    Equipment Currently Used at Home none    Concerns to be Addressed no discharge needs identified    Anticipated Changes Related to Illness none    Equipment Needed After Discharge none    Current Discharge Risk chronically ill                   Discharge  Plan       Row Name 03/23/25 0955       Plan    Plan Home    Patient/Family in Agreement with Plan yes    Plan Comments Spoke with pt and wife/dtr at bedside to assess for d/c planning. Pt will return home with wife as before, no DME use or HH care at present and they do not feel needed. Pt has RX coverage/PCP.  Order placed for Advance Directives, message left on  number as they will not be here until Monday.  Will follow.                       Demographic Summary    No documentation.                  Functional Status    No documentation.                  Psychosocial    No documentation.                  Abuse/Neglect    No documentation.                  Legal    No documentation.                  Substance Abuse    No documentation.                  Patient Forms    No documentation.                     STORMY MyersW

## 2025-03-23 NOTE — PLAN OF CARE
Goal Outcome Evaluation:  Plan of Care Reviewed With: patient, family        Progress: no change  Outcome Evaluation: Pt has denied pain; IVF and IV abx per orders; NPO except ice chips; turn q2hr; GI consulted and saw patient today-no plans at this time; safety maintained.

## 2025-03-24 ENCOUNTER — APPOINTMENT (OUTPATIENT)
Dept: GENERAL RADIOLOGY | Facility: HOSPITAL | Age: 85
End: 2025-03-24
Payer: COMMERCIAL

## 2025-03-24 LAB
ALBUMIN SERPL-MCNC: 2.2 G/DL (ref 3.5–5.2)
ALBUMIN/GLOB SERPL: 1.6 G/DL
ALP SERPL-CCNC: 72 U/L (ref 39–117)
ALT SERPL W P-5'-P-CCNC: 14 U/L (ref 1–41)
ANION GAP SERPL CALCULATED.3IONS-SCNC: 8 MMOL/L (ref 5–15)
AST SERPL-CCNC: 16 U/L (ref 1–40)
BH BB BLOOD EXPIRATION DATE: NORMAL
BH BB BLOOD TYPE BARCODE: 6200
BH BB DISPENSE STATUS: NORMAL
BH BB PRODUCT CODE: NORMAL
BH BB UNIT NUMBER: NORMAL
BILIRUB SERPL-MCNC: 0.6 MG/DL (ref 0–1.2)
BUN SERPL-MCNC: 23 MG/DL (ref 8–23)
BUN/CREAT SERPL: 34.3 (ref 7–25)
BURR CELLS BLD QL SMEAR: ABNORMAL
CALCIUM SPEC-SCNC: 7.3 MG/DL (ref 8.6–10.5)
CHLORIDE SERPL-SCNC: 102 MMOL/L (ref 98–107)
CO2 SERPL-SCNC: 25 MMOL/L (ref 22–29)
CREAT SERPL-MCNC: 0.67 MG/DL (ref 0.76–1.27)
CROSSMATCH INTERPRETATION: NORMAL
DEPRECATED RDW RBC AUTO: 46.2 FL (ref 37–54)
DOHLE BOD BLD QL SMEAR: PRESENT
EGFRCR SERPLBLD CKD-EPI 2021: 93.2 ML/MIN/1.73
ERYTHROCYTE [DISTWIDTH] IN BLOOD BY AUTOMATED COUNT: 14.5 % (ref 12.3–15.4)
GLOBULIN UR ELPH-MCNC: 1.4 GM/DL
GLUCOSE SERPL-MCNC: 91 MG/DL (ref 65–99)
HCT VFR BLD AUTO: 23.7 % (ref 37.5–51)
HCT VFR BLD AUTO: 23.8 % (ref 37.5–51)
HGB BLD-MCNC: 7.5 G/DL (ref 13–17.7)
HGB BLD-MCNC: 7.5 G/DL (ref 13–17.7)
INR PPP: 1.38 (ref 0.91–1.09)
LYMPHOCYTES # BLD MANUAL: 0.23 10*3/MM3 (ref 0.7–3.1)
LYMPHOCYTES NFR BLD MANUAL: 1 % (ref 5–12)
MCH RBC QN AUTO: 27.6 PG (ref 26.6–33)
MCHC RBC AUTO-ENTMCNC: 31.5 G/DL (ref 31.5–35.7)
MCV RBC AUTO: 87.5 FL (ref 79–97)
MONOCYTES # BLD: 0.01 10*3/MM3 (ref 0.1–0.9)
NEUTROPHILS # BLD AUTO: 0.81 10*3/MM3 (ref 1.7–7)
NEUTROPHILS NFR BLD MANUAL: 76 % (ref 42.7–76)
NEUTS BAND NFR BLD MANUAL: 1 % (ref 0–5)
NEUTS VAC BLD QL SMEAR: ABNORMAL
PLATELET # BLD AUTO: 132 10*3/MM3 (ref 140–450)
PMV BLD AUTO: 8.8 FL (ref 6–12)
POIKILOCYTOSIS BLD QL SMEAR: ABNORMAL
POTASSIUM SERPL-SCNC: 3.4 MMOL/L (ref 3.5–5.2)
PROT SERPL-MCNC: 3.6 G/DL (ref 6–8.5)
PROTHROMBIN TIME: 17.7 SECONDS (ref 11.8–14.8)
RBC # BLD AUTO: 2.72 10*6/MM3 (ref 4.14–5.8)
SMALL PLATELETS BLD QL SMEAR: ABNORMAL
SMUDGE CELLS BLD QL SMEAR: ABNORMAL
SODIUM SERPL-SCNC: 135 MMOL/L (ref 136–145)
TOXIC GRANULATION: ABNORMAL
UNIT  ABO: NORMAL
UNIT  RH: NORMAL
VARIANT LYMPHS NFR BLD MANUAL: 2 % (ref 0–5)
VARIANT LYMPHS NFR BLD MANUAL: 20 % (ref 19.6–45.3)
WBC NRBC COR # BLD AUTO: 1.05 10*3/MM3 (ref 3.4–10.8)

## 2025-03-24 PROCEDURE — 85007 BL SMEAR W/DIFF WBC COUNT: CPT | Performed by: INTERNAL MEDICINE

## 2025-03-24 PROCEDURE — 99223 1ST HOSP IP/OBS HIGH 75: CPT | Performed by: INTERNAL MEDICINE

## 2025-03-24 PROCEDURE — 74240 X-RAY XM UPR GI TRC 1CNTRST: CPT

## 2025-03-24 PROCEDURE — 25810000003 SODIUM CHLORIDE 0.9 % SOLUTION: Performed by: INTERNAL MEDICINE

## 2025-03-24 PROCEDURE — 25010000002 METRONIDAZOLE 500 MG/100ML SOLUTION: Performed by: INTERNAL MEDICINE

## 2025-03-24 PROCEDURE — 74248 X-RAY SM INT F-THRU STD: CPT

## 2025-03-24 PROCEDURE — 85025 COMPLETE CBC W/AUTO DIFF WBC: CPT | Performed by: INTERNAL MEDICINE

## 2025-03-24 PROCEDURE — 99232 SBSQ HOSP IP/OBS MODERATE 35: CPT | Performed by: NURSE PRACTITIONER

## 2025-03-24 PROCEDURE — 85610 PROTHROMBIN TIME: CPT | Performed by: INTERNAL MEDICINE

## 2025-03-24 PROCEDURE — 80053 COMPREHEN METABOLIC PANEL: CPT | Performed by: INTERNAL MEDICINE

## 2025-03-24 PROCEDURE — 25010000002 CEFTRIAXONE PER 250 MG: Performed by: INTERNAL MEDICINE

## 2025-03-24 RX ORDER — POTASSIUM CHLORIDE 750 MG/1
40 CAPSULE, EXTENDED RELEASE ORAL EVERY 4 HOURS
Status: COMPLETED | OUTPATIENT
Start: 2025-03-24 | End: 2025-03-24

## 2025-03-24 RX ORDER — DIATRIZOATE MEGLUMINE AND DIATRIZOATE SODIUM 660; 100 MG/ML; MG/ML
120 SOLUTION ORAL; RECTAL
Status: DISCONTINUED | OUTPATIENT
Start: 2025-03-24 | End: 2025-03-27 | Stop reason: HOSPADM

## 2025-03-24 RX ADMIN — NYSTATIN 500000 UNITS: 100000 SUSPENSION ORAL at 20:57

## 2025-03-24 RX ADMIN — PANTOPRAZOLE SODIUM 40 MG: 40 INJECTION, POWDER, FOR SOLUTION INTRAVENOUS at 20:58

## 2025-03-24 RX ADMIN — LATANOPROST 1 DROP: 50 SOLUTION OPHTHALMIC at 20:58

## 2025-03-24 RX ADMIN — POTASSIUM CHLORIDE 40 MEQ: 750 CAPSULE, EXTENDED RELEASE ORAL at 17:34

## 2025-03-24 RX ADMIN — SODIUM CHLORIDE 100 ML/HR: 9 INJECTION, SOLUTION INTRAVENOUS at 04:12

## 2025-03-24 RX ADMIN — Medication 10 ML: at 20:58

## 2025-03-24 RX ADMIN — POTASSIUM CHLORIDE 40 MEQ: 750 CAPSULE, EXTENDED RELEASE ORAL at 20:57

## 2025-03-24 RX ADMIN — NYSTATIN 500000 UNITS: 100000 SUSPENSION ORAL at 08:38

## 2025-03-24 RX ADMIN — NYSTATIN 500000 UNITS: 100000 SUSPENSION ORAL at 17:34

## 2025-03-24 RX ADMIN — METRONIDAZOLE 500 MG: 500 INJECTION, SOLUTION INTRAVENOUS at 04:12

## 2025-03-24 RX ADMIN — NYSTATIN 500000 UNITS: 100000 SUSPENSION ORAL at 12:18

## 2025-03-24 RX ADMIN — Medication 10 ML: at 08:38

## 2025-03-24 RX ADMIN — PANTOPRAZOLE SODIUM 40 MG: 40 INJECTION, POWDER, FOR SOLUTION INTRAVENOUS at 08:38

## 2025-03-24 RX ADMIN — METRONIDAZOLE 500 MG: 500 INJECTION, SOLUTION INTRAVENOUS at 12:18

## 2025-03-24 RX ADMIN — SODIUM CHLORIDE 1000 MG: 900 INJECTION INTRAVENOUS at 12:18

## 2025-03-24 RX ADMIN — METRONIDAZOLE 500 MG: 500 INJECTION, SOLUTION INTRAVENOUS at 20:57

## 2025-03-24 NOTE — PROGRESS NOTES
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  Patient initially presented in September with peritonitis and gastric perforation.  Dr. Malone took patient to the OR for robotic repair of perforated gastric ulcer Wade patch and was subsequently found to have distal stomach/duodenal lymphoma.  Patient followed up with Dr. Boogie with oncology for chemotherapy. Patient underwent endoscopy 1/22/2025 which showed circumferential ulcerated mucosa at the surgical site suggestive of chronic ischemia, chronic inflammation and ulceration. Pathology report showed small intestine, duodenal bulb, endoscopic biopsy:  Involvement by CD20 positive large B-cell lymphoma, best classified as Lawrence-Barr virus (EBV) positive diffuse large B-cell lymphoma.    Patient presented to the emergency department on 3/22 due to 2 to 3-month history of mid epigastric pain with bloody bowel movements which he has had for the past 1 to 2 days.  Patient began chemotherapy on 3/19.  CT imaging showed evidence of contained gastroduodenal perforation.  Patient is a poor surgical candidate.  Holding surgical option at this time with plans to continue monitoring hemoglobin.  GI was consulted and suggested interventional radiology.      Hemoglobin is 7.5 with hematocrit of 23.8.  WBC is 1.05 oncology is on.  Plans for transfusion if hemoglobin less than 7.  Patient continues Rocephin and Flagyl.  He remains NPO.    Visit Dx:    ICD-10-CM ICD-9-CM   1. Gastrointestinal hemorrhage with melena  K92.1 578.1   2. Diffuse large B-cell lymphoma, unspecified body region   C83.30 202.80   3. Anemia, unspecified type  D64.9 285.9   4. Neutropenia, unspecified type  D70.9 288.00   5. Chronic hyponatremia  E87.1 276.1       Hospital Problem List:     Upper GI bleed    CLL (chronic lymphocytic leukemia)    Anemia    Diffuse large B-cell lymphoma involving duodenal bulb    Weight loss    Abdominal pain    Suspected contained gastroduodenal perforation      History:   Past Medical History:   Diagnosis Date    Bladder cancer     CLL (chronic lymphocytic leukemia)     Colon polyp     Gallstone     GERD (gastroesophageal reflux disease)     Glaucoma     Hearing loss     Hypertension     Inguinal hernia     right groin    Macular degeneration, right eye      Past Surgical History:   Procedure Laterality Date    CATARACT EXTRACTION, BILATERAL      COLONOSCOPY  2012    CYSTOSCOPY BLADDER BIOPSY      DIAGNOSTIC LAPAROSCOPY N/A 2024    Procedure: ROBOTIC REPAIR OF PERFORATED GASTRIC ULCER;  Surgeon: Petrona Malone MD;  Location:  [facility] OR;  Service: General;  Laterality: N/A;  WITH REPAIR OF GASTRIC ULCER PERFORATION    EXCISION MASS HEAD/NECK N/A 3/4/2022    Procedure: EXCISION OF MASS ON POSTERIOR NECK;  Surgeon: Rossana Mahajan MD;  Location:  [facility] OR;  Service: General;  Laterality: N/A;    INGUINAL HERNIA REPAIR Left     INGUINAL HERNIA REPAIR Right 2017    Procedure: RIGHT INGUINAL HERNIA REPAIR WITH MESH ;  Surgeon: Rossana Mahajan MD;  Location:  [facility] OR;  Service:     VENOUS ACCESS DEVICE (PORT) INSERTION N/A 3/5/2025    Procedure: Single Lumen Port-a-cath insertion with flouroscopy;  Surgeon: Petrona Malone MD;  Location:  [facility] OR;  Service: General;  Laterality: N/A;     Social History     Socioeconomic History    Marital status:    Tobacco Use    Smoking status: Former     Current packs/day: 0.00     Types: Cigarettes     Start date:      Quit date: 1972     Years since quittin.2     Passive exposure: Past    Smokeless tobacco:  Never   Vaping Use    Vaping status: Never Used   Substance and Sexual Activity    Alcohol use: No    Drug use: No    Sexual activity: Defer       Allergies:  Allergies   Allergen Reactions    Latex Itching and Other (See Comments)     And band aids. Causes redness and itching.    Augmentin [Amoxicillin-Pot Clavulanate] Hives       Medications:    Current Facility-Administered Medications:     cefTRIAXone (ROCEPHIN) 1,000 mg in sodium chloride 0.9 % 100 mL MBP, 1,000 mg, Intravenous, Q24H, Wilbert Maldonado MD, Last Rate: 200 mL/hr at 03/23/25 1152, 1,000 mg at 03/23/25 1152    latanoprost (XALATAN) 0.005 % ophthalmic solution 1 drop, 1 drop, Right Eye, Nightly, Wilbert Maldonado MD, 1 drop at 03/23/25 2135    metroNIDAZOLE (FLAGYL) IVPB 500 mg, 500 mg, Intravenous, Q8H, Wilbert Maldonado MD, Last Rate: 200 mL/hr at 03/24/25 0412, 500 mg at 03/24/25 0412    Morphine sulfate (PF) injection 1 mg, 1 mg, Intravenous, Q4H PRN, Wilbert Maldonado MD    nystatin (MYCOSTATIN) 100,000 unit/mL suspension 500,000 Units, 5 mL, Oral, 4x Daily, Wilbert Maldonado MD, 500,000 Units at 03/24/25 0838    ondansetron (ZOFRAN) injection 4 mg, 4 mg, Intravenous, Q6H PRN, Wilbert Maldonado MD    pantoprazole (PROTONIX) injection 40 mg, 40 mg, Intravenous, Q12H, Wilbert Maldonado MD, 40 mg at 03/24/25 0838    [COMPLETED] Insert Peripheral IV, , , Once **AND** sodium chloride 0.9 % flush 10 mL, 10 mL, Intravenous, PRN, Wilbert Maldonado MD    sodium chloride 0.9 % flush 10 mL, 10 mL, Intravenous, Q12H, Wilbert Maldonado MD, 10 mL at 03/24/25 0838    sodium chloride 0.9 % flush 10 mL, 10 mL, Intravenous, PRN, Wilbert Maldonado MD    sodium chloride 0.9 % infusion 40 mL, 40 mL, Intravenous, PRN, Wilbert Maldonado MD    sodium chloride 0.9 % infusion, 100 mL/hr, Intravenous, Continuous, Wilbert Maldonado MD, Last Rate: 100 mL/hr at 03/24/25 0412, 100 mL/hr at 03/24/25 0412      OBJECTIVE      Vitals:    03/24/25 0737   BP: (!) 129/38   Pulse: 82   Resp: 18   Temp: 97.6 °F (36.4 °C)   SpO2: 95%       Temp:  [97.3 °F (36.3 °C)-98.4 °F (36.9 °C)] 97.6 °F (36.4 °C)  Heart Rate:  [70-92] 82  Resp:  [16-18] 18  BP: (112-129)/(38-65) 129/38     PHYSICAL EXAM   Physical Exam  Vitals and nursing note reviewed.   Constitutional:       General: He is awake.      Appearance: He is underweight. He is cachectic.      Comments: Body mass index is 15.78 kg/m².    HENT:      Head: Normocephalic and atraumatic.   Eyes:      General: Lids are normal. Gaze aligned appropriately.   Cardiovascular:      Rate and Rhythm: Normal rate and regular rhythm.   Pulmonary:      Effort: Pulmonary effort is normal. No respiratory distress.   Abdominal:      General: Abdomen is flat. There is no distension.      Palpations: Abdomen is soft.      Tenderness: There is generalized abdominal tenderness.   Musculoskeletal:      Cervical back: Normal range of motion and neck supple.   Skin:     General: Skin is warm and dry.   Neurological:      Mental Status: He is alert and oriented to person, place, and time.   Psychiatric:         Attention and Perception: Attention normal.         Mood and Affect: Mood normal.         Speech: Speech normal.         Behavior: Behavior is cooperative.            Results Review:  Lab Results (last 48 hours)       Procedure Component Value Units Date/Time    CBC & Differential [[id number]]  (Abnormal) Collected: 03/24/25 0448    Specimen: Blood Updated: 03/24/25 0757    Narrative:      The following orders were created for panel order CBC & Differential.  Procedure                               Abnormality         Status                     ---------                               -----------         ------                     CBC Auto Differential[[id number]]        Abnormal            Final result                 Please view results for these tests on the individual orders.    CBC Auto Differential  [[id number]]  (Abnormal) Collected: 03/24/25 0448    Specimen: Blood Updated: 03/24/25 0757     WBC 1.05 10*3/mm3      RBC 2.72 10*6/mm3      Hemoglobin 7.5 g/dL      Hematocrit 23.8 %      MCV 87.5 fL      MCH 27.6 pg      MCHC 31.5 g/dL      RDW 14.5 %      RDW-SD 46.2 fl      MPV 8.8 fL      Platelets 132 10*3/mm3     Manual Differential [[id number]]  (Abnormal) Collected: 03/24/25 0448    Specimen: Blood Updated: 03/24/25 0757     Neutrophil % 76.0 %      Lymphocyte % 20.0 %      Monocyte % 1.0 %      Bands %  1.0 %      Atypical Lymphocyte % 2.0 %      Neutrophils Absolute 0.81 10*3/mm3      Lymphocytes Absolute 0.23 10*3/mm3      Monocytes Absolute 0.01 10*3/mm3      Crenated RBC's Slight/1+     Poikilocytes Slight/1+     Dohle Bodies Present     Smudge Cells Mod/2+     Toxic Granulation Slight/1+     Vacuolated Neutrophils Slight/1+     Platelet Estimate Decreased    Comprehensive Metabolic Panel [[id number]]  (Abnormal) Collected: 03/24/25 0448    Specimen: Blood Updated: 03/24/25 0543     Glucose 91 mg/dL      BUN 23 mg/dL      Creatinine 0.67 mg/dL      Sodium 135 mmol/L      Potassium 3.4 mmol/L      Chloride 102 mmol/L      CO2 25.0 mmol/L      Calcium 7.3 mg/dL      Total Protein 3.6 g/dL      Albumin 2.2 g/dL      ALT (SGPT) 14 U/L      AST (SGOT) 16 U/L      Alkaline Phosphatase 72 U/L      Total Bilirubin 0.6 mg/dL      Globulin 1.4 gm/dL      A/G Ratio 1.6 g/dL      BUN/Creatinine Ratio 34.3     Anion Gap 8.0 mmol/L      eGFR 93.2 mL/min/1.73     Narrative:      GFR Categories in Chronic Kidney Disease (CKD)      GFR Category          GFR (mL/min/1.73)    Interpretation  G1                     90 or greater         Normal or high (1)  G2                      60-89                Mild decrease (1)  G3a                   45-59                Mild to moderate decrease  G3b                   30-44                Moderate to severe decrease  G4                    15-29                Severe decrease  G5                     14 or less           Kidney failure          (1)In the absence of evidence of kidney disease, neither GFR category G1 or G2 fulfill the criteria for CKD.    eGFR calculation 2021 CKD-EPI creatinine equation, which does not include race as a factor    Protime-INR [[id number]]  (Abnormal) Collected: 03/24/25 0448    Specimen: Blood Updated: 03/24/25 0526     Protime 17.7 Seconds      INR 1.38    Hemoglobin & Hematocrit, Blood [[id number]]  (Abnormal) Collected: 03/23/25 2337    Specimen: Blood Updated: 03/24/25 0024     Hemoglobin 7.5 g/dL      Hematocrit 23.7 %     Hemoglobin & Hematocrit, Blood [[id number]]  (Abnormal) Collected: 03/23/25 1805    Specimen: Blood Updated: 03/23/25 1817     Hemoglobin 7.8 g/dL      Hematocrit 24.9 %     Hemoglobin & Hematocrit, Blood [[id number]]  (Abnormal) Collected: 03/23/25 1155    Specimen: Blood Updated: 03/23/25 1221     Hemoglobin 7.7 g/dL      Hematocrit 24.1 %     Manual Differential [[id number]]  (Abnormal) Collected: 03/23/25 0554    Specimen: Blood Updated: 03/23/25 0627     Neutrophil % 85.6 %      Lymphocyte % 12.4 %      Monocyte % 1.0 %      Eosinophil % 0.0 %      Basophil % 0.0 %      Bands %  1.0 %      Neutrophils Absolute 1.97 10*3/mm3      Lymphocytes Absolute 0.28 10*3/mm3      Monocytes Absolute 0.02 10*3/mm3      Eosinophils Absolute 0.00 10*3/mm3      Basophils Absolute 0.00 10*3/mm3      Anisocytosis Slight/1+     Hypochromia Slight/1+     Microcytes Slight/1+     Dohle Bodies Present     Toxic Granulation Mod/2+     Vacuolated Neutrophils Mod/2+     Clumped Platelets Present    CBC & Differential [[id number]]  (Abnormal) Collected: 03/23/25 0554    Specimen: Blood Updated: 03/23/25 0627    Narrative:      The following orders were created for panel order CBC & Differential.  Procedure                               Abnormality         Status                     ---------                               -----------         ------                      CBC Auto Differential[[id number]]        Abnormal            Final result                 Please view results for these tests on the individual orders.    CBC Auto Differential [[id number]]  (Abnormal) Collected: 03/23/25 0554    Specimen: Blood Updated: 03/23/25 0627     WBC 2.27 10*3/mm3      RBC 2.75 10*6/mm3      Hemoglobin 7.6 g/dL      Hematocrit 23.8 %      MCV 86.5 fL      MCH 27.6 pg      MCHC 31.9 g/dL      RDW 14.3 %      RDW-SD 44.6 fl      MPV 8.5 fL      Platelets 147 10*3/mm3     Narrative:      The previously reported component NRBC is no longer being reported. Previous result was 0.0 /100 WBC (Reference Range: 0.0-0.2 /100 WBC) on 3/23/2025 at [address] [place] [address].    Comprehensive Metabolic Panel [[id number]]  (Abnormal) Collected: 03/23/25 0554    Specimen: Blood Updated: 03/23/25 0621     Glucose 95 mg/dL      BUN 24 mg/dL      Creatinine 0.65 mg/dL      Sodium 132 mmol/L      Potassium 3.7 mmol/L      Chloride 99 mmol/L      CO2 25.0 mmol/L      Calcium 7.7 mg/dL      Total Protein 3.7 g/dL      Albumin 2.5 g/dL      ALT (SGPT) 13 U/L      AST (SGOT) 14 U/L      Alkaline Phosphatase 73 U/L      Total Bilirubin 1.2 mg/dL      Globulin 1.2 gm/dL      A/G Ratio 2.1 g/dL      BUN/Creatinine Ratio 36.9     Anion Gap 8.0 mmol/L      eGFR 94.1 mL/min/1.73     Narrative:      GFR Categories in Chronic Kidney Disease (CKD)      GFR Category          GFR (mL/min/1.73)    Interpretation  G1                     90 or greater         Normal or high (1)  G2                      60-89                Mild decrease (1)  G3a                   45-59                Mild to moderate decrease  G3b                   30-44                Moderate to severe decrease  G4                    15-29                Severe decrease  G5                    14 or less           Kidney failure          (1)In the absence of evidence of kidney disease, neither GFR category G1 or G2 fulfill the criteria for CKD.    eGFR calculation 2021 CKD-EPI  creatinine equation, which does not include race as a factor    Protime-INR [[id number]]  (Abnormal) Collected: 03/23/25 0554    Specimen: Blood Updated: 03/23/25 0611     Protime 16.1 Seconds      INR 1.23    Hemoglobin & Hematocrit, Blood [[id number]]  (Abnormal) Collected: 03/22/25 2355    Specimen: Blood Updated: 03/23/25 0015     Hemoglobin 6.8 g/dL      Hematocrit 21.7 %     Urinalysis With Culture If Indicated - Urine, Clean Catch [[id number]]  (Abnormal) Collected: 03/22/25 1705    Specimen: Urine, Clean Catch Updated: 03/22/25 1736     Color, UA Yellow     Appearance, UA Clear     pH, UA 5.5     Specific Gravity, UA 1.029     Glucose, UA Negative     Ketones, UA Negative     Bilirubin, UA Negative     Blood, UA Negative     Protein, UA Trace     Leuk Esterase, UA Negative     Nitrite, UA Positive     Urobilinogen, UA 1.0 E.U./dL    Narrative:      In absence of clinical symptoms, the presence of pyuria, bacteria, and/or nitrites on the urinalysis result does not correlate with infection.    Urinalysis, Microscopic Only - Urine, Clean Catch [[id number]]  (Abnormal) Collected: 03/22/25 1705    Specimen: Urine, Clean Catch Updated: 03/22/25 1736     RBC, UA None Seen /HPF      WBC, UA 3-5 /HPF      Comment: Urine culture not indicated.        Bacteria, UA 3+ /HPF      Squamous Epithelial Cells, UA 0-2 /HPF      Hyaline Casts, UA None Seen /LPF      Calcium Oxalate Crystals, UA Small/1+ /HPF      Methodology Manual Light Microscopy    CBC & Differential [[id number]]  (Abnormal) Collected: 03/22/25 1512    Specimen: Blood Updated: 03/22/25 1551    Narrative:      The following orders were created for panel order CBC & Differential.  Procedure                               Abnormality         Status                     ---------                               -----------         ------                     CBC Auto Differential[[id number]]        Abnormal            Final result                 Please view results for  these tests on the individual orders.    CBC Auto Differential [[id number]]  (Abnormal) Collected: 03/22/25 1512    Specimen: Blood Updated: 03/22/25 1551     WBC 3.13 10*3/mm3      RBC 2.89 10*6/mm3      Hemoglobin 7.9 g/dL      Hematocrit 25.3 %      MCV 87.5 fL      MCH 27.3 pg      MCHC 31.2 g/dL      RDW 14.2 %      RDW-SD 45.3 fl      MPV 8.7 fL      Platelets 189 10*3/mm3     Manual Differential [[id number]]  (Abnormal) Collected: 03/22/25 1512    Specimen: Blood Updated: 03/22/25 1551     Neutrophil % 90.5 %      Lymphocyte % 4.2 %      Monocyte % 1.1 %      Eosinophil % 0.0 %      Basophil % 0.0 %      Bands %  3.2 %      Metamyelocyte % 1.1 %      Neutrophils Absolute 2.93 10*3/mm3      Lymphocytes Absolute 0.13 10*3/mm3      Monocytes Absolute 0.03 10*3/mm3      Eosinophils Absolute 0.00 10*3/mm3      Basophils Absolute 0.00 10*3/mm3      Crenated RBC's Slight/1+     Elliptocytes Slight/1+     Poikilocytes Slight/1+     Vacuolated Neutrophils Slight/1+     Platelet Morphology Normal    Comprehensive Metabolic Panel [[id number]]  (Abnormal) Collected: 03/22/25 1512    Specimen: Blood Updated: 03/22/25 1540     Glucose 118 mg/dL      BUN 31 mg/dL      Creatinine 0.77 mg/dL      Sodium 131 mmol/L      Potassium 3.9 mmol/L      Chloride 96 mmol/L      CO2 27.0 mmol/L      Calcium 8.2 mg/dL      Total Protein 4.4 g/dL      Albumin 2.9 g/dL      ALT (SGPT) 14 U/L      AST (SGOT) 17 U/L      Alkaline Phosphatase 87 U/L      Total Bilirubin 0.5 mg/dL      Globulin 1.5 gm/dL      A/G Ratio 1.9 g/dL      BUN/Creatinine Ratio 40.3     Anion Gap 8.0 mmol/L      eGFR 89.4 mL/min/1.73     Narrative:      GFR Categories in Chronic Kidney Disease (CKD)      GFR Category          GFR (mL/min/1.73)    Interpretation  G1                     90 or greater         Normal or high (1)  G2                      60-89                Mild decrease (1)  G3a                   45-59                Mild to moderate decrease  G3b                    30-44                Moderate to severe decrease  G4                    15-29                Severe decrease  G5                    14 or less           Kidney failure          (1)In the absence of evidence of kidney disease, neither GFR category G1 or G2 fulfill the criteria for CKD.    eGFR calculation 2021 CKD-EPI creatinine equation, which does not include race as a factor    Lactic Acid, Plasma [[id number]]  (Normal) Collected: 03/22/25 1512    Specimen: Blood Updated: 03/22/25 1535     Lactate 1.1 mmol/L     Protime-INR [[id number]]  (Abnormal) Collected: 03/22/25 1512    Specimen: Blood Updated: 03/22/25 1530     Protime 15.6 Seconds      INR 1.17    aPTT [[id number]]  (Normal) Collected: 03/22/25 1512    Specimen: Blood Updated: 03/22/25 1530     PTT 27.1 seconds     Narrative:      PTT = The equivalent PTT values for the therapeutic range of heparin levels at 0.3 to 0.7 U/ml are 77 - 99 seconds.    POC Occult Blood Stool [[id number]]  (Abnormal) Collected: 03/22/25 1527    Specimen: Stool Updated: 03/22/25 1529     Fecal Occult Blood Positive     Lot Number [id number]     Expiration Date 2/28/25     DEVELOPER LOT NUMBER 271     DEVELOPER EXPIRATION DATE 2/28/25     Positive Control Positive     Negative Control Negative          Imaging Results (Last 72 Hours)       Procedure Component Value Units Date/Time    CT Abdomen Pelvis Without Contrast [[id number]] Collected: 03/22/25 2032     Updated: 03/22/25 2043    Narrative:      EXAM/TECHNIQUE: CT abdomen and pelvis without contrast     INDICATION: eval for contained perforation on previous CT; K92.1-Melena;  C83.30-Diffuse large B-cell lymphoma, unspecified site; D64.9-Anemia,  unspecified; D70.9-Neutropenia, unspecified; E87.2-Mwru-tjuffntpnp and  hyponatremia     COMPARISON: None available.     DLP: 126.86 mGy.cm. Automated exposure control was utilized to decrease  patient radiation dose.       Impression:      FINDINGS/IMPRESSION:     1.  Evaluation is  difficult as there is severe diffuse soft tissue  edema/anasarca, which results in blurring of fat planes.      2.  Oral contrast was administered which has transited to the stomach,  small bowel, and reached the distal colon. No free intraperitoneal  spillage of contrast. Redemonstrated CT findings which are highly  concerning for contained distal gastric perforation including poor  visualization of the distal gastric wall and infiltrating collection of  gas and debris within the right upper quadrant extending along the  inferior aspect of the liver.        This report was signed and finalized on 3/22/2025 8:40 PM by Dr. Steven Giles MD.       CT Angiogram Abdomen Pelvis [[id number]] Collected: 03/22/25 1600     Updated: 03/22/25 1612    Narrative:      EXAM/TECHNIQUE: CT angiography with 3D MIP images abdomen and pelvis  without and with IV contrast     INDICATION: gi bleed. known stomach cancer     COMPARISON: 1/7/2025     DLP: 598.03 mGy.cm. Automated exposure control was utilized to decrease  patient radiation dose.     FINDINGS:     Lung bases are clear.     No suspicious liver lesion. Small gallstone. No abnormal gallbladder  wall thickening. No biliary ductal dilatation. Pancreas and adrenal  glands appear unremarkable. Spleen is mildly enlarged measuring 13.3 cm  craniocaudal dimension. No solid renal mass. No urolithiasis or  hydronephrosis. No focal urinary bladder abnormality.     Patient has a known lymphoma involving the duodenum and has a history of  perforated gastric ulcer. The distal gastric wall is poorly visualized  and there appears to be a large amount of extraluminal fluid and gas  within the region of the gastric antrum and proximal duodenum, in  keeping with bowel perforation. Also within the region of known  lymphoma, in the distal gastric lumen, there is an area of contrast  pooling on delayed images on axial image 97 series 9, in keeping with an  area of active bleeding within the  stomach.     Moderate volume stool in the colon. No colonic wall thickening or  pericolonic fat stranding. Hyperdense material within the stomach is  hyperdense before giving contrast. No bowel obstruction. Normal  appendix.     No ascites or free pelvic fluid. No pelvic mass or collection. Prostate  and seminal vesicles are unremarkable.     Nonaneurysmal atherosclerotic abdominal aorta. Celiac artery, SMA, and  ONEL are patent. Renal arteries are patent. Redemonstrated mild  retroperitoneal periaortic lymphadenopathy. No new or enlarging lymph  nodes in the abdomen or pelvis.     Severe diffuse body wall soft tissue edema, in keeping with anasarca. No  acute osseous finding.       Impression:         1.  Patient has a known distal gastric/proximal duodenal lymphoma. The  distal gastric wall is poorly visualized and there is appearance of  extraluminal gas and debris, highly suspicious for contained  gastroduodenal perforation. If clinically indicated, this could be  further evaluated with limited CT of the abdomen after oral contrast  administration.     2.  There is a 1.7 cm oval focus of contrast pooling within the distal  gastric lumen on delayed images, in keeping with an area of active  bleeding.     3.  Upper abdominal retroperitoneal lymphadenopathy, similar to the  prior study.     4.  Severe diffuse abdominal wall soft tissue edema, likely related to  anasarca.           This report was signed and finalized on 3/22/2025 4:09 PM by Dr. Steven Giles MD.                  ASSESSMENT/PLAN     Active Hospital Problems    Diagnosis     **Upper GI bleed     Weight loss     Abdominal pain     Suspected contained gastroduodenal perforation     Diffuse large B-cell lymphoma involving duodenal bulb     Anemia     CLL (chronic lymphocytic leukemia)        PLAN:   No plans for surgical intervention at this time.  Continue empiric antibiotics as ordered.  SBFT ordered today  Patient may benefit from palliative care  consult/goals of care conversation.  Patient may benefit from transfer to tertiary center in the future for interventional radiology/advanced GI if bleeding continues    Discussed findings and treatment plan including risks, benefits, and treatment options with patient in detail. Patient agreed with treatment plan.      This document has been electronically signed by RENUKA Coreas on 3/24/2025 09:10 CDT

## 2025-03-24 NOTE — PROGRESS NOTES
Chart follow-up.    Recent progress notes and labs noted.     Please let us know if further GI input is desired.     Ted Noel MD  3/24/2025  12:05 CDT

## 2025-03-24 NOTE — PLAN OF CARE
Goal Outcome Evaluation:           Progress: no change     Pt c/o stomach pain. Pt refused pain meds. NPO with ice chips. IVF/ IV abx infusing per orders. Turn q2. Dark tarry BM x 3. Voiding. Incontinent. VSS. Safety maintained.

## 2025-03-24 NOTE — ACP (ADVANCE CARE PLANNING)
Date of First Steps ACP conversation: 3/24/2025.  Location of conversation: Patient's Room, 388.  ACP facilitator: Evaristo Mccormack.  Referral source: Nurse.  Time spent in conversation and documentation: drop down [id number]    DOCUMENTS COMPLETED:    Living Will Directive: YES    CHOOSING A HEALTHCARE SURROGATE/DECISION MAKER:   Reviewed the qualities of healthcare surrogate: YES   Name of healthcare surrogate, if chosen: Brittanie Dey and Brittanie Torres.    Relationship to patient: Wife and Daughter.    SUMMARY OF CONVERSATION:   I explained the content and intent of the directive.  He said it made sense to him.  One of his surrogates was present (Wife).  I notarized the directive, gave him three copies and the original.  A copy was faxed to HIM and it was received.  We had prayer before I left the room.

## 2025-03-24 NOTE — CONSULTS
[facility]  CONSULT NOTE    PATIENT NAME:  Oral Dey  YOB: 1940  PATIENT MRN:  [id number]    Date of Admission:  3/22/2025  Consultation Date:  3/24/2025  Referring Provider: No ref. provider found    Subjective     Reason for Consultation: Continuity of care.    History of present illness: Oral Dey is a 84 y.o.  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  He had hypersensitivity reaction to rituximab on 3/18/2025.  He tolerated rituximab rechallenge on 3/19/2025.  He is seen C1D6 of mini R-CHOP.  Patient had Neulasta 3/20/2025.  Patient seen with spouse Alaina and nurse Steffany at the bedside.    Patient presented with abdominal pain and black stools 3/22/2025.    Occult blood positive in the emergency department.    WBC 3.1, NC 2.9, hemoglobin 7.9, hematocrit 25.3, MCV 87.5 and platelet 189.  PT 15.6, INR 1.1 and PTT 27.1.    CT angiogram abdomen and pelvis 3/22/2025.Patient has a known distal gastric/proximal duodenal lymphoma. The distal gastric wall is poorly visualized and there is appearance of  extraluminal gas and debris, highly suspicious for contained gastroduodenal perforation. If clinically indicated, this could be further evaluated with limited CT of the abdomen after oral contrast administration.  There is a 1.7 cm oval focus of contrast pooling within the distal  gastric lumen on delayed images, in keeping with an area of active bleeding.  Upper abdominal retroperitoneal lymphadenopathy, similar to the prior study.  Severe diffuse abdominal wall soft tissue edema, likely related to anasarca.    CT abdomen pelvis 3/22/2025.Evaluation is difficult as there is severe diffuse soft tissue  edema/anasarca, which results in blurring of fat planes.  Oral contrast was administered which has transited to the stomach, small bowel, and reached the distal colon. No free  intraperitoneal  spillage of contrast. Redemonstrated CT findings which are highly concerning for contained distal gastric perforation including poor visualization of the distal gastric wall and infiltrating collection of  gas and debris within the right upper quadrant extending along the inferior aspect of the liver.    Note from Dr. Gómez 3/22/2025.  I am hesitant to operate as I do not believe he would benefit and I am not convinced it is needed at this time.  Perforation appears to be contained without any free abdominal fluid nor free air.  3 days post last day of chemo and is likely nearing his rosalba.  He is cachectic, malnourished and poor overall candidate for surgery.  Follow hemoglobin and consult GI for bleeding.    WBC 2.2, ANC 1.97 hemoglobin 7.6, hematocrit 23.8 and platelet 147.  ANC 1.97 on 3/23/2025..  Albumin 2.5.    Patient given 1 unit packed RBC 3/23/2025.    Patient seen by Dr. Noel 3/23/2025.  Patient has bleeding from the B-cell neoplasm in the proximal duodenum.  This was provoked by a febrile response to chemotherapy.  Strongly consider transfer to an institution process and interventional radiology capabilities.    WBC 1.05, hemoglobin 7.5, hematocrit 23.8, MCV 87.5 and platelet 132.  CMP remarkable for albumin of 2.2.  PT 17.7 and INR 1.38.          Past Medical History:  Past Medical History:   Diagnosis Date    Bladder cancer     CLL (chronic lymphocytic leukemia)     Colon polyp     Gallstone     GERD (gastroesophageal reflux disease)     Glaucoma     Hearing loss     Hypertension     Inguinal hernia     right groin    Macular degeneration, right eye      Prior Surgeries:  Past Surgical History:   Procedure Laterality Date    CATARACT EXTRACTION, BILATERAL      COLONOSCOPY  06/13/2012    CYSTOSCOPY BLADDER BIOPSY      DIAGNOSTIC LAPAROSCOPY N/A 9/26/2024    Procedure: ROBOTIC REPAIR OF PERFORATED GASTRIC ULCER;  Surgeon: Petrona Malone MD;  Location: [facility];  Service:  General;  Laterality: N/A;  WITH REPAIR OF GASTRIC ULCER PERFORATION    EXCISION MASS HEAD/NECK N/A 3/4/2022    Procedure: EXCISION OF MASS ON POSTERIOR NECK;  Surgeon: Rossana Mahajan MD;  Location:  [facility];  Service: General;  Laterality: N/A;    INGUINAL HERNIA REPAIR Left 2007    INGUINAL HERNIA REPAIR Right 12/28/2017    Procedure: RIGHT INGUINAL HERNIA REPAIR WITH MESH ;  Surgeon: Rossana Mahajan MD;  Location:  [facility];  Service:     VENOUS ACCESS DEVICE (PORT) INSERTION N/A 3/5/2025    Procedure: Single Lumen Port-a-cath insertion with flouroscopy;  Surgeon: Petrona Malone MD;  Location:  [facility];  Service: General;  Laterality: N/A;        Allergies:Latex and Augmentin [amoxicillin-pot clavulanate]  PTA Meds:  Medications Prior to Admission   Medication Sig Dispense Refill Last Dose/Taking    acetaminophen (Tylenol) 325 MG tablet Take 3 tablets by mouth Every 8 (Eight) Hours. Take every 8 hours for 3 days then take prn as needed.   Past Week    allopurinol (ZYLOPRIM) 300 MG tablet Take 1 tablet by mouth Daily. 90 tablet 2 Past Week    dexAMETHasone (DECADRON) 4 MG tablet Take 1 tablet by mouth 2 (Two) Times a Day With Meals. Take 1 tablet the night before chemotherapy treatment and 1 tablet the morning of his chemotherapy treatment. 16 tablet 0 Past Week    Ferrous Sulfate (IRON PO) Take 1 tablet by mouth Daily.   Past Week    HYDROcodone-acetaminophen (NORCO)  MG per tablet Take 1 tablet by mouth Every 4 (Four) Hours As Needed for Moderate Pain. 180 tablet 0 Past Week    latanoprost (XALATAN) 0.005 % ophthalmic solution Administer 1 drop to the right eye Every Night.   Past Week    lidocaine-prilocaine (EMLA) 2.5-2.5 % cream 30 minutes prior to use apply generous amount of Emla Cream to port site and cover with clear plastic wrap until ready for access 1 each 1 Past Week    metoprolol tartrate (LOPRESSOR) 25 MG tablet Take 1 tablet by mouth 2 (Two) Times a Day.   Past Week     multivitamins-minerals (PRESERVISION AREDS 2) capsule capsule Take 1 capsule by mouth 2 (Two) Times a Day.   Past Week    nystatin (MYCOSTATIN) 100,000 unit/mL suspension Swish and swallow 5 mL 4 (Four) Times a Day.   Past Week    ondansetron (Zofran) 4 MG tablet Take 1 tablet by mouth Every 8 (Eight) Hours As Needed for Nausea or Vomiting. 15 tablet 0 Past Week    pantoprazole (Protonix) 40 MG EC tablet Take 1 tablet by mouth Daily. 30 tablet 2 Past Week    predniSONE (DELTASONE) 10 MG tablet Take 5 tablets by mouth Daily. Daily for 5 days to begin with chemo 5 tablet 2 Past Week    prochlorperazine (COMPAZINE) 10 MG tablet Take 1 tablet by mouth Every 4 (Four) Hours As Needed for Nausea or Vomiting. 60 tablet 2 Past Month    tamsulosin (FLOMAX) 0.4 MG capsule 24 hr capsule Take 1 capsule by mouth Daily.   Past Week    vitamin B-12 (CYANOCOBALAMIN) 1000 MCG tablet Take 1 tablet by mouth Daily.   Past Week    vitamin D3 125 MCG (5000 UT) capsule capsule Take 1 capsule by mouth Daily.   Past Week    naloxone (NARCAN) 4 MG/0.1ML nasal spray Call 911. Don't prime. [place] in 1 nostril for overdose. Repeat in 2-3 minutes in other nostril if no or minimal breathing/responsiveness. 2 each 0 Unknown      Current Meds:   Current Facility-Administered Medications   Medication Dose Route Frequency Provider Last Rate Last Admin    cefTRIAXone (ROCEPHIN) 1,000 mg in sodium chloride 0.9 % 100 mL MBP  1,000 mg Intravenous Q24H Wilbert Maldonado  mL/hr at 03/23/25 1152 1,000 mg at 03/23/25 1152    latanoprost (XALATAN) 0.005 % ophthalmic solution 1 drop  1 drop Right Eye Nightly Wilbert Maldonado MD   1 drop at 03/23/25 2135    metroNIDAZOLE (FLAGYL) IVPB 500 mg  500 mg Intravenous Q8H Wilbert Maldonado  mL/hr at 03/24/25 0412 500 mg at 03/24/25 0412    Morphine sulfate (PF) injection 1 mg  1 mg Intravenous Q4H PRN Wilbert Maldonado MD        nystatin (MYCOSTATIN) 100,000 unit/mL suspension 500,000 Units   "5 mL Oral 4x Daily Wilbert Maldonado MD   500,000 Units at 03/23/25 1709    ondansetron (ZOFRAN) injection 4 mg  4 mg Intravenous Q6H PRN Wilbert Maldonado MD        pantoprazole (PROTONIX) injection 40 mg  40 mg Intravenous Q12H Wilbert Maldonado MD   40 mg at 03/23/25 2112    sodium chloride 0.9 % flush 10 mL  10 mL Intravenous PRN Wilbert Maldonado MD        sodium chloride 0.9 % flush 10 mL  10 mL Intravenous Q12H Wilbert Maldonado MD   10 mL at 03/23/25 2113    sodium chloride 0.9 % flush 10 mL  10 mL Intravenous PRN Wilbert Maldonado MD        sodium chloride 0.9 % infusion 40 mL  40 mL Intravenous PRN Wilbert Maldonado MD        sodium chloride 0.9 % infusion  100 mL/hr Intravenous Continuous Wilbert Maldonado  mL/hr at 03/24/25 0412 100 mL/hr at 03/24/25 0412       Family History:family history includes Alzheimer's disease in his mother; COPD in his sister; Colon cancer in his maternal grandfather; Heart attack in his brother; Hypertension in his father; No Known Problems in his maternal grandmother, paternal grandfather, and paternal grandmother; Other in his father.   Social History: reports that he quit smoking about 53 years ago. His smoking use included cigarettes. He started smoking about 68 years ago. He has been exposed to tobacco smoke. He has never used smokeless tobacco. He reports that he does not drink alcohol and does not use drugs.    Review of Systems  Review of Systems   Constitutional:  Positive for fatigue. Negative for chills and fever.        \"When can I eat?\"   HENT:  Negative for nosebleeds.    Eyes:  Negative for redness.   Respiratory:  Negative for shortness of breath.    Cardiovascular:  Negative for chest pain.   Gastrointestinal:  Positive for abdominal pain. Negative for nausea and vomiting.   Endocrine: Negative for cold intolerance and heat intolerance.   Genitourinary:  Negative for dysuria and hematuria.   Musculoskeletal:  Negative for " "myalgias.   Skin:  Positive for pallor.   Allergic/Immunologic: Positive for immunocompromised state.   Neurological:  Negative for dizziness and speech difficulty.   Hematological:  Negative for adenopathy.   Psychiatric/Behavioral:  Negative for agitation and confusion. The patient is not nervous/anxious.          Objective      Vital Signs   Temp:  [97.3 °F (36.3 °C)-98.7 °F (37.1 °C)] 97.4 °F (36.3 °C)  Heart Rate:  [70-92] 92  Resp:  [16] 16  BP: (110-122)/(40-65) 122/43    Physical Exam  Vitals and nursing note reviewed.   Constitutional:       Appearance: He is ill-appearing.   HENT:      Head: Normocephalic and atraumatic.   Eyes:      General: No scleral icterus.  Cardiovascular:      Rate and Rhythm: Normal rate.   Pulmonary:      Effort: No respiratory distress.      Breath sounds: No wheezing.      Comments: Port site, no erythema.  Abdominal:      General: There is no distension.      Palpations: Abdomen is soft.   Musculoskeletal:         General: No swelling.   Skin:     Coloration: Skin is pale.   Neurological:      Mental Status: He is oriented to person, place, and time.   Psychiatric:         Behavior: Behavior normal.               Results from last 7 days   Lab Units 03/24/25  0448 03/23/25  0554 03/22/25  1512   SODIUM mmol/L 135* 132* 131*   POTASSIUM mmol/L 3.4* 3.7 3.9   CHLORIDE mmol/L 102 99 96*   CO2 mmol/L 25.0 25.0 27.0   BUN mg/dL 23 24* 31*   CREATININE mg/dL 0.67* 0.65* 0.77   CALCIUM mg/dL 7.3* 7.7* 8.2*   BILIRUBIN mg/dL 0.6 1.2 0.5   ALK PHOS U/L 72 73 87   ALT (SGPT) U/L 14 13 14   AST (SGOT) U/L 16 14 17   GLUCOSE mg/dL 91 95 118*       ABG:  No results found for: \"PHART\", \"PO2ART\", \"NBJ3YHX\"    Culture Data:   No results found for: \"BLOODCX\", \"URINECX\", \"WOUNDCX\", \"MRSACX\", \"RESPCX\", \"STOOLCX\"    Radiology:   Imaging Results (Last 7 Days)       Procedure Component Value Units Date/Time    CT Abdomen Pelvis Without Contrast [[id number]] Collected: 03/22/25 2032     Updated: " 03/22/25 2043    Narrative:      EXAM/TECHNIQUE: CT abdomen and pelvis without contrast     INDICATION: eval for contained perforation on previous CT; K92.1-Melena;  C83.30-Diffuse large B-cell lymphoma, unspecified site; D64.9-Anemia,  unspecified; D70.9-Neutropenia, unspecified; E87.6-Awns-kmsyuboqdy and  hyponatremia     COMPARISON: None available.     DLP: 126.86 mGy.cm. Automated exposure control was utilized to decrease  patient radiation dose.       Impression:      FINDINGS/IMPRESSION:     1.  Evaluation is difficult as there is severe diffuse soft tissue  edema/anasarca, which results in blurring of fat planes.      2.  Oral contrast was administered which has transited to the stomach,  small bowel, and reached the distal colon. No free intraperitoneal  spillage of contrast. Redemonstrated CT findings which are highly  concerning for contained distal gastric perforation including poor  visualization of the distal gastric wall and infiltrating collection of  gas and debris within the right upper quadrant extending along the  inferior aspect of the liver.        This report was signed and finalized on 3/22/2025 8:40 PM by Dr. Steven Giles MD.       CT Angiogram Abdomen Pelvis [[id number]] Collected: 03/22/25 1600     Updated: 03/22/25 1612    Narrative:      EXAM/TECHNIQUE: CT angiography with 3D MIP images abdomen and pelvis  without and with IV contrast     INDICATION: gi bleed. known stomach cancer     COMPARISON: 1/7/2025     DLP: 598.03 mGy.cm. Automated exposure control was utilized to decrease  patient radiation dose.     FINDINGS:     Lung bases are clear.     No suspicious liver lesion. Small gallstone. No abnormal gallbladder  wall thickening. No biliary ductal dilatation. Pancreas and adrenal  glands appear unremarkable. Spleen is mildly enlarged measuring 13.3 cm  craniocaudal dimension. No solid renal mass. No urolithiasis or  hydronephrosis. No focal urinary bladder abnormality.     Patient  has a known lymphoma involving the duodenum and has a history of  perforated gastric ulcer. The distal gastric wall is poorly visualized  and there appears to be a large amount of extraluminal fluid and gas  within the region of the gastric antrum and proximal duodenum, in  keeping with bowel perforation. Also within the region of known  lymphoma, in the distal gastric lumen, there is an area of contrast  pooling on delayed images on axial image 97 series 9, in keeping with an  area of active bleeding within the stomach.     Moderate volume stool in the colon. No colonic wall thickening or  pericolonic fat stranding. Hyperdense material within the stomach is  hyperdense before giving contrast. No bowel obstruction. Normal  appendix.     No ascites or free pelvic fluid. No pelvic mass or collection. Prostate  and seminal vesicles are unremarkable.     Nonaneurysmal atherosclerotic abdominal aorta. Celiac artery, SMA, and  ONEL are patent. Renal arteries are patent. Redemonstrated mild  retroperitoneal periaortic lymphadenopathy. No new or enlarging lymph  nodes in the abdomen or pelvis.     Severe diffuse body wall soft tissue edema, in keeping with anasarca. No  acute osseous finding.       Impression:         1.  Patient has a known distal gastric/proximal duodenal lymphoma. The  distal gastric wall is poorly visualized and there is appearance of  extraluminal gas and debris, highly suspicious for contained  gastroduodenal perforation. If clinically indicated, this could be  further evaluated with limited CT of the abdomen after oral contrast  administration.     2.  There is a 1.7 cm oval focus of contrast pooling within the distal  gastric lumen on delayed images, in keeping with an area of active  bleeding.     3.  Upper abdominal retroperitoneal lymphadenopathy, similar to the  prior study.     4.  Severe diffuse abdominal wall soft tissue edema, likely related to  anasarca.           This report was signed and  finalized on 3/22/2025 4:09 PM by Dr. Steven Giles MD.                    Assessment/Plan        ASSESSMENT:   Distal gastric perforation.  2.  GI bleed secondary to the gastric perforation.  3.  Neutropenia without fever from mini R-CHOP.  Had pegfilgrastim 3/20/2025.      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  Ki-67 70%.  AJCC stage: 1B.  IPI score 2. 3 year overall survival 81%. 3 year PFS 75%.   Treatment status: Pending mini R CHOP due to advance age and poor performance status.   2.   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  Treatment status: C1D1 mini R-CHOP on 3/19/2025..  Complications: None.  Prognosis: Good.  3.   Abdominal pain from lymphoma.  Hydrocodone as needed for pain.    4.   Normocytic anemia, from chronic disease and iron deficiency.  Oral iron 2/13/2025 through present.  5.   Poor performance status of 3.  6.   Mild thrombocytopenia likely from idiopathic thrombocytopenic purpura, stable for observation.   7.   History of fever, resolved, ? viral syndrome.  Not compatible with progressing chronic lymphocytic leukemia (CLL). Lymphocyte count is stable, no palpable adenopathies, and fever had resolved.   8.   Bladder cancer  AJCC stage cTa, cN0, cM0.   Treatment status:On observation.   9.  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       PLAN:   regarding the reason for the consult.  Differential count today is pending.  Continue supportive measures.  2.  Hold filgrastim 300 mcg subcutaneous daily unless he develop neutropenic fever.  Patient had pegfilgrastim on 3/20/2025.  3.  Follow heme status.  4.  Transfuse packed RBC if hemoglobin < 7.  Observe for transfusion reactions.  5.  He is on empiric ceftriaxone and metronidazole.  Patient also on pantoprazole.  6.  Further recommendations  pending.            I discussed the patients findings and my recommendations with patient, spouse and nurse Steffany.  They verbalized understanding.    Marco Boogie MD  03/24/25  05:44 CDT                Thank you for allowing me to participate in the care of . Oral Dey

## 2025-03-24 NOTE — PROGRESS NOTES
[facility]  INPATIENT PROGRESS NOTE    Patient Name: Oral Dey  Date of Admission: 3/22/2025  Today's Date: 03/24/25  Length of Stay: 2  Primary Care Physician: Jorge Shepherd MD    Subjective   Chief Complaint: follow-up abdominal pain, black stool  HPI     Patient has no new complaint, discussed plan with patient's wife at bedside.    Review of Systems   All pertinent negatives and positives are as above. All other systems have been reviewed and are negative unless otherwise stated.     Objective    Temp:  [97.3 °F (36.3 °C)-98.3 °F (36.8 °C)] 98.2 °F (36.8 °C)  Heart Rate:  [61-92] 61  Resp:  [16-18] 16  BP: (114-140)/(38-65) 140/42  Physical Exam  Vitals reviewed.   Constitutional:       Appearance: He is ill-appearing.      Comments: Frail and cachectic appearing   HENT:      Head: Normocephalic.      Mouth/Throat:      Mouth: Mucous membranes are moist.   Cardiovascular:      Rate and Rhythm: Normal rate.   Pulmonary:      Effort: Pulmonary effort is normal. No respiratory distress.   Abdominal:      Tenderness: There is no guarding or rebound.      Comments: Thin, mild PASTORA TTP   Musculoskeletal:      Right lower leg: Edema present.      Left lower leg: Edema present.   Skin:     General: Skin is warm.   Neurological:      Mental Status: He is alert.      Motor: Weakness present.   Psychiatric:         Mood and Affect: Mood normal.         Results Review:  I have reviewed the labs, radiology results, and diagnostic studies.    Laboratory Data:   Results from last 7 days   Lab Units 03/24/25  0448 03/23/25  2337 03/23/25  1805 03/23/25  1155 03/23/25  0554 03/22/25  2355 03/22/25  1512   WBC 10*3/mm3 1.05*  --   --   --  2.27*  --  3.13*   HEMOGLOBIN g/dL 7.5* 7.5* 7.8*   < > 7.6*   < > 7.9*   HEMATOCRIT % 23.8* 23.7* 24.9*   < > 23.8*   < > 25.3*   PLATELETS 10*3/mm3 132*  --   --   --  147  --  189    < > = values in this interval not displayed.  "       Results from last 7 days   Lab Units 03/24/25  0448 03/23/25  0554 03/22/25  1512   SODIUM mmol/L 135* 132* 131*   POTASSIUM mmol/L 3.4* 3.7 3.9   CHLORIDE mmol/L 102 99 96*   CO2 mmol/L 25.0 25.0 27.0   BUN mg/dL 23 24* 31*   CREATININE mg/dL 0.67* 0.65* 0.77   CALCIUM mg/dL 7.3* 7.7* 8.2*   BILIRUBIN mg/dL 0.6 1.2 0.5   ALK PHOS U/L 72 73 87   ALT (SGPT) U/L 14 13 14   AST (SGOT) U/L 16 14 17   GLUCOSE mg/dL 91 95 118*       Culture Data:   No results found for: \"BLOODCX\", \"URINECX\", \"WOUNDCX\", \"MRSACX\", \"RESPCX\", \"STOOLCX\"    Radiology Data:   Imaging Results (Last 24 Hours)       Procedure Component Value Units Date/Time    FL Upper GI Single Contrast SBFT [[id number]] Collected: 03/24/25 1202     Updated: 03/24/25 1217    Narrative:      EXAMINATION: FL UPPER GI SINGLE CONTRAST SBFT-     3/24/2025 10:05 AM     HISTORY: 84 yom w/ contained gastrodoudenal perforation.; K92.1-Melena;  C83.30-Diffuse large B-cell lymphoma, unspecified site; D64.9-Anemia,  unspecified; D70.9-Neutropenia, unspecified; E87.0-Llsu-xogmmmzpey and  hyponatremia     Fluoroscopy was performed and an images of the upper GI and small bowel  were obtained during and after ingestion of the meal water-soluble  contrast material.     Comparison is made with the previous study dated 10/1/2024.     There is no difficulty in initiating the swallowing.     There is normal preparation and propagation of bolus through the  oropharynx. No nasopharyngeal reflux.     Patient laryngeal penetration and small amount of aspiration of the  contrast material followed by significant cough and expectoration.     Normal vallecula piriform sinuses.     The esophagus is normal. No intrinsic abnormality.     There is marked deformity and thickening of the gastric antrum. No  perforation at this level. The type of contrast used is not optimal for  evaluation of the gastric mucosa/ulceration.     There is marked deformity of the duodenum with diffuse " abnormal  dilatation of the duodenum. There is marked thickening and and  irregularity of the mucosa of the duodenum. No evidence of perforation.  The duodenal loop appears normal.     A follow-up image of the abdomen was obtained for evaluation of small  bowel.     Normal opacification of the small bowel. Image obtained after 20 minutes  show retained contrast material in the stomach and the distal part of  the contrast material in the cecum and proximal ascending colon. No  evidence of leakage of the contrast material is noted.       Impression:      1. Marked irregularity and deformity of the mucosa of the gastric antrum  and proximal duodenum without a perforation. This may represent acute or  subacute inflammatory process.  2. The remaining small bowel is normal.  3. Fluoroscopy time: 4 minutes 1 second.  5. Dose: 60mGy.  6. Number of images: 70.              This report was signed and finalized on 3/24/2025 12:14 PM by Dr. Bob Prater MD.               I have reviewed the patient's current medications.     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    -Duodenal perforation secondary to lymphoma [complaint]  General Surgery was consulted and also recommended conservative management.  Discussed all these with patient's wife this morning and advised palliative consult.    -Urinary tract infection  Patient is currently on ceftriaxone, unfortunately urine culture was not done and  therefore we will complete 5 to 7-day course of antibiotic.    -History of diffuse large B-cell lymphoma involving duodenal bulb  Patient was getting chemotherapy outpatient, no urgent need to consult oncology in-house.  I will consult palliative care.    Other chronic medical conditions-  Chronic anemia  Chronic lymphocytic leukemia  Bladder cancer  GERD  Hypertension  Macular degeneration of right eye    DVT prophylaxis-SCD    Disposition-consult palliative care for disposition planning      Electronically signed by Sandoval Davis MD, 03/24/25, 17:28 CDT.

## 2025-03-24 NOTE — PLAN OF CARE
Problem: Adult Inpatient Plan of Care  Goal: Plan of Care Review  Outcome: Progressing  Flowsheets (Taken 3/24/2025 8412)  Outcome Evaluation: Patient RA. IV CDI, IVF infusing per order. IV abx infusing per order. Tolerating clear liquid diet. Alert and Oriented. SBFT, see results. VSS, safety maintained.

## 2025-03-24 NOTE — PAYOR COMM NOTE
"ADMIT 3/22/2025  REF: [id number]    [facility]  JEAN,   [phone]  OR   FAX  [phone]    Oral Mao (84 y.o. Male)       Date of Birth   1940    Social Security Number       Address   [address] [place] [address]    Home Phone   [phone]    MRN   [id number]       Rastafari   Other    Marital Status                               Admission Date   3/22/2025    Admission Type   Emergency    Admitting Provider   Sandoval Davis MD    Attending Provider   Sandoval Davis MD    Department, Room/Bed   [facility] 3C, 388/1       Discharge Date       Discharge Disposition       Discharge Destination                                 Attending Provider: Sandoval Davis MD    Allergies: Latex, Augmentin [Amoxicillin-pot Clavulanate]    Isolation: None   Infection: None   Code Status: CPR    Ht: 177.8 cm (70\")   Wt: 49.9 kg (110 lb)    Admission Cmt: None   Principal Problem: Upper GI bleed [K92.2]                   Active Insurance as of 3/22/2025       Primary Coverage       Payor Plan Insurance Group Employer/Plan Group    AET[employer] COMMERCIAL AETNA [id number]       Payor Plan Address Payor Plan Phone Number Payor Plan Fax Number Effective Dates    [address] [phone]  7/1/2017 - None Entered    [facility] [id number]         Subscriber Name Subscriber Birth Date Member ID       ORAL MAO 1940 [id number]                     Emergency Contacts        (Rel.) Home Phone Work Phone Mobile Phone    Alaina Mao (Spouse) [phone] -- --    MAYRA LANCASTER (Daughter) -- -- [phone]    YisselMiguel (Brother) [phone] -- --          3/22/2025 Event Details User   14:50 Patient arrival  Richelle Hess RN   14:50 HPI HPI  Stated Reason for Visit: pt presents to ED with CC of \"tar like\" stools. Hx of stomach cancer and first chemo was 3/19. A&O x4. ABCs intact  History Obtained From: patient Deshawn " MAISHA Peoples   14:50:30 Arrival Complaint black stools    14:50:40 Trigger for Triage Start  Richelle Hess RN   14:50:40 Triage Started  Richelle Hess RN   14:50:40 Chief Complaints Updated Black or Bloody Stool Richelle Hess RN   14:51 Vitals Assessment  Nawaf Mathews PCT   14:51 Acuity/Destination Acuity/Destination  Patient Acuity: 3  ED Destination: ED Beds Richelle Hess RN   14:51 Vital Signs Vital Signs  Temp: 98.1 °F (36.7 °C)  Temp src: Temporal  Heart Rate: 79  Heart Rate Source: Monitor  Resp: 18  Resp Rate Source: Visual  BP: 137/46  BP Location: Left arm  BP Method: Automatic  Patient Position: Sitting  Oxygen Therapy  SpO2: 98 %  Pulse Oximetry Type: Intermittent  Device (Oxygen Therapy): room air  Vitals Timer  Restart Vitals Timer: Yes Nawaf Mathews PCT     14:52 Pain Pain  (0-10) Pain Rating: Rest: 2  Pain Location: abdomen Richelle Hess RN     15:27 POC Occult Blood Stool Resulted (Edited) Abnormal Result  Collected: 3/22/2025 15:27  Last updated: 3/22/2025 15:29  Status: Edited Result - FINAL  Fecal Occult Blood: Positive Abnormal   Lot Number: [id number]  Expiration Date: 2/28/25  DEVELOPER LOT NUMBER: [id number]  DEVELOPER EXPIRATION DATE: 2/28/25  Positive Control: Positive  Negative Control: Negative Pitt, Ena, RN     5:55 Medication Given pantoprazole (PROTONIX) injection 80 mg - Dose: 80 mg ; Route: Intravenous ; Line: Peripheral IV 03/22/25 1522 Anterior;Left Forearm ; Scheduled Time: 1545 Julia Lopes RN     16:47 Free Text I did review the patient's previous hospitalization when he had the gastric ulcer perforation requiring surgical intervention.  Laboratory data has been reviewed today with previous labs compared.  He is chronically anemic with a relatively stable hemoglobin and hematocrit of 7.9 25.3 respectively.  He is not hypotensive.  I spoke with Dr. Pemberton, general surgeon on-call who is gracious evaluate the patient in the ED and  review his images. Nanda Rivera PA     17:39 Free Text Dr. Pemberton, general surgeon, is gracious evaluate the patient in the ED.  He requests a CT scan be completed with oral contrast for further evaluation of possibly contained perforation.  He recommends medicine admit the patient and consult gastroenterology as well.  I have spoken to both Dr. Maldonado, hospitalist as well as Dr. Jules, gastroenterologist. Nanda Rivera PA Ray, Charlene, RN   Registered Nurse  Nursing     Plan of Care      Signed     Date of Service: 03/23/25 0351  Creation Time: 03/23/25 0351     Signed         Goal Outcome Evaluation:  Plan of Care Reviewed With: patient, spouse  Progress: no change  Outcome Evaluation: Patient received alert and orientated with bilateral hearing aids intact due to [place].  Patient drank oral contrast in the ER and this nurse transported patient to CT scan via wheelchair.  Patient is a one person assist to transfer with weak gait.  Patient is incontinent of stool and urine at times with brief changed frequently.  Critical lab of Hgb 6.8 reported to hospitalist with new orders to transfuse PRBCs.  Patient educated on blood transfusion and wife signed consent as patient reports he is too weak to sign his own signature at this time.  Patient skin is intact with no open areas during assessment.  Nurse placed preventative foam dressing to coccyx due to thin layer of skin that is red and blanchable over the ray prominence.  Patient educated on turning and positioning every two hours and patient agreed to turn himself with minimal assistance.  Patient complains of intermittant pain in the abdomen LLQ mostly and has had no request needed for pain management at this time.  Patient placed on telemetry once he returned from CT scan; will continue to assess and treat patient per plan of care.  Patient remains NPO for possible tests in the morning.              Karla Grant, RN    Registered Nurse  Nursing     Plan of Care      Signed     Date of Service: 03/23/25 1758  Creation Time: 03/23/25 1758     Signed         Goal Outcome Evaluation:  Plan of Care Reviewed With: patient, family  Progress: no change  Outcome Evaluation: Pt has denied pain; IVF and IV abx per orders; NPO except ice chips; turn q2hr; GI consulted and saw patient today-no plans at this time; safety maintained.                  Steffany joseph RN   Registered Nurse  Nursing     Plan of Care      Signed     Date of Service: 03/24/25 0519  Creation Time: 03/24/25 0519     Signed         Goal Outcome Evaluation:  Progress: no change  Pt c/o stomach pain. Pt refused pain meds. NPO with ice chips. IVF/ IV abx infusing per orders. Turn q2. Dark tarry BM x 3. Voiding. Incontinent. VSS. Safety maintained.                        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 Boogie of hematology/oncology, the presented to our facility today with symptoms of abdominal pain in addition to passing black stool.  Patient indicates that he recently started his first cycle of chemotherapy.  He indicates that he has been having worsening pain and discomfort near the middle of his abdomen, and yesterday began passing some stool which was black in color.  He denies any nausea or vomiting.  No hematemesis.  He reports having ongoing weight loss of approximately 30-40 pounds over the past few months.  He has continued to get progressively more weak.  In the emergency  department he has had multiple black stools, occult positive for blood, and also received a CAT scan of the abdomen and pelvis revealing a contained perforation near the site of his malignancy.  Both general surgery and gastroenterology have been notified.  Hospitalist service requested for admission.  Patient's only other complaint is some soreness in his mouth, and concern for possible thrush.  Family is at bedside.  Patient indicates that he does not take any blood thinning medication.  He does not utilize any NSAIDs.    Review of Systems   Otherwise complete ROS reviewed and negative except as mentioned in the HPI.    Past Medical History:   Past Medical History:   Diagnosis Date    Bladder cancer     CLL (chronic lymphocytic leukemia)     Colon polyp     Gallstone     GERD (gastroesophageal reflux disease)     Glaucoma     Hearing loss     Hypertension     Inguinal hernia     right groin    Macular degeneration, right eye      Past Surgical History:  Past Surgical History:   Procedure Laterality Date    CATARACT EXTRACTION, BILATERAL      COLONOSCOPY  06/13/2012    CYSTOSCOPY BLADDER BIOPSY      DIAGNOSTIC LAPAROSCOPY N/A 9/26/2024    Procedure: ROBOTIC REPAIR OF PERFORATED GASTRIC ULCER;  Surgeon: Petrona Mlaone MD;  Location:  [facility] OR;  Service: General;  Laterality: N/A;  WITH REPAIR OF GASTRIC ULCER PERFORATION    EXCISION MASS HEAD/NECK N/A 3/4/2022    Procedure: EXCISION OF MASS ON POSTERIOR NECK;  Surgeon: Rossana Mahajan MD;  Location:  [facility] OR;  Service: General;  Laterality: N/A;    INGUINAL HERNIA REPAIR Left 2007    INGUINAL HERNIA REPAIR Right 12/28/2017    Procedure: RIGHT INGUINAL HERNIA REPAIR WITH MESH ;  Surgeon: Rossana Mahajan MD;  Location:  [facility] OR;  Service:     VENOUS ACCESS DEVICE (PORT) INSERTION N/A 3/5/2025    Procedure: Single Lumen Port-a-cath insertion with flouroscopy;  Surgeon: Petrona Malone MD;  Location:  [facility] OR;  Service: General;  Laterality: N/A;      Social History:  reports that he quit smoking about 53 years ago. His smoking use included cigarettes. He started smoking about 68 years ago. He has never used smokeless tobacco. He reports that he does not drink alcohol and does not use drugs.    Family History: family history includes Alzheimer's disease in his mother; COPD in his sister; Colon cancer in his maternal grandfather; Heart attack in his brother; Hypertension in his father; No Known Problems in his maternal grandmother, paternal grandfather, and paternal grandmother; Other in his father.       Allergies:  Allergies   Allergen Reactions    Latex Itching and Other (See Comments)     And band aids. Causes redness and itching.    Augmentin [Amoxicillin-Pot Clavulanate] Hives       Medications:  Prior to Admission medications    Medication Sig Start Date End Date Taking? Authorizing Provider   acetaminophen (Tylenol) 325 MG tablet Take 3 tablets by mouth Every 8 (Eight) Hours. Take every 8 hours for 3 days then take prn as needed. 3/5/25 3/5/26  Petrona Malone MD   acetaminophen (TYLENOL) 500 MG tablet Take 1 tablet by mouth Every 6 (Six) Hours As Needed for Mild Pain. 10/5/24   Hipolito Robbins MD   albuterol sulfate  (90 Base) MCG/ACT inhaler Inhale 2 puffs 4 (Four) Times a Day. 10/4/24   Petrona Malone MD   allopurinol (ZYLOPRIM) 300 MG tablet Take 1 tablet by mouth Daily. 2/11/25   Marco Boogie MD   dexAMETHasone (DECADRON) 4 MG tablet Take 1 tablet by mouth 2 (Two) Times a Day With Meals. Take 1 tablet the night before chemotherapy treatment and 1 tablet the morning of his chemotherapy treatment. 3/18/25   Marco Boogie MD   famotidine (PEPCID) 20 MG tablet Take 1 tablet by mouth 2 (Two) Times a Day. 1/24/25   Raji Nunn Jr., MD   Ferrous Sulfate (IRON PO) Take 1 tablet by mouth Daily.    Provider, MD Lauro   HYDROcodone-acetaminophen (NORCO)  MG per tablet Take 1 tablet by mouth Every 6 (Six) Hours  As Needed for Moderate Pain. 2/11/25   Marco Boogie MD   HYDROcodone-acetaminophen (NORCO)  MG per tablet Take 1 tablet by mouth Every 4 (Four) Hours As Needed for Moderate Pain. 3/4/25   Marco Boogie MD   latanoprost (XALATAN) 0.005 % ophthalmic solution Administer 1 drop to the right eye Every Night. 6/27/17   Lauro Cazares MD   lidocaine-prilocaine (EMLA) 2.5-2.5 % cream 30 minutes prior to use apply generous amount of Emla Cream to port site and cover with clear plastic wrap until ready for access 3/6/25   Marco Boogie MD   metoprolol tartrate (LOPRESSOR) 25 MG tablet Take 1 tablet by mouth 2 (Two) Times a Day. 6/5/17   Lauro Cazares MD   multivitamins-minerals (PRESERVISION AREDS 2) capsule capsule Take 1 capsule by mouth 2 (Two) Times a Day.    Lauro Cazares MD   naloxone (NARCAN) 4 MG/0.1ML nasal spray Call 911. Don't prime. [place] in 1 nostril for overdose. Repeat in 2-3 minutes in other nostril if no or minimal breathing/responsiveness. 3/4/25   Marco Boogie MD   ondansetron (Zofran) 4 MG tablet Take 1 tablet by mouth Every 8 (Eight) Hours As Needed for Nausea or Vomiting. 3/5/25 3/5/26  Petrona Malone MD   ondansetron (ZOFRAN) 8 MG tablet Take 1 tablet by mouth Every 8 (Eight) Hours As Needed for Nausea or Vomiting. 2/11/25   Marco Boogie MD   ondansetron ODT (ZOFRAN-ODT) 4 MG disintegrating tablet Place 1 tablet on the tongue Every 6 (Six) Hours As Needed for Nausea or Vomiting. 10/3/24   Petrona Malone MD   pantoprazole (Protonix) 40 MG EC tablet Take 1 tablet by mouth 2 (Two) Times a Day. 12/23/24   Jeaneth Okeefe PA-C   pantoprazole (Protonix) 40 MG EC tablet Take 1 tablet by mouth Daily. 3/5/25 3/5/26  Petrona Malone MD   predniSONE (DELTASONE) 10 MG tablet Take 5 tablets by mouth Daily. Daily for 5 days to begin with chemo 2/11/25   Marco Boogie MD   prochlorperazine (COMPAZINE) 10 MG tablet Take 1 tablet by mouth Every 4 (Four) Hours As  "Needed for Nausea or Vomiting. 2/11/25   Marco Boogie MD   sucralfate (Carafate) 1 GM/10ML suspension Take 10 mL by mouth 4 (Four) Times a Day. 1/13/25   Jeaneth Okeefe PA-C   tamsulosin (FLOMAX) 0.4 MG capsule 24 hr capsule Take 1 capsule by mouth Daily. 1/31/24   Lauro Cazares MD   vitamin B-12 (CYANOCOBALAMIN) 1000 MCG tablet Take 1 tablet by mouth Daily.    Lauro Cazares MD   vitamin D3 125 MCG (5000 UT) capsule capsule Take 1 capsule by mouth Daily.    Lauro Cazares MD     I have utilized all available immediate resources to obtain, update, or review the patient's current medications (including all prescriptions, over-the-counter products, herbals, cannabis/cannabidiol products, and vitamin/mineral/dietary (nutritional) supplements).    Objective     Vital Signs: /58   Pulse 83   Temp 98.1 °F (36.7 °C) (Temporal)   Resp 18   Ht 177.8 cm (70\")   Wt 49.9 kg (110 lb)   SpO2 100%   BMI 15.78 kg/m²   Physical Exam  Vitals reviewed.   Constitutional:       Appearance: He is ill-appearing.      Comments: Frail and cachectic appearing   HENT:      Head: Normocephalic.      Mouth/Throat:      Mouth: Mucous membranes are dry.      Pharynx: Oropharyngeal exudate present.   Cardiovascular:      Rate and Rhythm: Normal rate.   Pulmonary:      Effort: Pulmonary effort is normal. No respiratory distress.   Abdominal:      Tenderness: There is abdominal tenderness.   Genitourinary:     Comments: Patient did pass black, sticky stool during my assessment  Musculoskeletal:      Right lower leg: Edema present.      Left lower leg: Edema present.   Skin:     General: Skin is warm.      Coloration: Skin is pale.   Neurological:      Mental Status: He is alert.      Motor: Weakness present.          Results Reviewed:  Lab Results (last 24 hours)       Procedure Component Value Units Date/Time    Urinalysis With Culture If Indicated - Urine, Clean Catch [[id number]]  (Abnormal) Collected: " 03/22/25 1705    Specimen: Urine, Clean Catch Updated: 03/22/25 1736     Color, UA Yellow     Appearance, UA Clear     pH, UA 5.5     Specific Gravity, UA 1.029     Glucose, UA Negative     Ketones, UA Negative     Bilirubin, UA Negative     Blood, UA Negative     Protein, UA Trace     Leuk Esterase, UA Negative     Nitrite, UA Positive     Urobilinogen, UA 1.0 E.U./dL    Narrative:      In absence of clinical symptoms, the presence of pyuria, bacteria, and/or nitrites on the urinalysis result does not correlate with infection.    Urinalysis, Microscopic Only - Urine, Clean Catch [[id number]]  (Abnormal) Collected: 03/22/25 1705    Specimen: Urine, Clean Catch Updated: 03/22/25 1736     RBC, UA None Seen /HPF      WBC, UA 3-5 /HPF      Comment: Urine culture not indicated.        Bacteria, UA 3+ /HPF      Squamous Epithelial Cells, UA 0-2 /HPF      Hyaline Casts, UA None Seen /LPF      Calcium Oxalate Crystals, UA Small/1+ /HPF      Methodology Manual Light Microscopy    CBC & Differential [[id number]]  (Abnormal) Collected: 03/22/25 1512    Specimen: Blood Updated: 03/22/25 1551    Narrative:      The following orders were created for panel order CBC & Differential.  Procedure                               Abnormality         Status                     ---------                               -----------         ------                     CBC Auto Differential[[id number]]        Abnormal            Final result                 Please view results for these tests on the individual orders.    CBC Auto Differential [[id number]]  (Abnormal) Collected: 03/22/25 1512    Specimen: Blood Updated: 03/22/25 1551     WBC 3.13 10*3/mm3      RBC 2.89 10*6/mm3      Hemoglobin 7.9 g/dL      Hematocrit 25.3 %      MCV 87.5 fL      MCH 27.3 pg      MCHC 31.2 g/dL      RDW 14.2 %      RDW-SD 45.3 fl      MPV 8.7 fL      Platelets 189 10*3/mm3     Manual Differential [[id number]]  (Abnormal) Collected: 03/22/25 1512    Specimen: Blood  Updated: 03/22/25 1551     Neutrophil % 90.5 %      Lymphocyte % 4.2 %      Monocyte % 1.1 %      Eosinophil % 0.0 %      Basophil % 0.0 %      Bands %  3.2 %      Metamyelocyte % 1.1 %      Neutrophils Absolute 2.93 10*3/mm3      Lymphocytes Absolute 0.13 10*3/mm3      Monocytes Absolute 0.03 10*3/mm3      Eosinophils Absolute 0.00 10*3/mm3      Basophils Absolute 0.00 10*3/mm3      Crenated RBC's Slight/1+     Elliptocytes Slight/1+     Poikilocytes Slight/1+     Vacuolated Neutrophils Slight/1+     Platelet Morphology Normal    Comprehensive Metabolic Panel [[id number]]  (Abnormal) Collected: 03/22/25 1512    Specimen: Blood Updated: 03/22/25 1540     Glucose 118 mg/dL      BUN 31 mg/dL      Creatinine 0.77 mg/dL      Sodium 131 mmol/L      Potassium 3.9 mmol/L      Chloride 96 mmol/L      CO2 27.0 mmol/L      Calcium 8.2 mg/dL      Total Protein 4.4 g/dL      Albumin 2.9 g/dL      ALT (SGPT) 14 U/L      AST (SGOT) 17 U/L      Alkaline Phosphatase 87 U/L      Total Bilirubin 0.5 mg/dL      Globulin 1.5 gm/dL      A/G Ratio 1.9 g/dL      BUN/Creatinine Ratio 40.3     Anion Gap 8.0 mmol/L      eGFR 89.4 mL/min/1.73     Narrative:      GFR Categories in Chronic Kidney Disease (CKD)      GFR Category          GFR (mL/min/1.73)    Interpretation  G1                     90 or greater         Normal or high (1)  G2                      60-89                Mild decrease (1)  G3a                   45-59                Mild to moderate decrease  G3b                   30-44                Moderate to severe decrease  G4                    15-29                Severe decrease  G5                    14 or less           Kidney failure          (1)In the absence of evidence of kidney disease, neither GFR category G1 or G2 fulfill the criteria for CKD.    eGFR calculation 2021 CKD-EPI creatinine equation, which does not include race as a factor    Lactic Acid, Plasma [[id number]]  (Normal) Collected: 03/22/25 1512    Specimen:  Blood Updated: 03/22/25 1535     Lactate 1.1 mmol/L     Protime-INR [[id number]]  (Abnormal) Collected: 03/22/25 1512    Specimen: Blood Updated: 03/22/25 1530     Protime 15.6 Seconds      INR 1.17    aPTT [[id number]]  (Normal) Collected: 03/22/25 1512    Specimen: Blood Updated: 03/22/25 1530     PTT 27.1 seconds     Narrative:      PTT = The equivalent PTT values for the therapeutic range of heparin levels at 0.3 to 0.7 U/ml are 77 - 99 seconds.    POC Occult Blood Stool [[id number]]  (Abnormal) Collected: 03/22/25 1527    Specimen: Stool Updated: 03/22/25 1529     Fecal Occult Blood Positive     Lot Number [id number]     Expiration Date 2/28/25     DEVELOPER LOT NUMBER 271     DEVELOPER EXPIRATION DATE 2/28/25     Positive Control Positive     Negative Control Negative          Imaging Results (Last 24 Hours)       Procedure Component Value Units Date/Time    CT Angiogram Abdomen Pelvis [[id number]] Collected: 03/22/25 1600     Updated: 03/22/25 1612    Narrative:      EXAM/TECHNIQUE: CT angiography with 3D MIP images abdomen and pelvis  without and with IV contrast     INDICATION: gi bleed. known stomach cancer     COMPARISON: 1/7/2025     DLP: 598.03 mGy.cm. Automated exposure control was utilized to decrease  patient radiation dose.     FINDINGS:     Lung bases are clear.     No suspicious liver lesion. Small gallstone. No abnormal gallbladder  wall thickening. No biliary ductal dilatation. Pancreas and adrenal  glands appear unremarkable. Spleen is mildly enlarged measuring 13.3 cm  craniocaudal dimension. No solid renal mass. No urolithiasis or  hydronephrosis. No focal urinary bladder abnormality.     Patient has a known lymphoma involving the duodenum and has a history of  perforated gastric ulcer. The distal gastric wall is poorly visualized  and there appears to be a large amount of extraluminal fluid and gas  within the region of the gastric antrum and proximal duodenum, in  keeping with bowel perforation. Also  within the region of known  lymphoma, in the distal gastric lumen, there is an area of contrast  pooling on delayed images on axial image 97 series 9, in keeping with an  area of active bleeding within the stomach.     Moderate volume stool in the colon. No colonic wall thickening or  pericolonic fat stranding. Hyperdense material within the stomach is  hyperdense before giving contrast. No bowel obstruction. Normal  appendix.     No ascites or free pelvic fluid. No pelvic mass or collection. Prostate  and seminal vesicles are unremarkable.     Nonaneurysmal atherosclerotic abdominal aorta. Celiac artery, SMA, and  ONEL are patent. Renal arteries are patent. Redemonstrated mild  retroperitoneal periaortic lymphadenopathy. No new or enlarging lymph  nodes in the abdomen or pelvis.     Severe diffuse body wall soft tissue edema, in keeping with anasarca. No  acute osseous finding.       Impression:         1.  Patient has a known distal gastric/proximal duodenal lymphoma. The  distal gastric wall is poorly visualized and there is appearance of  extraluminal gas and debris, highly suspicious for contained  gastroduodenal perforation. If clinically indicated, this could be  further evaluated with limited CT of the abdomen after oral contrast  administration.     2.  There is a 1.7 cm oval focus of contrast pooling within the distal  gastric lumen on delayed images, in keeping with an area of active  bleeding.     3.  Upper abdominal retroperitoneal lymphadenopathy, similar to the  prior study.     4.  Severe diffuse abdominal wall soft tissue edema, likely related to  anasarca.           This report was signed and finalized on 3/22/2025 4:09 PM by Dr. Steven Giles MD.             I have personally reviewed and interpreted the radiology studies and ECG obtained at time of admission.     Assessment / Plan   Assessment:   Active Hospital Problems    Diagnosis     **Upper GI bleed     Weight loss     Abdominal pain      Suspected contained gastroduodenal perforation     Diffuse large B-cell lymphoma involving duodenal bulb     Anemia     CLL (chronic lymphocytic leukemia)        Treatment Plan  Strict NPO  General Surgery consult  Repeat CT A/P planned with PO contrast  GI consultation  IV PPI  Serial H/Hs  Type and Screen  Start IVFs  IV Morphine PRN pain  IV Zofran PRN  SCDs; avoid all blood thinning medications  Workup ongoing.  Guarded prognosis.    Medical Decision Making  Number and Complexity of problems: 3 acute; high complexity    Conditions and Status        Condition is worsening.     [facility] Data  External documents reviewed: Dr. Boogie's outpatient Hematology/Oncology note from earlier this month reviewed  Cardiac tracing (EKG, telemetry) interpretation: no new EKGs  Radiology interpretation: CT A/P with findings of possible contained perforation gastroduodenal region with possible blood noted in stomach  Labs reviewed: as above  Any tests that were considered but not ordered: none     Decision rules/scores evaluated (example [id number], Wells, etc): none     Discussed with: patient, family at bedside, Bella (ED PA)     Care Planning  Shared decision making: Discussed with patient with agreement to proceed with treatment plan as outlined  Code status and discussions: I had discussion with him regarding his CODE STATUS.  I informed him and his family that we would keep him as a full code for now, but patient clearly was thinking about his options moving forward but not ready to formally make any change to code decision yet.    Disposition  Social Determinants of Health that impact treatment or disposition: none apparent at this time  Estimated length of stay is 2 days or greater    I confirmed that the patient's advanced care plan is present, code status is documented, and a surrogate decision maker is listed in the patient's medical record.     The patient's surrogate decision maker is his wife,  Brittanie        Electronically signed by Wilbert GRACIA. MD Joel, 03/22/25, 18:07 CDT.                Electronically signed by Wilbert Maldonado MD at 03/22/25 1812          Emergency Department Notes        Nanda Rivera PA at 03/22/25 1503       Attestation signed by Raji Nunn Jr., MD at 03/23/25 0649        SUPERVISE: For this patient encounter, I reviewed the APC's documentation, treatment plan, and medical decision making.  Raji Nunn Jr, MD 3/23/2025 06:48 CDT                         Subjective   History of Present Illness    Patient is a pleasant 84-year-old gentleman who presents to ED with his wife and daughter.  Chief complaint is tarry stool.    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    Patient describes yesterday, he had lower abdominal discomfort and had some occasional tarry looking stool that is black in coloration.  The patient says he was up all night going to the bathroom frequently.  He is unsure how many bowel movements he has had.  He feels weak.  He has had over 50 pound weight loss in the past several months.  He did start IV chemotherapy treatment 3 days ago.  He believes he gets it every 21 days.  He denies any associated fever, nausea or vomiting.  He has no desire to eat.  He denies any hematuria, bleeding out of his gumline, or blood out of any other orifice except for his stool.  He denies taking NSAIDs or drinking alcohol.  He has not taken any Pepto-Bismol.    Patient does have a history of chronic swelling to his lower extremities as well and is not really sure why.  He denies any history of congestive heart failure, shortness of breath, chest pain, liver or kidney failure.    Review of Systems    Constitutional:  Positive for activity change, appetite change and unexpected weight change. Negative for fever.   HENT: Negative.     Respiratory: Negative.     Cardiovascular:  Positive for leg swelling.   Gastrointestinal:  Positive for abdominal pain, blood in stool and diarrhea.   Genitourinary: Negative.  Negative for hematuria.   Musculoskeletal: Negative.    Skin: Negative.    Neurological:  Positive for weakness.   Psychiatric/Behavioral: Negative.     All other systems reviewed and are negative.      Past Medical History:   Diagnosis Date    Bladder cancer     CLL (chronic lymphocytic leukemia)     Colon polyp     Gallstone     GERD (gastroesophageal reflux disease)     Glaucoma     Hearing loss     Hypertension     Inguinal hernia     right groin    Macular degeneration, right eye        Allergies   Allergen Reactions    Latex Itching and Other (See Comments)     And band aids. Causes redness and itching.    Augmentin [Amoxicillin-Pot Clavulanate] Hives       Past Surgical History:   Procedure Laterality Date    CATARACT EXTRACTION, BILATERAL      COLONOSCOPY  06/13/2012    CYSTOSCOPY BLADDER BIOPSY      DIAGNOSTIC LAPAROSCOPY N/A 9/26/2024    Procedure: ROBOTIC REPAIR OF PERFORATED GASTRIC ULCER;  Surgeon: Petrona Malone MD;  Location:  [facility] OR;  Service: General;  Laterality: N/A;  WITH REPAIR OF GASTRIC ULCER PERFORATION    EXCISION MASS HEAD/NECK N/A 3/4/2022    Procedure: EXCISION OF MASS ON POSTERIOR NECK;  Surgeon: Rossana Mahajan MD;  Location:  [facility] OR;  Service: General;  Laterality: N/A;    INGUINAL HERNIA REPAIR Left 2007    INGUINAL HERNIA REPAIR Right 12/28/2017    Procedure: RIGHT INGUINAL HERNIA REPAIR WITH MESH ;  Surgeon: Rossana Mahajan MD;  Location:  [facility] OR;  Service:     VENOUS ACCESS DEVICE (PORT) INSERTION N/A 3/5/2025    Procedure: Single Lumen Port-a-cath insertion with flouroscopy;  Surgeon: Petrona Malone MD;  Location:  [facility] OR;  Service: General;   Laterality: N/A;       Family History   Problem Relation Age of Onset    Colon cancer Maternal Grandfather         in his 60's.     Alzheimer's disease Mother     Hypertension Father     Other Father         stent    COPD Sister     Heart attack Brother     No Known Problems Maternal Grandmother     No Known Problems Paternal Grandmother     No Known Problems Paternal Grandfather        Social History     Socioeconomic History    Marital status:    Tobacco Use    Smoking status: Former     Current packs/day: 0.00     Types: Cigarettes     Start date:      Quit date:      Years since quittin.2    Smokeless tobacco: Never   Vaping Use    Vaping status: Never Used   Substance and Sexual Activity    Alcohol use: No    Drug use: No    Sexual activity: Defer       Prior to Admission medications    Medication Sig Start Date End Date Taking? Authorizing Provider   acetaminophen (Tylenol) 325 MG tablet Take 3 tablets by mouth Every 8 (Eight) Hours. Take every 8 hours for 3 days then take prn as needed. 3/5/25 3/5/26  Petrona Malone MD   acetaminophen (TYLENOL) 500 MG tablet Take 1 tablet by mouth Every 6 (Six) Hours As Needed for Mild Pain. 10/5/24   Hipolito Robbins MD   albuterol sulfate  (90 Base) MCG/ACT inhaler Inhale 2 puffs 4 (Four) Times a Day. 10/4/24   Petrona Malone MD   allopurinol (ZYLOPRIM) 300 MG tablet Take 1 tablet by mouth Daily. 25   Marco Boogie MD   dexAMETHasone (DECADRON) 4 MG tablet Take 1 tablet by mouth 2 (Two) Times a Day With Meals. Take 1 tablet the night before chemotherapy treatment and 1 tablet the morning of his chemotherapy treatment. 3/18/25   Marco Boogie MD   famotidine (PEPCID) 20 MG tablet Take 1 tablet by mouth 2 (Two) Times a Day. 25   Raji Nunn Jr., MD   Ferrous Sulfate (IRON PO) Take 1 tablet by mouth Daily.    Provider, MD Lauro   HYDROcodone-acetaminophen (NORCO)  MG per tablet Take 1 tablet by mouth  Every 6 (Six) Hours As Needed for Moderate Pain. 2/11/25   Marco Boogie MD   HYDROcodone-acetaminophen (NORCO)  MG per tablet Take 1 tablet by mouth Every 4 (Four) Hours As Needed for Moderate Pain. 3/4/25   Marco Boogie MD   latanoprost (XALATAN) 0.005 % ophthalmic solution Administer 1 drop to the right eye Every Night. 6/27/17   Lauro Cazares MD   lidocaine-prilocaine (EMLA) 2.5-2.5 % cream 30 minutes prior to use apply generous amount of Emla Cream to port site and cover with clear plastic wrap until ready for access 3/6/25   Marco Boogie MD   metoprolol tartrate (LOPRESSOR) 25 MG tablet Take 1 tablet by mouth 2 (Two) Times a Day. 6/5/17   Lauro Cazares MD   multivitamins-minerals (PRESERVISION AREDS 2) capsule capsule Take 1 capsule by mouth 2 (Two) Times a Day.    Lauro Cazares MD   naloxone (NARCAN) 4 MG/0.1ML nasal spray Call 911. Don't prime. [place] in 1 nostril for overdose. Repeat in 2-3 minutes in other nostril if no or minimal breathing/responsiveness. 3/4/25   Marco Boogie MD   ondansetron (Zofran) 4 MG tablet Take 1 tablet by mouth Every 8 (Eight) Hours As Needed for Nausea or Vomiting. 3/5/25 3/5/26  Petrona Malone MD   ondansetron (ZOFRAN) 8 MG tablet Take 1 tablet by mouth Every 8 (Eight) Hours As Needed for Nausea or Vomiting. 2/11/25   Marco Boogie MD   ondansetron ODT (ZOFRAN-ODT) 4 MG disintegrating tablet Place 1 tablet on the tongue Every 6 (Six) Hours As Needed for Nausea or Vomiting. 10/3/24   Petrona Malone MD   pantoprazole (Protonix) 40 MG EC tablet Take 1 tablet by mouth 2 (Two) Times a Day. 12/23/24   Jeaneth Okeefe PA-C   pantoprazole (Protonix) 40 MG EC tablet Take 1 tablet by mouth Daily. 3/5/25 3/5/26  Petrona Malone MD   predniSONE (DELTASONE) 10 MG tablet Take 5 tablets by mouth Daily. Daily for 5 days to begin with chemo 2/11/25   Marco Boogie MD   prochlorperazine (COMPAZINE) 10 MG tablet Take 1 tablet by mouth Every  "4 (Four) Hours As Needed for Nausea or Vomiting. 2/11/25   Marco Boogie MD   sucralfate (Carafate) 1 GM/10ML suspension Take 10 mL by mouth 4 (Four) Times a Day. 1/13/25   Jeaneth Okeefe PA-C   tamsulosin (FLOMAX) 0.4 MG capsule 24 hr capsule Take 1 capsule by mouth Daily. 1/31/24   Lauro Cazares MD   vitamin B-12 (CYANOCOBALAMIN) 1000 MCG tablet Take 1 tablet by mouth Daily.    Lauro Cazares MD   vitamin D3 125 MCG (5000 UT) capsule capsule Take 1 capsule by mouth Daily.    Lauro Cazares MD       Medications   sodium chloride 0.9 % flush 10 mL (has no administration in time range)   pantoprazole (PROTONIX) injection 80 mg (80 mg Intravenous Given 3/22/25 1555)   iopamidol (ISOVUE-370) 76 % injection 100 mL (100 mL Intravenous Given 3/22/25 1546)       /58   Pulse 83   Temp 98.1 °F (36.7 °C) (Temporal)   Resp 18   Ht 177.8 cm (70\")   Wt 49.9 kg (110 lb)   SpO2 100%   BMI 15.78 kg/m²       Objective   Physical Exam  Vitals reviewed.   Constitutional:       Appearance: Normal appearance. He is ill-appearing.      Comments: Chronically ill-appearing   HENT:      Head: Normocephalic and atraumatic.      Nose: Nose normal.   Eyes:      Extraocular Movements: Extraocular movements intact.   Cardiovascular:      Rate and Rhythm: Normal rate and regular rhythm.   Pulmonary:      Effort: Pulmonary effort is normal.      Breath sounds: Normal breath sounds.   Abdominal:      General: Bowel sounds are normal.      Palpations: Abdomen is soft.      Tenderness: There is abdominal tenderness. There is guarding.   Musculoskeletal:         General: Swelling present.      Cervical back: Normal range of motion and neck supple.      Right lower leg: Edema present.      Left lower leg: Edema present.      Comments: 3+ pitting edema to bilateral lower extremities with palpable pulses bilaterally.   Skin:     General: Skin is warm.   Neurological:      Mental Status: He is alert and oriented to " person, place, and time.   Psychiatric:         Mood and Affect: Mood normal.         Behavior: Behavior normal. Behavior is cooperative.         Procedures        Lab Results (last 24 hours)       Procedure Component Value Units Date/Time    CBC & Differential [[id number]]  (Abnormal) Collected: 03/22/25 1512    Specimen: Blood Updated: 03/22/25 1551    Narrative:      The following orders were created for panel order CBC & Differential.  Procedure                               Abnormality         Status                     ---------                               -----------         ------                     CBC Auto Differential[[id number]]        Abnormal            Final result                 Please view results for these tests on the individual orders.    Comprehensive Metabolic Panel [[id number]]  (Abnormal) Collected: 03/22/25 1512    Specimen: Blood Updated: 03/22/25 1540     Glucose 118 mg/dL      BUN 31 mg/dL      Creatinine 0.77 mg/dL      Sodium 131 mmol/L      Potassium 3.9 mmol/L      Chloride 96 mmol/L      CO2 27.0 mmol/L      Calcium 8.2 mg/dL      Total Protein 4.4 g/dL      Albumin 2.9 g/dL      ALT (SGPT) 14 U/L      AST (SGOT) 17 U/L      Alkaline Phosphatase 87 U/L      Total Bilirubin 0.5 mg/dL      Globulin 1.5 gm/dL      A/G Ratio 1.9 g/dL      BUN/Creatinine Ratio 40.3     Anion Gap 8.0 mmol/L      eGFR 89.4 mL/min/1.73     Narrative:      GFR Categories in Chronic Kidney Disease (CKD)      GFR Category          GFR (mL/min/1.73)    Interpretation  G1                     90 or greater         Normal or high (1)  G2                      60-89                Mild decrease (1)  G3a                   45-59                Mild to moderate decrease  G3b                   30-44                Moderate to severe decrease  G4                    15-29                Severe decrease  G5                    14 or less           Kidney failure          (1)In the absence of evidence of kidney disease,  neither GFR category G1 or G2 fulfill the criteria for CKD.    eGFR calculation 2021 CKD-EPI creatinine equation, which does not include race as a factor    Protime-INR [[id number]]  (Abnormal) Collected: 03/22/25 1512    Specimen: Blood Updated: 03/22/25 1530     Protime 15.6 Seconds      INR 1.17    aPTT [[id number]]  (Normal) Collected: 03/22/25 1512    Specimen: Blood Updated: 03/22/25 1530     PTT 27.1 seconds     Narrative:      PTT = The equivalent PTT values for the therapeutic range of heparin levels at 0.3 to 0.7 U/ml are 77 - 99 seconds.    Lactic Acid, Plasma [[id number]]  (Normal) Collected: 03/22/25 1512    Specimen: Blood Updated: 03/22/25 1535     Lactate 1.1 mmol/L     CBC Auto Differential [[id number]]  (Abnormal) Collected: 03/22/25 1512    Specimen: Blood Updated: 03/22/25 1551     WBC 3.13 10*3/mm3      RBC 2.89 10*6/mm3      Hemoglobin 7.9 g/dL      Hematocrit 25.3 %      MCV 87.5 fL      MCH 27.3 pg      MCHC 31.2 g/dL      RDW 14.2 %      RDW-SD 45.3 fl      MPV 8.7 fL      Platelets 189 10*3/mm3     Manual Differential [[id number]]  (Abnormal) Collected: 03/22/25 1512    Specimen: Blood Updated: 03/22/25 1551     Neutrophil % 90.5 %      Lymphocyte % 4.2 %      Monocyte % 1.1 %      Eosinophil % 0.0 %      Basophil % 0.0 %      Bands %  3.2 %      Metamyelocyte % 1.1 %      Neutrophils Absolute 2.93 10*3/mm3      Lymphocytes Absolute 0.13 10*3/mm3      Monocytes Absolute 0.03 10*3/mm3      Eosinophils Absolute 0.00 10*3/mm3      Basophils Absolute 0.00 10*3/mm3      Crenated RBC's Slight/1+     Elliptocytes Slight/1+     Poikilocytes Slight/1+     Vacuolated Neutrophils Slight/1+     Platelet Morphology Normal    POC Occult Blood Stool [[id number]]  (Abnormal) Collected: 03/22/25 1527    Specimen: Stool Updated: 03/22/25 1529     Fecal Occult Blood Positive     Lot Number [id number]     Expiration Date 2/28/25     DEVELOPER LOT NUMBER [id number]     DEVELOPER EXPIRATION DATE 2/28/25     Positive Control  Positive     Negative Control Negative    Urinalysis With Culture If Indicated - Urine, Clean Catch [[id number]]  (Abnormal) Collected: 03/22/25 1705    Specimen: Urine, Clean Catch Updated: 03/22/25 1736     Color, UA Yellow     Appearance, UA Clear     pH, UA 5.5     Specific Gravity, UA 1.029     Glucose, UA Negative     Ketones, UA Negative     Bilirubin, UA Negative     Blood, UA Negative     Protein, UA Trace     Leuk Esterase, UA Negative     Nitrite, UA Positive     Urobilinogen, UA 1.0 E.U./dL    Narrative:      In absence of clinical symptoms, the presence of pyuria, bacteria, and/or nitrites on the urinalysis result does not correlate with infection.    Urinalysis, Microscopic Only - Urine, Clean Catch [[id number]]  (Abnormal) Collected: 03/22/25 1705    Specimen: Urine, Clean Catch Updated: 03/22/25 1736     RBC, UA None Seen /HPF      WBC, UA 3-5 /HPF      Comment: Urine culture not indicated.        Bacteria, UA 3+ /HPF      Squamous Epithelial Cells, UA 0-2 /HPF      Hyaline Casts, UA None Seen /LPF      Calcium Oxalate Crystals, UA Small/1+ /HPF      Methodology Manual Light Microscopy            CT Angiogram Abdomen Pelvis  Result Date: 3/22/2025  Narrative: EXAM/TECHNIQUE: CT angiography with 3D MIP images abdomen and pelvis without and with IV contrast  INDICATION: gi bleed. known stomach cancer  COMPARISON: 1/7/2025  DLP: 598.03 mGy.cm. Automated exposure control was utilized to decrease patient radiation dose.  FINDINGS:  Lung bases are clear.  No suspicious liver lesion. Small gallstone. No abnormal gallbladder wall thickening. No biliary ductal dilatation. Pancreas and adrenal glands appear unremarkable. Spleen is mildly enlarged measuring 13.3 cm craniocaudal dimension. No solid renal mass. No urolithiasis or hydronephrosis. No focal urinary bladder abnormality.  Patient has a known lymphoma involving the duodenum and has a history of perforated gastric ulcer. The distal gastric wall is poorly  visualized and there appears to be a large amount of extraluminal fluid and gas within the region of the gastric antrum and proximal duodenum, in keeping with bowel perforation. Also within the region of known lymphoma, in the distal gastric lumen, there is an area of contrast pooling on delayed images on axial image 97 series 9, in keeping with an area of active bleeding within the stomach.  Moderate volume stool in the colon. No colonic wall thickening or pericolonic fat stranding. Hyperdense material within the stomach is hyperdense before giving contrast. No bowel obstruction. Normal appendix.  No ascites or free pelvic fluid. No pelvic mass or collection. Prostate and seminal vesicles are unremarkable.  Nonaneurysmal atherosclerotic abdominal aorta. Celiac artery, SMA, and ONEL are patent. Renal arteries are patent. Redemonstrated mild retroperitoneal periaortic lymphadenopathy. No new or enlarging lymph nodes in the abdomen or pelvis.  Severe diffuse body wall soft tissue edema, in keeping with anasarca. No acute osseous finding.      Impression:  1.  Patient has a known distal gastric/proximal duodenal lymphoma. The distal gastric wall is poorly visualized and there is appearance of extraluminal gas and debris, highly suspicious for contained gastroduodenal perforation. If clinically indicated, this could be further evaluated with limited CT of the abdomen after oral contrast administration.  2.  There is a 1.7 cm oval focus of contrast pooling within the distal gastric lumen on delayed images, in keeping with an area of active bleeding.  3.  Upper abdominal retroperitoneal lymphadenopathy, similar to the prior study.  4.  Severe diffuse abdominal wall soft tissue edema, likely related to anasarca.    This report was signed and finalized on 3/22/2025 4:09 PM by Dr. Steven Giles MD.      XR Chest Post CVA Port  Result Date: 3/5/2025  Narrative: XR CHEST POST CVA PORT- 3/5/2025 12:30 PM  HISTORY: lifeport  insertion; Z45.2-Encounter for adjustment and management of vascular access device; C83.33-Diffuse large b-cell lymphoma, intra-abdominal lymph nodes  COMPARISON: None  FLUOROSCOPY TIME: 12 seconds  FLUOROSCOPY DOSE: 0.98 mGy  NUMBER OF IMAGES: 1      Impression:  Intraoperative fluoroscopic images during life port insertion.  Please refer to the operative note for more details.  This report was signed and finalized on 3/5/2025 3:51 PM by Dr Eddie Lincoln.      FL C Arm During Surgery  Result Date: 3/5/2025  Narrative: This procedure was auto-finalized with no dictation required.    NM PET/CT Skull Base to Mid Thigh  Result Date: 3/3/2025  Narrative: Exam: NM PET/CT SKULL BASE TO MID THIGH-  Indication: Initial staging of lymphoma  Comparison: CT 1/7/2025  -10.2 mCi F-18 FDG was administered IV per protocol via the right wrist. - Blood glucose at the time of injection was 115 mg/dl.  - PET/CT images were obtained from the base of the skull to the proximal femurs approximately 60 minutes after radiotracer administration. - Noncontrast CT images of the neck, chest, abdomen, and pelvis were obtained for attenuation correction and anatomic localization. - DLP: 274.08 mGy.cm. Automated exposure control was also utilized to decrease patient radiation dose.  FINDINGS: Background right hepatic lobe metabolic activity measures max SUV 1.7.  No suspicious hypermetabolic activity in the neck.  No hypermetabolic pulmonary nodule. 1.6 x 1.3 cm subcarinal hypermetabolic lymph node with max SUV 2.5. No other hypermetabolic lymph nodes in the chest.  Marked hypermetabolic wall thickening of the gastric antrum and proximal duodenum with max SUV of 11.2. There is again gastrohepatic ligament lymphadenopathy though these nodes do not appear hypermetabolic. For reference, right gastrohepatic ligament lymph node along the medial liver margin measuring 3.6 x 1.6 cm on image 236 series 4 with max SUV 1.5.  No hypermetabolic pelvic  lymphadenopathy. No hypermetabolic bone lesion.  Non-FDG avid findings:  Lower intracranial cavity is unremarkable. No acute orbital finding. Parotid glands appear unremarkable. No large thyroid nodule. No pathologic enlarged cervical lymph nodes. Mastoid air cells and paranasal sinuses are clear. Multilevel cervical spine degenerative change.  Central airways are clear. No consolidation or pleural effusion. Mild biapical parenchymal scarring. No suspicious pulmonary nodule. Ascending aorta is mildly dilated measuring 4.1 cm diameter and contains atherosclerotic calcification. Heavy coronary artery calcification no pericardial effusion.  Unenhanced liver, gallbladder, pancreas, and adrenal glands are unremarkable. Spleen is enlarged measuring 14.3 cm craniocaudal dimension. No urolithiasis or hydronephrosis. No focal urinary bladder abnormality.  No bowel distention or active bowel inflammation. No ascites or free pelvic fluid. No pelvic mass or collection. Prostate is enlarged. Nonenlarged atherosclerotic abdominal aorta. No acute osseous finding.      Impression:  1.  Marked hypermetabolic wall thickening of the gastric antrum and proximal duodenum. Findings are in keeping with biopsy-proven lymphoma.  2.  Enlarged gastrocolic ligament lymph nodes are again present, though these are not hypermetabolic. Differential includes reactive lymph nodes versus additional site of lymphomatous involvement.  3.  Mildly enlarged hypermetabolic subcarinal lymph node, with differential including lymphoma versus reactive node.  4.  The spleen is mildly enlarged measuring 14.3 cm craniocaudal dimension.  5.  The ascending aorta is dilated measuring 4.1 cm diameter.  This report was signed and finalized on 3/3/2025 11:40 AM by Dr. Steven Giles MD.        ED Course  ED Course as of 03/22/25 1803   Sat Mar 22, 2025   1647 I did review the patient's previous hospitalization when he had the gastric ulcer perforation requiring  surgical intervention.  Laboratory data has been reviewed today with previous labs compared.  He is chronically anemic with a relatively stable hemoglobin and hematocrit of 7.9 25.3 respectively.  He is not hypotensive.  I spoke with Dr. Pemberton, general surgeon on-call who is gracious evaluate the patient in the ED and review his images. [TK]   1739 Dr. Pemberton, general surgeon, is gracious evaluate the patient in the ED.  He requests a CT scan be completed with oral contrast for further evaluation of possibly contained perforation.  He recommends medicine admit the patient and consult gastroenterology as well.  I have spoken to both Dr. Maldonado, hospitalist as well as Dr. Jules, gastroenterologist.  [TK]      ED Course User Index  [TK] Nanda Rivera PA          [facility]     Amount and/or Complexity of Data Reviewed  Decide to obtain previous medical records or to obtain history from someone other than the patient: yes        Final diagnoses:   Gastrointestinal hemorrhage with melena   Diffuse large B-cell lymphoma, unspecified body region   Anemia, unspecified type   Neutropenia, unspecified type   Chronic hyponatremia       Disposition: Patient will be admitted under hospitalists' service with consultations to both general surgery and gastroenterology.       Nanda Rivera PA  03/22/25 1747       Nanda Rivera PA  03/22/25 1803      Electronically signed by Raji Nunn Jr., MD at 03/23/25 0649       Vital Signs (last 3 days)       Date/Time Temp Temp src Pulse Resp BP Patient Position SpO2    03/24/25 0737 97.6 (36.4) Oral 82 18 129/38 Lying 95    03/24/25 0333 97.4 (36.3) Oral 92 16 122/43 Lying 94    03/23/25 2327 97.3 (36.3) Oral 78 16 114/65 Lying 96    03/23/25 1551 98.4 (36.9) Oral 75 16 119/51 Lying 96    03/23/25 1118 97.8 (36.6) Oral 70 16 112/40 Lying 95    03/23/25 0809 98.7 (37.1) Oral 74 16 110/52 Lying 97    03/23/25 0447 97.2 (36.2) Oral 70 16 107/48 -- 96     03/23/25 0400 97.1 (36.2) Oral 68 16 104/33 -- 96    03/23/25 0300 98.1 (36.7) Oral 66 16 105/34 -- 96    03/23/25 0202 97.2 (36.2) Oral 68 16 114/38 -- 97    03/23/25 0115 98.3 (36.8) Oral 64 16 117/39 -- 96    03/22/25 2348 97.4 (36.3) Oral 71 16 123/55 Lying 98    03/22/25 2045 97.5 (36.4) Oral 72 16 125/35 Lying 100    03/22/25 1853 98.2 (36.8) Oral 63 16 143/58 Lying 96    03/22/25 1802 -- -- 81 -- 159/68 -- 90    03/22/25 1747 -- -- 78 -- 160/67 -- 98    03/22/25 1714 -- -- 83 -- 144/58 -- 100    03/22/25 1659 -- -- 79 -- 139/53 -- 95    03/22/25 1451 98.1 (36.7) Temporal 79 18 137/46 Sitting 98          Oxygen Therapy (last 3 days)       Date/Time SpO2 Device (Oxygen Therapy) Flow (L/min) (Oxygen Therapy) Oxygen Concentration (%) ETCO2 (mmHg)    03/24/25 0750 -- room air -- -- --    03/24/25 0737 95 room air -- -- --    03/24/25 0333 94 room air -- -- --    03/23/25 2327 96 room air -- -- --    03/23/25 1551 96 room air -- -- --    03/23/25 1118 95 room air -- -- --    03/23/25 0809 97 room air -- -- --    03/23/25 0447 96 room air -- -- --    03/23/25 0400 96 room air -- -- --    03/23/25 0300 96 room air -- -- --    03/23/25 0202 97 room air -- -- --    03/23/25 0115 96 room air -- -- --    03/22/25 2348 98 room air -- -- --    03/22/25 2045 100 room air -- -- --    03/22/25 1853 96 room air -- -- --    03/22/25 1802 90 -- -- -- --    03/22/25 1747 98 -- -- -- --    03/22/25 1714 100 -- -- -- --    03/22/25 1659 95 -- -- -- --    03/22/25 1451 98 room air -- -- --          Intake & Output (last 3 days)         03/21 0701 03/22 0700 03/22 0701  03/23 0700 03/23 0701  03/24 0700 03/24 0701  03/25 0700    I.V. (mL/kg)   1894 (38)     Blood  301.3      Total Intake(mL/kg)  301.3 (6) 1894 (38)     Urine (mL/kg/hr)  300      Total Output  300      Net  +1.3 +1894             Urine Unmeasured Occurrence  1 x 2 x     Stool Unmeasured Occurrence   5 x            Facility-Administered Medications as of 3/24/2025    Medication Dose Route Frequency Provider Last Rate Last Admin    cefTRIAXone (ROCEPHIN) 1,000 mg in sodium chloride 0.9 % 100 mL MBP  1,000 mg Intravenous Q24H Wilbert Maldonado  mL/hr at 03/23/25 1152 1,000 mg at 03/23/25 1152    [COMPLETED] iopamidol (ISOVUE-370) 76 % injection 100 mL  100 mL Intravenous Once in imaging Nanda Rivera PA   100 mL at 03/22/25 1546    latanoprost (XALATAN) 0.005 % ophthalmic solution 1 drop  1 drop Right Eye Nightly Wilbert Maldonado MD   1 drop at 03/23/25 2135    metroNIDAZOLE (FLAGYL) IVPB 500 mg  500 mg Intravenous Q8H Wilbert Maldonado  mL/hr at 03/24/25 0412 500 mg at 03/24/25 0412    Morphine sulfate (PF) injection 1 mg  1 mg Intravenous Q4H PRN Wilbert Maldonado MD        nystatin (MYCOSTATIN) 100,000 unit/mL suspension 500,000 Units  5 mL Oral 4x Daily Wilbert Maldonado MD   500,000 Units at 03/23/25 1709    ondansetron (ZOFRAN) injection 4 mg  4 mg Intravenous Q6H PRN Wilbert Maldonado MD        pantoprazole (PROTONIX) injection 40 mg  40 mg Intravenous Q12H Wilbert Maldonado MD   40 mg at 03/23/25 2112    [COMPLETED] pantoprazole (PROTONIX) injection 80 mg  80 mg Intravenous Once Nanda Rivera PA   80 mg at 03/22/25 1555    sodium chloride 0.9 % flush 10 mL  10 mL Intravenous PRN Wilbert Maldonado MD        sodium chloride 0.9 % flush 10 mL  10 mL Intravenous Q12H Wilbert Maldonado MD   10 mL at 03/23/25 2113    sodium chloride 0.9 % flush 10 mL  10 mL Intravenous PRN Wilbert Maldonado MD        sodium chloride 0.9 % infusion 40 mL  40 mL Intravenous PRN Wilbert Maldonado MD        sodium chloride 0.9 % infusion  100 mL/hr Intravenous Continuous Wilbert Maldonado  mL/hr at 03/24/25 0412 100 mL/hr at 03/24/25 0412     Orders (last 72 hrs)        Start     Ordered    03/24/25 0728  Manual Differential  Once         03/24/25 0727    03/24/25 0700  FL Upper GI Single Contrast SBFT  1  Time Imaging         03/23/25 1105    03/24/25 0600  CBC & Differential  Morning Draw         03/23/25 1124    03/24/25 0600  Comprehensive Metabolic Panel  Morning Draw         03/23/25 1124    03/24/25 0600  Protime-INR  Morning Draw         03/23/25 1124    03/24/25 0600  CBC Auto Differential  PROCEDURE ONCE         03/23/25 2201    03/24/25 0524  Manual Differential  Once,   Status:  Canceled         03/24/25 0523    03/23/25 1230  cefTRIAXone (ROCEPHIN) 1,000 mg in sodium chloride 0.9 % 100 mL MBP  Every 24 Hours         03/23/25 1120    03/23/25 1230  metroNIDAZOLE (FLAGYL) IVPB 500 mg  Every 8 Hours         03/23/25 1120    03/23/25 1230  sodium chloride 0.9 % infusion  Continuous         03/23/25 1131    03/23/25 1200  Hemoglobin & Hematocrit, Blood  Every 6 Hours       03/23/25 1124    03/23/25 1123  Inpatient Hematology & Oncology Consult  Once        Specialty:  Hematology and Oncology  Provider:  Marco Boogie MD    03/23/25 1123    03/23/25 0900  Inpatient Case Management  Consult  Once        Provider:  (Not yet assigned)    03/23/25 0020    03/23/25 0812  FL Upper GI Single Contrast SBFT  1 Time Imaging,   Status:  Canceled         03/23/25 0812    03/23/25 0800  Oral Care  2 Times Daily       03/22/25 1858    03/23/25 0800  nystatin (MYCOSTATIN) 100,000 unit/mL suspension 500,000 Units  4 Times Daily         03/22/25 1858    03/23/25 0604  Manual Differential  Once         03/23/25 0603    03/23/25 0600  CBC & Differential  Morning Draw         03/22/25 1858    03/23/25 0600  Comprehensive Metabolic Panel  Morning Draw         03/22/25 1858    03/23/25 0600  Protime-INR  Morning Draw         03/22/25 1858    03/23/25 0600  CBC Auto Differential  PROCEDURE ONCE         03/22/25 2201    03/23/25 0022  Verify Informed Consent for Blood Product Administration  Once         03/23/25 0021    03/23/25 0022  Prepare RBC, 1 Units  Blood - Once         03/23/25 0021    03/23/25 0021  Transfuse  RBC Infuse Each Unit Over: 3.5H  Transfusion         03/23/25 0021    03/23/25 0019  Inpatient Nutrition Consult  Once        Provider:  (Not yet assigned)    03/23/25 0020    03/23/25 0000  Hemoglobin & Hematocrit, Blood  Every 6 Hours,   Status:  Canceled       03/22/25 1858 03/22/25 2100  latanoprost (XALATAN) 0.005 % ophthalmic solution 1 drop  Nightly         03/22/25 1858 03/22/25 2100  sodium chloride 0.9 % flush 10 mL  Every 12 Hours Scheduled         03/22/25 1858 03/22/25 2000  Vital Signs  Every 4 Hours       03/22/25 1858 03/22/25 1945  sodium chloride 0.9 % infusion  Continuous,   Status:  Discontinued         03/22/25 1858 03/22/25 1945  pantoprazole (PROTONIX) injection 40 mg  Every 12 Hours Scheduled         03/22/25 1858 03/22/25 1859  Intake & Output  Every Shift       03/22/25 1858 03/22/25 1859  Weigh Patient  Once         03/22/25 1858 03/22/25 1859  Insert Peripheral IV  Once         03/22/25 1858 03/22/25 1859  Saline Lock & Maintain IV Access  Continuous         03/22/25 1858 03/22/25 1859  Place Sequential Compression Device  Once         03/22/25 1858 03/22/25 1859  Maintain Sequential Compression Device  Continuous         03/22/25 1858 03/22/25 1859  NPO Diet NPO Type: Strict NPO  Diet Effective Now,   Status:  Canceled         03/22/25 1858    03/22/25 1859  Bowel Regimen Not Indicated  Once         03/22/25 1858 03/22/25 1859  NPO Diet NPO Type: Strict NPO  Diet Effective Now         03/22/25 1858 03/22/25 1858  sodium chloride 0.9 % flush 10 mL  As Needed         03/22/25 1858 03/22/25 1858  sodium chloride 0.9 % infusion 40 mL  As Needed         03/22/25 1858 03/22/25 1858  ondansetron (ZOFRAN) injection 4 mg  Every 6 Hours PRN         03/22/25 1858    03/22/25 1858  Morphine sulfate (PF) injection 1 mg  Every 4 Hours PRN         03/22/25 1858 03/22/25 1804  Type & Screen  Once         03/22/25 1803    03/22/25 1756  Code Status and  "Medical Interventions: CPR (Attempt to Resuscitate); Full Support  Continuous         03/22/25 1757    03/22/25 1747  Inpatient Admission  Once         03/22/25 1747    03/22/25 1739  Gastroenterology (on-call MD unless specified)  Once        Specialty:  Gastroenterology  Provider:  Sabine Jules MD    03/22/25 1739    03/22/25 1723  CT Abdomen Pelvis Without Contrast  1 Time Imaging        Comments: ORAL CONTRAST ONLY      03/22/25 1723    03/22/25 1717  Urinalysis, Microscopic Only - Urine, Clean Catch  Once         03/22/25 1716    03/22/25 1646  Surgery (on-call MD unless specified)  Once        Specialty:  General Surgery  Provider:  Gianfranco Pemberton MD    03/22/25 1645    03/22/25 1602  iopamidol (ISOVUE-370) 76 % injection 100 mL  Once in Imaging         03/22/25 1546    03/22/25 1545  pantoprazole (PROTONIX) injection 80 mg  Once         03/22/25 1529    03/22/25 1522  Manual Differential  Once         03/22/25 1521    03/22/25 1504  POC Occult Blood Stool  Once         03/22/25 1503    03/22/25 1503  Insert Peripheral IV  Once        Placed in \"And\" Linked Group    03/22/25 1502    03/22/25 1503  CT Angiogram Abdomen Pelvis  1 Time Imaging         03/22/25 1502    03/22/25 1503  Cardiac Monitoring  Continuous        Comments: Follow Standing Orders As Outlined in Process Instructions (Open Order Report to View Full Instructions)    03/22/25 1502    03/22/25 1503  Urinalysis With Culture If Indicated - Urine, Clean Catch  Once         03/22/25 1502    03/22/25 1502  sodium chloride 0.9 % flush 10 mL  As Needed        Placed in \"And\" Linked Group    03/22/25 1502    03/22/25 1502  CBC & Differential  Once         03/22/25 1502    03/22/25 1502  Comprehensive Metabolic Panel  Once         03/22/25 1502    03/22/25 1502  Protime-INR  Once         03/22/25 1502    03/22/25 1502  aPTT  Once         03/22/25 1502    03/22/25 1502  Lactic Acid, Plasma  Once         03/22/25 1502    03/22/25 1502  CBC Auto " Differential  PROCEDURE ONCE         25 1503    25 0000  Telemetry Scan  Once         25 0000    --  HYDROcodone-acetaminophen (NORCO)  MG per tablet  Every 4 Hours PRN,   Status:  Discontinued         25 1308    --  pantoprazole (PROTONIX) 40 MG EC tablet  Daily,   Status:  Discontinued         25 1311    --  sucralfate (CARAFATE) 1 g tablet  4 Times Daily,   Status:  Discontinued         25 1311    --  nystatin (MYCOSTATIN) 100,000 unit/mL suspension  4 Times Daily         25 1313                     Physician Progress Notes (last 72 hours)        Ted Noel MD at 25 1439            Gastroenterology Hospitalist follow-up  Patient Identification:  Name: Oral Dey  Age: 84 y.o.  Sex: male  :  1940  MRN: [id number]    Attending: Wilbert Maldonado MD  [facility]  Information from:patient and family     CC: GI bleed    History:   He says he is only passing a small amount of fecal smears. No large melenic stools. No abdominal pain. Under the impression he is to undergo EGD tomorrow.     Review of Systems:  Constitutional:  Weak.   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]     History of gastric ulcer [Z87.11]     Atrial fibrillation with rapid ventricular response [I48.91]     COVID-19 virus infection [U07.1]     Cough [R05.9]     Anemia [D64.9]     Perforated gastric ulcer [K25.5]     Pneumoperitoneum [K66.8]     CLL (chronic lymphocytic leukemia) [C91.10]     Hypertension [I10]     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  Morphine    ondansetron    [COMPLETED] Insert Peripheral IV **AND** sodium chloride    sodium chloride    sodium chloride  Allergies:  Allergies   Allergen Reactions    Latex Itching and Other (See Comments)     And band aids. Causes redness and itching.    Augmentin [Amoxicillin-Pot Clavulanate] Hives       Intake/Output:     Intake/Output Summary (Last 24 hours) at 3/23/2025 1440  Last data filed at 3/23/2025 0447  Gross per 24 hour   Intake 301.25 ml   Output 300 ml   Net 1.25 ml     New allergies/reactions:  None    Physical Exam:  Vitals:   Temp (24hrs), Av.8 °F (36.6 °C), Min:97.1 °F (36.2 °C), Max:98.7 °F (37.1 °C)    Temp:  [97.1 °F (36.2 °C)-98.7 °F (37.1 °C)] 97.8 °F (36.6 °C)  Heart Rate:  [63-83] 70  Resp:  [16-18] 16  BP: (104-160)/(33-68) 112/40  /40 (BP Location: Right arm, Patient Position: Lying)   Pulse 70   Temp 97.8 °F (36.6 °C) (Oral)   Resp 16   Ht 177.8 cm (70\")   Wt 49.9 kg (110 lb)   SpO2 95%   BMI 15.78 kg/m²     Exam:  NAD. Cachectic  PERRLA. Sclerae and conjunctivae pale  HENT: external inspection normal. Hearing intact.  No respiratory distress. Lungs clear.  Cardiac: no MRG.  Abdomen: bowel sounds active. Soft, non-tender, no HSM.  Alert, oriented, normal affect.     DATA:  Radiology and Labs:   Recent Results (from the past 24 hours)   Comprehensive Metabolic Panel    Collection Time: 25  3:12 PM    Specimen: Blood   Result Value Ref Range    Glucose 118 (H) 65 - 99 mg/dL    BUN 31 (H) 8 - 23 mg/dL    Creatinine 0.77 0.76 - 1.27 mg/dL    " Sodium 131 (L) 136 - 145 mmol/L    Potassium 3.9 3.5 - 5.2 mmol/L    Chloride 96 (L) 98 - 107 mmol/L    CO2 27.0 22.0 - 29.0 mmol/L    Calcium 8.2 (L) 8.6 - 10.5 mg/dL    Total Protein 4.4 (L) 6.0 - 8.5 g/dL    Albumin 2.9 (L) 3.5 - 5.2 g/dL    ALT (SGPT) 14 1 - 41 U/L    AST (SGOT) 17 1 - 40 U/L    Alkaline Phosphatase 87 39 - 117 U/L    Total Bilirubin 0.5 0.0 - 1.2 mg/dL    Globulin 1.5 gm/dL    A/G Ratio 1.9 g/dL    BUN/Creatinine Ratio 40.3 (H) 7.0 - 25.0    Anion Gap 8.0 5.0 - 15.0 mmol/L    eGFR 89.4 >60.0 mL/min/1.73   Protime-INR    Collection Time: 03/22/25  3:12 PM    Specimen: Blood   Result Value Ref Range    Protime 15.6 (H) 11.8 - 14.8 Seconds    INR 1.17 (H) 0.91 - 1.09   aPTT    Collection Time: 03/22/25  3:12 PM    Specimen: Blood   Result Value Ref Range    PTT 27.1 24.5 - 36.0 seconds   Lactic Acid, Plasma    Collection Time: 03/22/25  3:12 PM    Specimen: Blood   Result Value Ref Range    Lactate 1.1 0.5 - 2.0 mmol/L   CBC Auto Differential    Collection Time: 03/22/25  3:12 PM    Specimen: Blood   Result Value Ref Range    WBC 3.13 (L) 3.40 - 10.80 10*3/mm3    RBC 2.89 (L) 4.14 - 5.80 10*6/mm3    Hemoglobin 7.9 (L) 13.0 - 17.7 g/dL    Hematocrit 25.3 (L) 37.5 - 51.0 %    MCV 87.5 79.0 - 97.0 fL    MCH 27.3 26.6 - 33.0 pg    MCHC 31.2 (L) 31.5 - 35.7 g/dL    RDW 14.2 12.3 - 15.4 %    RDW-SD 45.3 37.0 - 54.0 fl    MPV 8.7 6.0 - 12.0 fL    Platelets 189 140 - 450 10*3/mm3   Manual Differential    Collection Time: 03/22/25  3:12 PM    Specimen: Blood   Result Value Ref Range    Neutrophil % 90.5 (H) 42.7 - 76.0 %    Lymphocyte % 4.2 (L) 19.6 - 45.3 %    Monocyte % 1.1 (L) 5.0 - 12.0 %    Eosinophil % 0.0 (L) 0.3 - 6.2 %    Basophil % 0.0 0.0 - 1.5 %    Bands %  3.2 0.0 - 5.0 %    Metamyelocyte % 1.1 (H) 0.0 - 0.0 %    Neutrophils Absolute 2.93 1.70 - 7.00 10*3/mm3    Lymphocytes Absolute 0.13 (L) 0.70 - 3.10 10*3/mm3    Monocytes Absolute 0.03 (L) 0.10 - 0.90 10*3/mm3    Eosinophils Absolute 0.00  0.00 - 0.40 10*3/mm3    Basophils Absolute 0.00 0.00 - 0.20 10*3/mm3    Crenated RBC's Slight/1+ None Seen    Elliptocytes Slight/1+ None Seen    Poikilocytes Slight/1+ None Seen    Vacuolated Neutrophils Slight/1+ None Seen    Platelet Morphology Normal Normal   POC Occult Blood Stool    Collection Time: 03/22/25  3:27 PM    Specimen: Stool   Result Value Ref Range    Fecal Occult Blood Positive (A)     Lot Number [id number]     Expiration Date 2/28/25     DEVELOPER LOT NUMBER 271     DEVELOPER EXPIRATION DATE 2/28/25     Positive Control Positive     Negative Control Negative    Urinalysis With Culture If Indicated - Urine, Clean Catch    Collection Time: 03/22/25  5:05 PM    Specimen: Urine, Clean Catch   Result Value Ref Range    Color, UA Yellow Yellow, Straw    Appearance, UA Clear Clear    pH, UA 5.5 5.0 - 8.0    Specific Gravity, UA 1.029 1.005 - 1.030    Glucose, UA Negative Negative    Ketones, UA Negative Negative    Bilirubin, UA Negative Negative    Blood, UA Negative Negative    Protein, UA Trace (A) Negative    Leuk Esterase, UA Negative Negative    Nitrite, UA Positive (A) Negative    Urobilinogen, UA 1.0 E.U./dL 0.2 - 1.0 E.U./dL   Urinalysis, Microscopic Only - Urine, Clean Catch    Collection Time: 03/22/25  5:05 PM    Specimen: Urine, Clean Catch   Result Value Ref Range    RBC, UA None Seen None Seen, 0-2 /HPF    WBC, UA 3-5 (A) None Seen, 0-2 /HPF    Bacteria, UA 3+ (A) None Seen /HPF    Squamous Epithelial Cells, UA 0-2 None Seen, 0-2 /HPF    Hyaline Casts, UA None Seen None Seen /LPF    Calcium Oxalate Crystals, UA Small/1+ None Seen /HPF    Methodology Manual Light Microscopy    Type & Screen    Collection Time: 03/22/25  6:20 PM    Specimen: Blood   Result Value Ref Range    ABO Type A     RH type Positive     Antibody Screen Negative     T&S Expiration Date 3/25/2025 11:59:59 PM    Hemoglobin & Hematocrit, Blood    Collection Time: 03/22/25 11:55 PM    Specimen: Blood   Result Value Ref Range     Hemoglobin 6.8 (C) 13.0 - 17.7 g/dL    Hematocrit 21.7 (L) 37.5 - 51.0 %   Prepare RBC, 1 Units    Collection Time: 03/23/25  1:31 AM   Result Value Ref Range    Product Code W3392S10     Unit Number [id number]     UNIT  ABO A     UNIT  RH POS     Crossmatch Interpretation Compatible     Dispense Status IS     Blood Expiration Date [id number]     Blood Type Barcode 6200    Comprehensive Metabolic Panel    Collection Time: 03/23/25  5:54 AM    Specimen: Blood   Result Value Ref Range    Glucose 95 65 - 99 mg/dL    BUN 24 (H) 8 - 23 mg/dL    Creatinine 0.65 (L) 0.76 - 1.27 mg/dL    Sodium 132 (L) 136 - 145 mmol/L    Potassium 3.7 3.5 - 5.2 mmol/L    Chloride 99 98 - 107 mmol/L    CO2 25.0 22.0 - 29.0 mmol/L    Calcium 7.7 (L) 8.6 - 10.5 mg/dL    Total Protein 3.7 (L) 6.0 - 8.5 g/dL    Albumin 2.5 (L) 3.5 - 5.2 g/dL    ALT (SGPT) 13 1 - 41 U/L    AST (SGOT) 14 1 - 40 U/L    Alkaline Phosphatase 73 39 - 117 U/L    Total Bilirubin 1.2 0.0 - 1.2 mg/dL    Globulin 1.2 gm/dL    A/G Ratio 2.1 g/dL    BUN/Creatinine Ratio 36.9 (H) 7.0 - 25.0    Anion Gap 8.0 5.0 - 15.0 mmol/L    eGFR 94.1 >60.0 mL/min/1.73   Protime-INR    Collection Time: 03/23/25  5:54 AM    Specimen: Blood   Result Value Ref Range    Protime 16.1 (H) 11.8 - 14.8 Seconds    INR 1.23 (H) 0.91 - 1.09   CBC Auto Differential    Collection Time: 03/23/25  5:54 AM    Specimen: Blood   Result Value Ref Range    WBC 2.27 (L) 3.40 - 10.80 10*3/mm3    RBC 2.75 (L) 4.14 - 5.80 10*6/mm3    Hemoglobin 7.6 (L) 13.0 - 17.7 g/dL    Hematocrit 23.8 (L) 37.5 - 51.0 %    MCV 86.5 79.0 - 97.0 fL    MCH 27.6 26.6 - 33.0 pg    MCHC 31.9 31.5 - 35.7 g/dL    RDW 14.3 12.3 - 15.4 %    RDW-SD 44.6 37.0 - 54.0 fl    MPV 8.5 6.0 - 12.0 fL    Platelets 147 140 - 450 10*3/mm3   Manual Differential    Collection Time: 03/23/25  5:54 AM    Specimen: Blood   Result Value Ref Range    Neutrophil % 85.6 (H) 42.7 - 76.0 %    Lymphocyte % 12.4 (L) 19.6 - 45.3 %    Monocyte % 1.0 (L)  "5.0 - 12.0 %    Eosinophil % 0.0 (L) 0.3 - 6.2 %    Basophil % 0.0 0.0 - 1.5 %    Bands %  1.0 0.0 - 5.0 %    Neutrophils Absolute 1.97 1.70 - 7.00 10*3/mm3    Lymphocytes Absolute 0.28 (L) 0.70 - 3.10 10*3/mm3    Monocytes Absolute 0.02 (L) 0.10 - 0.90 10*3/mm3    Eosinophils Absolute 0.00 0.00 - 0.40 10*3/mm3    Basophils Absolute 0.00 0.00 - 0.20 10*3/mm3    Anisocytosis Slight/1+ None Seen    Hypochromia Slight/1+ None Seen    Microcytes Slight/1+ None Seen    Dohle Bodies Present None Seen    Toxic Granulation Mod/2+ None Seen    Vacuolated Neutrophils Mod/2+ None Seen    Clumped Platelets Present None Seen   Hemoglobin & Hematocrit, Blood    Collection Time: 03/23/25 11:55 AM    Specimen: Blood   Result Value Ref Range    Hemoglobin 7.7 (L) 13.0 - 17.7 g/dL    Hematocrit 24.1 (L) 37.5 - 51.0 %       Result Review:  I have personally reviewed the results from the time of this admission to 3/23/2025 14:40 CDT and agree with these findings:  [x]  Laboratory list / accordion  []  Microbiology  [x]  Radiology  []  EKG/Telemetry   []  Cardiology/Vascular   [x]  Pathology  [x]  Old records  []  Other:  Most notable findings include: I have carefully reviewed Dr. Zaman's notes from Jan 2025. The neoplasm in the duodenum as seen in the enodophotos is best described as a \"necrotic mess\". Both recent CT scans clearly show the left lobe of the liver outlined by air and fluid from a gastroduodenal perforation.       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 I presume this was provoked by a favorable response to chemotherapy. In any case EGD has no utility here, and would be expected to blow even more air through the perforation, worsening the " overall situation. If patient is not a surgical candidate, would strongly consider transfer to an institution possessing interventional  radiology capabilities.     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  Any results not available at time of inpatient discharge and/or GI clinic follow up should be managed by the hospitalist team, PCP, or outpatient gastroenterologist.      Electronically signed by Ted Noel MD at 03/23/25 4552       Wilbert Maldonado MD at 03/23/25 1112              [facility] Medicine Services  INPATIENT PROGRESS NOTE    Patient Name: Oral Dey  Date of Admission: 3/22/2025  Today's Date: 03/23/25  Length of Stay: 1  Primary Care Physician: Jorge Shepherd MD    Subjective   Chief Complaint: follow-up abdominal pain, black stool  HPI   Patient states that he feels like that his passage of black stool is slowing down.  He did have a hemoglobin that was less than 7 overnight and already has been transfused with 1 unit of packed red blood cells.  He denies any pain this morning.  He confirmed with me this morning that his last chemotherapy occurred on 3/19/2025.  He voices no new complaints this morning.    Review of Systems   All pertinent negatives and positives are as above. All other systems have been reviewed and are negative unless otherwise stated.     Objective    Temp:  [97.1 °F (36.2 °C)-98.7 °F (37.1 °C)] 98.7 °F (37.1 °C)  Heart Rate:  [63-83] 74  Resp:  [16-18] 16  BP: (104-160)/(33-68) 110/52  Physical Exam  Vitals reviewed.   Constitutional:       Appearance: He is ill-appearing.      Comments: Frail and cachectic appearing   HENT:      Head: Normocephalic.      Mouth/Throat:      Mouth: Mucous membranes are moist.   Cardiovascular:      Rate and Rhythm: Normal rate.  "  Pulmonary:      Effort: Pulmonary effort is normal. No respiratory distress.   Abdominal:      Tenderness: There is no guarding or rebound.      Comments: Thin, mild PASTORA TTP   Musculoskeletal:      Right lower leg: Edema present.      Left lower leg: Edema present.   Skin:     General: Skin is warm.   Neurological:      Mental Status: He is alert.      Motor: Weakness present.   Psychiatric:         Mood and Affect: Mood normal.         Results Review:  I have reviewed the labs, radiology results, and diagnostic studies.    Laboratory Data:   Results from last 7 days   Lab Units 03/23/25  0554 03/22/25  2355 03/22/25  1512 03/18/25  0711   WBC 10*3/mm3 2.27*  --  3.13* 3.98   HEMOGLOBIN g/dL 7.6* 6.8* 7.9* 8.2*   HEMATOCRIT % 23.8* 21.7* 25.3* 25.1*   PLATELETS 10*3/mm3 147  --  189 232        Results from last 7 days   Lab Units 03/23/25  0554 03/22/25  1512   SODIUM mmol/L 132* 131*   POTASSIUM mmol/L 3.7 3.9   CHLORIDE mmol/L 99 96*   CO2 mmol/L 25.0 27.0   BUN mg/dL 24* 31*   CREATININE mg/dL 0.65* 0.77   CALCIUM mg/dL 7.7* 8.2*   BILIRUBIN mg/dL 1.2 0.5   ALK PHOS U/L 73 87   ALT (SGPT) U/L 13 14   AST (SGOT) U/L 14 17   GLUCOSE mg/dL 95 118*       Culture Data:   No results found for: \"BLOODCX\", \"URINECX\", \"WOUNDCX\", \"MRSACX\", \"RESPCX\", \"STOOLCX\"    Radiology Data:   Imaging Results (Last 24 Hours)       Procedure Component Value Units Date/Time    CT Abdomen Pelvis Without Contrast [[id number]] Collected: 03/22/25 2032     Updated: 03/22/25 2043    Narrative:      EXAM/TECHNIQUE: CT abdomen and pelvis without contrast     INDICATION: eval for contained perforation on previous CT; K92.1-Melena;  C83.30-Diffuse large B-cell lymphoma, unspecified site; D64.9-Anemia,  unspecified; D70.9-Neutropenia, unspecified; E87.9-Brgf-ciemzilral and  hyponatremia     COMPARISON: None available.     DLP: 126.86 mGy.cm. Automated exposure control was utilized to decrease  patient radiation dose.       Impression:      " FINDINGS/IMPRESSION:     1.  Evaluation is difficult as there is severe diffuse soft tissue  edema/anasarca, which results in blurring of fat planes.      2.  Oral contrast was administered which has transited to the stomach,  small bowel, and reached the distal colon. No free intraperitoneal  spillage of contrast. Redemonstrated CT findings which are highly  concerning for contained distal gastric perforation including poor  visualization of the distal gastric wall and infiltrating collection of  gas and debris within the right upper quadrant extending along the  inferior aspect of the liver.        This report was signed and finalized on 3/22/2025 8:40 PM by Dr. Steven Giles MD.       CT Angiogram Abdomen Pelvis [[id number]] Collected: 03/22/25 1600     Updated: 03/22/25 1612    Narrative:      EXAM/TECHNIQUE: CT angiography with 3D MIP images abdomen and pelvis  without and with IV contrast     INDICATION: gi bleed. known stomach cancer     COMPARISON: 1/7/2025     DLP: 598.03 mGy.cm. Automated exposure control was utilized to decrease  patient radiation dose.     FINDINGS:     Lung bases are clear.     No suspicious liver lesion. Small gallstone. No abnormal gallbladder  wall thickening. No biliary ductal dilatation. Pancreas and adrenal  glands appear unremarkable. Spleen is mildly enlarged measuring 13.3 cm  craniocaudal dimension. No solid renal mass. No urolithiasis or  hydronephrosis. No focal urinary bladder abnormality.     Patient has a known lymphoma involving the duodenum and has a history of  perforated gastric ulcer. The distal gastric wall is poorly visualized  and there appears to be a large amount of extraluminal fluid and gas  within the region of the gastric antrum and proximal duodenum, in  keeping with bowel perforation. Also within the region of known  lymphoma, in the distal gastric lumen, there is an area of contrast  pooling on delayed images on axial image 97 series 9, in keeping with  an  area of active bleeding within the stomach.     Moderate volume stool in the colon. No colonic wall thickening or  pericolonic fat stranding. Hyperdense material within the stomach is  hyperdense before giving contrast. No bowel obstruction. Normal  appendix.     No ascites or free pelvic fluid. No pelvic mass or collection. Prostate  and seminal vesicles are unremarkable.     Nonaneurysmal atherosclerotic abdominal aorta. Celiac artery, SMA, and  ONEL are patent. Renal arteries are patent. Redemonstrated mild  retroperitoneal periaortic lymphadenopathy. No new or enlarging lymph  nodes in the abdomen or pelvis.     Severe diffuse body wall soft tissue edema, in keeping with anasarca. No  acute osseous finding.       Impression:         1.  Patient has a known distal gastric/proximal duodenal lymphoma. The  distal gastric wall is poorly visualized and there is appearance of  extraluminal gas and debris, highly suspicious for contained  gastroduodenal perforation. If clinically indicated, this could be  further evaluated with limited CT of the abdomen after oral contrast  administration.     2.  There is a 1.7 cm oval focus of contrast pooling within the distal  gastric lumen on delayed images, in keeping with an area of active  bleeding.     3.  Upper abdominal retroperitoneal lymphadenopathy, similar to the  prior study.     4.  Severe diffuse abdominal wall soft tissue edema, likely related to  anasarca.           This report was signed and finalized on 3/22/2025 4:09 PM by Dr. Steven Giles MD.               I have reviewed the patient's current medications.     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  Will start trial of empiric Abxs with Rocephin and Flagyl  1 unit of PRBCs administered overnight for Hgb 6.8.  Post transfusion Hgb this morning 7.6  Continue serial H/H monitoring  GI consultation  IV PPI  Continue IVFs  SCDs; avoid all blood thinning medications  Hematology/Oncology consultation for continuity and goals of care; patient received last chemotherapy on 3/19/2025  Workup continues.  Guarded prognosis.     Medical Decision Making  Number and Complexity of problems: 3 acute; high complexity     Conditions and Status        Condition is worsening.     [facility] Data  External documents reviewed: Dr. Boogie's outpatient Hematology/Oncology note from earlier this month reviewed  Cardiac tracing (EKG, telemetry) interpretation: no new EKGs  Radiology interpretation: CT A/P with findings of possible contained perforation gastroduodenal region with possible blood noted in stomach  Labs reviewed: as above  Any tests that were considered but not ordered: none     Decision rules/scores evaluated (example [id number], Wells, etc): none     Discussed with: patient, spouse at bedside     Care Planning  Shared decision making: Discussed with patient with agreement to proceed with treatment plan as outlined  Code status and discussions: I had discussion with him regarding his CODE STATUS.  I informed him and his family that we would keep him as a full code for now, but patient clearly was thinking about his options moving forward but not ready to formally make any change to code decision yet.       Disposition  Social Determinants of Health that impact treatment or disposition: none apparent at this time  I expect the patient to be discharged to: TBD        Electronically signed by Wilbert Maldonado MD, 03/23/25, 11:18 CDT.     Electronically signed by Wilbert Maldonado MD at 03/23/25 1132       Gianfranco Pemberton MD at  03/23/25 0951           LOS: 1 day   Patient Care Team:  Jorge Shepherd MD as PCP - General (Internal Medicine)    Subjective  Patient denies abdominal pain this morning.  Remains NPO.  No nausea or vomiting.  Overnight hemoglobin less than 7 and received 1 unit packed red blood cells.    Objective:   Vital Signs  Temp:  [97.1 °F (36.2 °C)-98.7 °F (37.1 °C)] 98.7 °F (37.1 °C)  Heart Rate:  [63-83] 74  Resp:  [16-18] 16  BP: (104-160)/(33-68) 110/52    Intake & Output (last 3 days)         03/20 0701 03/21 0700 03/21 0701 03/22 0700 03/22 0701 03/23 0700 03/23 0701 03/24 0700    Blood   301.3     Total Intake(mL/kg)   301.3 (6)     Urine (mL/kg/hr)   300     Total Output   300     Net   +1.3             Urine Unmeasured Occurrence   1 x             Physical Exam:     General Appearance:    Alert, cooperative, in no acute distress, frail, cachectic appearing    HEENT:   Normocephalic, atraumatic, EOMI, MMM   Lungs:     Equal chest rise bilaterally.  No increased work of breathing    Heart:    Regular rhythm and normal rate   Abdomen:    soft, nondistended, tenderness in mid epigastric region, no guarding, rebound tenderness, evidence of peritonitis   Extremities:     Moves all extremities spontaneously, no peripheral edema   Results Review:     I reviewed the patient's new clinical results. Significant labs:   I reviewed the patient's new imaging results and agree with the interpretation.    Results from last 7 days   Lab Units 03/23/25  0554 03/22/25  2355 03/22/25  1512 03/18/25  0711   WBC 10*3/mm3 2.27*  --  3.13* 3.98   HEMOGLOBIN g/dL 7.6* 6.8* 7.9* 8.2*   HEMATOCRIT % 23.8* 21.7* 25.3* 25.1*   PLATELETS 10*3/mm3 147  --  189 232        Results from last 7 days   Lab Units 03/23/25  0554 03/22/25  1512   SODIUM mmol/L 132* 131*   POTASSIUM mmol/L 3.7 3.9   CHLORIDE mmol/L 99 96*   CO2 mmol/L 25.0 27.0   BUN mg/dL 24* 31*   CREATININE mg/dL 0.65* 0.77   CALCIUM mg/dL 7.7* 8.2*   BILIRUBIN mg/dL 1.2 0.5    ALK PHOS U/L 73 87   ALT (SGPT) U/L 13 14   AST (SGOT) U/L 14 17   GLUCOSE mg/dL 95 118*       Assessment and plan:    Assessment & Plan       Upper GI bleed    CLL (chronic lymphocytic leukemia)    Anemia    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 Dey is a 84 y.o. male with past medical history of GERD, HTN, lymphocytic leukemia (CLL)/small lymphocytic lymphoma (SLL) and past surgical history of perforated gastric ulcer status post Wade patch repair in September 2024.  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  Patient presented on 3/22 with bloody bowel movements/melena and CT workup showing contained gastroduodenal perforation.    Extremely difficult case as perforation and bleeding are likely secondary to diffuse large B-cell lymphoma.  Imaging appears to show contained gastroduodenal perforation.  Patient without evidence of peritonitis/sepsis although he is 3 days status post initial chemotherapy and may not mount typical immune response.  Still he does not have good surgical options and will likely require partial gastrectomy with reconstruction due to distal gastric lesion.  Will plan to treat conservatively for now and follow clinically.    Patient also has GI bleed.  Again this is likely associated with his diffuse large B-cell lymphoma mass in his distal stomach.  Appreciate gastroenterology's input.  If bleeding continues and GI unable to intervene patient may benefit from interventional radiology and embolization of vessel.  This would require transfer to tertiary care center.    Recommendations:  N.p.o.  empiric antibiotics  Upper GI Monday/Tuesday  Appreciate GI Recs for GI bleed   Will need to follow-up with oncology to discuss prognosis.    Patient may benefit from  palliative care consult/goals of care conversation.  Patient may benefit for transfer to tertiary center in the future for interventional radiology/advance GI if continued bleeding from gastric mass    Gianfranco Pemberton MD  03/23/25  09:51 CDT    Part of this note may be an electronic transcription/translation of spoken language to printed text using the Dragon Dictation System.     Electronically signed by Gianfranco Pemberton MD at 03/23/25 1003          Consult Notes (last 72 hours)        Marco Boogie MD at 03/24/25 0543        Consult Orders    1. Inpatient Hematology & Oncology Consult [[id number]] ordered by Wilbert Maldonado MD at 03/23/25 1123                     [facility] Oncology/Hematology Services  CONSULT NOTE    PATIENT NAME:  Oral Dey  YOB: 1940  PATIENT MRN:  [id number]    Date of Admission:  3/22/2025  Consultation Date:  3/24/2025  Referring Provider: No ref. provider found    Subjective     Reason for Consultation: Continuity of care.    History of present illness: Oral Dey is a 84 y.o.  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  He had hypersensitivity reaction to rituximab on 3/18/2025.  He tolerated rituximab rechallenge on 3/19/2025.  He is seen C1D6 of mini R-CHOP.  Patient had Neulasta 3/20/2025.  Patient seen with spouse Alaina and nurse Steffany at the bedside.    Patient presented with abdominal pain and black stools 3/22/2025.    Occult blood positive in the emergency department.    WBC 3.1, NC 2.9, hemoglobin 7.9, hematocrit 25.3, MCV 87.5 and platelet 189.  PT 15.6, INR 1.1 and PTT 27.1.    CT angiogram abdomen and pelvis 3/22/2025.Patient has a known distal gastric/proximal duodenal lymphoma. The distal gastric wall is poorly visualized and there is appearance of  extraluminal gas and debris, highly suspicious for contained gastroduodenal perforation. If clinically indicated, this could be further  evaluated with limited CT of the abdomen after oral contrast administration.  There is a 1.7 cm oval focus of contrast pooling within the distal  gastric lumen on delayed images, in keeping with an area of active bleeding.  Upper abdominal retroperitoneal lymphadenopathy, similar to the prior study.  Severe diffuse abdominal wall soft tissue edema, likely related to anasarca.    CT abdomen pelvis 3/22/2025.Evaluation is difficult as there is severe diffuse soft tissue  edema/anasarca, which results in blurring of fat planes.  Oral contrast was administered which has transited to the stomach, small bowel, and reached the distal colon. No free intraperitoneal  spillage of contrast. Redemonstrated CT findings which are highly concerning for contained distal gastric perforation including poor visualization of the distal gastric wall and infiltrating collection of  gas and debris within the right upper quadrant extending along the inferior aspect of the liver.    Note from Dr. Gómez 3/22/2025.  I am hesitant to operate as I do not believe he would benefit and I am not convinced it is needed at this time.  Perforation appears to be contained without any free abdominal fluid nor free air.  3 days post last day of chemo and is likely nearing his rosalba.  He is cachectic, malnourished and poor overall candidate for surgery.  Follow hemoglobin and consult GI for bleeding.    WBC 2.2, ANC 1.97 hemoglobin 7.6, hematocrit 23.8 and platelet 147.  ANC 1.97 on 3/23/2025..  Albumin 2.5.    Patient given 1 unit packed RBC 3/23/2025.    Patient seen by Dr. Noel 3/23/2025.  Patient has bleeding from the B-cell neoplasm in the proximal duodenum.  This was provoked by a febrile response to chemotherapy.  Strongly consider transfer to an institution process and interventional radiology capabilities.    WBC 1.05, hemoglobin 7.5, hematocrit 23.8, MCV 87.5 and platelet 132.  CMP remarkable for albumin of 2.2.  PT 17.7 and INR  1.38.          Past Medical History:  Past Medical History:   Diagnosis Date    Bladder cancer     CLL (chronic lymphocytic leukemia)     Colon polyp     Gallstone     GERD (gastroesophageal reflux disease)     Glaucoma     Hearing loss     Hypertension     Inguinal hernia     right groin    Macular degeneration, right eye      Prior Surgeries:  Past Surgical History:   Procedure Laterality Date    CATARACT EXTRACTION, BILATERAL      COLONOSCOPY  06/13/2012    CYSTOSCOPY BLADDER BIOPSY      DIAGNOSTIC LAPAROSCOPY N/A 9/26/2024    Procedure: ROBOTIC REPAIR OF PERFORATED GASTRIC ULCER;  Surgeon: Petrona Malone MD;  Location:  [facility] OR;  Service: General;  Laterality: N/A;  WITH REPAIR OF GASTRIC ULCER PERFORATION    EXCISION MASS HEAD/NECK N/A 3/4/2022    Procedure: EXCISION OF MASS ON POSTERIOR NECK;  Surgeon: Rossana Mahajan MD;  Location:  [facility] OR;  Service: General;  Laterality: N/A;    INGUINAL HERNIA REPAIR Left 2007    INGUINAL HERNIA REPAIR Right 12/28/2017    Procedure: RIGHT INGUINAL HERNIA REPAIR WITH MESH ;  Surgeon: Rossana Mahajan MD;  Location:  [facility] OR;  Service:     VENOUS ACCESS DEVICE (PORT) INSERTION N/A 3/5/2025    Procedure: Single Lumen Port-a-cath insertion with flouroscopy;  Surgeon: Petrona Malone MD;  Location:  [facility] OR;  Service: General;  Laterality: N/A;        Allergies:Latex and Augmentin [amoxicillin-pot clavulanate]  PTA Meds:  Medications Prior to Admission   Medication Sig Dispense Refill Last Dose/Taking    acetaminophen (Tylenol) 325 MG tablet Take 3 tablets by mouth Every 8 (Eight) Hours. Take every 8 hours for 3 days then take prn as needed.   Past Week    allopurinol (ZYLOPRIM) 300 MG tablet Take 1 tablet by mouth Daily. 90 tablet 2 Past Week    dexAMETHasone (DECADRON) 4 MG tablet Take 1 tablet by mouth 2 (Two) Times a Day With Meals. Take 1 tablet the night before chemotherapy treatment and 1 tablet the morning of his chemotherapy treatment. 16 tablet 0 Past  Week    Ferrous Sulfate (IRON PO) Take 1 tablet by mouth Daily.   Past Week    HYDROcodone-acetaminophen (NORCO)  MG per tablet Take 1 tablet by mouth Every 4 (Four) Hours As Needed for Moderate Pain. 180 tablet 0 Past Week    latanoprost (XALATAN) 0.005 % ophthalmic solution Administer 1 drop to the right eye Every Night.   Past Week    lidocaine-prilocaine (EMLA) 2.5-2.5 % cream 30 minutes prior to use apply generous amount of Emla Cream to port site and cover with clear plastic wrap until ready for access 1 each 1 Past Week    metoprolol tartrate (LOPRESSOR) 25 MG tablet Take 1 tablet by mouth 2 (Two) Times a Day.   Past Week    multivitamins-minerals (PRESERVISION AREDS 2) capsule capsule Take 1 capsule by mouth 2 (Two) Times a Day.   Past Week    nystatin (MYCOSTATIN) 100,000 unit/mL suspension Swish and swallow 5 mL 4 (Four) Times a Day.   Past Week    ondansetron (Zofran) 4 MG tablet Take 1 tablet by mouth Every 8 (Eight) Hours As Needed for Nausea or Vomiting. 15 tablet 0 Past Week    pantoprazole (Protonix) 40 MG EC tablet Take 1 tablet by mouth Daily. 30 tablet 2 Past Week    predniSONE (DELTASONE) 10 MG tablet Take 5 tablets by mouth Daily. Daily for 5 days to begin with chemo 5 tablet 2 Past Week    prochlorperazine (COMPAZINE) 10 MG tablet Take 1 tablet by mouth Every 4 (Four) Hours As Needed for Nausea or Vomiting. 60 tablet 2 Past Month    tamsulosin (FLOMAX) 0.4 MG capsule 24 hr capsule Take 1 capsule by mouth Daily.   Past Week    vitamin B-12 (CYANOCOBALAMIN) 1000 MCG tablet Take 1 tablet by mouth Daily.   Past Week    vitamin D3 125 MCG (5000 UT) capsule capsule Take 1 capsule by mouth Daily.   Past Week    naloxone (NARCAN) 4 MG/0.1ML nasal spray Call 911. Don't prime. Jackson in 1 nostril for overdose. Repeat in 2-3 minutes in other nostril if no or minimal breathing/responsiveness. 2 each 0 Unknown      Current Meds:   Current Facility-Administered Medications   Medication Dose Route  Frequency Provider Last Rate Last Admin    cefTRIAXone (ROCEPHIN) 1,000 mg in sodium chloride 0.9 % 100 mL MBP  1,000 mg Intravenous Q24H Wilbert Maldonado  mL/hr at 03/23/25 1152 1,000 mg at 03/23/25 1152    latanoprost (XALATAN) 0.005 % ophthalmic solution 1 drop  1 drop Right Eye Nightly Wilbert Maldonado MD   1 drop at 03/23/25 2135    metroNIDAZOLE (FLAGYL) IVPB 500 mg  500 mg Intravenous Q8H Wilbert Maldonado  mL/hr at 03/24/25 0412 500 mg at 03/24/25 0412    Morphine sulfate (PF) injection 1 mg  1 mg Intravenous Q4H PRN Wilbert Maldonado MD        nystatin (MYCOSTATIN) 100,000 unit/mL suspension 500,000 Units  5 mL Oral 4x Daily Wilbert Maldonado MD   500,000 Units at 03/23/25 1709    ondansetron (ZOFRAN) injection 4 mg  4 mg Intravenous Q6H PRN Wilbert Maldonado MD        pantoprazole (PROTONIX) injection 40 mg  40 mg Intravenous Q12H Wilbert Maldonado MD   40 mg at 03/23/25 2112    sodium chloride 0.9 % flush 10 mL  10 mL Intravenous PRN Wilbert Maldonado MD        sodium chloride 0.9 % flush 10 mL  10 mL Intravenous Q12H Wilbert Maldonado MD   10 mL at 03/23/25 2113    sodium chloride 0.9 % flush 10 mL  10 mL Intravenous PRN Wilbert Maldonado MD        sodium chloride 0.9 % infusion 40 mL  40 mL Intravenous PRN Wilbert Maldonado MD        sodium chloride 0.9 % infusion  100 mL/hr Intravenous Continuous Wilbert Maldonado  mL/hr at 03/24/25 0412 100 mL/hr at 03/24/25 0412       Family History:family history includes Alzheimer's disease in his mother; COPD in his sister; Colon cancer in his maternal grandfather; Heart attack in his brother; Hypertension in his father; No Known Problems in his maternal grandmother, paternal grandfather, and paternal grandmother; Other in his father.   Social History: reports that he quit smoking about 53 years ago. His smoking use included cigarettes. He started smoking about 68 years ago. He has been exposed to  "tobacco smoke. He has never used smokeless tobacco. He reports that he does not drink alcohol and does not use drugs.    Review of Systems  Review of Systems   Constitutional:  Positive for fatigue. Negative for chills and fever.        \"When can I eat?\"   HENT:  Negative for nosebleeds.    Eyes:  Negative for redness.   Respiratory:  Negative for shortness of breath.    Cardiovascular:  Negative for chest pain.   Gastrointestinal:  Positive for abdominal pain. Negative for nausea and vomiting.   Endocrine: Negative for cold intolerance and heat intolerance.   Genitourinary:  Negative for dysuria and hematuria.   Musculoskeletal:  Negative for myalgias.   Skin:  Positive for pallor.   Allergic/Immunologic: Positive for immunocompromised state.   Neurological:  Negative for dizziness and speech difficulty.   Hematological:  Negative for adenopathy.   Psychiatric/Behavioral:  Negative for agitation and confusion. The patient is not nervous/anxious.          Objective      Vital Signs   Temp:  [97.3 °F (36.3 °C)-98.7 °F (37.1 °C)] 97.4 °F (36.3 °C)  Heart Rate:  [70-92] 92  Resp:  [16] 16  BP: (110-122)/(40-65) 122/43    Physical Exam  Vitals and nursing note reviewed.   Constitutional:       Appearance: He is ill-appearing.   HENT:      Head: Normocephalic and atraumatic.   Eyes:      General: No scleral icterus.  Cardiovascular:      Rate and Rhythm: Normal rate.   Pulmonary:      Effort: No respiratory distress.      Breath sounds: No wheezing.      Comments: Port site, no erythema.  Abdominal:      General: There is no distension.      Palpations: Abdomen is soft.   Musculoskeletal:         General: No swelling.   Skin:     Coloration: Skin is pale.   Neurological:      Mental Status: He is oriented to person, place, and time.   Psychiatric:         Behavior: Behavior normal.               Results from last 7 days   Lab Units 03/24/25  0448 03/23/25  0554 03/22/25  1512   SODIUM mmol/L 135* 132* 131*   POTASSIUM " "mmol/L 3.4* 3.7 3.9   CHLORIDE mmol/L 102 99 96*   CO2 mmol/L 25.0 25.0 27.0   BUN mg/dL 23 24* 31*   CREATININE mg/dL 0.67* 0.65* 0.77   CALCIUM mg/dL 7.3* 7.7* 8.2*   BILIRUBIN mg/dL 0.6 1.2 0.5   ALK PHOS U/L 72 73 87   ALT (SGPT) U/L 14 13 14   AST (SGOT) U/L 16 14 17   GLUCOSE mg/dL 91 95 118*       ABG:  No results found for: \"PHART\", \"PO2ART\", \"YXN0AEL\"    Culture Data:   No results found for: \"BLOODCX\", \"URINECX\", \"WOUNDCX\", \"MRSACX\", \"RESPCX\", \"STOOLCX\"    Radiology:   Imaging Results (Last 7 Days)       Procedure Component Value Units Date/Time    CT Abdomen Pelvis Without Contrast [[id number]] Collected: 03/22/25 2032     Updated: 03/22/25 2043    Narrative:      EXAM/TECHNIQUE: CT abdomen and pelvis without contrast     INDICATION: eval for contained perforation on previous CT; K92.1-Melena;  C83.30-Diffuse large B-cell lymphoma, unspecified site; D64.9-Anemia,  unspecified; D70.9-Neutropenia, unspecified; E87.1-Frci-fzootfvdpl and  hyponatremia     COMPARISON: None available.     DLP: 126.86 mGy.cm. Automated exposure control was utilized to decrease  patient radiation dose.       Impression:      FINDINGS/IMPRESSION:     1.  Evaluation is difficult as there is severe diffuse soft tissue  edema/anasarca, which results in blurring of fat planes.      2.  Oral contrast was administered which has transited to the stomach,  small bowel, and reached the distal colon. No free intraperitoneal  spillage of contrast. Redemonstrated CT findings which are highly  concerning for contained distal gastric perforation including poor  visualization of the distal gastric wall and infiltrating collection of  gas and debris within the right upper quadrant extending along the  inferior aspect of the liver.        This report was signed and finalized on 3/22/2025 8:40 PM by Dr. Steven Giles MD.       CT Angiogram Abdomen Pelvis [[id number]] Collected: 03/22/25 1600     Updated: 03/22/25 1612    Narrative:      " EXAM/TECHNIQUE: CT angiography with 3D MIP images abdomen and pelvis  without and with IV contrast     INDICATION: gi bleed. known stomach cancer     COMPARISON: 1/7/2025     DLP: 598.03 mGy.cm. Automated exposure control was utilized to decrease  patient radiation dose.     FINDINGS:     Lung bases are clear.     No suspicious liver lesion. Small gallstone. No abnormal gallbladder  wall thickening. No biliary ductal dilatation. Pancreas and adrenal  glands appear unremarkable. Spleen is mildly enlarged measuring 13.3 cm  craniocaudal dimension. No solid renal mass. No urolithiasis or  hydronephrosis. No focal urinary bladder abnormality.     Patient has a known lymphoma involving the duodenum and has a history of  perforated gastric ulcer. The distal gastric wall is poorly visualized  and there appears to be a large amount of extraluminal fluid and gas  within the region of the gastric antrum and proximal duodenum, in  keeping with bowel perforation. Also within the region of known  lymphoma, in the distal gastric lumen, there is an area of contrast  pooling on delayed images on axial image 97 series 9, in keeping with an  area of active bleeding within the stomach.     Moderate volume stool in the colon. No colonic wall thickening or  pericolonic fat stranding. Hyperdense material within the stomach is  hyperdense before giving contrast. No bowel obstruction. Normal  appendix.     No ascites or free pelvic fluid. No pelvic mass or collection. Prostate  and seminal vesicles are unremarkable.     Nonaneurysmal atherosclerotic abdominal aorta. Celiac artery, SMA, and  ONEL are patent. Renal arteries are patent. Redemonstrated mild  retroperitoneal periaortic lymphadenopathy. No new or enlarging lymph  nodes in the abdomen or pelvis.     Severe diffuse body wall soft tissue edema, in keeping with anasarca. No  acute osseous finding.       Impression:         1.  Patient has a known distal gastric/proximal duodenal  lymphoma. The  distal gastric wall is poorly visualized and there is appearance of  extraluminal gas and debris, highly suspicious for contained  gastroduodenal perforation. If clinically indicated, this could be  further evaluated with limited CT of the abdomen after oral contrast  administration.     2.  There is a 1.7 cm oval focus of contrast pooling within the distal  gastric lumen on delayed images, in keeping with an area of active  bleeding.     3.  Upper abdominal retroperitoneal lymphadenopathy, similar to the  prior study.     4.  Severe diffuse abdominal wall soft tissue edema, likely related to  anasarca.           This report was signed and finalized on 3/22/2025 4:09 PM by Dr. Steven Giles MD.                    Assessment/Plan        ASSESSMENT:   Distal gastric perforation.  2.  GI bleed secondary to the gastric perforation.  3.  Neutropenia without fever from mini R-CHOP.  Had pegfilgrastim 3/20/2025.      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  Ki-67 70%.  AJCC stage: 1B.  IPI score 2. 3 year overall survival 81%. 3 year PFS 75%.   Treatment status: Pending mini R CHOP due to advance age and poor performance status.   2.   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  Treatment status: C1D1 mini R-CHOP on 3/19/2025..  Complications: None.  Prognosis: Good.  3.   Abdominal pain from lymphoma.  Hydrocodone as needed for pain.    4.   Normocytic anemia, from chronic disease and iron deficiency.  Oral iron 2/13/2025 through present.  5.   Poor performance status of 3.  6.   Mild thrombocytopenia likely from idiopathic thrombocytopenic purpura, stable for observation.   7.   History of fever, resolved, ? viral syndrome.  Not compatible with progressing chronic lymphocytic leukemia (CLL). Lymphocyte count is stable, no palpable adenopathies, and fever had resolved.   8.   Bladder cancer  AJCC  stage cTa, cN0, cM0.   Treatment status:On observation.   9.  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       PLAN:   regarding the reason for the consult.  Differential count today is pending.  Continue supportive measures.  2.  Hold filgrastim 300 mcg subcutaneous daily unless he develop neutropenic fever.  Patient had pegfilgrastim on 3/20/2025.  3.  Follow heme status.  4.  Transfuse packed RBC if hemoglobin < 7.  Observe for transfusion reactions.  5.  He is on empiric ceftriaxone and metronidazole.  Patient also on pantoprazole.  6.  Further recommendations pending.            I discussed the patients findings and my recommendations with patient, spouse and nurse Steffany.  They verbalized understanding.    Marco Boogie MD  03/24/25  05:44 CDT                Thank you for allowing me to participate in the care of Mr. Oral Dey      Electronically signed by Marco Boogie MD at 03/24/25 0703       Gianfranco Pemberton MD at 03/22/25 230        Consult Orders    1. Surgery (on-call MD unless specified) [[id number]] ordered by Wilbert Maldonado MD at 03/22/25 1645                 Patient: Oral Dey    YOB: 1940    Date: 03/22/2025    Primary Care Provider: Jorge hSepherd MD    Chief Complaint   Patient presents with    Black or Bloody Stool       History of present illness:  Mr. Dey is a 84 y.o. 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  Patient initially presented in September with peritonitis and gastric perforation.  He underwent robot-assisted Wade patch and was subsequently found to have distal stomach/duodenal lymphoma.  Is following up with Dr. Boogie and oncology for chemotherapy and was started on mini R  CHOP due to advanced age and poor performance status.    Patient underwent endoscopy 1/22/2025 which showed circumferential ulcerated mucosa at the surgical site suggestive of chronic ischemia, chronic inflammation and ulceration. Pathology report showed small intestine, duodenal bulb, endoscopic biopsy:  Involvement by CD20 positive large B-cell lymphoma, best classified as Lawrence-Barr virus (EBV) positive diffuse large B-cell lymphoma.    Patient has had midepigastric pain for the past 2 to 3 months.  He has presented to the emergency department multiple times for this pain.  On evaluation the patient he states pain is similar to past episodes.  He presents to the emergency department today due to melena and bloody bowel movements which he has had for the past 1 to 2 days.    Of note he began chemotherapy on 3/19, he is now 3 days status post his first round.    Review of Systems  14 point review of systems negative except for above findings.    History:  Past Medical History:   Diagnosis Date    Bladder cancer     CLL (chronic lymphocytic leukemia)     Colon polyp     Gallstone     GERD (gastroesophageal reflux disease)     Glaucoma     Hearing loss     Hypertension     Inguinal hernia     right groin    Macular degeneration, right eye           Past Surgical History:   Procedure Laterality Date    CATARACT EXTRACTION, BILATERAL      COLONOSCOPY  06/13/2012    CYSTOSCOPY BLADDER BIOPSY      DIAGNOSTIC LAPAROSCOPY N/A 9/26/2024    Procedure: ROBOTIC REPAIR OF PERFORATED GASTRIC ULCER;  Surgeon: Petrona Malone MD;  Location:  [facility];  Service: General;  Laterality: N/A;  WITH REPAIR OF GASTRIC ULCER PERFORATION    EXCISION MASS HEAD/NECK N/A 3/4/2022    Procedure: EXCISION OF MASS ON POSTERIOR NECK;  Surgeon: Rossana Mahajan MD;  Location:  [facility];  Service: General;  Laterality: N/A;    INGUINAL HERNIA REPAIR Left 2007    INGUINAL HERNIA REPAIR Right 12/28/2017    Procedure: RIGHT INGUINAL HERNIA  REPAIR WITH MESH ;  Surgeon: Rossana Mahajan MD;  Location:  [facility];  Service:     VENOUS ACCESS DEVICE (PORT) INSERTION N/A 3/5/2025    Procedure: Single Lumen Port-a-cath insertion with flouroscopy;  Surgeon: Petrona Malone MD;  Location:  [facility];  Service: General;  Laterality: N/A;       Family History   Problem Relation Age of Onset    Colon cancer Maternal Grandfather         in his 60's.     Alzheimer's disease Mother     Hypertension Father     Other Father         stent    COPD Sister     Heart attack Brother     No Known Problems Maternal Grandmother     No Known Problems Paternal Grandmother     No Known Problems Paternal Grandfather        Social History     Tobacco Use    Smoking status: Former     Current packs/day: 0.00     Types: Cigarettes     Start date:      Quit date:      Years since quittin.2    Smokeless tobacco: Never   Vaping Use    Vaping status: Never Used   Substance Use Topics    Alcohol use: No    Drug use: No       Allergies:  Allergies   Allergen Reactions    Latex Itching and Other (See Comments)     And band aids. Causes redness and itching.    Augmentin [Amoxicillin-Pot Clavulanate] Hives       Medications:     Current Facility-Administered Medications:     latanoprost (XALATAN) 0.005 % ophthalmic solution 1 drop, 1 drop, Right Eye, Nightly, Wilbert Maldonado MD, 1 drop at 25    Morphine sulfate (PF) injection 1 mg, 1 mg, Intravenous, Q4H PRN, Wilbert Maldonado MD    [START ON 3/23/2025] nystatin (MYCOSTATIN) 100,000 unit/mL suspension 500,000 Units, 5 mL, Oral, 4x Daily, Wilbert Maldonado MD    ondansetron (ZOFRAN) injection 4 mg, 4 mg, Intravenous, Q6H PRN, Wilbert Maldonado MD    pantoprazole (PROTONIX) injection 40 mg, 40 mg, Intravenous, Q12H, Wilbert Maldonado MD, 40 mg at 25    [COMPLETED] Insert Peripheral IV, , , Once **AND** sodium chloride 0.9 % flush 10 mL, 10 mL, Intravenous, PRN, Wilbert Maldonado  MD    sodium chloride 0.9 % flush 10 mL, 10 mL, Intravenous, Q12H, Wilbert Maldonado MD, 10 mL at 03/22/25 2102    sodium chloride 0.9 % flush 10 mL, 10 mL, Intravenous, PRN, Wilbert Maldonado MD    sodium chloride 0.9 % infusion 40 mL, 40 mL, Intravenous, PRN, Wilbert Maldonado MD    sodium chloride 0.9 % infusion, 100 mL/hr, Intravenous, Continuous, Wilbert Maldonado MD, Last Rate: 100 mL/hr at 03/22/25 2102, 100 mL/hr at 03/22/25 2102    Vital Signs:   Vitals:    03/22/25 1747 03/22/25 1802 03/22/25 1853 03/22/25 2045   BP: 160/67 159/68 143/58 (!) 125/35   BP Location:   Right arm Right arm   Patient Position:   Lying Lying   Pulse: 78 81 63 72   Resp:   16 16   Temp:   98.2 °F (36.8 °C) 97.5 °F (36.4 °C)   TempSrc:   Oral Oral   SpO2: 98% 90% 96% 100%   Weight:       Height:           Physical Exam:     General Appearance:    Alert, cooperative, in no acute distress, frail/cachectic   Head:    Normocephalic, without obvious abnormality, atraumatic   Eyes:          PERRLA, EOMI   Ears:    Ears appear intact with no abnormalities noted   Throat:   No oral lesions, oral mucosa moist   Neck:   No adenopathy, supple, trachea midline   Back:     No skin lesions, erythema or scars, no tenderness palpation   Lungs:     B/L chest rise, no increase work of breathing     Heart:    Regular rhythm and normal rate   Chest Wall:    No abnormalities observed, no skin lesions   Abdomen:     Soft, nondistended, tender to deep palpation mid epigastric region.  No rebound tenderness, no guarding.   Rectal:     Deferred   Extremities:   Moves all extremities well, no edema   Pulses:   Pulses palpable and equal bilaterally   Skin:   No bleeding, bruising or rash   Lymph nodes:   No palpable adenopathy   Neurologic:   Cranial nerves 2 - 12 grossly intact, sensation intact       Results Review:     Results from last 7 days   Lab Units 03/22/25  1512 03/18/25  0711   WBC 10*3/mm3 3.13* 3.98   HEMOGLOBIN g/dL 7.9* 8.2*    HEMATOCRIT % 25.3* 25.1*   PLATELETS 10*3/mm3 189 232        Results from last 7 days   Lab Units 03/22/25  1512   SODIUM mmol/L 131*   POTASSIUM mmol/L 3.9   CHLORIDE mmol/L 96*   CO2 mmol/L 27.0   BUN mg/dL 31*   CREATININE mg/dL 0.77   CALCIUM mg/dL 8.2*   BILIRUBIN mg/dL 0.5   ALK PHOS U/L 87   ALT (SGPT) U/L 14   AST (SGOT) U/L 17   GLUCOSE mg/dL 118*       Assessment / Plan:    Mr. Dey is a 84 y.o. 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     CT imaging in the emergency department showed evidence of contained gastroduodenal perforation.  Follow-up imaging with contrast obtained showed similar findings.    The patient is not peritonitic and not tachycardic although he is on chemotherapy and is unlikely to have classic inflammatory response.    I am hesitant to operate as I do not believe he would benefit and I am not convinced it is needed at this time.  The patient has minimal abdominal pain and the perforation appears to be contained without any free abdominal fluid nor free air. He is 3 days status post last dose of chemotherapy and is likely nearing his rosalba.  On exam he is cachectic, malnourished and would overall be a poor surgical candidate.  Additionally this perforation is likely secondary to the known B-cell lymphoma within the area and would not be amenable to Wade patch repair.  Instead he would require partial gastrectomy with Billroth/Anthony-en-Y reconstruction which I do not believe would be in his best interest.    Will monitor his hemoglobin and consult gastroenterology for possible evaluation of his GI bleed.  I would also like to further evaluate perforation with upper GI.  In the meantime we will keep patient on IV antibiotics with Zosyn.    I had a detailed discussion with the patient and  family about the diagnosis.  They stated their understanding and appeared to agree.  They are aware that he may require surgery in the future pending continued workup and clinical direction.     Plan:  GI consult  Trend hemoglobin  Upper GI when able  IV Ryannen  Will reach out to oncology to better understand patient's prognosis    Electronically signed by Gianfranco Pemberton MD  03/22/25  23:05 CDT                     Electronically signed by Gianfranco Pemberton MD at 03/22/25 1546

## 2025-03-24 NOTE — CONSULTS
Initial Nutrition Assessment  Nutrition PES/Intervention Note  Malnutrition Severity Assessment    Patient Name:  Oral Dey  YOB: 1940  MRN: [id number]  Admit Date:  3/22/2025    Date:  3/24/2025    Comments:  Nurse admission screen consult for nutrition assessment. Pt with poor appetite and reduced oral intake. He has had significant weight loss. Pt had a perforated gastric ulcer s/p repair with omental patch in September 2024 that ended up being lymphoma involving the duodenal bulb. He presented to the ED with 2-3 month hx of mid-epigastric pain with bloody BM's. Pt admitted with upper GI bleed, suspected gastroduodenal perforation. He did have a SBFT today that indicated there is no perforation but showed likely an inflammatory process. No plans for surgical intervention at this time. He recently started chemo on 3/19. He is ordered a clear liquid diet for today. He did see a dining ambassador for his dinner meal choices. He is on neutropenic precautions. He was laying in bed at time of RD visit. He confirms he has not had a good appetite and that he has been losing weight. His UBW prior to illness is reported at 157#. He is -12#/1 month (9.8%), -26#/3 months (19%), -30#/6 months (21%), and -37#/1 year (25%). He is underweight with a BMI of 15.78. He has severe muscle and fat wasting per NFPE. He reports at home he drinks 2-3 misael Boosts daily. He would like to have misael Boost when his diet allows. While on CLD, he will try Boost Breeze. He denies chewing or swallowing difficulty. No SBD noted but at increased risk for SBD with low body weight, decreased po intake, and vaughn score of 18. He qualifies for severe malnutrition in the context of chronic disease. Will follow.     Malnutrition Severity Assessment      Patient meets criteria for : Severe Malnutrition  Malnutrition Type (Last 8 Hours)       Malnutrition Severity Assessment       Row Name 03/24/25 9128       Malnutrition Severity  Assessment    Malnutrition Type Chronic Disease - Related Malnutrition      Row Name 03/24/25 1759       Insufficient Energy Intake     Insufficient Energy Intake Findings Severe    Insufficient Energy Intake  <75% of est. energy requirement for > or equal to 1 month      Row Name 03/24/25 1759       Unintentional Weight Loss     Unintentional Weight Loss Findings Severe    Unintentional Weight Loss  Weight loss greater than 10% in six months  -12#/1 month (9.8%), -26#/3 months (19%), -30#/6 months (21%), and -37#/1 year (25%)      Row Name 03/24/25 1759       Muscle Loss    Loss of Muscle Mass Findings Severe    Islam Region Severe - deep hollowing/scooping, lack of muscle to touch, facial bones well defined    Clavicle Bone Region Severe - protruding prominent bone    Acromion Bone Region Severe - squared shoulders, bones, and acromion process protrusion prominent    Scapular Bone Region Severe - prominent bones, depressions easily visible between ribs, scapula, spine, shoulders    Dorsal Hand Region Severe - prominent depression    Patellar Region Severe - prominent bone, square looking, very little muscle definition    Anterior Thigh Region Severe - line/depression along thigh, obviously thin    Posterior Calf Region Severe - thin with very little definition/firmness      Row Name 03/24/25 1759       Fat Loss    Subcutaneous Fat Loss Findings Severe    Orbital Region  Severe - pronounced hollowness/depression, dark circles, loose saggy skin    Upper Arm Region Severe - mostly skin, very little space between folds, fingers touch    Thoracic & Lumbar Region Severe - ribs visible with prominent depressions, iliac crest very prominent      Row Name 03/24/25 4129       Criteria Met (Must meet criteria for severity in at least 2 of these categories: M Wasting, Fat Loss, Fluid, Secondary Signs, Wt. Status, Intake)    Patient meets criteria for  Severe Malnutrition                           Problem 1       Row Name  03/24/25 1801          Nutrition Diagnoses Problem 1    Problem 1 Malnutrition  Severe malnutrition in the context of chronic disease     Etiology (related to) Medical Diagnosis;Factors Affecting Nutrition;Medication Interaction     Nutrition related Increased nutrition needs  hypermetabolic     Gastrointestinal Gastrointestinal bleed     Hematological Other (comment)  neutropenic precautions     Oncology Lymphoma     Food/Medication Interaction Chemotherapy  started 3/19/25     Appetite Poor Due to GI Factor     Signs/Symptoms (evidenced by) Report of Mnimal PO Intake;Clear Liquid Diet;BMI;% UBW;Unintended Weight Change;Report/Observation     BMI Less than 16     Percent (%) UBW 70 %     Unintended Weight Change Loss     Number of Pounds Lost -12#/1 month (9.8%), -26#/3 months (19%), -30#/6 months (21%), and -37#/1 year (25%)     Reported GI Symptoms GI distress;Other (comment)  abdominal pain for the last 2-3 months with bloody BM's     Other Comment severe muscle and fat wasting per NFPE, generalized weakness                    Intervention Goal       Row Name 03/24/25 1805          Intervention Goal    General Reduce/improve symptoms;Meet nutritional needs for age/condition;Disease management/therapy     PO Tolerate PO;Establish PO;Advance diet;Meet estimated needs     Weight Appropriate weight gain                    Nutrition Intervention       Row Name 03/24/25 1805          Nutrition Intervention    RD/Tech Action Follow Tx progress;Care plan reviewd;Encourage intake;Advise alternate selection;Recommend/ordered     Recommended/Ordered Supplement                    Nutrition Prescription       Row Name 03/24/25 1805          Nutrition Prescription PO    PO Prescription Begin/change supplement     Supplement Boost Breeze     Supplement Frequency 3 times a day  Will switch to misael Boost Original when diet allows     New PO Prescription Ordered? Yes                    Education/Evaluation       Row Name 03/24/25  1806          Education    Education No discharge needs identified at this time  d/c needs unknown at present        Monitor/Evaluation    Monitor Per protocol                     Electronically signed by:  Keisha Garcia RDN, JOSE  03/24/25 18:06 CDT

## 2025-03-25 LAB
ALBUMIN SERPL-MCNC: 2.3 G/DL (ref 3.5–5.2)
ALBUMIN/GLOB SERPL: 1.6 G/DL
ALP SERPL-CCNC: 72 U/L (ref 39–117)
ALT SERPL W P-5'-P-CCNC: 14 U/L (ref 1–41)
ANION GAP SERPL CALCULATED.3IONS-SCNC: 7 MMOL/L (ref 5–15)
ANISOCYTOSIS BLD QL: ABNORMAL
AST SERPL-CCNC: 13 U/L (ref 1–40)
BASOPHILS # BLD MANUAL: 0 10*3/MM3 (ref 0–0.2)
BASOPHILS NFR BLD MANUAL: 0 % (ref 0–1.5)
BILIRUB SERPL-MCNC: 0.8 MG/DL (ref 0–1.2)
BUN SERPL-MCNC: 21 MG/DL (ref 8–23)
BUN/CREAT SERPL: 34.4 (ref 7–25)
CALCIUM SPEC-SCNC: 7.7 MG/DL (ref 8.6–10.5)
CHLORIDE SERPL-SCNC: 102 MMOL/L (ref 98–107)
CO2 SERPL-SCNC: 24 MMOL/L (ref 22–29)
CREAT SERPL-MCNC: 0.61 MG/DL (ref 0.76–1.27)
DEPRECATED RDW RBC AUTO: 44.9 FL (ref 37–54)
EGFRCR SERPLBLD CKD-EPI 2021: 95.9 ML/MIN/1.73
ELLIPTOCYTES BLD QL SMEAR: ABNORMAL
EOSINOPHIL # BLD MANUAL: 0 10*3/MM3 (ref 0–0.4)
EOSINOPHIL NFR BLD MANUAL: 0 % (ref 0.3–6.2)
ERYTHROCYTE [DISTWIDTH] IN BLOOD BY AUTOMATED COUNT: 14.3 % (ref 12.3–15.4)
GIANT PLATELETS: ABNORMAL
GLOBULIN UR ELPH-MCNC: 1.4 GM/DL
GLUCOSE SERPL-MCNC: 97 MG/DL (ref 65–99)
HCT VFR BLD AUTO: 24.2 % (ref 37.5–51)
HGB BLD-MCNC: 7.7 G/DL (ref 13–17.7)
LYMPHOCYTES # BLD MANUAL: 0.31 10*3/MM3 (ref 0.7–3.1)
LYMPHOCYTES NFR BLD MANUAL: 1.5 % (ref 5–12)
MCH RBC QN AUTO: 27.5 PG (ref 26.6–33)
MCHC RBC AUTO-ENTMCNC: 31.8 G/DL (ref 31.5–35.7)
MCV RBC AUTO: 86.4 FL (ref 79–97)
MICROCYTES BLD QL: ABNORMAL
MONOCYTES # BLD: 0.01 10*3/MM3 (ref 0.1–0.9)
NEUTROPHILS # BLD AUTO: 0.13 10*3/MM3 (ref 1.7–7)
NEUTROPHILS NFR BLD MANUAL: 29.4 % (ref 42.7–76)
NEUTS VAC BLD QL SMEAR: ABNORMAL
PLATELET # BLD AUTO: 124 10*3/MM3 (ref 140–450)
PMV BLD AUTO: 9 FL (ref 6–12)
POIKILOCYTOSIS BLD QL SMEAR: ABNORMAL
POLYCHROMASIA BLD QL SMEAR: ABNORMAL
POTASSIUM SERPL-SCNC: 4.2 MMOL/L (ref 3.5–5.2)
PROT SERPL-MCNC: 3.7 G/DL (ref 6–8.5)
RBC # BLD AUTO: 2.8 10*6/MM3 (ref 4.14–5.8)
SMALL PLATELETS BLD QL SMEAR: ABNORMAL
SODIUM SERPL-SCNC: 133 MMOL/L (ref 136–145)
VARIANT LYMPHS NFR BLD MANUAL: 4.4 % (ref 0–5)
VARIANT LYMPHS NFR BLD MANUAL: 64.7 % (ref 19.6–45.3)
WBC NRBC COR # BLD AUTO: 0.45 10*3/MM3 (ref 3.4–10.8)

## 2025-03-25 PROCEDURE — 85007 BL SMEAR W/DIFF WBC COUNT: CPT | Performed by: INTERNAL MEDICINE

## 2025-03-25 PROCEDURE — 99223 1ST HOSP IP/OBS HIGH 75: CPT | Performed by: CLINICAL NURSE SPECIALIST

## 2025-03-25 PROCEDURE — 80053 COMPREHEN METABOLIC PANEL: CPT | Performed by: INTERNAL MEDICINE

## 2025-03-25 PROCEDURE — 85025 COMPLETE CBC W/AUTO DIFF WBC: CPT | Performed by: INTERNAL MEDICINE

## 2025-03-25 PROCEDURE — 99233 SBSQ HOSP IP/OBS HIGH 50: CPT | Performed by: INTERNAL MEDICINE

## 2025-03-25 PROCEDURE — 25010000002 METRONIDAZOLE 500 MG/100ML SOLUTION: Performed by: INTERNAL MEDICINE

## 2025-03-25 PROCEDURE — 99497 ADVNCD CARE PLAN 30 MIN: CPT | Performed by: CLINICAL NURSE SPECIALIST

## 2025-03-25 PROCEDURE — 25010000002 MORPHINE PER 10 MG: Performed by: INTERNAL MEDICINE

## 2025-03-25 RX ORDER — ATROPINE SULFATE 10 MG/ML
2 SOLUTION/ DROPS OPHTHALMIC 2 TIMES DAILY PRN
Status: DISCONTINUED | OUTPATIENT
Start: 2025-03-25 | End: 2025-03-27 | Stop reason: HOSPADM

## 2025-03-25 RX ORDER — LORAZEPAM 2 MG/ML
1 INJECTION INTRAMUSCULAR
Status: DISCONTINUED | OUTPATIENT
Start: 2025-03-25 | End: 2025-03-27 | Stop reason: HOSPADM

## 2025-03-25 RX ORDER — LORAZEPAM 0.5 MG/1
0.5 TABLET ORAL
Status: DISCONTINUED | OUTPATIENT
Start: 2025-03-25 | End: 2025-03-27 | Stop reason: HOSPADM

## 2025-03-25 RX ORDER — LORAZEPAM 2 MG/ML
1 CONCENTRATE ORAL
Status: DISCONTINUED | OUTPATIENT
Start: 2025-03-25 | End: 2025-03-27 | Stop reason: HOSPADM

## 2025-03-25 RX ORDER — ACETAMINOPHEN 325 MG/1
650 TABLET ORAL EVERY 4 HOURS PRN
Status: DISCONTINUED | OUTPATIENT
Start: 2025-03-25 | End: 2025-03-27 | Stop reason: HOSPADM

## 2025-03-25 RX ORDER — DIPHENOXYLATE HYDROCHLORIDE AND ATROPINE SULFATE 2.5; .025 MG/1; MG/1
1 TABLET ORAL
Status: DISCONTINUED | OUTPATIENT
Start: 2025-03-25 | End: 2025-03-27 | Stop reason: HOSPADM

## 2025-03-25 RX ORDER — LORAZEPAM 2 MG/ML
0.5 INJECTION INTRAMUSCULAR
Status: DISCONTINUED | OUTPATIENT
Start: 2025-03-25 | End: 2025-03-27 | Stop reason: HOSPADM

## 2025-03-25 RX ORDER — LORAZEPAM 1 MG/1
1 TABLET ORAL
Status: DISCONTINUED | OUTPATIENT
Start: 2025-03-25 | End: 2025-03-27 | Stop reason: HOSPADM

## 2025-03-25 RX ORDER — MORPHINE SULFATE 20 MG/ML
20 SOLUTION ORAL
Refills: 0 | Status: DISCONTINUED | OUTPATIENT
Start: 2025-03-25 | End: 2025-03-27 | Stop reason: HOSPADM

## 2025-03-25 RX ORDER — HALOPERIDOL 1 MG/1
1 TABLET ORAL EVERY 4 HOURS PRN
Status: DISCONTINUED | OUTPATIENT
Start: 2025-03-25 | End: 2025-03-27 | Stop reason: HOSPADM

## 2025-03-25 RX ORDER — HALOPERIDOL 5 MG/ML
1 INJECTION INTRAMUSCULAR EVERY 4 HOURS PRN
Status: DISCONTINUED | OUTPATIENT
Start: 2025-03-25 | End: 2025-03-27 | Stop reason: HOSPADM

## 2025-03-25 RX ORDER — LORAZEPAM 2 MG/ML
0.5 CONCENTRATE ORAL
Status: DISCONTINUED | OUTPATIENT
Start: 2025-03-25 | End: 2025-03-27 | Stop reason: HOSPADM

## 2025-03-25 RX ORDER — PROCHLORPERAZINE 25 MG
25 SUPPOSITORY, RECTAL RECTAL EVERY 12 HOURS PRN
Status: DISCONTINUED | OUTPATIENT
Start: 2025-03-25 | End: 2025-03-27 | Stop reason: HOSPADM

## 2025-03-25 RX ORDER — LORAZEPAM 2 MG/ML
2 CONCENTRATE ORAL
Status: DISCONTINUED | OUTPATIENT
Start: 2025-03-25 | End: 2025-03-27 | Stop reason: HOSPADM

## 2025-03-25 RX ORDER — DIPHENHYDRAMINE HYDROCHLORIDE 50 MG/ML
25 INJECTION, SOLUTION INTRAMUSCULAR; INTRAVENOUS EVERY 6 HOURS PRN
Status: DISCONTINUED | OUTPATIENT
Start: 2025-03-25 | End: 2025-03-27 | Stop reason: HOSPADM

## 2025-03-25 RX ORDER — PROCHLORPERAZINE EDISYLATE 5 MG/ML
5 INJECTION INTRAMUSCULAR; INTRAVENOUS EVERY 6 HOURS PRN
Status: DISCONTINUED | OUTPATIENT
Start: 2025-03-25 | End: 2025-03-27 | Stop reason: HOSPADM

## 2025-03-25 RX ORDER — LORAZEPAM 1 MG/1
2 TABLET ORAL
Status: DISCONTINUED | OUTPATIENT
Start: 2025-03-25 | End: 2025-03-27 | Stop reason: HOSPADM

## 2025-03-25 RX ORDER — SCOPOLAMINE 1 MG/3D
1 PATCH, EXTENDED RELEASE TRANSDERMAL
Status: DISCONTINUED | OUTPATIENT
Start: 2025-03-25 | End: 2025-03-27 | Stop reason: HOSPADM

## 2025-03-25 RX ORDER — LORAZEPAM 2 MG/ML
2 INJECTION INTRAMUSCULAR
Status: DISCONTINUED | OUTPATIENT
Start: 2025-03-25 | End: 2025-03-27 | Stop reason: HOSPADM

## 2025-03-25 RX ORDER — ACETAMINOPHEN 650 MG/1
650 SUPPOSITORY RECTAL EVERY 4 HOURS PRN
Status: DISCONTINUED | OUTPATIENT
Start: 2025-03-25 | End: 2025-03-27 | Stop reason: HOSPADM

## 2025-03-25 RX ORDER — FUROSEMIDE 10 MG/ML
20 INJECTION INTRAMUSCULAR; INTRAVENOUS EVERY 6 HOURS PRN
Status: DISCONTINUED | OUTPATIENT
Start: 2025-03-25 | End: 2025-03-27 | Stop reason: HOSPADM

## 2025-03-25 RX ORDER — PROCHLORPERAZINE MALEATE 10 MG
5 TABLET ORAL EVERY 6 HOURS PRN
Status: DISCONTINUED | OUTPATIENT
Start: 2025-03-25 | End: 2025-03-27 | Stop reason: HOSPADM

## 2025-03-25 RX ORDER — HALOPERIDOL 2 MG/1
2 TABLET ORAL EVERY 4 HOURS PRN
Status: DISCONTINUED | OUTPATIENT
Start: 2025-03-25 | End: 2025-03-27 | Stop reason: HOSPADM

## 2025-03-25 RX ORDER — ACETAMINOPHEN 160 MG/5ML
650 SOLUTION ORAL EVERY 4 HOURS PRN
Status: DISCONTINUED | OUTPATIENT
Start: 2025-03-25 | End: 2025-03-27 | Stop reason: HOSPADM

## 2025-03-25 RX ORDER — HALOPERIDOL 2 MG/ML
2 SOLUTION ORAL EVERY 4 HOURS PRN
Status: DISCONTINUED | OUTPATIENT
Start: 2025-03-25 | End: 2025-03-27 | Stop reason: HOSPADM

## 2025-03-25 RX ORDER — DIPHENHYDRAMINE HCL 25 MG
25 CAPSULE ORAL EVERY 6 HOURS PRN
Status: DISCONTINUED | OUTPATIENT
Start: 2025-03-25 | End: 2025-03-27 | Stop reason: HOSPADM

## 2025-03-25 RX ORDER — MORPHINE SULFATE 20 MG/ML
10 SOLUTION ORAL
Refills: 0 | Status: DISCONTINUED | OUTPATIENT
Start: 2025-03-25 | End: 2025-03-27 | Stop reason: HOSPADM

## 2025-03-25 RX ORDER — IPRATROPIUM BROMIDE AND ALBUTEROL SULFATE 2.5; .5 MG/3ML; MG/3ML
3 SOLUTION RESPIRATORY (INHALATION) EVERY 4 HOURS PRN
Status: DISCONTINUED | OUTPATIENT
Start: 2025-03-25 | End: 2025-03-27 | Stop reason: HOSPADM

## 2025-03-25 RX ORDER — DIPHENHYDRAMINE HYDROCHLORIDE AND LIDOCAINE HYDROCHLORIDE AND ALUMINUM HYDROXIDE AND MAGNESIUM HYDRO
5 KIT EVERY 4 HOURS PRN
Status: DISCONTINUED | OUTPATIENT
Start: 2025-03-25 | End: 2025-03-27 | Stop reason: HOSPADM

## 2025-03-25 RX ORDER — HALOPERIDOL 2 MG/ML
1 SOLUTION ORAL EVERY 4 HOURS PRN
Status: DISCONTINUED | OUTPATIENT
Start: 2025-03-25 | End: 2025-03-27 | Stop reason: HOSPADM

## 2025-03-25 RX ORDER — HALOPERIDOL 5 MG/ML
2 INJECTION INTRAMUSCULAR EVERY 4 HOURS PRN
Status: DISCONTINUED | OUTPATIENT
Start: 2025-03-25 | End: 2025-03-27 | Stop reason: HOSPADM

## 2025-03-25 RX ADMIN — NYSTATIN 500000 UNITS: 100000 SUSPENSION ORAL at 08:40

## 2025-03-25 RX ADMIN — Medication 10 ML: at 21:28

## 2025-03-25 RX ADMIN — MORPHINE SULFATE 10 MG: 20 SOLUTION ORAL at 11:48

## 2025-03-25 RX ADMIN — MORPHINE SULFATE 10 MG: 20 SOLUTION ORAL at 17:01

## 2025-03-25 RX ADMIN — PANTOPRAZOLE SODIUM 40 MG: 40 INJECTION, POWDER, FOR SOLUTION INTRAVENOUS at 08:40

## 2025-03-25 RX ADMIN — NYSTATIN 500000 UNITS: 100000 SUSPENSION ORAL at 21:28

## 2025-03-25 RX ADMIN — NYSTATIN 500000 UNITS: 100000 SUSPENSION ORAL at 18:47

## 2025-03-25 RX ADMIN — LATANOPROST 1 DROP: 50 SOLUTION OPHTHALMIC at 21:28

## 2025-03-25 RX ADMIN — METRONIDAZOLE 500 MG: 500 INJECTION, SOLUTION INTRAVENOUS at 04:24

## 2025-03-25 RX ADMIN — MORPHINE SULFATE 1 MG: 2 INJECTION, SOLUTION INTRAMUSCULAR; INTRAVENOUS at 08:48

## 2025-03-25 RX ADMIN — PANTOPRAZOLE SODIUM 40 MG: 40 INJECTION, POWDER, FOR SOLUTION INTRAVENOUS at 21:28

## 2025-03-25 RX ADMIN — Medication 10 ML: at 08:40

## 2025-03-25 NOTE — CONSULTS
[facility] Palliative Care Services    Palliative Care Initial Consult   Attending Physician: Sandoval Davis MD  Referring Provider: Sandoval Davis MD    Reason for Referral: assistance with clarification of goals of care  Family/Support: Spouse    Code Status and Medical Interventions: CPR (Attempt to Resuscitate); Full Support   Ordered at: 03/22/25 1757     Code Status (Patient has no pulse and is not breathing):    CPR (Attempt to Resuscitate)     Medical Interventions (Patient has pulse or is breathing):    Full Support     Goals of Care: TBD.    HPI:   84 y.o. 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 Boogie treated with rituximab 3/18/2025, mini R-CHOP, and Neulasta.  Additional history positive for Colon polyp, Gallstone, GERD, Glaucoma, Hearing loss, Hypertension, Inguinal hernia, and Macular degeneration-right eye.  Port-A-Cath placement 3/5 by Dr. Malone; ED visit 1/24 due to epigastric pain; ED visit 1/7 due to abdominal pain; hospitalization 9/26-10/5 due to perforated gastric ulcer. Patient presented to [facility] on 3/22/2025 related to black stool and abdominal pain.  Noted to have progressive weight loss of approximately 30 to 40 pounds over the past few months becoming progressively more weak.  ED workup shows a urinalysis with positive nitrite/3+ bacteria.  Additional workup shows leukopenia, anemia, hyponatremia, hypoalbuminemia, fecal occult positive.  CT imaging abdomen pelvis shows known distal gastric/proximal duodenal lymphoma, suspicious for contained gastroduodenal perforation, upper abdominal retroperitoneal lymphadenopathy similar to previous study.  There are diffuse abdominal wall soft tissue edema likely related to anasarca.  Made n.p.o., IV  PPI and IV fluids.  General surgery consulted notes poor surgical candidate, plans further diagnostic workup and to continue IV antibiotics.  Received 1 unit PRBC 3/23.  Evaluated by GI who notes endoscopy would not have a favorable outcome, recommends transfer to tertiary facility for IR capabilities if patient is not a surgical candidate.  Oncology following for continuity of care, recommends hold filgrastim unless he develops neutropenic fever or unstable vital signs, last received 3/20/2025.  Small bowel follow-through 3/24 shows marked irregularity and deformity of the mucosa of the gastric antrum and proximal duodenum without a perforation.  Oncology has no plans for further chemotherapy due to poor performance status, cancer cachexia, and likely develop febrile neutropenia due to pre-existing perforation.  Recommends changing CODE STATUS to comfort measures and a referral to hospice discussed with patient and spouse.  Laying in bed without apparent distress.  Complains of diffuse abdominal pain unrelieved with dose of morphine received earlier.  Frequent incontinence of bowel and urine reported.  Spouse and brother-in-law currently at bedside. Palliative Care Spoke With: patient and family reports significant decline in quality of life and functional status that has been progressive but more so after receiving chemotherapy recently.  Patient is now incontinent of urine and stool and no longer able to meet ADLs. Due to the Palliative Care Topics Discussed: palliative care, goals of care, care options, resuscitation status, [facility], and discharge options we will establish an advance care plan.   Advance Care Planning   Advance Care Planning Discussion: Spoke with patient, spouse, and brother-in-law in room with regard to events leading to current hospitalization and overall poor long-term prognosis secondary to diffuse large B-cell lymphoma, CLL, SLL, bladder cancer, upper GI bleed, severe malnutrition, poor  "performance status, cancer cachexia, neutropenia, and comorbidities.  Patient and family appears to have fair prognostic awareness.  We discussed significant decline after receiving systemic therapy.  States he is no longer able to meet his care needs including ADLs in the home setting.  We explored conversation with oncologist, Dr. Boogie and aware no further plans for systemic therapy.  States he understood time may be quite limited \"couple of weeks\".  We discussed options of best supportive care directed by hospice and expectations for symptom management.  We discussed discharge options to include home with hospice versus nursing facility with hospice.  Patient and spouse do not feel they are able to provide level of caregiver support in the home setting.  We discussed options of private pay caregivers in this scenario as a potential option.  Family would like to discuss options of inpatient hospice in the [place] area. We discussed many times a hospice center is utilized for crisis needs and short term symptom management. Brother in law repeats \"he has good insurance\" and seems to difficulty processing this information.  We further addressed de-escalation of CODE STATUS to no CPR/comfort measures and initiating hospice level care while determining a discharge plan and patient is agreeable \"I do not want to prolong this\".  Will plan to complete a MOST document based upon goals.  Questions encouraged and support given.     Review of Systems   Constitutional: Positive for malaise/fatigue.   Respiratory:  Negative for shortness of breath.    Gastrointestinal:  Positive for abdominal pain and bowel incontinence.   Genitourinary:  Positive for bladder incontinence.   Neurological:  Positive for weakness.   Psychiatric/Behavioral:  The patient is nervous/anxious.      1- Pain Assessment  Nonverbal Indicators of Pain: nonverbal indicators absent  Pain Location: abdomen  Pain Description: aching    Past Medical History: "   Diagnosis Date    Bladder cancer     CLL (chronic lymphocytic leukemia)     Colon polyp     Gallstone     GERD (gastroesophageal reflux disease)     Glaucoma     Hearing loss     Hypertension     Inguinal hernia     right groin    Macular degeneration, right eye      Past Surgical History:   Procedure Laterality Date    CATARACT EXTRACTION, BILATERAL      COLONOSCOPY  2012    CYSTOSCOPY BLADDER BIOPSY      DIAGNOSTIC LAPAROSCOPY N/A 2024    Procedure: ROBOTIC REPAIR OF PERFORATED GASTRIC ULCER;  Surgeon: Petrona Malone MD;  Location:  [facility] OR;  Service: General;  Laterality: N/A;  WITH REPAIR OF GASTRIC ULCER PERFORATION    EXCISION MASS HEAD/NECK N/A 3/4/2022    Procedure: EXCISION OF MASS ON POSTERIOR NECK;  Surgeon: Rossana Mahajan MD;  Location:  [facility] OR;  Service: General;  Laterality: N/A;    INGUINAL HERNIA REPAIR Left     INGUINAL HERNIA REPAIR Right 2017    Procedure: RIGHT INGUINAL HERNIA REPAIR WITH MESH ;  Surgeon: Rossana Mahajan MD;  Location:  [facility] OR;  Service:     VENOUS ACCESS DEVICE (PORT) INSERTION N/A 3/5/2025    Procedure: Single Lumen Port-a-cath insertion with flouroscopy;  Surgeon: Petrona Malone MD;  Location:  [facility] OR;  Service: General;  Laterality: N/A;     Social History     Socioeconomic History    Marital status:    Tobacco Use    Smoking status: Former     Current packs/day: 0.00     Types: Cigarettes     Start date:      Quit date: 1972     Years since quittin.2     Passive exposure: Past    Smokeless tobacco: Never   Vaping Use    Vaping status: Never Used   Substance and Sexual Activity    Alcohol use: No    Drug use: No    Sexual activity: Defer         Current Facility-Administered Medications:     Calcium Replacement - Follow Nurse / BPA Driven Protocol, , Not Applicable, PRN, Sandoval Davis MD    cefTRIAXone (ROCEPHIN) 1,000 mg in sodium chloride 0.9 % 100 mL MBP, 1,000 mg, Intravenous, Q24H, Wilbert Maldonado,  MD, Last Rate: 200 mL/hr at 03/24/25 1218, 1,000 mg at 03/24/25 1218    diatrizoate meglumine-sodium (GASTROGRAFIN) 66-10 % oral solution 120 mL, 120 mL, Oral, Once in imaging, Sandoval Davis MD    latanoprost (XALATAN) 0.005 % ophthalmic solution 1 drop, 1 drop, Right Eye, Nightly, Wilbert Maldonado MD, 1 drop at 03/24/25 2058    Magnesium Standard Dose Replacement - Follow Nurse / BPA Driven Protocol, , Not Applicable, PRN, Sandoval Davis MD    metroNIDAZOLE (FLAGYL) IVPB 500 mg, 500 mg, Intravenous, Q8H, Wilbert Maldonado MD, Last Rate: 200 mL/hr at 03/25/25 0424, 500 mg at 03/25/25 0424    Morphine sulfate (PF) injection 1 mg, 1 mg, Intravenous, Q4H PRN, Wilbert Maldonado MD    nystatin (MYCOSTATIN) 100,000 unit/mL suspension 500,000 Units, 5 mL, Oral, 4x Daily, Wilbert Maldonado MD, 500,000 Units at 03/24/25 2057    ondansetron (ZOFRAN) injection 4 mg, 4 mg, Intravenous, Q6H PRN, Wilbetr Maldonado MD    pantoprazole (PROTONIX) injection 40 mg, 40 mg, Intravenous, Q12H, Wilbert Maldonado MD, 40 mg at 03/24/25 2058    Phosphorus Replacement - Follow Nurse / BPA Driven Protocol, , Not Applicable, PRRyan OVALLES Emmanuel O, MD    Potassium Replacement - Follow Nurse / BPA Driven Protocol, , Not Applicable, PRN, Sandoval Davis MD    [COMPLETED] Insert Peripheral IV, , , Once **AND** sodium chloride 0.9 % flush 10 mL, 10 mL, Intravenous, PRN, Wilbert Maldonado MD    sodium chloride 0.9 % flush 10 mL, 10 mL, Intravenous, Q12H, Wilbert Maldonado MD, 10 mL at 03/24/25 2058    sodium chloride 0.9 % flush 10 mL, 10 mL, Intravenous, PRN, Wilbert Maldonado MD    sodium chloride 0.9 % infusion 40 mL, 40 mL, Intravenous, PRN, Wilbert Maldonado MD    Allergies   Allergen Reactions    Latex Itching and Other (See Comments)     And band aids. Causes redness and itching.    Augmentin [Amoxicillin-Pot Clavulanate] Hives     I have utilized all available immediate resources to  "obtain, update, or review the patient's current medications (including all prescriptions, over-the-counter products, herbals, cannabis/cannabidiol products, and vitamin/mineral/dietary (nutritional) supplements) for name, route of administration, type, dose and frequency.      Intake/Output Summary (Last 24 hours) at 3/25/2025 0752  Last data filed at 3/25/2025 0618  Gross per 24 hour   Intake --   Output 825 ml   Net -825 ml       Physical Exam:    Diagnostics: Reviewed  /54 (BP Location: Left arm, Patient Position: Lying)   Pulse 63   Temp 98.6 °F (37 °C) (Oral)   Resp 18   Ht 177.8 cm (70\")   Wt 49.9 kg (110 lb)   SpO2 96%   BMI 15.78 kg/m²     Vitals and nursing note reviewed.   Constitutional:       Appearance: Not in distress. Cachectic and frail. Chronically ill-appearing.   Eyes:      General: Lids are normal.      Pupils: Pupils are equal, round, and reactive to light.   HENT:      Head: Normocephalic.   Pulmonary:      Effort: Pulmonary effort is normal.      Breath sounds: Decreased breath sounds present.   Chest:      Comments: Right chest port  Cardiovascular:      Normal rate.   Edema:     Peripheral edema absent.   Abdominal:      General: Abdomen is scaphoid.   Musculoskeletal:      Cervical back: Neck supple. Skin:     General: Skin is warm.      Coloration: Skin is pale.   Genitourinary:     Comments: Urinary incontinence  Neurological:      Mental Status: Alert, oriented to person, place, and time and oriented to person, place and time.   Psychiatric:         Mood and Affect: Mood is anxious.     Patient status: Disease state: No further treatment being pursued.  Current Functional status: Palliative Performance Scale Score: Performance 30% based on the following measures: Ambulation: Totally bed bound, Activity and Evidence of Disease: Unable to do any work, extensive evidence of disease, Self-Care: Total care required,  Intake: Reduced, LOC: Full, drowsy or confusion   Baseline " Functional status: Palliative Performance Scale Score: Performance 40% based on the following measures: Ambulation: Mainly in bed, Activity and Evidence of Disease: Unable to do any work, extensive evidence of disease, Self-Care: Mainly assistance required,  Intake: Normal or reduced, LOC: Full, drowsy or confusion   Baseline ECOG Status(4) Completely disabled, unable to carry out self-care.  Totally confined to bed or chair.  Nutritional status: Albumin 2.3 Body mass index is 15.78 kg/m².         Hospital Problem List      Upper GI bleed    CLL (chronic lymphocytic leukemia)    Anemia    Diffuse large B-cell lymphoma involving duodenal bulb    Weight loss    Abdominal pain    Suspected contained gastroduodenal perforation    Impression/Problem List:    Diffuse large B cell lymphoma of the duodenal bulb/distal stomach (1/22/2025)  Lymphocytic leukemia (CLL)  Small lymphocytic lymphoma (SLL)  Bladder cancer  Suspected contained gastroduodenal perforation  Upper GI bleed  Severe malnutrition   Weight loss  Abdominal pain from lymphoma  Poor performance status  Cancer cachexia  Anasarca  Neutropenia, without fever from mini R-CHOP.  Filgrastim 3/20/2025  Anemia    Colon polyp  GERD  Glaucoma  Hearing loss  Hypertension  Macular degeneration-right eye    Bowel and bladder incontinence    Recommendations/Plan:  1. plan: Goals of care include CODE STATUS no CPR/comfort measures.    Family support: The patient receives support from his wife..  Advance Directives: Advance Directive Status: Patient does not have advance directive   POA/Healthcare surrogate-spouse and daughter healthcare surrogates per advance directive.    2.  Palliative care encounter  - Prognosis is poor long-term secondary to diffuse large B-cell lymphoma, CLL, SLL, bladder cancer, upper GI bleed, severe malnutrition, poor performance status, cancer cachexia, neutropenia, and comorbidities.  -Patient and family appears to have fair prognostic awareness.      -followed by Dr. Boogie treated with rituximab 3/18/2025, mini R-CHOP, and Neulasta.    -9/26-10/5 hospitalization due to perforated gastric ulcer.     - Made n.p.o., IV PPI and IV fluids.    -General surgery consulted notes poor surgical candidate, plans further diagnostic workup and to continue IV antibiotics.   3/23-Received 1 unit PRBC.    -Evaluated by GI who notes endoscopy would not have a favorable outcome, recommends transfer to tertiary facility for IR capabilities if patient is not a surgical candidate.    -Oncology following for continuity of care, recommends hold filgrastim unless he develops neutropenic fever or unstable vital signs, last received 3/20/2025.  3/24-Small bowel follow-through 3/24 shows no perforation.    3/25-oncology has no plans for further chemotherapy due to poor performance status, cancer cachexia, and likely develop febrile neutropenia due to pre-existing perforation.  Recommends changing CODE STATUS to comfort measures and a referral to hospice discussed with patient and spouse.      3/25-CODE STATUS changes no CPR/comfort measures  -Anticipating hospice at discharge, a hospice consult has been placed.  -Discharge disposition pending as family do not feel they are able to provide level of caregiver support in the home setting.  We explored options of private pay caregivers versus nursing facility placement with hospice.  Patient and family asking about options of inpatient hospice in the [place] area.  Will ask  to further assist with a discharge plan.  -Will plan to complete a MOST document.    3.  Comfort measures  -We will discontinue any treatments and adjuvants not in line with comfort  -Will plan to place a Ibarra catheter per patient request due to urinary incontinence/comfort  -Additional palliative adjuvants as needed      Thank you for this consult and allowing us to participate in patient's plan of care. Palliative Care Team will continue to follow  patient.     20 minutes spent on advance care planning-discussing with patient and/or family, answering questions, providing some guidance about a plan and documentation of care, and coordinating care face to face.    Karla Wheeler, APRN  3/25/2025  07:33 CDT

## 2025-03-25 NOTE — CASE MANAGEMENT/SOCIAL WORK
Continued Stay Note   [facility]     Patient Name: Oral Dey  MRN: [id number]  Today's Date: 3/25/2025    Admit Date: 3/22/2025    Plan: Hospice   Discharge Plan       Row Name 03/25/25 1221       Plan    Plan Hospice    Plan Comments Met with pt, wife, and daughter in the room. They are interested in hospice. Pt does not feel his wife can take care of him at home at this time. Discussed private pay snf. He asked about the hospice care center but explained he would have to meet criteria which at this time he does not appear to. Discussed hospice agency options and he requests [facility]. Kathy will speak with them.                   Discharge Codes    No documentation.                       TAYLOR Mcarthur

## 2025-03-25 NOTE — PLAN OF CARE
Goal Outcome Evaluation:  Plan of Care Reviewed With: patient        Progress: no change          Patient with no complaints of pain so far during shift. IV abx given per order. Turn Q2h; patient turns self most of the time. Room air. Incontinent of urine and stool. Stools have been a dark brown diarrhea this shift.  Neutropenic precautions maintained. Tele on running NSR. Safety maintained. Wife at bedside.

## 2025-03-25 NOTE — PROGRESS NOTES
[facility] Progress Note    Progress Note    Patient:  Oral Dey  YOB: 1940  Date of Service: 3/25/2025  MRN: [id number]   Acct: [id number]   Primary Care Physician: Jorge Shepherd MD  Advance Directive:   Code Status and Medical Interventions: CPR (Attempt to Resuscitate); Full Support   Ordered at: 03/22/25 1757     Code Status (Patient has no pulse and is not breathing):    CPR (Attempt to Resuscitate)     Medical Interventions (Patient has pulse or is breathing):    Full Support     Admit Date: 3/22/2025       Hospital Day: 3      Subjective:     Chief Compliant: Patient seen with nurse Jeaneth and spouse Alaina at the bedside.  Patient incontinent of urine and stool.  He reports no abdominal pain.  No fever.      Review of Systems:   Review of Systems   Constitutional:  Positive for fatigue. Negative for fever.   HENT:  Negative for nosebleeds.    Eyes:  Negative for redness and visual disturbance.   Respiratory:  Negative for shortness of breath.    Cardiovascular:  Negative for chest pain and leg swelling.   Gastrointestinal:  Positive for vomiting. Negative for nausea.   Genitourinary:  Negative for hematuria.   Musculoskeletal:  Negative for joint swelling.   Skin:  Positive for pallor.   Allergic/Immunologic: Positive for immunocompromised state.   Neurological:  Negative for tremors and facial asymmetry.   Hematological:  Negative for adenopathy.   Psychiatric/Behavioral:  Negative for agitation, confusion and hallucinations.            Medications:   Scheduled Meds:cefTRIAXone, 1,000 mg, Intravenous, Q24H  diatrizoate meglumine-sodium, 120 mL, Oral, Once in imaging  latanoprost, 1 drop, Right Eye, Nightly  metroNIDAZOLE, 500 mg, Intravenous, Q8H  nystatin, 5 mL, Oral, 4x Daily  pantoprazole, 40 mg, Intravenous, Q12H  sodium chloride, 10 mL, Intravenous, Q12H        Continuous Infusions:     Labs:     Lab Results (last 72 hours)       Procedure Component Value Units  Date/Time    CBC & Differential [[id number]]  (Abnormal) Collected: 03/25/25 0349    Specimen: Blood Updated: 03/25/25 0559    Narrative:      The following orders were created for panel order CBC & Differential.  Procedure                               Abnormality         Status                     ---------                               -----------         ------                     CBC Auto Differential[[id number]]        Abnormal            Final result                 Please view results for these tests on the individual orders.    CBC Auto Differential [[id number]]  (Abnormal) Collected: 03/25/25 0349    Specimen: Blood Updated: 03/25/25 0559     WBC 0.45 10*3/mm3      RBC 2.80 10*6/mm3      Hemoglobin 7.7 g/dL      Hematocrit 24.2 %      MCV 86.4 fL      MCH 27.5 pg      MCHC 31.8 g/dL      RDW 14.3 %      RDW-SD 44.9 fl      MPV 9.0 fL      Platelets 124 10*3/mm3     Manual Differential [[id number]]  (Abnormal) Collected: 03/25/25 0349    Specimen: Blood Updated: 03/25/25 0559     Neutrophil % 29.4 %      Lymphocyte % 64.7 %      Monocyte % 1.5 %      Eosinophil % 0.0 %      Basophil % 0.0 %      Atypical Lymphocyte % 4.4 %      Neutrophils Absolute 0.13 10*3/mm3      Lymphocytes Absolute 0.31 10*3/mm3      Monocytes Absolute 0.01 10*3/mm3      Eosinophils Absolute 0.00 10*3/mm3      Basophils Absolute 0.00 10*3/mm3      Anisocytosis Slight/1+     Elliptocytes Slight/1+     Microcytes Slight/1+     Poikilocytes Slight/1+     Polychromasia Slight/1+     Vacuolated Neutrophils Slight/1+     Platelet Estimate Decreased     Giant Platelets Large/3+    Comprehensive Metabolic Panel [[id number]]  (Abnormal) Collected: 03/25/25 0349    Specimen: Blood Updated: 03/25/25 0501     Glucose 97 mg/dL      BUN 21 mg/dL      Creatinine 0.61 mg/dL      Sodium 133 mmol/L      Potassium 4.2 mmol/L      Chloride 102 mmol/L      CO2 24.0 mmol/L      Calcium 7.7 mg/dL      Total Protein 3.7 g/dL      Albumin 2.3 g/dL      ALT (SGPT)  14 U/L      AST (SGOT) 13 U/L      Alkaline Phosphatase 72 U/L      Total Bilirubin 0.8 mg/dL      Globulin 1.4 gm/dL      A/G Ratio 1.6 g/dL      BUN/Creatinine Ratio 34.4     Anion Gap 7.0 mmol/L      eGFR 95.9 mL/min/1.73     Narrative:      GFR Categories in Chronic Kidney Disease (CKD)      GFR Category          GFR (mL/min/1.73)    Interpretation  G1                     90 or greater         Normal or high (1)  G2                      60-89                Mild decrease (1)  G3a                   45-59                Mild to moderate decrease  G3b                   30-44                Moderate to severe decrease  G4                    15-29                Severe decrease  G5                    14 or less           Kidney failure          (1)In the absence of evidence of kidney disease, neither GFR category G1 or G2 fulfill the criteria for CKD.    eGFR calculation 2021 CKD-EPI creatinine equation, which does not include race as a factor    CBC & Differential [[id number]]  (Abnormal) Collected: 03/24/25 0448    Specimen: Blood Updated: 03/24/25 0757    Narrative:      The following orders were created for panel order CBC & Differential.  Procedure                               Abnormality         Status                     ---------                               -----------         ------                     CBC Auto Differential[[id number]]        Abnormal            Final result                 Please view results for these tests on the individual orders.    CBC Auto Differential [[id number]]  (Abnormal) Collected: 03/24/25 0448    Specimen: Blood Updated: 03/24/25 0757     WBC 1.05 10*3/mm3      RBC 2.72 10*6/mm3      Hemoglobin 7.5 g/dL      Hematocrit 23.8 %      MCV 87.5 fL      MCH 27.6 pg      MCHC 31.5 g/dL      RDW 14.5 %      RDW-SD 46.2 fl      MPV 8.8 fL      Platelets 132 10*3/mm3     Manual Differential [[id number]]  (Abnormal) Collected: 03/24/25 0448    Specimen: Blood Updated: 03/24/25 0757      Neutrophil % 76.0 %      Lymphocyte % 20.0 %      Monocyte % 1.0 %      Bands %  1.0 %      Atypical Lymphocyte % 2.0 %      Neutrophils Absolute 0.81 10*3/mm3      Lymphocytes Absolute 0.23 10*3/mm3      Monocytes Absolute 0.01 10*3/mm3      Crenated RBC's Slight/1+     Poikilocytes Slight/1+     Dohle Bodies Present     Smudge Cells Mod/2+     Toxic Granulation Slight/1+     Vacuolated Neutrophils Slight/1+     Platelet Estimate Decreased    Comprehensive Metabolic Panel [[id number]]  (Abnormal) Collected: 03/24/25 0448    Specimen: Blood Updated: 03/24/25 0543     Glucose 91 mg/dL      BUN 23 mg/dL      Creatinine 0.67 mg/dL      Sodium 135 mmol/L      Potassium 3.4 mmol/L      Chloride 102 mmol/L      CO2 25.0 mmol/L      Calcium 7.3 mg/dL      Total Protein 3.6 g/dL      Albumin 2.2 g/dL      ALT (SGPT) 14 U/L      AST (SGOT) 16 U/L      Alkaline Phosphatase 72 U/L      Total Bilirubin 0.6 mg/dL      Globulin 1.4 gm/dL      A/G Ratio 1.6 g/dL      BUN/Creatinine Ratio 34.3     Anion Gap 8.0 mmol/L      eGFR 93.2 mL/min/1.73     Narrative:      GFR Categories in Chronic Kidney Disease (CKD)      GFR Category          GFR (mL/min/1.73)    Interpretation  G1                     90 or greater         Normal or high (1)  G2                      60-89                Mild decrease (1)  G3a                   45-59                Mild to moderate decrease  G3b                   30-44                Moderate to severe decrease  G4                    15-29                Severe decrease  G5                    14 or less           Kidney failure          (1)In the absence of evidence of kidney disease, neither GFR category G1 or G2 fulfill the criteria for CKD.    eGFR calculation 2021 CKD-EPI creatinine equation, which does not include race as a factor    Protime-INR [[id number]]  (Abnormal) Collected: 03/24/25 0448    Specimen: Blood Updated: 03/24/25 0526     Protime 17.7 Seconds      INR 1.38    Hemoglobin & Hematocrit,  Blood [[id number]]  (Abnormal) Collected: 03/23/25 2337    Specimen: Blood Updated: 03/24/25 0024     Hemoglobin 7.5 g/dL      Hematocrit 23.7 %     Hemoglobin & Hematocrit, Blood [[id number]]  (Abnormal) Collected: 03/23/25 1805    Specimen: Blood Updated: 03/23/25 1817     Hemoglobin 7.8 g/dL      Hematocrit 24.9 %     Hemoglobin & Hematocrit, Blood [[id number]]  (Abnormal) Collected: 03/23/25 1155    Specimen: Blood Updated: 03/23/25 1221     Hemoglobin 7.7 g/dL      Hematocrit 24.1 %     Manual Differential [[id number]]  (Abnormal) Collected: 03/23/25 0554    Specimen: Blood Updated: 03/23/25 0627     Neutrophil % 85.6 %      Lymphocyte % 12.4 %      Monocyte % 1.0 %      Eosinophil % 0.0 %      Basophil % 0.0 %      Bands %  1.0 %      Neutrophils Absolute 1.97 10*3/mm3      Lymphocytes Absolute 0.28 10*3/mm3      Monocytes Absolute 0.02 10*3/mm3      Eosinophils Absolute 0.00 10*3/mm3      Basophils Absolute 0.00 10*3/mm3      Anisocytosis Slight/1+     Hypochromia Slight/1+     Microcytes Slight/1+     Dohle Bodies Present     Toxic Granulation Mod/2+     Vacuolated Neutrophils Mod/2+     Clumped Platelets Present    CBC & Differential [[id number]]  (Abnormal) Collected: 03/23/25 0554    Specimen: Blood Updated: 03/23/25 0627    Narrative:      The following orders were created for panel order CBC & Differential.  Procedure                               Abnormality         Status                     ---------                               -----------         ------                     CBC Auto Differential[[id number]]        Abnormal            Final result                 Please view results for these tests on the individual orders.    CBC Auto Differential [[id number]]  (Abnormal) Collected: 03/23/25 0554    Specimen: Blood Updated: 03/23/25 0627     WBC 2.27 10*3/mm3      RBC 2.75 10*6/mm3      Hemoglobin 7.6 g/dL      Hematocrit 23.8 %      MCV 86.5 fL      MCH 27.6 pg      MCHC 31.9 g/dL      RDW 14.3 %       RDW-SD 44.6 fl      MPV 8.5 fL      Platelets 147 10*3/mm3     Narrative:      The previously reported component NRBC is no longer being reported. Previous result was 0.0 /100 WBC (Reference Range: 0.0-0.2 /100 WBC) on 3/23/2025 at [address] [facility] [place].    Comprehensive Metabolic Panel [[id number]]  (Abnormal) Collected: 03/23/25 0554    Specimen: Blood Updated: 03/23/25 0621     Glucose 95 mg/dL      BUN 24 mg/dL      Creatinine 0.65 mg/dL      Sodium 132 mmol/L      Potassium 3.7 mmol/L      Chloride 99 mmol/L      CO2 25.0 mmol/L      Calcium 7.7 mg/dL      Total Protein 3.7 g/dL      Albumin 2.5 g/dL      ALT (SGPT) 13 U/L      AST (SGOT) 14 U/L      Alkaline Phosphatase 73 U/L      Total Bilirubin 1.2 mg/dL      Globulin 1.2 gm/dL      A/G Ratio 2.1 g/dL      BUN/Creatinine Ratio 36.9     Anion Gap 8.0 mmol/L      eGFR 94.1 mL/min/1.73     Narrative:      GFR Categories in Chronic Kidney Disease (CKD)      GFR Category          GFR (mL/min/1.73)    Interpretation  G1                     90 or greater         Normal or high (1)  G2                      60-89                Mild decrease (1)  G3a                   45-59                Mild to moderate decrease  G3b                   30-44                Moderate to severe decrease  G4                    15-29                Severe decrease  G5                    14 or less           Kidney failure          (1)In the absence of evidence of kidney disease, neither GFR category G1 or G2 fulfill the criteria for CKD.    eGFR calculation 2021 CKD-EPI creatinine equation, which does not include race as a factor    Protime-INR [[id number]]  (Abnormal) Collected: 03/23/25 0554    Specimen: Blood Updated: 03/23/25 0611     Protime 16.1 Seconds      INR 1.23    Hemoglobin & Hematocrit, Blood [[id number]]  (Abnormal) Collected: 03/22/25 2355    Specimen: Blood Updated: 03/23/25 0015     Hemoglobin 6.8 g/dL      Hematocrit 21.7 %     Urinalysis With Culture If Indicated - Urine, Clean  Catch [[id number]]  (Abnormal) Collected: 03/22/25 1705    Specimen: Urine, Clean Catch Updated: 03/22/25 1736     Color, UA Yellow     Appearance, UA Clear     pH, UA 5.5     Specific Gravity, UA 1.029     Glucose, UA Negative     Ketones, UA Negative     Bilirubin, UA Negative     Blood, UA Negative     Protein, UA Trace     Leuk Esterase, UA Negative     Nitrite, UA Positive     Urobilinogen, UA 1.0 E.U./dL    Narrative:      In absence of clinical symptoms, the presence of pyuria, bacteria, and/or nitrites on the urinalysis result does not correlate with infection.    Urinalysis, Microscopic Only - Urine, Clean Catch [[id number]]  (Abnormal) Collected: 03/22/25 1705    Specimen: Urine, Clean Catch Updated: 03/22/25 1736     RBC, UA None Seen /HPF      WBC, UA 3-5 /HPF      Comment: Urine culture not indicated.        Bacteria, UA 3+ /HPF      Squamous Epithelial Cells, UA 0-2 /HPF      Hyaline Casts, UA None Seen /LPF      Calcium Oxalate Crystals, UA Small/1+ /HPF      Methodology Manual Light Microscopy    CBC & Differential [[id number]]  (Abnormal) Collected: 03/22/25 1512    Specimen: Blood Updated: 03/22/25 1551    Narrative:      The following orders were created for panel order CBC & Differential.  Procedure                               Abnormality         Status                     ---------                               -----------         ------                     CBC Auto Differential[[id number]]        Abnormal            Final result                 Please view results for these tests on the individual orders.    CBC Auto Differential [[id number]]  (Abnormal) Collected: 03/22/25 1512    Specimen: Blood Updated: 03/22/25 1551     WBC 3.13 10*3/mm3      RBC 2.89 10*6/mm3      Hemoglobin 7.9 g/dL      Hematocrit 25.3 %      MCV 87.5 fL      MCH 27.3 pg      MCHC 31.2 g/dL      RDW 14.2 %      RDW-SD 45.3 fl      MPV 8.7 fL      Platelets 189 10*3/mm3     Manual Differential [[id number]]  (Abnormal)  Collected: 03/22/25 1512    Specimen: Blood Updated: 03/22/25 1551     Neutrophil % 90.5 %      Lymphocyte % 4.2 %      Monocyte % 1.1 %      Eosinophil % 0.0 %      Basophil % 0.0 %      Bands %  3.2 %      Metamyelocyte % 1.1 %      Neutrophils Absolute 2.93 10*3/mm3      Lymphocytes Absolute 0.13 10*3/mm3      Monocytes Absolute 0.03 10*3/mm3      Eosinophils Absolute 0.00 10*3/mm3      Basophils Absolute 0.00 10*3/mm3      Crenated RBC's Slight/1+     Elliptocytes Slight/1+     Poikilocytes Slight/1+     Vacuolated Neutrophils Slight/1+     Platelet Morphology Normal    Comprehensive Metabolic Panel [[id number]]  (Abnormal) Collected: 03/22/25 1512    Specimen: Blood Updated: 03/22/25 1540     Glucose 118 mg/dL      BUN 31 mg/dL      Creatinine 0.77 mg/dL      Sodium 131 mmol/L      Potassium 3.9 mmol/L      Chloride 96 mmol/L      CO2 27.0 mmol/L      Calcium 8.2 mg/dL      Total Protein 4.4 g/dL      Albumin 2.9 g/dL      ALT (SGPT) 14 U/L      AST (SGOT) 17 U/L      Alkaline Phosphatase 87 U/L      Total Bilirubin 0.5 mg/dL      Globulin 1.5 gm/dL      A/G Ratio 1.9 g/dL      BUN/Creatinine Ratio 40.3     Anion Gap 8.0 mmol/L      eGFR 89.4 mL/min/1.73     Narrative:      GFR Categories in Chronic Kidney Disease (CKD)      GFR Category          GFR (mL/min/1.73)    Interpretation  G1                     90 or greater         Normal or high (1)  G2                      60-89                Mild decrease (1)  G3a                   45-59                Mild to moderate decrease  G3b                   30-44                Moderate to severe decrease  G4                    15-29                Severe decrease  G5                    14 or less           Kidney failure          (1)In the absence of evidence of kidney disease, neither GFR category G1 or G2 fulfill the criteria for CKD.    eGFR calculation 2021 CKD-EPI creatinine equation, which does not include race as a factor    Lactic Acid, Plasma [[id number]]   (Normal) Collected: 03/22/25 1512    Specimen: Blood Updated: 03/22/25 1535     Lactate 1.1 mmol/L     Protime-INR [[id number]]  (Abnormal) Collected: 03/22/25 1512    Specimen: Blood Updated: 03/22/25 1530     Protime 15.6 Seconds      INR 1.17    aPTT [[id number]]  (Normal) Collected: 03/22/25 1512    Specimen: Blood Updated: 03/22/25 1530     PTT 27.1 seconds     Narrative:      PTT = The equivalent PTT values for the therapeutic range of heparin levels at 0.3 to 0.7 U/ml are 77 - 99 seconds.    POC Occult Blood Stool [[id number]]  (Abnormal) Collected: 03/22/25 1527    Specimen: Stool Updated: 03/22/25 1529     Fecal Occult Blood Positive     Lot Number [id number]     Expiration Date 2/28/25     DEVELOPER LOT NUMBER [id number]     DEVELOPER EXPIRATION DATE 2/28/25     Positive Control Positive     Negative Control Negative              Radiology:     Imaging Results (Last 72 Hours)       Procedure Component Value Units Date/Time    FL Upper GI Single Contrast SBFT [[id number]] Collected: 03/24/25 1202     Updated: 03/24/25 1217    Narrative:      EXAMINATION: FL UPPER GI SINGLE CONTRAST SBFT-     3/24/2025 10:05 AM     HISTORY: 84 yom w/ contained gastrodoudenal perforation.; K92.1-Melena;  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     Comparison is made with the previous study dated 10/1/2024.     There is no difficulty in initiating the swallowing.     There is normal preparation and propagation of bolus through the  oropharynx. No nasopharyngeal reflux.     Patient laryngeal penetration and small amount of aspiration of the  contrast material followed by significant cough and expectoration.     Normal vallecula piriform sinuses.     The esophagus is  normal. No intrinsic abnormality.     There is marked deformity and thickening of the gastric antrum. No  perforation at this level. The type of contrast used is not optimal for  evaluation of the gastric mucosa/ulceration.     There is marked deformity of the duodenum with diffuse abnormal  dilatation of the duodenum. There is marked thickening and and  irregularity of the mucosa of the duodenum. No evidence of perforation.  The duodenal loop appears normal.     A follow-up image of the abdomen was obtained for evaluation of small  bowel.     Normal opacification of the small bowel. Image obtained after 20 minutes  show retained contrast material in the stomach and the distal part of  the contrast material in the cecum and proximal ascending colon. No  evidence of leakage of the contrast material is noted.       Impression:      1. Marked irregularity and deformity of the mucosa of the gastric antrum  and proximal duodenum without a perforation. This may represent acute or  subacute inflammatory process.  2. The remaining small bowel is normal.  3. Fluoroscopy time: 4 minutes 1 second.  5. Dose: 60mGy.  6. Number of images: 70.              This report was signed and finalized on 3/24/2025 12:14 PM by Dr. Bob Prater MD.       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     DLP: 126.86 mGy.cm. Automated exposure control was utilized to decrease  patient radiation dose.       Impression:      FINDINGS/IMPRESSION:     1.  Evaluation is difficult as there is severe diffuse soft tissue  edema/anasarca, which results in  blurring of fat planes.      2.  Oral contrast was administered which has transited to the stomach,  small bowel, and reached the distal colon. No free intraperitoneal  spillage of contrast. Redemonstrated CT findings which are highly  concerning for contained distal gastric perforation including poor  visualization of the distal gastric wall and infiltrating collection of  gas and debris within the right upper quadrant extending along the  inferior aspect of the liver.        This report was signed and finalized on 3/22/2025 8:40 PM by Dr. Steven Giles MD.       CT Angiogram Abdomen Pelvis [[id number]] Collected: 03/22/25 1600     Updated: 03/22/25 1612    Narrative:      EXAM/TECHNIQUE: CT angiography with 3D MIP images abdomen and pelvis  without and with IV contrast     INDICATION: gi bleed. known stomach cancer     COMPARISON: 1/7/2025     DLP: 598.03 mGy.cm. Automated exposure control was utilized to decrease  patient radiation dose.     FINDINGS:     Lung bases are clear.     No suspicious liver lesion. Small gallstone. No abnormal gallbladder  wall thickening. No biliary ductal dilatation. Pancreas and adrenal  glands appear unremarkable. Spleen is mildly enlarged measuring 13.3 cm  craniocaudal dimension. No solid renal mass. No urolithiasis or  hydronephrosis. No focal urinary bladder abnormality.     Patient has a known lymphoma involving the duodenum and has a history of  perforated gastric ulcer. The distal gastric wall is poorly visualized  and there appears to be a large amount of extraluminal fluid and gas  within the region of the gastric antrum and proximal duodenum, in  keeping with bowel perforation. Also within the region of known  lymphoma, in the distal gastric lumen, there is an area of contrast  pooling on delayed images on axial image 97 series 9, in keeping with an  area of active bleeding within the stomach.     Moderate volume stool in the colon. No colonic wall thickening  "or  pericolonic fat stranding. Hyperdense material within the stomach is  hyperdense before giving contrast. No bowel obstruction. Normal  appendix.     No ascites or free pelvic fluid. No pelvic mass or collection. Prostate  and seminal vesicles are unremarkable.     Nonaneurysmal atherosclerotic abdominal aorta. Celiac artery, SMA, and  ONEL are patent. Renal arteries are patent. Redemonstrated mild  retroperitoneal periaortic lymphadenopathy. No new or enlarging lymph  nodes in the abdomen or pelvis.     Severe diffuse body wall soft tissue edema, in keeping with anasarca. No  acute osseous finding.       Impression:         1.  Patient has a known distal gastric/proximal duodenal lymphoma. The  distal gastric wall is poorly visualized and there is appearance of  extraluminal gas and debris, highly suspicious for contained  gastroduodenal perforation. If clinically indicated, this could be  further evaluated with limited CT of the abdomen after oral contrast  administration.     2.  There is a 1.7 cm oval focus of contrast pooling within the distal  gastric lumen on delayed images, in keeping with an area of active  bleeding.     3.  Upper abdominal retroperitoneal lymphadenopathy, similar to the  prior study.     4.  Severe diffuse abdominal wall soft tissue edema, likely related to  anasarca.           This report was signed and finalized on 3/22/2025 4:09 PM by Dr. Steven Gilse MD.                 Objective:   Vitals: /48 (BP Location: Left arm, Patient Position: Lying)   Pulse 71   Temp 98.9 °F (37.2 °C) (Oral)   Resp 16   Ht 177.8 cm (70\")   Wt 49.9 kg (110 lb)   SpO2 95%   BMI 15.78 kg/m²   Physical Exam  Vitals and nursing note reviewed.   Constitutional:       Appearance: He is ill-appearing.      Comments: He is cachectic.   HENT:      Head: Normocephalic.      Comments: Bitemporal wasting.  Eyes:      General: No scleral icterus.  Cardiovascular:      Rate and Rhythm: Normal rate. "   Pulmonary:      Effort: No respiratory distress.      Comments: Port, right, no erythema.  Abdominal:      General: There is no distension.      Palpations: Abdomen is soft.      Tenderness: There is no abdominal tenderness.   Musculoskeletal:         General: No swelling.   Skin:     Coloration: Skin is pale.   Neurological:      Mental Status: He is oriented to person, place, and time.   Psychiatric:         Mood and Affect: Mood normal.         Behavior: Behavior normal.         Thought Content: Thought content normal.         Judgment: Judgment normal.       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  Patient not a surgical candidate Dr. Pemberton.  2.  GI bleed secondary to the gastric perforation.  3.  Neutropenia grade 4, without fever from mini R-CHOP.  Had pegfilgrastim 3/20/2025.  4.  Cancer cachexia.       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  Ki-67 70%.  AJCC stage: 1B.  IPI score 2. 3 year overall survival 81%. 3 year PFS 75%.   Treatment status: Pending mini R CHOP due to advance age and poor performance status.   2.   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  Treatment status: C1D1 mini R-CHOP on 3/19/2025..  Complications: None.  Prognosis: Good.  3.   Abdominal pain from lymphoma.  Hydrocodone as needed for pain.    4.   Normocytic anemia, from chronic disease and iron deficiency.  Oral iron 2/13/2025 through present.  5.   Poor performance status of 3.  6.   Mild  "thrombocytopenia likely from idiopathic thrombocytopenic purpura, stable for observation.   7.   History of fever, resolved, ? viral syndrome.  Not compatible with progressing chronic lymphocytic leukemia (CLL). Lymphocyte count is stable, no palpable adenopathies, and fever had resolved.   8.   Bladder cancer  AJCC stage cTa, cN0, cM0.   Treatment status:On observation.   9.  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        PLAN:   Re: WBC 0.45 from 1.05, hemoglobin 7.7 from 7.5, platelet 124 from 132 and ANC of 0.1 from 0.8.  No schistocytes..  CMP remarkable for calcium 7.7 albumin 2.3..  2.  Patient hemodynamically stable.  Hold filgrastim 300 mcg subcutaneous daily unless he develop neutropenic fever or unstable vital signs.  Patient had pegfilgrastim on 3/20/2025.  3.  Cancel further chemotherapy.  Patient has poor performance status, cancer cachexia and likely developed febrile neutropenia due to pre-existing perforation.  4.  Suggest changing CODE STATUS to comfort measures.  5.  Patient on therapy with empiric ceftriaxone and metronidazole.  He is also on pantoprazole.  6.  Patient is a very poor surgical candidate.  Cachectic, malnourished and poor performance status.  7.  Discussed no further chemotherapy due to his very poor performance status and high risk for further bleeding or progressing perforation.  Patient and spouse agreed. \"  I appreciate with you determine.  I was getting so sick.  We gave it a try.\"  8.  Discussed role of hospice.  Patient and spouse agreed.  9.  Refer to hospice.  10.  Cancel clinic appointment.  I will sign off.  11.  Above discussed with patient, nurse Jeaneth and spouse Alaina.  They verbalized understanding.                "

## 2025-03-26 PROCEDURE — 99232 SBSQ HOSP IP/OBS MODERATE 35: CPT | Performed by: CLINICAL NURSE SPECIALIST

## 2025-03-26 RX ADMIN — Medication 10 ML: at 20:40

## 2025-03-26 RX ADMIN — NYSTATIN 500000 UNITS: 100000 SUSPENSION ORAL at 09:44

## 2025-03-26 RX ADMIN — MORPHINE SULFATE 20 MG: 20 SOLUTION ORAL at 16:30

## 2025-03-26 RX ADMIN — LATANOPROST 1 DROP: 50 SOLUTION OPHTHALMIC at 20:39

## 2025-03-26 RX ADMIN — MORPHINE SULFATE 10 MG: 20 SOLUTION ORAL at 21:14

## 2025-03-26 RX ADMIN — NYSTATIN 500000 UNITS: 100000 SUSPENSION ORAL at 20:40

## 2025-03-26 RX ADMIN — MORPHINE SULFATE 20 MG: 20 SOLUTION ORAL at 11:36

## 2025-03-26 RX ADMIN — PANTOPRAZOLE SODIUM 40 MG: 40 INJECTION, POWDER, FOR SOLUTION INTRAVENOUS at 09:44

## 2025-03-26 RX ADMIN — NYSTATIN 500000 UNITS: 100000 SUSPENSION ORAL at 18:41

## 2025-03-26 NOTE — CASE MANAGEMENT/SOCIAL WORK
Continued Stay Note   Arsalan     Patient Name: Oral Dey  MRN: [id number]  Today's Date: 3/26/2025    Admit Date: 3/22/2025    Plan: Hospice   Discharge Plan       Row Name 03/26/25 1130       Plan    Plan Hospice    Plan Comments Kathy with [facility] has met with pt and family. Right now, they are leaning toward possible snf. They request a referral to [facility] so referral sent. Also gave them a sitter list in case they decide to go home with hospice.                   Discharge Codes    No documentation.                       TAYLOR Mcarthur

## 2025-03-26 NOTE — PROGRESS NOTES
Nutrition Services  Comfort Measures Care Plan  Patient Name:  Oral Dey  YOB: 1940  MRN: [id number]  Admit Date:  3/22/2025    Inpatient nutrition services reviewed POC. Discussed diet (clear liquid, neutropenic) with providers to determine risk v. Benefit of liberalized meals with comfort measures care plan. MD okay with diet upgrade. Modified ONS to berry flavor; frequency reduced to daily with dinner. Malnutrition care plan remains active after MSA completed 3/24 on initial evaluation. Inpatient nutrition services/RD available upon request. Will follow per protocol.      Electronically signed by:  Ramya Crockett RDN, JOSE  03/26/25 09:28 CDT

## 2025-03-26 NOTE — PROGRESS NOTES
[facility] Medicine Services  INPATIENT PROGRESS NOTE    Patient Name: Oral Dey  Date of Admission: 3/22/2025  Today's Date: 03/26/25  Length of Stay: 4  Primary Care Physician: Jorge Shepherd MD    Subjective   Chief Complaint: follow-up abdominal pain, black stool  HPI     Had extensive discussion with patient and multiple family members at bedside regarding goal of care, patient stated he would want to go on hospice.  Hospice was consulted and working on home hospice with patient and family.      Review of Systems   All pertinent negatives and positives are as above. All other systems have been reviewed and are negative unless otherwise stated.     Objective    Temp:  [98.1 °F (36.7 °C)-99 °F (37.2 °C)] 99 °F (37.2 °C)  Heart Rate:  [81-88] 87  Resp:  [16] 16  BP: (111-129)/(44-53) 119/46  Physical Exam  Vitals reviewed.   Constitutional:       Appearance: He is ill-appearing.      Comments: Frail and cachectic appearing   HENT:      Head: Normocephalic.      Mouth/Throat:      Mouth: Mucous membranes are moist.   Cardiovascular:      Rate and Rhythm: Normal rate.   Pulmonary:      Effort: Pulmonary effort is normal. No respiratory distress.   Abdominal:      Tenderness: There is no guarding or rebound.      Comments: Thin, mild PASTORA TTP   Musculoskeletal:      Right lower leg: Edema present.      Left lower leg: Edema present.   Skin:     General: Skin is warm.   Neurological:      Mental Status: He is alert.      Motor: Weakness present.   Psychiatric:         Mood and Affect: Mood normal.         Results Review:  I have reviewed the labs, radiology results, and diagnostic studies.    Laboratory Data:   Results from last 7 days   Lab Units 03/25/25  0349 03/24/25  0448 03/23/25  2337 03/23/25  1155 03/23/25  0554   WBC 10*3/mm3 0.45* 1.05*  --   --  2.27*   HEMOGLOBIN g/dL 7.7* 7.5* 7.5*   < > 7.6*   HEMATOCRIT % 24.2* 23.8* 23.7*   < > 23.8*   PLATELETS 10*3/mm3  "124* 132*  --   --  147    < > = values in this interval not displayed.        Results from last 7 days   Lab Units 03/25/25  0349 03/24/25  0448 03/23/25  0554   SODIUM mmol/L 133* 135* 132*   POTASSIUM mmol/L 4.2 3.4* 3.7   CHLORIDE mmol/L 102 102 99   CO2 mmol/L 24.0 25.0 25.0   BUN mg/dL 21 23 24*   CREATININE mg/dL 0.61* 0.67* 0.65*   CALCIUM mg/dL 7.7* 7.3* 7.7*   BILIRUBIN mg/dL 0.8 0.6 1.2   ALK PHOS U/L 72 72 73   ALT (SGPT) U/L 14 14 13   AST (SGOT) U/L 13 16 14   GLUCOSE mg/dL 97 91 95       Culture Data:   No results found for: \"BLOODCX\", \"URINECX\", \"WOUNDCX\", \"MRSACX\", \"RESPCX\", \"STOOLCX\"    Radiology Data:   Imaging Results (Last 24 Hours)       ** No results found for the last 24 hours. **            I have reviewed the patient's current medications.     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    -Duodenal perforation secondary to lymphoma [complaint]  General Surgery was consulted and also recommended conservative management.  Patient and family are requesting hospice care.    -Urinary tract infection  Patient is currently on ceftriaxone, unfortunately urine culture was not done and therefore we will complete 5 to 7-day course of antibiotic.    -History of diffuse large B-cell lymphoma involving duodenal bulb  Patient was getting chemotherapy outpatient, oncologist was on consult and has recommended hospice care for patient and signed " off.  Hospice    Other chronic medical conditions-  Chronic anemia  Chronic lymphocytic leukemia  Bladder cancer  GERD  Hypertension  Macular degeneration of right eye    DVT prophylaxis-SCD    Disposition-discharge planning for tomorrow with home hospice      Electronically signed by Sandoval Davis MD, 03/26/25, 17:11 CDT.

## 2025-03-26 NOTE — PLAN OF CARE
Goal Outcome Evaluation:                 Patient alert and oriented x4. Moderate pain being treated with prn medication. Patient on comfort care without needs.

## 2025-03-26 NOTE — CASE MANAGEMENT/SOCIAL WORK
Continued Stay Note  RACHANA Arriaza     Patient Name: Oral Dey  MRN: [id number]  Today's Date: 3/26/2025    Admit Date: 3/22/2025    Plan: Home Hospice   Discharge Plan       Row Name 03/26/25 1628       Plan    Plan Home Hospice    Patient/Family in Agreement with Plan yes    Plan Comments Family has elected to now go home with hospice. Kathy with [facility] has met with them and is making the arrangements.                   Discharge Codes    No documentation.                 Expected Discharge Date and Time       Expected Discharge Date Expected Discharge Time    Mar 27, 2025               TAYLOR Mcarthur

## 2025-03-26 NOTE — PROGRESS NOTES
[facility] Palliative Care Services    Palliative Care Daily Progress Note   Chief complaint-follow up comfort care    Code Status:   Code Status and Medical Interventions: No CPR (Do Not Attempt to Resuscitate); Comfort Measures   Ordered at: 03/25/25 1018     Code Status (Patient has no pulse and is not breathing):    No CPR (Do Not Attempt to Resuscitate)     Medical Interventions (Patient has pulse or is breathing):    Comfort Measures     Level Of Support Discussed With:    Patient      Advanced Directives: Advance Directive Status: Patient has advance directive, copy in chart   Goals of Care: Ongoing.     S: Medical record reviewed. Events noted.  Transition to comfort measures 3/25.  Received 20 mg oral morphine equivalent in the form of morphine concentrate over the last 24 hours.  Currently laying in bed without apparent distress.  Reports improvement in abdominal pain with morphine of which he has not required dosing since yesterday afternoon.  Encouraged use as needed.  His daughter and spouse are currently at bedside.  Questions encouraged and support given. Anticipating SNF with hospice at discharge. [facility] reports [facility].     Review of Systems   Constitutional: Positive for malaise/fatigue.   Respiratory:  Negative for shortness of breath.    Gastrointestinal:  Positive for abdominal pain and bowel incontinence.   Genitourinary:  Positive for bladder incontinence.   Neurological:  Positive for weakness.   Psychiatric/Behavioral:  The patient is nervous/anxious.      Pain Assessment  Preferred Pain Scale: number (Numeric Rating Pain Scale)  Nonverbal Indicators of Pain: nonverbal indicators absent  Pain Location: abdomen  Pain Description: aching    O:     Intake/Output Summary (Last 24 hours) at 3/26/2025 0988  Last data filed at 3/26/2025 0619  Gross per 24 hour   Intake --   Output 750 ml   Net -750 ml       Diagnostics and current medications:  Reviewed.      Current Facility-Administered Medications:     acetaminophen (TYLENOL) tablet 650 mg, 650 mg, Oral, Q4H PRN **OR** acetaminophen (TYLENOL) 160 MG/5ML oral solution 650 mg, 650 mg, Oral, Q4H PRN **OR** acetaminophen (TYLENOL) suppository 650 mg, 650 mg, Rectal, Q4H PRN, Karla Wheeler, APRN    atropine 1 % ophthalmic solution 2 drop, 2 drop, Sublingual, BID PRN, Karla Wheeler APRN    diatrizoate meglumine-sodium (GASTROGRAFIN) 66-10 % oral solution 120 mL, 120 mL, Oral, Once in imaging, Sandoval Davis MD    diphenhydrAMINE (BENADRYL) capsule 25 mg, 25 mg, Oral, Q6H PRN **OR** diphenhydrAMINE (BENADRYL) injection 25 mg, 25 mg, Intravenous, Q6H PRN, Karla Wheeler APRN    diphenoxylate-atropine (LOMOTIL) 2.5-0.025 MG per tablet 1 tablet, 1 tablet, Oral, Q2H PRN, Karla Wheeler APRN    First Mouthwash (Magic Mouthwash) 5 mL, 5 mL, Swish & Spit, Q4H PRN, Karla Wheeler, APRN    furosemide (LASIX) injection 20 mg, 20 mg, Intravenous, Q6H PRN **OR** furosemide (LASIX) injection 20 mg, 20 mg, Subcutaneous, Q6H PRN, Karla Wheeler, APRN    haloperidol (HALDOL) tablet 1 mg, 1 mg, Oral, Q4H PRN **OR** haloperidol (HALDOL) 2 MG/ML solution 1 mg, 1 mg, Sublingual, Q4H PRN **OR** haloperidol lactate (HALDOL) injection 1 mg, 1 mg, Intravenous, Q4H PRN, Karla Wheeler, APRN    haloperidol (HALDOL) tablet 2 mg, 2 mg, Oral, Q4H PRN **OR** haloperidol (HALDOL) 2 MG/ML solution 2 mg, 2 mg, Sublingual, Q4H PRN **OR** haloperidol lactate (HALDOL) injection 2 mg, 2 mg, Intravenous, Q4H PRN, Karla Wheeler APRN    ipratropium-albuterol (DUO-NEB) nebulizer solution 3 mL, 3 mL, Nebulization, Q4H PRN, Karla Wheeler APRN    latanoprost (XALATAN) 0.005 % ophthalmic solution 1 drop, 1 drop, Right Eye, Nightly, Wilbert Maldonado MD, 1 drop at 03/25/25 2128    LORazepam (ATIVAN) tablet 0.5 mg, 0.5 mg, Oral, Q1H PRN **OR** LORazepam (ATIVAN) injection 0.5 mg, 0.5 mg,  Intravenous, Q1H PRN **OR** LORazepam (ATIVAN) injection 0.5 mg, 0.5 mg, Intramuscular, Q1H PRN **OR** LORazepam (ATIVAN) injection 0.5 mg, 0.5 mg, Subcutaneous, Q1H PRN **OR** LORazepam (ATIVAN) 2 MG/ML concentrated solution 0.5 mg, 0.5 mg, Oral, Q1H PRN **OR** LORazepam (ATIVAN) 2 MG/ML concentrated solution 0.5 mg, 0.5 mg, Sublingual, Q1H PRN, Wheeler, Karla L, APRN    LORazepam (ATIVAN) tablet 1 mg, 1 mg, Oral, Q1H PRN **OR** LORazepam (ATIVAN) injection 1 mg, 1 mg, Intravenous, Q1H PRN **OR** LORazepam (ATIVAN) injection 1 mg, 1 mg, Intramuscular, Q1H PRN **OR** LORazepam (ATIVAN) injection 1 mg, 1 mg, Subcutaneous, Q1H PRN **OR** LORazepam (ATIVAN) 2 MG/ML concentrated solution 1 mg, 1 mg, Oral, Q1H PRN **OR** LORazepam (ATIVAN) 2 MG/ML concentrated solution 1 mg, 1 mg, Sublingual, Q1H PRN, Wheeler, Karla L, APRN    LORazepam (ATIVAN) tablet 2 mg, 2 mg, Oral, Q1H PRN **OR** LORazepam (ATIVAN) injection 2 mg, 2 mg, Intravenous, Q1H PRN **OR** LORazepam (ATIVAN) injection 2 mg, 2 mg, Intramuscular, Q1H PRN **OR** LORazepam (ATIVAN) injection 2 mg, 2 mg, Subcutaneous, Q1H PRN **OR** LORazepam (ATIVAN) 2 MG/ML concentrated solution 2 mg, 2 mg, Oral, Q1H PRN **OR** LORazepam (ATIVAN) 2 MG/ML concentrated solution 2 mg, 2 mg, Sublingual, Q1H PRN, Wheeler, Karla L, APRN    morphine injection 4 mg, 4 mg, Intravenous, Q1H PRN **OR** morphine concentrated solution 10 mg, 10 mg, Sublingual, Q1H PRN, Wheeler, Karla L, APRN, 10 mg at 03/25/25 1701    morphine injection 6 mg, 6 mg, Intravenous, Q1H PRN **OR** morphine concentrated solution 20 mg, 20 mg, Sublingual, Q1H PRN, Karla Wheeler APRN    nystatin (MYCOSTATIN) 100,000 unit/mL suspension 500,000 Units, 5 mL, Oral, 4x Daily, Wilbert Maldonado MD, 500,000 Units at 03/25/25 2128    ondansetron (ZOFRAN) injection 4 mg, 4 mg, Intravenous, Q6H PRN, Wilbert Maldonado MD    pantoprazole (PROTONIX) injection 40 mg, 40 mg, Intravenous, Q12H, Joel  "Wilbert GRACIA MD, 40 mg at 03/25/25 2128    Polyvinyl Alcohol-Povidone PF (ARTIFICIAL TEARS) 1.4-0.6 % ophthalmic solution 1 drop, 1 drop, Both Eyes, Q30 Min PRN, Karla Wheeler APRN    prochlorperazine (COMPAZINE) injection 5 mg, 5 mg, Intravenous, Q6H PRN **OR** prochlorperazine (COMPAZINE) tablet 5 mg, 5 mg, Oral, Q6H PRN **OR** prochlorperazine (COMPAZINE) suppository 25 mg, 25 mg, Rectal, Q12H PRN, Karla Wheeler APRCHARLETTE    scopolamine patch 1 mg/72 hr, 1 patch, Transdermal, Q72H PRN, Karla Wheeler APRN    [COMPLETED] Insert Peripheral IV, , , Once **AND** sodium chloride 0.9 % flush 10 mL, 10 mL, Intravenous, PRN, Wilbert Maldonado MD    sodium chloride 0.9 % flush 10 mL, 10 mL, Intravenous, Q12H, Wilbert Maldonado MD, 10 mL at 03/25/25 2128    sodium chloride 0.9 % flush 10 mL, 10 mL, Intravenous, PRN, Wilbert Maldonado MD    sodium chloride 0.9 % infusion 40 mL, 40 mL, Intravenous, PRN, Wilbert Maldonado MD    Allergies   Allergen Reactions    Latex Itching and Other (See Comments)     And band aids. Causes redness and itching.    Augmentin [Amoxicillin-Pot Clavulanate] Hives     I have utilized all available immediate resources to obtain, update, or review the patient's current medications (including all prescriptions, over-the-counter products, herbals, cannabis/cannabidiol products, and vitamin/mineral/dietary (nutritional) supplements) for name, route of administration, type, dose and frequency.    A:    /51 (BP Location: Left arm, Patient Position: Lying)   Pulse 81   Temp 98.5 °F (36.9 °C) (Oral)   Resp 16   Ht 177.8 cm (70\")   Wt 49.9 kg (110 lb)   SpO2 95%   BMI 15.78 kg/m²     Vitals and nursing note reviewed.   Constitutional:       Appearance: Not in distress. Cachectic and frail. Chronically ill-appearing.   Eyes:      General: Lids are normal.      Pupils: Pupils are equal, round, and reactive to light.   HENT:      Head: Normocephalic.   Pulmonary:     "  Effort: Pulmonary effort is normal.   Chest:      Comments: Right chest port  Cardiovascular:      Normal rate.   Edema:     Peripheral edema absent.   Abdominal:      General: Abdomen is scaphoid.   Skin:     General: Skin is warm.      Coloration: Skin is pale.   Genitourinary:     Comments: Urinary incontinence  Neurological:      Mental Status: Alert, oriented to person, place, and time   Psychiatric:         Mood and Affect: Mood is calm     Patient status: Disease state: No further treatment being pursued.  Current Functional status: Palliative Performance Scale Score: Performance 30% based on the following measures: Ambulation: Totally bed bound, Activity and Evidence of Disease: Unable to do any work, extensive evidence of disease, Self-Care: Total care required,  Intake: Reduced, LOC: Full, drowsy or confusion   Baseline Functional status: Palliative Performance Scale Score: Performance 40% based on the following measures: Ambulation: Mainly in bed, Activity and Evidence of Disease: Unable to do any work, extensive evidence of disease, Self-Care: Mainly assistance required,  Intake: Normal or reduced, LOC: Full, drowsy or confusion   Baseline ECOG Status(4) Completely disabled, unable to carry out self-care.  Totally confined to bed or chair.  Nutritional status: Albumin 2.3 Body mass index is 15.78 kg/m².      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  Filgrastim 3/20/2025  Anemia    Colon polyp  GERD  Glaucoma  Hearing loss  Hypertension  Macular degeneration-right eye    Bowel and bladder incontinence     Recommendations/Plan:  1. plan: Goals of care include CODE STATUS no CPR/comfort measures.     Family support: The patient receives support from his wife..  Advance Directives: Advance Directive Status: Patient does not have advance directive   POA/Healthcare surrogate-spouse and daughter healthcare surrogates per advance directive.     2.  Palliative care encounter  - Prognosis is poor long-term secondary to diffuse large B-cell lymphoma, CLL, SLL, bladder cancer, upper GI bleed, severe malnutrition, poor performance status, cancer cachexia, neutropenia, and comorbidities.  -Patient and family appears to have fair prognostic awareness.      -followed by Dr. Boogie treated with rituximab 3/18/2025, mini R-CHOP, and Neulasta.    -9/26-10/5 hospitalization due to perforated gastric ulcer.      - Made n.p.o., IV PPI and IV fluids.    -General surgery consulted notes poor surgical candidate, plans further diagnostic workup and to continue IV antibiotics.   3/23-Received 1 unit PRBC.    -Evaluated by GI who notes endoscopy would not have a favorable outcome, recommends transfer to tertiary facility for IR capabilities if patient is not a surgical candidate.    -Oncology following for continuity of care, recommends hold filgrastim unless he develops neutropenic fever or unstable vital signs, last received 3/20/2025.  3/24-Small bowel follow-through 3/24 shows no perforation.    3/25-oncology has no plans for further chemotherapy due to poor performance status, cancer cachexia, and likely develop febrile neutropenia due to pre-existing perforation.  Recommends changing CODE STATUS to comfort measures and a referral to hospice discussed with patient and spouse.       3/25-CODE STATUS changes no CPR/comfort  measures  -Anticipating hospice at discharge, a hospice consult has been placed.  -Discharge disposition pending as family do not feel they are able to provide level of caregiver support in the home setting.  We explored options of private pay caregivers versus nursing facility placement with hospice.  Patient and family asking about options of inpatient hospice in the [place] area.  Will ask  to further assist with a discharge plan.  -Will plan to complete a MOST document.    3/26-transition to comfort measures 3/25  -Anticipating SNF with hospice at discharge. [facility]. Case discussed electronically with [facility].   A hospice consult placed on 3/25.  -A MOST document initiated.  Attending to complete.     3.  Comfort measures  -palliative adjuvants as needed        Thank you for allowing us to participate in patient's plan of care. Palliative Care Team will continue to follow patient.     Karla Wheeler, APRN  3/26/2025  09:24 CDT

## 2025-03-26 NOTE — PLAN OF CARE
Goal Outcome Evaluation:  Plan of Care Reviewed With: patient        Progress: no change  Outcome Evaluation: iid patent. transitioned to comfort care. medicated per order for generalized pain. has now requested kidd be placed for comfort. cont to monitor.

## 2025-03-26 NOTE — NURSING NOTE
Kidd cath placed for comfort per order. This RN placed the kidd cath with ANAMIKA Wetzel. Sterile technique was maintained and faviola card was used.

## 2025-03-26 NOTE — PLAN OF CARE
Goal Outcome Evaluation:  Plan of Care Reviewed With: patient        Progress: no change          Patient with no complaints of pain so far during shift. IV IID. Ibarra cath placed this shift for comfort. Tele taken off. Patient turning self in bed. Patient on comfort measures. Safety maintained. Wife at bedside. Neutropenic precautions maintained.

## 2025-03-27 VITALS
OXYGEN SATURATION: 96 % | HEIGHT: 70 IN | SYSTOLIC BLOOD PRESSURE: 104 MMHG | HEART RATE: 76 BPM | RESPIRATION RATE: 16 BRPM | WEIGHT: 110 LBS | TEMPERATURE: 97.8 F | DIASTOLIC BLOOD PRESSURE: 55 MMHG | BODY MASS INDEX: 15.75 KG/M2

## 2025-03-27 PROCEDURE — 99232 SBSQ HOSP IP/OBS MODERATE 35: CPT | Performed by: CLINICAL NURSE SPECIALIST

## 2025-03-27 RX ORDER — MORPHINE SULFATE 20 MG/ML
10 SOLUTION ORAL
Qty: 50 ML | Refills: 0 | Status: SHIPPED | OUTPATIENT
Start: 2025-03-27 | End: 2025-03-30

## 2025-03-27 RX ADMIN — MORPHINE SULFATE 20 MG: 20 SOLUTION ORAL at 13:48

## 2025-03-27 RX ADMIN — MORPHINE SULFATE 20 MG: 20 SOLUTION ORAL at 09:21

## 2025-03-27 RX ADMIN — NYSTATIN 500000 UNITS: 100000 SUSPENSION ORAL at 11:16

## 2025-03-27 RX ADMIN — NYSTATIN 500000 UNITS: 100000 SUSPENSION ORAL at 08:50

## 2025-03-27 NOTE — PLAN OF CARE
Goal Outcome Evaluation:                 Patient alert and oriented x4. Discharging today with hospice. No IV. Discharging with medication from pharmacy and instructions. Hospice set up. Ambulance transporting.

## 2025-03-27 NOTE — CASE MANAGEMENT/SOCIAL WORK
Continued Stay Note  RACHANA Arriaza     Patient Name: Oral Dey  MRN: [id number]  Today's Date: 3/27/2025    Admit Date: 3/22/2025    Plan: Home Hospice   Discharge Plan       Row Name 03/27/25 1235       Plan    Plan Home Hospice    Patient/Family in Agreement with Plan yes    Final Discharge Disposition Code 50 - home with hospice    Final Note Pt is being d/c'ed home today with [facility] [phone].                   Discharge Codes    No documentation.                 Expected Discharge Date and Time       Expected Discharge Date Expected Discharge Time    Mar 27, 2025               TAYLOR Mcarthur

## 2025-03-27 NOTE — PROGRESS NOTES
[facility] Palliative Care Services    Palliative Care Daily Progress Note   Chief complaint-follow up comfort care    Code Status:   Code Status and Medical Interventions: No CPR (Do Not Attempt to Resuscitate); Comfort Measures   Ordered at: 03/25/25 1018     Code Status (Patient has no pulse and is not breathing):    No CPR (Do Not Attempt to Resuscitate)     Medical Interventions (Patient has pulse or is breathing):    Comfort Measures     Level Of Support Discussed With:    Patient      Advanced Directives: Advance Directive Status: Patient has advance directive, copy in chart   Goals of Care: Ongoing.     S: Medical record reviewed. Events noted.  Transition to comfort measures 3/25.  Received 50 mg oral morphine equivalent in the form of morphine concentrate over the last 24 hours.  Sitting up in bed without apparent distress eating breakfast.  Rates abdominal pain 4 or 5/10.  Nursing notified for opiate.  Spouse and daughter currently at bedside report they are ready for discharge.  Anticipating home with hospice.  A MOST document has been initiated, attending to complete.     Review of Systems   Constitutional: Positive for malaise/fatigue.   Respiratory:  Negative for shortness of breath.    Gastrointestinal:  Positive for abdominal pain and bowel incontinence.   Genitourinary:  Positive for bladder incontinence.   Neurological:  Positive for weakness.   Psychiatric/Behavioral:  The patient is nervous/anxious.      Pain Assessment  Preferred Pain Scale: number (Numeric Rating Pain Scale)  Nonverbal Indicators of Pain: nonverbal indicators absent  Pain Location: abdomen  Pain Description: aching    O:     Intake/Output Summary (Last 24 hours) at 3/27/2025 0816  Last data filed at 3/27/2025 0603  Gross per 24 hour   Intake --   Output 1300 ml   Net -1300 ml       Diagnostics and current medications: Reviewed.      Current Facility-Administered Medications:     acetaminophen (TYLENOL)  tablet 650 mg, 650 mg, Oral, Q4H PRN **OR** acetaminophen (TYLENOL) 160 MG/5ML oral solution 650 mg, 650 mg, Oral, Q4H PRN **OR** acetaminophen (TYLENOL) suppository 650 mg, 650 mg, Rectal, Q4H PRN, Karla Wheeler APRCHARLETTE    atropine 1 % ophthalmic solution 2 drop, 2 drop, Sublingual, BID PRN, Karla Wheeler APRN    diatrizoate meglumine-sodium (GASTROGRAFIN) 66-10 % oral solution 120 mL, 120 mL, Oral, Once in imaging, Sandoval Davis MD    diphenhydrAMINE (BENADRYL) capsule 25 mg, 25 mg, Oral, Q6H PRN **OR** diphenhydrAMINE (BENADRYL) injection 25 mg, 25 mg, Intravenous, Q6H PRN, Karla Wheeler APRN    diphenoxylate-atropine (LOMOTIL) 2.5-0.025 MG per tablet 1 tablet, 1 tablet, Oral, Q2H PRN, Karla Wheeler APRN    First Mouthwash (Magic Mouthwash) 5 mL, 5 mL, Swish & Spit, Q4H PRN, Karla Wheeler APRN    furosemide (LASIX) injection 20 mg, 20 mg, Intravenous, Q6H PRN **OR** furosemide (LASIX) injection 20 mg, 20 mg, Subcutaneous, Q6H PRN, Karla Wheeler APRN    haloperidol (HALDOL) tablet 1 mg, 1 mg, Oral, Q4H PRN **OR** haloperidol (HALDOL) 2 MG/ML solution 1 mg, 1 mg, Sublingual, Q4H PRN **OR** haloperidol lactate (HALDOL) injection 1 mg, 1 mg, Intravenous, Q4H PRN, Karla Wheeler APRN    haloperidol (HALDOL) tablet 2 mg, 2 mg, Oral, Q4H PRN **OR** haloperidol (HALDOL) 2 MG/ML solution 2 mg, 2 mg, Sublingual, Q4H PRN **OR** haloperidol lactate (HALDOL) injection 2 mg, 2 mg, Intravenous, Q4H PRN, Karla Wheeler APRN    ipratropium-albuterol (DUO-NEB) nebulizer solution 3 mL, 3 mL, Nebulization, Q4H PRN, Karla Wheeler APRN    latanoprost (XALATAN) 0.005 % ophthalmic solution 1 drop, 1 drop, Right Eye, Nightly, Wilbert Maldonado MD, 1 drop at 03/26/25 2039    LORazepam (ATIVAN) tablet 0.5 mg, 0.5 mg, Oral, Q1H PRN **OR** LORazepam (ATIVAN) injection 0.5 mg, 0.5 mg, Intravenous, Q1H PRN **OR** LORazepam (ATIVAN) injection 0.5 mg, 0.5 mg, Intramuscular, Q1H  PRN **OR** LORazepam (ATIVAN) injection 0.5 mg, 0.5 mg, Subcutaneous, Q1H PRN **OR** LORazepam (ATIVAN) 2 MG/ML concentrated solution 0.5 mg, 0.5 mg, Oral, Q1H PRN **OR** LORazepam (ATIVAN) 2 MG/ML concentrated solution 0.5 mg, 0.5 mg, Sublingual, Q1H PRN, Wheeler, Karla L, APRN    LORazepam (ATIVAN) tablet 1 mg, 1 mg, Oral, Q1H PRN **OR** LORazepam (ATIVAN) injection 1 mg, 1 mg, Intravenous, Q1H PRN **OR** LORazepam (ATIVAN) injection 1 mg, 1 mg, Intramuscular, Q1H PRN **OR** LORazepam (ATIVAN) injection 1 mg, 1 mg, Subcutaneous, Q1H PRN **OR** LORazepam (ATIVAN) 2 MG/ML concentrated solution 1 mg, 1 mg, Oral, Q1H PRN **OR** LORazepam (ATIVAN) 2 MG/ML concentrated solution 1 mg, 1 mg, Sublingual, Q1H PRN, Wheeler, Karla L, APRN    LORazepam (ATIVAN) tablet 2 mg, 2 mg, Oral, Q1H PRN **OR** LORazepam (ATIVAN) injection 2 mg, 2 mg, Intravenous, Q1H PRN **OR** LORazepam (ATIVAN) injection 2 mg, 2 mg, Intramuscular, Q1H PRN **OR** LORazepam (ATIVAN) injection 2 mg, 2 mg, Subcutaneous, Q1H PRN **OR** LORazepam (ATIVAN) 2 MG/ML concentrated solution 2 mg, 2 mg, Oral, Q1H PRN **OR** LORazepam (ATIVAN) 2 MG/ML concentrated solution 2 mg, 2 mg, Sublingual, Q1H PRN, Wheeler, Karla L, APRN    morphine injection 4 mg, 4 mg, Intravenous, Q1H PRN **OR** morphine concentrated solution 10 mg, 10 mg, Sublingual, Q1H PRN, Wheeler, Karla L, APRN, 10 mg at 03/26/25 2114    morphine injection 6 mg, 6 mg, Intravenous, Q1H PRN **OR** morphine concentrated solution 20 mg, 20 mg, Sublingual, Q1H PRN, Karla Wheeler APRN, 20 mg at 03/26/25 1630    nystatin (MYCOSTATIN) 100,000 unit/mL suspension 500,000 Units, 5 mL, Oral, 4x Daily, Wilbert Maldonado MD, 500,000 Units at 03/27/25 0850    ondansetron (ZOFRAN) injection 4 mg, 4 mg, Intravenous, Q6H PRN, Wilbert Maldonado MD    pantoprazole (PROTONIX) injection 40 mg, 40 mg, Intravenous, Q12H, Wilbert Maldonado MD, 40 mg at 03/27/25 0850    Polyvinyl  "Alcohol-Povidone PF (ARTIFICIAL TEARS) 1.4-0.6 % ophthalmic solution 1 drop, 1 drop, Both Eyes, Q30 Min PRN, Karla Wheeler APRN    prochlorperazine (COMPAZINE) injection 5 mg, 5 mg, Intravenous, Q6H PRN **OR** prochlorperazine (COMPAZINE) tablet 5 mg, 5 mg, Oral, Q6H PRN **OR** prochlorperazine (COMPAZINE) suppository 25 mg, 25 mg, Rectal, Q12H PRN, Karla Wheeler APRN    scopolamine patch 1 mg/72 hr, 1 patch, Transdermal, Q72H PRN, Karla Wheeler APRN    [COMPLETED] Insert Peripheral IV, , , Once **AND** sodium chloride 0.9 % flush 10 mL, 10 mL, Intravenous, PRN, Wilbert Maldonado MD    sodium chloride 0.9 % flush 10 mL, 10 mL, Intravenous, Q12H, Wilbert Maldonado MD, 10 mL at 03/26/25 2040    sodium chloride 0.9 % flush 10 mL, 10 mL, Intravenous, PRN, Wilbert Maldonado MD    sodium chloride 0.9 % infusion 40 mL, 40 mL, Intravenous, PRN, Wilbert Maldonado MD    Allergies   Allergen Reactions    Latex Itching and Other (See Comments)     And band aids. Causes redness and itching.    Augmentin [Amoxicillin-Pot Clavulanate] Hives     I have utilized all available immediate resources to obtain, update, or review the patient's current medications (including all prescriptions, over-the-counter products, herbals, cannabis/cannabidiol products, and vitamin/mineral/dietary (nutritional) supplements) for name, route of administration, type, dose and frequency.    A:    /55   Pulse 76   Temp 97.8 °F (36.6 °C)   Resp 16   Ht 177.8 cm (70\")   Wt 49.9 kg (110 lb)   SpO2 96%   BMI 15.78 kg/m²     Vitals and nursing note reviewed.   Constitutional:       Appearance: Not in distress. Cachectic and frail. Chronically ill-appearing.   Eyes:      General: Lids are normal.      Pupils: Pupils are equal, round, and reactive to light.   HENT:      Head: Normocephalic.   Pulmonary:      Effort: Pulmonary effort is normal.   Chest:      Comments: Right chest port  Cardiovascular:      Normal " rate.   Edema:     Peripheral edema absent.   Abdominal:      General: Abdomen is scaphoid.   Skin:     General: Skin is warm.      Coloration: Skin is pale.   Genitourinary:     Comments: Urinary incontinence  Neurological:      Mental Status: Alert, oriented to person, place, and time   Psychiatric:         Mood and Affect: Mood is calm     Patient status: Disease state: No further treatment being pursued.  Current Functional status: Palliative Performance Scale Score: Performance 30% based on the following measures: Ambulation: Totally bed bound, Activity and Evidence of Disease: Unable to do any work, extensive evidence of disease, Self-Care: Total care required,  Intake: Reduced, LOC: Full, drowsy or confusion   Baseline Functional status: Palliative Performance Scale Score: Performance 40% based on the following measures: Ambulation: Mainly in bed, Activity and Evidence of Disease: Unable to do any work, extensive evidence of disease, Self-Care: Mainly assistance required,  Intake: Normal or reduced, LOC: Full, drowsy or confusion   Baseline ECOG Status(4) Completely disabled, unable to carry out self-care.  Totally confined to bed or chair.  Nutritional status: Albumin 2.3 Body mass index is 15.78 kg/m².      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  Filgrastim 3/20/2025  Anemia    Colon  polyp  GERD  Glaucoma  Hearing loss  Hypertension  Macular degeneration-right eye    Bowel and bladder incontinence     Recommendations/Plan:  1. plan: Goals of care include CODE STATUS no CPR/comfort measures.     Family support: The patient receives support from his wife..  Advance Directives: Advance Directive Status: Patient does not have advance directive   POA/Healthcare surrogate-spouse and daughter healthcare surrogates per advance directive.     2.  Palliative care encounter  - Prognosis is poor long-term secondary to diffuse large B-cell lymphoma, CLL, SLL, bladder cancer, upper GI bleed, severe malnutrition, poor performance status, cancer cachexia, neutropenia, and comorbidities.  -Patient and family appears to have fair prognostic awareness.      -followed by Dr. Boogie treated with rituximab 3/18/2025, mini R-CHOP, and Neulasta.    -9/26-10/5 hospitalization due to perforated gastric ulcer.      - Made n.p.o., IV PPI and IV fluids.    -General surgery consulted notes poor surgical candidate, plans further diagnostic workup and to continue IV antibiotics.   3/23-Received 1 unit PRBC.    -Evaluated by GI who notes endoscopy would not have a favorable outcome, recommends transfer to tertiary facility for IR capabilities if patient is not a surgical candidate.    -Oncology following for continuity of care, recommends hold filgrastim unless he develops neutropenic fever or unstable vital signs, last received 3/20/2025.  3/24-Small bowel follow-through 3/24 shows no perforation.    3/25-oncology has no plans for further chemotherapy due to poor performance status, cancer cachexia, and likely develop febrile neutropenia due to pre-existing perforation.  Recommends changing CODE STATUS to comfort measures and a referral to hospice discussed with patient and spouse.       3/25-CODE STATUS changes no CPR/comfort measures  -Anticipating hospice at discharge, a hospice consult has been placed.  -Discharge  disposition pending as family do not feel they are able to provide level of caregiver support in the home setting.  We explored options of private pay caregivers versus nursing facility placement with hospice.  Patient and family asking about options of inpatient hospice in the [place] area.  Will ask  to further assist with a discharge plan.  -Will plan to complete a MOST document.    3/27-transition to comfort measures 3/25  -Anticipating home with hospice at discharge. A hospice consult placed on 3/25.  -A MOST document initiated.  Attending to complete.     3.  Comfort measures  -palliative adjuvants as needed        Thank you for allowing us to participate in patient's plan of care. Palliative Care Team will continue to follow patient.     Karla Wheeler, APRN  3/27/2025  08:55 CDT

## 2025-03-27 NOTE — DISCHARGE SUMMARY
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 Left ventricular ejection fraction appears to be 61 - 65%.    Normal global longitudinal LV strain (GLS) = -19.8%    The right ventricular cavity is mild to moderately dilated. Normal right ventricular systolic function noted.    The left atrial cavity is mildly dilated.    There is mild calcification of the aortic valve. Mild aortic valve regurgitation is present.    Moderate mitral annular calcification is present. Calcified mitral valve chordae are present. Mild mitral valve regurgitation is present.      Imaging Results (All)       Procedure Component Value Units Date/Time    FL Upper GI Single Contrast SBFT [[id number]] Collected: 03/24/25 1202     Updated: 03/24/25 1217    Narrative:      EXAMINATION: FL UPPER GI SINGLE CONTRAST SBFT-     3/24/2025 10:05 AM     HISTORY: 84 yom w/ contained gastrodoudenal perforation.; K92.1-Melena;  C83.30-Diffuse large B-cell lymphoma, unspecified site; D64.9-Anemia,  unspecified; D70.9-Neutropenia, unspecified; E87.5-Xqkz-lnfrpvfmes and  hyponatremia     Fluoroscopy was performed and an images of the  upper GI and small bowel  were obtained during and after ingestion of the meal water-soluble  contrast material.     Comparison is made with the previous study dated 10/1/2024.     There is no difficulty in initiating the swallowing.     There is normal preparation and propagation of bolus through the  oropharynx. No nasopharyngeal reflux.     Patient laryngeal penetration and small amount of aspiration of the  contrast material followed by significant cough and expectoration.     Normal vallecula piriform sinuses.     The esophagus is normal. No intrinsic abnormality.     There is marked deformity and thickening of the gastric antrum. No  perforation at this level. The type of contrast used is not optimal for  evaluation of the gastric mucosa/ulceration.     There is marked deformity of the duodenum with diffuse abnormal  dilatation of the duodenum. There is marked thickening and and  irregularity of the mucosa of the duodenum. No evidence of perforation.  The duodenal loop appears normal.     A follow-up image of the abdomen was obtained for evaluation of small  bowel.     Normal opacification of the small bowel. Image obtained after 20 minutes  show retained contrast material in the stomach and the distal part of  the contrast material in the cecum and proximal ascending colon. No  evidence of leakage of the contrast material is noted.       Impression:      1. Marked irregularity and deformity of the mucosa of the gastric antrum  and proximal duodenum without a perforation. This may represent acute or  subacute inflammatory process.  2. The remaining small bowel is normal.  3. Fluoroscopy time: 4 minutes 1 second.  5. Dose: 60mGy.  6. Number of images: 70.              This report was signed and finalized on 3/24/2025 12:14 PM by Dr. Bob Prater MD.       CT Abdomen Pelvis Without Contrast [[id number]] Collected: 03/22/25 2032     Updated: 03/22/25 2043    Narrative:      EXAM/TECHNIQUE: CT abdomen and  pelvis without contrast     INDICATION: eval for contained perforation on previous CT; K92.1-Melena;  C83.30-Diffuse large B-cell lymphoma, unspecified site; D64.9-Anemia,  unspecified; D70.9-Neutropenia, unspecified; E87.0-Njuq-qogapkzkgy and  hyponatremia     COMPARISON: None available.     DLP: 126.86 mGy.cm. Automated exposure control was utilized to decrease  patient radiation dose.       Impression:      FINDINGS/IMPRESSION:     1.  Evaluation is difficult as there is severe diffuse soft tissue  edema/anasarca, which results in blurring of fat planes.      2.  Oral contrast was administered which has transited to the stomach,  small bowel, and reached the distal colon. No free intraperitoneal  spillage of contrast. Redemonstrated CT findings which are highly  concerning for contained distal gastric perforation including poor  visualization of the distal gastric wall and infiltrating collection of  gas and debris within the right upper quadrant extending along the  inferior aspect of the liver.        This report was signed and finalized on 3/22/2025 8:40 PM by Dr. Steven Giles MD.       CT Angiogram Abdomen Pelvis [[id number]] Collected: 03/22/25 1600     Updated: 03/22/25 1612    Narrative:      EXAM/TECHNIQUE: CT angiography with 3D MIP images abdomen and pelvis  without and with IV contrast     INDICATION: gi bleed. known stomach cancer     COMPARISON: 1/7/2025     DLP: 598.03 mGy.cm. Automated exposure control was utilized to decrease  patient radiation dose.     FINDINGS:     Lung bases are clear.     No suspicious liver lesion. Small gallstone. No abnormal gallbladder  wall thickening. No biliary ductal dilatation. Pancreas and adrenal  glands appear unremarkable. Spleen is mildly enlarged measuring 13.3 cm  craniocaudal dimension. No solid renal mass. No urolithiasis or  hydronephrosis. No focal urinary bladder abnormality.     Patient has a known lymphoma involving the duodenum and has a history  of  perforated gastric ulcer. The distal gastric wall is poorly visualized  and there appears to be a large amount of extraluminal fluid and gas  within the region of the gastric antrum and proximal duodenum, in  keeping with bowel perforation. Also within the region of known  lymphoma, in the distal gastric lumen, there is an area of contrast  pooling on delayed images on axial image 97 series 9, in keeping with an  area of active bleeding within the stomach.     Moderate volume stool in the colon. No colonic wall thickening or  pericolonic fat stranding. Hyperdense material within the stomach is  hyperdense before giving contrast. No bowel obstruction. Normal  appendix.     No ascites or free pelvic fluid. No pelvic mass or collection. Prostate  and seminal vesicles are unremarkable.     Nonaneurysmal atherosclerotic abdominal aorta. Celiac artery, SMA, and  ONEL are patent. Renal arteries are patent. Redemonstrated mild  retroperitoneal periaortic lymphadenopathy. No new or enlarging lymph  nodes in the abdomen or pelvis.     Severe diffuse body wall soft tissue edema, in keeping with anasarca. No  acute osseous finding.       Impression:         1.  Patient has a known distal gastric/proximal duodenal lymphoma. The  distal gastric wall is poorly visualized and there is appearance of  extraluminal gas and debris, highly suspicious for contained  gastroduodenal perforation. If clinically indicated, this could be  further evaluated with limited CT of the abdomen after oral contrast  administration.     2.  There is a 1.7 cm oval focus of contrast pooling within the distal  gastric lumen on delayed images, in keeping with an area of active  bleeding.     3.  Upper abdominal retroperitoneal lymphadenopathy, similar to the  prior study.     4.  Severe diffuse abdominal wall soft tissue edema, likely related to  anasarca.           This report was signed and finalized on 3/22/2025 4:09 PM by Dr. Steven Giles MD.              LAB RESULTS:      Lab 03/25/25 0349 03/24/25 0448 03/23/25  2337 03/23/25  1805 03/23/25  1155 03/23/25  0554 03/22/25  2355 03/22/25  1512   WBC 0.45* 1.05*  --   --   --  2.27*  --  3.13*   HEMOGLOBIN 7.7* 7.5* 7.5* 7.8* 7.7* 7.6*   < > 7.9*   HEMATOCRIT 24.2* 23.8* 23.7* 24.9* 24.1* 23.8*   < > 25.3*   PLATELETS 124* 132*  --   --   --  147  --  189   NEUTROS ABS 0.13* 0.81*  --   --   --  1.97  --  2.93   EOS ABS 0.00  --   --   --   --  0.00  --  0.00   MCV 86.4 87.5  --   --   --  86.5  --  87.5   LACTATE  --   --   --   --   --   --   --  1.1   PROTIME  --  17.7*  --   --   --  16.1*  --  15.6*   APTT  --   --   --   --   --   --   --  27.1    < > = values in this interval not displayed.         Lab 03/25/25 0349 03/24/25 0448 03/23/25  0554 03/22/25  1512   SODIUM 133* 135* 132* 131*   POTASSIUM 4.2 3.4* 3.7 3.9   CHLORIDE 102 102 99 96*   CO2 24.0 25.0 25.0 27.0   ANION GAP 7.0 8.0 8.0 8.0   BUN 21 23 24* 31*   CREATININE 0.61* 0.67* 0.65* 0.77   EGFR 95.9 93.2 94.1 89.4   GLUCOSE 97 91 95 118*   CALCIUM 7.7* 7.3* 7.7* 8.2*         Lab 03/25/25 0349 03/24/25 0448 03/23/25  0554 03/22/25  1512   TOTAL PROTEIN 3.7* 3.6* 3.7* 4.4*   ALBUMIN 2.3* 2.2* 2.5* 2.9*   GLOBULIN 1.4 1.4 1.2 1.5   ALT (SGPT) 14 14 13 14   AST (SGOT) 13 16 14 17   BILIRUBIN 0.8 0.6 1.2 0.5   ALK PHOS 72 72 73 87         Lab 03/24/25  0448 03/23/25  0554 03/22/25  1512   PROTIME 17.7* 16.1* 15.6*   INR 1.38* 1.23* 1.17*             Lab 03/22/25  1820   ABO TYPING A   RH TYPING Positive   ANTIBODY SCREEN Negative         Brief Urine Lab Results  (Last result in the past 365 days)        Color   Clarity   Blood   Leuk Est   Nitrite   Protein   CREAT   Urine HCG        03/22/25 1705 Yellow   Clear   Negative   Negative   Positive   Trace                 Microbiology Results (last 10 days)       ** No results found for the last 240 hours. **            Hospital Course:     -Upper GI bleed suspected to be secondary to duodenal  "perforation  Gastroenterology was consulted, after evaluation recommended conservative management, but if bleeding continues patient will need to go to a center with interventional radiology capability for embolization [patient now hospice].     -Duodenal perforation secondary to lymphoma [complaint]  General Surgery was consulted and also recommended conservative management.  Patient and family are requested hospice care.     -Urinary tract infection  Patient is currently on ceftriaxone, unfortunately urine culture was not done and therefore we will complete 5 to 7-day course of antibiotic [now hospice]     -History of diffuse large B-cell lymphoma involving duodenal bulb  Patient was getting chemotherapy outpatient, oncologist was on consult and has recommended hospice care for patient and signed off.     Other chronic medical conditions-  Chronic anemia  Chronic lymphocytic leukemia  Bladder cancer  GERD  Hypertension      Physical Exam on Discharge:  /55   Pulse 76   Temp 97.8 °F (36.6 °C)   Resp 16   Ht 177.8 cm (70\")   Wt 49.9 kg (110 lb)   SpO2 96%   BMI 15.78 kg/m²   Physical Exam  Constitutional:       Appearance: Not in distress. Cachectic and frail. Chronically ill-appearing.   Eyes:      General: Lids are normal.      Pupils: Pupils are equal, round, and reactive to light.   HENT:      Head: Normocephalic.   Pulmonary:      Effort: Pulmonary effort is normal.   Chest:      Comments: Right chest port  Cardiovascular:      Normal rate.   Edema:     Peripheral edema absent.   Abdominal:      General: Abdomen is scaphoid.   Skin:     General: Skin is warm.      Coloration: Skin is pale.   Genitourinary:     Comments: Urinary incontinence  Neurological:      Mental Status: Alert, oriented to person, place, and time   Psychiatric:         Mood and Affect: Mood is calm    Condition on Discharge: Home hospice    Discharge Disposition:  Hospice/Medical Facility (DC - External)    Discharge " Medications:     Discharge Medications        New Medications        Instructions Start Date   morphine 20 MG/ML concentrated solution 20mg/ml   10 mg, Oral, Every 1 Hour PRN             Continue These Medications        Instructions Start Date   acetaminophen 325 MG tablet  Commonly known as: Tylenol   975 mg, Oral, Every 8 Hours, Take every 8 hours for 3 days then take prn as needed.      allopurinol 300 MG tablet  Commonly known as: ZYLOPRIM   300 mg, Oral, Daily      dexAMETHasone 4 MG tablet  Commonly known as: DECADRON   4 mg, Oral, 2 Times Daily With Meals, Take 1 tablet the night before chemotherapy treatment and 1 tablet the morning of his chemotherapy treatment.      HYDROcodone-acetaminophen  MG per tablet  Commonly known as: NORCO   1 tablet, Oral, Every 4 Hours PRN      IRON PO   1 tablet, Oral, Daily      latanoprost 0.005 % ophthalmic solution  Commonly known as: XALATAN   1 drop, Nightly      lidocaine-prilocaine 2.5-2.5 % cream  Commonly known as: EMLA   30 minutes prior to use apply generous amount of Emla Cream to port site and cover with clear plastic wrap until ready for access      metoprolol tartrate 25 MG tablet  Commonly known as: LOPRESSOR   25 mg, Oral, 2 Times Daily      multivitamins-minerals capsule capsule   1 capsule, 2 Times Daily      naloxone 4 MG/0.1ML nasal spray  Commonly known as: NARCAN   Call 911. Don't prime. Paulina in 1 nostril for overdose. Repeat in 2-3 minutes in other nostril if no or minimal breathing/responsiveness.      nystatin 100,000 unit/mL suspension  Commonly known as: MYCOSTATIN   500,000 Units, 4 Times Daily      ondansetron 4 MG tablet  Commonly known as: Zofran   4 mg, Oral, Every 8 Hours PRN      pantoprazole 40 MG EC tablet  Commonly known as: Protonix   40 mg, Oral, Daily      predniSONE 10 MG tablet  Commonly known as: DELTASONE   50 mg, Oral, Daily, Daily for 5 days to begin with chemo      prochlorperazine 10 MG tablet  Commonly known as:  COMPAZINE   10 mg, Oral, Every 4 Hours PRN      tamsulosin 0.4 MG capsule 24 hr capsule  Commonly known as: FLOMAX   1 capsule, Oral, Daily      vitamin B-12 1000 MCG tablet  Commonly known as: CYANOCOBALAMIN   1,000 mcg, Oral, Daily      vitamin D3 125 MCG (5000 UT) capsule capsule   5,000 Units, Daily             Discharge Diet: Regular diet as tolerated    Activity at Discharge: As tolerated    Follow-up Appointments:   No future appointments.    Test Results Pending at Discharge: None    Electronically signed by Sandoval Davis MD, 03/27/25, 14:26 CDT.    Time: 60 minutes.

## 2025-03-27 NOTE — PAYOR COMM NOTE
"Oral Dey (84 y.o. Male) [id number]   Continued Stay Clinical Update  [facility] [phone]  [phone]      Date of Birth   1940    Social Security Number       Address   [address] [place] [address]    Home Phone   [phone]    MRN   [id number]       Latter-day   Other    Marital Status                               Admission Date   3/22/2025    Admission Type   Emergency    Admitting Provider   Sandoval Davis MD    Attending Provider   Sandoval Davis MD    Department, Room/Bed   [facility] 3C, 388/1       Discharge Date       Discharge Disposition       Discharge Destination                                 Attending Provider: Sandoval Davis MD    Allergies: Latex, Augmentin [Amoxicillin-pot Clavulanate]    Isolation: None   Infection: None   Code Status: No CPR    Ht: 177.8 cm (70\")   Wt: 49.9 kg (110 lb)    Admission Cmt: None   Principal Problem: Upper GI bleed [K92.2]                   Active Insurance as of 3/22/2025       Primary Coverage       Payor Plan Insurance Group Employer/Plan Group    [employer] COMMERCIAL AETNA [id number]       Payor Plan Address Payor Plan Phone Number Payor Plan Fax Number Effective Dates    [address] [phone]  7/1/2017 - None Entered    [facility] [id number]         Subscriber Name Subscriber Birth Date Member ID       OARL DEY 1940 [id number]                     Emergency Contacts        (Rel.) Home Phone Work Phone Mobile Phone    Alaina Dey (Spouse) [phone] -- --    MAYRA LANCASTER (Daughter) -- -- [phone]    Miguel Dey (Brother) [phone] -- --              Vital Signs (last day)       Date/Time Temp Temp src Pulse Resp BP Patient Position SpO2    03/27/25 0812 97.8 (36.6) -- 76 16 104/55 -- 96    03/27/25 0603 98 (36.7) Oral 80 16 106/46 Lying 95    03/26/25 1828 98.2 (36.8) Oral 83 16 121/46 Lying 90    03/26/25 1607 99 (37.2) " Oral 87 16 119/46 Lying 96    03/26/25 1127 98.4 (36.9) Oral 82 16 111/44 Lying 94    03/26/25 0808 98.5 (36.9) Oral 81 16 122/51 Lying 95          Current Facility-Administered Medications   Medication Dose Route Frequency Provider Last Rate Last Admin    acetaminophen (TYLENOL) tablet 650 mg  650 mg Oral Q4H PRN Ashley Wheelera L, APRN        Or    acetaminophen (TYLENOL) 160 MG/5ML oral solution 650 mg  650 mg Oral Q4H PRN Ashley Wheelera ALY, APRN        Or    acetaminophen (TYLENOL) suppository 650 mg  650 mg Rectal Q4H PRN Karla Wheeler, APRN        atropine 1 % ophthalmic solution 2 drop  2 drop Sublingual BID PRN Karla Wheeler APRN        diatrizoate meglumine-sodium (GASTROGRAFIN) 66-10 % oral solution 120 mL  120 mL Oral Once in imaging Sandoval Davis MD        diphenhydrAMINE (BENADRYL) capsule 25 mg  25 mg Oral Q6H PRN Karla Wheeler APRN        Or    diphenhydrAMINE (BENADRYL) injection 25 mg  25 mg Intravenous Q6H PRN Karla Wheeler APRN        diphenoxylate-atropine (LOMOTIL) 2.5-0.025 MG per tablet 1 tablet  1 tablet Oral Q2H PRN Karla Wheeler, APRN        First Mouthwash (Magic Mouthwash) 5 mL  5 mL Swish & Spit Q4H PRN Karla Wheeler, APRN        furosemide (LASIX) injection 20 mg  20 mg Intravenous Q6H PRN WheelerKarla L, APRN        Or    furosemide (LASIX) injection 20 mg  20 mg Subcutaneous Q6H PRN Karla Wheeler, APRN        haloperidol (HALDOL) tablet 1 mg  1 mg Oral Q4H PRN Ashley Wheelera L, APRN        Or    haloperidol (HALDOL) 2 MG/ML solution 1 mg  1 mg Sublingual Q4H PRN Karla Wheeler APRN        Or    haloperidol lactate (HALDOL) injection 1 mg  1 mg Intravenous Q4H PRN Karla Wheeler APRN        haloperidol (HALDOL) tablet 2 mg  2 mg Oral Q4H PRN Karla Wheeler APRN        Or    haloperidol (HALDOL) 2 MG/ML solution 2 mg  2 mg Sublingual Q4H PRN Karla Wheeler APRN        Or    haloperidol lactate  (HALDOL) injection 2 mg  2 mg Intravenous Q4H PRN Wheeler, Karla L, APRN        ipratropium-albuterol (DUO-NEB) nebulizer solution 3 mL  3 mL Nebulization Q4H PRN Wheeler, Karla L, APRN        latanoprost (XALATAN) 0.005 % ophthalmic solution 1 drop  1 drop Right Eye Nightly Wilbert Maldonado MD   1 drop at 03/26/25 2039    LORazepam (ATIVAN) tablet 0.5 mg  0.5 mg Oral Q1H PRN Wheeler, Karla L, APRN        Or    LORazepam (ATIVAN) injection 0.5 mg  0.5 mg Intravenous Q1H PRN Wheeler, Karla L, APRN        Or    LORazepam (ATIVAN) injection 0.5 mg  0.5 mg Intramuscular Q1H PRN Wheeler, Karla L, APRN        Or    LORazepam (ATIVAN) injection 0.5 mg  0.5 mg Subcutaneous Q1H PRN Wheeler, Karla L, APRN        Or    LORazepam (ATIVAN) 2 MG/ML concentrated solution 0.5 mg  0.5 mg Oral Q1H PRN Wheeler, Karla L, APRN        Or    LORazepam (ATIVAN) 2 MG/ML concentrated solution 0.5 mg  0.5 mg Sublingual Q1H PRN Wheeler, Karla L, APRN        LORazepam (ATIVAN) tablet 1 mg  1 mg Oral Q1H PRN Wheeler, Karla L, APRN        Or    LORazepam (ATIVAN) injection 1 mg  1 mg Intravenous Q1H PRN Wheeler, Karla L, APRN        Or    LORazepam (ATIVAN) injection 1 mg  1 mg Intramuscular Q1H PRN Wheeler, Karla L, APRN        Or    LORazepam (ATIVAN) injection 1 mg  1 mg Subcutaneous Q1H PRN Wheeler, Karla L, APRN        Or    LORazepam (ATIVAN) 2 MG/ML concentrated solution 1 mg  1 mg Oral Q1H PRN Wheeler, Karla L, APRN        Or    LORazepam (ATIVAN) 2 MG/ML concentrated solution 1 mg  1 mg Sublingual Q1H PRN Wheeler, Karla L, APRN        LORazepam (ATIVAN) tablet 2 mg  2 mg Oral Q1H PRN Wheeler, Karla L, APRN        Or    LORazepam (ATIVAN) injection 2 mg  2 mg Intravenous Q1H PRN Karla Wheeler APRCHARLETTE        Or    LORazepam (ATIVAN) injection 2 mg  2 mg Intramuscular Q1H PRN Karla Wheeler APRN        Or    LORazepam (ATIVAN) injection 2 mg  2 mg Subcutaneous Q1H PRN Summer,  Karla L, APRN        Or    LORazepam (ATIVAN) 2 MG/ML concentrated solution 2 mg  2 mg Oral Q1H PRN Wheeler, Karla L, APRN        Or    LORazepam (ATIVAN) 2 MG/ML concentrated solution 2 mg  2 mg Sublingual Q1H PRN Wheeler, Karla L, APRN        morphine injection 4 mg  4 mg Intravenous Q1H PRN Wheeler, Karla L, APRN        Or    morphine concentrated solution 10 mg  10 mg Sublingual Q1H PRN Wheeler, Karla L, APRN   10 mg at 03/26/25 2114    morphine injection 6 mg  6 mg Intravenous Q1H PRN Wheeler, Karla L, APRN        Or    morphine concentrated solution 20 mg  20 mg Sublingual Q1H PRN Wheeler, Karla L, APRN   20 mg at 03/26/25 1630    nystatin (MYCOSTATIN) 100,000 unit/mL suspension 500,000 Units  5 mL Oral 4x Daily Wilbert Maldonado MD   500,000 Units at 03/26/25 2040    ondansetron (ZOFRAN) injection 4 mg  4 mg Intravenous Q6H PRN Wilbert Maldonado MD        pantoprazole (PROTONIX) injection 40 mg  40 mg Intravenous Q12H Wilbert Maldonado MD   40 mg at 03/26/25 0944    Polyvinyl Alcohol-Povidone PF (ARTIFICIAL TEARS) 1.4-0.6 % ophthalmic solution 1 drop  1 drop Both Eyes Q30 Min PRN Ashley Wheelera L, APRN        prochlorperazine (COMPAZINE) injection 5 mg  5 mg Intravenous Q6H PRN Ashley Wheelera L, APRN        Or    prochlorperazine (COMPAZINE) tablet 5 mg  5 mg Oral Q6H PRN Wheeler, Karla L, APRN        Or    prochlorperazine (COMPAZINE) suppository 25 mg  25 mg Rectal Q12H PRN Ashley Wheelera ALY, APRN        scopolamine patch 1 mg/72 hr  1 patch Transdermal Q72H PRN Ashley Wheelera L, APRN        sodium chloride 0.9 % flush 10 mL  10 mL Intravenous PRN Wilbert Maldonado MD        sodium chloride 0.9 % flush 10 mL  10 mL Intravenous Q12H Wilbert Maldonado MD   10 mL at 03/26/25 2040    sodium chloride 0.9 % flush 10 mL  10 mL Intravenous PRN Wilbert Maldonado MD        sodium chloride 0.9 % infusion 40 mL  40 mL Intravenous PRN Wilbert Maldonado MD             Physician Progress Notes (last 48 hours)        Sandoval Davis MD at 03/26/25 1711              [facility] Medicine Services  INPATIENT PROGRESS NOTE    Patient Name: Oral Dey  Date of Admission: 3/22/2025  Today's Date: 03/26/25  Length of Stay: 4  Primary Care Physician: Jorge Shepherd MD    Subjective   Chief Complaint: follow-up abdominal pain, black stool  HPI     Had extensive discussion with patient and multiple family members at bedside regarding goal of care, patient stated he would want to go on hospice.  Hospice was consulted and working on home hospice with patient and family.      Review of Systems   All pertinent negatives and positives are as above. All other systems have been reviewed and are negative unless otherwise stated.     Objective    Temp:  [98.1 °F (36.7 °C)-99 °F (37.2 °C)] 99 °F (37.2 °C)  Heart Rate:  [81-88] 87  Resp:  [16] 16  BP: (111-129)/(44-53) 119/46  Physical Exam  Vitals reviewed.   Constitutional:       Appearance: He is ill-appearing.      Comments: Frail and cachectic appearing   HENT:      Head: Normocephalic.      Mouth/Throat:      Mouth: Mucous membranes are moist.   Cardiovascular:      Rate and Rhythm: Normal rate.   Pulmonary:      Effort: Pulmonary effort is normal. No respiratory distress.   Abdominal:      Tenderness: There is no guarding or rebound.      Comments: Thin, mild PASTORA TTP   Musculoskeletal:      Right lower leg: Edema present.      Left lower leg: Edema present.   Skin:     General: Skin is warm.   Neurological:      Mental Status: He is alert.      Motor: Weakness present.   Psychiatric:         Mood and Affect: Mood normal.         Results Review:  I have reviewed the labs, radiology results, and diagnostic studies.    Laboratory Data:   Results from last 7 days   Lab Units 03/25/25  0349 03/24/25  0448 03/23/25  2337 03/23/25  1155 03/23/25  0554   WBC 10*3/mm3 0.45* 1.05*  --   --  2.27*  "  HEMOGLOBIN g/dL 7.7* 7.5* 7.5*   < > 7.6*   HEMATOCRIT % 24.2* 23.8* 23.7*   < > 23.8*   PLATELETS 10*3/mm3 124* 132*  --   --  147    < > = values in this interval not displayed.        Results from last 7 days   Lab Units 03/25/25  0349 03/24/25  0448 03/23/25  0554   SODIUM mmol/L 133* 135* 132*   POTASSIUM mmol/L 4.2 3.4* 3.7   CHLORIDE mmol/L 102 102 99   CO2 mmol/L 24.0 25.0 25.0   BUN mg/dL 21 23 24*   CREATININE mg/dL 0.61* 0.67* 0.65*   CALCIUM mg/dL 7.7* 7.3* 7.7*   BILIRUBIN mg/dL 0.8 0.6 1.2   ALK PHOS U/L 72 72 73   ALT (SGPT) U/L 14 14 13   AST (SGOT) U/L 13 16 14   GLUCOSE mg/dL 97 91 95       Culture Data:   No results found for: \"BLOODCX\", \"URINECX\", \"WOUNDCX\", \"MRSACX\", \"RESPCX\", \"STOOLCX\"    Radiology Data:   Imaging Results (Last 24 Hours)       ** No results found for the last 24 hours. **            I have reviewed the patient's current medications.     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    -Duodenal perforation secondary to lymphoma [complaint]  General Surgery was consulted and also recommended conservative management.  Patient and family are requesting hospice care.    -Urinary tract infection  Patient is currently on ceftriaxone, unfortunately urine culture was not done and therefore we will complete 5 to 7-day course of antibiotic.    -History of diffuse large B-cell lymphoma involving duodenal bulb  Patient was getting " chemotherapy outpatient, oncologist was on consult and has recommended hospice care for patient and signed off.  Hospice    Other chronic medical conditions-  Chronic anemia  Chronic lymphocytic leukemia  Bladder cancer  GERD  Hypertension  Macular degeneration of right eye    DVT prophylaxis-SCD    Disposition-discharge planning for tomorrow with home hospice      Electronically signed by Sandoval Davis MD, 03/26/25, 17:11 CDT.     Electronically signed by Sandoval Davis MD at 03/26/25 1712       Karla Wheeler APRN at 03/26/25 0924                      [facility] Palliative Care Services    Palliative Care Daily Progress Note   Chief complaint-follow up comfort care    Code Status:   Code Status and Medical Interventions: No CPR (Do Not Attempt to Resuscitate); Comfort Measures   Ordered at: 03/25/25 1018     Code Status (Patient has no pulse and is not breathing):    No CPR (Do Not Attempt to Resuscitate)     Medical Interventions (Patient has pulse or is breathing):    Comfort Measures     Level Of Support Discussed With:    Patient      Advanced Directives: Advance Directive Status: Patient has advance directive, copy in chart   Goals of Care: Ongoing.     S: Medical record reviewed. Events noted.  Transition to comfort measures 3/25.  Received 20 mg oral morphine equivalent in the form of morphine concentrate over the last 24 hours.  Currently laying in bed without apparent distress.  Reports improvement in abdominal pain with morphine of which he has not required dosing since yesterday afternoon.  Encouraged use as needed.  His daughter and spouse are currently at bedside.  Questions encouraged and support given. Anticipating SNF with hospice at discharge. [facility].     Review of Systems   Constitutional: Positive for malaise/fatigue.   Respiratory:  Negative for shortness of breath.    Gastrointestinal:  Positive for abdominal pain and bowel  incontinence.   Genitourinary:  Positive for bladder incontinence.   Neurological:  Positive for weakness.   Psychiatric/Behavioral:  The patient is nervous/anxious.      Pain Assessment  Preferred Pain Scale: number (Numeric Rating Pain Scale)  Nonverbal Indicators of Pain: nonverbal indicators absent  Pain Location: abdomen  Pain Description: aching    O:     Intake/Output Summary (Last 24 hours) at 3/26/2025 0924  Last data filed at 3/26/2025 0619  Gross per 24 hour   Intake --   Output 750 ml   Net -750 ml       Diagnostics and current medications: Reviewed.      Current Facility-Administered Medications:     acetaminophen (TYLENOL) tablet 650 mg, 650 mg, Oral, Q4H PRN **OR** acetaminophen (TYLENOL) 160 MG/5ML oral solution 650 mg, 650 mg, Oral, Q4H PRN **OR** acetaminophen (TYLENOL) suppository 650 mg, 650 mg, Rectal, Q4H PRN, Ashley Wheelera ALY, APRN    atropine 1 % ophthalmic solution 2 drop, 2 drop, Sublingual, BID PRN, Karla Wheeler, APRN    diatrizoate meglumine-sodium (GASTROGRAFIN) 66-10 % oral solution 120 mL, 120 mL, Oral, Once in imaging, Sandoval Davis MD    diphenhydrAMINE (BENADRYL) capsule 25 mg, 25 mg, Oral, Q6H PRN **OR** diphenhydrAMINE (BENADRYL) injection 25 mg, 25 mg, Intravenous, Q6H PRN, Karla Wheeler, APRN    diphenoxylate-atropine (LOMOTIL) 2.5-0.025 MG per tablet 1 tablet, 1 tablet, Oral, Q2H PRN, Karla Wheeler, APRN    First Mouthwash (Magic Mouthwash) 5 mL, 5 mL, Swish & Spit, Q4H PRN, Karla Wheeler, APRN    furosemide (LASIX) injection 20 mg, 20 mg, Intravenous, Q6H PRN **OR** furosemide (LASIX) injection 20 mg, 20 mg, Subcutaneous, Q6H PRN, Karla Wheeler, APRN    haloperidol (HALDOL) tablet 1 mg, 1 mg, Oral, Q4H PRN **OR** haloperidol (HALDOL) 2 MG/ML solution 1 mg, 1 mg, Sublingual, Q4H PRN **OR** haloperidol lactate (HALDOL) injection 1 mg, 1 mg, Intravenous, Q4H PRN, Karla Wheeler APRN    haloperidol (HALDOL) tablet 2 mg, 2 mg, Oral, Q4H  PRN **OR** haloperidol (HALDOL) 2 MG/ML solution 2 mg, 2 mg, Sublingual, Q4H PRN **OR** haloperidol lactate (HALDOL) injection 2 mg, 2 mg, Intravenous, Q4H PRN, Wheeler, Karla L, APRN    ipratropium-albuterol (DUO-NEB) nebulizer solution 3 mL, 3 mL, Nebulization, Q4H PRN, Wheeler, Karla L, APRN    latanoprost (XALATAN) 0.005 % ophthalmic solution 1 drop, 1 drop, Right Eye, Nightly, Wilbert Maldonado MD, 1 drop at 03/25/25 2128    LORazepam (ATIVAN) tablet 0.5 mg, 0.5 mg, Oral, Q1H PRN **OR** LORazepam (ATIVAN) injection 0.5 mg, 0.5 mg, Intravenous, Q1H PRN **OR** LORazepam (ATIVAN) injection 0.5 mg, 0.5 mg, Intramuscular, Q1H PRN **OR** LORazepam (ATIVAN) injection 0.5 mg, 0.5 mg, Subcutaneous, Q1H PRN **OR** LORazepam (ATIVAN) 2 MG/ML concentrated solution 0.5 mg, 0.5 mg, Oral, Q1H PRN **OR** LORazepam (ATIVAN) 2 MG/ML concentrated solution 0.5 mg, 0.5 mg, Sublingual, Q1H PRN, Wheeler, Karla L, APRN    LORazepam (ATIVAN) tablet 1 mg, 1 mg, Oral, Q1H PRN **OR** LORazepam (ATIVAN) injection 1 mg, 1 mg, Intravenous, Q1H PRN **OR** LORazepam (ATIVAN) injection 1 mg, 1 mg, Intramuscular, Q1H PRN **OR** LORazepam (ATIVAN) injection 1 mg, 1 mg, Subcutaneous, Q1H PRN **OR** LORazepam (ATIVAN) 2 MG/ML concentrated solution 1 mg, 1 mg, Oral, Q1H PRN **OR** LORazepam (ATIVAN) 2 MG/ML concentrated solution 1 mg, 1 mg, Sublingual, Q1H PRN, Wheeler, Karla L, APRN    LORazepam (ATIVAN) tablet 2 mg, 2 mg, Oral, Q1H PRN **OR** LORazepam (ATIVAN) injection 2 mg, 2 mg, Intravenous, Q1H PRN **OR** LORazepam (ATIVAN) injection 2 mg, 2 mg, Intramuscular, Q1H PRN **OR** LORazepam (ATIVAN) injection 2 mg, 2 mg, Subcutaneous, Q1H PRN **OR** LORazepam (ATIVAN) 2 MG/ML concentrated solution 2 mg, 2 mg, Oral, Q1H PRN **OR** LORazepam (ATIVAN) 2 MG/ML concentrated solution 2 mg, 2 mg, Sublingual, Q1H PRN, Karla Wheeler, RENUKA    morphine injection 4 mg, 4 mg, Intravenous, Q1H PRN **OR** morphine concentrated solution 10  mg, 10 mg, Sublingual, Q1H PRN, Karla Wheeler APRN, 10 mg at 03/25/25 1701    morphine injection 6 mg, 6 mg, Intravenous, Q1H PRN **OR** morphine concentrated solution 20 mg, 20 mg, Sublingual, Q1H PRN, Karla Wheeler APRN    nystatin (MYCOSTATIN) 100,000 unit/mL suspension 500,000 Units, 5 mL, Oral, 4x Daily, Wilbert Maldonado MD, 500,000 Units at 03/25/25 2128    ondansetron (ZOFRAN) injection 4 mg, 4 mg, Intravenous, Q6H PRN, Wilbert Maldonado MD    pantoprazole (PROTONIX) injection 40 mg, 40 mg, Intravenous, Q12H, Wilbert Maldonado MD, 40 mg at 03/25/25 2128    Polyvinyl Alcohol-Povidone PF (ARTIFICIAL TEARS) 1.4-0.6 % ophthalmic solution 1 drop, 1 drop, Both Eyes, Q30 Min PRN, Karla Wheeler APRN    prochlorperazine (COMPAZINE) injection 5 mg, 5 mg, Intravenous, Q6H PRN **OR** prochlorperazine (COMPAZINE) tablet 5 mg, 5 mg, Oral, Q6H PRN **OR** prochlorperazine (COMPAZINE) suppository 25 mg, 25 mg, Rectal, Q12H PRN, Karla Wheeler APRN    scopolamine patch 1 mg/72 hr, 1 patch, Transdermal, Q72H PRN, Karla Wheeler APRN    [COMPLETED] Insert Peripheral IV, , , Once **AND** sodium chloride 0.9 % flush 10 mL, 10 mL, Intravenous, PRN, Wilbert Maldonado MD    sodium chloride 0.9 % flush 10 mL, 10 mL, Intravenous, Q12H, Wilbert Maldonado MD, 10 mL at 03/25/25 2128    sodium chloride 0.9 % flush 10 mL, 10 mL, Intravenous, PRN, Wilbert Maldonado MD    sodium chloride 0.9 % infusion 40 mL, 40 mL, Intravenous, PRN, Wilbert Maldonado MD    Allergies   Allergen Reactions    Latex Itching and Other (See Comments)     And band aids. Causes redness and itching.    Augmentin [Amoxicillin-Pot Clavulanate] Hives     I have utilized all available immediate resources to obtain, update, or review the patient's current medications (including all prescriptions, over-the-counter products, herbals, cannabis/cannabidiol products, and vitamin/mineral/dietary (nutritional)  "supplements) for name, route of administration, type, dose and frequency.    A:    /51 (BP Location: Left arm, Patient Position: Lying)   Pulse 81   Temp 98.5 °F (36.9 °C) (Oral)   Resp 16   Ht 177.8 cm (70\")   Wt 49.9 kg (110 lb)   SpO2 95%   BMI 15.78 kg/m²     Vitals and nursing note reviewed.   Constitutional:       Appearance: Not in distress. Cachectic and frail. Chronically ill-appearing.   Eyes:      General: Lids are normal.      Pupils: Pupils are equal, round, and reactive to light.   HENT:      Head: Normocephalic.   Pulmonary:      Effort: Pulmonary effort is normal.   Chest:      Comments: Right chest port  Cardiovascular:      Normal rate.   Edema:     Peripheral edema absent.   Abdominal:      General: Abdomen is scaphoid.   Skin:     General: Skin is warm.      Coloration: Skin is pale.   Genitourinary:     Comments: Urinary incontinence  Neurological:      Mental Status: Alert, oriented to person, place, and time   Psychiatric:         Mood and Affect: Mood is calm     Patient status: Disease state: No further treatment being pursued.  Current Functional status: Palliative Performance Scale Score: Performance 30% based on the following measures: Ambulation: Totally bed bound, Activity and Evidence of Disease: Unable to do any work, extensive evidence of disease, Self-Care: Total care required,  Intake: Reduced, LOC: Full, drowsy or confusion   Baseline Functional status: Palliative Performance Scale Score: Performance 40% based on the following measures: Ambulation: Mainly in bed, Activity and Evidence of Disease: Unable to do any work, extensive evidence of disease, Self-Care: Mainly assistance required,  Intake: Normal or reduced, LOC: Full, drowsy or confusion   Baseline ECOG Status(4) Completely disabled, unable to carry out self-care.  Totally confined to bed or chair.  Nutritional status: Albumin 2.3 Body mass index is 15.78 kg/m².      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  Filgrastim 3/20/2025  Anemia    Colon polyp  GERD  Glaucoma  Hearing loss  Hypertension  Macular degeneration-right eye    Bowel and bladder incontinence     Recommendations/Plan:  1. plan: Goals of care include CODE STATUS no CPR/comfort measures.     Family support: The patient receives support from his wife..  Advance Directives: Advance Directive Status: Patient does not have advance directive   POA/Healthcare surrogate-spouse and daughter healthcare surrogates per advance directive.     2.  Palliative care encounter  - Prognosis is poor long-term secondary to diffuse large B-cell lymphoma, CLL, SLL, bladder cancer, upper GI bleed, severe malnutrition, poor performance status, cancer cachexia, neutropenia, and comorbidities.  -Patient and family appears to have fair prognostic awareness.      -followed by Dr. Boogie treated with rituximab 3/18/2025, mini R-CHOP, and Neulasta.    -9/26-10/5 hospitalization due to perforated gastric ulcer.      - Made n.p.o., IV PPI and IV fluids.    -General surgery consulted notes poor surgical candidate, plans further diagnostic workup and to continue IV antibiotics.   3/23-Received 1 unit PRBC.    -Evaluated by GI who notes endoscopy would not have a favorable outcome, recommends transfer to tertiary facility for IR capabilities if patient is not a surgical candidate.    -Oncology  following for continuity of care, recommends hold filgrastim unless he develops neutropenic fever or unstable vital signs, last received 3/20/2025.  3/24-Small bowel follow-through 3/24 shows no perforation.    3/25-oncology has no plans for further chemotherapy due to poor performance status, cancer cachexia, and likely develop febrile neutropenia due to pre-existing perforation.  Recommends changing CODE STATUS to comfort measures and a referral to hospice discussed with patient and spouse.       3/25-CODE STATUS changes no CPR/comfort measures  -Anticipating hospice at discharge, a hospice consult has been placed.  -Discharge disposition pending as family do not feel they are able to provide level of caregiver support in the home setting.  We explored options of private pay caregivers versus nursing facility placement with hospice.  Patient and family asking about options of inpatient hospice in the [place] area.  Will ask  to further assist with a discharge plan.  -Will plan to complete a MOST document.    3/26-transition to comfort measures 3/25  -Anticipating SNF with hospice at discharge. [facility]. Case discussed electronically with SW.   A hospice consult placed on 3/25.  -A MOST document initiated.  Attending to complete.     3.  Comfort measures  -palliative adjuvants as needed        Thank you for allowing us to participate in patient's plan of care. Palliative Care Team will continue to follow patient.     RENUKA Nelson  3/26/2025  09:24 CDT     Electronically signed by Karla Wheeler APRN at 03/26/25 5249       Sandoval Davis MD at 03/25/25 9020              [facility] Medicine Services  INPATIENT PROGRESS NOTE    Patient Name: Oral Dey  Date of Admission: 3/22/2025  Today's Date: 03/25/25  Length of Stay: 3  Primary Care Physician: Jorge Shepherd MD    Subjective   Chief Complaint: follow-up  abdominal pain, black stool  HPI     Had extensive discussion with patient and multiple family members at bedside regarding goal of care, patient stated he would want to go on hospice.  I have placed order for hospice consult.      Review of Systems   All pertinent negatives and positives are as above. All other systems have been reviewed and are negative unless otherwise stated.     Objective    Temp:  [98.3 °F (36.8 °C)-98.9 °F (37.2 °C)] 98.3 °F (36.8 °C)  Heart Rate:  [63-81] 81  Resp:  [16-18] 18  BP: (134-154)/(48-56) 135/52  Physical Exam  Vitals reviewed.   Constitutional:       Appearance: He is ill-appearing.      Comments: Frail and cachectic appearing   HENT:      Head: Normocephalic.      Mouth/Throat:      Mouth: Mucous membranes are moist.   Cardiovascular:      Rate and Rhythm: Normal rate.   Pulmonary:      Effort: Pulmonary effort is normal. No respiratory distress.   Abdominal:      Tenderness: There is no guarding or rebound.      Comments: Thin, mild PASTORA TTP   Musculoskeletal:      Right lower leg: Edema present.      Left lower leg: Edema present.   Skin:     General: Skin is warm.   Neurological:      Mental Status: He is alert.      Motor: Weakness present.   Psychiatric:         Mood and Affect: Mood normal.         Results Review:  I have reviewed the labs, radiology results, and diagnostic studies.    Laboratory Data:   Results from last 7 days   Lab Units 03/25/25 0349 03/24/25 0448 03/23/25  2337 03/23/25  1155 03/23/25  0554   WBC 10*3/mm3 0.45* 1.05*  --   --  2.27*   HEMOGLOBIN g/dL 7.7* 7.5* 7.5*   < > 7.6*   HEMATOCRIT % 24.2* 23.8* 23.7*   < > 23.8*   PLATELETS 10*3/mm3 124* 132*  --   --  147    < > = values in this interval not displayed.        Results from last 7 days   Lab Units 03/25/25 0349 03/24/25 0448 03/23/25  0554   SODIUM mmol/L 133* 135* 132*   POTASSIUM mmol/L 4.2 3.4* 3.7   CHLORIDE mmol/L 102 102 99   CO2 mmol/L 24.0 25.0 25.0   BUN mg/dL 21 23 24*   CREATININE  "mg/dL 0.61* 0.67* 0.65*   CALCIUM mg/dL 7.7* 7.3* 7.7*   BILIRUBIN mg/dL 0.8 0.6 1.2   ALK PHOS U/L 72 72 73   ALT (SGPT) U/L 14 14 13   AST (SGOT) U/L 13 16 14   GLUCOSE mg/dL 97 91 95       Culture Data:   No results found for: \"BLOODCX\", \"URINECX\", \"WOUNDCX\", \"MRSACX\", \"RESPCX\", \"STOOLCX\"    Radiology Data:   Imaging Results (Last 24 Hours)       ** No results found for the last 24 hours. **            I have reviewed the patient's current medications.     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    -Duodenal perforation secondary to lymphoma [complaint]  General Surgery was consulted and also recommended conservative management.  Patient and family are requesting hospice care.    -Urinary tract infection  Patient is currently on ceftriaxone, unfortunately urine culture was not done and therefore we will complete 5 to 7-day course of antibiotic.    -History of diffuse large B-cell lymphoma involving duodenal bulb  Patient was getting chemotherapy outpatient, oncologist was on consult and has recommended hospice care for patient and signed off.  Hospice    Other chronic medical conditions-  Chronic anemia  Chronic lymphocytic leukemia  Bladder cancer  GERD  Hypertension  Macular degeneration of right eye    DVT prophylaxis-SCD    Disposition-consult hospice.      Electronically signed by Sandoval Davis MD, 03/25/25, 17:36 CDT.     Electronically " signed by Sandoval Davis MD at 03/25/25 1731

## 2025-03-27 NOTE — PLAN OF CARE
Goal Outcome Evaluation:           Progress: no change  Outcome Evaluation: Patient c/o pain PRN meds given per orders, cont comfort care measures, kidd cath in place, wife at bedside

## 2025-03-28 NOTE — PAYOR COMM NOTE
"DC HOME WITH HOSPICE 3-27-25    Oral Mao (84 y.o. Male)       Date of Birth   1940    Social Security Number       Address   [address] [place] [address]    Home Phone   [phone]    MRN   [id number]       Mormon   Other    Marital Status                               Admission Date   3/22/2025    Admission Type   Emergency    Admitting Provider   Sandoval Davis MD    Attending Provider       Department, Room/Bed   [facility] 3C, 388/1       Discharge Date   3/27/2025    Discharge Disposition   Hospice/Medical Facility (DC - External)    Discharge Destination                                 Attending Provider: (none)   Allergies: Latex, Augmentin [Amoxicillin-pot Clavulanate]    Isolation: None   Infection: None   Code Status: Prior    Ht: 177.8 cm (70\")   Wt: 49.9 kg (110 lb)    Admission Cmt: None   Principal Problem: Upper GI bleed [K92.2]                   Active Insurance as of 3/22/2025       Primary Coverage       Payor Plan Insurance Group Employer/Plan Group    [employer]ERCIAL A[employer] [id number]       Payor Plan Address Payor Plan Phone Number Payor Plan Fax Number Effective Dates    [address] [phone]  7/1/2017 - None Entered    [facility] [id number]         Subscriber Name Subscriber Birth Date Member ID       ORAL MAO 1940 [id number]                     Emergency Contacts        (Rel.) Home Phone Work Phone Mobile Phone    Alaina Mao (Spouse) [phone] -- --    MAYRA LANCASTER (Daughter) -- -- [phone]    YisselMiguel (Brother) [phone] -- --                 Discharge Summary        Sandoval Davis MD at 03/27/25 [address] [place] [address] Medicine Services  DISCHARGE SUMMARY       Date of Admission: 3/22/2025  Date of Discharge:  3/27/2025  Primary Care Physician: Jorge Shepherd MD    Presenting Problem/History of Present Illness:  Black stool/abdominal " pain    Final Discharge Diagnoses:  Active Hospital Problems    Diagnosis     **Upper GI bleed     Weight loss     Abdominal pain     Suspected contained gastroduodenal perforation     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 Left ventricular ejection fraction appears to be 61 - 65%.    Normal global longitudinal LV strain (GLS) = -19.8%    The right ventricular cavity is mild to moderately dilated. Normal right ventricular systolic function noted.    The left atrial cavity is mildly dilated.    There is mild calcification of the aortic valve. Mild aortic valve regurgitation is present.    Moderate mitral annular calcification is present. Calcified mitral valve chordae are present. Mild mitral valve regurgitation is present.      Imaging Results (All)       Procedure Component Value Units Date/Time    FL Upper GI Single Contrast SBFT [[id number]] Collected: 03/24/25 1202     Updated: 03/24/25 1217    Narrative:      EXAMINATION: FL UPPER GI SINGLE CONTRAST SBFT-     3/24/2025 10:05 AM     HISTORY: 84 yom w/ contained gastrodoudenal perforation.; K92.1-Melena;  C83.30-Diffuse large B-cell lymphoma, unspecified site; D64.9-Anemia,  unspecified; D70.9-Neutropenia, unspecified; E87.0-Qneq-jzamwdcdwk and  hyponatremia     Fluoroscopy was performed and an images of the upper GI and small bowel  were obtained during and after ingestion of the meal water-soluble  contrast material.     Comparison is made with the previous study dated 10/1/2024.     There is no difficulty in initiating the swallowing.     There is normal preparation and  propagation of bolus through the  oropharynx. No nasopharyngeal reflux.     Patient laryngeal penetration and small amount of aspiration of the  contrast material followed by significant cough and expectoration.     Normal vallecula piriform sinuses.     The esophagus is normal. No intrinsic abnormality.     There is marked deformity and thickening of the gastric antrum. No  perforation at this level. The type of contrast used is not optimal for  evaluation of the gastric mucosa/ulceration.     There is marked deformity of the duodenum with diffuse abnormal  dilatation of the duodenum. There is marked thickening and and  irregularity of the mucosa of the duodenum. No evidence of perforation.  The duodenal loop appears normal.     A follow-up image of the abdomen was obtained for evaluation of small  bowel.     Normal opacification of the small bowel. Image obtained after 20 minutes  show retained contrast material in the stomach and the distal part of  the contrast material in the cecum and proximal ascending colon. No  evidence of leakage of the contrast material is noted.       Impression:      1. Marked irregularity and deformity of the mucosa of the gastric antrum  and proximal duodenum without a perforation. This may represent acute or  subacute inflammatory process.  2. The remaining small bowel is normal.  3. Fluoroscopy time: 4 minutes 1 second.  5. Dose: 60mGy.  6. Number of images: 70.              This report was signed and finalized on 3/24/2025 12:14 PM by Dr. Bob Prater MD.       CT Abdomen Pelvis Without Contrast [[id number]] Collected: 03/22/25 2032     Updated: 03/22/25 2043    Narrative:      EXAM/TECHNIQUE: CT abdomen and pelvis without contrast     INDICATION: eval for contained perforation on previous CT; K92.1-Melena;  C83.30-Diffuse large B-cell lymphoma, unspecified site; D64.9-Anemia,  unspecified; D70.9-Neutropenia, unspecified; E87.3-Jmcx-vnfbltamhp and  hyponatremia     COMPARISON:  None available.     DLP: 126.86 mGy.cm. Automated exposure control was utilized to decrease  patient radiation dose.       Impression:      FINDINGS/IMPRESSION:     1.  Evaluation is difficult as there is severe diffuse soft tissue  edema/anasarca, which results in blurring of fat planes.      2.  Oral contrast was administered which has transited to the stomach,  small bowel, and reached the distal colon. No free intraperitoneal  spillage of contrast. Redemonstrated CT findings which are highly  concerning for contained distal gastric perforation including poor  visualization of the distal gastric wall and infiltrating collection of  gas and debris within the right upper quadrant extending along the  inferior aspect of the liver.        This report was signed and finalized on 3/22/2025 8:40 PM by Dr. Steven Giles MD.       CT Angiogram Abdomen Pelvis [[id number]] Collected: 03/22/25 1600     Updated: 03/22/25 1612    Narrative:      EXAM/TECHNIQUE: CT angiography with 3D MIP images abdomen and pelvis  without and with IV contrast     INDICATION: gi bleed. known stomach cancer     COMPARISON: 1/7/2025     DLP: 598.03 mGy.cm. Automated exposure control was utilized to decrease  patient radiation dose.     FINDINGS:     Lung bases are clear.     No suspicious liver lesion. Small gallstone. No abnormal gallbladder  wall thickening. No biliary ductal dilatation. Pancreas and adrenal  glands appear unremarkable. Spleen is mildly enlarged measuring 13.3 cm  craniocaudal dimension. No solid renal mass. No urolithiasis or  hydronephrosis. No focal urinary bladder abnormality.     Patient has a known lymphoma involving the duodenum and has a history of  perforated gastric ulcer. The distal gastric wall is poorly visualized  and there appears to be a large amount of extraluminal fluid and gas  within the region of the gastric antrum and proximal duodenum, in  keeping with bowel perforation. Also within the region of  known  lymphoma, in the distal gastric lumen, there is an area of contrast  pooling on delayed images on axial image 97 series 9, in keeping with an  area of active bleeding within the stomach.     Moderate volume stool in the colon. No colonic wall thickening or  pericolonic fat stranding. Hyperdense material within the stomach is  hyperdense before giving contrast. No bowel obstruction. Normal  appendix.     No ascites or free pelvic fluid. No pelvic mass or collection. Prostate  and seminal vesicles are unremarkable.     Nonaneurysmal atherosclerotic abdominal aorta. Celiac artery, SMA, and  ONEL are patent. Renal arteries are patent. Redemonstrated mild  retroperitoneal periaortic lymphadenopathy. No new or enlarging lymph  nodes in the abdomen or pelvis.     Severe diffuse body wall soft tissue edema, in keeping with anasarca. No  acute osseous finding.       Impression:         1.  Patient has a known distal gastric/proximal duodenal lymphoma. The  distal gastric wall is poorly visualized and there is appearance of  extraluminal gas and debris, highly suspicious for contained  gastroduodenal perforation. If clinically indicated, this could be  further evaluated with limited CT of the abdomen after oral contrast  administration.     2.  There is a 1.7 cm oval focus of contrast pooling within the distal  gastric lumen on delayed images, in keeping with an area of active  bleeding.     3.  Upper abdominal retroperitoneal lymphadenopathy, similar to the  prior study.     4.  Severe diffuse abdominal wall soft tissue edema, likely related to  anasarca.           This report was signed and finalized on 3/22/2025 4:09 PM by Dr. Steven Giles MD.             LAB RESULTS:      Lab 03/25/25  0349 03/24/25  0448 03/23/25  2337 03/23/25  1805 03/23/25  1155 03/23/25  0554 03/22/25  2355 03/22/25  1512   WBC 0.45* 1.05*  --   --   --  2.27*  --  3.13*   HEMOGLOBIN 7.7* 7.5* 7.5* 7.8* 7.7* 7.6*   < > 7.9*   HEMATOCRIT  24.2* 23.8* 23.7* 24.9* 24.1* 23.8*   < > 25.3*   PLATELETS 124* 132*  --   --   --  147  --  189   NEUTROS ABS 0.13* 0.81*  --   --   --  1.97  --  2.93   EOS ABS 0.00  --   --   --   --  0.00  --  0.00   MCV 86.4 87.5  --   --   --  86.5  --  87.5   LACTATE  --   --   --   --   --   --   --  1.1   PROTIME  --  17.7*  --   --   --  16.1*  --  15.6*   APTT  --   --   --   --   --   --   --  27.1    < > = values in this interval not displayed.         Lab 03/25/25 0349 03/24/25 0448 03/23/25  0554 03/22/25  1512   SODIUM 133* 135* 132* 131*   POTASSIUM 4.2 3.4* 3.7 3.9   CHLORIDE 102 102 99 96*   CO2 24.0 25.0 25.0 27.0   ANION GAP 7.0 8.0 8.0 8.0   BUN 21 23 24* 31*   CREATININE 0.61* 0.67* 0.65* 0.77   EGFR 95.9 93.2 94.1 89.4   GLUCOSE 97 91 95 118*   CALCIUM 7.7* 7.3* 7.7* 8.2*         Lab 03/25/25 0349 03/24/25 0448 03/23/25  0554 03/22/25  1512   TOTAL PROTEIN 3.7* 3.6* 3.7* 4.4*   ALBUMIN 2.3* 2.2* 2.5* 2.9*   GLOBULIN 1.4 1.4 1.2 1.5   ALT (SGPT) 14 14 13 14   AST (SGOT) 13 16 14 17   BILIRUBIN 0.8 0.6 1.2 0.5   ALK PHOS 72 72 73 87         Lab 03/24/25 0448 03/23/25  0554 03/22/25  1512   PROTIME 17.7* 16.1* 15.6*   INR 1.38* 1.23* 1.17*             Lab 03/22/25  1820   ABO TYPING A   RH TYPING Positive   ANTIBODY SCREEN Negative         Brief Urine Lab Results  (Last result in the past 365 days)        Color   Clarity   Blood   Leuk Est   Nitrite   Protein   CREAT   Urine HCG        03/22/25 1705 Yellow   Clear   Negative   Negative   Positive   Trace                 Microbiology Results (last 10 days)       ** No results found for the last 240 hours. **            Hospital Course:     -Upper GI bleed suspected to be secondary to duodenal perforation  Gastroenterology was consulted, after evaluation recommended conservative management, but if bleeding continues patient will need to go to a center with interventional radiology capability for embolization [patient now hospice].     -Duodenal perforation  "secondary to lymphoma [complaint]  General Surgery was consulted and also recommended conservative management.  Patient and family are requested hospice care.     -Urinary tract infection  Patient is currently on ceftriaxone, unfortunately urine culture was not done and therefore we will complete 5 to 7-day course of antibiotic [now hospice]     -History of diffuse large B-cell lymphoma involving duodenal bulb  Patient was getting chemotherapy outpatient, oncologist was on consult and has recommended hospice care for patient and signed off.     Other chronic medical conditions-  Chronic anemia  Chronic lymphocytic leukemia  Bladder cancer  GERD  Hypertension      Physical Exam on Discharge:  /55   Pulse 76   Temp 97.8 °F (36.6 °C)   Resp 16   Ht 177.8 cm (70\")   Wt 49.9 kg (110 lb)   SpO2 96%   BMI 15.78 kg/m²   Physical Exam  Constitutional:       Appearance: Not in distress. Cachectic and frail. Chronically ill-appearing.   Eyes:      General: Lids are normal.      Pupils: Pupils are equal, round, and reactive to light.   HENT:      Head: Normocephalic.   Pulmonary:      Effort: Pulmonary effort is normal.   Chest:      Comments: Right chest port  Cardiovascular:      Normal rate.   Edema:     Peripheral edema absent.   Abdominal:      General: Abdomen is scaphoid.   Skin:     General: Skin is warm.      Coloration: Skin is pale.   Genitourinary:     Comments: Urinary incontinence  Neurological:      Mental Status: Alert, oriented to person, place, and time   Psychiatric:         Mood and Affect: Mood is calm    Condition on Discharge: Home hospice    Discharge Disposition:  Hospice/Medical Facility (DC - External)    Discharge Medications:     Discharge Medications        New Medications        Instructions Start Date   morphine 20 MG/ML concentrated solution 20mg/ml   10 mg, Oral, Every 1 Hour PRN             Continue These Medications        Instructions Start Date   acetaminophen 325 MG " tablet  Commonly known as: Tylenol   975 mg, Oral, Every 8 Hours, Take every 8 hours for 3 days then take prn as needed.      allopurinol 300 MG tablet  Commonly known as: ZYLOPRIM   300 mg, Oral, Daily      dexAMETHasone 4 MG tablet  Commonly known as: DECADRON   4 mg, Oral, 2 Times Daily With Meals, Take 1 tablet the night before chemotherapy treatment and 1 tablet the morning of his chemotherapy treatment.      HYDROcodone-acetaminophen  MG per tablet  Commonly known as: NORCO   1 tablet, Oral, Every 4 Hours PRN      IRON PO   1 tablet, Oral, Daily      latanoprost 0.005 % ophthalmic solution  Commonly known as: XALATAN   1 drop, Nightly      lidocaine-prilocaine 2.5-2.5 % cream  Commonly known as: EMLA   30 minutes prior to use apply generous amount of Emla Cream to port site and cover with clear plastic wrap until ready for access      metoprolol tartrate 25 MG tablet  Commonly known as: LOPRESSOR   25 mg, Oral, 2 Times Daily      multivitamins-minerals capsule capsule   1 capsule, 2 Times Daily      naloxone 4 MG/0.1ML nasal spray  Commonly known as: NARCAN   Call 911. Don't prime. [place] in 1 nostril for overdose. Repeat in 2-3 minutes in other nostril if no or minimal breathing/responsiveness.      nystatin 100,000 unit/mL suspension  Commonly known as: MYCOSTATIN   500,000 Units, 4 Times Daily      ondansetron 4 MG tablet  Commonly known as: Zofran   4 mg, Oral, Every 8 Hours PRN      pantoprazole 40 MG EC tablet  Commonly known as: Protonix   40 mg, Oral, Daily      predniSONE 10 MG tablet  Commonly known as: DELTASONE   50 mg, Oral, Daily, Daily for 5 days to begin with chemo      prochlorperazine 10 MG tablet  Commonly known as: COMPAZINE   10 mg, Oral, Every 4 Hours PRN      tamsulosin 0.4 MG capsule 24 hr capsule  Commonly known as: FLOMAX   1 capsule, Oral, Daily      vitamin B-12 1000 MCG tablet  Commonly known as: CYANOCOBALAMIN   1,000 mcg, Oral, Daily      vitamin D3 125 MCG (5000 UT) capsule  capsule   5,000 Units, Daily             Discharge Diet: Regular diet as tolerated    Activity at Discharge: As tolerated    Follow-up Appointments:   No future appointments.    Test Results Pending at Discharge: None    Electronically signed by Sandoval Davis MD, 03/27/25, 14:26 CDT.    Time: 60 minutes.          Electronically signed by Sandoval Davis MD at 03/27/25 1425

## (undated) DEVICE — ELECTRD BLD EDGE/INSUL1P 2.4X5.1MM STRL

## (undated) DEVICE — GLV SURG BIOGEL LTX PF 6 1/2

## (undated) DEVICE — GLOVE,SURG,SENSICARE,ALOE,LF,PF,6: Brand: MEDLINE

## (undated) DEVICE — SEAL ENDO INSTR SELF SEAL UROLOGY

## (undated) DEVICE — BG DRN WND JP W/TBG/100ML/BULB

## (undated) DEVICE — LARYNGOSCOPE BLDE MAC HNDL M SZ 35 ST CURAPLEX CURAVIEW LED

## (undated) DEVICE — SOLUTION IRRIG 3000ML STRL H2O USP UROMATIC PLAS CONT

## (undated) DEVICE — LARYNGOSCOPE VID MILLER 2 MTL BLADE M HNDL CURAPLEX

## (undated) DEVICE — INTENDED FOR TISSUE SEPARATION, AND OTHER PROCEDURES THAT REQUIRE A SHARP SURGICAL BLADE TO PUNCTURE OR CUT.: Brand: BARD-PARKER ® STAINLESS STEEL BLADES

## (undated) DEVICE — STERILE LATEX POWDER FREE SURGICAL GLOVES WITH HYDROGEL COATING: Brand: PROTEXIS

## (undated) DEVICE — SUT MNCRYL 4/0 PS2 27IN UD MCP426H

## (undated) DEVICE — BAG DRNGE COMB PK

## (undated) DEVICE — SUCTION IRRIGATOR: Brand: ENDOWRIST

## (undated) DEVICE — CVR UNIV C/ARM

## (undated) DEVICE — ANTIBACTERIAL UNDYED BRAIDED (POLYGLACTIN 910), SYNTHETIC ABSORBABLE SUTURE: Brand: COATED VICRYL

## (undated) DEVICE — SOLUTION IRRIG 3000ML 0.9% SOD CHL USP UROMATIC PLAS CONT

## (undated) DEVICE — CATHETER,FOLEY,3-WAY,20FR,30ML,100%SILI: Brand: MEDLINE

## (undated) DEVICE — SURGICAL PROCEDURE PACK CYTOSCOPY

## (undated) DEVICE — CONTRAST IOTHALAMATE MEGLUMINE 60% 50 ML INJ CONRAY 60

## (undated) DEVICE — BAG URIN DRNAGE 2000ML TB L48IN NDL SAMP ANTIREFLX CHMBR DRN

## (undated) DEVICE — CATHETER URET 5FR L70CM OPN END SGL LUMN INJ HUB FLEXIMA

## (undated) DEVICE — TIP COVER ACCESSORY

## (undated) DEVICE — STYLET INTUB 14FR MAL ALUM CVR PLAS SHTH EXT LUBRICATED

## (undated) DEVICE — DAVINCI: Brand: MEDLINE INDUSTRIES, INC.

## (undated) DEVICE — TBG PENCL TELESCP MEGADYNE SMOKE EVAC 10FT

## (undated) DEVICE — SYR LL TP 10ML STRL

## (undated) DEVICE — SEAL

## (undated) DEVICE — PATIENT RETURN ELECTRODE, SINGLE-USE, CONTACT QUALITY MONITORING, ADULT, WITH 9FT CORD, FOR PATIENTS WEIGING OVER 33LBS. (15KG): Brand: MEGADYNE

## (undated) DEVICE — DECANTER FLD 9IN ST BG FOR ASEP TRNSF OF FLD

## (undated) DEVICE — ADHS SKIN PREMIERPRO EXOFIN TOPICAL HI/VISC .5ML

## (undated) DEVICE — SPNG GZ STRL 2S 4X4 12PLY

## (undated) DEVICE — Device

## (undated) DEVICE — PK TURNOVER RM ADV

## (undated) DEVICE — GLV SURG DERMASSURE GRN LF PF 7.0

## (undated) DEVICE — DRAINBAG,ANTI-REFLUX TOWER,L/F,2000ML,LL: Brand: MEDLINE

## (undated) DEVICE — GLOVE SURG SZ 75 CRM LTX FREE POLYISOPRENE POLYMER BEAD ANTI

## (undated) DEVICE — SUT SILK 2/0 SUTUPAK TIES 24IN SA75H

## (undated) DEVICE — BLANKET WRM W29.9XL79.1IN UP BODY FORC AIR MISTRAL-AIR

## (undated) DEVICE — 4-PORT MANIFOLD: Brand: NEPTUNE 2

## (undated) DEVICE — ELECTRD BLD EZ CLN MOD XLNG 2.75IN

## (undated) DEVICE — GLV SURG SENSICARE W/ALOE PF LF 6.5 STRL

## (undated) DEVICE — BALLOON DILATATION CATHETER: Brand: UROMAX ULTRA

## (undated) DEVICE — EVACUATOR URO BLDR W/ ADPT UROVAC

## (undated) DEVICE — CONTAINER,SPECIMEN,OR STERILE,4OZ: Brand: MEDLINE

## (undated) DEVICE — SUT PROLN 0 MO7 CR8 18IN C841G

## (undated) DEVICE — KT CLN CLEANOR SCPE

## (undated) DEVICE — TRY PREP SCRB VAG PVP

## (undated) DEVICE — NDL HYPO PRECISIONGLIDE REG 25G 1 1/2

## (undated) DEVICE — INFLATION DEVICE: Brand: ENCORE™ 26

## (undated) DEVICE — TUBE ET 7.5MM NSL ORAL BASIC CUF INTMED MURPHY EYE RADPQ

## (undated) DEVICE — TRAP FLD MINIVAC MEGADYNE 100ML

## (undated) DEVICE — 3M™ IOBAN™ 2 ANTIMICROBIAL INCISE DRAPE 6650EZ: Brand: IOBAN™ 2

## (undated) DEVICE — ST TBG AIRSEAL FLTR TRI LUM

## (undated) DEVICE — SUT PROLN 2/0 SH 36IN 8523H

## (undated) DEVICE — SOLUTION IV IRRIG POUR BRL 0.9% SODIUM CHL 2F7124

## (undated) DEVICE — PAD,EYE,1-5/8X2 5/8,STERILE,LF,1/PK: Brand: MEDLINE

## (undated) DEVICE — GUIDEWIRE ENDOSCP L150CM DIA0.035IN TIP 3CM PTFE NIT

## (undated) DEVICE — PAD MINOR UNIVERSAL: Brand: MEDLINE INDUSTRIES, INC.

## (undated) DEVICE — SYRINGE,PISTON,IRRIGATION,60ML,STERILE: Brand: MEDLINE

## (undated) DEVICE — DRSNG SURESITE WNDW 4X4.5

## (undated) DEVICE — SUT SILK 2/0 SH 30IN K833H

## (undated) DEVICE — ELECTRODE,CUTTING,STERILE.24FR: Brand: N.A.

## (undated) DEVICE — ADHS LIQ MASTISOL 2/3ML

## (undated) DEVICE — NEEDLE,22GX1.5",REG,BEVEL: Brand: MEDLINE

## (undated) DEVICE — SYR CONTRL LUERLOK 10CC

## (undated) DEVICE — DECANTER: Brand: UNBRANDED

## (undated) DEVICE — ARM DRAPE

## (undated) DEVICE — GLOVE SURG SZ 65 L12IN FNGR THK79MIL GRN LTX FREE

## (undated) DEVICE — BLADELESS OBTURATOR: Brand: WECK VISTA

## (undated) DEVICE — SOLUTION IV IRRIG 1000ML POUR BTL 2F7114

## (undated) DEVICE — MICRO HVTSA, 0.5G AND HVTSA SOURCEMARK PRODUCT CODE M1206 AND M1206-01: Brand: EXOFIN MICRO HVTSA, 0.5G

## (undated) DEVICE — 3M™ STERI-STRIP™ REINFORCED ADHESIVE SKIN CLOSURES, R1547, 1/2 IN X 4 IN (12 MM X 100 MM), 6 STRIPS/ENVELOPE: Brand: 3M™ STERI-STRIP™